# Patient Record
Sex: FEMALE | Race: WHITE | NOT HISPANIC OR LATINO | Employment: UNEMPLOYED | URBAN - METROPOLITAN AREA
[De-identification: names, ages, dates, MRNs, and addresses within clinical notes are randomized per-mention and may not be internally consistent; named-entity substitution may affect disease eponyms.]

---

## 2017-01-11 ENCOUNTER — HOSPITAL ENCOUNTER (OUTPATIENT)
Dept: RADIOLOGY | Facility: HOSPITAL | Age: 62
Discharge: HOME | End: 2017-01-11
Attending: INTERNAL MEDICINE
Payer: COMMERCIAL

## 2017-01-11 DIAGNOSIS — K76.0 FATTY LIVER: ICD-10-CM

## 2017-01-11 PROCEDURE — 76700 US EXAM ABDOM COMPLETE: CPT | Mod: 26,,, | Performed by: RADIOLOGY

## 2017-01-11 PROCEDURE — 76700 US EXAM ABDOM COMPLETE: CPT | Mod: TC

## 2017-01-17 ENCOUNTER — OFFICE VISIT (OUTPATIENT)
Dept: ORTHOPEDICS | Facility: CLINIC | Age: 62
End: 2017-01-17
Payer: COMMERCIAL

## 2017-01-17 VITALS
RESPIRATION RATE: 19 BRPM | BODY MASS INDEX: 41.78 KG/M2 | HEIGHT: 66 IN | SYSTOLIC BLOOD PRESSURE: 165 MMHG | WEIGHT: 260 LBS | DIASTOLIC BLOOD PRESSURE: 95 MMHG | HEART RATE: 87 BPM

## 2017-01-17 DIAGNOSIS — M17.0 PRIMARY OSTEOARTHRITIS OF BOTH KNEES: Primary | ICD-10-CM

## 2017-01-17 PROCEDURE — 99499 UNLISTED E&M SERVICE: CPT | Mod: S$GLB,,, | Performed by: NURSE PRACTITIONER

## 2017-01-17 PROCEDURE — 20610 DRAIN/INJ JOINT/BURSA W/O US: CPT | Mod: 50,S$GLB,, | Performed by: NURSE PRACTITIONER

## 2017-01-17 PROCEDURE — 99999 PR PBB SHADOW E&M-EST. PATIENT-LVL IV: CPT | Mod: PBBFAC,,, | Performed by: NURSE PRACTITIONER

## 2017-01-17 RX ORDER — TRIAMCINOLONE ACETONIDE 40 MG/ML
80 INJECTION, SUSPENSION INTRA-ARTICULAR; INTRAMUSCULAR
Status: COMPLETED | OUTPATIENT
Start: 2017-01-17 | End: 2017-01-17

## 2017-01-17 RX ADMIN — TRIAMCINOLONE ACETONIDE 80 MG: 40 INJECTION, SUSPENSION INTRA-ARTICULAR; INTRAMUSCULAR at 03:01

## 2017-01-17 NOTE — PROGRESS NOTES
Pt with known bilateral knee djd. She returns today for repeat cortisone injections.     Knee Injection Procedure Note    Pre-operative Diagnosis: bilateral knee degenerative arthritis    Post-operative Diagnosis: same    Indications: bilateral knee pain    Anesthesia: none    Procedure Details     Verbal consent was obtained for the procedure. The injection site was identified and the skin was prepared with alcohol. The bilateral knee was injected from an anterolateral approach with 1 ml of Kenalog and 5 ml Lidocaine under sterile technique using a 22 gauge needle. The needle was removed and the area cleansed and dressed.    Complications:  None; patient tolerated the procedure well.    she was advised to rest the knee today, using ice and elevation as needed for comfort and swelling. she did receive immediate relief of the knee pain. she was told this would be short lived and is secondary to the lidocaine. she may have an increase in discomfort tonight followed by steady improvement over the next several days. It may take 1-3 weeks following the injection to get the full benefit of the medication.

## 2017-03-15 RX ORDER — NAPROXEN 500 MG/1
TABLET ORAL
Qty: 60 TABLET | Refills: 0 | Status: SHIPPED | OUTPATIENT
Start: 2017-03-15 | End: 2018-09-26 | Stop reason: ALTCHOICE

## 2017-03-16 RX ORDER — TRIAMTERENE AND HYDROCHLOROTHIAZIDE 37.5; 25 MG/1; MG/1
CAPSULE ORAL
Qty: 90 CAPSULE | Refills: 0 | Status: SHIPPED | OUTPATIENT
Start: 2017-03-16 | End: 2017-04-21 | Stop reason: SDUPTHER

## 2017-04-21 ENCOUNTER — OFFICE VISIT (OUTPATIENT)
Dept: INTERNAL MEDICINE | Facility: CLINIC | Age: 62
End: 2017-04-21
Payer: COMMERCIAL

## 2017-04-21 VITALS
RESPIRATION RATE: 17 BRPM | SYSTOLIC BLOOD PRESSURE: 130 MMHG | BODY MASS INDEX: 41.76 KG/M2 | DIASTOLIC BLOOD PRESSURE: 90 MMHG | WEIGHT: 259.81 LBS | TEMPERATURE: 98 F | HEIGHT: 66 IN | HEART RATE: 91 BPM

## 2017-04-21 DIAGNOSIS — F43.21 ADJUSTMENT DISORDER WITH DEPRESSED MOOD: ICD-10-CM

## 2017-04-21 DIAGNOSIS — E66.01 MORBID OBESITY WITH BMI OF 40.0-44.9, ADULT: ICD-10-CM

## 2017-04-21 DIAGNOSIS — E89.0 POSTOPERATIVE HYPOTHYROIDISM: Primary | ICD-10-CM

## 2017-04-21 DIAGNOSIS — R73.03 PRE-DIABETES: ICD-10-CM

## 2017-04-21 DIAGNOSIS — I10 ESSENTIAL HYPERTENSION: ICD-10-CM
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
## 2017-04-21 DIAGNOSIS — Z12.31 ENCOUNTER FOR SCREENING MAMMOGRAM FOR BREAST CANCER: ICD-10-CM

## 2017-04-21 DIAGNOSIS — E78.5 HYPERLIPIDEMIA, UNSPECIFIED HYPERLIPIDEMIA TYPE: ICD-10-CM

## 2017-04-21 PROCEDURE — 1160F RVW MEDS BY RX/DR IN RCRD: CPT | Mod: S$GLB,,, | Performed by: INTERNAL MEDICINE

## 2017-04-21 PROCEDURE — 99999 PR PBB SHADOW E&M-EST. PATIENT-LVL III: CPT | Mod: PBBFAC,,, | Performed by: INTERNAL MEDICINE

## 2017-04-21 PROCEDURE — 3075F SYST BP GE 130 - 139MM HG: CPT | Mod: S$GLB,,, | Performed by: INTERNAL MEDICINE

## 2017-04-21 PROCEDURE — 99214 OFFICE O/P EST MOD 30 MIN: CPT | Mod: S$GLB,,, | Performed by: INTERNAL MEDICINE

## 2017-04-21 PROCEDURE — 3080F DIAST BP >= 90 MM HG: CPT | Mod: S$GLB,,, | Performed by: INTERNAL MEDICINE

## 2017-04-21 RX ORDER — ATORVASTATIN CALCIUM 10 MG/1
10 TABLET, FILM COATED ORAL DAILY
Qty: 90 TABLET | Refills: 3 | Status: SHIPPED | OUTPATIENT
Start: 2017-04-21 | End: 2017-10-23

## 2017-04-21 RX ORDER — LEVOTHYROXINE SODIUM 137 UG/1
TABLET ORAL
Qty: 30 TABLET | Refills: 11 | Status: SHIPPED | OUTPATIENT
Start: 2017-04-21 | End: 2017-04-24

## 2017-04-21 RX ORDER — BUPROPION HYDROCHLORIDE 100 MG/1
100 TABLET, EXTENDED RELEASE ORAL 2 TIMES DAILY
Qty: 60 TABLET | Refills: 2 | Status: SHIPPED | OUTPATIENT
Start: 2017-04-21 | End: 2017-10-09 | Stop reason: ALTCHOICE

## 2017-04-21 RX ORDER — MUPIROCIN 20 MG/G
OINTMENT TOPICAL 2 TIMES DAILY PRN
Qty: 30 G | Refills: 0 | Status: SHIPPED | OUTPATIENT
Start: 2017-04-21 | End: 2018-09-26 | Stop reason: ALTCHOICE

## 2017-04-21 RX ORDER — METFORMIN HYDROCHLORIDE 750 MG/1
750 TABLET, EXTENDED RELEASE ORAL DAILY
Qty: 90 TABLET | Refills: 0 | Status: SHIPPED | OUTPATIENT
Start: 2017-04-21 | End: 2017-07-18 | Stop reason: SDUPTHER

## 2017-04-21 RX ORDER — LOSARTAN POTASSIUM 25 MG/1
25 TABLET ORAL DAILY
Qty: 30 TABLET | Refills: 11 | Status: SHIPPED | OUTPATIENT
Start: 2017-04-21 | End: 2017-06-05

## 2017-04-21 NOTE — MR AVS SNAPSHOT
[facility]  [address]  [place] [address]  Phone: [phone]  Fax: [phone]                  Carolina Marcum   2017 9:00 AM   Office Visit    Description:  Female : 1955   Provider:  Kierra Cornejo MD   Department:  [facility]           Reason for Visit     Medication Refill     Follow-up           Diagnoses this Visit        Comments    Postoperative hypothyroidism    -  Primary     Adjustment disorder with depressed mood         Morbid obesity with BMI of 40.0-44.9, adult         Essential hypertension         Insect bite, initial encounter         Hyperlipidemia, unspecified hyperlipidemia type         Pre-diabetes         Encounter for screening mammogram for breast cancer                To Do List           Future Appointments        Provider Department Dept Phone    2017 9:40 AM LAB, APPOINTMENT [facility] [phone]      Goals (5 Years of Data)     None      Follow-Up and Disposition     Return in about 6 weeks (around 2017).       These Medications        Disp Refills Start End    SYNTHROID 137 mcg Tab tablet 30 tablet 11 2017     1 tab daily except 1/2 tab on Sundays.    Pharmacy: [facility] [address] [place] [address] [address] AT [facility] Ph #: [phone]       buPROPion (WELLBUTRIN SR) 100 MG TBSR 12 hr tablet 60 tablet 2 2017    Take 1 tablet (100 mg total) by mouth 2 (two) times daily. - Oral    Pharmacy: [facility] [address] [place], [place] [address] [address] [address] AT [facility] Ph #: [phone]       losartan (COZAAR) 25 MG tablet 30 tablet 11 2017    Take 1 tablet (25 mg total) by mouth once daily. - Oral    Pharmacy: [facility] [address] [place], [place] [address] [address] AT [facility] Ph #: [phone]       mupirocin (BACTROBAN) 2 % ointment 30 g 0  4/21/2017     Apply topically 2 (two) times daily as needed. - Topical (Top)    Pharmacy: [facility] [address] AT [facility] #: [phone]       metformin (GLUCOPHAGE-XR) 750 MG 24 hr tablet 90 tablet 0 4/21/2017     Take 1 tablet (750 mg total) by mouth once daily. - Oral    Pharmacy: [facility] [place] [address] [address] AT [facility] #: [phone]       atorvastatin (LIPITOR) 10 MG tablet 90 tablet 3 4/21/2017     Take 1 tablet (10 mg total) by mouth once daily. - Oral    Pharmacy: [facility] [address] [facility] #: [phone]       Notes to Pharmacy: **Patient requests 90 day supply**      [facility]     [facility] - 24/7 Assistance  Unless otherwise directed by your provider, please contact [facility], our nurse care line that is available for 24/7 assistance.     Registered nurses in the [facility] provide: appointment scheduling, clinical advisement, health education, and other advisory services.  Call: [phone] (toll free)               Medications           Message regarding Medications     Verify the changes and/or additions to your medication regime listed below are the same as discussed with your clinician today.  If any of these changes or additions are incorrect, please notify your healthcare provider.        START taking these NEW medications        Refills    buPROPion (WELLBUTRIN SR) 100 MG TBSR 12 hr tablet 2    Sig: Take 1 tablet (100 mg total) by mouth 2 (two) times daily.    Class: Normal    Route: Oral    losartan (COZAAR) 25 MG tablet 11    Sig: Take 1 tablet (25 mg total) by mouth once daily.    Class: Normal    Route: Oral    mupirocin (BACTROBAN) 2 % ointment 0    Sig: Apply topically 2 (two) times daily as needed.    Class: Normal    Route: Topical (Top)       CHANGE how you are taking these medications     Start Taking Instead of    metformin (GLUCOPHAGE-XR) 750 MG 24 hr tablet metformin (GLUCOPHAGE-XR) 750 MG 24 hr tablet    Dosage:  Take 1 tablet (750 mg total) by mouth once daily. Dosage:  TAKE 1 TABLET BY MOUTH DAILY WITH BREAKFAST    Reason for Change:  Reorder     atorvastatin (LIPITOR) 10 MG tablet atorvastatin (LIPITOR) 10 MG tablet    Dosage:  Take 1 tablet (10 mg total) by mouth once daily. Dosage:  TAKE 1 TABLET BY MOUTH ONCE DAILY    Reason for Change:  Reorder            Verify that the below list of medications is an accurate representation of the medications you are currently taking.  If none reported, the list may be blank. If incorrect, please contact your healthcare provider. Carry this list with you in case of emergency.           Current Medications     atorvastatin (LIPITOR) 10 MG tablet Take 1 tablet (10 mg total) by mouth once daily.    cetirizine (ZYRTEC) 10 MG tablet Take 10 mg by mouth. Half Tablet Oral Every day    esomeprazole (NEXIUM) 20 MG capsule Take 1 capsule (20 mg total) by mouth 2 (two) times daily. 1 Capsule, Delayed Release(E.C.) Oral Every evening    estradiol (VAGIFEM) 10 mcg Tab Insert vaginally twice weekly H.S.    fluticasone (FLONASE) 50 mcg/actuation nasal spray 1 Spray, Suspension Nasal Twice a day     hydrocortisone-pramox-E-pram#1 (ANALPRAM E) 2.5-1-1 % KtCT Place 1 application rectally 3 (three) times daily. 1 Cream Rectal 3-4 times per day.    hydrocortisone-pramoxine (PROCTOFOAM HC) rectal foam Place rectally 3 (three) times daily. 1 Foam Rectal Three times a day    metformin (GLUCOPHAGE-XR) 750 MG 24 hr tablet Take 1 tablet (750 mg total) by mouth once daily.    montelukast (SINGULAIR) 10 mg tablet TAKE 1 TABLET BY MOUTH EVERY EVENING    multivitamin (THERAGRAN) per tablet Take by mouth. 1 Tablet Oral Every day    naproxen (NAPROSYN) 500 MG tablet TAKE 1 TABLET BY MOUTH TWICE DAILY    nystatin (MYCOSTATIN) cream Apply  "topically 2 (two) times daily.    SYNTHROID 137 mcg Tab tablet 1 tab daily except 1/2 tab on Sundays.    terconazole (TERAZOL 3) 0.8 % vaginal cream     tramadol (ULTRAM) 50 mg tablet Take 1 tablet (50 mg total) by mouth every 6 (six) hours as needed for Pain.    tretinoin, emollient, 0.05 % Crea 1 Cream Topical Every day    TRIAMCINOLONE ACETONIDE TOP 1   Every day (cream)    triamterene-hydrochlorothiazide 37.5-25 mg (DYAZIDE) 37.5-25 mg per capsule TAKE 1 CAPSULE BY MOUTH EVERY DAY WITH A BANANA    buPROPion (WELLBUTRIN SR) 100 MG TBSR 12 hr tablet Take 1 tablet (100 mg total) by mouth 2 (two) times daily.    ciclesonide (OMNARIS) 50 mcg nasal spray 50 mcg. 2 Spray, Non-Aerosol Nasal Every day    losartan (COZAAR) 25 MG tablet Take 1 tablet (25 mg total) by mouth once daily.    mupirocin (BACTROBAN) 2 % ointment Apply topically 2 (two) times daily as needed.    pilocarpine (SALAGEN) 5 MG Tab Take 1 tablet (5 mg total) by mouth 2 (two) times daily.    topiramate (TOPAMAX) 50 MG tablet Take 1 tablet (50 mg total) by mouth once daily.           Clinical Reference Information           Your Vitals Were     BP Pulse Temp Resp Height Weight    130/90 91 98.1 °F (36.7 °C) (Oral) 17 5' 6" (1.676 m) 117.8 kg (259 lb 13 oz)    BMI                41.93 kg/m2          Blood Pressure          Most Recent Value    BP  (!)  130/90      Allergies as of 4/21/2017     No Known Allergies      Immunizations Administered on Date of Encounter - 4/21/2017     None      Orders Placed During Today's Visit     Future Labs/Procedures Expected by Expires    Mammo Digital Screening Bilateral With CAD  4/21/2017 6/21/2018    T4, free  4/21/2017 6/20/2018    TSH  4/21/2017 6/20/2018      Language Assistance Services     ATTENTION: Language assistance services are available, free of charge. Please call [phone].      ATENCIÓN: Si habla español, tiene a hoff disposición servicios gratuitos de asistencia lingüística. Llame al " [phone].     TERESSA Ý: N?u b?n nói Ti?ng Vi?t, có các d?ch v? h? tr? ngôn ng? mi?n phí dành cho b?n. G?i s? [phone].         [facility] complies with applicable Federal civil rights laws and does not discriminate on the basis of race, color, national origin, age, disability, or sex.

## 2017-04-21 NOTE — PROGRESS NOTES
"Subjective:       Patient ID: Carolina Marcum is a 62 y.o. female.    Chief Complaint: Medication Refill and Follow-up    HPI   Thyroid nodule s/p total thyroidectomy-->Hypothyroidism - synthroid 137mcg daily. T4 mildly elevated w/ low normal TSH. Decreased synthroid to 137mcg daily except 1/2 tab at Sunday. Sometimes forgetful to take the half and would just take 1/2 some day in the week day. More hectic life and so weight loss of a pound. No palpitations unless too much caffeine or alcohol. No SOB/LE edema/cp.    Hasn't started Ideal Protein.     HTN - triamterene-hydrochlorothiazide. Takes it intermittently as it causes achy joints. Tries to drink a lot of water.     Previously on lexapro 10mg daily and wellbutrin XL 150mg daily previously. Stress and feels down x 6 mo. Caregiver. Cries intermittently.     Review of Systems  as above in HPI.     Objective:      Physical Exam    BP (!) 130/90  Pulse 91  Temp 98.1 °F (36.7 °C) (Oral)   Resp 17  Ht 5' 6" (1.676 m)  Wt 117.8 kg (259 lb 13 oz)  BMI 41.93 kg/m2    gEN - a+ox4, nad  HEENT - PERRL, op clear. MMM.   Neck - No thyromegaly or cervical LAD. No thyroid masses felt.  CV - RRR, no m/r   Chest - CTAB, no wheezing or rhonchi  Abd - S/NT/ND/+BS.   Ext - 2+B radial pulses. No LE edema.   Skin - right lower/mid back w/ area of redness that's tender to palpation. Also has small nodules that feel like LN on the upper thighs (about 3 each upper leg), nontender and soft.     Assessment/Plan     Carolina was seen today for medication refill and follow-up.    Diagnoses and all orders for this visit:    Postoperative hypothyroidism  -     TSH; Future  -     T4, free; Future  -     SYNTHROID 137 mcg Tab tablet; 1 tab daily except 1/2 tab on Sundays.    Adjustment disorder with depressed mood - restart wellbutrin but at 100mg bid.   -     buPROPion (WELLBUTRIN SR) 100 MG TBSR 12 hr tablet; Take 1 tablet (100 mg total) by mouth 2 (two) times daily.    Morbid obesity " with BMI of 41.9, adult - start ideal protein.   -     metformin (GLUCOPHAGE-XR) 750 MG 24 hr tablet; Take 1 tablet (750 mg total) by mouth once daily.    Essential hypertension  -     losartan (COZAAR) 25 MG tablet; Take 1 tablet (25 mg total) by mouth once daily.    Insect bite, initial encounter  -     mupirocin (BACTROBAN) 2 % ointment; Apply topically 2 (two) times daily as needed.    Hyperlipidemia, unspecified hyperlipidemia type  -     atorvastatin (LIPITOR) 10 MG tablet; Take 1 tablet (10 mg total) by mouth once daily.    Pre-diabetes  -     metformin (GLUCOPHAGE-XR) 750 MG 24 hr tablet; Take 1 tablet (750 mg total) by mouth once daily.    Encounter for screening mammogram for breast cancer  -     Mammo Digital Screening Bilateral With CAD; Future    Pt to monitor size of nodules on the thighs to ensure no growth. Let me know if there is growth.     Return in about 6 weeks (around 6/2/2017).      Kierra Cornejo MD  Department of Internal Medicine - [facility]  9:10 AM

## 2017-04-24 ENCOUNTER — TELEPHONE (OUTPATIENT)
Dept: INTERNAL MEDICINE | Facility: CLINIC | Age: 62
End: 2017-04-24

## 2017-04-24 RX ORDER — LEVOTHYROXINE SODIUM 125 UG/1
125 TABLET ORAL DAILY
Qty: 90 TABLET | Refills: 3 | Status: SHIPPED | OUTPATIENT
Start: 2017-04-24 | End: 2017-06-06 | Stop reason: SDUPTHER

## 2017-05-14 ENCOUNTER — HOSPITAL ENCOUNTER (EMERGENCY)
Facility: HOSPITAL | Age: 62
Discharge: HOME | End: 2017-05-14
Attending: EMERGENCY MEDICINE
Payer: COMMERCIAL

## 2017-05-14 VITALS
TEMPERATURE: 99 F | DIASTOLIC BLOOD PRESSURE: 73 MMHG | OXYGEN SATURATION: 97 % | RESPIRATION RATE: 16 BRPM | HEIGHT: 66 IN | BODY MASS INDEX: 40.18 KG/M2 | WEIGHT: 250 LBS | SYSTOLIC BLOOD PRESSURE: 188 MMHG | HEART RATE: 85 BPM

## 2017-05-14 DIAGNOSIS — T78.40XA ALLERGIC REACTION, INITIAL ENCOUNTER: Primary | ICD-10-CM

## 2017-05-14 LAB — POCT GLUCOSE: 109 MG/DL (ref 70–110)

## 2017-05-14 PROCEDURE — 99284 EMERGENCY DEPT VISIT MOD MDM: CPT | Mod: 25

## 2017-05-14 PROCEDURE — 96374 THER/PROPH/DIAG INJ IV PUSH: CPT

## 2017-05-14 PROCEDURE — 63600175 PHARM REV CODE 636 W HCPCS: Performed by: EMERGENCY MEDICINE

## 2017-05-14 PROCEDURE — 96375 TX/PRO/DX INJ NEW DRUG ADDON: CPT

## 2017-05-14 PROCEDURE — 25000003 PHARM REV CODE 250: Performed by: EMERGENCY MEDICINE

## 2017-05-14 PROCEDURE — 82962 GLUCOSE BLOOD TEST: CPT

## 2017-05-14 RX ORDER — FAMOTIDINE 10 MG/ML
40 INJECTION INTRAVENOUS
Status: COMPLETED | OUTPATIENT
Start: 2017-05-14 | End: 2017-05-14

## 2017-05-14 RX ORDER — DIPHENHYDRAMINE HYDROCHLORIDE 50 MG/ML
12.5 INJECTION INTRAMUSCULAR; INTRAVENOUS
Status: COMPLETED | OUTPATIENT
Start: 2017-05-14 | End: 2017-05-14

## 2017-05-14 RX ORDER — PREDNISONE 20 MG/1
40 TABLET ORAL DAILY
Qty: 10 TABLET | Refills: 0 | Status: SHIPPED | OUTPATIENT
Start: 2017-05-14 | End: 2017-05-24

## 2017-05-14 RX ORDER — EPINEPHRINE 0.3 MG/.3ML
1 INJECTION SUBCUTANEOUS
Qty: 1 DEVICE | Refills: 0 | Status: SHIPPED | OUTPATIENT
Start: 2017-05-14 | End: 2019-08-12

## 2017-05-14 RX ORDER — METHYLPREDNISOLONE SOD SUCC 125 MG
125 VIAL (EA) INJECTION
Status: COMPLETED | OUTPATIENT
Start: 2017-05-14 | End: 2017-05-14

## 2017-05-14 RX ORDER — DIPHENHYDRAMINE HCL 25 MG
25 CAPSULE ORAL EVERY 6 HOURS PRN
Qty: 30 CAPSULE | Refills: 1 | Status: SHIPPED | OUTPATIENT
Start: 2017-05-14 | End: 2017-06-05

## 2017-05-14 RX ORDER — FAMOTIDINE 20 MG/1
20 TABLET, FILM COATED ORAL 2 TIMES DAILY
Qty: 10 TABLET | Refills: 0 | Status: SHIPPED | OUTPATIENT
Start: 2017-05-14 | End: 2017-06-27

## 2017-05-14 RX ADMIN — METHYLPREDNISOLONE SODIUM SUCCINATE 125 MG: 125 INJECTION, POWDER, FOR SOLUTION INTRAMUSCULAR; INTRAVENOUS at 09:05

## 2017-05-14 RX ADMIN — FAMOTIDINE 40 MG: 10 INJECTION, SOLUTION INTRAVENOUS at 09:05

## 2017-05-14 RX ADMIN — DIPHENHYDRAMINE HYDROCHLORIDE 12.5 MG: 50 INJECTION, SOLUTION INTRAMUSCULAR; INTRAVENOUS at 09:05

## 2017-05-14 NOTE — ED AVS SNAPSHOT
[facility]  [address] [place] [address]               Carolina Marcum   2017  9:01 PM   ED    Description:  Female : 1955   Department:  [facility]           Your Care was Coordinated By:     Provider Role From To    Wilmer Ren MD Attending Provider 17 1908 --      Reason for Visit     Allergic Reaction           Diagnoses this Visit        Comments    Allergic reaction, initial encounter    -  Primary       ED Disposition     ED Disposition Condition Comment    Discharge             To Do List           Follow-up Information     Follow up with Kierra Cornejo MD In 1 week(s).    Specialty:  Internal Medicine    Contact information:    [address]  [facility] [address]  [phone]        Referral Details     Referred By    Referred To    Wilmer Ren MD   [address] [place] [place] [address]   Phone:  [phone]   Fax:  [phone]    Order:  Ambulatory Referral To Allergy   Reason:  Specialty Services Required           These Medications        Disp Refills Start End    diphenhydrAMINE (BENADRYL) 25 mg capsule 30 capsule 1 2017     Take 1 each (25 mg total) by mouth every 6 (six) hours as needed for Itching or Allergies. - Oral    Pharmacy: [facility] [address] [place] [address] [address] AT [facility] Ph #: [phone]       predniSONE (DELTASONE) 20 MG tablet 10 tablet 0 2017    Take 2 tablets (40 mg total) by mouth once daily. - Oral    Pharmacy: [facility] [address] [place] [address] [address] AT [facility] Ph #: [phone]       famotidine (PEPCID AC) 20 MG tablet 10 tablet 0 2017    Take 1 tablet (20 mg total) by mouth 2 (two) times daily. - Oral    Pharmacy: [facility] [address] [place] [address] [place] [address] [address] AT [facility] Ph #: [phone]       epinephrine  (EPIPEN) 0.3 mg/0.3 mL AtIn 1 Device 0 5/14/2017 5/14/2018    Inject 0.3 mLs (0.3 mg total) into the muscle as needed (for allergic reaction in which you feel like your throat is swelling, you cannot breathe.). - Intramuscular    Pharmacy: [facility] [id number] [address] [facility] [address] AT [facility] #: [phone]         [facility]     [facility] On Call Nurse Care Line - 24/7 Assistance  Unless otherwise directed by your provider, please contact [facility], our nurse care line that is available for 24/7 assistance.     Registered nurses in the [facility] provide: appointment scheduling, clinical advisement, health education, and other advisory services.  Call: [phone] (toll free)               Medications           Message regarding Medications     Verify the changes and/or additions to your medication regime listed below are the same as discussed with your clinician today.  If any of these changes or additions are incorrect, please notify your healthcare provider.        START taking these NEW medications        Refills    diphenhydrAMINE (BENADRYL) 25 mg capsule 1    Sig: Take 1 each (25 mg total) by mouth every 6 (six) hours as needed for Itching or Allergies.    Class: Print    Route: Oral    predniSONE (DELTASONE) 20 MG tablet 0    Sig: Take 2 tablets (40 mg total) by mouth once daily.    Class: Print    Route: Oral    famotidine (PEPCID AC) 20 MG tablet 0    Sig: Take 1 tablet (20 mg total) by mouth 2 (two) times daily.    Class: Print    Route: Oral    epinephrine (EPIPEN) 0.3 mg/0.3 mL AtIn 0    Sig: Inject 0.3 mLs (0.3 mg total) into the muscle as needed (for allergic reaction in which you feel like your throat is swelling, you cannot breathe.).    Class: Print    Route: Intramuscular      These medications were administered today        Dose Freq    methylPREDNISolone sodium succinate injection 125 mg 125 mg ED 1 Time    Sig: Inject 125 mg  into the vein ED 1 Time.    Class: Normal    Route: Intravenous    famotidine (PF) 20 mg/2 mL injection 40 mg 40 mg ED 1 Time    Sig: Inject 4 mLs (40 mg total) into the vein ED 1 Time.    Class: Normal    Route: Intravenous    diphenhydrAMINE injection 12.5 mg 12.5 mg ED 1 Time    Sig: Inject 0.25 mLs (12.5 mg total) into the vein ED 1 Time.    Class: Normal    Route: Intravenous           Verify that the below list of medications is an accurate representation of the medications you are currently taking.  If none reported, the list may be blank. If incorrect, please contact your healthcare provider. Carry this list with you in case of emergency.           Current Medications     atorvastatin (LIPITOR) 10 MG tablet Take 1 tablet (10 mg total) by mouth once daily.    buPROPion (WELLBUTRIN SR) 100 MG TBSR 12 hr tablet Take 1 tablet (100 mg total) by mouth 2 (two) times daily.    cetirizine (ZYRTEC) 10 MG tablet Take 10 mg by mouth. Half Tablet Oral Every day    ciclesonide (OMNARIS) 50 mcg nasal spray 50 mcg. 2 Spray, Non-Aerosol Nasal Every day    esomeprazole (NEXIUM) 20 MG capsule Take 1 capsule (20 mg total) by mouth 2 (two) times daily. 1 Capsule, Delayed Release(E.C.) Oral Every evening    estradiol (VAGIFEM) 10 mcg Tab Insert vaginally twice weekly H.S.    fluticasone (FLONASE) 50 mcg/actuation nasal spray 1 Spray, Suspension Nasal Twice a day     levothyroxine (SYNTHROID) 125 MCG tablet Take 1 tablet (125 mcg total) by mouth once daily. Stop synthroid 137mcg daily.    losartan (COZAAR) 25 MG tablet Take 1 tablet (25 mg total) by mouth once daily.    metformin (GLUCOPHAGE-XR) 750 MG 24 hr tablet Take 1 tablet (750 mg total) by mouth once daily.    multivitamin (THERAGRAN) per tablet Take by mouth. 1 Tablet Oral Every day    naproxen (NAPROSYN) 500 MG tablet TAKE 1 TABLET BY MOUTH TWICE DAILY    pilocarpine (SALAGEN) 5 MG Tab Take 1 tablet (5 mg total) by mouth 2 (two) times daily.    terconazole (TERAZOL 3) 0.8  "% vaginal cream     TRIAMCINOLONE ACETONIDE TOP 1   Every day (cream)    triamterene-hydrochlorothiazide 37.5-25 mg (DYAZIDE) 37.5-25 mg per capsule TAKE 1 CAPSULE BY MOUTH EVERY DAY WITH A BANANA    diphenhydrAMINE (BENADRYL) 25 mg capsule Take 1 each (25 mg total) by mouth every 6 (six) hours as needed for Itching or Allergies.    epinephrine (EPIPEN) 0.3 mg/0.3 mL AtIn Inject 0.3 mLs (0.3 mg total) into the muscle as needed (for allergic reaction in which you feel like your throat is swelling, you cannot breathe.).    famotidine (PEPCID AC) 20 MG tablet Take 1 tablet (20 mg total) by mouth 2 (two) times daily.    hydrocortisone-pramoxine (PROCTOFOAM HC) rectal foam Place rectally 3 (three) times daily. 1 Foam Rectal Three times a day    montelukast (SINGULAIR) 10 mg tablet TAKE 1 TABLET BY MOUTH EVERY EVENING    mupirocin (BACTROBAN) 2 % ointment Apply topically 2 (two) times daily as needed.    nystatin (MYCOSTATIN) cream Apply topically 2 (two) times daily.    predniSONE (DELTASONE) 20 MG tablet Take 2 tablets (40 mg total) by mouth once daily.    topiramate (TOPAMAX) 50 MG tablet Take 1 tablet (50 mg total) by mouth once daily.    tramadol (ULTRAM) 50 mg tablet Take 1 tablet (50 mg total) by mouth every 6 (six) hours as needed for Pain.    tretinoin, emollient, 0.05 % Crea 1 Cream Topical Every day           Clinical Reference Information           Your Vitals Were     BP Pulse Temp Resp Height Weight    188/73 (BP Location: Left arm, Patient Position: Lying, BP Method: Automatic) 85 98.6 °F (37 °C) (Oral) 16 5' 6" (1.676 m) 113.4 kg (250 lb)    SpO2 BMI             97% 40.35 kg/m2         Allergies as of 5/14/2017     No Known Allergies      Immunizations Administered on Date of Encounter - 5/14/2017     None      ED Micro, Lab, POCT     Start Ordered       Status Ordering Provider    05/14/17 2153 05/14/17 2153  POCT glucose  Once      Final result       ED Imaging Orders     None        Discharge " Instructions         Take your medications as prescribed.  Follow up with your primary care physician if you're not improving in 2-3 days.  Please return to the emergency department if another episode of ALLERGIC reaction, difficulty breathing, shortness of breath or any other concerns.  Please refer to the additional material providing further information including when return to the emergency department.   When to seek medical advice  Call your healthcare provider right away if any of these occur:  · Shortness of breath  · Increased swelling in the face, eyelids, or lips  · Dizziness, weakness  · Continuing or recurring symptoms  · Spreading areas of itching, redness or swelling  · Signs of infection:  ¨ Spreading redness  ¨ Increased pain or swelling  ¨ Fever (1 degree above your normal temperature) lasting for 24 to 48 hours Or, whatever your healthcare provider told you to report based on your medical condition  ¨ Colored fluid draining from the inflamed area  Date Last Reviewed: 7/30/2015  © 7098-0583 [employer]. [address] [place] [address]. All rights reserved. This information is not intended as a substitute for professional medical care. Always follow your healthcare professional's instructions.          Nasal Allergy Medicines  The table below lists the most common over-the-counter (OTC) medicines for nasal allergies. Some are pills. Some are liquids. And, some are nasal sprays. It's important to check with your healthcare provider or pharmacist before taking these medicines, even though they are available without a prescription. And, be sure to follow the instructions on the package labels.  Type of medicine Description of medicine   Antihistamines · Stops the release of histamine, a substance in the body that causes many allergy symptoms.  · Helps prevent sneezing, runny nose, and itchy and watery eyes.   Corticosteroids    · Reduces inflammation and swelling.  · Relieves  itching and sneezing.   Decongestants · Reduce swelling of nasal passages and relieve sinus pressure  · Overuse can worsen symptoms.   Mast cell inhibitors · Also help prevent cells from releasing histamine  · Prevent and relieve sneezing, itchiness, and runny nose.   Anticholinergics · Decrease mucus production in the nasal passages.  · Relieves runny nose.   Saline sprays, rinses, and gels · Provide lubrication or moisture to nasal passages. These can be used as often as needed.  · Help soothe irritated nasal passages. Loosens thick mucus.   NOTE: Talk to your healthcare provider or pharmacist about the possible side effects and drug or food interactions of any medicine you take.   How to use nasal spray  Nasal sprays must be used the right way to be effective. Be sure to do the following:  · Blow your nose to clear your nostrils.  · Gently shake the bottle. Then remove the cap.  · With your right hand, carefully insert the tip of the bottle into your left nostril. Make sure to point the tip toward your ear and not the center of the nose.  · While gently breathing in through your nose, press down once on the pump to release the spray.  · Breathe out through your mouth.  · With your left hand, repeat the steps for your right nostril.  Date Last Reviewed: 9/1/2016 © 2000-2016 [employer]. [address] [place] [address], [place] [address]. All rights reserved. This information is not intended as a substitute for professional medical care. Always follow your healthcare professional's instructions.          Discharge References/Attachments     ALLERGIC REACTION, OTHER (GENERAL) (ENGLISH)       [facility] complies with applicable Federal civil rights laws and does not discriminate on the basis of race, color, national origin, age, disability, or sex.        Language Assistance Services     ATTENTION: Language assistance services are available, free of charge. Please call [phone].       ATENCIÓN: Si habla español, tiene a hoff disposición servicios gratuitos de asistencia lingüística. Llame al [phone].     CHÚ Ý: N?u b?n nói Ti?ng Vi?t, có các d?ch v? h? tr? ngôn ng? mi?n phí dành cho b?n. G?i s? [phone].

## 2017-05-15 NOTE — DISCHARGE INSTRUCTIONS
Take your medications as prescribed.  Follow up with your primary care physician if you're not improving in 2-3 days.  Please return to the emergency department if another episode of ALLERGIC reaction, difficulty breathing, shortness of breath or any other concerns.  Please refer to the additional material providing further information including when return to the emergency department.   When to seek medical advice  Call your healthcare provider right away if any of these occur:  · Shortness of breath  · Increased swelling in the face, eyelids, or lips  · Dizziness, weakness  · Continuing or recurring symptoms  · Spreading areas of itching, redness or swelling  · Signs of infection:  ¨ Spreading redness  ¨ Increased pain or swelling  ¨ Fever (1 degree above your normal temperature) lasting for 24 to 48 hours Or, whatever your healthcare provider told you to report based on your medical condition  ¨ Colored fluid draining from the inflamed area  Date Last Reviewed: 7/30/2015  © 6948-9461 [employer]. [address] [place] [address]. All rights reserved. This information is not intended as a substitute for professional medical care. Always follow your healthcare professional's instructions.          Nasal Allergy Medicines  The table below lists the most common over-the-counter (OTC) medicines for nasal allergies. Some are pills. Some are liquids. And, some are nasal sprays. It's important to check with your healthcare provider or pharmacist before taking these medicines, even though they are available without a prescription. And, be sure to follow the instructions on the package labels.  Type of medicine Description of medicine   Antihistamines · Stops the release of histamine, a substance in the body that causes many allergy symptoms.  · Helps prevent sneezing, runny nose, and itchy and watery eyes.   Corticosteroids    · Reduces inflammation and swelling.  · Relieves itching and sneezing.    Decongestants · Reduce swelling of nasal passages and relieve sinus pressure  · Overuse can worsen symptoms.   Mast cell inhibitors · Also help prevent cells from releasing histamine  · Prevent and relieve sneezing, itchiness, and runny nose.   Anticholinergics · Decrease mucus production in the nasal passages.  · Relieves runny nose.   Saline sprays, rinses, and gels · Provide lubrication or moisture to nasal passages. These can be used as often as needed.  · Help soothe irritated nasal passages. Loosens thick mucus.   NOTE: Talk to your healthcare provider or pharmacist about the possible side effects and drug or food interactions of any medicine you take.   How to use nasal spray  Nasal sprays must be used the right way to be effective. Be sure to do the following:  · Blow your nose to clear your nostrils.  · Gently shake the bottle. Then remove the cap.  · With your right hand, carefully insert the tip of the bottle into your left nostril. Make sure to point the tip toward your ear and not the center of the nose.  · While gently breathing in through your nose, press down once on the pump to release the spray.  · Breathe out through your mouth.  · With your left hand, repeat the steps for your right nostril.  Date Last Reviewed: 9/1/2016  © 6916-5724 [employer]. [address] [place] [address], [place] [address]. All rights reserved. This information is not intended as a substitute for professional medical care. Always follow your healthcare professional's instructions.

## 2017-05-15 NOTE — ED PROVIDER NOTES
Encounter Date: 5/14/2017       History     Chief Complaint   Patient presents with    Allergic Reaction     reports possible allergic reaction that started approx 3 hrs ago, reports running nose and thick tongue, took zerytec 10mg      Review of patient's allergies indicates:  No Known Allergies  HPI Comments: CHIEF COMPLAINT: ALLERGIC reaction    HISTORY OF PRESENT ILLNESS: This is a 62-year-old female presents to the emergency Department today with complete ALLERGIC reaction.  She reports she is unaware of what she had causing the ALLERGIC reaction.  She is known to be ALLERGIC to oak and cats.  They were driving through the [place] today on the way back from eating.  She has no known food ALLERGIES.  Did not take any new medications.  She reports that she has a very thick nasal drainage that happens when this comes on.  It goes down the throat makes it feel like she cannot breathe.  She does not have any lip swelling but she reports she may have some tongue swelling.  No posterior pharynx swelling.  No stridor.  No nausea no vomiting.  No diarrhea.    REVIEW OF SYSTEMS:  Constitutional: No fever, no chills.  Eyes: No discharge. No pain.  HENT: No ear ache. No sore throat.  Cardiovascular: No chest pain, no palpitations.  Respiratory: No cough, no shortness of breath.  Gastrointestinal: No abdominal pain, no vomiting. No diarrhea.  Genitourinary: No hematuria, dysuria, urgency.  Musculoskeletal: No back pain.  Skin: No rashes, no lesions.  Neurological: No headache, no focal weakness.    ALLERGIES reviewed  Family history reviewed  Home medications reviewed  Problem list reviewed        The history is provided by the patient.     Past Medical History:   Diagnosis Date    Abnormal Pap smear of cervix 1989    [facility] - CK, then Hyst    Asthma, chronic     Bleeding hemorrhoids     Depression     Diverticular disease     Endometriosis     Fatty liver 12/15/2014    Herpes simplex without mention of complication      Hypercholesterolemia     Multiple gastric polyps 2012    Obesity     Reflux      Past Surgical History:   Procedure Laterality Date    BREAST BIOPSY      CERVICAL CONIZATION   W/ LASER       SECTION      x1    CHOLECYSTECTOMY      HYSTERECTOMY  age 42    [place], LSO (uterine fibroids, adhesions, endometriosis)    OOPHORECTOMY  age 42    LSO (endometriosis, cyst)    THYROIDECTOMY      TOE SURGERY      left    TOTAL THYROIDECTOMY       Family History   Problem Relation Age of Onset    Breast cancer Maternal Aunt     Ovarian cancer Neg Hx     Colon cancer Neg Hx      Social History   Substance Use Topics    Smoking status: Never Smoker    Smokeless tobacco: Never Used    Alcohol use No      Comment: none lately.  rarely has a drink for special occassion     Review of Systems   All other systems reviewed and are negative.      Physical Exam   Initial Vitals   BP Pulse Resp Temp SpO2   17   238/108 89 20 98.6 °F (37 °C) 99 %     Physical Exam    Nursing note and vitals reviewed.  Constitutional: She appears well-developed and well-nourished.   HENT:   Head: Normocephalic and atraumatic.   Mouth/Throat: Oropharynx is clear and moist.   No noticeable lip, tongue or uvular swelling.  She does have some nasal drainage.   Eyes: Conjunctivae and EOM are normal. Pupils are equal, round, and reactive to light. No scleral icterus.   Neck: Normal range of motion. Neck supple. No JVD present.   Cardiovascular: Normal rate, regular rhythm, normal heart sounds and intact distal pulses. Exam reveals no gallop and no friction rub.    No murmur heard.  Pulmonary/Chest: Breath sounds normal. No stridor. No respiratory distress. She has no wheezes. She has no rhonchi. She has no rales. She exhibits no tenderness.   Abdominal: Soft. Bowel sounds are normal. She exhibits no distension and no mass. There is no tenderness. There is no  rebound and no guarding.   Musculoskeletal: Normal range of motion. She exhibits no edema or tenderness.   Neurological: She is alert and oriented to person, place, and time. She has normal strength. No cranial nerve deficit.   Skin: Skin is warm and dry. No rash noted. No pallor.   Psychiatric: She has a normal mood and affect. Thought content normal.         ED Course   Procedures  Labs Reviewed   POCT GLUCOSE             Medical Decision Making:   ED Management:  This is a 62-year-old female presents to the emergency department with severe ALLERGIC reaction.  She has not had any swelling.  No stridor.  No breathing issues.  She feels much improved by medications.  We'll discharge her home on medications including short course of steroids, H1 and H2 blockers.  I will also write her a prescription for EpiPen given that she's had these reactions in the past.  The patient is comfortable with this plan and comfortable going home at this time. After taking into careful account the historical factors and physical exam findings of the patient's presentation today no acute emergent medical condition has been identified. The patient appears to be low risk for an emergent medical condition and I feel it is safe and appropriate at this time for the patient to be discharged to follow-up as detailed in their discharge instructions for reevaluation and possible continued outpatient workup and management. I have discussed the specifics of the workup with the patient and the patient has verbalized understanding of the details of the workup, the diagnosis, the treatment plan, and the need for outpatient follow-up. In addition, I have advised the patient that they can return to the ED and/or activate EMS at any time with worsening of their symptoms, change of their symptoms, or with any other medical complaint. The patient remained comfortable and stable during their visit in the ED.  Discharge and follow-up instructions discussed  with the patient who expressed understanding and willingness to comply with my recommendations.     This medical record was prepared using voice dictation software. There may be phonetic errors.                   ED Course   The pt feels improved following meds. Back to her baseline.   Clinical Impression:   The encounter diagnosis was Allergic reaction, initial encounter.    Disposition:   Disposition: Discharged  Condition: Stable       Wilmer Ren MD  05/14/17 5374

## 2017-05-15 NOTE — ED TRIAGE NOTES
Pt arrived in ED with complaints of allergic reaction that started a few hours ago. Complaints of SOB, frequent dry cough, and post nasal drip

## 2017-05-15 NOTE — ED NOTES
Patient has verified the spelling of their name and  on armband  LOC: The patient is awake, alert, and aware of environment with an appropriate affect, the patient is oriented x 4 and speaking appropriately.   APPEARANCE: Patient resting comfortably and in no acute distress, patient is clean and well groomed, patient's clothing is properly fastened.   SKIN: The skin is warm and dry, color consistent with ethnicity, patient has normal skin turgor and moist mucus membranes, skin intact, no breakdown or bruising noted.   : denies any problems urinating  MUSCULOSKELETAL: Patient moving all extremities spontaneously, no obvious swelling or deformities noted.   RESPIRATORY: Airway is open and patent, respirations are spontaneous, patient has a normal effort and rate, no accessory muscle use noted, bilateral breath sounds clear, complaints of SOB, post nasal drip allergic reaction per pt  ABDOMEN: Soft and non tender to palpation, no distention noted, normoactive bowel sounds present in all four quadrants, constipation at times.   CARDIAC: Normal rate and rhythm, no peripheral edema noted, less then 3 second capillary refill, denies chest pain

## 2017-05-25 ENCOUNTER — HOSPITAL ENCOUNTER (OUTPATIENT)
Dept: RADIOLOGY | Facility: HOSPITAL | Age: 62
Discharge: HOME | End: 2017-05-25
Attending: INTERNAL MEDICINE
Payer: COMMERCIAL

## 2017-05-25 DIAGNOSIS — Z12.31 ENCOUNTER FOR SCREENING MAMMOGRAM FOR BREAST CANCER: ICD-10-CM

## 2017-05-25 PROCEDURE — 77063 BREAST TOMOSYNTHESIS BI: CPT | Mod: 26,,, | Performed by: RADIOLOGY

## 2017-05-25 PROCEDURE — 77067 SCR MAMMO BI INCL CAD: CPT | Mod: TC

## 2017-05-25 PROCEDURE — 77067 SCR MAMMO BI INCL CAD: CPT | Mod: 26,,, | Performed by: RADIOLOGY

## 2017-05-28 ENCOUNTER — PATIENT MESSAGE (OUTPATIENT)
Dept: ADMINISTRATIVE | Facility: OTHER | Age: 62
End: 2017-05-28

## 2017-05-29 ENCOUNTER — TELEPHONE (OUTPATIENT)
Dept: RADIOLOGY | Facility: HOSPITAL | Age: 62
End: 2017-05-29

## 2017-05-29 ENCOUNTER — PATIENT MESSAGE (OUTPATIENT)
Dept: ADMINISTRATIVE | Facility: OTHER | Age: 62
End: 2017-05-29

## 2017-05-29 NOTE — TELEPHONE ENCOUNTER
Spoke with patient and explained mammogram findings.Patient expressed understanding of results. Patient is scheduled for a abnormal mammogram follow up appointment at [facility] on 5/30/17

## 2017-05-30 ENCOUNTER — HOSPITAL ENCOUNTER (OUTPATIENT)
Dept: RADIOLOGY | Facility: HOSPITAL | Age: 62
Discharge: HOME | End: 2017-05-30
Attending: INTERNAL MEDICINE
Payer: COMMERCIAL

## 2017-05-30 DIAGNOSIS — R92.8 ABNORMAL MAMMOGRAM: ICD-10-CM

## 2017-05-30 PROCEDURE — 77061 BREAST TOMOSYNTHESIS UNI: CPT | Mod: 26,LT,, | Performed by: RADIOLOGY

## 2017-05-30 PROCEDURE — 77065 DX MAMMO INCL CAD UNI: CPT | Mod: 26,LT,, | Performed by: RADIOLOGY

## 2017-05-30 PROCEDURE — 76642 ULTRASOUND BREAST LIMITED: CPT | Mod: 26,LT,, | Performed by: RADIOLOGY

## 2017-05-30 PROCEDURE — 77061 BREAST TOMOSYNTHESIS UNI: CPT | Mod: TC,LT

## 2017-05-30 PROCEDURE — 76642 ULTRASOUND BREAST LIMITED: CPT | Mod: TC,LT

## 2017-05-31 ENCOUNTER — TELEPHONE (OUTPATIENT)
Dept: RADIOLOGY | Facility: HOSPITAL | Age: 62
End: 2017-05-31

## 2017-05-31 NOTE — TELEPHONE ENCOUNTER
Spoke with patient. Reviewed breast biopsy procedure and reviewed instructions for breast biopsy. Patient expressed understanding and all questions were answered. Provided patient with my phone number to call for any further concerns or questions.   Patient scheduled breast biopsy at the [facility] on 6/5/17

## 2017-06-02 ENCOUNTER — OFFICE VISIT (OUTPATIENT)
Dept: ORTHOPEDICS | Facility: CLINIC | Age: 62
End: 2017-06-02
Payer: COMMERCIAL

## 2017-06-02 VITALS — HEIGHT: 66 IN | BODY MASS INDEX: 41.56 KG/M2 | WEIGHT: 258.63 LBS

## 2017-06-02 DIAGNOSIS — M17.0 PRIMARY OSTEOARTHRITIS OF BOTH KNEES: Primary | ICD-10-CM

## 2017-06-02 PROCEDURE — 20610 DRAIN/INJ JOINT/BURSA W/O US: CPT | Mod: 50,S$GLB,, | Performed by: NURSE PRACTITIONER

## 2017-06-02 PROCEDURE — 99213 OFFICE O/P EST LOW 20 MIN: CPT | Mod: 25,S$GLB,, | Performed by: NURSE PRACTITIONER

## 2017-06-02 PROCEDURE — 99999 PR PBB SHADOW E&M-EST. PATIENT-LVL IV: CPT | Mod: PBBFAC,,, | Performed by: NURSE PRACTITIONER

## 2017-06-02 RX ADMIN — TRIAMCINOLONE ACETONIDE 80 MG: 40 INJECTION, SUSPENSION INTRA-ARTICULAR; INTRAMUSCULAR at 04:06

## 2017-06-04 RX ORDER — TRIAMCINOLONE ACETONIDE 40 MG/ML
80 INJECTION, SUSPENSION INTRA-ARTICULAR; INTRAMUSCULAR
Status: COMPLETED | OUTPATIENT
Start: 2017-06-02 | End: 2017-06-02

## 2017-06-05 ENCOUNTER — OFFICE VISIT (OUTPATIENT)
Dept: INTERNAL MEDICINE | Facility: CLINIC | Age: 62
End: 2017-06-05
Payer: COMMERCIAL

## 2017-06-05 ENCOUNTER — PATIENT MESSAGE (OUTPATIENT)
Dept: ADMINISTRATIVE | Facility: OTHER | Age: 62
End: 2017-06-05

## 2017-06-05 ENCOUNTER — HOSPITAL ENCOUNTER (OUTPATIENT)
Dept: RADIOLOGY | Facility: HOSPITAL | Age: 62
Discharge: HOME | End: 2017-06-05
Attending: INTERNAL MEDICINE
Payer: COMMERCIAL

## 2017-06-05 VITALS
BODY MASS INDEX: 40.64 KG/M2 | SYSTOLIC BLOOD PRESSURE: 148 MMHG | DIASTOLIC BLOOD PRESSURE: 86 MMHG | HEART RATE: 84 BPM | WEIGHT: 252.88 LBS | TEMPERATURE: 98 F | RESPIRATION RATE: 17 BRPM | HEIGHT: 66 IN

## 2017-06-05 DIAGNOSIS — E03.9 HYPOTHYROIDISM, UNSPECIFIED TYPE: ICD-10-CM

## 2017-06-05 DIAGNOSIS — R92.8 ABNORMAL MAMMOGRAM: ICD-10-CM

## 2017-06-05 DIAGNOSIS — T78.40XD ALLERGIC REACTION, SUBSEQUENT ENCOUNTER: Primary | ICD-10-CM

## 2017-06-05 DIAGNOSIS — R22.43 SKIN LUMP OF LEG, BILATERAL: ICD-10-CM

## 2017-06-05 DIAGNOSIS — I10 BENIGN ESSENTIAL HYPERTENSION: ICD-10-CM

## 2017-06-05 DIAGNOSIS — J30.9 ALLERGIC RHINITIS, UNSPECIFIED ALLERGIC RHINITIS TRIGGER, UNSPECIFIED RHINITIS SEASONALITY: ICD-10-CM

## 2017-06-05 PROCEDURE — 19081 BX BREAST 1ST LESION STRTCTC: CPT | Mod: LT,,, | Performed by: RADIOLOGY

## 2017-06-05 PROCEDURE — 88305 TISSUE EXAM BY PATHOLOGIST: CPT | Mod: 26,,, | Performed by: PATHOLOGY

## 2017-06-05 PROCEDURE — 99999 PR PBB SHADOW E&M-EST. PATIENT-LVL IV: CPT | Mod: PBBFAC,,, | Performed by: INTERNAL MEDICINE

## 2017-06-05 PROCEDURE — 99214 OFFICE O/P EST MOD 30 MIN: CPT | Mod: S$GLB,,, | Performed by: INTERNAL MEDICINE

## 2017-06-05 PROCEDURE — A4648 IMPLANTABLE TISSUE MARKER: HCPCS

## 2017-06-05 PROCEDURE — 88305 TISSUE EXAM BY PATHOLOGIST: CPT | Performed by: PATHOLOGY

## 2017-06-05 RX ORDER — DIPHENHYDRAMINE HCL 25 MG
25 CAPSULE ORAL EVERY 6 HOURS PRN
Qty: 60 CAPSULE | Refills: 0 | Status: SHIPPED | OUTPATIENT
Start: 2017-06-05

## 2017-06-05 RX ORDER — MONTELUKAST SODIUM 10 MG/1
10 TABLET ORAL NIGHTLY
Qty: 90 TABLET | Refills: 3 | Status: SHIPPED | OUTPATIENT
Start: 2017-06-05 | End: 2023-05-17 | Stop reason: SDUPTHER

## 2017-06-05 RX ORDER — AZELASTINE 1 MG/ML
1 SPRAY, METERED NASAL 2 TIMES DAILY
Qty: 30 ML | Refills: 1 | Status: SHIPPED | OUTPATIENT
Start: 2017-06-05 | End: 2018-12-03 | Stop reason: SDUPTHER

## 2017-06-05 RX ORDER — AMLODIPINE BESYLATE 5 MG/1
5 TABLET ORAL DAILY
Qty: 90 TABLET | Refills: 3 | Status: SHIPPED | OUTPATIENT
Start: 2017-06-05 | End: 2018-01-02

## 2017-06-05 NOTE — PROGRESS NOTES
"Subjective:       Patient ID: Carolina Marcum is a 62 y.o. female.    Chief Complaint: Hypothyroidism and Hypertension    HPI  Reports ate some spicy shrimp and had an allergic reaction where she had swelling of her lips. Was taking banophen 25mg prn and prednisolone prn. Has an epipen now.   Reports has had allergic reaction about once a year.  Follows w/ an outside allergist. Would like to est w/ [facility] allergist.     Allergies - on zyrtec 10mg, singular 10mg (hasn't been taking) and on flonase. Reports breakthrough allergies - postnasal drip. Raspy voice. No fevers/chills. No congestion.  Allergic to dust, oak, [place] grass - she has all of this in her surrounding.     GERD - given famotidine 20mg BID and nexium 20mg BID.   Reports her BP was very high in the ED.   Multiple gastric polyps on EGD 7/16/12. Repeat EGD wnl 2013 except for hiatal hernia. Suggest for repeat EGD in 3 yrs for surveillance.    HTN - on losartan 25mg daily and triamterene-hydrochlorothiazide 37.5-25mg daily. No CP/SOB currently.     Hypothyroidism - on synthroid 125mcg daily. Hair loss.     Lumps on the legs - reports getting more of them. No pain on palpation.     Review of Systems  as above in HPI.     Objective:      Physical Exam    BP (!) 148/86   Pulse 84   Temp 98 °F (36.7 °C) (Oral)   Resp 17   Ht 5' 6" (1.676 m)   Wt 114.7 kg (252 lb 14.4 oz)   BMI 40.82 kg/m²     Gen - A+OX4, NAD  HEENT - PERRL, OP clear. Narrow airway but not edematous. No stridor. MMM.   Neck - no LAD  CV - RRR, no m/r  Chest - CTAB, no wheezing/rhonchi/crackles  Abd - S/NT/ND/+BS  eXT -2 + BDP and radial pulses. No LE edema.   MSK - lumps under the skin of B thighs - nontender.     Previous labs reviewed.    Assessment/Plan     Carolina was seen today for hypothyroidism and hypertension.    Diagnoses and all orders for this visit:    Allergic reaction, subsequent encounter - avoid spicy foods.  -     Ambulatory Referral to Allergy  -     " diphenhydrAMINE (BANOPHEN) 25 mg capsule; Take 1 each (25 mg total) by mouth every 6 (six) hours as needed for Itching or Allergies.    Allergic rhinitis, unspecified allergic rhinitis trigger, unspecified rhinitis seasonality  -     montelukast (SINGULAIR) 10 mg tablet; Take 1 tablet (10 mg total) by mouth every evening.  -     azelastine (ASTELIN) 137 mcg (0.1 %) nasal spray; 1 spray (137 mcg total) by Nasal route 2 (two) times daily.    Benign essential hypertension - stop losartan given poss angioedema hx. Start norvasc. Cont triamterene-hctz. Not controlled currently.  -     Hypertension Digital Medicine (HDMP) Enrollment Order  -     amlodipine (NORVASC) 5 MG tablet; Take 1 tablet (5 mg total) by mouth once daily.  -     Comprehensive metabolic panel; Future    Hypothyroidism, unspecified type - pt dec synthroid from 137 to 125mcg daily.   -     TSH; Future  -     T4, free; Future    Skin lump of leg, bilateral  -     US Soft Tissue Misc; Future        Return in about 2 months (around 8/5/2017).      Kierra Cornejo MD  Department of Internal Medicine - [facility]  10:01 AM

## 2017-06-06 ENCOUNTER — TELEPHONE (OUTPATIENT)
Dept: INTERNAL MEDICINE | Facility: CLINIC | Age: 62
End: 2017-06-06

## 2017-06-06 RX ORDER — LEVOTHYROXINE SODIUM 125 UG/1
TABLET ORAL
Qty: 90 TABLET | Refills: 3 | Status: SHIPPED | OUTPATIENT
Start: 2017-06-06 | End: 2017-06-20

## 2017-06-07 ENCOUNTER — TELEPHONE (OUTPATIENT)
Dept: SURGERY | Facility: CLINIC | Age: 62
End: 2017-06-07

## 2017-06-07 ENCOUNTER — HOSPITAL ENCOUNTER (OUTPATIENT)
Dept: RADIOLOGY | Facility: HOSPITAL | Age: 62
Discharge: HOME | End: 2017-06-07
Attending: INTERNAL MEDICINE
Payer: COMMERCIAL

## 2017-06-07 DIAGNOSIS — R22.43 SKIN LUMP OF LEG, BILATERAL: ICD-10-CM

## 2017-06-07 PROCEDURE — 76882 US LMTD JT/FCL EVL NVASC XTR: CPT | Mod: TC

## 2017-06-07 PROCEDURE — 76882 US LMTD JT/FCL EVL NVASC XTR: CPT | Mod: 26,,, | Performed by: RADIOLOGY

## 2017-06-07 PROCEDURE — 76882 US LMTD JT/FCL EVL NVASC XTR: CPT | Mod: 26,76,, | Performed by: RADIOLOGY

## 2017-06-07 NOTE — TELEPHONE ENCOUNTER
Called patient with the results of her left breast biopsy from 6/5/17. Explained that it showed fibrocystic changes in the breast,  no atypia/benign, all questions answered, pt advised to follow up in 6 months with repeat imaging per core biopsy protocol, advised case will be reviewed in the weekly core conference and pt will be notified if there are any changes. Patient verbalized understanding of all information

## 2017-06-08 ENCOUNTER — PATIENT MESSAGE (OUTPATIENT)
Dept: ADMINISTRATIVE | Facility: OTHER | Age: 62
End: 2017-06-08

## 2017-06-19 RX ORDER — LEVOTHYROXINE SODIUM 125 UG/1
TABLET ORAL
Qty: 34 TABLET | Refills: 12 | Status: SHIPPED | OUTPATIENT
Start: 2017-06-19 | End: 2017-08-08 | Stop reason: SDUPTHER

## 2017-06-26 ENCOUNTER — PATIENT MESSAGE (OUTPATIENT)
Dept: ADMINISTRATIVE | Facility: OTHER | Age: 62
End: 2017-06-26

## 2017-06-27 ENCOUNTER — PATIENT MESSAGE (OUTPATIENT)
Dept: INTERNAL MEDICINE | Facility: CLINIC | Age: 62
End: 2017-06-27

## 2017-06-27 ENCOUNTER — PATIENT OUTREACH (OUTPATIENT)
Dept: OTHER | Facility: OTHER | Age: 62
End: 2017-06-27

## 2017-06-27 DIAGNOSIS — T78.40XA ALLERGIC REACTION, INITIAL ENCOUNTER: ICD-10-CM

## 2017-06-27 RX ORDER — TRETINOIN 0.8 MG/G
1 GEL TOPICAL DAILY
COMMUNITY
End: 2017-10-09 | Stop reason: ALTCHOICE

## 2017-06-27 NOTE — TELEPHONE ENCOUNTER
HTN Digital Medicine Program Medication Reconciliation Outreach    Called patient to introduce her into the HDMP. Reviewed program details including blood pressure goals, technique for taking readings (timing, cuff placement, body positioning), and what to do in case of emergency. Verified patient's understanding of [facility] use for contacting clinical team and to ensure that [facility] cuff readings continue to transmit (by logging in at least every 2 weeks). Reviewed recent readings with patient, she states higher readings were taken before her BP medications.    Screening Questionnaire Review:  1. Depression - not indicated  2. Sleep apnea - yes     QUINTIN screening results for this patient suggest a high likelihood of sleep apnea, which can contribute to hypertension. Patient has not been previously diagnosed with sleep apnea and is interested in referral at this time. Patient reports the following symptoms of sleep apnea: snoring, snorting, periods of not breathing, tossing and turning, sinus problems, congested nose. Staff message sent to [facility].      Verified the following information with the patient:  1. Medication list  Current Outpatient Prescriptions on File Prior to Visit   Medication Sig Dispense Refill    amlodipine (NORVASC) 5 MG tablet Take 1 tablet (5 mg total) by mouth once daily. 90 tablet 3    atorvastatin (LIPITOR) 10 MG tablet Take 1 tablet (10 mg total) by mouth once daily. 90 tablet 3    azelastine (ASTELIN) 137 mcg (0.1 %) nasal spray 1 spray (137 mcg total) by Nasal route 2 (two) times daily. 30 mL 1    buPROPion (WELLBUTRIN SR) 100 MG TBSR 12 hr tablet Take 1 tablet (100 mg total) by mouth 2 (two) times daily. 60 tablet 2    cetirizine (ZYRTEC) 10 MG tablet Take 10 mg by mouth. Half Tablet Oral Every day      diphenhydrAMINE (BANOPHEN) 25 mg capsule Take 1 each (25 mg total) by mouth every 6 (six) hours as needed for Itching or Allergies. 60 capsule 0    epinephrine  (EPIPEN) 0.3 mg/0.3 mL AtIn Inject 0.3 mLs (0.3 mg total) into the muscle as needed (for allergic reaction in which you feel like your throat is swelling, you cannot breathe.). 1 Device 0    esomeprazole (NEXIUM) 20 MG capsule Take 1 capsule (20 mg total) by mouth 2 (two) times daily. 1 Capsule, Delayed Release(E.C.) Oral Every evening 60 capsule 7    estradiol (VAGIFEM) 10 mcg Tab Insert vaginally twice weekly H.S. 24 tablet 4    hydrocortisone-pramoxine (PROCTOFOAM HC) rectal foam Place rectally 3 (three) times daily. 1 Foam Rectal Three times a day 1 applicator 0    metformin (GLUCOPHAGE-XR) 750 MG 24 hr tablet Take 1 tablet (750 mg total) by mouth once daily. 90 tablet 0    montelukast (SINGULAIR) 10 mg tablet Take 1 tablet (10 mg total) by mouth every evening. 90 tablet 3    multivitamin (THERAGRAN) per tablet Take by mouth. 1 Tablet Oral Every day      mupirocin (BACTROBAN) 2 % ointment Apply topically 2 (two) times daily as needed. 30 g 0    naproxen (NAPROSYN) 500 MG tablet TAKE 1 TABLET BY MOUTH TWICE DAILY 60 tablet 0    nystatin (MYCOSTATIN) cream Apply topically 2 (two) times daily. 30 g 4    pilocarpine (SALAGEN) 5 MG Tab Take 1 tablet (5 mg total) by mouth 2 (two) times daily. 60 tablet 11    SYNTHROID 125 mcg tablet TAKE 1 TABLET BY MOUTH DAILY 34 tablet 12    terconazole (TERAZOL 3) 0.8 % vaginal cream   4    triamterene-hydrochlorothiazide 37.5-25 mg (DYAZIDE) 37.5-25 mg per capsule TAKE 1 CAPSULE BY MOUTH EVERY DAY WITH A BANANA 90 capsule 3    [DISCONTINUED] ciclesonide (OMNARIS) 50 mcg nasal spray 50 mcg. 2 Spray, Non-Aerosol Nasal Every day      [DISCONTINUED] famotidine (PEPCID AC) 20 MG tablet Take 1 tablet (20 mg total) by mouth 2 (two) times daily. 10 tablet 0    [DISCONTINUED] topiramate (TOPAMAX) 50 MG tablet Take 1 tablet (50 mg total) by mouth once daily. 30 tablet 11    [DISCONTINUED] tramadol (ULTRAM) 50 mg tablet Take 1 tablet (50 mg total) by mouth every 6 (six)  hours as needed for Pain. 90 tablet 4    [DISCONTINUED] tretinoin, emollient, 0.05 % Crea 1 Cream Topical Every day 15 g 2    [DISCONTINUED] TRIAMCINOLONE ACETONIDE TOP 1   Every day (cream)       No current facility-administered medications on file prior to visit.        Previous ADRs  Losartan: swelling, sensation of throat closing    2. Medication compliance: has not been compliant with the medication regiment due to the following:  medication regimen too difficult. Overwhelmed at times by amount of medications and different med schedules.    3. Medication Allergies:   Review of patient's allergies indicates:   Allergen Reactions    Losartan Swelling     Possible throat closing sensation.    Shrimp Rash     Possibly spicy foods       Explained that our goal is to get her BP consistently below 140/90mmHg.  Patient denies having further questions, concerns. BP is not at goal.       Last 5 Patient Entered Readings                                                               Current 30 Day Average: 139/90     Recent Readings 6/27/2017 6/27/2017 6/27/2017 6/27/2017 6/26/2017    Systolic BP (mmHg) 146 143 136 140 142    Diastolic BP (mmHg) 93 89 82 96 87    Pulse 97 91 78 80 95

## 2017-06-27 NOTE — LETTER
"   Danielle Collins PharmD  [address] [place] [address]     Dear Carolina Marcum,    Welcome to the [facility]!         My name is Danielle Collins PharmD and I am your dedicated Digital Medicine clinician.  As an expert in medication management, I will help ensure that the medications you are taking continue to provide you with the intended benefits.       This is Samantha Albright and she will be your health  for the duration of the program.  Her job is to help you identify lifestyle changes to improve your blood pressure control.  You will talk about nutrition, exercise, and other ways that you may be able to adjust your current habits to better your health. Together, we will work to improve your overall health and encourage you to meet your goals for a healthier lifestyle.    What we expect from YOU:    You will need to take blood pressure readings multiple times a week and no less than one reading per week.   It is important that you take your measurements at different times during the day, when possible.     What you should expect from your Digital Medicine Care Team:   We will provide you with education about high blood pressure, including lifestyle changes that could help you to control your blood pressure.   We will review your weekly readings and provide you with monthly blood pressure progress reports after you have been in the program for more than 30 days.   We will send monthly progress reports on your blood pressure control to your physician so they can follow along with your progress as well.    You will be able to reach me by phone at [phone] or through your [facility] account by clicking "Send a message to your doctor's office" on the home screen then selecting my name in the "To the office of:" field.     I look forward to working with you to achieve your blood pressure goals!    Sincerely,    Danielle Collins PharmD  Your personal " clinician    Please visit [email or web address] to learn more about high blood pressure and what you can do lower your blood pressure.                                                                                         Carolina Marcum  [address] [id number]

## 2017-07-03 ENCOUNTER — PATIENT OUTREACH (OUTPATIENT)
Dept: OTHER | Facility: OTHER | Age: 62
End: 2017-07-03

## 2017-07-03 NOTE — PROGRESS NOTES
Last 5 Patient Entered Readings                                                               Current 30 Day Average: 141/89     Recent Readings 7/2/2017 7/1/2017 6/30/2017 6/29/2017 6/28/2017    Systolic BP (mmHg) 153 145 152 143 126    Diastolic BP (mmHg) 91 83 93 90 90    Pulse 91 90 99 90 90        Big problem is eating out. Very curious about diet and making dietary changes to lose weight. Staying away from spicy food.  Bad knee - restricts exercising. [place] some resistance band exercises in PT. Has a gym membership, would like to start swimming again.   Excited for the program. Would like to get  enrolled, as well.  No other questions or concerns.    Initial introduction completed with the Mrs. Tucker and the role of the health  was explained via [employer].   Expectations and the process of the program was explained as well. I encouraged her to call or message me back with any questions she may have. I will follow up in a few weeks to see how she are doing.     Resources on diet and exercise were sent.     she is aware that I am not available for emergencies and to call 911 or [facility] on call if one arises.  she is aware of the importance of medication adherence.  she is aware of the importance of diet and exercise.  she is aware that his sodium intake should be no more than 2000mg per day.  she is aware that the recommended physical activity each week should be about 30 minutes per day at least 5 times per week.   she is aware of the importance of taking BP readings at least weekly if not more and during different times each day.  she is aware that the health  can be used as a resource for lifestyle modifications to help reduce or maintain a healthy BP

## 2017-07-18 ENCOUNTER — OFFICE VISIT (OUTPATIENT)
Dept: OBSTETRICS AND GYNECOLOGY | Facility: CLINIC | Age: 62
End: 2017-07-18
Payer: COMMERCIAL

## 2017-07-18 VITALS
WEIGHT: 248 LBS | HEIGHT: 66 IN | DIASTOLIC BLOOD PRESSURE: 72 MMHG | BODY MASS INDEX: 39.86 KG/M2 | SYSTOLIC BLOOD PRESSURE: 124 MMHG

## 2017-07-18 DIAGNOSIS — N89.8 VAGINAL DISCHARGE: ICD-10-CM

## 2017-07-18 DIAGNOSIS — Z01.419 VISIT FOR GYNECOLOGIC EXAMINATION: Primary | ICD-10-CM

## 2017-07-18 DIAGNOSIS — N95.2 ATROPHIC VAGINITIS: ICD-10-CM

## 2017-07-18 DIAGNOSIS — Z90.710 S/P HYSTERECTOMY: ICD-10-CM

## 2017-07-18 DIAGNOSIS — E66.01 MORBID OBESITY WITH BMI OF 40.0-44.9, ADULT: ICD-10-CM

## 2017-07-18 DIAGNOSIS — R73.03 PRE-DIABETES: ICD-10-CM

## 2017-07-18 DIAGNOSIS — D06.9 CARCINOMA IN SITU OF CERVIX, UNSPECIFIED LOCATION: ICD-10-CM

## 2017-07-18 LAB
CANDIDA RRNA VAG QL PROBE: NEGATIVE
G VAGINALIS RRNA GENITAL QL PROBE: NEGATIVE
T VAGINALIS RRNA GENITAL QL PROBE: NEGATIVE

## 2017-07-18 PROCEDURE — 88175 CYTOPATH C/V AUTO FLUID REDO: CPT

## 2017-07-18 PROCEDURE — 87480 CANDIDA DNA DIR PROBE: CPT

## 2017-07-18 PROCEDURE — 87660 TRICHOMONAS VAGIN DIR PROBE: CPT

## 2017-07-18 PROCEDURE — 99396 PREV VISIT EST AGE 40-64: CPT | Mod: S$GLB,,, | Performed by: NURSE PRACTITIONER

## 2017-07-18 PROCEDURE — 99999 PR PBB SHADOW E&M-EST. PATIENT-LVL IV: CPT | Mod: PBBFAC,,, | Performed by: NURSE PRACTITIONER

## 2017-07-18 RX ORDER — METFORMIN HYDROCHLORIDE 750 MG/1
TABLET, EXTENDED RELEASE ORAL
Qty: 90 TABLET | Refills: 0 | Status: SHIPPED | OUTPATIENT
Start: 2017-07-18 | End: 2017-08-14

## 2017-07-18 RX ORDER — ESTRADIOL 10 UG/1
INSERT VAGINAL
Qty: 24 TABLET | Refills: 4 | Status: SHIPPED | OUTPATIENT
Start: 2017-07-18 | End: 2017-10-23

## 2017-07-18 RX ORDER — FLUCONAZOLE 150 MG/1
150 TABLET ORAL ONCE
Qty: 2 TABLET | Refills: 4 | Status: SHIPPED | OUTPATIENT
Start: 2017-07-18 | End: 2017-07-18

## 2017-07-18 RX ORDER — METRONIDAZOLE 500 MG/1
500 TABLET ORAL 2 TIMES DAILY
Qty: 14 TABLET | Refills: 1 | Status: SHIPPED | OUTPATIENT
Start: 2017-07-18 | End: 2017-07-25

## 2017-07-18 RX ORDER — CLOTRIMAZOLE AND BETAMETHASONE DIPROPIONATE 10; .64 MG/G; MG/G
CREAM TOPICAL 2 TIMES DAILY
Qty: 45 G | Refills: 3 | Status: SHIPPED | OUTPATIENT
Start: 2017-07-18 | End: 2017-07-25

## 2017-07-18 RX ORDER — METRONIDAZOLE 7.5 MG/G
1 GEL VAGINAL DAILY
Qty: 70 G | Refills: 4 | Status: SHIPPED | OUTPATIENT
Start: 2017-07-18 | End: 2017-07-23

## 2017-07-18 NOTE — PROGRESS NOTES
"HISTORY OF PRESENT ILLNESS:    Carolina Marcum is a 62 y.o. female,   presents today for her routine visit and Vagifem refills for dryness.  -Lately has been itching and notes a vaginal odor - self treated with Monistat, but wants to be sure all the infection is gone.  -Wears Depends in public due to occasional JOVAN "from increased HCTZ Rx".  -Requests Rx refills to take with her to [place] to visit her son.     Past Medical History:   Diagnosis Date    Abnormal Pap smear of cervix     CIS - CKC, then Hyst    Asthma, chronic     Bleeding hemorrhoids     Depression     Diverticular disease     Endometriosis     Fatty liver 12/15/2014    Herpes simplex without mention of complication     Hypercholesterolemia     Multiple gastric polyps 2012    Obesity     Reflux        Past Surgical History:   Procedure Laterality Date    BREAST BIOPSY      CERVICAL CONIZATION   W/ LASER       SECTION      x1    CHOLECYSTECTOMY      HYSTERECTOMY  age 42    [place], LSO (uterine fibroids, adhesions, endometriosis)    OOPHORECTOMY  age 42    LSO (endometriosis, cyst)    THYROIDECTOMY      TOE SURGERY      left    TOTAL THYROIDECTOMY         MEDICATIONS AND ALLERGIES:      Current Outpatient Prescriptions:     amlodipine (NORVASC) 5 MG tablet, Take 1 tablet (5 mg total) by mouth once daily., Disp: 90 tablet, Rfl: 3    atorvastatin (LIPITOR) 10 MG tablet, Take 1 tablet (10 mg total) by mouth once daily., Disp: 90 tablet, Rfl: 3    azelastine (ASTELIN) 137 mcg (0.1 %) nasal spray, 1 spray (137 mcg total) by Nasal route 2 (two) times daily., Disp: 30 mL, Rfl: 1    buPROPion (WELLBUTRIN SR) 100 MG TBSR 12 hr tablet, Take 1 tablet (100 mg total) by mouth 2 (two) times daily., Disp: 60 tablet, Rfl: 2    cetirizine (ZYRTEC) 10 MG tablet, Take 10 mg by mouth. Half Tablet Oral Every day, Disp: , Rfl:     diphenhydrAMINE (BANOPHEN) 25 mg capsule, Take 1 each (25 mg total) by mouth every 6 (six) " hours as needed for Itching or Allergies., Disp: 60 capsule, Rfl: 0    epinephrine (EPIPEN) 0.3 mg/0.3 mL AtIn, Inject 0.3 mLs (0.3 mg total) into the muscle as needed (for allergic reaction in which you feel like your throat is swelling, you cannot breathe.)., Disp: 1 Device, Rfl: 0    esomeprazole (NEXIUM) 20 MG capsule, Take 1 capsule (20 mg total) by mouth 2 (two) times daily. 1 Capsule, Delayed Release(E.C.) Oral Every evening, Disp: 60 capsule, Rfl: 7    estradiol (VAGIFEM) 10 mcg Tab, Insert vaginally twice weekly H.S., Disp: 24 tablet, Rfl: 4    hydrocortisone-pramoxine (PROCTOFOAM HC) rectal foam, Place rectally 3 (three) times daily. 1 Foam Rectal Three times a day, Disp: 1 applicator, Rfl: 0    metformin (GLUCOPHAGE-XR) 750 MG 24 hr tablet, Take 1 tablet (750 mg total) by mouth once daily., Disp: 90 tablet, Rfl: 0    montelukast (SINGULAIR) 10 mg tablet, Take 1 tablet (10 mg total) by mouth every evening., Disp: 90 tablet, Rfl: 3    multivitamin (THERAGRAN) per tablet, Take by mouth. 1 Tablet Oral Every day, Disp: , Rfl:     mupirocin (BACTROBAN) 2 % ointment, Apply topically 2 (two) times daily as needed., Disp: 30 g, Rfl: 0    naproxen (NAPROSYN) 500 MG tablet, TAKE 1 TABLET BY MOUTH TWICE DAILY, Disp: 60 tablet, Rfl: 0    nystatin (MYCOSTATIN) cream, Apply topically 2 (two) times daily., Disp: 30 g, Rfl: 4    pilocarpine (SALAGEN) 5 MG Tab, Take 1 tablet (5 mg total) by mouth 2 (two) times daily., Disp: 60 tablet, Rfl: 11    SYNTHROID 125 mcg tablet, TAKE 1 TABLET BY MOUTH DAILY, Disp: 34 tablet, Rfl: 12    terconazole (TERAZOL 3) 0.8 % vaginal cream, , Disp: , Rfl: 4    tretinoin microspheres 0.08 % GlwP, Apply 1 application topically once daily., Disp: , Rfl:     triamterene-hydrochlorothiazide 37.5-25 mg (DYAZIDE) 37.5-25 mg per capsule, TAKE 1 CAPSULE BY MOUTH EVERY DAY WITH A BANANA, Disp: 90 capsule, Rfl: 3    clotrimazole-betamethasone 1-0.05% (LOTRISONE) cream, Apply topically 2  (two) times daily., Disp: 45 g, Rfl: 3    fluconazole (DIFLUCAN) 150 MG Tab, Take 1 tablet (150 mg total) by mouth once. May retreat in 3 days if still symptomatic, Disp: 2 tablet, Rfl: 4    metronidazole (FLAGYL) 500 MG tablet, Take 1 tablet (500 mg total) by mouth 2 (two) times daily., Disp: 14 tablet, Rfl: 1    metronidazole (METROGEL VAGINAL) 0.75 % vaginal gel, Place 1 applicator vaginally once daily., Disp: 70 g, Rfl: 4    Review of patient's allergies indicates:   Allergen Reactions    Losartan Swelling     Possible throat closing sensation.    Shrimp Rash     Possibly spicy foods       Family History   Problem Relation Age of Onset    Breast cancer Maternal Aunt     Ovarian cancer Neg Hx     Colon cancer Neg Hx        Social History     Social History    Marital status:      Spouse name: N/A    Number of children: N/A    Years of education: N/A     Occupational History    Not on file.     Social History Main Topics    Smoking status: Never Smoker    Smokeless tobacco: Never Used    Alcohol use No      Comment: none lately.  rarely has a drink for special occassion    Drug use: No    Sexual activity: Yes     Partners: Male     Birth control/ protection: Surgical, Post-menopausal, None     Other Topics Concern    Not on file     Social History Narrative    No narrative on file       OBSTETRIC HISTORY: Number of cesareans: 1     COMPREHENSIVE GYN HISTORY:  PAP History: Reports abnormal Pap: CIS. Date:1989. Treatment:CK, Hyst. LAST PAP 5-3-16 NORMAL  Infection History: Reports STDs. HPV. Denies PID.  Benign History: Reports uterine fibroids. Reports ovarian cysts. Reports endometriosis. Denies other conditions.  Cancer History: Reports cervical cancer: 1989. Treatment: CKC. Denies uterine cancer or hyperplasia. Denies ovarian cancer. Denies vulvar cancer or pre-cancer. Denies vaginal cancer or precancer.  Denies breast cancer. Denies colon cancer.  Sexual Activity History: Reports  "currently being sexually active.  Menstrual History: Denies menses. Patient is postmenopausal: At age 42 (hyst). Not on ERT.     ROS:  GENERAL: No weight changes. No swelling. No fatigue. No fever. No vasomotor symptoms.   CARDIOVASCULAR: No chest pain. No shortness of breath. No leg cramps.   NEUROLOGICAL: No headaches. No vision changes.  BREASTS: No pain. No lumps. No discharge.  ABDOMEN: No pain. No nausea. No vomiting. No diarrhea. + CONSTIPATION.  REPRODUCTIVE: No abnormal bleeding.  VULVA: No pain. No lesions. + ITCHING.  VAGINA: No relaxation. No itching. + ODOR. No discharge. No lesions. + DRYNESS  URINARY: No incontinence. No nocturia. No frequency. No dysuria.    /72   Ht 5' 6" (1.676 m)   Wt 112.5 kg (248 lb)   BMI 40.03 kg/m²     PE:  APPEARANCE: OBESE. Well nourished, well developed, in no acute distress.  AFFECT: WNL, alert and oriented x 3.  SKIN: No acne or hirsutism.  NECK: Neck symmetric without masses or thyromegaly.  NODES: No inguinal, cervical, axillary or femoral lymph node enlargement.  CHEST: Good respiratory effort.   ABDOMEN: OBESE. DIFFICULT TO PALPATE ANYTHING. Soft.   BREASTS: Symmetrical, no skin changes or visible lesions. No palpable masses, nipple discharge bilaterally.  PELVIC: ATROPHIC EXTERNAL FEMALE GENITALIA without lesions. Normal hair distribution. Adequate perineal body, normal urethral meatus. VAGINA ATROPHIC, with YELLOW ODORLESS D/C without lesions. No significant cystocele or rectocele. Bimanual exam shows CERVIX and UTERUS to be SURGICALLY ABSENT. Adnexa without masses or tenderness. EXAM DIFFICULT DUE TO BODY HABITUS.  RECTAL: There are TWO SMALL NON PAINFUL HEMORRHOID TAGS, no stool or masses.  EXTREMITIES: No edema.    DIAGNOSIS:  1. Visit for gynecologic examination    2. S/P hysterectomy    3. History of CIS    4. Vaginal discharge    5. Atrophic vaginitis        Orders Placed This Encounter    Vaginosis Screen by DNA Probe    Liquid-based pap smear, " screening    fluconazole (DIFLUCAN) 150 MG Tab    clotrimazole-betamethasone 1-0.05% (LOTRISONE) cream    metronidazole (FLAGYL) 500 MG tablet    metronidazole (METROGEL VAGINAL) 0.75 % vaginal gel    estradiol (VAGIFEM) 10 mcg Tab       COUNSELING:  The patient was counseled today on:  -osteoporosis prevention, calcium supplementation, regular weight bearing exercise;  -ACS PAP guidelines (no paps), with recommendations for yearly pelvic exams as her uterus and cervix were removed for benign reasons and ovaries remain;  -recommendation for yearly mammogram;  -to see her primary care physician for all other health maintenance.    FOLLOW-UP with me for next routine visit.

## 2017-07-19 RX ORDER — TRIAMTERENE AND HYDROCHLOROTHIAZIDE 37.5; 25 MG/1; MG/1
CAPSULE ORAL
Qty: 90 CAPSULE | Refills: 0 | Status: SHIPPED | OUTPATIENT
Start: 2017-07-19 | End: 2017-10-10 | Stop reason: SDUPTHER

## 2017-07-25 ENCOUNTER — PATIENT OUTREACH (OUTPATIENT)
Dept: OTHER | Facility: OTHER | Age: 62
End: 2017-07-25

## 2017-07-25 NOTE — PROGRESS NOTES
Last 5 Patient Entered [employer] Current 30 Day Average: 133/83     Recent Readings 7/25/2017 7/22/2017 7/21/2017 7/20/2017 7/17/2017    Systolic BP (mmHg) 133 133 111 130 142    Diastolic BP (mmHg) 84 82 68 90 77    Pulse 95 90 87 107 91        Called Ms. Tucker to introduce her into the Hypertension Digital Medicine Program.     She complains of joint cramps/aches with Dyazide. Explained that when K drops, it usually results in muscle cramps. She does not always eat a banana when taking Dyazide, but will start to eat 1/2-1 banana/day. She will also increase other foods rich in potassium like citrus, melons, green vegetables. Potassium on 6/7/17 was 3.6.     Reviewed patient's medications and verified allergies on file.   Hypertension Medications             amlodipine (NORVASC) 5 MG tablet Take 1 tablet (5 mg total) by mouth once daily.    triamterene-hydrochlorothiazide 37.5-25 mg (DYAZIDE) 37.5-25 mg per capsule TAKE 1 CAPSULE BY MOUTH ONCE DAILY WITH A BANANA          Explained that we expect her to obtain several blood pressures/week at random times of day. Also asked that the BP be taken at least 1 hour after taking BP medications.     Explained that our goal is to get her BP to consistently below 140/90mmHg.     Patient and I agreed that the patient will take her BP daily to every other day at varying times of the day.     Emailed patient link to [facility] HTN webpage as well as my direct phone number in case she has in questions.

## 2017-07-31 ENCOUNTER — PATIENT OUTREACH (OUTPATIENT)
Dept: OTHER | Facility: OTHER | Age: 62
End: 2017-07-31

## 2017-07-31 NOTE — PROGRESS NOTES
Last 5 Patient Entered [employer] Current 30 Day Average: 130/81     Recent Readings 7/30/2017 7/29/2017 7/29/2017 7/25/2017 7/22/2017    Systolic BP (mmHg) 113 128 109 133 133    Diastolic BP (mmHg) 67 77 49 84 82    Pulse 90 92 95 95 90        Bad allergies. Going to see an allergist in a week to get help. OTC meds haven't given much relief.  Watching sodium - unless goes out to eat.  Working on getting more exercise - got in 30mins today but allergies make her more fatigued.    Follow up with Mrs. Carolina Chapalarry completed. Patient is maintaining a low sodium diet and continuing her exercise regime. Patient did not have any further questions or concerns. I will follow up in a few weeks to see how she is doing and progressing.

## 2017-08-03 ENCOUNTER — TELEPHONE (OUTPATIENT)
Dept: PULMONOLOGY | Facility: CLINIC | Age: 62
End: 2017-08-03

## 2017-08-03 DIAGNOSIS — J40 BRONCHITIS: Primary | ICD-10-CM

## 2017-08-03 RX ORDER — METHYLPREDNISOLONE 4 MG/1
TABLET ORAL
Qty: 10 TABLET | Refills: 0 | Status: SHIPPED | OUTPATIENT
Start: 2017-08-03 | End: 2017-09-05

## 2017-08-07 ENCOUNTER — OFFICE VISIT (OUTPATIENT)
Dept: ALLERGY | Facility: CLINIC | Age: 62
End: 2017-08-07
Payer: COMMERCIAL

## 2017-08-07 ENCOUNTER — APPOINTMENT (OUTPATIENT)
Dept: LAB | Facility: HOSPITAL | Age: 62
End: 2017-08-07
Attending: ALLERGY & IMMUNOLOGY
Payer: COMMERCIAL

## 2017-08-07 ENCOUNTER — OFFICE VISIT (OUTPATIENT)
Dept: INTERNAL MEDICINE | Facility: CLINIC | Age: 62
End: 2017-08-07
Payer: COMMERCIAL

## 2017-08-07 VITALS
SYSTOLIC BLOOD PRESSURE: 142 MMHG | HEART RATE: 84 BPM | HEIGHT: 66 IN | WEIGHT: 255.94 LBS | BODY MASS INDEX: 41.13 KG/M2 | DIASTOLIC BLOOD PRESSURE: 90 MMHG

## 2017-08-07 VITALS
HEIGHT: 66 IN | HEART RATE: 72 BPM | WEIGHT: 254.69 LBS | BODY MASS INDEX: 40.93 KG/M2 | DIASTOLIC BLOOD PRESSURE: 86 MMHG | SYSTOLIC BLOOD PRESSURE: 132 MMHG

## 2017-08-07 DIAGNOSIS — R09.89 CHRONIC THROAT CLEARING: ICD-10-CM

## 2017-08-07 DIAGNOSIS — E78.5 HYPERLIPIDEMIA, UNSPECIFIED HYPERLIPIDEMIA TYPE: ICD-10-CM

## 2017-08-07 DIAGNOSIS — K21.9 GASTROESOPHAGEAL REFLUX DISEASE, ESOPHAGITIS PRESENCE NOT SPECIFIED: ICD-10-CM

## 2017-08-07 DIAGNOSIS — E03.4 HYPOTHYROIDISM DUE TO ACQUIRED ATROPHY OF THYROID: ICD-10-CM

## 2017-08-07 DIAGNOSIS — M17.0 PRIMARY OSTEOARTHRITIS OF BOTH KNEES: ICD-10-CM

## 2017-08-07 DIAGNOSIS — I10 ESSENTIAL HYPERTENSION: ICD-10-CM

## 2017-08-07 DIAGNOSIS — J31.0 OTHER CHRONIC RHINITIS: ICD-10-CM

## 2017-08-07 DIAGNOSIS — I10 BENIGN ESSENTIAL HYPERTENSION: Primary | ICD-10-CM

## 2017-08-07 DIAGNOSIS — M35.00 SJOGREN'S SYNDROME, WITH UNSPECIFIED ORGAN INVOLVEMENT: ICD-10-CM

## 2017-08-07 DIAGNOSIS — R05.9 COUGH: Primary | ICD-10-CM

## 2017-08-07 PROCEDURE — 99214 OFFICE O/P EST MOD 30 MIN: CPT | Mod: S$GLB,,, | Performed by: INTERNAL MEDICINE

## 2017-08-07 PROCEDURE — 99999 PR PBB SHADOW E&M-EST. PATIENT-LVL III: CPT | Mod: PBBFAC,,, | Performed by: INTERNAL MEDICINE

## 2017-08-07 PROCEDURE — 99999 PR PBB SHADOW E&M-EST. PATIENT-LVL III: CPT | Mod: PBBFAC,,, | Performed by: ALLERGY & IMMUNOLOGY

## 2017-08-07 PROCEDURE — 3008F BODY MASS INDEX DOCD: CPT | Mod: S$GLB,,, | Performed by: INTERNAL MEDICINE

## 2017-08-07 PROCEDURE — 99244 OFF/OP CNSLTJ NEW/EST MOD 40: CPT | Mod: S$GLB,,, | Performed by: ALLERGY & IMMUNOLOGY
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
## 2017-08-07 NOTE — LETTER
[facility] - Internal Medicine  [address]  [facility] [address]  Phone: [phone]  Fax: [phone]     2017    To Whom It May Concern:    Ms. Carolina Marcum ( 1955) is currently under my care. She has had chronic rhinitis along with some wheezing. She is currently too ill to fly at this time. Please allow her to reschedule her trip or refund the ticket.    If you have any questions or concerns, don't hesitate to contact me at [phone].    Sincerely,          Kierra Cornejo MD  Department of Internal Medicine - [facility]
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
## 2017-08-08 ENCOUNTER — TELEPHONE (OUTPATIENT)
Dept: INTERNAL MEDICINE | Facility: CLINIC | Age: 62
End: 2017-08-08

## 2017-08-08 RX ORDER — LEVOTHYROXINE SODIUM 125 UG/1
125 TABLET ORAL DAILY
Qty: 34 TABLET | Refills: 12 | Status: SHIPPED | OUTPATIENT
Start: 2017-08-08 | End: 2018-05-02 | Stop reason: SDUPTHER

## 2017-08-13 ENCOUNTER — PATIENT MESSAGE (OUTPATIENT)
Dept: INTERNAL MEDICINE | Facility: CLINIC | Age: 62
End: 2017-08-13

## 2017-08-13 DIAGNOSIS — E88.810 METABOLIC SYNDROME: Primary | ICD-10-CM

## 2017-08-14 ENCOUNTER — PATIENT MESSAGE (OUTPATIENT)
Dept: INTERNAL MEDICINE | Facility: CLINIC | Age: 62
End: 2017-08-14

## 2017-08-14 RX ORDER — METFORMIN HYDROCHLORIDE 500 MG/1
500 TABLET, EXTENDED RELEASE ORAL
Qty: 90 TABLET | Refills: 3 | Status: SHIPPED | OUTPATIENT
Start: 2017-08-14 | End: 2017-10-23

## 2017-08-16 ENCOUNTER — OFFICE VISIT (OUTPATIENT)
Dept: ALLERGY | Facility: CLINIC | Age: 62
End: 2017-08-16
Payer: COMMERCIAL

## 2017-08-16 VITALS
HEIGHT: 66 IN | WEIGHT: 253 LBS | DIASTOLIC BLOOD PRESSURE: 80 MMHG | SYSTOLIC BLOOD PRESSURE: 122 MMHG | BODY MASS INDEX: 40.66 KG/M2

## 2017-08-16 DIAGNOSIS — M35.00 SJOGREN'S SYNDROME, WITH UNSPECIFIED ORGAN INVOLVEMENT: ICD-10-CM

## 2017-08-16 DIAGNOSIS — R05.9 COUGH: Primary | ICD-10-CM

## 2017-08-16 DIAGNOSIS — J31.0 OTHER CHRONIC RHINITIS: ICD-10-CM

## 2017-08-16 DIAGNOSIS — T78.3XXD ANGIOEDEMA, SUBSEQUENT ENCOUNTER: ICD-10-CM

## 2017-08-16 DIAGNOSIS — K21.9 GASTROESOPHAGEAL REFLUX DISEASE, ESOPHAGITIS PRESENCE NOT SPECIFIED: ICD-10-CM

## 2017-08-16 DIAGNOSIS — E03.4 HYPOTHYROIDISM DUE TO ACQUIRED ATROPHY OF THYROID: ICD-10-CM

## 2017-08-16 DIAGNOSIS — I10 ESSENTIAL HYPERTENSION: ICD-10-CM

## 2017-08-16 PROCEDURE — 99214 OFFICE O/P EST MOD 30 MIN: CPT | Mod: S$GLB,,, | Performed by: ALLERGY & IMMUNOLOGY

## 2017-08-16 PROCEDURE — 3008F BODY MASS INDEX DOCD: CPT | Mod: S$GLB,,, | Performed by: ALLERGY & IMMUNOLOGY

## 2017-08-16 PROCEDURE — 3074F SYST BP LT 130 MM HG: CPT | Mod: S$GLB,,, | Performed by: ALLERGY & IMMUNOLOGY

## 2017-08-16 PROCEDURE — 3079F DIAST BP 80-89 MM HG: CPT | Mod: S$GLB,,, | Performed by: ALLERGY & IMMUNOLOGY

## 2017-08-16 PROCEDURE — 99999 PR PBB SHADOW E&M-EST. PATIENT-LVL II: CPT | Mod: PBBFAC,,, | Performed by: ALLERGY & IMMUNOLOGY

## 2017-08-16 NOTE — PROGRESS NOTES
Carolina Knight returns to clinic today for continued evaluation of chronic rhinitis and angioedema. She is here alone. She was last seen August 17, 2017.    Since her last visit, she has not had any further angioedema. She has not had any urticaria.    She does continue to have chronic rhinitis with postnasal drip, throat clearing, postnasal drip, a sensation that something is in the back throat, sore throats, hoarseness, difficulty swallowing, and a nonproductive cough.    She has been taking Zyrtec twice a day. She also takes Singulair. She is taking Flonase daily.    She does not feel that these helped with her symptoms.    She does have gastroesophageal reflux disease and is currently taking 40 mg a day. She was taking 40 mg twice a day which controlled her symptoms better. She reduced this several months ago.    She does think that her rhinitis did increase after decreasing her PPI.    She is going to [place] next week for a week.    She does have an EpiPen.    [facility] PEQ ALLERGY QUESTIONNAIRE SHORT 8/13/2017   Are you taking any new medications since your last visit? No   Constitution: No symptoms   Head or facial pain: No symptoms   Eyes: No symptoms   Ears: Fullness   Nose: Post nasal drip, Sneezing   Throat: Frequent clearing   Sinuses: No symptoms   Lungs: Shortness of breath   Skin: Itching, No symptoms   Cardiovascular: No symptoms   Gastrointestinal: Heartburn/ indigestion/ reflux   Genital/ urinary No symptoms   Musculoskeletal: No symptoms   Neurologic: Light-headedness   Endocrine: No symptoms   Hematologic: No symptoms     Physical Examination:  General: Well-developed, well-nourished, no acute distress. Clearing throat throughout interview.  Head: No sinus tenderness.  Eyes: Conjunctivae:  No bulbar or palpebral conjunctival injection.  Ears: EAC's clear.  TM's clear.  No pre-auricular nodes.  Nose: Nasal Mucosa:  Pink.  Septum: No apparent deviation.  Turbinates:  No significant edema.   Polyps/Mass:  None visible.  Teeth/Gums:  No bleeding noted.  Oropharynx: No exudates.  Neck: Supple without thyromegaly. No cervical lymphadenopathy.    Respiratory/Chest: Effort: Good.  Auscultation:  Clear bilaterally.  Skin: Good turgor.  No urticaria or angioedema.  Neuro/Psych: Oriented x 3.    Spirometry 5/7/2015: Normal.    Laboratory 8/7/2017:  IgE level: Less than 35.  ImmunoCAP: Negative.  Shellfish: Negative.  TSH: 2.145.  T4: 1.50.  Anti-thyroglobulin antibody level: 4.3.  Antimicrosomal antibody level: Less than 6.0.  Anti-IgE receptor antibody level: Pending.  Vitamin D level: 26.  Serum tryptase: 4.3.  SPEP: Normal.  IgA: 282.  IgG: A 45.  IgM: 193.  Pneumococcal titers: Not protective.    Assessment:  1. Chronic rhinitis, consider allergic.  2. Chronic conjunctivitis, consider allergic.  3. Chronic cough, consider allergic component.  4. GERD.  5. Probable LPR.  6. Recurrent angioedema of uncertain etiology, controlled.  7. Hypothyroidism on replacement.  8. Recurrent rash, now resolved, of uncertain etiology.  9. Hypertension on triamterene-hydrochlorothiazide and amlodipine.  10. Sjogren's disease.  11. Prediabetes on Glucophage.  12. Fatty liver.  13. DJD.    Recommendations:  1. Continue present medications for now.  2. ENT evaluation for LPR.  3. Journal and take pictures of any further episodes of angioedema.  4. Consider repeat spirometry.  5. Consider skin testing off antihistamines. She would rather wait until the fall to do this.  6. Return to clinic after above.    LPR and website reviewed.

## 2017-08-21 ENCOUNTER — PATIENT MESSAGE (OUTPATIENT)
Dept: INTERNAL MEDICINE | Facility: CLINIC | Age: 62
End: 2017-08-21

## 2017-08-25 ENCOUNTER — TELEPHONE (OUTPATIENT)
Dept: ALLERGY | Facility: CLINIC | Age: 62
End: 2017-08-25

## 2017-08-25 DIAGNOSIS — R05.9 COUGH: Primary | ICD-10-CM

## 2017-08-29 ENCOUNTER — PATIENT OUTREACH (OUTPATIENT)
Dept: OTHER | Facility: OTHER | Age: 62
End: 2017-08-29

## 2017-08-29 NOTE — PROGRESS NOTES
Last 5 Patient Entered Redings Current 30 Day Average: 125/79     Recent Readings 8/23/2017 8/22/2017 8/22/2017 8/21/2017 8/21/2017    Systolic BP (mmHg) 147 123 123 123 152    Diastolic BP (mmHg) 81 82 76 83 103    Pulse 98 96 87 92 100        Hypertension Digital Medicine Program (HDMP): Health  Follow Up    Currently in [place] and returning home tonight. Will take a reading once she returns. Feeling well.     Lifestyle Modifications:    1. Low sodium diet: yes She is interested in the best approaches to follow to lose weight. We discussed portion control and making meals based around veggies instead of starchy carbohydrates. Filling majority of plate with veg first.    2.Physical activity: yes     Follow up with Ms. Carolina Marcum completed. Patient did not have any further questions or concerns. I will follow up in a few weeks to see how she is doing and progressing.

## 2017-09-05 ENCOUNTER — OFFICE VISIT (OUTPATIENT)
Dept: OTOLARYNGOLOGY | Facility: CLINIC | Age: 62
End: 2017-09-05
Payer: COMMERCIAL

## 2017-09-05 VITALS
HEIGHT: 66 IN | BODY MASS INDEX: 39.9 KG/M2 | HEART RATE: 77 BPM | DIASTOLIC BLOOD PRESSURE: 89 MMHG | WEIGHT: 248.25 LBS | SYSTOLIC BLOOD PRESSURE: 140 MMHG

## 2017-09-05 DIAGNOSIS — R09.89 CHRONIC THROAT CLEARING: ICD-10-CM

## 2017-09-05 DIAGNOSIS — K21.9 LPRD (LARYNGOPHARYNGEAL REFLUX DISEASE): Primary | ICD-10-CM

## 2017-09-05 DIAGNOSIS — R49.0 DYSPHONIA: ICD-10-CM

## 2017-09-05 DIAGNOSIS — R09.A2 GLOBUS PHARYNGEUS: ICD-10-CM

## 2017-09-05 PROCEDURE — 31575 DIAGNOSTIC LARYNGOSCOPY: CPT | Mod: S$GLB,,, | Performed by: NURSE PRACTITIONER

## 2017-09-05 PROCEDURE — 99243 OFF/OP CNSLTJ NEW/EST LOW 30: CPT | Mod: 25,S$GLB,, | Performed by: NURSE PRACTITIONER

## 2017-09-05 PROCEDURE — 99999 PR PBB SHADOW E&M-EST. PATIENT-LVL V: CPT | Mod: PBBFAC,,, | Performed by: NURSE PRACTITIONER

## 2017-09-05 NOTE — PATIENT INSTRUCTIONS
"  How Acid Reflux Affects Your Throat    Do you have to clear your throat or cough often? Are you hoarse? Do you have trouble swallowing? If you have these or other throat symptoms, you may have acid reflux. This occurs when stomach acid flows back up and irritates your throat.  Why you have throat symptoms  There are muscles (esophageal sphincters) at both ends of the tube that carries food to your stomach (the esophagus). These muscles relax to let food pass. Then they tighten to keep stomach acid down. When the lower esophageal sphincter (LES) doesnt tighten enough, acid can flow back (reflux) from your stomach into your esophagus. This may cause heartburn. In some cases the upper esophageal sphincter (UES) also doesnt work well. Then acid can travel higher and enter your throat (pharynx). In many cases, this causes throat symptoms.  Common throat symptoms  · Need to clear your throat often  · Feeling like youre choking  · Long-term (chronic) cough  · Hoarseness  · Trouble swallowing  · Feel like you have a lump in your throat  · Sour or acid taste  · Sore throat that keeps coming back     LARYNGOPHARYNGEAL REFLUX  (SILENT OR ATYPICAL REFLUX)    If you have any of the following symptoms you may have laryngopharyngeal reflux (LPR):  hoarseness, thick or too much mucus, chronic throat pain/irritation, chronic throat clearing, chronic cough, especially cough that wake you up from sleep, chronic "postnasal drip" without the need to blow your nose.     Many people with LPR do not have symptoms of heartburn. Compared to the esophagus, the voice box and the back of the throat are significantly more sensitive to the effects of acid on surrounding tissue. Acid passing quickly through the esophagus does not have a chance to irritate the area for too long.  However acid that pools in the throat or voice box can cause prolonged irritation resulting in the symptoms of LPR.    The symptoms of LPR can consist of a dry cough " "and the sensation of something being stuck in the throat.  Some people will complain of heartburn while others may have intermittent hoarseness or loss of voice.  Another major symptom of LPR is "postnasal drip."  Patients are often told symptoms are due to abnormal nasal drainage or sinus infection; however this is rarely the cause of chronic throat irritation. For post nasal drip to cause the complaints described, signs and symptoms of an active nasal infection should be present.     Treatments for LPR include:  postural changes, weight reduction, diet modification, medication to reduce stomach acid and promote normal motility, and surgery to prevent reflux. Most patients will begin to notice some relief in her symptoms about 2 weeks after starting the medication; however it is generally recommended the medication should be continued for 2 months. If the symptoms completely resolve, the medication can then be tapered.  Some people will remain symptom free while others may have relapses which required treatment again.    Things you can do to prevent reflux include:  Do not smoke.  Smoking will cause reflux.  Avoid tight fitting clothes or belts around the waist.  Avoid eating at least 2 hours prior to bedtime.  In fact avoid eating your largest meal at night.  Weight loss.  For patient's with recent weight gain, shedding a few pounds is all that is required to improve reflux.  Avoid caffeine, cola beverages, citrus beverages, mints, alcoholic beverages, particularly at night, cheese, fried foods, spicy foods, eggs, and chocolate.  Sleep with the head of bed elevated at least 6 inches.    Recommendations:  Take Nexium / Prilosec (PPI) every morning on an empty stomach (30-60 minutes before eating) 20-40 MG. At bedtime take Zantac (H2-blocker) 150-300 mg.  All are available over-the-counter without a prescription. See a Gastro doctor (GI) for refractory symptoms and continued management.    Acid reflux pillow: " [email or web address]

## 2017-09-05 NOTE — LETTER
September 5, 2017      SULAIMAN Ferraro III, MD  [address] [facility]  [facility] [address]           [facility]  [address] [facility] [address] [place] [address]  Phone: [phone]  Fax: [phone]          Patient: Carolina Marcum   MR Number: [id number]   YOB: 1955   Date of Visit: 9/5/2017       Dear Dr. SULAIMAN Ferraro III:    Thank you for referring Carolina Marcum to me for evaluation. Attached you will find relevant portions of my assessment and plan of care.    If you have questions, please do not hesitate to call me. I look forward to following Carolina Marcum along with you.    Sincerely,    Vanessa Herrera, GENNA    Enclosure  CC:  No Recipients    If you would like to receive this communication electronically, please contact [email or web address] or [phone] to request more information on [employer] Link access.    For providers and/or their staff who would like to refer a patient to [facility], please contact us through our one-stop-shop provider referral line, [facility], at [phone].    If you feel you have received this communication in error or would no longer like to receive these types of communications, please e-mail [email or web address]

## 2017-09-05 NOTE — PROGRESS NOTES
"Subjective:       Patient ID: Carolina Marcum is a 62 y.o. female.    Chief Complaint: LPR (Ref. Dr. Ferraro); Cough (w/ post nasal drip); and Hoarse (sensation somthing is stuck in throat)    HPI   Patient is referred by Dr. Ferraro for consultation for suspected LPRD evaluation. Patient reports sensation of something stuck in the back of her throat, chronic post-nasal drip and cough. She was evaluated by Dr. Ferraro one month ago to rule out allergic etiology. His testing revealed:  Spirometry 5/7/2015: Normal.  Laboratory 8/7/2017:  IgE level: Less than 35.  ImmunoCAP: Negative.  Shellfish: Negative.  Pneumococcal titers: Not protective.  She is return off antihistamines for skin testing by Dr. Ferraro.   Patient states she was sick all summer due to allergic rhinitis and sinusitis. She was given multiple courses of prednisone. She states when she went to [place], all symptoms improved significantly, but resumed within 24 hours of returning to [place]. Her chief complaint is post-nasal drip, hoarseness and something stuck in the back of her throat X few months. Patient states spicy foods cause sensation of throat closing up. She has occasional dyspepsia that keeps her up at night.     Review of Systems   Constitutional: Negative.    HENT: Positive for postnasal drip. Negative for congestion, dental problem, facial swelling, rhinorrhea, sinus pressure, sore throat, trouble swallowing and voice change.         Sensation of thick or too much mucus in the back of her throat  Lots of throat clearing  Sensation of "lump" in the back of her throat   Eyes: Negative.    Respiratory: Positive for cough.    Cardiovascular: Negative.    Gastrointestinal: Negative.    Musculoskeletal: Negative.    Skin: Negative.    Neurological: Negative.    Hematological: Negative.    Psychiatric/Behavioral: Negative.        Objective:      Physical Exam   Constitutional: She is oriented to person, place, and time. Vital signs are normal. She " appears well-developed and well-nourished. She is cooperative. She does not appear ill. No distress.   HENT:   Head: Normocephalic and atraumatic.   Right Ear: Hearing, tympanic membrane, external ear and ear canal normal. Tympanic membrane is not erythematous. No middle ear effusion.   Left Ear: Hearing, tympanic membrane, external ear and ear canal normal. Tympanic membrane is not erythematous.  No middle ear effusion.   Nose: Nose normal. No mucosal edema or rhinorrhea. Right sinus exhibits no maxillary sinus tenderness and no frontal sinus tenderness. Left sinus exhibits no maxillary sinus tenderness and no frontal sinus tenderness.   Mouth/Throat: Uvula is midline, oropharynx is clear and moist and mucous membranes are normal. Mucous membranes are not pale, not dry and not cyanotic. No oral lesions. No oropharyngeal exudate, posterior oropharyngeal edema or posterior oropharyngeal erythema.   Eyes: Conjunctivae, EOM and lids are normal. Pupils are equal, round, and reactive to light. Right eye exhibits no discharge. Left eye exhibits no discharge. No scleral icterus.   Neck: Trachea normal and normal range of motion. Neck supple. No tracheal deviation present. No thyroid mass and no thyromegaly present.   Cardiovascular: Normal rate.    Pulmonary/Chest: Effort normal. No stridor. No respiratory distress. She has no wheezes.   Musculoskeletal: Normal range of motion.   Lymphadenopathy:        Head (right side): No submental, no submandibular, no tonsillar, no preauricular and no posterior auricular adenopathy present.        Head (left side): No submental, no submandibular, no tonsillar, no preauricular and no posterior auricular adenopathy present.     She has no cervical adenopathy.        Right cervical: No superficial cervical and no posterior cervical adenopathy present.       Left cervical: No superficial cervical and no posterior cervical adenopathy present.   Neurological: She is alert and oriented to  person, place, and time. She has normal strength. Coordination and gait normal.   Skin: Skin is warm, dry and intact. No lesion and no rash noted. She is not diaphoretic. No cyanosis. No pallor.   Psychiatric: She has a normal mood and affect. Her speech is normal and behavior is normal. Judgment and thought content normal. Cognition and memory are normal.   Nursing note and vitals reviewed.      Procedure: Flexible laryngoscopy    In order to fully examine the upper aerodigestive tract, including the larynx, in a patient with a hyperactive gag reflex, and suboptimal visualization with indirect mirror exam,  flexible endoscopy is required.   After explaining the procedure and obtaining verbal consent, a timeout was performed with the patient's participation according to the universal protocol. Both nasal cavities were anesthetized with 4% Xylocaine spray mixed with Griffin-Synephrine. The flexible laryngoscope  was inserted into the nasal cavity and advanced to visualize the nasal cavity, nasopharynx, the posterior oropharynx, hypopharynx, and the endolarynx with the  findings noted. The scope was removed and the procedure terminated. The patient tolerated this procedure well without apparent complication.     OVERALL FINDINGS  Nasopharynx - the torus is clear. There are no lesions of the posterior wall.   Oropharynx - no lesions of the tongue base. There is no obvious fullness or asymmetry.  Hypopharynx - there are no lesions of the pyriform sinuses or postcricoid region   Larynx - there are no lesions of the supraglottic or glottic larynx.  Vocal fold mobility is normal.     SPECIFIC FINDINGS  Adenoid tissue - normal   Nasopharynx & eustachian tube orifices - normal   Posterior pharyngeal wall - normal   Base of tongue - normal   Epiglottis - normal   Valleculae - normal   Pyriform sinuses - normal   False vocal cords - normal   True vocal cords - normal  Arytenoids - edema   Interarytenoid space - erythema, edema    Larynx    Larynx    Assessment:     LPRD/GERD manifested as dysphonia, globus sensation and throat clearing      Plan:     Laryngoscope photos given and reviewed in detail with patient. Recommend omeprazole QAM on an empty stomach, ranitidine QHS, and follow up with GI for refractory symptoms and continued management. Handouts given on LPRD and GERD, and discussed at length with patient.

## 2017-09-07 ENCOUNTER — OFFICE VISIT (OUTPATIENT)
Dept: ALLERGY | Facility: CLINIC | Age: 62
End: 2017-09-07
Payer: COMMERCIAL

## 2017-09-07 VITALS
DIASTOLIC BLOOD PRESSURE: 80 MMHG | BODY MASS INDEX: 40.53 KG/M2 | WEIGHT: 252.19 LBS | HEART RATE: 88 BPM | SYSTOLIC BLOOD PRESSURE: 120 MMHG | HEIGHT: 66 IN

## 2017-09-07 DIAGNOSIS — J31.0 OTHER CHRONIC RHINITIS: ICD-10-CM

## 2017-09-07 DIAGNOSIS — K21.9 LARYNGOPHARYNGEAL REFLUX: ICD-10-CM

## 2017-09-07 DIAGNOSIS — R05.9 COUGH: Primary | ICD-10-CM

## 2017-09-07 DIAGNOSIS — K21.9 GASTROESOPHAGEAL REFLUX DISEASE, ESOPHAGITIS PRESENCE NOT SPECIFIED: ICD-10-CM

## 2017-09-07 DIAGNOSIS — I10 ESSENTIAL HYPERTENSION: ICD-10-CM

## 2017-09-07 PROCEDURE — 3008F BODY MASS INDEX DOCD: CPT | Mod: S$GLB,,, | Performed by: ALLERGY & IMMUNOLOGY

## 2017-09-07 PROCEDURE — 3074F SYST BP LT 130 MM HG: CPT | Mod: S$GLB,,, | Performed by: ALLERGY & IMMUNOLOGY

## 2017-09-07 PROCEDURE — 99999 PR PBB SHADOW E&M-EST. PATIENT-LVL III: CPT | Mod: PBBFAC,,, | Performed by: ALLERGY & IMMUNOLOGY

## 2017-09-07 PROCEDURE — 3079F DIAST BP 80-89 MM HG: CPT | Mod: S$GLB,,, | Performed by: ALLERGY & IMMUNOLOGY

## 2017-09-07 PROCEDURE — 99214 OFFICE O/P EST MOD 30 MIN: CPT | Mod: S$GLB,,, | Performed by: ALLERGY & IMMUNOLOGY

## 2017-09-07 RX ORDER — OMEPRAZOLE 40 MG/1
40 CAPSULE, DELAYED RELEASE ORAL
Qty: 60 CAPSULE | Refills: 3 | Status: SHIPPED | OUTPATIENT
Start: 2017-09-07 | End: 2019-12-27

## 2017-09-07 RX ORDER — BENZONATATE 100 MG/1
100 CAPSULE ORAL 3 TIMES DAILY PRN
Qty: 30 CAPSULE | Refills: 1 | Status: SHIPPED | OUTPATIENT
Start: 2017-09-07 | End: 2017-10-07

## 2017-09-07 NOTE — PROGRESS NOTES
Carolina Knight returns to clinic today for continued evaluation of chronic rhinitis and angioedema. She is here alone. She was last seen August 16, 2017.    After her last visit, she saw NP Vanessa Herrera who did diagnose LPR. He suggested omeprazole and ranitidine.    She has not yet started these medications.    She continues to have chronic rhinitis with postnasal drip, throat clearing, a sensation of something in the back throat, difficulty swallowing, hoarseness, and a nonproductive cough.    She does continue to take Zyrtec once or twice a day. She also takes Singulair and Flonase.    She does think that Zyrtec helps her sleep. She is reluctant to stop this medication.    She also occasionally takes Benadryl.    She has not had any urticaria or angioedema. She attributes several months past to shrimp and crawfish.    [facility] PEQ ALLERGY QUESTIONNAIRE SHORT 9/5/2017   Are you taking any new medications since your last visit? No   Constitution: No symptoms   Head or facial pain: No symptoms   Eyes: No symptoms   Ears: No symptoms   Nose: Post nasal drip   Throat: Reflux/ heartburn   Sinuses: No symptoms   Lungs: No symptoms   Skin: No symptoms   Cardiovascular: No symptoms   Gastrointestinal: No symptoms   Genital/ urinary No symptoms   Musculoskeletal: Joint pain   Neurologic: No symptoms   Endocrine: No symptoms   Hematologic: No symptoms     Physical Examination:  General: Well-developed, well-nourished, no acute distress. Clearing throat throughout interview.  Head: No sinus tenderness.  Eyes: Conjunctivae:  No bulbar or palpebral conjunctival injection.  Ears: EAC's clear.  TM's clear.  No pre-auricular nodes.  Nose: Nasal Mucosa:  Pink.  Septum: No apparent deviation.  Turbinates:  No significant edema.  Polyps/Mass:  None visible.  Teeth/Gums:  No bleeding noted.  Oropharynx: No exudates.  Neck: Supple without thyromegaly. No cervical lymphadenopathy.    sdRespiratory/Chest: Effort: Good.  Auscultation:   Clear bilaterally.  Skin: Good turgor.  No urticaria or angioedema.  Neuro/Psych: Oriented x 3.    Spirometry 5/7/2015: Normal.    Laboratory 8/7/2017:  IgE level: Less than 35.  ImmunoCAP: Negative.  Shellfish: Negative.  TSH: 2.145.  T4: 1.50.  Anti-thyroglobulin antibody level: 4.3.  Antimicrosomal antibody level: Less than 6.0.  Anti-IgE receptor antibody level: 9.  Vitamin D level: 26.  Serum tryptase: 4.3.  SPEP: Normal.  IgA: 282.  IgG: A 45.  IgM: 193.  Pneumococcal titers: Not protective.    Assessment:  1. Chronic rhinitis, consider allergic.  2. Chronic conjunctivitis, consider allergic.  3. Chronic cough, consider allergic component.  4. GERD.  5. Probable LPR.  6. Recurrent angioedema of uncertain etiology, controlled.  7. Hypothyroidism on replacement.  8. Recurrent rash, now resolved, of uncertain etiology.  9. Hypertension on triamterene-hydrochlorothiazide and amlodipine.  10. Sjogren's disease.  11. Prediabetes on Glucophage.  12. Fatty liver.  13. DJD.    Recommendations:  1. Start omeprazole 40 mg twice a day.  2. Hold Zyrtec.  3. Journal and take pictures of any further episodes of angioedema.  4. Consider repeat spirometry.  5. Schedule skin testing off antihistamines in 2 weeks.

## 2017-09-14 ENCOUNTER — TELEPHONE (OUTPATIENT)
Dept: ORTHOPEDICS | Facility: CLINIC | Age: 62
End: 2017-09-14

## 2017-09-14 NOTE — TELEPHONE ENCOUNTER
----- Message from Karlie Avendaño sent at 9/14/2017  2:57 PM CDT -----  Contact: self@home, [phone]  Pt called in stating that she needs to come in for injections in both knees, and is asking if there is anyway that she can be seen tomorrow, or at least sooner than 9/26. Please call and advise    Thank you

## 2017-09-14 NOTE — TELEPHONE ENCOUNTER
----- Message from Karlie Avendaño sent at 9/14/2017  2:57 PM CDT -----  Contact: self@home, [phone]  Pt called in stating that she needs to come in for injections in both knees, and is asking if there is anyway that she can be seen tomorrow, or at least sooner than 9/26. Please call and advise    Thank you

## 2017-09-14 NOTE — TELEPHONE ENCOUNTER
Spoke to pt regardign a sooner appt with Deedee. Pt was informed that Deedee does not have any appts tomorrow or sooner than the appt she had scheduled on 9/26. Pt was offered an appt tomorrow with Mono Dennison PA-C at 315p. Pt could not make that appt time. She was scheduled with Mono on 9/20 at 915a. Pt verbalized understanding.

## 2017-09-14 NOTE — TELEPHONE ENCOUNTER
----- Message from Karlie Avendaño sent at 9/14/2017  2:57 PM CDT -----  Contact: self@home, [phone]  Pt called in stating that she needs to come in for injections in both knees, and is asking if there is anyway that she can be seen tomorrow, or at least sooner than 9/26. Please call and advise    Thank you

## 2017-09-14 NOTE — TELEPHONE ENCOUNTER
----- Message from Karlie Avendaño sent at 9/14/2017  2:57 PM CDT -----  Contact: self@home, [phone]  Pt called in stating that she needs to come in for injections in both knees, and is asking if there is anyway that she can be seen tomorrow, or at least sooner than 9/26. Please call and advise    Thank you

## 2017-09-20 ENCOUNTER — OFFICE VISIT (OUTPATIENT)
Dept: ORTHOPEDICS | Facility: CLINIC | Age: 62
End: 2017-09-20
Payer: COMMERCIAL

## 2017-09-20 VITALS
HEART RATE: 91 BPM | DIASTOLIC BLOOD PRESSURE: 90 MMHG | HEIGHT: 66 IN | WEIGHT: 248 LBS | BODY MASS INDEX: 39.86 KG/M2 | SYSTOLIC BLOOD PRESSURE: 139 MMHG

## 2017-09-20 DIAGNOSIS — M17.0 PRIMARY OSTEOARTHRITIS OF BOTH KNEES: Primary | ICD-10-CM

## 2017-09-20 PROCEDURE — 20610 DRAIN/INJ JOINT/BURSA W/O US: CPT | Mod: 50,S$GLB,, | Performed by: PHYSICIAN ASSISTANT

## 2017-09-20 PROCEDURE — 3080F DIAST BP >= 90 MM HG: CPT | Mod: S$GLB,,, | Performed by: PHYSICIAN ASSISTANT

## 2017-09-20 PROCEDURE — 3075F SYST BP GE 130 - 139MM HG: CPT | Mod: S$GLB,,, | Performed by: PHYSICIAN ASSISTANT

## 2017-09-20 PROCEDURE — 3008F BODY MASS INDEX DOCD: CPT | Mod: S$GLB,,, | Performed by: PHYSICIAN ASSISTANT

## 2017-09-20 PROCEDURE — 99213 OFFICE O/P EST LOW 20 MIN: CPT | Mod: 25,S$GLB,, | Performed by: PHYSICIAN ASSISTANT

## 2017-09-20 PROCEDURE — 99999 PR PBB SHADOW E&M-EST. PATIENT-LVL IV: CPT | Mod: PBBFAC,,, | Performed by: PHYSICIAN ASSISTANT

## 2017-09-20 RX ORDER — BETAMETHASONE SODIUM PHOSPHATE AND BETAMETHASONE ACETATE 3; 3 MG/ML; MG/ML
12 INJECTION, SUSPENSION INTRA-ARTICULAR; INTRALESIONAL; INTRAMUSCULAR; SOFT TISSUE
Status: COMPLETED | OUTPATIENT
Start: 2017-09-20 | End: 2017-09-20

## 2017-09-20 RX ADMIN — BETAMETHASONE SODIUM PHOSPHATE AND BETAMETHASONE ACETATE 12 MG: 3; 3 INJECTION, SUSPENSION INTRA-ARTICULAR; INTRALESIONAL; INTRAMUSCULAR; SOFT TISSUE at 10:09

## 2017-09-20 NOTE — PROGRESS NOTES
SUBJECTIVE:     Chief Complaint & History of Present Illness:  Carolina Marcum is a Established patient 62 y.o. female who is seen here today with a complaint of    Chief Complaint   Patient presents with    Left Knee - Pain    Right Knee - Pain    .  Is here today for continuing care for her bilateral knee pain last seen treated clinic for this condition on 6/02/2017 at which time she undergone bilateral cortisone injections.  Injections prior to that were approximately 4 months.  We had a lengthy discussion review her x-rays she does have a moderate arthritis in both knees right more so than left with him as complete loss of medial joint space.  Explained that repeated cortisone injections at increasingly shorter intervals can cause secondary curved concerns not the least of which would be to increase the rate of deterioration in the knee.  We discussed possibility for Visco supplementation injections at some point in the future patient is very open to this concept  On a scale of 1-10, with 10 being worst pain imaginable, he rates this pain as 5 on good days and 7 on bad days.  she describes the pain as sore and achy.    Review of patient's allergies indicates:   Allergen Reactions    Losartan Swelling     Possible throat closing sensation.    Cefuroxime Itching and Rash    Shrimp Rash     Possibly spicy foods         Current Outpatient Prescriptions   Medication Sig Dispense Refill    amlodipine (NORVASC) 5 MG tablet Take 1 tablet (5 mg total) by mouth once daily. 90 tablet 3    atorvastatin (LIPITOR) 10 MG tablet Take 1 tablet (10 mg total) by mouth once daily. 90 tablet 3    azelastine (ASTELIN) 137 mcg (0.1 %) nasal spray 1 spray (137 mcg total) by Nasal route 2 (two) times daily. 30 mL 1    benzonatate (TESSALON) 100 MG capsule Take 1 capsule (100 mg total) by mouth 3 (three) times daily as needed for Cough. 30 capsule 1    buPROPion (WELLBUTRIN SR) 100 MG TBSR 12 hr tablet Take 1 tablet (100  mg total) by mouth 2 (two) times daily. 60 tablet 2    cetirizine (ZYRTEC) 10 MG tablet Take 10 mg by mouth. Half Tablet Oral Every day      diphenhydrAMINE (BANOPHEN) 25 mg capsule Take 1 each (25 mg total) by mouth every 6 (six) hours as needed for Itching or Allergies. 60 capsule 0    epinephrine (EPIPEN) 0.3 mg/0.3 mL AtIn Inject 0.3 mLs (0.3 mg total) into the muscle as needed (for allergic reaction in which you feel like your throat is swelling, you cannot breathe.). 1 Device 0    esomeprazole (NEXIUM) 20 MG capsule Take 1 capsule (20 mg total) by mouth 2 (two) times daily. 1 Capsule, Delayed Release(E.C.) Oral Every evening 60 capsule 7    estradiol (VAGIFEM) 10 mcg Tab Insert vaginally twice weekly H.S. 24 tablet 4    hydrocortisone-pramoxine (PROCTOFOAM HC) rectal foam Place rectally 3 (three) times daily. 1 Foam Rectal Three times a day 1 applicator 0    metformin (GLUCOPHAGE-XR) 500 MG 24 hr tablet Take 1 tablet (500 mg total) by mouth daily with breakfast. 90 tablet 3    montelukast (SINGULAIR) 10 mg tablet Take 1 tablet (10 mg total) by mouth every evening. 90 tablet 3    multivitamin (THERAGRAN) per tablet Take by mouth. 1 Tablet Oral Every day      mupirocin (BACTROBAN) 2 % ointment Apply topically 2 (two) times daily as needed. 30 g 0    naproxen (NAPROSYN) 500 MG tablet TAKE 1 TABLET BY MOUTH TWICE DAILY 60 tablet 0    nystatin (MYCOSTATIN) cream Apply topically 2 (two) times daily. 30 g 4    omeprazole (PRILOSEC) 40 MG capsule Take 1 capsule (40 mg total) by mouth 2 (two) times daily before meals. 60 capsule 3    pilocarpine (SALAGEN) 5 MG Tab Take 1 tablet (5 mg total) by mouth 2 (two) times daily. 60 tablet 11    SYNTHROID 125 mcg tablet Take 1 tablet (125 mcg total) by mouth once daily. 34 tablet 12    terconazole (TERAZOL 3) 0.8 % vaginal cream   4    tretinoin microspheres 0.08 % GlwP Apply 1 application topically once daily.      triamterene-hydrochlorothiazide 37.5-25 mg  "(DYAZIDE) 37.5-25 mg per capsule TAKE 1 CAPSULE BY MOUTH ONCE DAILY WITH A BANANA 90 capsule 0     No current facility-administered medications for this visit.        Past Medical History:   Diagnosis Date    Abnormal Pap smear of cervix     [facility] - [facility], then [facility]    Asthma, chronic     Bleeding hemorrhoids     Depression     Diverticular disease     Endometriosis     Fatty liver 12/15/2014    Herpes simplex without mention of complication     Hypercholesterolemia     Multiple gastric polyps 2012    Obesity     Reflux        Past Surgical History:   Procedure Laterality Date    BREAST BIOPSY      CERVICAL CONIZATION   W/ LASER       SECTION      x1    CHOLECYSTECTOMY      HYSTERECTOMY  age 42    [place], [facility] (uterine fibroids, adhesions, endometriosis)    OOPHORECTOMY  age 42    [facility] (endometriosis, cyst)    THYROIDECTOMY      TOE SURGERY      left    TOTAL THYROIDECTOMY         Vital Signs (Most Recent)  Vitals:    17 0921   BP: (!) 139/90   Pulse: 91           Review of Systems:  ROS:  Constitutional: no fever or chills  Eyes: no visual changes  ENT: no nasal congestion or sore throat  Respiratory: no cough or shortness of breath, Positive for asthma  Cardiovascular: no chest pain or palpitations  Gastrointestinal: no nausea or vomiting, tolerating diet, Positive for GERD  Genitourinary: no hematuria or dysuria  Integument/Breast: no rash or pruritis  Hematologic/Lymphatic: no easy bruising or lymphadenopathy  Musculoskeletal: positive for arthralgias, back pain, myalgias and stiff joints, negative for bone pain and muscle weakness  Neurological: no seizures or tremors  Behavioral/Psych: no auditory or visual hallucinations, Positive for depression  Endocrine: no heat or cold intolerance                OBJECTIVE:     PHYSICAL EXAM:  Height: 5' 6" (167.6 cm) Weight: 112.5 kg (248 lb 0.3 oz), General Appearance: Well nourished, well developed, in no acute " distress.  Neurological: Mood & affect are normal.  left Knee Exam:  Knee Range of Motion:0-115 degrees flexion   Effusion:none  Condition of skin:intact  Location of tenderness:Medial joint line   Strength:limited by pain and 5 of 5  Stability:  stable to testing  Varus /Valgus stress:  normal  Mary:   negative/negative    right Knee Exam:  Knee Range of Motion:0-115 degrees flexion   Effusion:none  Condition of skin:intact  Location of tenderness:Medial joint line   Strength:limited by pain and 5 of 5  Stability:  stable to testing  Varus /Valgus stress:  normal  Mary:   negative/negative      Hip Examination:  normal    RADIOGRAPHS:  X-rays from previous visit reviewed by me today demonstrate moderate arthritic changes to both knees with considerable loss of medial joint space early sclerotic changes noted without marked osteophytic spurring no other evidence of fracture dislocation or other bony abnormalities    ASSESSMENT/PLAN:     Plan: We discussed with the patient at length all the different treatment options available for  the knee including anti-inflammatories, acetaminophen, rest, ice, knee strengthening exercise, occasional cortisone injections for temporary relief, Viscosupplimentation injections, arthroscopic debridement osteotomy, and finally knee arthroplasty.   We'll proceed with bilateral steroid injections today patient was provided literature for all this was supplementation injections which she would like to try her next visit     The injection site was identified and the skin was prepared with a betadine solution. The   bilateral  knee was injected with 1 ml of Celestone and 5 ml Lidocaine under sterile technique. Carolina Marcum tolerated the procedure well, she was advised to rest the knee today, ice and elevation. she did receive immediate relief of the pain in and about his knee she was told this would be short lived and is secondary to the lidocaine. she may have an increase in  his discomfort tonight followed by steady improvement over the next several days. I may take 1-3 weeks following the injection to get the full benefit of the medication.  I will see her back in 4-6 months. Sooner if he has any problems or concerns.

## 2017-09-26 ENCOUNTER — PATIENT OUTREACH (OUTPATIENT)
Dept: OTHER | Facility: OTHER | Age: 62
End: 2017-09-26

## 2017-09-26 NOTE — PROGRESS NOTES
Last 5 Patient Entered Red[place] Current 30 Day Average: 140/88     Recent Readings 9/23/2017 9/23/2017 9/20/2017 9/20/2017 9/20/2017    Systolic BP (mmHg) 152 157 161 167 108    Diastolic BP (mmHg) 96 101 99 117 61    Pulse 89 90 89 101 84          Hypertension Digital Medicine (HDMP) Health  Follow Up    LVM to follow up with Ms. Carolinatobi Marcum.    Per 30 day average, blood pressure is 140/88 mmHg. Encouraged adherence to low sodium diet and physical activity guidelines. Advised patient to call or message with questions or concerns. WCB in 2 weeks.

## 2017-10-09 ENCOUNTER — PATIENT MESSAGE (OUTPATIENT)
Dept: INTERNAL MEDICINE | Facility: CLINIC | Age: 62
End: 2017-10-09

## 2017-10-09 ENCOUNTER — OFFICE VISIT (OUTPATIENT)
Dept: DERMATOLOGY | Facility: CLINIC | Age: 62
End: 2017-10-09
Payer: COMMERCIAL

## 2017-10-09 ENCOUNTER — NURSE TRIAGE (OUTPATIENT)
Dept: ADMINISTRATIVE | Facility: CLINIC | Age: 62
End: 2017-10-09

## 2017-10-09 ENCOUNTER — OFFICE VISIT (OUTPATIENT)
Dept: INTERNAL MEDICINE | Facility: CLINIC | Age: 62
End: 2017-10-09
Payer: COMMERCIAL

## 2017-10-09 VITALS
SYSTOLIC BLOOD PRESSURE: 140 MMHG | DIASTOLIC BLOOD PRESSURE: 80 MMHG | BODY MASS INDEX: 40.15 KG/M2 | HEART RATE: 84 BPM | WEIGHT: 249.81 LBS | OXYGEN SATURATION: 97 % | HEIGHT: 66 IN

## 2017-10-09 DIAGNOSIS — L82.1 SK (SEBORRHEIC KERATOSIS): ICD-10-CM

## 2017-10-09 DIAGNOSIS — D18.00 ANGIOMA: ICD-10-CM

## 2017-10-09 DIAGNOSIS — L81.4 LENTIGO: ICD-10-CM

## 2017-10-09 DIAGNOSIS — R42 DIZZY SPELLS: Primary | ICD-10-CM

## 2017-10-09 DIAGNOSIS — D22.9 NEVUS: ICD-10-CM

## 2017-10-09 DIAGNOSIS — L72.0 EPIDERMAL CYST: Primary | ICD-10-CM

## 2017-10-09 PROCEDURE — 99999 PR PBB SHADOW E&M-EST. PATIENT-LVL II: CPT | Mod: PBBFAC,,, | Performed by: DERMATOLOGY

## 2017-10-09 PROCEDURE — 99214 OFFICE O/P EST MOD 30 MIN: CPT | Mod: S$GLB,,, | Performed by: DERMATOLOGY

## 2017-10-09 PROCEDURE — 99999 PR PBB SHADOW E&M-EST. PATIENT-LVL III: CPT | Mod: PBBFAC,,, | Performed by: INTERNAL MEDICINE

## 2017-10-09 PROCEDURE — 99213 OFFICE O/P EST LOW 20 MIN: CPT | Mod: S$GLB,,, | Performed by: INTERNAL MEDICINE

## 2017-10-09 NOTE — TELEPHONE ENCOUNTER
"  Reason for Disposition   Symptoms only or mainly in 1 ear (unilateral tinnitus)   Patient wants to be seen    Answer Assessment - Initial Assessment Questions  1. DESCRIPTION: "Describe the sound you are hearing." (e.g., hissing, humming, pounding, ringing)      Dizziness with lying down  2. LOCATION: "One or both ears?" If one, ask: "Which ear?"      Left ear  3. SEVERITY: "How bad is it?"     - MILD - doesn't interfere with normal activities, only can hear in a quiet room     - MODERATE-SEVERE: interferes with work, school, sleep, or other activities       mild  4. ONSET: "When did this begin?" "Did it start suddenly or come on gradually?"      Few days ago  5. PATTERN: "Does this come and go, or has it been constant since it started?"      intermittent  6. HEARING LOSS: "Is your hearing decreased?" (e.g., normal, decreased)        no  7. OTHER SYMPTOMS: "Do you have any other symptoms?" (e.g., dizziness, earache)      dizziness  8. PREGNANCY: "Is there any chance you are pregnant?" "When was your last menstrual period?"      no    Protocols used: ST TINNITUS-A-AH, ST DIZZINESS-A-OH    Ms. Marcum states she is experiencing dizziness when lying down and ringing in her ear. She states dizziness resolves when she closes her eyes. Patient states she began taking metformin about 3 days ago.     "

## 2017-10-09 NOTE — PROGRESS NOTES
"  Subjective:       Patient ID:  Carolina Marcum is a 62 y.o. female who presents for   Chief Complaint   Patient presents with    Skin Check    Cyst     Patient is here today for a "mole" check.   Pt has a history of  moderate sun exposure in the past.   Pt recalls several blistering sunburns in the past- noen  Pt has history of tanning bed use- non  Pt has  had moles removed in the past- non  Pt has history of melanoma in first degree relatives-  Maybe?  Says sister and brother have one of each type of skin cancer  C/o lesion back midline x several months.  Denies pain or bleeding. Tender w pressure.  Also has soemthing in scalp      Cyst         Review of Systems   Constitutional: Negative for fever, chills, fatigue and malaise.   Skin: Positive for daily sunscreen use.   Hematologic/Lymphatic: Does not bruise/bleed easily.        Objective:    Physical Exam   Constitutional: She appears well-developed and well-nourished. No distress.   Neurological: She is alert and oriented to person, place, and time. She is not disoriented.   Psychiatric: She has a normal mood and affect.   Skin:   Areas Examined (abnormalities noted in diagram):   Scalp / Hair Palpated and Inspected  Head / Face Inspection Performed  Neck Inspection Performed  Chest / Axilla Inspection Performed  Abdomen Inspection Performed  Genitals / Buttocks / Groin Inspection Performed  Back Inspection Performed  RUE Inspected  LUE Inspection Performed  RLE Inspected  LLE Inspection Performed  Nails and Digits Inspection Performed                  Diagram Legend     Erythematous scaling macule/papule c/w actinic keratosis       Vascular papule c/w angioma      Pigmented verrucoid papule/plaque c/w seborrheic keratosis      Yellow umbilicated papule c/w sebaceous hyperplasia      Irregularly shaped tan macule c/w lentigo     1-2 mm smooth white papules consistent with Milia      Movable subcutaneous cyst with punctum c/w epidermal inclusion cyst      " Subcutaneous movable cyst c/w pilar cyst      Firm pink to brown papule c/w dermatofibroma      Pedunculated fleshy papule(s) c/w skin tag(s)      Evenly pigmented macule c/w junctional nevus     Mildly variegated pigmented, slightly irregular-bordered macule c/w mildly atypical nevus      Flesh colored to evenly pigmented papule c/w intradermal nevus       Pink pearly papule/plaque c/w basal cell carcinoma      Erythematous hyperkeratotic cursted plaque c/w SCC      Surgical scar with no sign of skin cancer recurrence      Open and closed comedones      Inflammatory papules and pustules      Verrucoid papule consistent consistent with wart     Erythematous eczematous patches and plaques     Dystrophic onycholytic nail with subungual debris c/w onychomycosis     Umbilicated papule    Erythematous-base heme-crusted tan verrucoid plaque consistent with inflamed seborrheic keratosis     Erythematous Silvery Scaling Plaque c/w Psoriasis     See annotation      Assessment / Plan:        Epidermal cyst  Reassurance given to patient. No treatment is necessary.   Treatment of benign, asymptomatic lesions may be considered cosmetic.    Angioma  These are benign vascular lesions that are inherited.  Treatment is not necessary.    Lentigo  This is a benign hyperpigmented sun induced lesion. Daily sun protection will reduce the number of new lesions. Treatment of these benign lesions are considered cosmetic.  The nature of sun-induced photo-aging and skin cancers is discussed.  Sun avoidance, protective clothing, and the use of 30-SPF sunscreens is advised. Observe closely for skin damage/changes, and call if such occurs.    Nevus  Discussed ABCDE's of nevi.  Monitor for new mole or moles that are becoming bigger, darker, irritated, or developing irregular borders. Brochure provided.    SK (seborrheic keratosis)  These are benign inherited growths without a malignant potential. Reassurance given to patient. No treatment is  necessary.     Total body skin examination performed today including at least 12 points as noted in physical examination. No lesions suspicious for malignancy noted.             Return in about 2 years (around 10/9/2019).

## 2017-10-09 NOTE — TELEPHONE ENCOUNTER
Spoke with pt , she will call back to schedule  An urgent care appt if she decides not to go to a walk in clinic

## 2017-10-09 NOTE — PATIENT INSTRUCTIONS
Recommendations for today    Dizzy spells are likely happening because of multiple causes.  Low blood sugar is 1 possibility.  Especially if dizziness occurs again check blood sugar at that time.  If blood sugar is 85 or less eat something and then contact prescribing doctor to discuss the reaction.    You may also consider suspending metformin if unable to check sugar at the time of the next episode and if episodes stop this supports the conclusion that episodes occur because of low sugar.    Sinusitis can also result in vertigo or dizziness.  Therefore we recommend that you optimize sinusitis treatment.  Start Flonase daily for at least 2 weeks in addition to Zyrtec.  This should help with postnasal drip and sinus congestion.          Dizziness (Uncertain Cause)  Dizziness is a common symptom. It may be described as lightheadedness, spinning, or feeling like you are going to faint. Dizziness can have many causes.  Be sure to tell the healthcare provider about:  · All medicines you take, including prescription, over-the-counter, herbs, and supplements  · Any other symptoms you have  · Any health problems you are being treated for  · Anything that causes the dizziness to get worse or better  Today's exam did not show an exact cause for your dizziness. Other tests may be needed. Follow up with your healthcare provider.  Home care  · Dizziness that occurs with sudden standing may be a sign of mild dehydration. Drink extra fluids for the next few days.  · If you recently started a new medicine, stopped a medicine, or had the dose of a current medicine changed, talk with the prescribing healthcare provider. Your medicine plan may need adjustment.  · If dizziness lasts more than a few seconds, sit or lie down until it passes. This may help prevent injury in case you pass out.  · Do not drive or use power tools or dangerous equipment until you have had no dizziness for at least 48 hours.  Follow-up care  Follow up with  your healthcare provider for further evaluation within the next 7 days or as advised.  When to seek medical advice  Call your healthcare provider for any of the following:  · Worsening of symptoms or new symptoms  · Passing out or seizure  · Repeated vomiting  · Headache  · Palpitations (the sense that your heart is fluttering or beating fast or hard)  · Shortness of breath  · Blood in vomit or stool (black or red color)  · Weakness of an arm or leg or one side of the face  · Vision or hearing changes  · Trouble walking or speaking  · Chest, arm, neck, back, or jaw pain  Date Last Reviewed: 8/23/2015  © 7745-6960 [employer]. [address] [place] [address], [place] [address]. All rights reserved. This information is not intended as a substitute for professional medical care. Always follow your healthcare professional's instructions.

## 2017-10-10 NOTE — PROGRESS NOTES
Portions of this note are generated with voice recognition software. Typographical errors may exist.     SUBJECTIVE:    This is a/an 62 y.o. female here for primary care visit for  Chief Complaint   Patient presents with    Dizziness    Tinnitus     States that the dizziness began within the last 7 days.  Precipitating factors are variable.  States the change in orthostatic posture caused at least 1 episode.  Another episode occurred after standing up but another episode happened when laying supine.  Episodes have not been severe to the point of losing orthostatic tone.  No associated headaches.  No TIA syndrome.  Associated with acute on chronic sinusitis symptoms.  Left maxillary fullness and left ear fullness with postnasal drip.  Associated acute on chronic tinnitus.    Pertinent history includes recently starting metformin therapy.  One episode occurred after taking metformin in the evening for the first time.  Patient felt dizzy but did not check blood sugar.  She is taking metformin to help with weight loss.      Medications Reviewed and Updated    Past medical, family, and social histories were reviewed and updated.    Review of Systems negative unless otherwise noted in history of present illness-  ROS    General ROS: negative  Psychological ROS: negative  ENT ROS: negative  Allergy and Immunology ROS: negative  Cardiovascular ROS: negative  Neurological ROS: negative  Dermatological ROS: negative        Allergic:    Review of patient's allergies indicates:   Allergen Reactions    Losartan Swelling     Possible throat closing sensation.    Cefuroxime Itching and Rash    Shrimp Rash     Possibly spicy foods       OBJECTIVE:  BP: (!) 140/80 Pulse: 84    Wt Readings from Last 3 Encounters:   10/09/17 113.3 kg (249 lb 12.5 oz)   09/20/17 112.5 kg (248 lb 0.3 oz)   09/07/17 114.4 kg (252 lb 3.3 oz)    Body mass index is 40.32 kg/m².  Previous Blood Pressure Readings :   BP Readings from Last 3 Encounters:    10/09/17 (!) 140/80   09/20/17 (!) 139/90   09/07/17 120/80       Physical Exam    GEN: No apparent distress  HEENT: sclera non-icteric, conjunctiva clear.  EOM intact.  No significant effusions noted.  CV: no peripheral edema, RRR   PULM: breathing non-labored  ABD: Obese, protuberant abdomen.  PSYCH: appropriate affect  MSK: able to rise from chair without assistance  Neurologic: Vertigo not demonstrated with provocative maneuvers in clinic.  Cranial nerves II through XII grossly intact.   SKIN: normal skin turgor    Pertinent Labs Reviewed       ASSESSMENT/PLAN:    Dizzy spells.etiology likely multifactorial.  Possible hypoglycemia due to metformin.  Possible labyrinthitis due to sinusitis.  Detailed counseling on self care measures. Plan to monitor clinically in addition to plan below.       Future Appointments  Date Time Provider Department Center   10/23/2017 9:00 AM Kierra Cornejo MD [facility] Laurent  10/10/2017  8:47 AM

## 2017-10-10 NOTE — PROGRESS NOTES
Last 5 Patient Entered Red[place] Current 30 Day Average: 142/92     Recent Readings 10/9/2017 10/6/2017 10/3/2017 10/3/2017 9/26/2017    Systolic BP (mmHg) 127 145 142 170 137    Diastolic BP (mmHg) 88 91 95 83 91    Pulse 85 86 89 90 90        Hypertension Digital Medicine Program (HDMP): Health  Follow Up    Went to Dr Laurent yesterday due to dizziness. Stated she was recently started on metformin but thinks it caused some side effects she wasn't comfortable with. (after reviewing chart notes, has been on metformin since 2015).   Has an appt with Dr Cornejo on 10/23.     Lifestyle Modifications:    1.Low sodium diet: trying to be more mindful of sodium - lunch today: burger, no bread, cauliflower, spinach. Eating more salads. Ideal protein shakes in between meals.  One of her big problems is eating out. She eats out weekly at a buffet on Wednesdays. Encouraged her to try different options to see how they affect her BP the next day. Also encouraged her to balance out her meals the rest of the day on days she knows she'll be eating out.    2.Physical activity: having some knee pain and it's hindering her from too much activity    Follow up with MsTi Marcum completed. No further questions or concerns. I will follow up in a few weeks to assess progress.

## 2017-10-11 RX ORDER — TRIAMTERENE AND HYDROCHLOROTHIAZIDE 37.5; 25 MG/1; MG/1
CAPSULE ORAL
Qty: 90 CAPSULE | Refills: 0 | Status: SHIPPED | OUTPATIENT
Start: 2017-10-11 | End: 2018-03-21 | Stop reason: ALTCHOICE

## 2017-10-23 ENCOUNTER — OFFICE VISIT (OUTPATIENT)
Dept: INTERNAL MEDICINE | Facility: CLINIC | Age: 62
End: 2017-10-23
Payer: COMMERCIAL

## 2017-10-23 ENCOUNTER — LAB VISIT (OUTPATIENT)
Dept: LAB | Facility: HOSPITAL | Age: 62
End: 2017-10-23
Attending: INTERNAL MEDICINE
Payer: COMMERCIAL

## 2017-10-23 ENCOUNTER — PATIENT MESSAGE (OUTPATIENT)
Dept: ADMINISTRATIVE | Facility: OTHER | Age: 62
End: 2017-10-23

## 2017-10-23 VITALS
RESPIRATION RATE: 15 BRPM | DIASTOLIC BLOOD PRESSURE: 62 MMHG | BODY MASS INDEX: 39.64 KG/M2 | WEIGHT: 246.63 LBS | HEART RATE: 88 BPM | HEIGHT: 66 IN | TEMPERATURE: 98 F | SYSTOLIC BLOOD PRESSURE: 114 MMHG

## 2017-10-23 DIAGNOSIS — M25.50 POLYARTHRALGIA: ICD-10-CM

## 2017-10-23 DIAGNOSIS — E03.4 HYPOTHYROIDISM DUE TO ACQUIRED ATROPHY OF THYROID: ICD-10-CM

## 2017-10-23 DIAGNOSIS — R73.03 PREDIABETES: ICD-10-CM

## 2017-10-23 DIAGNOSIS — I10 ESSENTIAL HYPERTENSION: Primary | ICD-10-CM

## 2017-10-23 DIAGNOSIS — M54.42 ACUTE BILATERAL LOW BACK PAIN WITH BILATERAL SCIATICA: ICD-10-CM

## 2017-10-23 DIAGNOSIS — K76.0 FATTY LIVER: ICD-10-CM

## 2017-10-23 DIAGNOSIS — M54.41 ACUTE BILATERAL LOW BACK PAIN WITH BILATERAL SCIATICA: ICD-10-CM

## 2017-10-23 DIAGNOSIS — I10 ESSENTIAL HYPERTENSION: ICD-10-CM

## 2017-10-23 LAB
ALBUMIN SERPL BCP-MCNC: 3.6 G/DL
ALP SERPL-CCNC: 63 U/L
ALT SERPL W/O P-5'-P-CCNC: 22 U/L
ANION GAP SERPL CALC-SCNC: 11 MMOL/L
AST SERPL-CCNC: 11 U/L
BASOPHILS # BLD AUTO: 0.04 K/UL
BASOPHILS NFR BLD: 0.6 %
BILIRUB SERPL-MCNC: 0.3 MG/DL
BUN SERPL-MCNC: 15 MG/DL
CALCIUM SERPL-MCNC: 9.3 MG/DL
CHLORIDE SERPL-SCNC: 104 MMOL/L
CK SERPL-CCNC: 135 U/L
CO2 SERPL-SCNC: 29 MMOL/L
CREAT SERPL-MCNC: 0.8 MG/DL
CRP SERPL-MCNC: 12.3 MG/L
DIFFERENTIAL METHOD: ABNORMAL
EOSINOPHIL # BLD AUTO: 0.2 K/UL
EOSINOPHIL NFR BLD: 2.1 %
ERYTHROCYTE [DISTWIDTH] IN BLOOD BY AUTOMATED COUNT: 15 %
ERYTHROCYTE [SEDIMENTATION RATE] IN BLOOD BY WESTERGREN METHOD: 29 MM/HR
EST. GFR  (AFRICAN AMERICAN): >60 ML/MIN/1.73 M^2
EST. GFR  (NON AFRICAN AMERICAN): >60 ML/MIN/1.73 M^2
ESTIMATED AVG GLUCOSE: 114 MG/DL
GLUCOSE SERPL-MCNC: 93 MG/DL
HBA1C MFR BLD HPLC: 5.6 %
HCT VFR BLD AUTO: 41.9 %
HGB BLD-MCNC: 13.8 G/DL
LYMPHOCYTES # BLD AUTO: 2.3 K/UL
LYMPHOCYTES NFR BLD: 32.6 %
MCH RBC QN AUTO: 29.2 PG
MCHC RBC AUTO-ENTMCNC: 32.9 G/DL
MCV RBC AUTO: 89 FL
MONOCYTES # BLD AUTO: 0.7 K/UL
MONOCYTES NFR BLD: 9.5 %
NEUTROPHILS # BLD AUTO: 3.8 K/UL
NEUTROPHILS NFR BLD: 54.6 %
PLATELET # BLD AUTO: 289 K/UL
PMV BLD AUTO: 10.2 FL
POTASSIUM SERPL-SCNC: 3.2 MMOL/L
PROT SERPL-MCNC: 7.3 G/DL
RBC # BLD AUTO: 4.73 M/UL
SODIUM SERPL-SCNC: 144 MMOL/L
TSH SERPL DL<=0.005 MIU/L-ACNC: 2.72 UIU/ML
WBC # BLD AUTO: 7.02 K/UL

## 2017-10-23 PROCEDURE — 85025 COMPLETE CBC W/AUTO DIFF WBC: CPT

## 2017-10-23 PROCEDURE — 84443 ASSAY THYROID STIM HORMONE: CPT

## 2017-10-23 PROCEDURE — 99214 OFFICE O/P EST MOD 30 MIN: CPT | Mod: S$GLB,,, | Performed by: INTERNAL MEDICINE

## 2017-10-23 PROCEDURE — 99999 PR PBB SHADOW E&M-EST. PATIENT-LVL IV: CPT | Mod: PBBFAC,,, | Performed by: INTERNAL MEDICINE

## 2017-10-23 PROCEDURE — 82550 ASSAY OF CK (CPK): CPT

## 2017-10-23 PROCEDURE — 36415 COLL VENOUS BLD VENIPUNCTURE: CPT

## 2017-10-23 PROCEDURE — 83036 HEMOGLOBIN GLYCOSYLATED A1C: CPT

## 2017-10-23 PROCEDURE — 80053 COMPREHEN METABOLIC PANEL: CPT

## 2017-10-23 PROCEDURE — 85651 RBC SED RATE NONAUTOMATED: CPT

## 2017-10-23 PROCEDURE — 86140 C-REACTIVE PROTEIN: CPT

## 2017-10-23 RX ORDER — GABAPENTIN 100 MG/1
100 CAPSULE ORAL NIGHTLY
Qty: 30 CAPSULE | Refills: 2 | Status: SHIPPED | OUTPATIENT
Start: 2017-10-23 | End: 2018-01-23

## 2017-10-23 RX ORDER — TIZANIDINE 2 MG/1
2 TABLET ORAL EVERY 8 HOURS PRN
Qty: 15 TABLET | Refills: 0 | Status: SHIPPED | OUTPATIENT
Start: 2017-10-23 | End: 2017-11-02
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
## 2017-10-24 ENCOUNTER — TELEPHONE (OUTPATIENT)
Dept: INTERNAL MEDICINE | Facility: CLINIC | Age: 62
End: 2017-10-24

## 2017-10-24 DIAGNOSIS — M25.50 POLYARTHRALGIA: ICD-10-CM

## 2017-10-24 DIAGNOSIS — R79.82 ELEVATED C-REACTIVE PROTEIN (CRP): Primary | ICD-10-CM

## 2017-10-24 DIAGNOSIS — E87.6 HYPOKALEMIA: ICD-10-CM

## 2017-10-24 RX ORDER — POTASSIUM CHLORIDE 750 MG/1
20 CAPSULE, EXTENDED RELEASE ORAL DAILY
Qty: 60 CAPSULE | Refills: 11 | Status: SHIPPED | OUTPATIENT
Start: 2017-10-24 | End: 2018-03-21 | Stop reason: SDUPTHER

## 2017-10-24 NOTE — TELEPHONE ENCOUNTER
Please call and let patient know that her muscle enzymes are normal.  Inflammatory markers are very mildly elevated.  I would like to do additional blood work to check for things like rheumatoid arthritis.  Rheumatoid factor, CCP,  ZULMA antibodies are ordered.  Please schedule (in 2 weeks).  Thyroid level is normal.  Continue current dose of Synthroid.  Hemoglobin A1c is in the normal range.  Her potassium is low which may be causing her body aches.  I'm going to start her on potassium supplement 10 mEq 2 pills daily.  Repeat BMP in 2 weeks to recheck potassium (can schedule w/ the CCP, RF, ZULMA).

## 2017-10-27 ENCOUNTER — PATIENT OUTREACH (OUTPATIENT)
Dept: OTHER | Facility: OTHER | Age: 62
End: 2017-10-27

## 2017-10-27 NOTE — PROGRESS NOTES
Last 5 Patient Entered Readings Current 30 Day Average: 140/90     Recent Readings 10/27/2017 10/23/2017 10/19/2017 10/19/2017 10/18/2017    Systolic BP (mmHg) 140 144 152 159 141    Diastolic BP (mmHg) 92 91 93 95 90    Pulse 93 80 89 88 96        Ms. Marcum's BP average has increased since our last encounter. She says she is losing weight and would like to continue to lose weight and be able to stop her medications. She has been having some aches, so Dr. Cornejo is investigating the cause. Her potassium was low (3.2) on labs Monday so she is now taking a potassium supplement and will be repeating labs in 2 weeks. Asked that she call with any other concerns or questions.     Current HTN regimen:  Hypertension Medications             amlodipine (NORVASC) 5 MG tablet Take 1 tablet (5 mg total) by mouth once daily.    triamterene-hydrochlorothiazide 37.5-25 mg (DYAZIDE) 37.5-25 mg per capsule TAKE ONE CAPSULE BY MOUTH ONCE DAILY WITH BANANA        Will continue to monitor regularly. Will follow up in 4-6 weeks, sooner if BP begins to trend upward or downward.    Patient has my contact information and knows to call with any concerns or clinical changes.

## 2017-10-31 ENCOUNTER — DOCUMENTATION ONLY (OUTPATIENT)
Dept: REHABILITATION | Facility: HOSPITAL | Age: 62
End: 2017-10-31

## 2017-10-31 NOTE — PROGRESS NOTES
Pt was evaluated on 11/09/16 and was seen 5 times for PT. Pt has not attended PT since 11/30/16. Pt was given HEP. Current status is unknown. Pt to be d/c'd at this time.    Peri MUÑOZT

## 2017-11-07 ENCOUNTER — PATIENT OUTREACH (OUTPATIENT)
Dept: OTHER | Facility: OTHER | Age: 62
End: 2017-11-07

## 2017-11-07 ENCOUNTER — LAB VISIT (OUTPATIENT)
Dept: LAB | Facility: HOSPITAL | Age: 62
End: 2017-11-07
Attending: INTERNAL MEDICINE
Payer: COMMERCIAL

## 2017-11-07 DIAGNOSIS — R79.82 ELEVATED C-REACTIVE PROTEIN (CRP): ICD-10-CM

## 2017-11-07 DIAGNOSIS — E87.6 HYPOKALEMIA: ICD-10-CM

## 2017-11-07 DIAGNOSIS — M25.50 POLYARTHRALGIA: ICD-10-CM

## 2017-11-07 LAB
ANION GAP SERPL CALC-SCNC: 11 MMOL/L
BUN SERPL-MCNC: 18 MG/DL
CALCIUM SERPL-MCNC: 9.9 MG/DL
CCP AB SER IA-ACNC: <0.5 U/ML
CHLORIDE SERPL-SCNC: 103 MMOL/L
CO2 SERPL-SCNC: 26 MMOL/L
CREAT SERPL-MCNC: 0.8 MG/DL
CRP SERPL-MCNC: 11.9 MG/L
ERYTHROCYTE [SEDIMENTATION RATE] IN BLOOD BY WESTERGREN METHOD: 36 MM/HR
EST. GFR  (AFRICAN AMERICAN): >60 ML/MIN/1.73 M^2
EST. GFR  (NON AFRICAN AMERICAN): >60 ML/MIN/1.73 M^2
GLUCOSE SERPL-MCNC: 86 MG/DL
MAGNESIUM SERPL-MCNC: 1.9 MG/DL
POTASSIUM SERPL-SCNC: 3.7 MMOL/L
RHEUMATOID FACT SERPL-ACNC: <10 IU/ML
SODIUM SERPL-SCNC: 140 MMOL/L

## 2017-11-07 PROCEDURE — 83735 ASSAY OF MAGNESIUM: CPT

## 2017-11-07 PROCEDURE — 86039 ANTINUCLEAR ANTIBODIES (ANA): CPT

## 2017-11-07 PROCEDURE — 36415 COLL VENOUS BLD VENIPUNCTURE: CPT | Mod: PO

## 2017-11-07 PROCEDURE — 85651 RBC SED RATE NONAUTOMATED: CPT

## 2017-11-07 PROCEDURE — 86431 RHEUMATOID FACTOR QUANT: CPT

## 2017-11-07 PROCEDURE — 86140 C-REACTIVE PROTEIN: CPT

## 2017-11-07 PROCEDURE — 86038 ANTINUCLEAR ANTIBODIES: CPT

## 2017-11-07 PROCEDURE — 80048 BASIC METABOLIC PNL TOTAL CA: CPT

## 2017-11-07 PROCEDURE — 86235 NUCLEAR ANTIGEN ANTIBODY: CPT | Mod: 59

## 2017-11-07 PROCEDURE — 86200 CCP ANTIBODY: CPT

## 2017-11-07 NOTE — PROGRESS NOTES
"Last 5 Patient Entered Readings Current 30 Day Average: 140/89     Recent Readings 11/6/2017 10/30/2017 10/28/2017 10/27/2017 10/23/2017    Systolic BP (mmHg) 147 138 141 140 144    Diastolic BP (mmHg) 89 95 83 92 91    Pulse 81 73 87 93 80        Hypertension Digital Medicine Program (HDMP): Health  Follow Up    Feeling well. Having some aches and pains. Taking tylenol in the morning. Saw Dr Cornejo recently. Metformin dose was reduced.    Lifestyle Modifications:    1.Low sodium diet: yes - "choosing healthier things", watching portion sizes, monitoring sodium intake. Working on weight loss. Aims to drink 64oz water/day. Builds meals around vegetables now more often than starchy carbs.    2.Physical activity: yes goes for walks but when she feels achy will not walk as far.    3.Hypotension/Hypertension symptoms: no   Frequency/Alleviating factors/Precipitating factors, etc.     4.Patient has been compliant with the medication regimen.     Follow up with Ms. Carolina Marcum completed. No further questions or concerns. I will follow up in a few weeks to assess progress.         "

## 2017-11-08 ENCOUNTER — HOSPITAL ENCOUNTER (OUTPATIENT)
Dept: RADIOLOGY | Facility: HOSPITAL | Age: 62
Discharge: HOME | End: 2017-11-08
Attending: INTERNAL MEDICINE
Payer: COMMERCIAL

## 2017-11-08 VITALS — BODY MASS INDEX: 39.53 KG/M2 | WEIGHT: 246 LBS | HEIGHT: 66 IN

## 2017-11-08 DIAGNOSIS — R92.8 ABNORMAL MAMMOGRAM: ICD-10-CM

## 2017-11-08 LAB
ANA SER QL IF: POSITIVE
ANA TITR SER IF: NORMAL {TITER}

## 2017-11-08 PROCEDURE — 77065 DX MAMMO INCL CAD UNI: CPT | Mod: TC,LT

## 2017-11-08 PROCEDURE — 77061 BREAST TOMOSYNTHESIS UNI: CPT | Mod: TC,LT

## 2017-11-08 PROCEDURE — 77065 DX MAMMO INCL CAD UNI: CPT | Mod: 26,LT,, | Performed by: RADIOLOGY

## 2017-11-08 PROCEDURE — 77061 BREAST TOMOSYNTHESIS UNI: CPT | Mod: 26,LT,, | Performed by: RADIOLOGY

## 2017-11-09 ENCOUNTER — PATIENT MESSAGE (OUTPATIENT)
Dept: INTERNAL MEDICINE | Facility: CLINIC | Age: 62
End: 2017-11-09

## 2017-11-09 ENCOUNTER — TELEPHONE (OUTPATIENT)
Dept: INTERNAL MEDICINE | Facility: CLINIC | Age: 62
End: 2017-11-09

## 2017-11-09 DIAGNOSIS — R79.82 ELEVATED C-REACTIVE PROTEIN (CRP): ICD-10-CM

## 2017-11-09 DIAGNOSIS — M25.50 POLYARTHRALGIA: Primary | ICD-10-CM

## 2017-11-09 NOTE — TELEPHONE ENCOUNTER
Please call and let patient know that the ZULMA screen was positive as previously.  Specific markers for rheumatoid arthritis and autoimmune diseases are negative so far.  Inflammatory markers are elevated though but may be nonsponcific. I recommend following up with rheumatology, Dr. Abbasi, whom she saw 10/2016.    Electrolytes and magnesium are normal.

## 2017-11-10 ENCOUNTER — CLINICAL SUPPORT (OUTPATIENT)
Dept: REHABILITATION | Facility: HOSPITAL | Age: 62
End: 2017-11-10
Payer: COMMERCIAL

## 2017-11-10 DIAGNOSIS — M54.42 ACUTE BILATERAL LOW BACK PAIN WITH BILATERAL SCIATICA: ICD-10-CM

## 2017-11-10 DIAGNOSIS — M54.41 ACUTE BILATERAL LOW BACK PAIN WITH BILATERAL SCIATICA: ICD-10-CM

## 2017-11-10 DIAGNOSIS — R53.1 DECREASED STRENGTH: ICD-10-CM

## 2017-11-10 DIAGNOSIS — M53.86 DECREASED ROM OF LUMBAR SPINE: ICD-10-CM

## 2017-11-10 DIAGNOSIS — Z74.09 DECREASED MOBILITY AND ENDURANCE: ICD-10-CM

## 2017-11-10 LAB
ANTI SM ANTIBODY: 9.95 EU
ANTI SM/RNP ANTIBODY: 5.94 EU
ANTI-SM INTERPRETATION: NEGATIVE
ANTI-SM/RNP INTERPRETATION: NEGATIVE
ANTI-SSA ANTIBODY: 17.63 EU
ANTI-SSA INTERPRETATION: NEGATIVE
ANTI-SSB ANTIBODY: 0.49 EU
ANTI-SSB INTERPRETATION: NEGATIVE
DSDNA AB SER-ACNC: NORMAL [IU]/ML

## 2017-11-10 PROCEDURE — 97110 THERAPEUTIC EXERCISES: CPT | Mod: PN

## 2017-11-10 PROCEDURE — 97161 PT EVAL LOW COMPLEX 20 MIN: CPT | Mod: PN
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
## 2017-11-16 PROBLEM — M54.40 ACUTE BILATERAL LOW BACK PAIN WITH SCIATICA: Status: ACTIVE | Noted: 2017-11-16

## 2017-11-16 PROBLEM — M53.86 DECREASED ROM OF LUMBAR SPINE: Status: ACTIVE | Noted: 2017-11-16

## 2017-11-16 PROBLEM — R53.1 DECREASED STRENGTH: Status: ACTIVE | Noted: 2017-11-16

## 2017-11-16 PROBLEM — Z74.09 DECREASED MOBILITY AND ENDURANCE: Status: ACTIVE | Noted: 2017-11-16

## 2017-11-22 ENCOUNTER — CLINICAL SUPPORT (OUTPATIENT)
Dept: REHABILITATION | Facility: HOSPITAL | Age: 62
End: 2017-11-22
Payer: COMMERCIAL

## 2017-11-22 DIAGNOSIS — M54.41 ACUTE BILATERAL LOW BACK PAIN WITH BILATERAL SCIATICA: ICD-10-CM

## 2017-11-22 DIAGNOSIS — M53.86 DECREASED ROM OF LUMBAR SPINE: ICD-10-CM

## 2017-11-22 DIAGNOSIS — M54.42 ACUTE BILATERAL LOW BACK PAIN WITH BILATERAL SCIATICA: ICD-10-CM

## 2017-11-22 DIAGNOSIS — R53.1 DECREASED STRENGTH: ICD-10-CM

## 2017-11-22 DIAGNOSIS — Z74.09 DECREASED MOBILITY AND ENDURANCE: ICD-10-CM

## 2017-11-22 PROCEDURE — 97140 MANUAL THERAPY 1/> REGIONS: CPT | Mod: PN

## 2017-11-22 PROCEDURE — 97110 THERAPEUTIC EXERCISES: CPT | Mod: PN

## 2017-11-22 NOTE — PROGRESS NOTES
"TIME RECORD    Date:  11/22/2017    Start Time:  2:05  Stop Time:  3:00    PROCEDURES:    TIMED  Procedure Min.   TE 20   MT 25                 UNTIMED  Procedure Min.   MHP 10         Total Timed Minutes:  45  Total Timed Units:  3  Total Untimed Units:  0  Charges Billed/# of units:  2MT, 1 TE      Progress/Current Status    Subjective:     Patient ID: Carolina Marcum is a 62 y.o. female.  Diagnosis:   1. Decreased ROM of lumbar spine     2. Acute bilateral low back pain with bilateral sciatica     3. Decreased strength     4. Decreased mobility and endurance       Pain: Pt states she has some B Mid lower back soreness and "ache" pain today. She is agreeable to PT session.     Objective:   Pt completed all therex as per log for B Lumbar stability and core strenghening 1:1 /w PTA. Pt the positioned in alternate sidelying for manual therapy consisting of: STM to B QL, STM to B IT band up to Piriformis mm, STM to lumbosacral paraspinals x 25 min. Pt then ended session on moist heat to full back in 90/90 positioning x 10 min.   DATE 11/22/17 11/10/17   VISIT 2 1   FOTO 2/5 1/5   MHP 10 min  Next   MT  --   Supine:      B HS stretch 30"x3 B 1x30'' B   B Piriformis stretch  --   LTRs 30X3 B 30x B   TAs 10x5'' 10x5''   B QS  --   Bridges 2x10 10x   Clams  --   SL hip abd  --   Seated:      LAQ  --   Standing:      Lat pull downs  --   Rows  --   Leg Press  --   INITIALS JA 1/6 SREE         Assessment:     Pt responded with decreased pain in B mid lower back post manual treatment. Pt completed session with no reports of increased pain, no adverse reactions.     Patient Education/Response:     Cont HEP /self stretches    Plans and Goals:   Cont to advance PT as per POC. Monitor response to session.   GOALS: Short Term Goals:  4 weeks  1.Report decreased low back pain  < / =  4 /10 with min radicular symptoms to increase tolerance for ambulation and ADLs.  2. Pt will demo good TA muscle contraction for increased lumbar " stabilization and decreased pain.  3. Pt will demo improved sitting and standing posture for decreased LBP and decrease risk of injury.  4. Pt to tolerate HEP to improve ROM and independence with ADL's     Long Term Goals: 8 weeks  1.Report decreased low back pain  <   / =  2  /10  to increase tolerance for ADLs and mobility.  2.Increase strength to >/= 4/5 MMT grade for BLE to increase tolerance for ADL and work activities.  3. Patient's goal: To loosen up and strengthen my back.  4.Pt will report at </= 41% on FOTO lumbar score for low back pain disability to demonstrate decrease in disability and improvement in back pain.

## 2017-11-24 ENCOUNTER — CLINICAL SUPPORT (OUTPATIENT)
Dept: REHABILITATION | Facility: HOSPITAL | Age: 62
End: 2017-11-24
Payer: COMMERCIAL

## 2017-11-24 DIAGNOSIS — R53.1 DECREASED STRENGTH: ICD-10-CM

## 2017-11-24 DIAGNOSIS — M53.86 DECREASED ROM OF LUMBAR SPINE: ICD-10-CM

## 2017-11-24 DIAGNOSIS — Z74.09 DECREASED MOBILITY AND ENDURANCE: ICD-10-CM

## 2017-11-24 DIAGNOSIS — M54.42 ACUTE BILATERAL LOW BACK PAIN WITH BILATERAL SCIATICA: ICD-10-CM

## 2017-11-24 DIAGNOSIS — M54.41 ACUTE BILATERAL LOW BACK PAIN WITH BILATERAL SCIATICA: ICD-10-CM

## 2017-11-24 PROCEDURE — 97110 THERAPEUTIC EXERCISES: CPT | Mod: PN

## 2017-11-24 PROCEDURE — 97140 MANUAL THERAPY 1/> REGIONS: CPT | Mod: PN

## 2017-11-24 NOTE — PROGRESS NOTES
"TIME RECORD    Date:  11/24/2017    Start Time:  0905  Stop Time:  1010    PROCEDURES:    TIMED  Procedure Min.   TE 15   MT 15                 UNTIMED  Procedure Min.   MHP 10         Total Timed Minutes: 30  Total Timed Units:  2  Total Untimed Units:  0  Charges Billed/# of units:  1 MT, 1 TE      Progress/Current Status    Subjective:     Patient ID: Carolina Marcum is a 62 y.o. female.  Diagnosis:   1. Decreased ROM of lumbar spine     2. Acute bilateral low back pain with bilateral sciatica     3. Decreased strength     4. Decreased mobility and endurance       Pain: 6-7/10 low back pain  Pt reports her back feels better today and she thinks the therapy is helping.    Objective:   Pt participated in therex as per log for B Lumbar stability and core strenghening 1:1 /w PT x 15 mins  f/b MT x15 mins consisting of: STM to B QL, STM to B IT band up to Piriformis mm, STM to lumbosacral paraspinals. Pt then performed 25 min of supervised TE per log below. Pt then ended session on moist heat to full back in 90/90 positioning x 10 min.   DATE 11/24/17 11/22/17 11/10/17   VISIT 3 2 1   FOTO 3/5 2/5 1/5   MHP NT 10 min  Next   MT   --   Supine:       B HS stretch 3x30'' B 30"x3 B 1x30'' B   B Piriformis stretch 3x30'' B  --   LTRs 2' 30X3 B 30x B   TAs 10x5'' 10x5'' 10x5''   B QS NEXT  --   Bridges 3x10 2x10 10x   Clams 3x10 B  --   SL hip abd 2x10 R  1x7 L (L knee pain)  --   Seated:       LAQ 2x10 B  --   Standing:       Lat pull downs 2x10 RTC  --   Rows 2x10 RTC  --   Leg Press NEXT  --   INITIALS SREE JA 1/6 SREE         Assessment:     Pt reported decreased low back pain after therapy and MHP at 4-5/10 LBP. Pt progressing well so far and add noted exercises above for next visit; pt tolerating TE well except of SL hip abd due to increased knee pain from OA.    Patient Education/Response:     Cont HEP /self stretches    Plans and Goals:   Cont to advance PT as per POC. Monitor response to session.   GOALS: Short " Term Goals:  4 weeks  1.Report decreased low back pain  < / =  4 /10 with min radicular symptoms to increase tolerance for ambulation and ADLs.  2. Pt will demo good TA muscle contraction for increased lumbar stabilization and decreased pain.  3. Pt will demo improved sitting and standing posture for decreased LBP and decrease risk of injury.  4. Pt to tolerate HEP to improve ROM and independence with ADL's     Long Term Goals: 8 weeks  1.Report decreased low back pain  <   / =  2  /10  to increase tolerance for ADLs and mobility.  2.Increase strength to >/= 4/5 MMT grade for BLE to increase tolerance for ADL and work activities.  3. Patient's goal: To loosen up and strengthen my back.  4.Pt will report at </= 41% on FOTO lumbar score for low back pain disability to demonstrate decrease in disability and improvement in back pain.

## 2017-11-28 ENCOUNTER — PATIENT MESSAGE (OUTPATIENT)
Dept: ADMINISTRATIVE | Facility: OTHER | Age: 62
End: 2017-11-28

## 2017-11-28 ENCOUNTER — CLINICAL SUPPORT (OUTPATIENT)
Dept: REHABILITATION | Facility: HOSPITAL | Age: 62
End: 2017-11-28
Payer: COMMERCIAL

## 2017-11-28 ENCOUNTER — PATIENT OUTREACH (OUTPATIENT)
Dept: OTHER | Facility: OTHER | Age: 62
End: 2017-11-28

## 2017-11-28 DIAGNOSIS — Z74.09 DECREASED MOBILITY AND ENDURANCE: ICD-10-CM

## 2017-11-28 DIAGNOSIS — M53.86 DECREASED ROM OF LUMBAR SPINE: ICD-10-CM

## 2017-11-28 DIAGNOSIS — M54.42 ACUTE BILATERAL LOW BACK PAIN WITH BILATERAL SCIATICA: ICD-10-CM

## 2017-11-28 DIAGNOSIS — R53.1 DECREASED STRENGTH: ICD-10-CM

## 2017-11-28 DIAGNOSIS — M54.41 ACUTE BILATERAL LOW BACK PAIN WITH BILATERAL SCIATICA: ICD-10-CM

## 2017-11-28 PROCEDURE — 97140 MANUAL THERAPY 1/> REGIONS: CPT | Mod: PN

## 2017-11-28 PROCEDURE — 97110 THERAPEUTIC EXERCISES: CPT | Mod: PN

## 2017-11-28 NOTE — PROGRESS NOTES
"TIME RECORD    Date:  11/28/2017    Start Time:  10:00  Stop Time:  11:00    PROCEDURES:    TIMED  Procedure Min.   TE 50   MT 10                 UNTIMED  Procedure Min.   MHP 10         Total Timed Minutes: 60  Total Timed Units:  4  Total Untimed Units:  NC  Charges Billed/# of units:  1 MT, 3 TE      Progress/Current Status    Subjective:     Patient ID: Carolina Marcum is a 62 y.o. female.  Diagnosis:   1. Decreased ROM of lumbar spine     2. Acute bilateral low back pain with bilateral sciatica     3. Decreased strength     4. Decreased mobility and endurance       Pain: 5/10 low back pain  Pt reports her back and knees are feeling better today.    Objective:   Pt participated in therex as per log for B Lumbar stability and core strenghening 1:1 /w PT x 50 mins  f/b MT x10 mins consisting of: STM to B QL, STM to B IT band up to Piriformis mm, STM to lumbosacral paraspinals.  Pt then ended session on moist heat to full back in 90/90 positioning x 10 min.  PT added on DKTC and brace marching today.    DATE 11/28/17 11/24/17 11/22/17 11/10/17   VISIT 4 3 2 1   FOTO 4/5 3/5 2/5 1/5   MHP NT NT 10 min  Next   MT 10 15  --   Supine:        B HS stretch 3x30'' B 3x30'' B 30"x3 B 1x30'' B   B Piriformis stretch 3x30'' B 3x30'' B  --   DKTC on SB 20x5''      LTRs  2' 30X3 B 30x B   TAs 10x5'' 10x5'' 10x5'' 10x5''   Brace Marching 2x10 B      B QS  NEXT  --   Bridges 2x15 3x10 2x10 10x   Clams 3x10 RTB 3x10 B  --   SL hip abd 3x10 B 2x10 R  1x7 L (L knee pain)  --   Seated:        LAQ 2x15 B 1# 2x10 B  --   Standing:        Lat pull downs 3x10 RTC 2x10 RTC  --   Rows 3x10 RTC 2x10 RTC  --   Leg Press NEXT NEXT  --   INITIALS SREE RUIZ 1/6 SREE         Assessment:     Pt reports 4/10 low back pain after therapy; pt progressing steadily and tolerated increased wts and reps today without pain.    Patient Education/Response:     Cont HEP /self stretches    Plans and Goals:   Cont to advance PT as per POC. Monitor response " to session.   GOALS: Short Term Goals:  4 weeks  1.Report decreased low back pain  < / =  4 /10 with min radicular symptoms to increase tolerance for ambulation and ADLs.  2. Pt will demo good TA muscle contraction for increased lumbar stabilization and decreased pain.  3. Pt will demo improved sitting and standing posture for decreased LBP and decrease risk of injury.  4. Pt to tolerate HEP to improve ROM and independence with ADL's     Long Term Goals: 8 weeks  1.Report decreased low back pain  <   / =  2  /10  to increase tolerance for ADLs and mobility.  2.Increase strength to >/= 4/5 MMT grade for BLE to increase tolerance for ADL and work activities.  3. Patient's goal: To loosen up and strengthen my back.  4.Pt will report at </= 41% on FOTO lumbar score for low back pain disability to demonstrate decrease in disability and improvement in back pain.

## 2017-11-28 NOTE — PROGRESS NOTES
"Last 5 Patient Entered Readings Current 30 Day Average: 144/91     Recent Readings 11/28/2017 11/26/2017 11/26/2017 11/24/2017 11/23/2017    Systolic BP (mmHg) 140 136 159 144 148    Diastolic BP (mmHg) 93 90 100 90 85    Pulse 101 101 83 73 82        Patient's BP average is not controlled. Reviewed 2017 ACC/AHA HTN guidelines with patient and explained new BP goal is <130/80.     Ms. Tucker's BP readings have been higher recently. She attributes this to Thanksgiving and a lot of parties the past few weeks. She is concerned that amlodipine could be causing her to have symptoms of an autoimmune disease. Her ZULMA was positive on 11/7, but has been positive in the past even while not taking amlodipine. She does not want to increase amlodipine dose. She also complains of dizziness but is unsure which medication may be causing it. She plans to resume her "weight loss plan" soon an would like to see if this helps her BP before adding another medication or increasing amlodipine. She asked that I follow up in 2 months.    Current HTN regimen:  Hypertension Medications             amlodipine (NORVASC) 5 MG tablet Take 1 tablet (5 mg total) by mouth once daily.    triamterene-hydrochlorothiazide 37.5-25 mg (DYAZIDE) 37.5-25 mg per capsule TAKE ONE CAPSULE BY MOUTH ONCE DAILY WITH BANANA          Will continue to monitor regularly. Will follow up in 2-3 months, sooner if BP begins to trend upward or downward.    Patient has my contact information and knows to call with any concerns or clinical changes.       "

## 2017-12-01 ENCOUNTER — CLINICAL SUPPORT (OUTPATIENT)
Dept: REHABILITATION | Facility: HOSPITAL | Age: 62
End: 2017-12-01
Payer: COMMERCIAL

## 2017-12-01 DIAGNOSIS — Z74.09 DECREASED MOBILITY AND ENDURANCE: ICD-10-CM

## 2017-12-01 DIAGNOSIS — M53.86 DECREASED ROM OF LUMBAR SPINE: ICD-10-CM

## 2017-12-01 DIAGNOSIS — M54.41 ACUTE BILATERAL LOW BACK PAIN WITH BILATERAL SCIATICA: ICD-10-CM

## 2017-12-01 DIAGNOSIS — R53.1 DECREASED STRENGTH: ICD-10-CM

## 2017-12-01 DIAGNOSIS — M54.42 ACUTE BILATERAL LOW BACK PAIN WITH BILATERAL SCIATICA: ICD-10-CM

## 2017-12-01 PROCEDURE — 97110 THERAPEUTIC EXERCISES: CPT | Mod: PN

## 2017-12-01 PROCEDURE — 97140 MANUAL THERAPY 1/> REGIONS: CPT | Mod: PN

## 2017-12-01 NOTE — PROGRESS NOTES
"TIME RECORD    Date:  12/01/2017    Start Time:  10:00  Stop Time:  11:00    PROCEDURES:    TIMED  Procedure Min.   TE 50   MT 10                 UNTIMED  Procedure Min.   MHP 10         Total Timed Minutes: 60  Total Timed Units:  4  Total Untimed Units:  NC  Charges Billed/# of units:  1 MT, 3 TE      Progress/Current Status    Subjective:     Patient ID: Carolina Marcum is a 62 y.o. female.  Diagnosis:   1. Decreased ROM of lumbar spine     2. Acute bilateral low back pain with bilateral sciatica     3. Decreased strength     4. Decreased mobility and endurance       Pain: 5/10 low back pain  Pt reports her back and knees are feeling better today.    Objective:   Pt participated in therex as per log for B Lumbar stability and core strenghening 1:1 /w PT x 50 mins  f/b MT x10 mins consisting of: STM to B QL, STM to B IT band up to Piriformis mm, STM to lumbosacral paraspinals.     DATE 12/1/17 11/28/17 11/24/17 11/22/17 11/10/17   VISIT 5 4 3 2 1   FOTO NEXT 4/5 3/5 2/5 1/5   MHP NT NT NT 10 min  Next   MT 10 10 15  --   Supine:         B HS stretch 3x30'' B 3x30'' B 3x30'' B 30"x3 B 1x30'' B   B Piriformis stretch 3x30'' B 3x30'' B 3x30'' B  --   DKTC on SB 20x5'' 20x5''      LTRs 1'  2' 30X3 B 30x B   TAs 10x5'' 10x5'' 10x5'' 10x5'' 10x5''   Brace Marching 2x10 B 2x10 B      B QS -  NEXT  --   Bridges 2x15 2x15 3x10 2x10 10x   Clams 2x15 RTB 3x10 RTB 3x10 B  --   SL hip abd 3x10 B 3x10 B 2x10 R  1x7 L (L knee pain)  --   Seated:         LAQ NT 2x15 B 1# 2x10 B  --   Standing:         Lat pull downs 2x15 RTB 3x10 RTC 2x10 RTC  --   Rows 2x15 GTB 3x10 RTC 2x10 RTC  --   Leg Press  NEXT NEXT  --   INITIALS SREE SREE SREE JA 1/6 SREE         Assessment:     Pt reported decreased LBP at 4/10 at end of session. Pt requires cont core strengthening and Mt. Assess pt leg length and hip alignment for next visit.    Patient Education/Response:     Cont HEP /self stretches    Plans and Goals:   Cont to advance PT as per POC. " Monitor response to session.   GOALS: Short Term Goals:  4 weeks  1.Report decreased low back pain  < / =  4 /10 with min radicular symptoms to increase tolerance for ambulation and ADLs.  2. Pt will demo good TA muscle contraction for increased lumbar stabilization and decreased pain.  3. Pt will demo improved sitting and standing posture for decreased LBP and decrease risk of injury.  4. Pt to tolerate HEP to improve ROM and independence with ADL's     Long Term Goals: 8 weeks  1.Report decreased low back pain  <   / =  2  /10  to increase tolerance for ADLs and mobility.  2.Increase strength to >/= 4/5 MMT grade for BLE to increase tolerance for ADL and work activities.  3. Patient's goal: To loosen up and strengthen my back.  4.Pt will report at </= 41% on FOTO lumbar score for low back pain disability to demonstrate decrease in disability and improvement in back pain.

## 2017-12-05 ENCOUNTER — PATIENT OUTREACH (OUTPATIENT)
Dept: OTHER | Facility: OTHER | Age: 62
End: 2017-12-05

## 2017-12-05 NOTE — PROGRESS NOTES
Last 5 Patient Entered Readings Current 30 Day Average: 144/90     Recent Readings 12/5/2017 12/5/2017 12/4/2017 11/28/2017 11/28/2017    Systolic BP (mmHg) 144 146 139 138 147    Diastolic BP (mmHg) 92 100 95 89 97    Pulse 77 80 85 91 82        Hypertension Digital Medicine Program (HDMP): Health  Follow Up    Feeling well. Finding it difficult to follow healthful diet / stay active now that the holiday season has begun.    Lifestyle Modifications:    1.Low sodium diet: no - need for better sodium monitoring. Trying to eat at home before going out to social events but is having difficulty staying on track.    2.Physical activity: no - staying active but need for increased exercise.    3.Hypotension/Hypertension symptoms: no   Frequency/Alleviating factors/Precipitating factors, etc.     4.Patient has been compliant with the medication regimen.     Follow up with Mrs. Garcia Amiemelina completed. No further questions or concerns. I will follow up in a few weeks to assess progress.

## 2017-12-08 ENCOUNTER — DOCUMENTATION ONLY (OUTPATIENT)
Dept: REHABILITATION | Facility: HOSPITAL | Age: 62
End: 2017-12-08

## 2017-12-08 ENCOUNTER — CLINICAL SUPPORT (OUTPATIENT)
Dept: REHABILITATION | Facility: HOSPITAL | Age: 62
End: 2017-12-08
Payer: COMMERCIAL

## 2017-12-08 DIAGNOSIS — M54.42 ACUTE BILATERAL LOW BACK PAIN WITH BILATERAL SCIATICA: ICD-10-CM

## 2017-12-08 DIAGNOSIS — M54.41 ACUTE BILATERAL LOW BACK PAIN WITH BILATERAL SCIATICA: ICD-10-CM

## 2017-12-08 DIAGNOSIS — M53.86 DECREASED ROM OF LUMBAR SPINE: ICD-10-CM

## 2017-12-08 DIAGNOSIS — Z74.09 DECREASED MOBILITY AND ENDURANCE: ICD-10-CM

## 2017-12-08 DIAGNOSIS — R53.1 DECREASED STRENGTH: ICD-10-CM

## 2017-12-08 PROCEDURE — 97110 THERAPEUTIC EXERCISES: CPT | Mod: PN

## 2017-12-08 PROCEDURE — 97140 MANUAL THERAPY 1/> REGIONS: CPT | Mod: PN

## 2017-12-08 NOTE — PROGRESS NOTES
"TIME RECORD    Date:  12/08/2017    Start Time:  9:00  Stop Time:  10:00    PROCEDURES:    TIMED  Procedure Min.   TE 35   MT 20                 UNTIMED  Procedure Min.             Total Timed Minutes: 55  Total Timed Units: 4  Total Untimed Units:    Charges Billed/# of units:  2TE, 2 MT      Progress/Current Status    Subjective:     Patient ID: Carolina Marcum is a 62 y.o. female.  Diagnosis:   1. Decreased ROM of lumbar spine     2. Acute bilateral low back pain with bilateral sciatica     3. Decreased strength     4. Decreased mobility and endurance       Pain: 5/10 low back pain  Pt states she has R mid lower back pain today. Pt describes her pain as "ache"    Objective:   Pt participated in therex as per log for B Lumbar stability and core strenghening 1:1 /w PTA x 30 mins. Pt then received manual therapy x20 mins consisting of: STM to B QL, STM to B IT band up to Piriformis mm, STM to lumbosacral paraspinals.     DATE 12/8/17 12/1/17 11/28/17 11/24/17 11/22/17 11/10/17   VISIT 6 5 4 3 2 1   FOTO 6/5 NEXT 4/5 3/5 2/5 1/5   MHP NT NT NT NT 10 min  Next   MT 20 10 10 15  --   Supine:          B HS stretch 30"X3 B 3x30'' B 3x30'' B 3x30'' B 30"x3 B 1x30'' B   B Piriformis stretch 30"X3 B 3x30'' B 3x30'' B 3x30'' B  --   DKTC on SB 20x5" 20x5'' 20x5''      LTRs 1' 1'  2' 30X3 B 30x B   TAs 10x5" 10x5'' 10x5'' 10x5'' 10x5'' 10x5''   Brace Marching 2x10 B 2x10 B 2x10 B      B QS -  -  NEXT  --   Bridges 2x15 2x15 2x15 3x10 2x10 10x   Clams 2x15 RTB 2x15 RTB 3x10 RTB 3x10 B  --   SL hip abd 3x10 B 3x10 B 3x10 B 2x10 R  1x7 L (L knee pain)  --   Seated:          LAQ  NT 2x15 B 1# 2x10 B  --   Standing:          Lat pull downs 2x15 RTB 2x15 RTB 3x10 RTC 2x10 RTC  --   Rows 2x15 RTB 2x15 GTB 3x10 RTC 2x10 RTC  --   Leg Press   NEXT NEXT  --   INITIALS JOSEPH 1/6 SREE RUIZ 1/6 SREE         Assessment:     Pt responded well to manual therapy stating she feels "Better overall". Completed all therx with no reports of " increased pain.     Patient Education/Response:     Cont HEP /self stretches    Plans and Goals:   Cont to advance PT as per POC.    GOALS: Short Term Goals:  4 weeks  1.Report decreased low back pain  < / =  4 /10 with min radicular symptoms to increase tolerance for ambulation and ADLs.  2. Pt will demo good TA muscle contraction for increased lumbar stabilization and decreased pain.  3. Pt will demo improved sitting and standing posture for decreased LBP and decrease risk of injury.  4. Pt to tolerate HEP to improve ROM and independence with ADL's     Long Term Goals: 8 weeks  1.Report decreased low back pain  <   / =  2  /10  to increase tolerance for ADLs and mobility.  2.Increase strength to >/= 4/5 MMT grade for BLE to increase tolerance for ADL and work activities.  3. Patient's goal: To loosen up and strengthen my back.  4.Pt will report at </= 41% on FOTO lumbar score for low back pain disability to demonstrate decrease in disability and improvement in back pain.

## 2017-12-08 NOTE — PROGRESS NOTES
"TIME RECORD    Date: 12/8/2017      Start Time:  ***  Stop Time:  ***    PROCEDURES:    TIMED  Procedure Min.                         UNTIMED  Procedure Min.             Total Timed Minutes:  ***  Total Timed Units:  ***  Total Untimed Units:  ***  Charges Billed/# of units:  ***      Progress/Current Status    Subjective:     Patient ID: Carolina Marcum is a 62 y.o. female.  Diagnosis:   1. Decreased ROM of lumbar spine     2. Acute bilateral low back pain with bilateral sciatica     3. Decreased strength     4. Decreased mobility and endurance       Pain: {PAIN 0-10:88456} /10  ***    Objective:     Pt participated in therex as per log for B Lumbar stability and core strenghening 1:1 /w PT x 50 mins  f/b MT x10 mins consisting of: STM to B QL, STM to B IT band up to Piriformis mm, STM to lumbosacral paraspinals.     DATE 12/8/17 12/1/17 11/28/17 11/24/17 11/22/17 11/10/17   VISIT  5 4 3 2 1   FOTO  NEXT 4/5 3/5 2/5 1/5   MHP  NT NT NT 10 min  Next   MT  10 10 15  --   Supine:          B HS stretch  3x30'' B 3x30'' B 3x30'' B 30"x3 B 1x30'' B   B Piriformis stretch  3x30'' B 3x30'' B 3x30'' B  --   DKTC on SB  20x5'' 20x5''      LTRs  1'  2' 30X3 B 30x B   TAs  10x5'' 10x5'' 10x5'' 10x5'' 10x5''   Brace Marching  2x10 B 2x10 B      B QS  -  NEXT  --   Bridges  2x15 2x15 3x10 2x10 10x   Clams  2x15 RTB 3x10 RTB 3x10 B  --   SL hip abd  3x10 B 3x10 B 2x10 R  1x7 L (L knee pain)  --   Seated:          LAQ  NT 2x15 B 1# 2x10 B  --   Standing:          Lat pull downs  2x15 RTB 3x10 RTC 2x10 RTC  --   Rows  2x15 GTB 3x10 RTC 2x10 RTC  --   Leg Press   NEXT NEXT  --   INITIALS  SREE RUIZ 1/6 SREE       Assessment:     ***    Patient Education/Response:     ***    Plans and Goals:     ***    "

## 2017-12-11 ENCOUNTER — CLINICAL SUPPORT (OUTPATIENT)
Dept: REHABILITATION | Facility: HOSPITAL | Age: 62
End: 2017-12-11
Payer: COMMERCIAL

## 2017-12-11 DIAGNOSIS — M53.86 DECREASED ROM OF LUMBAR SPINE: ICD-10-CM

## 2017-12-11 DIAGNOSIS — M54.42 ACUTE BILATERAL LOW BACK PAIN WITH BILATERAL SCIATICA: ICD-10-CM

## 2017-12-11 DIAGNOSIS — Z74.09 DECREASED MOBILITY AND ENDURANCE: ICD-10-CM

## 2017-12-11 DIAGNOSIS — R53.1 DECREASED STRENGTH: ICD-10-CM

## 2017-12-11 DIAGNOSIS — M54.41 ACUTE BILATERAL LOW BACK PAIN WITH BILATERAL SCIATICA: ICD-10-CM

## 2017-12-11 PROCEDURE — 97110 THERAPEUTIC EXERCISES: CPT | Mod: PN

## 2017-12-11 PROCEDURE — 97140 MANUAL THERAPY 1/> REGIONS: CPT | Mod: PN

## 2017-12-11 NOTE — PROGRESS NOTES
"TIME RECORD    Date:  12/11/2017    Start Time:  9:00  Stop Time:  10:00    PROCEDURES:    TIMED  Procedure Min.   TE 35   MT 20                 UNTIMED  Procedure Min.             Total Timed Minutes: 55  Total Timed Units: 4  Total Untimed Units:    Charges Billed/# of units:  2TE, 2 MT      Progress/Current Status    Subjective:     Patient ID: Carolina Marcum is a 62 y.o. female.  Diagnosis:   1. Decreased ROM of lumbar spine     2. Acute bilateral low back pain with bilateral sciatica     3. Decreased strength     4. Decreased mobility and endurance       Pain: 5/10 low back pain  Pt states she has R mid lower back pain today. Pt describes her pain as "ache"    Objective:   Pt participated in therex as per log for B Lumbar stability and core strenghening 1:1 /w PTA x 30 mins. Pt then received manual therapy x20 mins consisting of: STM to B QL, STM to B IT band up to Piriformis mm, STM to lumbosacral paraspinals.     DATE  12/8/17 12/1/17 11/28/17 11/24/17 11/22/17 11/10/17   VISIT  6 5 4 3 2 1   FOTO  6/5 NEXT 4/5 3/5 2/5 1/5   MHP  NT NT NT NT 10 min  Next   MT  20 10 10 15  --   Supine:           B HS stretch  30"X3 B 3x30'' B 3x30'' B 3x30'' B 30"x3 B 1x30'' B   B Piriformis stretch  30"X3 B 3x30'' B 3x30'' B 3x30'' B  --   DKTC on SB  20x5" 20x5'' 20x5''      LTRs  1' 1'  2' 30X3 B 30x B   TAs  10x5" 10x5'' 10x5'' 10x5'' 10x5'' 10x5''   Brace Marching  2x10 B 2x10 B 2x10 B      B QS  -  -  NEXT  --   Bridges  2x15 2x15 2x15 3x10 2x10 10x   Clams  2x15 RTB 2x15 RTB 3x10 RTB 3x10 B  --   SL hip abd  3x10 B 3x10 B 3x10 B 2x10 R  1x7 L (L knee pain)  --   Seated:           LAQ   NT 2x15 B 1# 2x10 B  --   Standing:           Lat pull downs  2x15 RTB 2x15 RTB 3x10 RTC 2x10 RTC  --   Rows  2x15 RTB 2x15 GTB 3x10 RTC 2x10 RTC  --   Leg Press    NEXT NEXT  --   INITIALS SREE RUIZ 1/6 SREE RUIZ 1/6 SREE         Assessment:     Pt responded well to manual therapy stating she feels "Better overall". Completed all " therx with no reports of increased pain.     Patient Education/Response:     Cont HEP /self stretches    Plans and Goals:   Cont to advance PT as per POC.    GOALS: Short Term Goals:  4 weeks  1.Report decreased low back pain  < / =  4 /10 with min radicular symptoms to increase tolerance for ambulation and ADLs.  2. Pt will demo good TA muscle contraction for increased lumbar stabilization and decreased pain.  3. Pt will demo improved sitting and standing posture for decreased LBP and decrease risk of injury.  4. Pt to tolerate HEP to improve ROM and independence with ADL's     Long Term Goals: 8 weeks  1.Report decreased low back pain  <   / =  2  /10  to increase tolerance for ADLs and mobility.  2.Increase strength to >/= 4/5 MMT grade for BLE to increase tolerance for ADL and work activities.  3. Patient's goal: To loosen up and strengthen my back.  4.Pt will report at </= 41% on FOTO lumbar score for low back pain disability to demonstrate decrease in disability and improvement in back pain.

## 2017-12-11 NOTE — PROGRESS NOTES
"TIME RECORD    Date: 12/11/2017      Start Time:  10:00 am   Stop Time:  11:00 am     PROCEDURES:    TIMED  Procedure Min.   TE supervised 30' NC   TE 10'    MT 20'              UNTIMED  Procedure Min.             Total Timed Minutes:  30'  Total Timed Units:  2  Total Untimed Units:  0  Charges Billed/# of units:  2  ( 1 TE, 1 MT)       Progress/Current Status    Subjective:     Patient ID: Carolina Marcum is a 62 y.o. female.  Diagnosis:   1. Decreased ROM of lumbar spine     2. Acute bilateral low back pain with bilateral sciatica     3. Decreased strength     4. Decreased mobility and endurance       Pain: 5 /10 prior to therapy session  Pt stated that she was lifting and twisting heavy clothes this weekend and "twinged" her back .     Objective:     Pt participated in therex as per log for B Lumbar stability and core strenghening supervised x 30' and 1:1 with PT x10'. Pt received manual therapy x20 mins consisting of: STM to B QL and STM to B lumbosacral paraspinals.     DATE 12/11/17 12/8/17 12/1/17 11/28/17 11/24/17 11/22/17 11/10/17   VISIT 7 6 5 4 3 2 1   FOTO 7/10 6/5 NEXT 4/5 3/5 2/5 1/5   MHP  NT NT NT NT 10 min  Next   MT 20' 20 10 10 15  --   Supine:           B HS stretch 30"x3 B 30"X3 B 3x30'' B 3x30'' B 3x30'' B 30"x3 B 1x30'' B   B Piriformis stretch 30"x3 B 30"X3 B 3x30'' B 3x30'' B 3x30'' B  --   DKTC on SB 20x5" 20x5" 20x5'' 20x5''      LTRs 1' 1' 1'  2' 30X3 B 30x B   TAs 10x5" 10x5" 10x5'' 10x5'' 10x5'' 10x5'' 10x5''   Brace Marching 2x10 2x10 B 2x10 B 2x10 B      B QS  -  -  NEXT  --   Bridges 2x15 2x15 2x15 2x15 3x10 2x10 10x   Clams 2x15 RTB 2x15 RTB 2x15 RTB 3x10 RTB 3x10 B  --   SL hip abd 3x10 B 3x10 B 3x10 B 3x10 B 2x10 R  1x7 L (L knee pain)  --   Seated:           LAQ   NT 2x15 B 1# 2x10 B  --   Standing:           Lat pull downs 2x15 RTB 2x15 RTB 2x15 RTB 3x10 RTC 2x10 RTC  --   Rows 2x15 RTB 2x15 RTB 2x15 GTB 3x10 RTC 2x10 RTC  --   Leg Press    NEXT NEXT  --   INITIALS RAHDA RUIZ 1/6 " SREE SREE SREE JOSEPH 1/6 SREE       Assessment:     Pt was able to tolerate all therex without any c/o increased lumbar pain. Pt tolerated therapy session without any adverse reactions.     Patient Education/Response:     Continue with HEP    Plans and Goals:     Cont to advance PT as per POC.    GOALS: Short Term Goals:  4 weeks  1.Report decreased low back pain  < / =  4 /10 with min radicular symptoms to increase tolerance for ambulation and ADLs.  2. Pt will demo good TA muscle contraction for increased lumbar stabilization and decreased pain.  3. Pt will demo improved sitting and standing posture for decreased LBP and decrease risk of injury.  4. Pt to tolerate HEP to improve ROM and independence with ADL's     Long Term Goals: 8 weeks  1.Report decreased low back pain  <   / =  2  /10  to increase tolerance for ADLs and mobility.  2.Increase strength to >/= 4/5 MMT grade for BLE to increase tolerance for ADL and work activities.  3. Patient's goal: To loosen up and strengthen my back.  4.Pt will report at </= 41% on FOTO lumbar score for low back pain disability to demonstrate decrease in disability and improvement in back pain.

## 2017-12-18 ENCOUNTER — CLINICAL SUPPORT (OUTPATIENT)
Dept: REHABILITATION | Facility: HOSPITAL | Age: 62
End: 2017-12-18
Payer: COMMERCIAL

## 2017-12-18 DIAGNOSIS — M53.86 DECREASED ROM OF LUMBAR SPINE: ICD-10-CM

## 2017-12-18 DIAGNOSIS — M54.41 ACUTE BILATERAL LOW BACK PAIN WITH BILATERAL SCIATICA: ICD-10-CM

## 2017-12-18 DIAGNOSIS — R53.1 DECREASED STRENGTH: ICD-10-CM

## 2017-12-18 DIAGNOSIS — M54.42 ACUTE BILATERAL LOW BACK PAIN WITH BILATERAL SCIATICA: ICD-10-CM

## 2017-12-18 DIAGNOSIS — Z74.09 DECREASED MOBILITY AND ENDURANCE: ICD-10-CM

## 2017-12-18 PROCEDURE — 97110 THERAPEUTIC EXERCISES: CPT | Mod: PN

## 2017-12-18 PROCEDURE — 97140 MANUAL THERAPY 1/> REGIONS: CPT | Mod: PN

## 2017-12-18 NOTE — PROGRESS NOTES
"TIME RECORD    Date: 12/18/2017      Start Time:  5:00 pm  Stop Time:  6:00 pm     PROCEDURES:    TIMED  Procedure Min.   TE supervised    TE 20'    MT 40'              UNTIMED  Procedure Min.             Total Timed Minutes:  60'  Total Timed Units:  4  Total Untimed Units:  0  Charges Billed/# of units:  4  ( 3 TE, 1 MT)       Progress/Current Status    Subjective:     Patient ID: Carolina Marcum is a 62 y.o. female.  Diagnosis:   1. Decreased ROM of lumbar spine     2. Acute bilateral low back pain with bilateral sciatica     3. Decreased strength     4. Decreased mobility and endurance       Pain: 4/10 prior to therapy session  Pt reports she went for a 7 hour drive to [place] and back was feeling pretty good. Pt does also state that she hasn't been keeping up with her exercises lately.    Objective:     Pt participated in therex as per log for B Lumbar stability and core strenghening x 40' 1:1 with PT f/b 20 mins of MT consisting of: STM to B QL and STM to B lumbosacral paraspinals, glut med, R IT band, piriformis, and lumbar distraction.    DATE 12/18/17 12/11/17 12/8/17 12/1/17 11/28/17 11/24/17 11/22/17 11/10/17   VISIT 8 7 6 5 4 3 2 1   POC 1/10/18           FOTO 8/10 7/10 6/5 NEXT 4/5 3/5 2/5 1/5   MHP   NT NT NT NT 10 min  Next   MT 20' 20' 20 10 10 15  --   Supine:            B HS stretch 3x30'' B 30"x3 B 30"X3 B 3x30'' B 3x30'' B 3x30'' B 30"x3 B 1x30'' B   B Piriformis stretch 2x30'' B 30"x3 B 30"X3 B 3x30'' B 3x30'' B 3x30'' B  --   DKTC on SB 20x5'' 20x5" 20x5" 20x5'' 20x5''      LTRs NT 1' 1' 1'  2' 30X3 B 30x B   TAs NT 10x5" 10x5" 10x5'' 10x5'' 10x5'' 10x5'' 10x5''   Brace Marching 2x10 2x10 2x10 B 2x10 B 2x10 B      B QS --  -  -  NEXT  --   Bridges -- 2x15 2x15 2x15 2x15 3x10 2x10 10x   Clams 2x15 GTB 2x15 RTB 2x15 RTB 2x15 RTB 3x10 RTB 3x10 B  --   SL hip abd 2x15 1# 3x10 B 3x10 B 3x10 B 3x10 B 2x10 R  1x7 L (L knee pain)  --   Seated:            LAQ --   NT 2x15 B 1# 2x10 B  --   Standing:  "           Lat pull downs 2x15 GTC 2x15 RTB 2x15 RTB 2x15 RTB 3x10 RTC 2x10 RTC  --   Rows 2x15 GTC 2x15 RTB 2x15 RTB 2x15 GTB 3x10 RTC 2x10 RTC  --   Leg Press DL 3x10  4.0    NEXT NEXT  --   INITIALS SREE RADHA RUIZ 1/6 SREE SREE RUIZ 1/6 SREE       Assessment:     Pt progressing steadily and pt decreased pain in general today (not specific to scale). Cont to progress pt in strength and advanced core dynamic activities.    Patient Education/Response:     Continue with HEP    Plans and Goals:     Cont to advance PT as per POC.    GOALS: Short Term Goals:  4 weeks  1.Report decreased low back pain  < / =  4 /10 with min radicular symptoms to increase tolerance for ambulation and ADLs.  2. Pt will demo good TA muscle contraction for increased lumbar stabilization and decreased pain.  3. Pt will demo improved sitting and standing posture for decreased LBP and decrease risk of injury.  4. Pt to tolerate HEP to improve ROM and independence with ADL's     Long Term Goals: 8 weeks  1.Report decreased low back pain  <   / =  2  /10  to increase tolerance for ADLs and mobility.  2.Increase strength to >/= 4/5 MMT grade for BLE to increase tolerance for ADL and work activities.  3. Patient's goal: To loosen up and strengthen my back.  4.Pt will report at </= 41% on FOTO lumbar score for low back pain disability to demonstrate decrease in disability and improvement in back pain.

## 2017-12-22 ENCOUNTER — CLINICAL SUPPORT (OUTPATIENT)
Dept: REHABILITATION | Facility: HOSPITAL | Age: 62
End: 2017-12-22
Payer: COMMERCIAL

## 2017-12-22 DIAGNOSIS — M54.41 ACUTE BILATERAL LOW BACK PAIN WITH BILATERAL SCIATICA: ICD-10-CM

## 2017-12-22 DIAGNOSIS — R53.1 DECREASED STRENGTH: ICD-10-CM

## 2017-12-22 DIAGNOSIS — M54.42 ACUTE BILATERAL LOW BACK PAIN WITH BILATERAL SCIATICA: ICD-10-CM

## 2017-12-22 DIAGNOSIS — Z74.09 DECREASED MOBILITY AND ENDURANCE: ICD-10-CM

## 2017-12-22 DIAGNOSIS — M53.86 DECREASED ROM OF LUMBAR SPINE: ICD-10-CM

## 2017-12-22 PROCEDURE — 97110 THERAPEUTIC EXERCISES: CPT | Mod: PN

## 2017-12-22 PROCEDURE — 97140 MANUAL THERAPY 1/> REGIONS: CPT | Mod: PN

## 2017-12-22 NOTE — PROGRESS NOTES
"TIME RECORD    Date: 12/22/2017      Start Time:  10:00  Stop Time:  11:00     PROCEDURES:    TIMED  Procedure Min.   TE supervised    TE 10'    MT 50'              UNTIMED  Procedure Min.             Total Timed Minutes:  60'  Total Timed Units:  4  Total Untimed Units:  0  Charges Billed/# of units:  4  ( 3 TE, 1 MT)       Progress/Current Status    Subjective:     Patient ID: Carolina Marcum is a 62 y.o. female.  Diagnosis:   1. Decreased ROM of lumbar spine     2. Acute bilateral low back pain with bilateral sciatica     3. Decreased strength     4. Decreased mobility and endurance       Pain: 4-5/10 prior to therapy session  Pt reports that her back is feeling ok today.    Objective:     Pt participated in therex as per log for B Lumbar stability and core strenghening x 50' 1:1 with PT f/b 10 mins of MT consisting of: STM to B QL and STM to B lumbosacral paraspinals, glut med, R IT band, piriformis, and lumbar distraction, and MET to correct for anteriorly rotated L innominate and R posteriorly rotated R innominate.    DATE 12/22/17 12/18/17 12/11/17 12/8/17 12/1/17 11/28/17 11/24/17 11/22/17 11/10/17   VISIT 9 8 7 6 5 4 3 2 1   POC 1/10/18            FOTO 9/19  Next 8/10 7/10 6/5 NEXT 4/5 3/5 2/5 1/5   MHP    NT NT NT NT 10 min  Next   MT 10' 20' 20' 20 10 10 15  --   Supine:             B HS stretch 3x30'' B 3x30'' B 30"x3 B 30"X3 B 3x30'' B 3x30'' B 3x30'' B 30"x3 B 1x30'' B   B Piriformis stretch 3x30'' B 2x30'' B 30"x3 B 30"X3 B 3x30'' B 3x30'' B 3x30'' B  --   DKTC on SB 20x5'' 20x5'' 20x5" 20x5" 20x5'' 20x5''      LTRs NT NT 1' 1' 1'  2' 30X3 B 30x B   TAs NT NT 10x5" 10x5" 10x5'' 10x5'' 10x5'' 10x5'' 10x5''   Brace Marching 2x10 B 2x10 2x10 2x10 B 2x10 B 2x10 B      QS 15x5'' B  Ankle propped --  -  -  NEXT  --   SLR 3x10 B           Bridges 2x15 w/ hip add -- 2x15 2x15 2x15 2x15 3x10 2x10 10x   Clams 2x15 GTB 2x15 GTB 2x15 RTB 2x15 RTB 2x15 RTB 3x10 RTB 3x10 B  --   SL hip abd 2x15 1# B 2x15 1# 3x10 " B 3x10 B 3x10 B 3x10 B 2x10 R  1x7 L (L knee pain)  --   Seated:             LAQ -- --   NT 2x15 B 1# 2x10 B  --   Standing:             Lat pull downs oot 2x15 GTC 2x15 RTB 2x15 RTB 2x15 RTB 3x10 RTC 2x10 RTC  --   Rows oot 2x15 GTC 2x15 RTB 2x15 RTB 2x15 GTB 3x10 RTC 2x10 RTC  --   Leg Press oot DL 3x10  4.0    NEXT NEXT  --   INITIALS SREE RUIZ 1/6 SREE RUIZ 1/6 SREE       Assessment:     Pt demo good MET hip correction with L LE slightly longer than R now. Pt given updated HEP today after pt said she was never given one (HEP was made and scanned into media). Pt reported decreased pain after MET and therapy.    Patient Education/Response:     Continue with HEP    Plans and Goals:     Cont to advance PT as per POC.    GOALS: Short Term Goals:  4 weeks  1.Report decreased low back pain  < / =  4 /10 with min radicular symptoms to increase tolerance for ambulation and ADLs.  2. Pt will demo good TA muscle contraction for increased lumbar stabilization and decreased pain.  3. Pt will demo improved sitting and standing posture for decreased LBP and decrease risk of injury.  4. Pt to tolerate HEP to improve ROM and independence with ADL's     Long Term Goals: 8 weeks  1.Report decreased low back pain  <   / =  2  /10  to increase tolerance for ADLs and mobility.  2.Increase strength to >/= 4/5 MMT grade for BLE to increase tolerance for ADL and work activities.  3. Patient's goal: To loosen up and strengthen my back.  4.Pt will report at </= 41% on FOTO lumbar score for low back pain disability to demonstrate decrease in disability and improvement in back pain.

## 2017-12-27 ENCOUNTER — CLINICAL SUPPORT (OUTPATIENT)
Dept: REHABILITATION | Facility: HOSPITAL | Age: 62
End: 2017-12-27
Payer: COMMERCIAL

## 2017-12-27 DIAGNOSIS — M53.86 DECREASED ROM OF LUMBAR SPINE: ICD-10-CM

## 2017-12-27 DIAGNOSIS — M54.41 ACUTE BILATERAL LOW BACK PAIN WITH BILATERAL SCIATICA: ICD-10-CM

## 2017-12-27 DIAGNOSIS — R53.1 DECREASED STRENGTH: ICD-10-CM

## 2017-12-27 DIAGNOSIS — Z74.09 DECREASED MOBILITY AND ENDURANCE: ICD-10-CM

## 2017-12-27 DIAGNOSIS — M54.42 ACUTE BILATERAL LOW BACK PAIN WITH BILATERAL SCIATICA: ICD-10-CM

## 2017-12-27 PROCEDURE — 97140 MANUAL THERAPY 1/> REGIONS: CPT | Mod: PN

## 2017-12-27 PROCEDURE — 97110 THERAPEUTIC EXERCISES: CPT | Mod: PN

## 2017-12-27 NOTE — PROGRESS NOTES
"TIME RECORD    Date: 12/27/2017      Start Time:  205  Stop Time:  305    PROCEDURES:    TIMED  Procedure Min.   Therex 50   Manual therapy 10         Total Timed Minutes:  60  Total Timed Units:  4  Total Untimed Units:  0  Charges Billed/# of units:  4  MTx1, TEx3      Progress/Current Status    Subjective:     Patient ID: Carolina Marcum is a 62 y.o. female.  Diagnosis:   1. Decreased ROM of lumbar spine     2. Acute bilateral low back pain with bilateral sciatica     3. Decreased strength     4. Decreased mobility and endurance       "I've been feeling good until the weather changed - now I'm aching"  Reports "soreness from where the band was around my legs last time."    Objective:     Pt participated in therex as per log for B Lumbar stability and core strenghening x 50' 1:1 with PTA.  Increased 10 mins of MT consisting of: STM to B QL and STM to B lumbosacral paraspinals, glut med, R IT band, piriformis, and     COMPLETE FOTO NEXT VISIT!!    DATE 12/27/17 12/22/17 12/18/17 12/11/17 12/8/17 12/1/17 11/28/17 11/24/17 11/22/17 11/10/17   VISIT 10 9 8 7 6 5 4 3 2 1   POC 1/10/18             FOTO 10/10   NEXT 9/19  Next 8/10 7/10 6/5 NEXT 4/5 3/5 2/5 1/5   MHP     NT NT NT NT 10 min  Next   MT 10' 10' 20' 20' 20 10 10 15  --   Supine:              B HS stretch 30"x3 B 3x30'' B 3x30'' B 30"x3 B 30"X3 B 3x30'' B 3x30'' B 3x30'' B 30"x3 B 1x30'' B   B Piriformis stretch 30"x3 B 3x30'' B 2x30'' B 30"x3 B 30"X3 B 3x30'' B 3x30'' B 3x30'' B  --   DKTC on SB 2' 20x5'' 20x5'' 20x5" 20x5" 20x5'' 20x5''      LTRs 5" hold x 2' NT NT 1' 1' 1'  2' 30X3 B 30x B   TAs 5"x10 NT NT 10x5" 10x5" 10x5'' 10x5'' 10x5'' 10x5'' 10x5''   Brace Marching 2x30" 2x10 B 2x10 2x10 2x10 B 2x10 B 2x10 B      QS 15x5'' B  Ankle propped 15x5'' B  Ankle propped --  -  -  NEXT  --   SLR 1# 2x10 B 3x10 B           Bridges 2x15 w/hip  add 2x15 w/ hip add -- 2x15 2x15 2x15 2x15 3x10 2x10 10x   Clams Held 2x15 GTB 2x15 GTB 2x15 RTB 2x15 RTB 2x15 RTB " "3x10 RTB 3x10 B  --   SL hip abd 1# 2x15 B 2x15 1# B 2x15 1# 3x10 B 3x10 B 3x10 B 3x10 B 2x10 R  1x7 L (L knee pain)  --   Seated:              LAQ -- -- --   NT 2x15 B 1# 2x10 B  --   Standing: --             Lat pull downs GTC 2x15 oot 2x15 GTC 2x15 RTB 2x15 RTB 2x15 RTB 3x10 RTC 2x10 RTC  --   Rows GTC 2x15 oot 2x15 GTC 2x15 RTB 2x15 RTB 2x15 GTB 3x10 RTC 2x10 RTC  --   Leg Press 4.0 DL  3x10 oot DL 3x10  4.0    NEXT NEXT  --   INITIALS DH 1/6 SREE BALBUENA CK JA 1/6 SREE RUIZ 1/6 SREE       Assessment:     Patient able to increase activity with added reps and weight as noted without complaint of pain or difficulty.  States "I feel pretty good now" after treatment.    Patient Education/Response:     Patient educated to continue HEP.  Verbalized understanding.    Plans and Goals:     Cont to advance PT as per POC.    GOALS: Short Term Goals:  4 weeks  1.Report decreased low back pain  < / =  4 /10 with min radicular symptoms to increase tolerance for ambulation and ADLs.  2. Pt will demo good TA muscle contraction for increased lumbar stabilization and decreased pain.  3. Pt will demo improved sitting and standing posture for decreased LBP and decrease risk of injury.  4. Pt to tolerate HEP to improve ROM and independence with ADL's     Long Term Goals: 8 weeks  1.Report decreased low back pain  <   / =  2  /10  to increase tolerance for ADLs and mobility.  2.Increase strength to >/= 4/5 MMT grade for BLE to increase tolerance for ADL and work activities.  3. Patient's goal: To loosen up and strengthen my back.  4.Pt will report at </= 41% on FOTO lumbar score for low back pain disability to demonstrate decrease in disability and improvement in back pain.       "

## 2017-12-29 ENCOUNTER — CLINICAL SUPPORT (OUTPATIENT)
Dept: REHABILITATION | Facility: HOSPITAL | Age: 62
End: 2017-12-29
Payer: COMMERCIAL

## 2017-12-29 ENCOUNTER — PATIENT MESSAGE (OUTPATIENT)
Dept: INTERNAL MEDICINE | Facility: CLINIC | Age: 62
End: 2017-12-29

## 2017-12-29 ENCOUNTER — DOCUMENTATION ONLY (OUTPATIENT)
Dept: REHABILITATION | Facility: HOSPITAL | Age: 62
End: 2017-12-29

## 2017-12-29 DIAGNOSIS — R53.1 DECREASED STRENGTH: ICD-10-CM

## 2017-12-29 DIAGNOSIS — Z74.09 DECREASED MOBILITY AND ENDURANCE: ICD-10-CM

## 2017-12-29 DIAGNOSIS — M53.86 DECREASED ROM OF LUMBAR SPINE: ICD-10-CM

## 2017-12-29 DIAGNOSIS — M54.42 ACUTE BILATERAL LOW BACK PAIN WITH LEFT-SIDED SCIATICA: ICD-10-CM

## 2017-12-29 PROCEDURE — G8979 MOBILITY GOAL STATUS: HCPCS | Mod: CK,PN

## 2017-12-29 PROCEDURE — 97110 THERAPEUTIC EXERCISES: CPT | Mod: PN

## 2017-12-29 PROCEDURE — G8980 MOBILITY D/C STATUS: HCPCS | Mod: CJ,PN

## 2017-12-29 NOTE — PROGRESS NOTES
"TIME RECORD    Date: 12/29/2017      Start Time:  200  Stop Time:  305    PROCEDURES:    TIMED  Procedure Min.   Therex 55   Manual therapy          Total Timed Minutes:  60  Total Timed Units:  4  Total Untimed Units:  0  Charges Billed/# of units:  4 TE      Progress/Current Status    Subjective:     Patient ID: Carolina Marcum is a 62 y.o. female.  Diagnosis:   1. Decreased ROM of lumbar spine     2. Acute bilateral low back pain with left-sided sciatica     3. Decreased strength     4. Decreased mobility and endurance       "I've been feeling good until the weather changed - now I'm aching"  Reports "soreness from where the band was around my legs last time."    Objective:     Pt participated in therex as per log for B Lumbar stability and core strenghening x 55' 1:1 with PT.        DATE 12/29/17 12/27/17 12/22/17 12/18/17 12/11/17 12/8/17 12/1/17 11/28/17 11/24/17 11/22/17 11/10/17   VISIT 11 10 9 8 7 6 5 4 3 2 1   POC 1/10/18              FOTO 11  DONE 10/10   NEXT 9/19  Next 8/10 7/10 6/5 NEXT 4/5 3/5 2/5 1/5   MHP      NT NT NT NT 10 min  Next   MT NT 10' 10' 20' 20' 20 10 10 15  --   Supine:               B HS stretch 3x30'' B 30"x3 B 3x30'' B 3x30'' B 30"x3 B 30"X3 B 3x30'' B 3x30'' B 3x30'' B 30"x3 B 1x30'' B   B Piriformis stretch 3x30'' B 30"x3 B 3x30'' B 2x30'' B 30"x3 B 30"X3 B 3x30'' B 3x30'' B 3x30'' B  --   DKTC on SB 2' 2' 20x5'' 20x5'' 20x5" 20x5" 20x5'' 20x5''      LTRs 5'' holds 2' 5" hold x 2' NT NT 1' 1' 1'  2' 30X3 B 30x B   TAs 5''x10 5"x10 NT NT 10x5" 10x5" 10x5'' 10x5'' 10x5'' 10x5'' 10x5''   Brace Marching 2x30'' 2x30" 2x10 B 2x10 2x10 2x10 B 2x10 B 2x10 B      QS 15x5'' B  Ankle propped 15x5'' B  Ankle propped 15x5'' B  Ankle propped --  -  -  NEXT  --   SLR 1# 2x10 B 1# 2x10 B 3x10 B           Bridges 2x15 w/ hip add 2x15 w/hip  add 2x15 w/ hip add -- 2x15 2x15 2x15 2x15 3x10 2x10 10x   Clams NT Held 2x15 GTB 2x15 GTB 2x15 RTB 2x15 RTB 2x15 RTB 3x10 RTB 3x10 B  --   SL hip abd NT 1# " 2x15 B 2x15 1# B 2x15 1# 3x10 B 3x10 B 3x10 B 3x10 B 2x10 R  1x7 L (L knee pain)  --   Seated:               LAQ -- -- -- --   NT 2x15 B 1# 2x10 B  --   Standing:  --             Lat pull downs NT GTC 2x15 oot 2x15 GTC 2x15 RTB 2x15 RTB 2x15 RTB 3x10 RTC 2x10 RTC  --   Rows NT GTC 2x15 oot 2x15 GTC 2x15 RTB 2x15 RTB 2x15 GTB 3x10 RTC 2x10 RTC  --   Leg Press NT 4.0 DL  3x10 oot DL 3x10  4.0    NEXT NEXT  --   INITIALS SREE DH 1/6 SREE BALBUENA CK JA 1/6 SREE RUIZ 1/6 SREE     Functional Limitation Reports: G codes  Tool: FOTO Lumbar Spine SURVEY  Category: Mobility ()  Limitation: 28%  Current: CJ 20%<40%  Goal: CK 40%<60%    Assessment:     Pt reported 0/10 LBP at end of session today. See d/c assessment below.    Patient Education/Response:     See d/c assessment    Plans and Goals:     REHAB SERVICES OUTPATIENT DISCHARGE SUMMARY  Physical Therapy      Name:  Carolina Marcum  Date:  12/29/17  Date of Evaluation:  11/10/17  Physician:  Kierra Cornejo MD  Total # Of Visits:  11   Diagnosis:    1. Decreased ROM of lumbar spine     2. Acute bilateral low back pain with left-sided sciatica     3. Decreased strength     4. Decreased mobility and endurance         Physical/Functional Status:  At time of discharge, patient was able to perform increased home ADLs and community walking with minimal low back pain.  EVAL  Range of Motion/Strength:  AROM: LUMBAR   Flexion WNL   Extension 80%, low back tightness   Right side bending 80%, opposing stretch   Left side bending 75%, opposing stretch   Right rotation 75%, opposing stretch   Left rotation 80%, opposing stretch      Hip   Right     Left   Pain/Dysfunction with Movement     AROM PROM MMT AROM PROM MMT     Flexion WFL WFL 4-/5 WFL WFL 4/5     Extension WFL WFL 3+/5 WFL WFL 3+/5 Thigh tightness   Abduction WFL WFL 4/5 WFL WFL 4/5 Hip pain B   Adduction WFL WFL 4+/5 WFL WFL 4/5     Internal rotation WFL WFL 4+/5 WFL WFL 4+/5     External rotation WFL WFL 4+/5 WFL WFL  4+/5           Knee   Right     Left   Pain/Dysfunction with Movement     AROM PROM MMT AROM PROM MMT     Flexion 105 106 4/5 110 112 4+/5 B knee pain with testing   Extension 0 0 4/5 0 0 4/5        Ankle   Right     Left   Pain/Dysfunction with Movement     AROM PROM MMT AROM PROM MMT     Plantarflexion WFL WFL 5/5 WFL WFL 5/5     Dorsiflexion WFL WFL 5/5 WFL WFL 4-/5     Inversion WFL WFL 4+/5 WFL WFL 5/5     Eversion WFL WFL 4+/5 WFL WFL 4/5        D/C  Range of Motion/Strength:  AROM: LUMBAR   Flexion WNL   Extension WNL   Right side bending 80%   Left side bending 80%   Right rotation 90%   Left rotation 90%      Hip   Right     Left   Pain/Dysfunction with Movement     AROM PROM MMT AROM PROM MMT     Flexion WFL WFL 4+/5 WFL WFL 5/5     Extension WFL WFL 4/5 WFL WFL 4/5    Abduction WFL WFL 5/5 WFL WFL 4+/5    Adduction WFL WFL 5/5 WFL WFL 5/5     Internal rotation WFL WFL 5/5 WFL WFL 5/5     External rotation WFL WFL 5/5 WFL WFL 5/5           Knee   Right     Left   Pain/Dysfunction with Movement     AROM PROM MMT AROM PROM MMT     Flexion 123 125 4+/5 125 126 4+/5    Extension 0 0 5/5 0 0 4/5        Ankle   Right     Left   Pain/Dysfunction with Movement     AROM PROM MMT AROM PROM MMT     Plantarflexion WFL WFL 5/5 WFL WFL 5/5     Dorsiflexion WFL WFL 5/5 WFL WFL 5/5     Inversion WFL WFL 5/5 WFL WFL 5/5     Eversion WFL WFL 5/5 WFL WFL 5/5          The patient is to be discharged from our Therapy service for the following reason(s):  Patient has met all of his/her goals    Degree of Goal Achievement:  Patient has met all goals  GOALS: Short Term Goals:  4 weeks  1.Report decreased low back pain  < / =  4 /10 with min radicular symptoms to increase tolerance for ambulation and ADLs. - Met  2. Pt will demo good TA muscle contraction for increased lumbar stabilization and decreased pain. - Met  3. Pt will demo improved sitting and standing posture for decreased LBP and decrease risk of injury. - Met  4. Pt to  tolerate HEP to improve ROM and independence with ADL's - Met     Long Term Goals: 8 weeks  1.Report decreased low back pain  <   / =  2  /10  to increase tolerance for ADLs and mobility. - Met  2.Increase strength to >/= 4/5 MMT grade for BLE to increase tolerance for ADL and work activities. - Met  3. Patient's goal: To loosen up and strengthen my back. - Met  4.Pt will report at </= 41% on FOTO lumbar score for low back pain disability to demonstrate decrease in disability and improvement in back pain. - Met       Patient Education:  Cont HEP and perform activities with good body mechanics.    Discharge Plan:  Home Program

## 2017-12-29 NOTE — PROGRESS NOTES
Face to Face PTA Conference performed with Nataliya Andino PTA, Lara Bach PTA, Jose Alvares PTA Jacobo Crain PTA regarding patient's current status, overall progress, and plan of care    Stephan Lugo, PT     Face to face meeting completed with  Stephan Lugo PT regarding current status and progress of   Carolina Barrettbrite .  Jacobo Crain, PTA    Face to face meeting completed with Stephan Lugo PT regarding current status and progress of   Carolina Barrettbrite .  Jose Alvares, GARRICK    Face to face meeting completed with Stephan Lugo PT regarding current status and progress of   Carolina Barrettbrite .  Kaci Andino, PTA\    Face to face meeting completed with Stephan Lugo PT regarding current status and progress of   Carolina Barrettbrite .  Lara Bach, PTA

## 2018-01-02 ENCOUNTER — PATIENT MESSAGE (OUTPATIENT)
Dept: INTERNAL MEDICINE | Facility: CLINIC | Age: 63
End: 2018-01-02

## 2018-01-02 RX ORDER — AMLODIPINE BESYLATE 10 MG/1
10 TABLET ORAL DAILY
Qty: 90 TABLET | Refills: 3 | Status: SHIPPED | OUTPATIENT
Start: 2018-01-02 | End: 2018-09-23 | Stop reason: SDUPTHER

## 2018-01-03 ENCOUNTER — PATIENT OUTREACH (OUTPATIENT)
Dept: OTHER | Facility: OTHER | Age: 63
End: 2018-01-03

## 2018-01-03 NOTE — PROGRESS NOTES
Last 5 Patient Entered Readings Current 30 Day Average: 139/91     Recent Readings 1/1/2018 12/31/2017 12/31/2017 12/31/2017 12/31/2017    SBP (mmHg) 115 155 170 165 174    DBP (mmHg) 84 99 102 105 101    Pulse 86 83 85 83 83        Patient's BP average is above goal of <130/80.     Ms. Tucker's BP readings have been elevated around the holidays (Christman, New Year's Sonali). She also forgot to take Dyazide on New Year's Sonali and BP was elevated as a result. She took Dyazide that evening and her BP the next day was 115/85. Dr. Cornejo increased amlodipine from 5 mg to 10 mg QD and Ms. Tucker will start this today or tomorrow. Asked that she notify me if she has any side effects or s/s of hypotension once starting higher dose.     Will continue to monitor regularly. Will follow up in 2-3 weeks, sooner if BP begins to trend upward or downward.    Patient has my contact information and knows to call with any concerns or clinical changes.     Current HTN regimen:  Hypertension Medications             amLODIPine (NORVASC) 10 MG tablet Take 1 tablet (10 mg total) by mouth once daily.    triamterene-hydrochlorothiazide 37.5-25 mg (DYAZIDE) 37.5-25 mg per capsule TAKE ONE CAPSULE BY MOUTH ONCE DAILY WITH BANANA

## 2018-01-09 ENCOUNTER — PATIENT OUTREACH (OUTPATIENT)
Dept: OTHER | Facility: OTHER | Age: 63
End: 2018-01-09

## 2018-01-09 NOTE — PROGRESS NOTES
Last 5 Patient Entered Readings Current 30 Day Average: 142/90     Recent Readings 1/8/2018 1/6/2018 1/6/2018 1/5/2018 1/5/2018    SBP (mmHg) 138 147 140 152 149    DBP (mmHg) 85 87 118 91 95    Pulse 86 90 85 88 82        Hypertension Digital Medicine Program (HDMP): Health  Follow Up    Feeling well.   Started new med regimen recently and feels it is helping bring readings down.    Lifestyle Modifications:    1.Low sodium diet: coffee, small cookie, ideal protein, salads, baked chicken, veggies / requests food to be prepared without salt/pepper.    2.Physical activity: trying to stay active but increased work load. Plans to do shorter bouts of activity until she can get back to the gym.    3.Hypotension/Hypertension symptoms: no   Frequency/Alleviating factors/Precipitating factors, etc.     4.Patient has been compliant with the medication regimen.     Follow up with Mrs. Carolina Garciadiana completed. No further questions or concerns. I will follow up in a few weeks to assess progress.

## 2018-01-23 ENCOUNTER — OFFICE VISIT (OUTPATIENT)
Dept: INTERNAL MEDICINE | Facility: CLINIC | Age: 63
End: 2018-01-23
Payer: COMMERCIAL

## 2018-01-23 ENCOUNTER — LAB VISIT (OUTPATIENT)
Dept: LAB | Facility: HOSPITAL | Age: 63
End: 2018-01-23
Attending: INTERNAL MEDICINE
Payer: COMMERCIAL

## 2018-01-23 VITALS
WEIGHT: 240.88 LBS | TEMPERATURE: 98 F | SYSTOLIC BLOOD PRESSURE: 116 MMHG | BODY MASS INDEX: 38.71 KG/M2 | DIASTOLIC BLOOD PRESSURE: 86 MMHG | HEIGHT: 66 IN | RESPIRATION RATE: 16 BRPM | HEART RATE: 76 BPM

## 2018-01-23 DIAGNOSIS — I10 BENIGN ESSENTIAL HYPERTENSION: ICD-10-CM

## 2018-01-23 DIAGNOSIS — M17.0 PRIMARY OSTEOARTHRITIS OF BOTH KNEES: ICD-10-CM

## 2018-01-23 DIAGNOSIS — E03.4 HYPOTHYROIDISM DUE TO ACQUIRED ATROPHY OF THYROID: ICD-10-CM

## 2018-01-23 DIAGNOSIS — E66.01 SEVERE OBESITY (BMI 35.0-39.9) WITH COMORBIDITY: ICD-10-CM

## 2018-01-23 DIAGNOSIS — I10 BENIGN ESSENTIAL HYPERTENSION: Primary | ICD-10-CM

## 2018-01-23 DIAGNOSIS — K76.0 FATTY LIVER: ICD-10-CM

## 2018-01-23 PROBLEM — M54.40 ACUTE BILATERAL LOW BACK PAIN WITH SCIATICA: Status: RESOLVED | Noted: 2017-11-16 | Resolved: 2018-01-23

## 2018-01-23 LAB
ALBUMIN SERPL BCP-MCNC: 3.9 G/DL
ALP SERPL-CCNC: 68 U/L
ALT SERPL W/O P-5'-P-CCNC: 21 U/L
ANION GAP SERPL CALC-SCNC: 8 MMOL/L
AST SERPL-CCNC: 11 U/L
BILIRUB SERPL-MCNC: 0.3 MG/DL
BUN SERPL-MCNC: 15 MG/DL
CALCIUM SERPL-MCNC: 9.8 MG/DL
CHLORIDE SERPL-SCNC: 104 MMOL/L
CO2 SERPL-SCNC: 31 MMOL/L
CREAT SERPL-MCNC: 0.8 MG/DL
EST. GFR  (AFRICAN AMERICAN): >60 ML/MIN/1.73 M^2
EST. GFR  (NON AFRICAN AMERICAN): >60 ML/MIN/1.73 M^2
GLUCOSE SERPL-MCNC: 97 MG/DL
POTASSIUM SERPL-SCNC: 3.6 MMOL/L
PROT SERPL-MCNC: 7.6 G/DL
SODIUM SERPL-SCNC: 143 MMOL/L
TSH SERPL DL<=0.005 MIU/L-ACNC: 1.83 UIU/ML

## 2018-01-23 PROCEDURE — 84443 ASSAY THYROID STIM HORMONE: CPT

## 2018-01-23 PROCEDURE — 80053 COMPREHEN METABOLIC PANEL: CPT

## 2018-01-23 PROCEDURE — 36415 COLL VENOUS BLD VENIPUNCTURE: CPT

## 2018-01-23 PROCEDURE — 99214 OFFICE O/P EST MOD 30 MIN: CPT | Mod: S$GLB,,, | Performed by: INTERNAL MEDICINE

## 2018-01-23 PROCEDURE — 99999 PR PBB SHADOW E&M-EST. PATIENT-LVL III: CPT | Mod: PBBFAC,,, | Performed by: INTERNAL MEDICINE

## 2018-01-23 RX ORDER — TRETINOIN 0.5 MG/G
CREAM TOPICAL
Qty: 15 G | Refills: 2 | Status: SHIPPED | OUTPATIENT
Start: 2018-01-23 | End: 2018-09-26 | Stop reason: ALTCHOICE

## 2018-01-23 RX ORDER — DICLOFENAC SODIUM 10 MG/G
2 GEL TOPICAL 2 TIMES DAILY
Qty: 100 G | Refills: 3 | Status: SHIPPED | OUTPATIENT
Start: 2018-01-23 | End: 2018-02-15 | Stop reason: SDUPTHER

## 2018-01-23 NOTE — PROGRESS NOTES
"Subjective:       Patient ID: Carolina Marcum is a 62 y.o. female.    Chief Complaint: Pre-diabetes; Hypothyroidism; Fatty Liver; Hypertension; Gastroesophageal Reflux; and Back Pain    HPI Pt known to me. Last seen by me 10/23/17    HTN - amlodipine 10 (recently increased from 5 to 10 since her BP was elevated), triamterene-hctz 37.5-25mg daily. Thinks that there were a lot more stressors and so was eating a lot of junk food - just finished audit.   Pt reports that if she doesn't drink water, it doesn't help to resolve her BP.   Part of digital HTN.   Losartan-->swelling    Thyroid nodule s/p total thyroidectomy-->Hypothyroidism - synthroid (brand name only) 125mcg daily.   TSH 10/23/17 - WNL.    Pre- DM - metformin xr 500mg daily but caused issues so stopped.   esolved now. a1c normal 10/2017.    At last visit 10/2017, pt c/o diffuse joint pains. ZULMA titers pos 1:640 but profile neg. F and CCP neg. Mildly increased CRP and ESR.    Fatty liver on US w/ mild hepatomegaly 1/11/17.  Hepatic function panel WNL.  Has been doing well w/ weight loss on Ideal Protein. Last another 6lbs since last visit in 10/2017 with a total of about 15lbs overall.    OA of knees - uses naproxen prn (discussed that this can elevate BP). S/P PT. Reports yesterday, twisted her lower back.   Saw following YESENIA Montes De Oca and was getting intermittent steroid injections.     Review of Systems   Constitutional: Negative for chills, fever and unexpected weight change.   HENT: Negative.    Respiratory: Negative for cough and shortness of breath.    Cardiovascular: Negative for chest pain and palpitations.   Musculoskeletal: Negative for arthralgias and neck pain.   Neurological: Negative for headaches.         Objective:      Physical Exam    /86   Pulse 76   Temp 98 °F (36.7 °C) (Oral)   Resp 16   Ht 5' 6" (1.676 m)   Wt 109.2 kg (240 lb 13.6 oz)   BMI 38.87 kg/m²     Gen - a+ox4, nad  heent - perrl, op clear but crowded. TM " normal.   Neck - no LAD or thyromegaly.  CV - RRR, no m/r  Chest - CTAB, no wheezing/rhonchi/crackles  Abd - S/NT/ND/+BS  eXT -2 + BDP and radial pulses. No BLE edema.   MSK - no spinal tenderness to palpation. Tenderness to palpation of B medial knees, no crepitus. 5/5 BUE and BLE strength. Normal gait.    Labs reviewed.    Assessment/Plan     Carolina was seen today for pre-diabetes, hypothyroidism, fatty liver, hypertension, gastroesophageal reflux and back pain.    Diagnoses and all orders for this visit:    Benign essential hypertension - part of digital HTN. BP better on amlodipine 10mg daily. Discussed avoiding NSAIDs including naproxen if possible. Cont low salt diet.  -     TSH; Future  -     Comprehensive metabolic panel; Future    Hypothyroidism due to acquired atrophy of thyroid - on synthroid.  -     TSH; Future    Fatty liver - continue to work on weight loss.     Primary osteoarthritis of both knees - trial of tylenol arthritis OTC prn (max 2000mg in 24 hrs) and voltaren gel. Avoid NSAIDs 2/2 HTN.   -     diclofenac sodium 1 % Gel; Apply 2 g topically 2 (two) times daily.    Severe obesity (BMI 38.87) with comorbidity - has continued to lose weight despite not watching diet as closely. Keep working w/ Ideal protein and increase exercise.     Other orders  -     tretinoin, emollient, 0.05 % Crea; 1 Cream Topical Every day      Follow-up in about 4 months (around 5/23/2018).      Kierra Cornejo MD  Department of Internal Medicine - [facility]  10:07 AM

## 2018-02-02 ENCOUNTER — PATIENT OUTREACH (OUTPATIENT)
Dept: OTHER | Facility: OTHER | Age: 63
End: 2018-02-02

## 2018-02-02 NOTE — PROGRESS NOTES
"Last 5 Patient Entered Readings                                      Current 30 Day Average: 145/91     Recent Readings 1/28/2018 1/23/2018 1/23/2018 1/18/2018 1/17/2018    SBP (mmHg) 139 129 157 135 149    DBP (mmHg) 92 88 95 90 87    Pulse 84 78 84 83 80        Ms. Bals BP has not improved since increasing amlodipine. She has had spikes in BP that she assumes are related to dining out. She said she eats out at restaurants about 3 times/week with different social clubs. She does try to get food without salt, but says sometimes it isn't an option. She also mention having "more room to eat salt since she is on 10 mg of amlodipine." Advised against this as her BP remains above goal despite the increase in amlodipine. She is very involved in [employer] and expects the next 2 weeks to be "crazy". Encouraged her to do her best with following a low salt diet and adhering to her medications.     Will continue to monitor regularly. Will follow up in 3-4 weeks, sooner if BP begins to trend upward or downward.    Patient has my contact information and knows to call with any concerns or clinical changes.     Current HTN regimen:  Hypertension Medications             amLODIPine (NORVASC) 10 MG tablet Take 1 tablet (10 mg total) by mouth once daily.    triamterene-hydrochlorothiazide 37.5-25 mg (DYAZIDE) 37.5-25 mg per capsule TAKE ONE CAPSULE BY MOUTH ONCE DAILY WITH BANANA              "

## 2018-02-06 ENCOUNTER — PATIENT OUTREACH (OUTPATIENT)
Dept: OTHER | Facility: OTHER | Age: 63
End: 2018-02-06

## 2018-02-06 NOTE — PROGRESS NOTES
Last 5 Patient Entered Readings                                      Current 30 Day Average: 143/90     Recent Readings 2/2/2018 2/2/2018 1/28/2018 1/23/2018 1/23/2018    SBP (mmHg) 135 140 139 129 157    DBP (mmHg) 82 88 92 88 95    Pulse 87 84 84 78 84        Hypertension Digital Medicine Program (HDMP): Health  Follow Up    Feeling well.    Lifestyle Modifications:    1.Low sodium diet: yes - grilled chicken breast, quinoa, ideal protein, neena game hen, working on increasing veggies - steamed, some jose cake / we reviewed sodium intake and staying below 2,000mg/day. She was curious about different bread choices and good toppings for baked potatoes. Plans to focus more on diet once Mateo Berger is over.    2.Physical activity: doing some exercises at home, would benefit from increased activity    3.Hypotension/Hypertension symptoms: no   Frequency/Alleviating factors/Precipitating factors, etc.     4.Patient has been compliant with the medication regimen.     Follow up with Ti Carolina Jossue completed. No further questions or concerns. I will follow up in a few weeks to assess progress.

## 2018-02-15 ENCOUNTER — LAB VISIT (OUTPATIENT)
Dept: LAB | Facility: HOSPITAL | Age: 63
End: 2018-02-15
Attending: INTERNAL MEDICINE
Payer: COMMERCIAL

## 2018-02-15 ENCOUNTER — OFFICE VISIT (OUTPATIENT)
Dept: RHEUMATOLOGY | Facility: CLINIC | Age: 63
End: 2018-02-15
Payer: COMMERCIAL

## 2018-02-15 VITALS
SYSTOLIC BLOOD PRESSURE: 137 MMHG | RESPIRATION RATE: 18 BRPM | HEART RATE: 96 BPM | DIASTOLIC BLOOD PRESSURE: 88 MMHG | HEIGHT: 66 IN | BODY MASS INDEX: 38.51 KG/M2 | WEIGHT: 239.63 LBS

## 2018-02-15 DIAGNOSIS — M35.01 SJOGREN'S SYNDROME WITH KERATOCONJUNCTIVITIS SICCA: Primary | ICD-10-CM

## 2018-02-15 DIAGNOSIS — M15.9 PRIMARY OSTEOARTHRITIS INVOLVING MULTIPLE JOINTS: ICD-10-CM

## 2018-02-15 DIAGNOSIS — M17.0 PRIMARY OSTEOARTHRITIS OF BOTH KNEES: ICD-10-CM

## 2018-02-15 PROCEDURE — 3008F BODY MASS INDEX DOCD: CPT | Mod: S$GLB,,, | Performed by: INTERNAL MEDICINE

## 2018-02-15 PROCEDURE — 86235 NUCLEAR ANTIGEN ANTIBODY: CPT | Mod: 59

## 2018-02-15 PROCEDURE — 86039 ANTINUCLEAR ANTIBODIES (ANA): CPT

## 2018-02-15 PROCEDURE — 99214 OFFICE O/P EST MOD 30 MIN: CPT | Mod: S$GLB,,, | Performed by: INTERNAL MEDICINE

## 2018-02-15 PROCEDURE — 36415 COLL VENOUS BLD VENIPUNCTURE: CPT

## 2018-02-15 PROCEDURE — 86235 NUCLEAR ANTIGEN ANTIBODY: CPT

## 2018-02-15 PROCEDURE — 86038 ANTINUCLEAR ANTIBODIES: CPT

## 2018-02-15 PROCEDURE — 99999 PR PBB SHADOW E&M-EST. PATIENT-LVL IV: CPT | Mod: PBBFAC,,, | Performed by: INTERNAL MEDICINE

## 2018-02-15 RX ORDER — TRAMADOL HYDROCHLORIDE 50 MG/1
50 TABLET ORAL EVERY 12 HOURS PRN
Qty: 90 TABLET | Refills: 5 | Status: SHIPPED | OUTPATIENT
Start: 2018-02-15 | End: 2018-05-16

## 2018-02-15 RX ORDER — DICLOFENAC SODIUM 10 MG/G
2 GEL TOPICAL 2 TIMES DAILY
Qty: 100 G | Refills: 3 | Status: SHIPPED | OUTPATIENT
Start: 2018-02-15 | End: 2018-09-26 | Stop reason: SDUPTHER

## 2018-02-15 NOTE — PROGRESS NOTES
Subjective:       Patient ID: Carolina Marcum is a 60 y.o. female.    Chief Complaint: Joint Pain    HPI   61 yo female with asthma, diverticulitis, depression,  Cervix carcinoma in situ in 1989, GERD, potential retinal detachments s/p laser treatment here for evaluation of pain.   Reports pain in knees. Reports knee pain in last year. She reports in September of 2014, she  Had right ankle and right knee pain from, dancing.  She had steroid shot INTO RIGHT KNEE with not much improvement. Pain is nonradiating  She reports doing stress eating and gaining weight in last year. She reports swelling off and on of right knee for months.  She was on naproxen 500mg as needed and she reports it might have helped.   Denies any rashes, oral ulcers, or hair loss. Patient denies dry eyes or dry mouth.      Interval history:  Patient is here for follow up. She has dry eyes and dry mouth for a year and half. She uses systane for dry eyes.  She denies cavities.   She is pain in both knees, hands, elbows. Pain in right knee is the worse 6/10.  Gripping makes pain worse.   She gets nodules in fingers in hands.   Reports that taking naproxen as needed for knee pain with improvement.      Past Medical History   Diagnosis Date    Asthma, chronic     Hypercholesterolemia     Diverticular disease     Reflux     Bleeding hemorrhoids     Obesity     Depression     CIS (carcinoma in situ of cervix)     Herpes simplex without mention of complication     Endometriosis     Abnormal Pap smear 1989     Colposcopy, Laser    Fatty liver 12/15/2014    Multiple gastric polyps 7/21/2012       Review of Systems   Constitutional: Negative for chills, diaphoresis, activity change, appetite change and fatigue.   HENT: Negative for congestion, ear discharge, ear pain, facial swelling, mouth sores, sinus pressure, sneezing, sore throat, tinnitus and trouble swallowing.    Eyes: Negative for photophobia, pain, discharge, redness, itching and  visual disturbance.   Respiratory: Negative for apnea, chest tightness, shortness of breath, wheezing and stridor.    Cardiovascular: Negative for leg swelling.   Gastrointestinal: Negative for nausea, abdominal pain, diarrhea, constipation, blood in stool, abdominal distention and anal bleeding.   Endocrine: Negative for cold intolerance and heat intolerance.   Genitourinary: Negative for dysuria and difficulty urinating.   Musculoskeletal: Positive for arthralgias. Negative for myalgias, back pain, joint swelling, gait problem, neck pain and neck stiffness.   Skin: Negative for color change, pallor, rash and wound.   Neurological: Negative for dizziness, seizures, light-headedness and numbness.   Hematological: Negative for adenopathy. Does not bruise/bleed easily.   Psychiatric/Behavioral: Negative for sleep disturbance. The patient is not nervous/anxious.              Objective:        Physical Exam   Constitutional: She is oriented to person, place, and time.   HENT:   Head: Normocephalic and atraumatic.   Right Ear: External ear normal.   Left Ear: External ear normal.   Nose: Nose normal.   Mouth/Throat: Oropharynx is clear and moist. No oropharyngeal exudate.   Eyes: Conjunctivae and EOM are normal. Pupils are equal, round, and reactive to light. Right eye exhibits no discharge. Left eye exhibits no discharge. No scleral icterus.   Neck: Neck supple. No JVD present. No thyromegaly present.   Cardiovascular: Normal rate, regular rhythm, normal heart sounds and intact distal pulses.  Exam reveals no gallop and no friction rub.    No murmur heard.  Pulmonary/Chest: Effort normal and breath sounds normal. No respiratory distress. She has no wheezes. She has no rales. She exhibits no tenderness.   Abdominal: Soft. Bowel sounds are normal. She exhibits no distension and no mass. There is no tenderness. There is no rebound and no guarding.   Lymphadenopathy:     She has no cervical adenopathy.   Neurological: She  is alert and oriented to person, place, and time. No cranial nerve deficit. Gait normal. Coordination normal.   Skin: candidiasis in inguinal region on right  Psychiatric: Affect and judgment normal.   Musculoskeletal: She exhibits no edema or tenderness.         FROM of all joints including neck, shoulders, spine, ankles, wrists, knees, and ankles; no joint deformities noted or effusions, erythema or warmth; no tophi or nodules noted; no crepitus; no synovitis noted in hands or feet; no nail pitting or onycholysis     ZULMA-+  Ro+    Imaging:  Knees (3/2015)- I personally reviewed knee xrays; DJD; ?early chondrocalcinosis    Assessment:      60 yo female with asthma, diverticulitis, depression,  YOUNG , GERD, Cervix carcinoma in situ in 1989,  here for follow up of Sjogrens.      1) Sjogrens:She has +ZULMA , +Ro, and sicca symptoms.  -continue systane eye drops  Declines pilocarpine  Labs today    2) Bilateral knee OA- followed by ortho and getting steroid injections  - can consider plaquenil for chondrocalcinosis. Patient declines.  Continue voltaren gel      3) Obesity: encourage weight loss  Provided for patient diet for weight loss and inflammation   rtc in 3 months

## 2018-02-15 NOTE — LETTER
February 16, 2018      Kierra Cornejo MD  [address]  [facility] [address]           [facility]  [address]  [facility] [address]  Phone: [phone]  Fax: [phone]          Patient: Carolina Marcum   MR Number: [id number]   YOB: 1955   Date of Visit: 2/15/2018       Dear Dr. Kierra Cornejo:    Thank you for referring Carolina Marcum to me for evaluation. Attached you will find relevant portions of my assessment and plan of care.    If you have questions, please do not hesitate to call me. I look forward to following Carolina Marcum along with you.    Sincerely,    Linda Abbasi MD    Enclosure  CC:  No Recipients    If you would like to receive this communication electronically, please contact [email or web address] or [phone] to request more information on [employer] access.    For providers and/or their staff who would like to refer a patient to [facility], please contact us through our one-stop-shop provider referral line, [facility] [place], at [phone].    If you feel you have received this communication in error or would no longer like to receive these types of communications, please e-mail [email or web address]

## 2018-02-16 LAB
ANA SER QL IF: POSITIVE
ANA TITR SER IF: NORMAL {TITER}

## 2018-02-17 LAB
ANTI-SSA ANTIBODY: 12.69 EU
ANTI-SSA INTERPRETATION: NEGATIVE
ANTI-SSB ANTIBODY: 0.88 EU
ANTI-SSB INTERPRETATION: NEGATIVE

## 2018-02-19 LAB
ANTI SM ANTIBODY: 6.51 EU
ANTI SM/RNP ANTIBODY: 6.23 EU
ANTI-SM INTERPRETATION: NEGATIVE
ANTI-SM/RNP INTERPRETATION: NEGATIVE
ANTI-SSA ANTIBODY: 12.69 EU
ANTI-SSA INTERPRETATION: NEGATIVE
ANTI-SSB ANTIBODY: 0.88 EU
ANTI-SSB INTERPRETATION: NEGATIVE
DSDNA AB SER-ACNC: NORMAL [IU]/ML

## 2018-02-21 ENCOUNTER — PATIENT OUTREACH (OUTPATIENT)
Dept: OTHER | Facility: OTHER | Age: 63
End: 2018-02-21

## 2018-02-21 NOTE — PROGRESS NOTES
Last 5 Patient Entered Readings                                      Current 30 Day Average: 135/85     Recent Readings 2/17/2018 2/9/2018 2/2/2018 2/2/2018 1/28/2018    SBP (mmHg) 130 143 135 140 139    DBP (mmHg) 80 82 82 88 92    Pulse 92 88 87 84 84        Ms. Tucekr has only submitted 2 BP readings since out last encounter. She will try to remember to check BP more often. Her average is improving, but still above goal.     Will continue to monitor regularly. Will follow up in 2-3 weeks, sooner if BP begins to trend upward or downward.    Patient has my contact information and knows to call with any concerns or clinical changes.     Current HTN regimen:  Hypertension Medications             amLODIPine (NORVASC) 10 MG tablet Take 1 tablet (10 mg total) by mouth once daily.    triamterene-hydrochlorothiazide 37.5-25 mg (DYAZIDE) 37.5-25 mg per capsule TAKE ONE CAPSULE BY MOUTH ONCE DAILY WITH BANANA          .

## 2018-02-28 ENCOUNTER — OFFICE VISIT (OUTPATIENT)
Dept: ORTHOPEDICS | Facility: CLINIC | Age: 63
End: 2018-02-28
Payer: COMMERCIAL

## 2018-02-28 VITALS
HEIGHT: 66 IN | SYSTOLIC BLOOD PRESSURE: 129 MMHG | WEIGHT: 243.63 LBS | HEART RATE: 91 BPM | DIASTOLIC BLOOD PRESSURE: 86 MMHG | BODY MASS INDEX: 39.15 KG/M2

## 2018-02-28 DIAGNOSIS — M17.0 PRIMARY OSTEOARTHRITIS OF BOTH KNEES: Primary | ICD-10-CM

## 2018-02-28 PROCEDURE — 20610 DRAIN/INJ JOINT/BURSA W/O US: CPT | Mod: 50,S$GLB,, | Performed by: NURSE PRACTITIONER

## 2018-02-28 PROCEDURE — 99499 UNLISTED E&M SERVICE: CPT | Mod: S$GLB,,, | Performed by: NURSE PRACTITIONER

## 2018-02-28 PROCEDURE — 99999 PR PBB SHADOW E&M-EST. PATIENT-LVL IV: CPT | Mod: PBBFAC,,, | Performed by: NURSE PRACTITIONER

## 2018-02-28 RX ADMIN — TRIAMCINOLONE ACETONIDE 80 MG: 40 INJECTION, SUSPENSION INTRA-ARTICULAR; INTRAMUSCULAR at 03:02

## 2018-03-02 RX ORDER — TRIAMCINOLONE ACETONIDE 40 MG/ML
80 INJECTION, SUSPENSION INTRA-ARTICULAR; INTRAMUSCULAR
Status: COMPLETED | OUTPATIENT
Start: 2018-02-28 | End: 2018-02-28

## 2018-03-02 NOTE — PROGRESS NOTES
Pt with known bilateral knee djd. She has been taking cortisone injections for her pain. Her last injections were 9/20/18. She returns today for repeat injections. She's not interested in surgery at this time. She is ambulating without assistive device.     Knee Injection Procedure Note    Pre-operative Diagnosis: bilateral knee degenerative arthritis    Post-operative Diagnosis: same    Indications: bilateral knee pain    Anesthesia: none    Procedure Details     Verbal consent was obtained for the procedure. The injection site was identified and the skin was prepared with alcohol. The bilateral knee was injected from an anterolateral approach with 1 ml of Kenalog and 5 ml Lidocaine under sterile technique using a 22 gauge needle. The needle was removed and the area cleansed and dressed.    Complications:  None; patient tolerated the procedure well.    she was advised to rest the knee today, using ice and elevation as needed for comfort and swelling. she did receive immediate relief of the knee pain. she was told this would be short lived and is secondary to the lidocaine. she may have an increase in discomfort tonight followed by steady improvement over the next several days. It may take 1-3 weeks following the injection to get the full benefit of the medication.

## 2018-03-06 ENCOUNTER — PATIENT OUTREACH (OUTPATIENT)
Dept: OTHER | Facility: OTHER | Age: 63
End: 2018-03-06

## 2018-03-06 NOTE — PROGRESS NOTES
"Last 5 Patient Entered Readings                                      Current 30 Day Average: 138/85     Recent Readings 2/28/2018 2/26/2018 2/24/2018 2/24/2018 2/24/2018    SBP (mmHg) 138 137 141 154 154    DBP (mmHg) 84 86 91 93 91    Pulse 86 86 81 88 84        Hypertension Digital Medicine Program (HDMP): Health  Follow Up    Feeling well.    Lifestyle Modifications:    1.Low sodium diet: yes - trying to eat more vegetables and get  "on board". Interested in learning more about the Whole 30 Diet compared to other diets and requested info be sent via email.    2.Physical activity: yes - Started walking more and now volunteering at an Assisted Living Facility. Doing exercises with them - arnol chi, balance exercises, slow/controlled movements - really enjoys it.     3.Hypotension/Hypertension symptoms: no   Frequency/Alleviating factors/Precipitating factors, etc.     4.Patient has been compliant with the medication regimen.     Follow up with Ms. Carolina Marcum completed. No further questions or concerns. I will follow up in a few weeks to assess progress.       "

## 2018-03-19 ENCOUNTER — PATIENT MESSAGE (OUTPATIENT)
Dept: INTERNAL MEDICINE | Facility: CLINIC | Age: 63
End: 2018-03-19

## 2018-03-21 ENCOUNTER — PATIENT OUTREACH (OUTPATIENT)
Dept: OTHER | Facility: OTHER | Age: 63
End: 2018-03-21

## 2018-03-21 DIAGNOSIS — E87.6 HYPOKALEMIA: ICD-10-CM

## 2018-03-21 DIAGNOSIS — I10 ESSENTIAL HYPERTENSION: Primary | ICD-10-CM

## 2018-03-21 RX ORDER — POTASSIUM CHLORIDE 750 MG/1
20 CAPSULE, EXTENDED RELEASE ORAL 2 TIMES DAILY
Qty: 120 CAPSULE | Refills: 3 | Status: SHIPPED | OUTPATIENT
Start: 2018-03-21 | End: 2018-04-05 | Stop reason: SDUPTHER

## 2018-03-21 RX ORDER — CHLORTHALIDONE 25 MG/1
25 TABLET ORAL DAILY
Qty: 30 TABLET | Refills: 3 | Status: SHIPPED | OUTPATIENT
Start: 2018-03-21 | End: 2018-07-10 | Stop reason: SDUPTHER

## 2018-03-21 NOTE — PROGRESS NOTES
Last 5 Patient Entered Readings                                      Current 30 Day Average: 139/87     Recent Readings 3/17/2018 3/17/2018 3/9/2018 3/9/2018 3/9/2018    SBP (mmHg) 133 144 148 140 153    DBP (mmHg) 85 88 91 87 92    Pulse 77 84 81 82 81        Ms. Tucker has a difficult time monitoring sodium intake because she dines out at restaurants frequently. Will change Dyazide to chlorthalidone 25 mg QD and increase KCL to 20 meq BID as potassium runs low normal. Will check BMP next week. Asked Ms. Tucker to check her BP more often (3-4 times/week).     Patient's BP average is above goal of <130/80.     Will continue to monitor regularly. Will follow up in 2-3 weeks, sooner if BP begins to trend upward or downward.    Patient has my contact information and knows to call with any concerns or clinical changes.     Current HTN regimen:  Hypertension Medications             amLODIPine (NORVASC) 10 MG tablet Take 1 tablet (10 mg total) by mouth once daily.    chlorthalidone (HYGROTEN) 25 MG Tab Take 1 tablet (25 mg total) by mouth once daily.

## 2018-03-28 ENCOUNTER — TELEPHONE (OUTPATIENT)
Dept: INTERNAL MEDICINE | Facility: CLINIC | Age: 63
End: 2018-03-28

## 2018-03-28 ENCOUNTER — LAB VISIT (OUTPATIENT)
Dept: LAB | Facility: HOSPITAL | Age: 63
End: 2018-03-28
Attending: INTERNAL MEDICINE
Payer: COMMERCIAL

## 2018-03-28 DIAGNOSIS — I10 ESSENTIAL HYPERTENSION: ICD-10-CM

## 2018-03-28 LAB
ANION GAP SERPL CALC-SCNC: 9 MMOL/L
BUN SERPL-MCNC: 20 MG/DL
CALCIUM SERPL-MCNC: 9.5 MG/DL
CHLORIDE SERPL-SCNC: 102 MMOL/L
CO2 SERPL-SCNC: 32 MMOL/L
CREAT SERPL-MCNC: 0.9 MG/DL
EST. GFR  (AFRICAN AMERICAN): >60 ML/MIN/1.73 M^2
EST. GFR  (NON AFRICAN AMERICAN): >60 ML/MIN/1.73 M^2
GLUCOSE SERPL-MCNC: 90 MG/DL
POTASSIUM SERPL-SCNC: 3.3 MMOL/L
SODIUM SERPL-SCNC: 143 MMOL/L

## 2018-03-28 PROCEDURE — 80048 BASIC METABOLIC PNL TOTAL CA: CPT

## 2018-03-28 PROCEDURE — 36415 COLL VENOUS BLD VENIPUNCTURE: CPT | Mod: PO

## 2018-03-28 NOTE — TELEPHONE ENCOUNTER
Please call pt: Potassium is still mildly low. Can you call pt to confirm that she's taking potassium 10mEq 2 pills twice a day? If so, I'll repeat labs - BMP and magnesium - on Monday. We may have to go up on the potassium pills if it remains low.

## 2018-03-29 ENCOUNTER — PATIENT MESSAGE (OUTPATIENT)
Dept: INTERNAL MEDICINE | Facility: CLINIC | Age: 63
End: 2018-03-29

## 2018-04-03 ENCOUNTER — PATIENT OUTREACH (OUTPATIENT)
Dept: OTHER | Facility: OTHER | Age: 63
End: 2018-04-03

## 2018-04-03 NOTE — PROGRESS NOTES
Last 5 Patient Entered Readings                                      Current 30 Day Average: 135/87     Recent Readings 3/31/2018 3/31/2018 3/31/2018 3/23/2018 3/23/2018    SBP (mmHg) 132 141 151 125 136    DBP (mmHg) 88 94 95 84 92    Pulse 84 87 86 87 84        Hypertension Digital Medicine Program (HDMP): Health  Follow Up    Feeling well.    Lifestyle Modifications:    1.Low sodium diet: Continuing to work on dietary changes. Not seasoning meat. Eating out maybe 1x/wk.    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    3.Hypotension/Hypertension symptoms: no   Frequency/Alleviating factors/Precipitating factors, etc.     4.Patient has been compliant with the medication regimen.     Follow up with Mrs. Machucaita Jossue completed. No further questions or concerns. I will follow up in a few weeks to assess progress.

## 2018-04-04 ENCOUNTER — PATIENT OUTREACH (OUTPATIENT)
Dept: OTHER | Facility: OTHER | Age: 63
End: 2018-04-04

## 2018-04-04 DIAGNOSIS — I10 ESSENTIAL HYPERTENSION: Primary | ICD-10-CM

## 2018-04-04 NOTE — PROGRESS NOTES
Last 5 Patient Entered Readings                                      Current 30 Day Average: 137/86     Recent Readings 4/3/2018 3/31/2018 3/31/2018 3/31/2018 3/23/2018    SBP (mmHg) 146 132 141 151 125    DBP (mmHg) 81 88 94 95 84    Pulse 90 84 87 86 87        Ms. Tucker's BP average has slightly improved since starting chlorthalidone. She has been eating out frequently, but is trying to cut back on this and be more focused on reducing salt intake. Her potassium was 3.3 on BMP. She confirms she is taking KCL 20 meq BID. Will repeat BMP tomorrow. Encouraged her to eat more potassium rich foods and sent a list to her via My Chart. Will not make any additional medication changes at this time. If BP remains above goal, will add a beta blocker.     Patient's BP average is above goal of <130/80.     Will continue to monitor regularly. Will follow up in 2-3 weeks, sooner if BP begins to trend upward or downward.    Patient has my contact information and knows to call with any concerns or clinical changes.     Current HTN regimen:  Hypertension Medications             amLODIPine (NORVASC) 10 MG tablet Take 1 tablet (10 mg total) by mouth once daily.    chlorthalidone (HYGROTEN) 25 MG Tab Take 1 tablet (25 mg total) by mouth once daily.

## 2018-04-05 ENCOUNTER — TELEPHONE (OUTPATIENT)
Dept: INTERNAL MEDICINE | Facility: CLINIC | Age: 63
End: 2018-04-05

## 2018-04-05 ENCOUNTER — LAB VISIT (OUTPATIENT)
Dept: LAB | Facility: HOSPITAL | Age: 63
End: 2018-04-05
Attending: INTERNAL MEDICINE
Payer: COMMERCIAL

## 2018-04-05 DIAGNOSIS — E87.6 HYPOKALEMIA: ICD-10-CM

## 2018-04-05 DIAGNOSIS — I10 ESSENTIAL HYPERTENSION: ICD-10-CM

## 2018-04-05 LAB
ANION GAP SERPL CALC-SCNC: 8 MMOL/L
BUN SERPL-MCNC: 19 MG/DL
CALCIUM SERPL-MCNC: 9.5 MG/DL
CHLORIDE SERPL-SCNC: 100 MMOL/L
CO2 SERPL-SCNC: 33 MMOL/L
CREAT SERPL-MCNC: 0.9 MG/DL
EST. GFR  (AFRICAN AMERICAN): >60 ML/MIN/1.73 M^2
EST. GFR  (NON AFRICAN AMERICAN): >60 ML/MIN/1.73 M^2
GLUCOSE SERPL-MCNC: 90 MG/DL
POTASSIUM SERPL-SCNC: 3.4 MMOL/L
SODIUM SERPL-SCNC: 141 MMOL/L

## 2018-04-05 PROCEDURE — 36415 COLL VENOUS BLD VENIPUNCTURE: CPT | Mod: PO

## 2018-04-05 PROCEDURE — 80048 BASIC METABOLIC PNL TOTAL CA: CPT

## 2018-04-05 RX ORDER — POTASSIUM CHLORIDE 750 MG/1
20 CAPSULE, EXTENDED RELEASE ORAL 3 TIMES DAILY
Qty: 180 CAPSULE | Refills: 11 | Status: SHIPPED | OUTPATIENT
Start: 2018-04-05 | End: 2019-05-21 | Stop reason: SDUPTHER

## 2018-04-05 NOTE — TELEPHONE ENCOUNTER
Potassium is still mildly on the low side. I'm going to increase her potassium from 10mEq 2 caps twice daily to three times daily.

## 2018-04-24 ENCOUNTER — PATIENT OUTREACH (OUTPATIENT)
Dept: OTHER | Facility: OTHER | Age: 63
End: 2018-04-24

## 2018-04-24 NOTE — PROGRESS NOTES
Last 5 Patient Entered Readings                                      Current 30 Day Average: 139/83     Recent Readings 5/8/2018 4/28/2018 4/21/2018 4/21/2018 4/14/2018    SBP (mmHg) 128 142 139 153 148    DBP (mmHg) 77 83 94 93 77    Pulse 86 86 95 93 89        Ms. Tucker's BP average remains above goal. Her most recent BP reading was improved. Explained that ideally, all of her BP readings would be like the last reading and be in the 120s/70s. She will try to send readings 3-4 times/week to determine if her average is improving. Explained the next step would be to add carvedilol or metoprolol.     Patient's BP average is above goal of <130/80.     Will continue to monitor regularly. Will follow up in 2-3 weeks, sooner if BP begins to trend upward or downward.    Patient has my contact information and knows to call with any concerns or clinical changes.     Current HTN regimen:  Hypertension Medications             amLODIPine (NORVASC) 10 MG tablet Take 1 tablet (10 mg total) by mouth once daily.    chlorthalidone (HYGROTEN) 25 MG Tab Take 1 tablet (25 mg total) by mouth once daily.

## 2018-05-01 ENCOUNTER — PATIENT OUTREACH (OUTPATIENT)
Dept: OTHER | Facility: OTHER | Age: 63
End: 2018-05-01

## 2018-05-01 NOTE — PROGRESS NOTES
Last 5 Patient Entered Readings                                      Current 30 Day Average: 138/83     Recent Readings 4/28/2018 4/21/2018 4/21/2018 4/14/2018 4/14/2018    SBP (mmHg) 142 139 153 148 135    DBP (mmHg) 83 94 93 77 90    Pulse 86 95 93 89 82          Digital Medicine: Health  Follow Up    Left voicemail to follow up with Ms. Carolina Marcum.  Current BP average 138/83 mmHg is not at goal.

## 2018-05-02 RX ORDER — LEVOTHYROXINE SODIUM 125 UG/1
TABLET ORAL
Qty: 90 TABLET | Refills: 3 | Status: SHIPPED | OUTPATIENT
Start: 2018-05-02 | End: 2018-05-02

## 2018-05-02 RX ORDER — LEVOTHYROXINE SODIUM 125 UG/1
125 TABLET ORAL DAILY
Qty: 90 TABLET | Refills: 3 | Status: SHIPPED | OUTPATIENT
Start: 2018-05-02 | End: 2018-10-17 | Stop reason: SDUPTHER

## 2018-05-10 ENCOUNTER — OFFICE VISIT (OUTPATIENT)
Dept: SLEEP MEDICINE | Facility: CLINIC | Age: 63
End: 2018-05-10
Payer: COMMERCIAL

## 2018-05-10 VITALS
WEIGHT: 244.06 LBS | DIASTOLIC BLOOD PRESSURE: 81 MMHG | HEIGHT: 66 IN | BODY MASS INDEX: 39.22 KG/M2 | HEART RATE: 85 BPM | SYSTOLIC BLOOD PRESSURE: 135 MMHG

## 2018-05-10 DIAGNOSIS — G47.30 SLEEP APNEA, UNSPECIFIED TYPE: Primary | ICD-10-CM

## 2018-05-10 DIAGNOSIS — J45.909 ASTHMA, UNSPECIFIED ASTHMA SEVERITY, UNSPECIFIED WHETHER COMPLICATED, UNSPECIFIED WHETHER PERSISTENT: ICD-10-CM

## 2018-05-10 PROCEDURE — 3008F BODY MASS INDEX DOCD: CPT | Mod: CPTII,S$GLB,, | Performed by: NURSE PRACTITIONER

## 2018-05-10 PROCEDURE — 99999 PR PBB SHADOW E&M-EST. PATIENT-LVL V: CPT | Mod: PBBFAC,,, | Performed by: NURSE PRACTITIONER

## 2018-05-10 PROCEDURE — 3079F DIAST BP 80-89 MM HG: CPT | Mod: CPTII,S$GLB,, | Performed by: NURSE PRACTITIONER

## 2018-05-10 PROCEDURE — 99204 OFFICE O/P NEW MOD 45 MIN: CPT | Mod: S$GLB,,, | Performed by: NURSE PRACTITIONER

## 2018-05-10 PROCEDURE — 3075F SYST BP GE 130 - 139MM HG: CPT | Mod: CPTII,S$GLB,, | Performed by: NURSE PRACTITIONER

## 2018-05-10 NOTE — PATIENT INSTRUCTIONS
Obstructive Sleep Apnea  Obstructive sleep apnea is a condition that causes your air passages to become narrowed or blocked during sleep. As a result, breathing stops for short periods. Your body wakes up enough for breathing to begin again, though you don't remember it. The cycle of stopped breathing and brief awakenings can repeat dozens of times a night. This prevents the body from getting to the deeper stages of sleep that are needed for good rest and may cause your body's oxygen level to fall.  Signs of sleep apnea include loud snoring, noisy breathing, and gasping sounds during sleep. Daytime symptoms include waking up tired after a full night's sleep, waking up with headaches, feeling very sleepy or falling asleep during the day, and having problems with memory or concentration.  Risk factors for sleep apnea include:  · Being overweight  · Being a man, or a woman in menopause  · Smoking  · Using alcohol or sedating medicines  · Having enlarged structures in the nose or throat  Home care  Lifestyle changes that can help treat snoring and sleep apnea include the following:  · If you are overweight, lose weight. Talk to your healthcare provider about a weight-loss plan for you.  · Avoid alcohol for 3 to 4 hours before bedtime. Avoid sedating medications. Ask your healthcare provider about the medicines you take.  · If you smoke, talk to your healthcare provider about ways to quit.  · Sleep on your side. This can help prevent gravity from pulling relaxed throat tissues into your breathing passages.  · If you have allergies or sinus problems that block your nose, ask your healthcare provider for help.  Follow-up care  Follow up with your healthcare provider, or as advised. A diagnosis of sleep apnea is made with a sleep study. Your healthcare provider can tell you more about this test.  When to seek medical advice  Sleep apnea can make you more likely to have certain health problems. These include high blood  pressure, heart attack, stroke, and sexual dysfunction. If you have sleep apnea, talk to your healthcare provider about the best treatments for you.  Date Last Reviewed: 4/1/2017  © 3509-8102 [employer]. [address] [place] [address], [place] [address]. All rights reserved. This information is not intended as a substitute for professional medical care. Always follow your healthcare professional's instructions.    Cheryle or Yuriy will contact you to schedule your sleep study. Their number is [phone] (ext 2). The [facility] is located on 7th floor of the [facility].    We will call you when the sleep study results are ready - if you have not heard from us by 2 weeks from the date of the study, please call [phone] (ext 1).    You are advised to abstain from driving should you feel sleepy or drowsy.

## 2018-05-10 NOTE — PROGRESS NOTES
Carolina Marcum  was seen as a new patient, referred by Dr. MUSTAFA , for the evaluation of obstructive sleep apnea.     CHIEF COMPLAINT: Snoring    HISTORY OF PRESENT ILLNESS:Carolina Marcum a 63 y.o. female presents for the evaluation of obstructive sleep apnea. She has never had a sleep study. +snores.  and osorio. Nocturia 3x, easily returns to sleep more often than not.  Occasional witnessed apneic pauses. Wgt gain+. Was waking up with headaches just recently. Got ent syrup which she takes prn which helps abort head pain/helps drain sinus. Side sleeper. Mouth breather during sleep.      At times symptoms of restless legs  Despite taking Zyrtec qhs still tosses and turns  R knee inflammation    EATS out a lot, hard to lose wgt.       EPWORTH SLEEPINESS SCALE 5/10/2018   Sitting and reading 3   Watching TV 2   Sitting, inactive in a public place (e.g. a theatre or a meeting) 2   As a passenger in a car for an hour without a break 1   Lying down to rest in the afternoon when circumstances permit 3   Sitting and talking to someone 0   Sitting quietly after a lunch without alcohol 3   In a car, while stopped for a few minutes in traffic 0   Total score 14         Sleep Clinic New Patient 5/10/2018   What time do you go to bed on a week day? (Give a range) Anywhere from 9:00-11:30 pm   What time do you go to bed on a day off? (Give a range) 10:30-1:00 pm   How long does it take you to fall asleep? (Give a range) Sometimes within one hour watching tv or other times fifteen minutes to twenty   How many times per night do you wake up?  2   How long does it take you to fall back into sleep? (Give a range) Sometimes quickly like within five minutes others hour or two depending on how i woke up n how much sleep and time night   What time do you wake up to start your day on a week day? (Give a range) From five to 7:30 am   What time do you wake up to start your day on a day off? (Give a range) From from 6:30-8 am  "  What time do you get out of bed? (Give a range) Seven to eight    How many hours do you sleep?  8   How many naps do you take per day? 1   Rate your sleep quality from 0 to 5 (0-poor, 5-great). 3   Have you experienced:  Weight gain?   How much weight have you gained or lost in pounds (lbs)?  25   How many times per night do you go to the bathroom?  3   Have you ever had a sleep study/CPAP machine/surgery for sleep apnea? No   Have you ever had a CPAP machine for sleep apnea? No   Have you ever had surgery for sleep apnea? No         FAMILY HISTORY: No known sleep disorders. ?dad    SOCIAL HISTORY: . Former , now volunteers at [employer], infrequent ETOH    REVIEW OF SYSTEMS:  Sleep related symptoms as per HPI; +overweight  Sleep Clinic ROS  5/10/2018   Difficulty breathing through the nose?  Yes   Sore throat or dry mouth in the morning? Yes   Irregular or very fast heart beat?  Sometimes   Shortness of breath?  Yes   Acid reflux? Yes   Body aches and pains?  Sometimes   Morning headaches? Yes   Dizziness? Yes   Mood changes?  No   Do you exercise?  Sometimes   Do you feel like moving your legs a lot?  Yes           PHYSICAL EXAM:   /81   Pulse 85   Ht 5' 6" (1.676 m)   Wt 110.7 kg (244 lb 0.8 oz)   BMI 39.39 kg/m²   GENERAL: Obese body habitus, well groomed   HEENT: Conjunctivae are non-erythematous; Pupils equal, round, and reactive to light; Nose is symmetrical; Nasal mucosa is normal; Septum is midline; Inferior turbinates are mildly swollen; Nasal airflow is normal; Posterior pharynx is pink; Modified Mallampati: IV; Posterior palate is low; Tonsils not visualized; +retrognathia. Uvula is short and pink;Tongue is short broad. Small oral cavitty; Dentition is fair; No TMJ tenderness; Jaw opening and protrusion without click and without discomfort.   NECK: Supple. Neck circumference is 17.5inches. No thyromegaly. No palpable nodes.   SKIN: On face and neck: No " abrasions, no rashes, no lesions. No subcutaneous nodules are palpable.   RESPIRATORY: Chest is clear to auscultation. Normal chest expansion and non-labored breathing at rest.   CARDIOVASCULAR: Normal S1, S2. No murmurs, gallops or rubs. No carotid bruits bilaterally.   EXTREMITIES: No edema. No clubbing. No cyanosis. Station normal. Gait normal.   NEURO/PSYCH: Oriented to time, place and person. Normal attention span and concentration. Affect is full. Mood is normal.       ASSESSMENT:     Unspecified Sleep Apnea, with symptoms of snoring, apneic pauses, un-refreshing disrupted sleep and excessive daytime sleepiness, with exam findings of a crowded oral airway, with medical comorbidities of ASTHMA, obesity, hypertension, hypothyroidism. Warrants further investigation for untreated sleep apnea.   Allergies  Chronic allergic rhinitis    PLAN:   1. Polysomnogram is warranted due to mouth breathing (HST ineffective), ASTHMA/allergy sensitivities/exacerbations and presence of upper airway resistance syndrome.    2. Discussed etiology of QUINTIN and potential ramifications of untreated QUINTIN, including stroke, heart disease, HTN.  We discussed potential treatment options, which could include weight loss (10-15%), body positioning, continuous positive airway pressure (CPAP-definitive), mandibular advancement splint by dentist, or referral for surgical consideration.   3. The patient was advised to abstain from driving should she feel sleepy or drowsy. Encouraged weight loss efforts for potential improvement of QUINTIN and overall health benefits  4. I will see the patient back after the study has been completed to review test results and plan of care.     Thank you for allowing me the opportunity to participate in the care of your patient

## 2018-05-18 NOTE — PROGRESS NOTES
Last 5 Patient Entered Readings                                      Current 30 Day Average: 136/87     Recent Readings 5/14/2018 5/14/2018 5/14/2018 5/10/2018 5/10/2018    SBP (mmHg) 137 141 151 132 149    DBP (mmHg) 90 101 95 90 90    Pulse 75 83 81 84 88        Digital Medicine: Health  Follow Up    Feeling well.   Noticed BP started to increase after weight gain.    Lifestyle Modifications:    1.Dietary Modifications: Started Ideal Protein yesterday again, hopes to lose 15-20lbs. Continuing to be mindful of sodium intake. Reading nutrition labels.     2.Physical Activity: staying active    Follow up with MsTi Carolina Jossue completed. No further questions or concerns. Will continue follow up to achieve health goals.

## 2018-05-21 DIAGNOSIS — Z86.018: Primary | ICD-10-CM

## 2018-05-23 DIAGNOSIS — G47.30 SLEEP APNEA, UNSPECIFIED TYPE: Primary | ICD-10-CM

## 2018-05-28 ENCOUNTER — OFFICE VISIT (OUTPATIENT)
Dept: INTERNAL MEDICINE | Facility: CLINIC | Age: 63
End: 2018-05-28
Payer: COMMERCIAL

## 2018-05-28 ENCOUNTER — PATIENT MESSAGE (OUTPATIENT)
Dept: ADMINISTRATIVE | Facility: OTHER | Age: 63
End: 2018-05-28

## 2018-05-28 ENCOUNTER — TELEPHONE (OUTPATIENT)
Dept: SLEEP MEDICINE | Facility: OTHER | Age: 63
End: 2018-05-28

## 2018-05-28 VITALS
HEIGHT: 66 IN | BODY MASS INDEX: 39.15 KG/M2 | DIASTOLIC BLOOD PRESSURE: 68 MMHG | HEART RATE: 73 BPM | SYSTOLIC BLOOD PRESSURE: 122 MMHG | WEIGHT: 243.63 LBS | TEMPERATURE: 98 F

## 2018-05-28 DIAGNOSIS — T50.2X5A DIURETIC-INDUCED HYPOKALEMIA: ICD-10-CM

## 2018-05-28 DIAGNOSIS — I10 BENIGN ESSENTIAL HYPERTENSION: Primary | ICD-10-CM

## 2018-05-28 DIAGNOSIS — E03.4 HYPOTHYROIDISM DUE TO ACQUIRED ATROPHY OF THYROID: ICD-10-CM

## 2018-05-28 DIAGNOSIS — M25.50 POLYARTHRALGIA: ICD-10-CM

## 2018-05-28 DIAGNOSIS — E87.6 DIURETIC-INDUCED HYPOKALEMIA: ICD-10-CM

## 2018-05-28 DIAGNOSIS — E66.01 SEVERE OBESITY (BMI 35.0-39.9) WITH COMORBIDITY: ICD-10-CM

## 2018-05-28 DIAGNOSIS — Z12.31 ENCOUNTER FOR SCREENING MAMMOGRAM FOR BREAST CANCER: ICD-10-CM

## 2018-05-28 PROCEDURE — 3074F SYST BP LT 130 MM HG: CPT | Mod: CPTII,S$GLB,, | Performed by: INTERNAL MEDICINE

## 2018-05-28 PROCEDURE — 99214 OFFICE O/P EST MOD 30 MIN: CPT | Mod: S$GLB,,, | Performed by: INTERNAL MEDICINE

## 2018-05-28 PROCEDURE — 99999 PR PBB SHADOW E&M-EST. PATIENT-LVL III: CPT | Mod: PBBFAC,,, | Performed by: INTERNAL MEDICINE

## 2018-05-28 PROCEDURE — 3078F DIAST BP <80 MM HG: CPT | Mod: CPTII,S$GLB,, | Performed by: INTERNAL MEDICINE

## 2018-05-28 PROCEDURE — 3008F BODY MASS INDEX DOCD: CPT | Mod: CPTII,S$GLB,, | Performed by: INTERNAL MEDICINE

## 2018-05-28 NOTE — PROGRESS NOTES
"Subjective:       Patient ID: Carolina Marcum is a 63 y.o. female.    Chief Complaint: Follow-up    HPI   HTN - amlodipine 10, chlorthalidone 25mg daily.   Previously on triamterene-HCTZ.  Losartan-->swelling.   Diuretic induced hypoK. microK 30mEq bid - sometimes forgets.   Follows w/ digital HTN. flowsheet reviewed.  No SOB/CP/palpitations/HA/blurry vision.     Hypothyroidism - synthroid (brand name) 125mcg daily.   TSH 1/23/18 WNL.  No unexpected weight changes.    Diffuse joint pains. Pos ZULMA, mildly elevated CRP and ESR.  Seen Dr. Abbasi 2/16/18 - Sjogrens. +Ro and sicca. Cont systane eye drops.  C/O dry eyes and mouth.      OA of knees. Naproxen prn. Recommended tylenol arthritis OTC prn.   KNEE XR 10/2016 - B tricompartmental OA of knees, helen medial tib-fem compartment.  S/p PT.   YESENIA [facility]es De Oca - intermittent steroid injections. Last injection to the knees were 3/2/18 w/ Mariluz Mark, NP.  Knee pains are doing ok but after stairs, had slight R knee pain.   Per Dr. Abbasi's note 2/16/18 - consider plaquenil for chondrocalcinosis, which pt declined. Cont voltaren gel.  Volunteering 2 days a week - 1/2 hour and doing exercise at the facility.     Snoring and risks of QUINTIN. Saw Soraya Desir NP 5/10/18. Plan for in lab sleep study. Daytime sleepiness.     Occasional constipation.   Restarted Ideal Protein last week.     Review of Systems  as above in HPI.     Objective:      Physical Exam    /68 (BP Location: Left arm, Patient Position: Sitting, BP Method: Large (Manual))   Pulse 73   Temp 97.8 °F (36.6 °C)   Ht 5' 6" (1.676 m)   Wt 110.5 kg (243 lb 9.7 oz)   BMI 39.32 kg/m²     Gen - a+ox4, nad  heent - perrl, op clear but crowded. TM normal.   Neck - no LAD or thyromegaly.  CV - RRR, no m/r  Chest - CTAB, no wheezing/rhonchi/crackles  Abd - S/NT/ND/+BS  eXT -2 + BDP and radial pulses. No BLE edema.   MSK - no spinal tenderness to palpation. Tenderness to palpation of B medial knees, no " crepitus. 5/5 BUE and BLE strength. Normal gait.    Labs reviewed.     Assessment/Plan     Carolina was seen today for follow-up.    Diagnoses and all orders for this visit:    Benign essential hypertension - Stable and controlled. Continue current medications.  -     Comprehensive metabolic panel; Future    Diuretic-induced hypokalemia  -     Comprehensive metabolic panel; Future    Hypothyroidism due to acquired atrophy of thyroid - cont synthroid (brand name only).   -     TSH; Future    Polyarthralgia - f/u w/ rheum.     Severe obesity (BMI 39.22) with comorbidity - recently restarted ideal protein. Cont exercise as tolerated.     Encounter for screening mammogram for breast cancer  -     Mammo Digital Screening Bilat with CAD; Future      Follow-up in about 6 months (around 11/28/2018).      Kierra Cornejo MD  Department [facility] Kristofer Farmer  7:24 AM

## 2018-05-31 ENCOUNTER — PATIENT OUTREACH (OUTPATIENT)
Dept: OTHER | Facility: OTHER | Age: 63
End: 2018-05-31

## 2018-05-31 ENCOUNTER — HOSPITAL ENCOUNTER (OUTPATIENT)
Dept: RADIOLOGY | Facility: HOSPITAL | Age: 63
Discharge: HOME | End: 2018-05-31
Attending: INTERNAL MEDICINE
Payer: COMMERCIAL

## 2018-05-31 VITALS — WEIGHT: 243 LBS | BODY MASS INDEX: 39.05 KG/M2 | HEIGHT: 66 IN

## 2018-05-31 DIAGNOSIS — Z12.31 ENCOUNTER FOR SCREENING MAMMOGRAM FOR BREAST CANCER: ICD-10-CM

## 2018-05-31 PROCEDURE — 77067 SCR MAMMO BI INCL CAD: CPT | Mod: TC

## 2018-05-31 PROCEDURE — 77067 SCR MAMMO BI INCL CAD: CPT | Mod: 26,,, | Performed by: RADIOLOGY

## 2018-05-31 NOTE — PROGRESS NOTES
Last 5 Patient Entered Readings                                      Current 30 Day Average: 126/82     Recent Readings 5/27/2018 5/22/2018 5/22/2018 5/22/2018 5/22/2018    SBP (mmHg) 122 124 120 128 134    DBP (mmHg) 78 83 85 85 90    Pulse 73 70 70 70 72        Ms. Tucker's BP average has improved. She has started doing Ideal Protein and only has a shake for breakfast. She feels this is the reason her BP has improved so much. Encouragement provided to continue to monitor salt intake.     Patient's BP average is controlled based on 2017 ACC/AHA HTN guidelines of goal BP <130/80.      Patient's health , Samantha Albright, will be following up every 3-4 weeks. I will continue to monitor regularly and will follow up in 2-3 months, sooner if BP begins to trend upward or downward.    Patient has my contact information and knows to call with any concerns or clinical changes.     Current HTN regimen:  Hypertension Medications             amLODIPine (NORVASC) 10 MG tablet Take 1 tablet (10 mg total) by mouth once daily.    chlorthalidone (HYGROTEN) 25 MG Tab Take 1 tablet (25 mg total) by mouth once daily.

## 2018-06-04 ENCOUNTER — TELEPHONE (OUTPATIENT)
Dept: SLEEP MEDICINE | Facility: OTHER | Age: 63
End: 2018-06-04

## 2018-06-05 ENCOUNTER — HOSPITAL ENCOUNTER (OUTPATIENT)
Dept: SLEEP MEDICINE | Facility: OTHER | Age: 63
Discharge: HOME | End: 2018-06-05
Attending: NURSE PRACTITIONER
Payer: COMMERCIAL

## 2018-06-05 DIAGNOSIS — G47.30 SLEEP APNEA, UNSPECIFIED TYPE: ICD-10-CM

## 2018-06-05 DIAGNOSIS — G47.33 OSA (OBSTRUCTIVE SLEEP APNEA): ICD-10-CM

## 2018-06-05 PROCEDURE — 95800 SLP STDY UNATTENDED: CPT

## 2018-06-05 PROCEDURE — 95806 SLEEP STUDY UNATT&RESP EFFT: CPT | Mod: 26,,, | Performed by: PSYCHIATRY & NEUROLOGY

## 2018-06-11 ENCOUNTER — LAB VISIT (OUTPATIENT)
Dept: LAB | Facility: HOSPITAL | Age: 63
End: 2018-06-11
Attending: INTERNAL MEDICINE
Payer: COMMERCIAL

## 2018-06-11 DIAGNOSIS — E87.6 DIURETIC-INDUCED HYPOKALEMIA: ICD-10-CM

## 2018-06-11 DIAGNOSIS — E03.4 HYPOTHYROIDISM DUE TO ACQUIRED ATROPHY OF THYROID: ICD-10-CM

## 2018-06-11 DIAGNOSIS — T50.2X5A DIURETIC-INDUCED HYPOKALEMIA: ICD-10-CM

## 2018-06-11 DIAGNOSIS — I10 BENIGN ESSENTIAL HYPERTENSION: ICD-10-CM

## 2018-06-11 LAB
ALBUMIN SERPL BCP-MCNC: 3.4 G/DL
ALP SERPL-CCNC: 69 U/L
ALT SERPL W/O P-5'-P-CCNC: 14 U/L
ANION GAP SERPL CALC-SCNC: 8 MMOL/L
AST SERPL-CCNC: 8 U/L
BILIRUB SERPL-MCNC: 0.3 MG/DL
BUN SERPL-MCNC: 24 MG/DL
CALCIUM SERPL-MCNC: 9.3 MG/DL
CHLORIDE SERPL-SCNC: 103 MMOL/L
CO2 SERPL-SCNC: 31 MMOL/L
CREAT SERPL-MCNC: 0.8 MG/DL
EST. GFR  (AFRICAN AMERICAN): >60 ML/MIN/1.73 M^2
EST. GFR  (NON AFRICAN AMERICAN): >60 ML/MIN/1.73 M^2
GLUCOSE SERPL-MCNC: 93 MG/DL
POTASSIUM SERPL-SCNC: 4.2 MMOL/L
PROT SERPL-MCNC: 7 G/DL
SODIUM SERPL-SCNC: 142 MMOL/L
TSH SERPL DL<=0.005 MIU/L-ACNC: 3.05 UIU/ML

## 2018-06-11 PROCEDURE — 80053 COMPREHEN METABOLIC PANEL: CPT

## 2018-06-11 PROCEDURE — 36415 COLL VENOUS BLD VENIPUNCTURE: CPT | Mod: PO

## 2018-06-11 PROCEDURE — 84443 ASSAY THYROID STIM HORMONE: CPT

## 2018-06-15 ENCOUNTER — PATIENT OUTREACH (OUTPATIENT)
Dept: OTHER | Facility: OTHER | Age: 63
End: 2018-06-15

## 2018-06-15 NOTE — PROGRESS NOTES
Last 5 Patient Entered Readings                                      Current 30 Day Average: 123/81     Recent Readings 6/13/2018 6/8/2018 6/8/2018 6/8/2018 6/4/2018    SBP (mmHg) 119 132 136 145 122    DBP (mmHg) 83 78 85 93 89    Pulse 84 74 74 73 90          Digital Medicine: Health  Follow Up    Unable to leave voicemail to follow up with MsTi Carolina Jossue.  Current BP average 123/81 mmHg is nearly at goal.  Sending [employer] message.

## 2018-06-18 NOTE — PROGRESS NOTES
"Last 5 Patient Entered Readings                                      Current 30 Day Average: 118/81     Recent Readings 6/18/2018 6/18/2018 6/13/2018 6/8/2018 6/8/2018    SBP (mmHg) 102 118 119 132 136    DBP (mmHg) 80 93 83 78 85    Pulse 80 76 84 74 74        Digital Medicine: Health  Follow Up    Mrs. Figueroa responded via [employer].    Lifestyle Modifications:    1.Dietary Modifications (Sodium intake <2,000mg/day, food labels, dining out): "No cheese. Ideal protein in morning. Usually lunch chicken with a vegetable or burger . No salt added or pepper. Usually dinner and a carb and veggie. Dessert a few times a week. Bad new habit is vanilla soft serve ice cream from [employer] three times this week. Still "chugging" water. Breakfast out once on weekends. Dinner out once a week.  Lunch out four times a week. No added salt or pepper."    Encouraged her to read nutrition labels and stay below 2,000mg/day. Check MyFitnessPal when eating out for sodium and reduce/cut out soft serve ice cream to help reach weight loss goal.    2.Physical Activity: deferred    Follow up with Mrs. Figueroa Jossue completed. No further questions or concerns. Will continue follow up to achieve health goals.    "

## 2018-06-21 ENCOUNTER — ANESTHESIA (OUTPATIENT)
Dept: ENDOSCOPY | Facility: HOSPITAL | Age: 63
End: 2018-06-21
Payer: COMMERCIAL

## 2018-06-21 ENCOUNTER — SURGERY (OUTPATIENT)
Age: 63
End: 2018-06-21

## 2018-06-21 ENCOUNTER — HOSPITAL ENCOUNTER (OUTPATIENT)
Facility: HOSPITAL | Age: 63
Discharge: HOME | End: 2018-06-21
Attending: INTERNAL MEDICINE | Admitting: INTERNAL MEDICINE
Payer: COMMERCIAL

## 2018-06-21 ENCOUNTER — ANESTHESIA EVENT (OUTPATIENT)
Dept: ENDOSCOPY | Facility: HOSPITAL | Age: 63
End: 2018-06-21
Payer: COMMERCIAL

## 2018-06-21 VITALS
DIASTOLIC BLOOD PRESSURE: 79 MMHG | HEART RATE: 92 BPM | OXYGEN SATURATION: 96 % | SYSTOLIC BLOOD PRESSURE: 144 MMHG | HEIGHT: 66 IN | TEMPERATURE: 98 F | RESPIRATION RATE: 17 BRPM | WEIGHT: 243 LBS | BODY MASS INDEX: 39.05 KG/M2

## 2018-06-21 DIAGNOSIS — Z86.018: ICD-10-CM

## 2018-06-21 PROCEDURE — 88305 TISSUE EXAM BY PATHOLOGIST: CPT | Performed by: PATHOLOGY

## 2018-06-21 PROCEDURE — 37000008 HC ANESTHESIA 1ST 15 MINUTES: Performed by: INTERNAL MEDICINE

## 2018-06-21 PROCEDURE — 25000003 PHARM REV CODE 250: Performed by: INTERNAL MEDICINE

## 2018-06-21 PROCEDURE — 88305 TISSUE EXAM BY PATHOLOGIST: CPT | Mod: 26,,, | Performed by: PATHOLOGY

## 2018-06-21 PROCEDURE — D9220A PRA ANESTHESIA: Mod: CRNA,,, | Performed by: NURSE ANESTHETIST, CERTIFIED REGISTERED

## 2018-06-21 PROCEDURE — D9220A PRA ANESTHESIA: Mod: ANES,,, | Performed by: ANESTHESIOLOGY

## 2018-06-21 PROCEDURE — 25000003 PHARM REV CODE 250: Performed by: NURSE ANESTHETIST, CERTIFIED REGISTERED

## 2018-06-21 PROCEDURE — 37000009 HC ANESTHESIA EA ADD 15 MINS: Performed by: INTERNAL MEDICINE

## 2018-06-21 PROCEDURE — 63600175 PHARM REV CODE 636 W HCPCS: Performed by: NURSE ANESTHETIST, CERTIFIED REGISTERED

## 2018-06-21 PROCEDURE — 43239 EGD BIOPSY SINGLE/MULTIPLE: CPT | Mod: ,,, | Performed by: INTERNAL MEDICINE

## 2018-06-21 PROCEDURE — 27201012 HC FORCEPS, HOT/COLD, DISP: Performed by: INTERNAL MEDICINE

## 2018-06-21 PROCEDURE — 43239 EGD BIOPSY SINGLE/MULTIPLE: CPT | Performed by: INTERNAL MEDICINE

## 2018-06-21 PROCEDURE — 88342 IMHCHEM/IMCYTCHM 1ST ANTB: CPT | Mod: 26,,, | Performed by: PATHOLOGY

## 2018-06-21 RX ORDER — FENTANYL CITRATE 50 UG/ML
25 INJECTION, SOLUTION INTRAMUSCULAR; INTRAVENOUS EVERY 5 MIN PRN
Status: DISCONTINUED | OUTPATIENT
Start: 2018-06-21 | End: 2018-06-21 | Stop reason: HOSPADM

## 2018-06-21 RX ORDER — SODIUM CHLORIDE, SODIUM LACTATE, POTASSIUM CHLORIDE, CALCIUM CHLORIDE 600; 310; 30; 20 MG/100ML; MG/100ML; MG/100ML; MG/100ML
INJECTION, SOLUTION INTRAVENOUS CONTINUOUS PRN
Status: DISCONTINUED | OUTPATIENT
Start: 2018-06-21 | End: 2018-06-21

## 2018-06-21 RX ORDER — SODIUM CHLORIDE 9 MG/ML
INJECTION, SOLUTION INTRAVENOUS CONTINUOUS
Status: DISCONTINUED | OUTPATIENT
Start: 2018-06-21 | End: 2018-06-21 | Stop reason: HOSPADM

## 2018-06-21 RX ORDER — LIDOCAINE HCL/PF 100 MG/5ML
SYRINGE (ML) INTRAVENOUS
Status: DISCONTINUED | OUTPATIENT
Start: 2018-06-21 | End: 2018-06-21

## 2018-06-21 RX ORDER — LIDOCAINE HYDROCHLORIDE 10 MG/ML
1 INJECTION, SOLUTION EPIDURAL; INFILTRATION; INTRACAUDAL; PERINEURAL ONCE
Status: DISCONTINUED | OUTPATIENT
Start: 2018-06-21 | End: 2018-06-21 | Stop reason: HOSPADM

## 2018-06-21 RX ORDER — PROPOFOL 10 MG/ML
INJECTION, EMULSION INTRAVENOUS
Status: DISCONTINUED | OUTPATIENT
Start: 2018-06-21 | End: 2018-06-21

## 2018-06-21 RX ORDER — SODIUM CHLORIDE 0.9 % (FLUSH) 0.9 %
3 SYRINGE (ML) INJECTION
Status: DISCONTINUED | OUTPATIENT
Start: 2018-06-21 | End: 2018-06-21 | Stop reason: HOSPADM

## 2018-06-21 RX ADMIN — PROPOFOL 50 MG: 10 INJECTION, EMULSION INTRAVENOUS at 08:06

## 2018-06-21 RX ADMIN — PROPOFOL 100 MG: 10 INJECTION, EMULSION INTRAVENOUS at 08:06

## 2018-06-21 RX ADMIN — PROPOFOL 20 MG: 10 INJECTION, EMULSION INTRAVENOUS at 08:06

## 2018-06-21 RX ADMIN — SODIUM CHLORIDE, SODIUM LACTATE, POTASSIUM CHLORIDE, AND CALCIUM CHLORIDE: 600; 310; 30; 20 INJECTION, SOLUTION INTRAVENOUS at 08:06

## 2018-06-21 RX ADMIN — LIDOCAINE HYDROCHLORIDE 100 MG: 20 INJECTION, SOLUTION INTRAVENOUS at 08:06

## 2018-06-21 RX ADMIN — SODIUM CHLORIDE: 0.9 INJECTION, SOLUTION INTRAVENOUS at 07:06

## 2018-06-21 NOTE — PLAN OF CARE
Discharge instructions reviewed with patient and , verbalization of understanding. Pt denies pain. Tolerated PO fluids without any vomiting or complaints of nausea. VSS. Pt ready for discharge home.

## 2018-06-21 NOTE — DISCHARGE INSTRUCTIONS

## 2018-06-21 NOTE — H&P
[facility]  History & Physical    Subjective:      Chief Complaint/Reason for Admission:     EGD    Carolina Marcum is a 63 y.o. female.    Past Medical History:   Diagnosis Date    Abnormal Pap smear of cervix     [facility] - [facility], then Hyst    Asthma, chronic     Bleeding hemorrhoids     Depression     Diverticular disease     Endometriosis     Fatty liver 12/15/2014    Herpes simplex without mention of complication     Hypercholesterolemia     Multiple gastric polyps 2012    Obesity     Reflux      Past Surgical History:   Procedure Laterality Date    BREAST BIOPSY Left 2017    core    BREAST BIOPSY Right     core    CERVICAL CONIZATION   W/ LASER       SECTION      x1    CHOLECYSTECTOMY      HYSTERECTOMY  age 42    [place], LSO (uterine fibroids, adhesions, endometriosis)    OOPHORECTOMY  age 42    LSO (endometriosis, cyst)    THYROIDECTOMY      TOE SURGERY      left    TOTAL THYROIDECTOMY       Family History   Problem Relation Age of Onset    Breast cancer Maternal Aunt     Melanoma Sister     Melanoma Brother     Ovarian cancer Neg Hx     Colon cancer Neg Hx      Social History   Substance Use Topics    Smoking status: Never Smoker    Smokeless tobacco: Never Used    Alcohol use No      Comment: none lately.  rarely has a drink for special occassion       PTA Medications   Medication Sig    amLODIPine (NORVASC) 10 MG tablet Take 1 tablet (10 mg total) by mouth once daily.    azelastine (ASTELIN) 137 mcg (0.1 %) nasal spray 1 spray (137 mcg total) by Nasal route 2 (two) times daily.    cetirizine (ZYRTEC) 10 MG tablet Take 10 mg by mouth. Half Tablet Oral Every day    chlorthalidone (HYGROTEN) 25 MG Tab Take 1 tablet (25 mg total) by mouth once daily.    diclofenac sodium 1 % Gel Apply 2 g topically 2 (two) times daily.    diphenhydrAMINE (BANOPHEN) 25 mg capsule Take 1 each (25 mg total) by mouth every 6 (six) hours as needed for Itching or  Allergies.    epinephrine (EPIPEN) 0.3 mg/0.3 mL AtIn Inject 0.3 mLs (0.3 mg total) into the muscle as needed (for allergic reaction in which you feel like your throat is swelling, you cannot breathe.).    hydrocortisone-pramoxine (PROCTOFOAM HC) rectal foam Place rectally 3 (three) times daily. 1 Foam Rectal Three times a day    montelukast (SINGULAIR) 10 mg tablet Take 1 tablet (10 mg total) by mouth every evening.    multivitamin (THERAGRAN) per tablet Take by mouth. 1 Tablet Oral Every day    mupirocin (BACTROBAN) 2 % ointment Apply topically 2 (two) times daily as needed.    naproxen (NAPROSYN) 500 MG tablet TAKE 1 TABLET BY MOUTH TWICE DAILY (Patient taking differently: TAKE 1 TABLET BY MOUTH TWICE DAILY prn)    nystatin (MYCOSTATIN) cream Apply topically 2 (two) times daily.    omeprazole (PRILOSEC) 40 MG capsule Take 1 capsule (40 mg total) by mouth 2 (two) times daily before meals.    potassium chloride (MICRO-K) 10 MEQ CpSR Take 2 capsules (20 mEq total) by mouth 3 (three) times daily.    SYNTHROID 125 mcg tablet Take 1 tablet (125 mcg total) by mouth once daily.    terconazole (TERAZOL 3) 0.8 % vaginal cream     tretinoin, emollient, 0.05 % Crea 1 Cream Topical Every day     Review of patient's allergies indicates:   Allergen Reactions    Losartan Swelling     Possible throat closing sensation.    Cefuroxime Itching and Rash        Review of Systems   Constitutional: Negative for chills, fever and weight loss.   Respiratory: Negative for shortness of breath.    Cardiovascular: Negative for chest pain.   Gastrointestinal: Positive for heartburn.       Objective:      Vital Signs (Most Recent)       Vital Signs Range (Last 24H):  BP: ()/()   Arterial Line BP: ()/()     Physical Exam   Constitutional: She appears well-developed and well-nourished.   Cardiovascular: Normal rate.    Pulmonary/Chest: Effort normal.   Abdominal: Soft.   Skin: Skin is warm and dry.   Psychiatric: She has a normal  mood and affect. Her behavior is normal. Judgment and thought content normal.           Assessment:      There are no hospital problems to display for this patient.      Plan:    EGD for GERD and history of gastric polyps and hiatal hernia.

## 2018-06-21 NOTE — TRANSFER OF CARE
"Anesthesia Transfer of Care Note    Patient: Carolina Barrettbrite    Procedure(s) Performed: Procedure(s) (LRB):  ESOPHAGOGASTRODUODENOSCOPY (EGD) (N/A)    Patient location: [facility]    Anesthesia Type: general    Transport from OR: Transported from OR on room air with adequate spontaneous ventilation    Post pain: adequate analgesia    Post assessment: no apparent anesthetic complications and tolerated procedure well    Post vital signs: stable    Level of consciousness: awake, alert and oriented    Nausea/Vomiting: no nausea/vomiting    Complications: none    Transfer of care protocol was followed      Last vitals:   Visit Vitals  BP (!) 161/84   Pulse 82   Temp 36.4 °C (97.5 °F) (Temporal)   Resp 17   Ht 5' 5.5" (1.664 m)   Wt 110.2 kg (243 lb)   SpO2 98%   Breastfeeding? No   BMI 39.82 kg/m²     "

## 2018-06-21 NOTE — ANESTHESIA POSTPROCEDURE EVALUATION
"Anesthesia Post Evaluation    Patient: Caroilna Barrettbrite    Procedure(s) Performed: Procedure(s) (LRB):  ESOPHAGOGASTRODUODENOSCOPY (EGD) (N/A)    Final Anesthesia Type: general  Patient location during evaluation: [facility]  Patient participation: Yes- Able to Participate  Level of consciousness: awake and alert  Post-procedure vital signs: reviewed and stable  Pain management: adequate  Airway patency: patent  PONV status at discharge: No PONV  Anesthetic complications: no      Cardiovascular status: blood pressure returned to baseline  Respiratory status: unassisted, spontaneous ventilation and room air  Hydration status: euvolemic          Visit Vitals  BP (!) 144/79 (BP Location: Left arm, Patient Position: Lying)   Pulse 92   Temp 36.4 °C (97.5 °F) (Temporal)   Resp 17   Ht 5' 5.5" (1.664 m)   Wt 110.2 kg (243 lb)   SpO2 96%   Breastfeeding? No   BMI 39.82 kg/m²       Pain/Vesna Score: Pain Assessment Performed: Yes (6/21/2018  8:18 AM)  Presence of Pain: denies (6/21/2018  8:18 AM)      "

## 2018-06-21 NOTE — [employer]ATION PATIENT INSTRUCTIONS
Discharge Summary/Instructions after an Endoscopic Procedure  Patient Name: Carolina Marcum  Patient MRN: [id number]  Patient YOB: 1955  Thursday, June 21, 2018  George Henriquez MD  RESTRICTIONS:  During your procedure today, you received medications for sedation.  These   medications may affect your judgment, balance and coordination.  Therefore,   for 24 hours, you have the following restrictions:   - DO NOT drive a car, operate machinery, make legal/financial decisions,   sign important papers or drink alcohol.    ACTIVITY:  Today: no heavy lifting, straining or running due to procedural   sedation/anesthesia.  The following day: return to full activity including work.  DIET:  Eat and drink normally unless instructed otherwise.     TREATMENT FOR COMMON SIDE EFFECTS:  - Mild abdominal pain, nausea, belching, bloating or excessive gas:  rest,   eat lightly and use a heating pad.  - Sore Throat: treat with throat lozenges and/or gargle with warm salt   water.  - Because air was used during the procedure, expelling large amounts of air   from your rectum or belching is normal.  - If a bowel prep was taken, you may not have a bowel movement for 1-3 days.    This is normal.  SYMPTOMS TO WATCH FOR AND REPORT TO YOUR PHYSICIAN:  1. Abdominal pain or bloating, other than gas cramps.  2. Chest pain.  3. Back pain.  4. Signs of infection such as: chills or fever occurring within 24 hours   after the procedure.  5. Rectal bleeding, which would show as bright red, maroon, or black stools.   (A tablespoon of blood from the rectum is not serious, especially if   hemorrhoids are present.)  6. Vomiting.  7. Weakness or dizziness.  GO DIRECTLY TO THE NEAREST EMERGENCY ROOM IF YOU HAVE ANY OF THE FOLLOWING:      Difficulty breathing              Chills and/or fever over 101 F   Persistent vomiting and/or vomiting blood   Severe abdominal pain   Severe chest pain   Black, tarry stools   Bleeding- more than one  tablespoon   Any other symptom or condition that you feel may need urgent attention  Your doctor recommends these additional instructions:  If any biopsies were taken, your doctors clinic will contact you in 1 to 2   weeks with any results.  - Discharge patient to home.   - Follow an antireflux regimen.   - Await pathology results.   - Telephone endoscopist for pathology results in 2 weeks.   - Return to GI clinic at the next available appointment.   - The findings and recommendations were discussed with the patient.   - Use a proton pump inhibitor PO daily.  For questions, problems or results please call your physician - George Henriquez MD at Work:  [phone].  [facility], EMERGENCY ROOM PHONE NUMBER: [phone]  IF A COMPLICATION OR EMERGENCY SITUATION ARISES AND YOU ARE UNABLE TO REACH   YOUR PHYSICIAN - GO DIRECTLY TO THE EMERGENCY ROOM.  George Henriquez MD  6/21/2018 8:45:38 AM  This report has been verified and signed electronically.  [employer]

## 2018-06-21 NOTE — ANESTHESIA PREPROCEDURE EVALUATION
06/21/2018  Carolina Marcum is a 63 y.o., female.  Pre-operative evaluation for Procedure(s) (LRB):  ESOPHAGOGASTRODUODENOSCOPY (EGD) (N/A)    Carolina Marcum is a 63 y.o. female     Patient Active Problem List   Diagnosis    Multiple gastric polyps    Hypothyroid    Back pain    Fatty liver    Polyarthralgia    BMI 38.0-38.9,adult    Essential hypertension    Sjogren's disease    Right ankle pain    Primary osteoarthritis of both knees    Gastroesophageal reflux disease    Decreased ROM of lumbar spine    Decreased strength    Decreased mobility and endurance    Sleep apnea       Review of patient's allergies indicates:   Allergen Reactions    Losartan Swelling     Possible throat closing sensation.    Cefuroxime Itching and Rash       No current facility-administered medications on file prior to encounter.      Current Outpatient Prescriptions on File Prior to Encounter   Medication Sig Dispense Refill    amLODIPine (NORVASC) 10 MG tablet Take 1 tablet (10 mg total) by mouth once daily. 90 tablet 3    azelastine (ASTELIN) 137 mcg (0.1 %) nasal spray 1 spray (137 mcg total) by Nasal route 2 (two) times daily. 30 mL 1    cetirizine (ZYRTEC) 10 MG tablet Take 10 mg by mouth. Half Tablet Oral Every day      chlorthalidone (HYGROTEN) 25 MG Tab Take 1 tablet (25 mg total) by mouth once daily. 30 tablet 3    diclofenac sodium 1 % Gel Apply 2 g topically 2 (two) times daily. 100 g 3    diphenhydrAMINE (BANOPHEN) 25 mg capsule Take 1 each (25 mg total) by mouth every 6 (six) hours as needed for Itching or Allergies. 60 capsule 0    epinephrine (EPIPEN) 0.3 mg/0.3 mL AtIn Inject 0.3 mLs (0.3 mg total) into the muscle as needed (for allergic reaction in which you feel like your throat is swelling, you cannot breathe.). 1 Device 0    hydrocortisone-pramoxine (PROCTOFOAM HC) rectal foam  Place rectally 3 (three) times daily. 1 Foam Rectal Three times a day 1 applicator 0    montelukast (SINGULAIR) 10 mg tablet Take 1 tablet (10 mg total) by mouth every evening. 90 tablet 3    multivitamin (THERAGRAN) per tablet Take by mouth. 1 Tablet Oral Every day      mupirocin (BACTROBAN) 2 % ointment Apply topically 2 (two) times daily as needed. 30 g 0    naproxen (NAPROSYN) 500 MG tablet TAKE 1 TABLET BY MOUTH TWICE DAILY (Patient taking differently: TAKE 1 TABLET BY MOUTH TWICE DAILY prn) 60 tablet 0    nystatin (MYCOSTATIN) cream Apply topically 2 (two) times daily. 30 g 4    omeprazole (PRILOSEC) 40 MG capsule Take 1 capsule (40 mg total) by mouth 2 (two) times daily before meals. 60 capsule 3    potassium chloride (MICRO-K) 10 MEQ CpSR Take 2 capsules (20 mEq total) by mouth 3 (three) times daily. 180 capsule 11    SYNTHROID 125 mcg tablet Take 1 tablet (125 mcg total) by mouth once daily. 90 tablet 3    terconazole (TERAZOL 3) 0.8 % vaginal cream   4    tretinoin, emollient, 0.05 % Crea 1 Cream Topical Every day 15 g 2       Past Surgical History:   Procedure Laterality Date    BREAST BIOPSY Left 2017    core    BREAST BIOPSY Right     core    CERVICAL CONIZATION   W/ LASER       SECTION      x1    CHOLECYSTECTOMY      HYSTERECTOMY  age 42    [place], LSO (uterine fibroids, adhesions, endometriosis)    OOPHORECTOMY  age 42    LSO (endometriosis, cyst)    THYROIDECTOMY      TOE SURGERY      left    TOTAL THYROIDECTOMY         Social History     Social History    Marital status:      Spouse name: N/A    Number of children: N/A    Years of education: N/A     Occupational History    Not on file.     Social History Main Topics    Smoking status: Never Smoker    Smokeless tobacco: Never Used    Alcohol use No      Comment: none lately.  rarely has a drink for special occassion    Drug use: No    Sexual activity: Yes     Partners: Male     Birth control/ protection:  "Surgical, Post-menopausal, None     Other Topics Concern    Not on file     Social History Narrative    No narrative on file         CBC: No results for input(s): WBC, RBC, HGB, HCT, PLT, MCV, MCH, MCHC in the last 72 hours.    CMP: No results for input(s): NA, K, CL, CO2, BUN, CREATININE, GLU, MG, PHOS, CALCIUM, ALBUMIN, PROT, ALKPHOS, ALT, AST, BILITOT in the last 72 hours.    INR  No results for input(s): PT, INR, PROTIME, APTT in the last 72 hours.        Diagnostic Studies:      EKD Echo:  No results found for this or any previous visit.  Last 3 sets of Vitals    Vitals - 1 value per visit 5/10/2018 2018 2018   SYSTOLIC 135 122 -   DIASTOLIC 81 68 -   PULSE 85 73 -   TEMPERATURE - 97.8 -   RESPIRATIONS - - -   SPO2 - - -   Weight (lb) 244.05 243.61 243   Weight (kg) 110.7 110.5 110.224   HEIGHT 5' 6" 5' 6" 5' 6"   BODY MASS INDEX 39.39 39.32 39.22   VISIT REPORT - - -   Pain Score  0 0 -   Some recent data might be hidden         Pre-op Assessment    I have reviewed the Patient Summary Reports.     I have reviewed the Nursing Notes.   I have reviewed the Medications.     Review of Systems  Anesthesia Hx:  No previous Anesthesia Possible difficult intubation History of prior surgery of interest to airway management or planning: Previous anesthesia: General thyroid 6 or 7  with general anesthesia.  Procedure performed at an [facility].   Social:  Non-Smoker, No Alcohol Use    Hematology/Oncology:  Hematology Normal   Oncology Normal     EENT/Dental:EENT/Dental Normal   Cardiovascular:   Exercise tolerance: good Hypertension, well controlled hyperlipidemia    Pulmonary:   Asthma Sleep Apnea    Hepatic/GI:   GERD Liver Disease, (fatty liver )    Musculoskeletal:   Arthritis     Endocrine:   Hypothyroidism    Psych:   depression          Physical Exam  General:  Well nourished    Airway/Jaw/Neck:  Airway Findings: Mouth Opening: Normal Tongue: Normal  Mallampati: II  TM Distance: " Normal, at least 6 cm      Dental:  Dental Findings: In tact        Mental Status:  Mental Status Findings:  Cooperative, Alert and Oriented         Anesthesia Plan  Type of Anesthesia, risks & benefits discussed:  Anesthesia Type:  MAC, general  Patient's Preference: same   Intra-op Monitoring Plan: standard ASA monitors  Intra-op Monitoring Plan Comments:   Post Op Pain Control Plan: per primary service following discharge from PACU  Post Op Pain Control Plan Comments:   Induction:   IV  Beta Blocker:  Patient is not currently on a Beta-Blocker (No further documentation required).       Informed Consent: Patient understands risks and agrees with Anesthesia plan.  Questions answered. Anesthesia consent signed with patient.  ASA Score: 2     Day of Surgery Review of History & Physical:    H&P update referred to the surgeon.         Ready For Surgery From Anesthesia Perspective.

## 2018-06-28 ENCOUNTER — TELEPHONE (OUTPATIENT)
Dept: ORTHOPEDICS | Facility: CLINIC | Age: 63
End: 2018-06-28

## 2018-06-28 NOTE — TELEPHONE ENCOUNTER
----- Message from Harriet Lopez sent at 6/28/2018 11:17 AM CDT -----  Contact: Self/ [phone]  The patient like a call back to make a appt for her joint injection in her knee.

## 2018-06-28 NOTE — TELEPHONE ENCOUNTER
Spoke with  after checking in chart to see if she was able to receive another cortisone injection. The pt was able to be scheduled, I offered her Monday 7/2 at 8:30 and 3:15. The pt accepted the 8:30a appt.

## 2018-07-02 ENCOUNTER — OFFICE VISIT (OUTPATIENT)
Dept: ORTHOPEDICS | Facility: CLINIC | Age: 63
End: 2018-07-02
Payer: COMMERCIAL

## 2018-07-02 VITALS — BODY MASS INDEX: 41.32 KG/M2 | WEIGHT: 248 LBS | HEIGHT: 65 IN

## 2018-07-02 DIAGNOSIS — M17.11 PRIMARY OSTEOARTHRITIS OF RIGHT KNEE: Primary | ICD-10-CM

## 2018-07-02 PROCEDURE — 99999 PR PBB SHADOW E&M-EST. PATIENT-LVL III: CPT | Mod: PBBFAC,,, | Performed by: NURSE PRACTITIONER

## 2018-07-02 PROCEDURE — 3008F BODY MASS INDEX DOCD: CPT | Mod: CPTII,S$GLB,, | Performed by: NURSE PRACTITIONER

## 2018-07-02 PROCEDURE — 20610 DRAIN/INJ JOINT/BURSA W/O US: CPT | Mod: RT,S$GLB,, | Performed by: NURSE PRACTITIONER

## 2018-07-02 PROCEDURE — 99213 OFFICE O/P EST LOW 20 MIN: CPT | Mod: 25,S$GLB,, | Performed by: NURSE PRACTITIONER

## 2018-07-02 RX ORDER — TRIAMCINOLONE ACETONIDE 40 MG/ML
40 INJECTION, SUSPENSION INTRA-ARTICULAR; INTRAMUSCULAR
Status: COMPLETED | OUTPATIENT
Start: 2018-07-02 | End: 2018-07-02

## 2018-07-02 RX ADMIN — TRIAMCINOLONE ACETONIDE 40 MG: 40 INJECTION, SUSPENSION INTRA-ARTICULAR; INTRAMUSCULAR at 09:07

## 2018-07-02 NOTE — PROGRESS NOTES
CC: Pain of the Right Knee      HPI: Pt with right knee pain for the past several years. The pain is medial and aching. She has been getting the cortisone injections for pain relief because she hasn't been ready for surgery. She is taking otc nsaids with minimal relief. She is ambulating without assistive device. There is not a limp.    ROS  General: denies fever and chills  Resp: no c/o sob  CVS: no c/o cp  MSK: c/o right knee pain which is aching and medial and worse with increased activity    PE  General: AAOx3, pleasant and cooperative  Resp: respirations even and unlabored  MSK: right knee exam  0 degrees extension  120 degrees flexion  No warmth or erythema   - effusion    Assessment:  Right knee djd    Plan:  Repeat cortisone injection right knee today  Pt would like to try the gel injections- euflexxa to start once insurance authorization obtained  RICE  Discussed need for weight loss to consider surgical intervention- BMI less than 40 is necessary (14 lbs weight loss needed)  F/u for gel injections    Knee Injection Procedure Note    Diagnosis: right knee degenerative arthritis  Indications: right knee pain  Procedure Details: Verbal consent was obtained for the procedure. The injection site was identified and the skin was prepared with alcohol. The right knee was injected from an anterolateral approach with 1 ml of Kenalog and 4 ml Lidocaine under sterile technique using a 22 gauge needle. The needle was removed and the area cleansed and dressed.  Complications:  Patient tolerated the procedure well.    she was advised to rest the knee today, using ice and elevation as needed for comfort and swelling.Immediate relief of the knee pain may be short lived and secondary to the lidocaine. she may have an increase in discomfort tonight followed by steady improvement over the next several days. It may take 1-2 weeks following the injection to get the full benefit of the medication.

## 2018-07-09 ENCOUNTER — PATIENT MESSAGE (OUTPATIENT)
Dept: ORTHOPEDICS | Facility: CLINIC | Age: 63
End: 2018-07-09

## 2018-07-10 DIAGNOSIS — I10 ESSENTIAL HYPERTENSION: ICD-10-CM

## 2018-07-10 RX ORDER — CHLORTHALIDONE 25 MG/1
TABLET ORAL
Qty: 30 TABLET | Refills: 2 | Status: SHIPPED | OUTPATIENT
Start: 2018-07-10 | End: 2018-09-30 | Stop reason: SDUPTHER

## 2018-07-11 DIAGNOSIS — G47.33 OBSTRUCTIVE SLEEP APNEA: Primary | ICD-10-CM

## 2018-07-16 ENCOUNTER — PATIENT OUTREACH (OUTPATIENT)
Dept: OTHER | Facility: OTHER | Age: 63
End: 2018-07-16

## 2018-07-16 NOTE — PROGRESS NOTES
"Last 5 Patient Entered Readings                                      Current 30 Day Average: 119/86     Recent Readings 7/11/2018 7/3/2018 7/3/2018 7/3/2018 7/3/2018    SBP (mmHg) 107 144 149 157 154    DBP (mmHg) 85 90 99 98 100    Pulse 92 80 82 83 88        Digital Medicine: Health  Follow Up    Lifestyle Modifications:    1.Dietary Modifications: Ideal protein in the morning. "Slipped up" a lot in the last two weeks for snacks and other meals. Thought she was making a smart choice by snacking on nuts but didn't realize how much sodium they had. Stopped snacking on nuts, has also stopped getting soft serve ice cream as often as she was before. Gained a few pounds and feeling discouraged but working on making better choices. Sending resources on low sodium recipes. Will encourage her to journal food because she's also having trouble losing weight.     2.Physical Activity: [facility] Stationary bike, light weight lifting.     3.Medication Therapy: Patient has been compliant with the medication regimen.    4.Patient has the following medication side effects/concerns:   (Frequency/Alleviating factors/Precipitating factors, etc.)     Follow up with Ms. Figueroa Jossue completed. No further questions or concerns. Will continue follow up to achieve health goals.  "

## 2018-07-19 ENCOUNTER — PATIENT MESSAGE (OUTPATIENT)
Dept: INTERNAL MEDICINE | Facility: CLINIC | Age: 63
End: 2018-07-19

## 2018-07-19 ENCOUNTER — OFFICE VISIT (OUTPATIENT)
Dept: OBSTETRICS AND GYNECOLOGY | Facility: CLINIC | Age: 63
End: 2018-07-19
Payer: COMMERCIAL

## 2018-07-19 VITALS
DIASTOLIC BLOOD PRESSURE: 68 MMHG | BODY MASS INDEX: 41.38 KG/M2 | SYSTOLIC BLOOD PRESSURE: 122 MMHG | WEIGHT: 248.69 LBS

## 2018-07-19 DIAGNOSIS — N95.2 VAGINAL ATROPHY: ICD-10-CM

## 2018-07-19 DIAGNOSIS — K62.5 RECTAL BLEEDING: ICD-10-CM

## 2018-07-19 DIAGNOSIS — Z01.419 ENCOUNTER FOR ANNUAL ROUTINE GYNECOLOGICAL EXAMINATION: Primary | ICD-10-CM

## 2018-07-19 DIAGNOSIS — K64.9 HEMORRHOIDS, UNSPECIFIED HEMORRHOID TYPE: ICD-10-CM

## 2018-07-19 DIAGNOSIS — Z12.4 PAP SMEAR FOR CERVICAL CANCER SCREENING: ICD-10-CM

## 2018-07-19 PROCEDURE — 88175 CYTOPATH C/V AUTO FLUID REDO: CPT

## 2018-07-19 PROCEDURE — 99396 PREV VISIT EST AGE 40-64: CPT | Mod: S$GLB,,, | Performed by: OBSTETRICS & GYNECOLOGY

## 2018-07-19 PROCEDURE — 3078F DIAST BP <80 MM HG: CPT | Mod: CPTII,S$GLB,, | Performed by: OBSTETRICS & GYNECOLOGY

## 2018-07-19 PROCEDURE — 3074F SYST BP LT 130 MM HG: CPT | Mod: CPTII,S$GLB,, | Performed by: OBSTETRICS & GYNECOLOGY

## 2018-07-19 PROCEDURE — 99999 PR PBB SHADOW E&M-EST. PATIENT-LVL IV: CPT | Mod: PBBFAC,,, | Performed by: OBSTETRICS & GYNECOLOGY

## 2018-07-19 RX ORDER — HYDROCORTISONE ACETATE 25 MG/1
25 SUPPOSITORY RECTAL 2 TIMES DAILY
Qty: 14 SUPPOSITORY | Refills: 1 | Status: SHIPPED | OUTPATIENT
Start: 2018-07-19 | End: 2019-07-19

## 2018-07-19 NOTE — PROGRESS NOTES
Chief Complaint   Patient presents with    Well Woman    Vaginal Pain       HISTORY OF PRESENT ILLNESS:   Carolina Marcum is a 63 y.o. female  who presents for well woman exam.  No LMP recorded. Patient has had a hysterectomy.. Menopause at age 42 2/2 [place]/LSO due to endometrosis   She has complains of vaginal dryness and is going to have intercourse but is worried it will hurt.   Declines STD testing. Also c/o occasional rectal bleeding.      Past Medical History:   Diagnosis Date    Abnormal Pap smear of cervix     CIS - CKC, then Hyst    Asthma, chronic     Bleeding hemorrhoids     Depression     Difficult intubation     Diverticular disease     Endometriosis     Fatty liver 12/15/2014    Herpes simplex without mention of complication     Hypercholesterolemia     Hypertension     Multiple gastric polyps 2012    Obesity     Reflux     Thyroid disease           Past Surgical History:   Procedure Laterality Date    BREAST BIOPSY Left 2017    core    BREAST BIOPSY Right     core    CERVICAL CONIZATION   W/ LASER       SECTION      x1    CHOLECYSTECTOMY      ESOPHAGOGASTRODUODENOSCOPY N/A 2018    Procedure: ESOPHAGOGASTRODUODENOSCOPY (EGD);  Surgeon: George Henriquez MD;  Location: [address]);  Service: Endoscopy;  Laterality: N/A;  possible difficult intubation - see anesthesia note dated 1/15/13 - 2nd floor/ generated from last EGD, 3 year f/u, Pt due/ Pt requesting Dr. Henriquez - ER    HYSTERECTOMY  age 42    [place], LSO (uterine fibroids, adhesions, endometriosis)    OOPHORECTOMY  age 42    LSO (endometriosis, cyst)    THYROIDECTOMY      TOE SURGERY      left    TOTAL THYROIDECTOMY           Social History     Social History    Marital status:      Spouse name: N/A    Number of children: N/A    Years of education: N/A     Occupational History    Not on file.     Social History Main Topics    Smoking status: Never Smoker    Smokeless  tobacco: Never Used    Alcohol use No      Comment: none lately.  rarely has a drink for special occassion    Drug use: No    Sexual activity: Yes     Partners: Male     Birth control/ protection: Surgical, Post-menopausal, None     Other Topics Concern    Not on file     Social History Narrative    No narrative on file       Family History   Problem Relation Age of Onset    Breast cancer Maternal Aunt     Melanoma Sister     Melanoma Brother     Cancer Maternal Uncle         esophageal cancer    Ovarian cancer Neg Hx     Colon cancer Neg Hx          OB History    Para Term  AB Living   1 1 1     1   SAB TAB Ectopic Multiple Live Births           1      # Outcome Date GA Lbr Eric/2nd Weight Sex Delivery Anes PTL Lv   1 Term 84    M CS-LTranv  N BURT          COMPREHENSIVE GYN HISTORY:  PAP History: had CKC in  but normal paps since   Infection History: Denies STDs. Denies PID.  Benign History: had uterine fibroids. Denies ovarian cysts. had endometriosis Denies other conditions.  Cancer History: Denies cervical cancer. Denies uterine cancer or hyperplasia. Denies ovarian cancer. Denies vulvar cancer or pre-cancer. Denies vaginal cancer or pre-cancer. Denies breast cancer. Denies colon cancer.  Cycle: normal until had hyst at 42 2/2 endo and an enlarging uterus due to fibroids    ROS:  GENERAL: Denies weight gain or weight loss. Feeling well overall.   SKIN: Denies rash or lesions.   HEAD: Denies headache.   NODES: Denies enlarged lymph nodes.   CHEST: Denies shortness of breath.   ABDOMEN: No abdominal pain, constipation, diarrhea, nausea, vomiting or rectal bleeding.   URINARY: No frequency, dysuria, hematuria, or burning on urination.  REPRODUCTIVE: See HPI.   BREASTS: The patient denies pain, lumps, or nipple discharge.       /68   Wt 112.8 kg (248 lb 10.9 oz)   BMI 41.38 kg/m²     APPEARANCE: Well nourished, well developed, in no acute distress.  NECK: Neck symmetric  without  thyromegaly.  NODES: No inguinal, cervical lymph node enlargement.  CHEST: Lungs clear to auscultation.  HEART: Regular rate and rhythm, no murmurs, rubs or gallops.  ABDOMEN: Soft. No tenderness or masses. No hernias. No hepatosplenomegaly.  BREASTS: Symmetrical, no skin changes or visible lesions. No palpable masses, nipple discharge or adenopathy bilaterally.  PELVIC:   VULVA: No lesions. Normal female genitalia.  URETHRAL MEATUS: Normal size and location, no lesions, no prolapse.  URETHRA: No masses, tenderness, prolapse or scarring.  VAGINA: atrophic, no discharge, no significant cystocele or rectocele. Well healed cuff   CERVIX: surgically absent  UTERUS: surgically absent   ADNEXA: No masses or tenderness.  PERINEUM: Normal, mo masses, small internal hemorrhoid     Data Reviewed:   Last Pap: 2017 NILM   Last MMG: Date: 5/2018 BIRADS 1  Lipid profile: Date:   Lab Results   Component Value Date    CHOL 190 12/21/2016    CHOL 190 05/20/2015    CHOL 166 05/30/2013     Lab Results   Component Value Date    HDL 62 12/21/2016    HDL 59 05/20/2015    HDL 60 05/30/2013     Lab Results   Component Value Date    LDLCALC 106.2 12/21/2016    LDLCALC 101.4 05/20/2015    LDLCALC 91.0 05/30/2013     Lab Results   Component Value Date    TRIG 109 12/21/2016    TRIG 148 05/20/2015    TRIG 76 05/30/2013     Lab Results   Component Value Date    CHOLHDL 32.6 12/21/2016    CHOLHDL 31.1 05/20/2015    CHOLHDL 36.1 05/30/2013     TSH:   Lab Results   Component Value Date    TSH 3.051 06/11/2018     Colonoscopy Date: 11/2010    1. Encounter for annual routine gynecological examination    2. Pap smear for cervical cancer screening    3. Vaginal atrophy    4. Hemorrhoids, unspecified hemorrhoid type        A/P 1. Routine gyn annual exam. s/p normal breast exam and MMG ordered.  Pap  ordered, discussed with her not likely indicated 2/2 hyst and no cervix and its been over 20 years since her ckc and abn pap but she would like to  continue to get them yearly. STD testing: GC/CT/trich, syphilis, HBV/HCV and HIV declined. Lipid Profile, needed every 5 years, up to date. Fasting glucose, needed every 3 years, up to date.   TSH, needed every 5 years, up to date.   Colonoscopy referral sent, will send in hemorrhoid cream per patient request but strongly recommend she see GI due to the rectal bleeding, no masses felt on exam today.  Does have vaginal atrophy we discussed in detail when to take HRT and when estrogen cream would be more appropriate and as she isn't having hot flashes will use premarin. She would like to do the lowest dose possible so we discussed doing what will help with her dryness and atrophy she can do it twice a week or prn if she likes. Discussed the low systemic absorption but the risk of DVT/stroke/breast cancer.      F/u in 1 yr or PRN  \

## 2018-07-20 ENCOUNTER — PATIENT MESSAGE (OUTPATIENT)
Dept: OBSTETRICS AND GYNECOLOGY | Facility: CLINIC | Age: 63
End: 2018-07-20

## 2018-07-23 DIAGNOSIS — J45.909 ASTHMA, UNSPECIFIED ASTHMA SEVERITY, UNSPECIFIED WHETHER COMPLICATED, UNSPECIFIED WHETHER PERSISTENT: Primary | ICD-10-CM

## 2018-07-26 ENCOUNTER — PATIENT OUTREACH (OUTPATIENT)
Dept: OTHER | Facility: OTHER | Age: 63
End: 2018-07-26

## 2018-07-26 NOTE — PROGRESS NOTES
Last 5 Patient Entered Readings                                      Current 30 Day Average: 120/84     Recent Readings 7/20/2018 7/20/2018 7/20/2018 7/16/2018 7/16/2018    SBP (mmHg) 114 123 128 112 124    DBP (mmHg) 77 82 87 81 83    Pulse 83 84 86 87 87        Patient's BP average is controlled based on 2017 ACC/AHA HTN guidelines of goal BP <130/80.      Mrs. Tucker's BP readings are much improved since starting Ideal [employer]. Her DBP average is above goal, but she is working on making changes to her diet to reduce her salt intake. Encouragement provided and praised her for progress with improving her BP average.     Patient's health , Samantha Albright, will be following up every 3-4 weeks. I will continue to monitor regularly and will follow up in 3-4 months, sooner if BP begins to trend upward or downward.    Patient has my contact information and knows to call with any concerns or clinical changes.     Current HTN regimen:  Hypertension Medications             amLODIPine (NORVASC) 10 MG tablet Take 1 tablet (10 mg total) by mouth once daily.    chlorthalidone (HYGROTEN) 25 MG Tab TAKE 1 TABLET BY MOUTH ONCE DAILY

## 2018-07-30 ENCOUNTER — OFFICE VISIT (OUTPATIENT)
Dept: ORTHOPEDICS | Facility: CLINIC | Age: 63
End: 2018-07-30
Payer: COMMERCIAL

## 2018-07-30 VITALS
BODY MASS INDEX: 41.43 KG/M2 | HEIGHT: 65 IN | DIASTOLIC BLOOD PRESSURE: 83 MMHG | SYSTOLIC BLOOD PRESSURE: 136 MMHG | HEART RATE: 79 BPM | WEIGHT: 248.69 LBS

## 2018-07-30 DIAGNOSIS — M17.11 PRIMARY OSTEOARTHRITIS OF RIGHT KNEE: ICD-10-CM

## 2018-07-30 PROCEDURE — 99999 PR PBB SHADOW E&M-EST. PATIENT-LVL IV: CPT | Mod: PBBFAC,,, | Performed by: NURSE PRACTITIONER

## 2018-07-30 PROCEDURE — 20610 DRAIN/INJ JOINT/BURSA W/O US: CPT | Mod: RT,S$GLB,, | Performed by: NURSE PRACTITIONER

## 2018-07-30 PROCEDURE — 99499 UNLISTED E&M SERVICE: CPT | Mod: S$GLB,,, | Performed by: NURSE PRACTITIONER

## 2018-07-30 NOTE — PROGRESS NOTES
Carolina Marcum presents to clinic today for the first right knee Euflexxa injection.    Exam demonstrates the no effusion in the  right knee, and the skin is intact.    Diagnosis: osteoarthritis knee    After time out was performed and patient ID, side, and site were verified, the  right  knee was sterilly prepped in the standard fashion.  A 22-gauge needle was introduced into right knee joint from an tucker-lateral site without complication and knee was then injected with 2 ml of Euflexxa.  Sterile dressing was applied.  The patient was instructed to resume activities as tolerated and to call with any problems.     We will see Carolina Chapalarry back next week for the second injection.

## 2018-08-02 ENCOUNTER — PATIENT MESSAGE (OUTPATIENT)
Dept: INTERNAL MEDICINE | Facility: CLINIC | Age: 63
End: 2018-08-02

## 2018-08-02 DIAGNOSIS — E03.4 HYPOTHYROIDISM DUE TO ACQUIRED ATROPHY OF THYROID: Primary | ICD-10-CM

## 2018-08-06 ENCOUNTER — OFFICE VISIT (OUTPATIENT)
Dept: ORTHOPEDICS | Facility: CLINIC | Age: 63
End: 2018-08-06
Payer: COMMERCIAL

## 2018-08-06 DIAGNOSIS — M17.11 PRIMARY OSTEOARTHRITIS OF RIGHT KNEE: ICD-10-CM

## 2018-08-06 PROCEDURE — 20610 DRAIN/INJ JOINT/BURSA W/O US: CPT | Mod: RT,S$GLB,, | Performed by: NURSE PRACTITIONER

## 2018-08-06 PROCEDURE — 99499 UNLISTED E&M SERVICE: CPT | Mod: S$GLB,,, | Performed by: NURSE PRACTITIONER

## 2018-08-06 PROCEDURE — 99999 PR PBB SHADOW E&M-EST. PATIENT-LVL III: CPT | Mod: PBBFAC,,, | Performed by: NURSE PRACTITIONER

## 2018-08-06 NOTE — PROGRESS NOTES
Carolina Marcum presents to clinic today for the second right knee Euflexxa injection.    Exam demonstrates the no effusion in the  right knee, and the skin is intact.    Diagnosis: osteoarthritis knee    After time out was performed and patient ID, side, and site were verified, the  right  knee was sterilly prepped in the standard fashion.  A 22-gauge needle was introduced into right knee joint from an tucker-lateral site without complication and knee was then injected with 2 ml of Euflexxa.  Sterile dressing was applied.  The patient was instructed to resume activities as tolerated and to call with any problems.     We will see Carolina Chapalarry back next week for the third injection.

## 2018-08-08 ENCOUNTER — OFFICE VISIT (OUTPATIENT)
Dept: INTERNAL MEDICINE | Facility: CLINIC | Age: 63
End: 2018-08-08
Payer: COMMERCIAL

## 2018-08-08 ENCOUNTER — DOCUMENTATION ONLY (OUTPATIENT)
Dept: INTERNAL MEDICINE | Facility: CLINIC | Age: 63
End: 2018-08-08

## 2018-08-08 ENCOUNTER — LAB VISIT (OUTPATIENT)
Dept: LAB | Facility: HOSPITAL | Age: 63
End: 2018-08-08
Attending: NURSE PRACTITIONER
Payer: COMMERCIAL

## 2018-08-08 VITALS
WEIGHT: 249.56 LBS | HEART RATE: 75 BPM | DIASTOLIC BLOOD PRESSURE: 82 MMHG | HEIGHT: 66 IN | SYSTOLIC BLOOD PRESSURE: 126 MMHG | BODY MASS INDEX: 40.11 KG/M2 | OXYGEN SATURATION: 98 %

## 2018-08-08 DIAGNOSIS — I10 ESSENTIAL HYPERTENSION: ICD-10-CM

## 2018-08-08 DIAGNOSIS — K21.9 GASTROESOPHAGEAL REFLUX DISEASE, ESOPHAGITIS PRESENCE NOT SPECIFIED: ICD-10-CM

## 2018-08-08 DIAGNOSIS — E03.4 HYPOTHYROIDISM DUE TO ACQUIRED ATROPHY OF THYROID: ICD-10-CM

## 2018-08-08 DIAGNOSIS — L65.9 HAIR LOSS: ICD-10-CM

## 2018-08-08 DIAGNOSIS — K92.1 MELENA: ICD-10-CM

## 2018-08-08 DIAGNOSIS — R25.1 TREMOR: ICD-10-CM

## 2018-08-08 DIAGNOSIS — K31.7 MULTIPLE GASTRIC POLYPS: ICD-10-CM

## 2018-08-08 DIAGNOSIS — L65.9 HAIR LOSS: Primary | ICD-10-CM

## 2018-08-08 LAB
BASOPHILS # BLD AUTO: 0.04 K/UL
BASOPHILS NFR BLD: 0.5 %
DHEA-S SERPL-MCNC: 17.9 UG/DL
DIFFERENTIAL METHOD: ABNORMAL
EOSINOPHIL # BLD AUTO: 0.2 K/UL
EOSINOPHIL NFR BLD: 2.1 %
ERYTHROCYTE [DISTWIDTH] IN BLOOD BY AUTOMATED COUNT: 15 %
HCT VFR BLD AUTO: 42 %
HGB BLD-MCNC: 14 G/DL
LYMPHOCYTES # BLD AUTO: 3 K/UL
LYMPHOCYTES NFR BLD: 34.1 %
MCH RBC QN AUTO: 29.4 PG
MCHC RBC AUTO-ENTMCNC: 33.3 G/DL
MCV RBC AUTO: 88 FL
MONOCYTES # BLD AUTO: 0.8 K/UL
MONOCYTES NFR BLD: 9.6 %
NEUTROPHILS # BLD AUTO: 4.6 K/UL
NEUTROPHILS NFR BLD: 53 %
PLATELET # BLD AUTO: 290 K/UL
PMV BLD AUTO: 9.9 FL
RBC # BLD AUTO: 4.76 M/UL
T3FREE SERPL-MCNC: 2.3 PG/ML
T4 FREE SERPL-MCNC: 1.44 NG/DL
TESTOST SERPL-MCNC: 10 NG/DL
TSH SERPL DL<=0.005 MIU/L-ACNC: 2.1 UIU/ML
WBC # BLD AUTO: 8.64 K/UL

## 2018-08-08 PROCEDURE — 99214 OFFICE O/P EST MOD 30 MIN: CPT | Mod: S$GLB,,, | Performed by: NURSE PRACTITIONER

## 2018-08-08 PROCEDURE — 84481 FREE ASSAY (FT-3): CPT

## 2018-08-08 PROCEDURE — 84443 ASSAY THYROID STIM HORMONE: CPT

## 2018-08-08 PROCEDURE — 84403 ASSAY OF TOTAL TESTOSTERONE: CPT

## 2018-08-08 PROCEDURE — 82627 DEHYDROEPIANDROSTERONE: CPT

## 2018-08-08 PROCEDURE — 3008F BODY MASS INDEX DOCD: CPT | Mod: CPTII,S$GLB,, | Performed by: NURSE PRACTITIONER

## 2018-08-08 PROCEDURE — 85025 COMPLETE CBC W/AUTO DIFF WBC: CPT

## 2018-08-08 PROCEDURE — 99999 PR PBB SHADOW E&M-EST. PATIENT-LVL IV: CPT | Mod: PBBFAC,,, | Performed by: NURSE PRACTITIONER

## 2018-08-08 PROCEDURE — 3074F SYST BP LT 130 MM HG: CPT | Mod: CPTII,S$GLB,, | Performed by: NURSE PRACTITIONER

## 2018-08-08 PROCEDURE — 84402 ASSAY OF FREE TESTOSTERONE: CPT

## 2018-08-08 PROCEDURE — 84439 ASSAY OF FREE THYROXINE: CPT

## 2018-08-08 PROCEDURE — 3079F DIAST BP 80-89 MM HG: CPT | Mod: CPTII,S$GLB,, | Performed by: NURSE PRACTITIONER

## 2018-08-08 NOTE — PROGRESS NOTES
INTERNAL MEDICINE URGENT CARE NOTE    CHIEF COMPLAINT     Chief Complaint   Patient presents with    Shaking     hand tremors    Results     f/u results from c scope    Hair Loss     thinning of hair       CLAUDIA Marcum is a 63 y.o. female with HTN, Sjogren's diease, hypothyroidism, Gerd, fatty liver, OA, and QUINTIN who presents for an urgent visit today.  She is an established pt of Dr Cornejo     Hypothyroidism- pt compliant with synthroid 125mcg daily   Last TSH 6/11/18 normal     Here with c/o hand tremors and hair loss.   Hand tremor- Made appointment with neurology.   C/o hand tremors to bilateral hands R>L intermittently at rest. Worse when neck is turned to left.   Also occurred when laying supine at night.    Denies neck pain or injury.   States it started after EGD when neck was positioned to the left.     Hair loss- occurs when brushing hair. No areas of baldness. Thinning to the front.   Started on premarin by Gyn   + hirsutism to the chin   No  irregular menses - went through menopause   No moderate to severe acne or treatment-refractory adult acne   No acanthosis nigricans  No galactorrhea   free and/or total testosterone level and dehydroepiandrosterone sulfate (DHEAS) level as initial screening tests     EGD- 6/21/2018   Needs GI follow up   Normal examined duodenum.                        - Erythematous mucosa in the gastric body, antrum                         and prepyloric region of the stomach. Biopsied.                        - A few gastric polyps. Resected and retrieved.                        - 1 cm hiatal hernia.  Recommendation:       - Discharge patient to home.                        - Follow an antireflux regimen.                        - Await pathology results.                        - Telephone endoscopist for pathology results in 2                         weeks.                        - Return to GI clinic at the next available                         appointment.                         - The findings and recommendations were discussed                         with the patient.                        - Use a proton pump inhibitor PO daily.      Past Medical History:  Past Medical History:   Diagnosis Date    Abnormal Pap smear of cervix 1989    [facility] - CKC, then Hyst    Asthma, chronic     Bleeding hemorrhoids     Depression     Difficult intubation     Diverticular disease     Endometriosis     Fatty liver 12/15/2014    Herpes simplex without mention of complication     Hypercholesterolemia     Hypertension     Multiple gastric polyps 7/21/2012    Obesity     Reflux     Thyroid disease        Home Medications:  Prior to Admission medications    Medication Sig Start Date End Date Taking? Authorizing Provider   amLODIPine (NORVASC) 10 MG tablet Take 1 tablet (10 mg total) by mouth once daily. 1/2/18 1/2/19  Kierra Cornejo MD   azelastine (ASTELIN) 137 mcg (0.1 %) nasal spray 1 spray (137 mcg total) by Nasal route 2 (two) times daily. 6/5/17 7/19/18  Kierra Cornejo MD   cetirizine (ZYRTEC) 10 MG tablet Take 10 mg by mouth. Half Tablet Oral Every day    Historical Provider, MD   chlorthalidone (HYGROTEN) 25 MG Tab TAKE 1 TABLET BY MOUTH ONCE DAILY 7/10/18   Demario Sykes MD   conjugated estrogens (PREMARIN) vaginal cream Place 0.5 g vaginally every Mon, Wed, Fri. 7/20/18 7/20/19  Isabel Hastings MD   diclofenac sodium 1 % Gel Apply 2 g topically 2 (two) times daily. 2/15/18 6/21/18  Linda Abbasi MD   diphenhydrAMINE (BANOPHEN) 25 mg capsule Take 1 each (25 mg total) by mouth every 6 (six) hours as needed for Itching or Allergies. 6/5/17   Kierra Cornejo MD   epinephrine (EPIPEN) 0.3 mg/0.3 mL AtIn Inject 0.3 mLs (0.3 mg total) into the muscle as needed (for allergic reaction in which you feel like your throat is swelling, you cannot breathe.). 5/14/17 5/28/18  Wilmer Ren MD   hydrocortisone (ANUSOL-HC) 25 mg suppository Place 1 suppository (25 mg total) rectally 2 (two) times  daily. 7/19/18 7/19/19  Isabel Hastings MD   hydrocortisone-pramoxine (PROCTOFOAM HC) rectal foam Place rectally 3 (three) times daily. 1 Foam Rectal Three times a day 10/10/12   Hermelindo Hernandez MD   montelukast (SINGULAIR) 10 mg tablet Take 1 tablet (10 mg total) by mouth every evening. 6/5/17   Kierra Cornejo MD   multivitamin (THERAGRAN) per tablet Take by mouth. 1 Tablet Oral Every day    Historical Provider, MD   mupirocin (BACTROBAN) 2 % ointment Apply topically 2 (two) times daily as needed. 4/21/17   Kierra Cornejo MD   naproxen (NAPROSYN) 500 MG tablet TAKE 1 TABLET BY MOUTH TWICE DAILY  Patient taking differently: TAKE 1 TABLET BY MOUTH TWICE DAILY prn 3/15/17   Deedee Mcgraw NP   nystatin (MYCOSTATIN) cream Apply topically 2 (two) times daily. 10/13/16   Linda Abbasi MD   omeprazole (PRILOSEC) 40 MG capsule Take 1 capsule (40 mg total) by mouth 2 (two) times daily before meals. 9/7/17   SULAIMAN Ferraro III, MD   SYNTHROID 125 mcg tablet Take 1 tablet (125 mcg total) by mouth once daily. 5/2/18   Kierra Cornejo MD   terconazole (TERAZOL 3) 0.8 % vaginal cream  4/21/14   Historical Provider, MD   tretinoin, emollient, 0.05 % Crea 1 Cream Topical Every day 1/23/18   Kierra Cornejo MD       Review of Systems:  Review of Systems   Constitutional: Negative for activity change and unexpected weight change.   HENT: Negative for hearing loss, rhinorrhea and trouble swallowing.    Eyes: Negative for discharge and visual disturbance.   Respiratory: Negative for chest tightness and wheezing.    Cardiovascular: Negative for chest pain and palpitations.   Gastrointestinal: Negative for blood in stool, constipation, diarrhea and vomiting.   Endocrine: Negative for polydipsia and polyuria.   Genitourinary: Negative for difficulty urinating, dysuria, hematuria and menstrual problem.   Musculoskeletal: Positive for arthralgias and neck pain. Negative for joint swelling.   Neurological: Positive for weakness. Negative for headaches.  "  Psychiatric/Behavioral: Negative for confusion and dysphoric mood.       Health Maintainence:   Immunizations:  Health Maintenance       Date Due Completion Date    Influenza Vaccine 08/01/2018 10/2/2017 (Done)    Override on 10/2/2017: Done    Override on 8/15/2016: Done    Mammogram 05/31/2019 5/31/2018    Colonoscopy 11/08/2020 11/8/2010    Lipid Panel 12/21/2021 12/21/2016    TETANUS VACCINE 12/21/2026 12/21/2016           PHYSICAL EXAM     /82 (BP Location: Left arm, Patient Position: Sitting, BP Method: Large (Manual))   Pulse 75   Ht 5' 6" (1.676 m)   Wt 113.2 kg (249 lb 9 oz)   SpO2 98%   BMI 40.28 kg/m²     Physical Exam   Constitutional: She is oriented to person, place, and time. She appears well-developed and well-nourished.   HENT:   Head: Normocephalic.   Right Ear: External ear normal.   Left Ear: External ear normal.   Nose: Nose normal.   Mouth/Throat: Oropharynx is clear and moist. No oropharyngeal exudate.   Hair thinning    Eyes: Pupils are equal, round, and reactive to light.   Neck: Neck supple. No JVD present. No tracheal deviation present. No thyroid mass and no thyromegaly present.   Cardiovascular: Normal rate, regular rhythm, normal heart sounds and intact distal pulses.  Exam reveals no gallop and no friction rub.    No murmur heard.  Pulmonary/Chest: Effort normal and breath sounds normal. No respiratory distress. She has no wheezes. She has no rales.   Abdominal: Soft. Bowel sounds are normal. She exhibits no distension. There is no tenderness.   Musculoskeletal: Normal range of motion. She exhibits no edema or tenderness.   Lymphadenopathy:     She has no cervical adenopathy.   Neurological: She is alert and oriented to person, place, and time. She displays normal reflexes. No cranial nerve deficit or sensory deficit. She exhibits normal muscle tone. Coordination normal.   No resting or essential tremor    Skin: Skin is warm and dry. No rash noted.   Psychiatric: She has a " normal mood and affect. Her behavior is normal.   Vitals reviewed.      LABS     Lab Results   Component Value Date    HGBA1C 5.6 10/23/2017     CMP  Sodium   Date Value Ref Range Status   06/11/2018 142 136 - 145 mmol/L Final     Potassium   Date Value Ref Range Status   06/11/2018 4.2 3.5 - 5.1 mmol/L Final     Chloride   Date Value Ref Range Status   06/11/2018 103 95 - 110 mmol/L Final     CO2   Date Value Ref Range Status   06/11/2018 31 (H) 23 - 29 mmol/L Final     Glucose   Date Value Ref Range Status   06/11/2018 93 70 - 110 mg/dL Final     BUN, Bld   Date Value Ref Range Status   06/11/2018 24 (H) 8 - 23 mg/dL Final     Creatinine   Date Value Ref Range Status   06/11/2018 0.8 0.5 - 1.4 mg/dL Final     Calcium   Date Value Ref Range Status   06/11/2018 9.3 8.7 - 10.5 mg/dL Final     Total Protein   Date Value Ref Range Status   06/11/2018 7.0 6.0 - 8.4 g/dL Final     Albumin   Date Value Ref Range Status   06/11/2018 3.4 (L) 3.5 - 5.2 g/dL Final     Total Bilirubin   Date Value Ref Range Status   06/11/2018 0.3 0.1 - 1.0 mg/dL Final     Comment:     For infants and newborns, interpretation of results should be based  on gestational age, weight and in agreement with clinical  observations.  Premature Infant recommended reference ranges:  Up to 24 hours.............<8.0 mg/dL  Up to 48 hours............<12.0 mg/dL  3-5 days..................<15.0 mg/dL  6-29 days.................<15.0 mg/dL       Alkaline Phosphatase   Date Value Ref Range Status   06/11/2018 69 55 - 135 U/L Final     AST   Date Value Ref Range Status   06/11/2018 8 (L) 10 - 40 U/L Final     ALT   Date Value Ref Range Status   06/11/2018 14 10 - 44 U/L Final     Anion Gap   Date Value Ref Range Status   06/11/2018 8 8 - 16 mmol/L Final     eGFR if    Date Value Ref Range Status   06/11/2018 >60.0 >60 mL/min/1.73 m^2 Final     eGFR if non    Date Value Ref Range Status   06/11/2018 >60.0 >60 mL/min/1.73 m^2  Final     Comment:     Calculation used to obtain the estimated glomerular filtration  rate (eGFR) is the CKD-EPI equation.        Lab Results   Component Value Date    WBC 7.02 10/23/2017    HGB 13.8 10/23/2017    HCT 41.9 10/23/2017    MCV 89 10/23/2017     10/23/2017     Lab Results   Component Value Date    CHOL 190 12/21/2016    CHOL 190 05/20/2015    CHOL 166 05/30/2013     Lab Results   Component Value Date    HDL 62 12/21/2016    HDL 59 05/20/2015    HDL 60 05/30/2013     Lab Results   Component Value Date    LDLCALC 106.2 12/21/2016    LDLCALC 101.4 05/20/2015    LDLCALC 91.0 05/30/2013     Lab Results   Component Value Date    TRIG 109 12/21/2016    TRIG 148 05/20/2015    TRIG 76 05/30/2013     Lab Results   Component Value Date    CHOLHDL 32.6 12/21/2016    CHOLHDL 31.1 05/20/2015    CHOLHDL 36.1 05/30/2013     Lab Results   Component Value Date    TSH 3.051 06/11/2018    T8GBQCI 8.2 09/02/2010       ASSESSMENT/PLAN     Carolina Marcum is a 63 y.o. female with  Past Medical History:   Diagnosis Date    Abnormal Pap smear of cervix 1989    [facility] - CKC, then Hyst    Asthma, chronic     Bleeding hemorrhoids     Depression     Difficult intubation     Diverticular disease     Endometriosis     Fatty liver 12/15/2014    Herpes simplex without mention of complication     Hypercholesterolemia     Hypertension     Multiple gastric polyps 7/21/2012    Obesity     Reflux     Thyroid disease      Hair loss- will check for hyperandrogenism. If normal will refer to derm for follow up.   -     DHEA-SULFATE; Future; Expected date: 08/08/2018  -     TESTOSTERONE; Future; Expected date: 08/08/2018  -     Testosterone, free; Future; Expected date: 08/08/2018    Tremor- will check cbc and TFTs. Physical exam normal. Will follow up with neurology.   -     CBC auto differential; Future; Expected date: 08/08/2018    Gastroesophageal reflux disease, esophagitis presence not specified- discussed EGD  findings. Discussed recommendations of GI to cont PPI. Discussed risks and benefits. Needs GI follow up   -     CBC auto differential; Future; Expected date: 08/08/2018    Multiple gastric polyps- see above     Essential hypertension- at goal. Cont current meds.     Melena- Check CBC today and send FIT kit   -     Fecal Immunochemical Test (iFOBT); Future; Expected date: 08/08/2018    Follow up with PCP     Patient education provided from Kai. Patient was counseled on when and how to seek emergent care.       Ana MATTHEWS, BON, FNP-c   Department of Internal Medicine - [facility]  9:18 AM

## 2018-08-10 ENCOUNTER — LAB VISIT (OUTPATIENT)
Dept: LAB | Facility: HOSPITAL | Age: 63
End: 2018-08-10
Attending: NURSE PRACTITIONER
Payer: COMMERCIAL

## 2018-08-10 DIAGNOSIS — K92.1 MELENA: ICD-10-CM

## 2018-08-10 LAB
HEMOCCULT STL QL IA: NEGATIVE
TESTOST FREE SERPL-MCNC: 0.4 PG/ML

## 2018-08-10 PROCEDURE — 82274 ASSAY TEST FOR BLOOD FECAL: CPT

## 2018-08-13 ENCOUNTER — PATIENT OUTREACH (OUTPATIENT)
Dept: OTHER | Facility: OTHER | Age: 63
End: 2018-08-13

## 2018-08-13 ENCOUNTER — OFFICE VISIT (OUTPATIENT)
Dept: ORTHOPEDICS | Facility: CLINIC | Age: 63
End: 2018-08-13
Payer: COMMERCIAL

## 2018-08-13 VITALS
SYSTOLIC BLOOD PRESSURE: 130 MMHG | BODY MASS INDEX: 40.28 KG/M2 | WEIGHT: 249.56 LBS | HEART RATE: 88 BPM | DIASTOLIC BLOOD PRESSURE: 85 MMHG

## 2018-08-13 DIAGNOSIS — M17.11 PRIMARY OSTEOARTHRITIS OF RIGHT KNEE: ICD-10-CM

## 2018-08-13 DIAGNOSIS — M54.31 SCIATICA OF RIGHT SIDE: Primary | ICD-10-CM

## 2018-08-13 PROCEDURE — 99499 UNLISTED E&M SERVICE: CPT | Mod: S$GLB,,, | Performed by: NURSE PRACTITIONER

## 2018-08-13 PROCEDURE — 20610 DRAIN/INJ JOINT/BURSA W/O US: CPT | Mod: RT,S$GLB,, | Performed by: NURSE PRACTITIONER

## 2018-08-13 PROCEDURE — 99999 PR PBB SHADOW E&M-EST. PATIENT-LVL IV: CPT | Mod: PBBFAC,,, | Performed by: NURSE PRACTITIONER

## 2018-08-13 RX ORDER — METHYLPREDNISOLONE 4 MG/1
TABLET ORAL
Qty: 1 PACKAGE | Refills: 0 | Status: SHIPPED | OUTPATIENT
Start: 2018-08-13 | End: 2018-08-14

## 2018-08-13 NOTE — PROGRESS NOTES
Carolina Marcum presents to clinic today for the third right knee Euflexxa injection.    Exam demonstrates the no effusion in the  right knee, and the skin is intact.    Diagnosis: osteoarthritis knee    After time out was performed and patient ID, side, and site were verified, the  right  knee was sterilly prepped in the standard fashion.  A 22-gauge needle was introduced into right knee joint from an tucker-lateral site without complication and knee was then injected with 2 ml of Euflexxa.  Sterile dressing was applied.  The patient was instructed to resume activities as tolerated and to call with any problems.     She states she is having good relief, but after dancing Saturday night she is having some aching pain.

## 2018-08-13 NOTE — PROGRESS NOTES
Last 5 Patient Entered Readings                                      Current 30 Day Average: 120/77     Recent Readings 8/8/2018 8/8/2018 8/8/2018 8/5/2018 8/1/2018    SBP (mmHg) 116 123 143 127 133    DBP (mmHg) 75 90 92 84 65    Pulse 75 74 76 70 78          Digital Medicine: Health  Follow Up    Left voicemail to follow up with Mrs. Figueroa Jossue.  Current BP average 120/77 mmHg is at goal.

## 2018-08-14 ENCOUNTER — PATIENT MESSAGE (OUTPATIENT)
Dept: INTERNAL MEDICINE | Facility: CLINIC | Age: 63
End: 2018-08-14

## 2018-08-14 ENCOUNTER — PATIENT MESSAGE (OUTPATIENT)
Dept: ORTHOPEDICS | Facility: CLINIC | Age: 63
End: 2018-08-14

## 2018-08-14 ENCOUNTER — HOSPITAL ENCOUNTER (OUTPATIENT)
Dept: RADIOLOGY | Facility: HOSPITAL | Age: 63
Discharge: HOME | End: 2018-08-14
Attending: INTERNAL MEDICINE
Payer: COMMERCIAL

## 2018-08-14 DIAGNOSIS — M54.30 SCIATICA, UNSPECIFIED LATERALITY: Primary | ICD-10-CM

## 2018-08-14 DIAGNOSIS — J45.909 ASTHMA, UNSPECIFIED ASTHMA SEVERITY, UNSPECIFIED WHETHER COMPLICATED, UNSPECIFIED WHETHER PERSISTENT: ICD-10-CM

## 2018-08-14 PROCEDURE — 71046 X-RAY EXAM CHEST 2 VIEWS: CPT | Mod: 26,,, | Performed by: RADIOLOGY

## 2018-08-14 PROCEDURE — 71046 X-RAY EXAM CHEST 2 VIEWS: CPT | Mod: TC,FY,PO

## 2018-08-14 RX ORDER — LEVOTHYROXINE SODIUM 125 UG/1
TABLET ORAL
Qty: 30 TABLET | Refills: 2 | Status: SHIPPED | OUTPATIENT
Start: 2018-08-14 | End: 2018-09-26 | Stop reason: SDUPTHER

## 2018-08-14 RX ORDER — METHYLPREDNISOLONE 4 MG/1
TABLET ORAL
Qty: 1 PACKAGE | Refills: 0 | Status: SHIPPED | OUTPATIENT
Start: 2018-08-14 | End: 2018-09-04

## 2018-08-21 ENCOUNTER — PATIENT MESSAGE (OUTPATIENT)
Dept: ADMINISTRATIVE | Facility: OTHER | Age: 63
End: 2018-08-21

## 2018-08-21 ENCOUNTER — PATIENT MESSAGE (OUTPATIENT)
Dept: INTERNAL MEDICINE | Facility: CLINIC | Age: 63
End: 2018-08-21

## 2018-08-21 ENCOUNTER — PATIENT OUTREACH (OUTPATIENT)
Dept: OTHER | Facility: OTHER | Age: 63
End: 2018-08-21

## 2018-08-21 NOTE — PROGRESS NOTES
"Last 5 Patient Entered Readings                                      Current 30 Day Average: 125/77     Recent Readings 8/21/2018 8/21/2018 8/21/2018 8/17/2018 8/17/2018    SBP (mmHg) 122 130 121 133 136    DBP (mmHg) 76 78 86 80 80    Pulse 80 77 77 78 79        Ms. Tucker has been having ALEXA, mostly after dining out and having a meal high in sodium. She reports that after drinking "lots of water and going to the restroom, it gets better." ALEXA is unlikely due to amlodipine as it does resolve after urination and starts after a salty meal. Advised Ms. Tucker discuss this with Dr. Cornejo. Her BP is well controlled on current regimen.     Will continue to monitor regularly. Will follow up in 6-8 weeks, sooner if BP begins to trend upward or downward.    Patient has my contact information and knows to call with any concerns or clinical changes.     Current HTN regimen:  Hypertension Medications             amLODIPine (NORVASC) 10 MG tablet Take 1 tablet (10 mg total) by mouth once daily.    chlorthalidone (HYGROTEN) 25 MG Tab TAKE 1 TABLET BY MOUTH ONCE DAILY              "

## 2018-08-23 ENCOUNTER — OFFICE VISIT (OUTPATIENT)
Dept: NEUROLOGY | Facility: CLINIC | Age: 63
End: 2018-08-23
Payer: COMMERCIAL

## 2018-08-23 VITALS
WEIGHT: 254 LBS | HEART RATE: 93 BPM | BODY MASS INDEX: 40.82 KG/M2 | DIASTOLIC BLOOD PRESSURE: 75 MMHG | SYSTOLIC BLOOD PRESSURE: 133 MMHG | HEIGHT: 66 IN

## 2018-08-23 DIAGNOSIS — R25.1 TREMOR, UNSPECIFIED: ICD-10-CM

## 2018-08-23 PROCEDURE — 3078F DIAST BP <80 MM HG: CPT | Mod: CPTII,S$GLB,, | Performed by: PSYCHIATRY & NEUROLOGY

## 2018-08-23 PROCEDURE — 99999 PR PBB SHADOW E&M-EST. PATIENT-LVL III: CPT | Mod: PBBFAC,,, | Performed by: PSYCHIATRY & NEUROLOGY

## 2018-08-23 PROCEDURE — 3075F SYST BP GE 130 - 139MM HG: CPT | Mod: CPTII,S$GLB,, | Performed by: PSYCHIATRY & NEUROLOGY

## 2018-08-23 PROCEDURE — 99213 OFFICE O/P EST LOW 20 MIN: CPT | Mod: S$GLB,,, | Performed by: PSYCHIATRY & NEUROLOGY

## 2018-08-23 PROCEDURE — 3008F BODY MASS INDEX DOCD: CPT | Mod: CPTII,S$GLB,, | Performed by: PSYCHIATRY & NEUROLOGY

## 2018-08-23 NOTE — PROGRESS NOTES
[facility] NEUROLOGY  [facility] [place]    Date: 8/23/18  Patient Name: Carolina Marcum   MRN: [id number]   PCP: Kierra Cornejo  Referring Provider: Self, Aaareferral    Assessment:   Carolina Marcum is a 63 y.o. female presenting with reported history of positional tremor.  May be representative of a benign essential or dystonic tremor however am unable to reproduce this on exam today.  Patient states it is minimally disruptive.  No indication for further workup at this time.  Neurologic exam is otherwise normal. May follow up clinically as needed.=    Plan:     Problem List Items Addressed This Visit        Neuro    Tremor, unspecified    Overview     Not seen/reproduced on exam today         Current Assessment & Plan     -- follow up as needed             Demario Ferraro MD  [facility]   Department of Neurology    Patient note was created using Dragon Dictation.  Any errors in syntax or even information may not have been identified and edited on initial review prior to signing this note.  Subjective:   HPI:   Ms. Carolina Marcum is a 63 y.o. female presenting for evaluation of right hand tremor states that over the past 2 months she has noted that intermittent tremor of her right hand and occasionally both hands, typically after bending her neck and a funny ankle and picking up heavy objects.  She states that her hand will shake for several seconds in this position and then resolves when she changes positions.  She denies resting tremor, gait change, memory change, speech change, stiffness, freezing, or other parkinsonian features.  She denies a family history of tremor.  She is unsure of alcohol effects the tremor.  I am unable to reproduce the tremor in clinic today.     PAST MEDICAL HISTORY:  Past Medical History:   Diagnosis Date    Abnormal Pap smear of cervix 1989    [facility] - [facility], then Hyst    Asthma, chronic     Bleeding hemorrhoids     Depression     Difficult  intubation     Diverticular disease     Endometriosis     Fatty liver 12/15/2014    Herpes simplex without mention of complication     Hypercholesterolemia     Hypertension     Multiple gastric polyps 2012    Obesity     Reflux     Thyroid disease        PAST SURGICAL HISTORY:  Past Surgical History:   Procedure Laterality Date    BREAST BIOPSY Left 2017    core    BREAST BIOPSY Right     core    CERVICAL CONIZATION   W/ LASER       SECTION      x1    CHOLECYSTECTOMY      HYSTERECTOMY  age 42    [place], [facility] (uterine fibroids, adhesions, endometriosis)    OOPHORECTOMY  age 42    LSO (endometriosis, cyst)    THYROIDECTOMY      TOE SURGERY      left    TOTAL THYROIDECTOMY         CURRENT MEDS:  Current Outpatient Medications   Medication Sig Dispense Refill    amLODIPine (NORVASC) 10 MG tablet Take 1 tablet (10 mg total) by mouth once daily. 90 tablet 3    cetirizine (ZYRTEC) 10 MG tablet Take 10 mg by mouth. Half Tablet Oral Every day      chlorthalidone (HYGROTEN) 25 MG Tab TAKE 1 TABLET BY MOUTH ONCE DAILY 30 tablet 2    conjugated estrogens (PREMARIN) vaginal cream Place 0.5 g vaginally every Mon, Wed, Fri. 45 g 1    diphenhydrAMINE (BANOPHEN) 25 mg capsule Take 1 each (25 mg total) by mouth every 6 (six) hours as needed for Itching or Allergies. 60 capsule 0    hydrocortisone (ANUSOL-HC) 25 mg suppository Place 1 suppository (25 mg total) rectally 2 (two) times daily. 14 suppository 1    hydrocortisone-pramoxine (PROCTOFOAM HC) rectal foam Place rectally 3 (three) times daily. 1 Foam Rectal Three times a day 1 applicator 0    methylPREDNISolone (MEDROL DOSEPACK) 4 mg tablet use as directed 1 Package 0    montelukast (SINGULAIR) 10 mg tablet Take 1 tablet (10 mg total) by mouth every evening. 90 tablet 3    multivitamin (THERAGRAN) per tablet Take by mouth. 1 Tablet Oral Every day      mupirocin (BACTROBAN) 2 % ointment Apply topically 2 (two) times daily as needed. 30 g  0    naproxen (NAPROSYN) 500 MG tablet TAKE 1 TABLET BY MOUTH TWICE DAILY (Patient taking differently: TAKE 1 TABLET BY MOUTH TWICE DAILY prn) 60 tablet 0    nystatin (MYCOSTATIN) cream Apply topically 2 (two) times daily. 30 g 4    omeprazole (PRILOSEC) 40 MG capsule Take 1 capsule (40 mg total) by mouth 2 (two) times daily before meals. 60 capsule 3    SYNTHROID 125 mcg tablet Take 1 tablet (125 mcg total) by mouth once daily. 90 tablet 3    SYNTHROID 125 mcg tablet TAKE 1 TABLET BY MOUTH DAILY 30 tablet 2    terconazole (TERAZOL 3) 0.8 % vaginal cream   4    tretinoin, emollient, 0.05 % Crea 1 Cream Topical Every day 15 g 2    azelastine (ASTELIN) 137 mcg (0.1 %) nasal spray 1 spray (137 mcg total) by Nasal route 2 (two) times daily. 30 mL 1    diclofenac sodium 1 % Gel Apply 2 g topically 2 (two) times daily. 100 g 3    epinephrine (EPIPEN) 0.3 mg/0.3 mL AtIn Inject 0.3 mLs (0.3 mg total) into the muscle as needed (for allergic reaction in which you feel like your throat is swelling, you cannot breathe.). 1 Device 0     No current facility-administered medications for this visit.        ALLERGIES:  Review of patient's allergies indicates:   Allergen Reactions    Losartan Swelling     Possible throat closing sensation.    Cefuroxime Itching and Rash       FAMILY HISTORY:  Family History   Problem Relation Age of Onset    Breast cancer Maternal Aunt     Melanoma Sister     Melanoma Brother     Cancer Maternal Uncle         esophageal cancer    Ovarian cancer Neg Hx     Colon cancer Neg Hx        SOCIAL HISTORY:  Social History     Tobacco Use    Smoking status: Never Smoker    Smokeless tobacco: Never Used   Substance Use Topics    Alcohol use: No     Comment: none lately.  rarely has a drink for special occassion    Drug use: No       Review of Systems:  12 review of systems is negative except for the symptoms mentioned in HPI.      Objective:     Vitals:    08/23/18 1407   BP: 133/75  "  Pulse: 93   Weight: 115.2 kg (253 lb 15.5 oz)   Height: 5' 6" (1.676 m)     General: NAD, well nourished   Eyes: no tearing, discharge, no erythema   ENT: moist mucous membranes of the oral cavity, nares patent    Neck: Supple, full range of motion  Cardiovascular: Warm and well perfused, pulses equal and symmetrical  Lungs: Normal work of breathing, normal chest wall excursions  Skin: No rash, lesions, or breakdown on exposed skin  Psychiatry: Mood and affect are appropriate   Abdomen: soft, non tender, non distended  Extremeties: No cyanosis, clubbing or edema.    Neurological   MENTAL STATUS: Alert and oriented to person, place, and time. Attention and concentration within normal limits. Speech without dysarthria, able to name and repeat without difficulty. Recent and remote memory within normal limits   CRANIAL NERVES: Visual fields intact. PERRL. EOMI. Facial sensation intact. Face symmetrical. Hearing grossly intact. Full shoulder shrug bilaterally. Tongue protrudes midline   SENSORY: Sensation is intact to light touch throughout.    MOTOR: Normal bulk and tone. No tremor on exam including with provoking maneuvers. 5/5 deltoid, biceps, triceps, interosseous, hand  bilaterally. 5/5 iliopsoas, knee extension/flexion, foot dorsi/plantarflexion bilaterally.    REFLEXES: Symmetric and 2+ throughout.   CEREBELLAR/COORDINATION/GAIT: Gait steady with normal arm swing and stride length. Finger to nose intact. Normal rapid alternating movements.       "

## 2018-08-23 NOTE — PATIENT INSTRUCTIONS
Taking Medicine Safely    Medicine is given to help treat or prevent illness. But if you dont take it correctly, it might not help. It might even harm you. Your healthcare provider or pharmacist can help you learn the right way to take your medicine. Listed below are some tips to help you take medicine safely.  Safety tips  Do's and don'ts include the following:   · Have a routine for taking each medicine. Make it part of something you do each day, such as brushing your teeth or eating a meal.  · When you go to the hospital or your healthcare providers office, bring all your current medicines in their original boxes or bottles. If you cant do that, bring an up-to-date list of your medicines.  · Don't stop taking a prescription medicine unless your healthcare provider tells you to. Doing so could make your condition worse.  · Don't share medicines.  · Let your healthcare provider and pharmacist know of any allergies you have and if there have been any changes in your health since you were last prescribed these medicines.   · Taking prescription medicines with alcohol, street drugs, herbs, supplements, or even some over-the-counter medicines can be harmful. Talk to your healthcare provider or pharmacist before using any of these things while taking a prescription medicine.  · When filling your prescriptions, try using the same pharmacy for all your medicines. If not, let the pharmacist know what medicines you are already on.  · Consider putting a week's worth of medicines in a container marked for each day of the week.   · Keep medicines out of the reach of children and pets. Be sure all medicine bottles have safety caps.  · Keep narcotics and sedatives out of sight or in a locked box or safe.  · Keep your medicines in a dry and cool location (not in the bathroom.)   · Don't use medicine that has  or that doesnt look or smell right. Get rid of it properly. To find out the right way to get rid of  medicine:  ¨ Call your [facility] or [facility] household trash and recycling service and ask if a medicine take-back program is available in your community.  ¨ Call your local pharmacy and ask the right way to get rid of the medicine.  ¨ Go to www.fda.gov/ForConsumers/ConsumerUpdates/oni784936 to learn how to get rid of medicines safely.  · Medicine that comes in a container for a single dose should be used only 1 time. If you use the container a second time, it may have germs in it that can cause illness. These illnesses include hepatitis B and C. They also include infections of the brain or spinal cord (meningitis and epidural abscess).   Using generic medicines  Medicines have brand names and generic (chemical) names. When a medicine is first made, it is sold only under its brand name. Later, it can be made and sold as a generic. Generic medicines cost less than brand-name medicines and most work just as well. Most people can use the generic medicine instead of the brand-name medicine, unless their healthcare provider says otherwise.   Date Last Reviewed: 8/1/2016 © 2000-2017 [employer]. [address] [place] [address], [place] [address]. All rights reserved. This information is not intended as a substitute for professional medical care. Always follow your healthcare professional's instructions.

## 2018-08-28 NOTE — PROGRESS NOTES
Last 5 Patient Entered Readings                                      Current 30 Day Average: 127/78     Recent Readings 8/25/2018 8/25/2018 8/21/2018 8/21/2018 8/21/2018    SBP (mmHg) 128 136 122 130 121    DBP (mmHg) 85 86 76 78 86    Pulse 80 79 80 77 77          Digital [facility]: Health  Follow Up    Left voicemail to follow up with Ms. Carolina Jossue.  Current BP average 127/78 mmHg is at goal.  Sending [employer] message.

## 2018-08-30 RX ORDER — METFORMIN HYDROCHLORIDE 750 MG/1
TABLET, EXTENDED RELEASE ORAL
Qty: 60 TABLET | Refills: 0 | OUTPATIENT
Start: 2018-08-30

## 2018-09-04 NOTE — PROGRESS NOTES
Last 5 Patient Entered Readings                                      Current 30 Day Average: 127/81     Recent Readings 9/3/2018 9/3/2018 9/3/2018 9/3/2018 8/25/2018    SBP (mmHg) 134 148 157 150 128    DBP (mmHg) 83 95 95 92 85    Pulse 86 89 101 100 80        Digital Medicine: Health  Follow Up    Lifestyle Modifications:    1.Dietary Modifications (Sodium intake <2,000mg/day, food labels, dining out): attributes elevated readings to salt intake. Had BBQ over labor day. Encouraged low sodium diet.    2.Physical Activity: Going to PT 2x/wk and [facility] 1x/wk    3.Medication Therapy: Patient has been compliant with the medication regimen.    4.Patient has the following medication side effects/concerns:   (Frequency/Alleviating factors/Precipitating factors, etc.)     Follow up with Mrs. Figueroa Jossue completed. No further questions or concerns. Will continue follow up to achieve health goals.

## 2018-09-05 RX ORDER — CONJUGATED ESTROGENS 0.62 MG/G
CREAM VAGINAL
Qty: 30 G | Refills: 2 | Status: SHIPPED | OUTPATIENT
Start: 2018-09-05 | End: 2018-09-26 | Stop reason: ALTCHOICE

## 2018-09-06 ENCOUNTER — HOSPITAL ENCOUNTER (OUTPATIENT)
Dept: PULMONOLOGY | Facility: CLINIC | Age: 63
Discharge: HOME | End: 2018-09-06
Payer: COMMERCIAL

## 2018-09-06 ENCOUNTER — OFFICE VISIT (OUTPATIENT)
Dept: SLEEP MEDICINE | Facility: CLINIC | Age: 63
End: 2018-09-06
Payer: COMMERCIAL

## 2018-09-06 ENCOUNTER — OFFICE VISIT (OUTPATIENT)
Dept: PULMONOLOGY | Facility: CLINIC | Age: 63
End: 2018-09-06
Payer: COMMERCIAL

## 2018-09-06 VITALS
SYSTOLIC BLOOD PRESSURE: 138 MMHG | HEIGHT: 66 IN | WEIGHT: 253.13 LBS | DIASTOLIC BLOOD PRESSURE: 80 MMHG | BODY MASS INDEX: 40.68 KG/M2 | HEART RATE: 91 BPM

## 2018-09-06 VITALS
SYSTOLIC BLOOD PRESSURE: 122 MMHG | OXYGEN SATURATION: 96 % | HEIGHT: 65 IN | BODY MASS INDEX: 42.15 KG/M2 | WEIGHT: 253 LBS | HEART RATE: 85 BPM | DIASTOLIC BLOOD PRESSURE: 66 MMHG

## 2018-09-06 DIAGNOSIS — J45.20 ASTHMA, MILD INTERMITTENT, WELL-CONTROLLED: Primary | ICD-10-CM

## 2018-09-06 DIAGNOSIS — J45.909 ASTHMA, UNSPECIFIED ASTHMA SEVERITY, UNSPECIFIED WHETHER COMPLICATED, UNSPECIFIED WHETHER PERSISTENT: ICD-10-CM

## 2018-09-06 DIAGNOSIS — J45.909 ASTHMA, UNSPECIFIED ASTHMA SEVERITY, UNSPECIFIED WHETHER COMPLICATED, UNSPECIFIED WHETHER PERSISTENT: Primary | ICD-10-CM

## 2018-09-06 DIAGNOSIS — G47.33 OSA (OBSTRUCTIVE SLEEP APNEA): Primary | ICD-10-CM

## 2018-09-06 DIAGNOSIS — I10 ESSENTIAL HYPERTENSION: ICD-10-CM

## 2018-09-06 LAB
PRE FEV1 FVC: 81
PRE FEV1: 2.31
PRE FVC: 2.86
PREDICTED FEV1 FVC: 79
PREDICTED FEV1: 2.44
PREDICTED FVC: 3.08

## 2018-09-06 PROCEDURE — 99204 OFFICE O/P NEW MOD 45 MIN: CPT | Mod: 25,S$GLB,, | Performed by: INTERNAL MEDICINE

## 2018-09-06 PROCEDURE — 3074F SYST BP LT 130 MM HG: CPT | Mod: CPTII,S$GLB,, | Performed by: INTERNAL MEDICINE

## 2018-09-06 PROCEDURE — 3008F BODY MASS INDEX DOCD: CPT | Mod: CPTII,S$GLB,, | Performed by: NURSE PRACTITIONER

## 2018-09-06 PROCEDURE — 94010 BREATHING CAPACITY TEST: CPT | Mod: S$GLB,,, | Performed by: INTERNAL MEDICINE

## 2018-09-06 PROCEDURE — 99999 PR PBB SHADOW E&M-EST. PATIENT-LVL III: CPT | Mod: PBBFAC,,, | Performed by: INTERNAL MEDICINE

## 2018-09-06 PROCEDURE — 99214 OFFICE O/P EST MOD 30 MIN: CPT | Mod: S$GLB,,, | Performed by: NURSE PRACTITIONER

## 2018-09-06 PROCEDURE — 3078F DIAST BP <80 MM HG: CPT | Mod: CPTII,S$GLB,, | Performed by: INTERNAL MEDICINE

## 2018-09-06 PROCEDURE — 3075F SYST BP GE 130 - 139MM HG: CPT | Mod: CPTII,S$GLB,, | Performed by: NURSE PRACTITIONER

## 2018-09-06 PROCEDURE — 3008F BODY MASS INDEX DOCD: CPT | Mod: CPTII,S$GLB,, | Performed by: INTERNAL MEDICINE

## 2018-09-06 PROCEDURE — 99999 PR PBB SHADOW E&M-EST. PATIENT-LVL IV: CPT | Mod: PBBFAC,,, | Performed by: NURSE PRACTITIONER

## 2018-09-06 PROCEDURE — 3079F DIAST BP 80-89 MM HG: CPT | Mod: CPTII,S$GLB,, | Performed by: NURSE PRACTITIONER

## 2018-09-06 RX ORDER — METFORMIN HYDROCHLORIDE 750 MG/1
750 TABLET, EXTENDED RELEASE ORAL
COMMUNITY
End: 2019-02-18

## 2018-09-06 RX ORDER — BUDESONIDE AND FORMOTEROL FUMARATE DIHYDRATE 160; 4.5 UG/1; UG/1
2 AEROSOL RESPIRATORY (INHALATION) 2 TIMES DAILY
Qty: 1 INHALER | Refills: 12 | Status: SHIPPED | OUTPATIENT
Start: 2018-09-06 | End: 2019-08-12

## 2018-09-06 RX ORDER — BUDESONIDE AND FORMOTEROL FUMARATE DIHYDRATE 160; 4.5 UG/1; UG/1
2 AEROSOL RESPIRATORY (INHALATION) EVERY 12 HOURS
Qty: 1 INHALER | Refills: 11 | Status: CANCELLED | OUTPATIENT
Start: 2018-09-06 | End: 2019-09-06

## 2018-09-06 NOTE — PROGRESS NOTES
Carolina Marcum  was seen as f/u today for the mgt of obstructive sleep apnea. Last seen  May 2018    Since seen she has undergone a sleep study which was reviewed with her today. Been setup with PAP therapy 6-20cm.Using nightly. Snoring resolved. Switched to FFM due to sinus congestion. Foggy today due to sinus impact. Likes using therapy. Denies pressure intolerance. +oral drying. Sleep more consolidated when not drinking late in evening. Knee pain can disrupt sleep/turn in bed.   Hard to lose wgt, eats out. 64oz water daily (drinks  Mostly evening time so has nocturia 2-4).     Encore: Date Range: 8/7/2018 - 9/5/2018     Hide 90% tile 11.7cm  2% leak     Compliance Summary  Apnea Indices    Days with Device Usage:  30 days  Average AHI:  4.1    Percentage of Days >=4 Hours:  96.7%     Average Usage (Days Used):  6 hrs. 22 mins. 48 secs.     Average Usage (All Days):  6 hrs. 22 mins. 48 secs.           HST 6-5-18 AHI 23/low SpO2: 82.7%    HISTORY  5/10/18  CHIEF COMPLAINT: Snoring    HISTORY OF PRESENT ILLNESS:Carolina Marcum a 63 y.o. female presents for the evaluation of obstructive sleep apnea. She has never had a sleep study. +snores.  and osorio. Nocturia 3x, easily returns to sleep more often than not.  Occasional witnessed apneic pauses. Wgt gain+. Was waking up with headaches just recently. Got ent syrup which she takes prn which helps abort head pain/helps drain sinus. Side sleeper. Mouth breather during sleep.      At times symptoms of restless legs  Despite taking Zyrtec qhs still tosses and turns  R knee inflammation    EATS out a lot, hard to lose wgt.     EPWORTH SLEEPINESS SCALE 5/10/2018   Sitting and reading 3   Watching TV 2   Sitting, inactive in a public place (e.g. a theatre or a meeting) 2   As a passenger in a car for an hour without a break 1   Lying down to rest in the afternoon when circumstances permit 3   Sitting and talking to someone 0   Sitting quietly after a lunch  without alcohol 3   In a car, while stopped for a few minutes in traffic 0   Total score 14         Sleep Clinic New Patient 5/10/2018   What time do you go to bed on a week day? (Give a range) Anywhere from 9:00-11:30 pm   What time do you go to bed on a day off? (Give a range) 10:30-1:00 pm   How long does it take you to fall asleep? (Give a range) Sometimes within one hour watching tv or other times fifteen minutes to twenty   How many times per night do you wake up?  2   How long does it take you to fall back into sleep? (Give a range) Sometimes quickly like within five minutes others hour or two depending on how i woke up n how much sleep and time night   What time do you wake up to start your day on a week day? (Give a range) From five to 7:30 am   What time do you wake up to start your day on a day off? (Give a range) From from 6:30-8 am   What time do you get out of bed? (Give a range) Seven to eight    How many hours do you sleep?  8   How many naps do you take per day? 1   Rate your sleep quality from 0 to 5 (0-poor, 5-great). 3   Have you experienced:  Weight gain?   How much weight have you gained or lost in pounds (lbs)?  25   How many times per night do you go to the bathroom?  3   Have you ever had a sleep study/CPAP machine/surgery for sleep apnea? No   Have you ever had a CPAP machine for sleep apnea? No   Have you ever had surgery for sleep apnea? No       FAMILY HISTORY: No known sleep disorders. ?dad  SOCIAL HISTORY: . Former , now volunteers at [employer], infrequent ETOH    REVIEW OF SYSTEMS:  Sleep related symptoms as per HPI; 9# gain  Difficulty breathing through the nose?  Yes   Sore throat or dry mouth in the morning? Yes   Irregular or very fast heart beat?  Sometimes   Shortness of breath?  Yes   Acid reflux? Yes   Body aches and pains?  Sometimes   Morning headaches? Yes   Dizziness? Yes   Mood changes?  No   Do you exercise?  Sometimes   Do you feel like  "moving your legs a lot?  Yes       PHYSICAL EXAM:   /80   Pulse 91   Ht 5' 6" (1.676 m)   Wt 114.8 kg (253 lb 1.6 oz)   BMI 40.85 kg/m²   GENERAL: Obese body habitus, well groomed     ASSESSMENT:     QUINTIN, moderate. She is adherent with PAP, AHI<5,symptoms improved  She has  medical comorbidities of asthma, obesity, hypertension, hypothyroidism      PLAN:   1. Continue APAP 6-20cm. [facility] DME prn supplies. Increase humidity to reduce oral drying   2. Discussed effectiveness of therapy and potential ramifications of untreated QUINTIN, including stroke, heart disease, HTN  3.REFER to bariatric dept. Encouraged continued weight loss efforts for potential improvement of QUINTIN and overall health benefits and see PCP re: HTN mgt. Stop fluid intake by 8p and use pillow b/t knees in bed  4. RTC 12mos, sooner if needed    "

## 2018-09-08 NOTE — PROGRESS NOTES
Subjective:       Patient ID: Carolina Marcum is a 63 y.o. female.    Chief Complaint: Asthma    HPI  62 yo daughter of Miko Tucker comes to follow up on her asthma.She really done well, Is using an inhaler sparingly. She has some rhinitis which seems to trigger her asthma.. She uses symbicort on rare occasions  Her PFT's today are normal in all respects and unchanged from May, 2015.  She is obese and her weight continue to climb. We had a bj discussion about bariatric surgery and I referred her to that clinic because I think that she would be a good candidate.       No flowsheet data found.]  Review of Systems   Constitutional: Negative.    HENT: Positive for postnasal drip and rhinorrhea.    Eyes: Negative.    Respiratory: Negative.         Mild intermittent ashtma    Hx of QUINTIN   Cardiovascular: Negative.    Genitourinary: Negative.    Musculoskeletal: Negative.    Skin: Negative.    Gastrointestinal: Negative.         GERD/s     Hx of gastric polyps   Neurological: Negative.    Psychiatric/Behavioral: Negative.        Objective:      Physical Exam   Constitutional: She is oriented to person, place, and time. She appears well-developed and well-nourished. No distress.   Obese, BMI>40   HENT:   Head: Normocephalic and atraumatic.   Right Ear: External ear normal.   Left Ear: External ear normal.   Nose: Nose normal.   Mouth/Throat: Oropharynx is clear and moist.   Eyes: Conjunctivae and EOM are normal. Pupils are equal, round, and reactive to light.   Neck: Normal range of motion. Neck supple. No JVD present. No thyromegaly present.   Cardiovascular: Normal rate, regular rhythm, normal heart sounds and intact distal pulses. Exam reveals no gallop.   No murmur heard.  Pulmonary/Chest: Breath sounds normal. No stridor. No respiratory distress. She has no wheezes. She has no rales. She exhibits no tenderness.   Abdominal: Soft. Bowel sounds are normal. She exhibits no distension and no mass. There is no  tenderness. There is no rebound and no guarding.   Musculoskeletal: Normal range of motion. She exhibits no edema.   Lymphadenopathy:     She has no cervical adenopathy.   Neurological: She is alert and oriented to person, place, and time. She has normal reflexes. She displays normal reflexes. No cranial nerve deficit.   Skin: Skin is warm and dry. No rash noted.   Psychiatric: She has a normal mood and affect. Her behavior is normal. Judgment and thought content normal.   Nursing note and vitals reviewed.          Assessment:       1. Asthma, mild intermittent, well-controlled        Outpatient Encounter Medications as of 9/6/2018   Medication Sig Dispense Refill    amLODIPine (NORVASC) 10 MG tablet Take 1 tablet (10 mg total) by mouth once daily. 90 tablet 3    azelastine (ASTELIN) 137 mcg (0.1 %) nasal spray 1 spray (137 mcg total) by Nasal route 2 (two) times daily. 30 mL 1    budesonide-formoterol 160-4.5 mcg (SYMBICORT) 160-4.5 mcg/actuation HFAA Inhale 2 puffs into the lungs 2 (two) times daily. 1 Inhaler 12    cetirizine (ZYRTEC) 10 MG tablet Take 10 mg by mouth. Half Tablet Oral Every day      chlorthalidone (HYGROTEN) 25 MG Tab TAKE 1 TABLET BY MOUTH ONCE DAILY 30 tablet 2    diclofenac sodium 1 % Gel Apply 2 g topically 2 (two) times daily. 100 g 3    diphenhydrAMINE (BANOPHEN) 25 mg capsule Take 1 each (25 mg total) by mouth every 6 (six) hours as needed for Itching or Allergies. 60 capsule 0    epinephrine (EPIPEN) 0.3 mg/0.3 mL AtIn Inject 0.3 mLs (0.3 mg total) into the muscle as needed (for allergic reaction in which you feel like your throat is swelling, you cannot breathe.). 1 Device 0    hydrocortisone (ANUSOL-HC) 25 mg suppository Place 1 suppository (25 mg total) rectally 2 (two) times daily. 14 suppository 1    hydrocortisone-pramoxine (PROCTOFOAM HC) rectal foam Place rectally 3 (three) times daily. 1 Foam Rectal Three times a day 1 applicator 0    metFORMIN (GLUCOPHAGE-XR) 750 MG  24 hr tablet Take 750 mg by mouth daily with breakfast.      montelukast (SINGULAIR) 10 mg tablet Take 1 tablet (10 mg total) by mouth every evening. 90 tablet 3    multivitamin (THERAGRAN) per tablet Take by mouth. 1 Tablet Oral Every day      mupirocin (BACTROBAN) 2 % ointment Apply topically 2 (two) times daily as needed. 30 g 0    naproxen (NAPROSYN) 500 MG tablet TAKE 1 TABLET BY MOUTH TWICE DAILY (Patient taking differently: TAKE 1 TABLET BY MOUTH TWICE DAILY prn) 60 tablet 0    nystatin (MYCOSTATIN) cream Apply topically 2 (two) times daily. 30 g 4    omeprazole (PRILOSEC) 40 MG capsule Take 1 capsule (40 mg total) by mouth 2 (two) times daily before meals. 60 capsule 3    PREMARIN vaginal cream PLACE 0.5 GRAMS IN THE VAGINA EVERY MONDAY, WEDNESDAY AND FRIDAY 30 g 2    SYNTHROID 125 mcg tablet Take 1 tablet (125 mcg total) by mouth once daily. 90 tablet 3    SYNTHROID 125 mcg tablet TAKE 1 TABLET BY MOUTH DAILY 30 tablet 2    terconazole (TERAZOL 3) 0.8 % vaginal cream   4    tretinoin, emollient, 0.05 % Crea 1 Cream Topical Every day 15 g 2     No facility-administered encounter medications on file as of 9/6/2018.      No orders of the defined types were placed in this encounter.    Plan:       IMP: Mild intermittent asthma by history. Exogenous Obesity  Referred to the bariatric surgery clinic

## 2018-09-24 RX ORDER — AMLODIPINE BESYLATE 10 MG/1
TABLET ORAL
Qty: 90 TABLET | Refills: 0 | Status: SHIPPED | OUTPATIENT
Start: 2018-09-24 | End: 2019-03-13 | Stop reason: SDUPTHER

## 2018-09-25 ENCOUNTER — TELEPHONE (OUTPATIENT)
Dept: BARIATRICS | Facility: CLINIC | Age: 63
End: 2018-09-25

## 2018-09-26 ENCOUNTER — LAB VISIT (OUTPATIENT)
Dept: LAB | Facility: HOSPITAL | Age: 63
End: 2018-09-26
Attending: INTERNAL MEDICINE
Payer: COMMERCIAL

## 2018-09-26 ENCOUNTER — OFFICE VISIT (OUTPATIENT)
Dept: INTERNAL MEDICINE | Facility: CLINIC | Age: 63
End: 2018-09-26
Payer: COMMERCIAL

## 2018-09-26 VITALS
OXYGEN SATURATION: 95 % | WEIGHT: 255.31 LBS | DIASTOLIC BLOOD PRESSURE: 83 MMHG | HEIGHT: 65 IN | SYSTOLIC BLOOD PRESSURE: 123 MMHG | BODY MASS INDEX: 42.54 KG/M2 | HEART RATE: 80 BPM

## 2018-09-26 DIAGNOSIS — R60.9 EDEMA, UNSPECIFIED TYPE: ICD-10-CM

## 2018-09-26 DIAGNOSIS — E87.6 HYPOKALEMIA: ICD-10-CM

## 2018-09-26 DIAGNOSIS — Z23 NEEDS FLU SHOT: ICD-10-CM

## 2018-09-26 DIAGNOSIS — E74.39 GLUCOSE INTOLERANCE: ICD-10-CM

## 2018-09-26 DIAGNOSIS — E66.01 MORBID OBESITY WITH BMI OF 45.0-49.9, ADULT: ICD-10-CM

## 2018-09-26 DIAGNOSIS — M25.50 POLYARTHRALGIA: Primary | ICD-10-CM

## 2018-09-26 DIAGNOSIS — M35.00 SJOGREN'S SYNDROME, WITH UNSPECIFIED ORGAN INVOLVEMENT: ICD-10-CM

## 2018-09-26 DIAGNOSIS — I10 HYPERTENSION, UNSPECIFIED TYPE: ICD-10-CM

## 2018-09-26 DIAGNOSIS — M17.0 PRIMARY OSTEOARTHRITIS OF BOTH KNEES: ICD-10-CM

## 2018-09-26 LAB
BILIRUB UR QL STRIP: NEGATIVE
CLARITY UR REFRACT.AUTO: CLEAR
COLOR UR AUTO: YELLOW
GLUCOSE UR QL STRIP: NEGATIVE
HGB UR QL STRIP: NEGATIVE
KETONES UR QL STRIP: NEGATIVE
LEUKOCYTE ESTERASE UR QL STRIP: NEGATIVE
MICROSCOPIC COMMENT: NORMAL
NITRITE UR QL STRIP: NEGATIVE
PH UR STRIP: 6 [PH] (ref 5–8)
PROT UR QL STRIP: NEGATIVE
SP GR UR STRIP: 1.02 (ref 1–1.03)
SQUAMOUS #/AREA URNS AUTO: 0 /HPF
URN SPEC COLLECT METH UR: NORMAL
UROBILINOGEN UR STRIP-ACNC: NEGATIVE EU/DL
WBC #/AREA URNS AUTO: 1 /HPF (ref 0–5)

## 2018-09-26 PROCEDURE — 99999 PR PBB SHADOW E&M-EST. PATIENT-LVL V: CPT | Mod: PBBFAC,,, | Performed by: INTERNAL MEDICINE

## 2018-09-26 PROCEDURE — 90471 IMMUNIZATION ADMIN: CPT | Mod: S$GLB,,, | Performed by: INTERNAL MEDICINE

## 2018-09-26 PROCEDURE — 90686 IIV4 VACC NO PRSV 0.5 ML IM: CPT | Mod: S$GLB,,, | Performed by: INTERNAL MEDICINE

## 2018-09-26 PROCEDURE — 99214 OFFICE O/P EST MOD 30 MIN: CPT | Mod: 25,S$GLB,, | Performed by: INTERNAL MEDICINE

## 2018-09-26 PROCEDURE — 81001 URINALYSIS AUTO W/SCOPE: CPT

## 2018-09-26 PROCEDURE — 3008F BODY MASS INDEX DOCD: CPT | Mod: CPTII,S$GLB,, | Performed by: INTERNAL MEDICINE

## 2018-09-26 PROCEDURE — 3079F DIAST BP 80-89 MM HG: CPT | Mod: CPTII,S$GLB,, | Performed by: INTERNAL MEDICINE

## 2018-09-26 PROCEDURE — 3074F SYST BP LT 130 MM HG: CPT | Mod: CPTII,S$GLB,, | Performed by: INTERNAL MEDICINE

## 2018-09-26 RX ORDER — DICLOFENAC SODIUM 10 MG/G
2 GEL TOPICAL 2 TIMES DAILY PRN
Qty: 100 G | Refills: 1 | Status: SHIPPED | OUTPATIENT
Start: 2018-09-26 | End: 2019-08-16 | Stop reason: SDUPTHER

## 2018-09-26 RX ORDER — ETODOLAC 400 MG/1
400 TABLET, EXTENDED RELEASE ORAL DAILY PRN
Qty: 30 TABLET | Refills: 1 | Status: SHIPPED | OUTPATIENT
Start: 2018-09-26 | End: 2020-07-20

## 2018-09-26 RX ORDER — TERCONAZOLE 4 MG/G
1 CREAM VAGINAL NIGHTLY
COMMUNITY
End: 2019-02-18

## 2018-09-26 NOTE — PROGRESS NOTES
Pt. ID: Carolina Marcum is a 63 y.o. female      Chief complaint:   Chief Complaint   Patient presents with    ache joints       HPI: Pt. Here for polyarthralgia to multiple joints for past 2 years on and off; naprosyn prn helps; joints include B/L  Hands/fingers, shoulders, and knees; she sees rheumatology for Sjogrens; RF was negative; she was asked to try voltaren gel which she has not used; pt. Sees ortho for chronic knee pain and gets localized shots to that knees; she would like flu shot; I reviewed labs dated 4/5/18; repeat potassium was normal and she is taking potassium supplements; HGBA1C dated 10/23/17 was 5.6; BP is borderline elevated and BP from HTN digital program was 126/81; pt. Gets occasional edema based on eating out at a restaurant; she has gained weight       Review of Systems   HENT: Negative for hearing loss.    Eyes: Negative for discharge.   Respiratory: Negative for wheezing.    Cardiovascular: Positive for leg swelling. Negative for chest pain and palpitations.   Gastrointestinal: Negative for blood in stool, constipation, diarrhea and vomiting.   Genitourinary: Negative for dysuria and hematuria.   Musculoskeletal: Positive for joint pain. Negative for neck pain.        Polyarthralgia to multiple joints intermittently    Neurological: Negative for weakness and headaches.   Endo/Heme/Allergies: Negative for polydipsia.         Objective:    Physical Exam   Constitutional: She is oriented to person, place, and time.   Morbid obesity     Eyes: EOM are normal.   Neck: Normal range of motion.   Cardiovascular: Normal rate, regular rhythm and normal heart sounds.   Pulmonary/Chest: Effort normal and breath sounds normal.   Abdominal: Soft. There is no tenderness. There is no rebound and no guarding.   Musculoskeletal: She exhibits edema.   Pain noted to B/L knees with ROM; good ROM of all joints; no significant edema at this time   Neurological: She is alert and oriented to person, place,  and time.   Skin: No rash noted.   Vitals reviewed.        Health Maintenance   Topic Date Due    Mammogram  05/31/2019    Fecal Occult Blood Test (FOBT)/[employer]  08/10/2019    Lipid Panel  12/21/2021    TETANUS VACCINE  12/21/2026    Hepatitis C Screening  Completed    Influenza Vaccine  Completed    Zoster Vaccine  Addressed         Assessment:     1. Polyarthralgia Active   2. Hypertension, unspecified type Well controlled   3. Sjogren's syndrome, with unspecified organ involvement Active   4. Edema, unspecified type Active   5. Hypokalemia Well controlled   6. Glucose intolerance Well controlled   7. Primary osteoarthritis of both knees Well controlled   8. Needs flu shot Active   9. Morbid obesity with BMI of 45.0-49.9, adult Sub-optimally controlled         Plan: Polyarthralgia  Comments:  start lodine prn and asked pt. to use voltaren gel as an alternative  Orders:  -     diclofenac sodium (VOLTAREN) 1 % Gel; Apply 2 g topically 2 (two) times daily as needed.  Dispense: 100 g; Refill: 1  -     etodolac (LODINE XL) 400 MG 24 hr tablet; Take 1 tablet (400 mg total) by mouth daily as needed (avoid all other NSAIDs).  Dispense: 30 tablet; Refill: 1    Hypertension, unspecified type  Comments:  continue current regimen and encouraged low Na diet and weight loss  Orders:  -     2D Echo w/ Color Flow Doppler; Future  -     Urinalysis; Future; Expected date: 09/26/2018    Sjogren's syndrome, with unspecified organ involvement  Comments:  f/u rheumatology who is managing     Edema, unspecified type  Comments:  asked pt. to limit salt intake; elevated legs at end of day; start pressure stockings PRN; get U/A and echo  Orders:  -     2D Echo w/ Color Flow Doppler; Future  -     Urinalysis; Future; Expected date: 09/26/2018    Hypokalemia  Comments:  continue potassium supplements     Glucose intolerance  Comments:  encouraged low sugar diet     Primary osteoarthritis of both knees  Comments:  continue current  regimen and f/u ortho who is managing     Needs flu shot  -     Influenza - Quadrivalent (3 years & older) (PF)    Morbid obesity with BMI of 45.0-49.9, adult  Comments:  encouraged diet and explained risks; bariatric options discussed; pt. states insurance does not cover bariatric procedure        Problem List Items Addressed This Visit        Cardiac/Vascular    Hypertension    Relevant Orders    2D Echo w/ Color Flow Doppler    Urinalysis       Renal/    Hypokalemia       ID    Needs flu shot    Relevant Orders    Influenza - Quadrivalent (3 years & older) (PF) (Completed)       Immunology/Multi System    Sjogren's disease       Endocrine    Morbid obesity with BMI of 45.0-49.9, adult    Glucose intolerance       Orthopedic    Polyarthralgia - Primary    Relevant Medications    diclofenac sodium (VOLTAREN) 1 % Gel    etodolac (LODINE XL) 400 MG 24 hr tablet    Primary osteoarthritis of both knees       Other    Edema    Relevant Orders    2D Echo w/ Color Flow Doppler    Urinalysis        Answers for HPI/ROS submitted by the patient on 9/24/2018   activity change: No  rhinorrhea: No  trouble swallowing: No  visual disturbance: No  chest tightness: No  polyuria: No  difficulty urinating: No  menstrual problem: No  joint swelling: No  arthralgias: Yes  confusion: No  dysphoric mood: No

## 2018-09-30 DIAGNOSIS — I10 ESSENTIAL HYPERTENSION: ICD-10-CM

## 2018-10-01 ENCOUNTER — HOSPITAL ENCOUNTER (OUTPATIENT)
Dept: RADIOLOGY | Facility: HOSPITAL | Age: 63
Discharge: HOME | End: 2018-10-01
Attending: NURSE PRACTITIONER
Payer: COMMERCIAL

## 2018-10-01 ENCOUNTER — OFFICE VISIT (OUTPATIENT)
Dept: ORTHOPEDICS | Facility: CLINIC | Age: 63
End: 2018-10-01
Payer: COMMERCIAL

## 2018-10-01 VITALS
SYSTOLIC BLOOD PRESSURE: 144 MMHG | WEIGHT: 248.13 LBS | HEIGHT: 65 IN | BODY MASS INDEX: 41.34 KG/M2 | DIASTOLIC BLOOD PRESSURE: 89 MMHG | HEART RATE: 90 BPM

## 2018-10-01 DIAGNOSIS — M17.0 PRIMARY OSTEOARTHRITIS OF BOTH KNEES: Primary | ICD-10-CM

## 2018-10-01 DIAGNOSIS — M17.11 PRIMARY OSTEOARTHRITIS OF RIGHT KNEE: ICD-10-CM

## 2018-10-01 DIAGNOSIS — M17.0 PRIMARY OSTEOARTHRITIS OF BOTH KNEES: ICD-10-CM

## 2018-10-01 PROCEDURE — 3079F DIAST BP 80-89 MM HG: CPT | Mod: CPTII,S$GLB,, | Performed by: NURSE PRACTITIONER

## 2018-10-01 PROCEDURE — 99999 PR PBB SHADOW E&M-EST. PATIENT-LVL IV: CPT | Mod: PBBFAC,,, | Performed by: NURSE PRACTITIONER

## 2018-10-01 PROCEDURE — 73564 X-RAY EXAM KNEE 4 OR MORE: CPT | Mod: 26,50,, | Performed by: RADIOLOGY

## 2018-10-01 PROCEDURE — 20610 DRAIN/INJ JOINT/BURSA W/O US: CPT | Mod: RT,S$GLB,, | Performed by: NURSE PRACTITIONER

## 2018-10-01 PROCEDURE — 73564 X-RAY EXAM KNEE 4 OR MORE: CPT | Mod: TC,50

## 2018-10-01 PROCEDURE — 3008F BODY MASS INDEX DOCD: CPT | Mod: CPTII,S$GLB,, | Performed by: NURSE PRACTITIONER

## 2018-10-01 PROCEDURE — 3077F SYST BP >= 140 MM HG: CPT | Mod: CPTII,S$GLB,, | Performed by: NURSE PRACTITIONER

## 2018-10-01 PROCEDURE — 99213 OFFICE O/P EST LOW 20 MIN: CPT | Mod: 25,S$GLB,, | Performed by: NURSE PRACTITIONER

## 2018-10-01 RX ORDER — CHLORTHALIDONE 25 MG/1
TABLET ORAL
Qty: 30 TABLET | Refills: 5 | Status: SHIPPED | OUTPATIENT
Start: 2018-10-01 | End: 2019-03-30 | Stop reason: SDUPTHER

## 2018-10-01 RX ORDER — TRIAMCINOLONE ACETONIDE 40 MG/ML
40 INJECTION, SUSPENSION INTRA-ARTICULAR; INTRAMUSCULAR
Status: COMPLETED | OUTPATIENT
Start: 2018-10-01 | End: 2018-10-01

## 2018-10-01 RX ADMIN — TRIAMCINOLONE ACETONIDE 40 MG: 40 INJECTION, SUSPENSION INTRA-ARTICULAR; INTRAMUSCULAR at 10:10

## 2018-10-01 NOTE — PROGRESS NOTES
CC: Injections of the Right Knee      HPI: Pt with right knee pain for the past few weeks. She had the euflexxa injections in August which helped her pain some, but the pain has returned. The pain is medial and aching. It is worse with activity. She is not ready for surgery at this time. She is currently on the Ideal Protein Diet to lose weight in anticipation of needing knee replacement. She is ambulating without assistive device. There is not a limp.    ROS  General: denies fever and chills  Resp: no c/o sob  CVS: no c/o cp  MSK: c/o right knee pain which is medial and aching    PE  General: AAOx3, pleasant and cooperative  Resp: respirations even and unlabored  MSK: right knee exam  0 degrees extension  130 degrees flexion  No warmth or erythema   - effusion    Xray:  Reviewed by me: Severe narrowing of the medial femoral tibial joints right greater than left.  No effusion    Assessment:  Right knee djd    Plan:  Cortisone injection today for pain  Continue losing weight  RICE  advil prn  F/u as needed    Knee Injection Procedure Note    Diagnosis: right knee degenerative arthritis  Indications: right knee pain  Procedure Details: Verbal consent was obtained for the procedure. The injection site was identified and the skin was prepared with alcohol. The right knee was injected from an anterolateral approach with 1 ml of Kenalog and 4 ml Lidocaine under sterile technique using a 22 gauge needle. The needle was removed and the area cleansed and dressed.  Complications:  Patient tolerated the procedure well.    she was advised to rest the knee today, using ice and elevation as needed for comfort and swelling.Immediate relief of the knee pain may be short lived and secondary to the lidocaine. she may have an increase in discomfort tonight followed by steady improvement over the next several days. It may take 1-2 weeks following the injection to get the full benefit of the medication.

## 2018-10-03 ENCOUNTER — PATIENT OUTREACH (OUTPATIENT)
Dept: OTHER | Facility: OTHER | Age: 63
End: 2018-10-03

## 2018-10-03 NOTE — PROGRESS NOTES
"Last 5 Patient Entered Readings                                      Current 30 Day Average: 133/85     Recent Readings 9/26/2018 9/26/2018 9/26/2018 9/26/2018 9/26/2018    SBP (mmHg) 153 132 130 154 140    DBP (mmHg) 92 91 87 86 87    Pulse 81 86 85 90 89        Digital Medicine: Health  Follow Up    Feeling well.    Has lost 5# so far.     Lifestyle Modifications:    1.Dietary Modifications: Ideal Protein - 4oz of protein 2x/day, unlimited raw vegetables, 2 ideal protein shakes/day. 64oz of water.    2.Physical Activity: Going to start PT on 10/8 - knee is "bone on bone".     3.Medication Therapy: Patient has been compliant with the medication regimen.    4.Patient has the following medication side effects/concerns:   (Frequency/Alleviating factors/Precipitating factors, etc.)     Follow up with MsTi Carolina Jossue completed. No further questions or concerns. Will continue to follow up to achieve health goals.    "

## 2018-10-05 ENCOUNTER — HOSPITAL ENCOUNTER (OUTPATIENT)
Dept: CARDIOLOGY | Facility: CLINIC | Age: 63
Discharge: HOME | End: 2018-10-05
Attending: INTERNAL MEDICINE
Payer: COMMERCIAL

## 2018-10-05 DIAGNOSIS — I10 HYPERTENSION, UNSPECIFIED TYPE: ICD-10-CM

## 2018-10-05 DIAGNOSIS — R60.9 EDEMA, UNSPECIFIED TYPE: ICD-10-CM

## 2018-10-05 LAB
DIASTOLIC DYSFUNCTION: NO
ESTIMATED PA SYSTOLIC PRESSURE: 19.97
RETIRED EF AND QEF - SEE NOTES: 65 (ref 55–65)
TRICUSPID VALVE REGURGITATION: NORMAL

## 2018-10-05 PROCEDURE — 93306 TTE W/DOPPLER COMPLETE: CPT | Mod: S$GLB,,, | Performed by: INTERNAL MEDICINE

## 2018-10-10 ENCOUNTER — CLINICAL SUPPORT (OUTPATIENT)
Dept: REHABILITATION | Facility: HOSPITAL | Age: 63
End: 2018-10-10
Payer: COMMERCIAL

## 2018-10-10 DIAGNOSIS — R26.89 DECREASED FUNCTIONAL MOBILITY: ICD-10-CM

## 2018-10-10 DIAGNOSIS — M62.81 MUSCLE WEAKNESS: ICD-10-CM

## 2018-10-10 DIAGNOSIS — G89.29 CHRONIC PAIN OF BOTH KNEES: ICD-10-CM

## 2018-10-10 DIAGNOSIS — M25.562 CHRONIC PAIN OF BOTH KNEES: ICD-10-CM

## 2018-10-10 DIAGNOSIS — M25.561 CHRONIC PAIN OF BOTH KNEES: ICD-10-CM

## 2018-10-10 PROCEDURE — 97161 PT EVAL LOW COMPLEX 20 MIN: CPT | Mod: PN

## 2018-10-10 PROCEDURE — 97110 THERAPEUTIC EXERCISES: CPT | Mod: PN

## 2018-10-10 NOTE — PATIENT INSTRUCTIONS
Strengthening: Quadriceps Set    Tighten muscles on top of thighs by pushing knees down into surface. Hold 5 seconds.  Repeat 10 times each leg per set. Do 2 sets per session. Do 1 sessions per day.      Straight Leg Raise     With right leg straight, other leg bent, raise straight leg until knees are even. Slowly lower.   Repeat 10 times each leg per set. Do 3 sets per session. Do 1 sessions per day.         Clam Shells    Lie on side with knees bent. Raise top leg, keeping knee bent and ankles together. Do not let your hips roll back as your raise your leg.  Repeat 10 times each leg per set. Do 3 sets per session. Do 1 sessions per day.      Copyright © [employer]. All rights reserved.

## 2018-10-10 NOTE — PLAN OF CARE
"[facility]  Physical Therapy Initial Evaluation    Name: Carolina Marcum  Clinic Number: [id number]    Therapy Diagnosis:   Encounter Diagnoses   Name Primary?    Chronic pain of both knees     Muscle weakness     Decreased functional mobility      Physician: Deedee Mcgraw NP    Physician Orders: PT Eval and Treat   Medical Diagnosis from Referral: M17.0 (ICD-10-CM) - Primary osteoarthritis of both knees  Evaluation Date: 10/10/2018  Authorization Period Expiration: 2018  Plan of Care Expiration: 12/10/18  Visit # / Visits authorized:     Time In: 1410  Time Out: 1500  Total Billable Time: 50 minutes (LCE-1, TE-1)    Precautions: Standard    Subjective     Date of onset: Chronic    History of current condition - Carolina reports: he knee pain has worsened in the last 6 weeks. Pt states she has "bone on bone" on the medial side of her R knee. "I don't walk straight, I walk like a penguin." has swelling in B knees. If performs exercises, will ice knees. Is on a weight loss and is hoping to lose more weight. She's already lost 9 pounds. Weightbearing and stairs increases pain. Has had knee injections in the past.        Past Medical History:   Diagnosis Date    Abnormal Pap smear of cervix     [facility] - [facility], then Hyst    Asthma, chronic     Bleeding hemorrhoids     Depression     Difficult intubation     Diverticular disease     Endometriosis     Fatty liver 12/15/2014    Herpes simplex without mention of complication     Hypercholesterolemia     Hypertension     Multiple gastric polyps 2012    Obesity     Reflux     Thyroid disease      Carolina Marcum  has a past surgical history that includes Total thyroidectomy; Cholecystectomy; Thyroidectomy; Toe Surgery; Oophorectomy (age 42); Cervical conization w/ laser ();  section; Hysterectomy (age 42); Breast biopsy (Left, 2017); Breast biopsy (Right); ESOPHAGOGASTRODUODENOSCOPY (EGD) (N/A, " 6/21/2018); and EGD (ESOPHAGOGASTRODUODENOSCOPY) (N/A, 1/15/2013).    Carolina has a current medication list which includes the following prescription(s): amlodipine, azelastine, budesonide-formoterol 160-4.5 mcg, zyrtec, chlorthalidone, diclofenac sodium, diphenhydramine, epinephrine, etodolac, hydrocortisone, hydrocortisone-pramoxine, metformin, montelukast, omeprazole, synthroid, and terconazole.    Review of patient's allergies indicates:   Allergen Reactions    Losartan Swelling     Possible throat closing sensation.    Cefuroxime Itching and Rash        Imaging, xray 10/1/18: FINDINGS:  Severe narrowing of the medial femoral tibial joints right greater than left.  No effusion.    Prior Therapy: Yes  Social History:  lives alone  Prior Level of Function: Independent  Current Level of Function: Independent    Pain:  Current 6/10, worst 9/10, best 3/10   Location: bilateral knee   Description: Aching and Tight  Aggravating Factors: Standing, Walking and Getting out of bed/chair  Easing Factors: pain medication and ice    Pts goals: strengthen B LE    Objective       Posture: sitting: slumped w/ forward head and rounded shoulders  Palpation: +TTP B med/lat knee joint line, B ITB    AROM:   R: -2-0-108  L: -1-0-101    PROM:   R: -4-0-101  L: +4-0-120    L/E Strength Right Left Pain/Dysfunction with Movement   (approx 4 sec hold w/ max contraction)   Hip Flexion 4/5  4/5     Hip Abduction 4/5  4/5    Quadriceps 4+/5  4+/5     Hamstrings 4/5  4/5         Leg length:  R: 84cm  L: 87cm      CMS Impairment/Limitation/Restriction for FOTO Knee Survey    Therapist reviewed FOTO scores for Carolina Chapaite on 10/10/2018.   FOTO documents entered into [email or web address] - see Media section.    Limitation Score: 72%           TREATMENT   Treatment Time In: 1452  Treatment Time Out: 1500  Total Treatment time separate from Evaluation: 8 minutes    Carolina received therapeutic exercises to develop strength and endurance for 8 minutes  "including:    FOTO: 1/10      QS: 5x5"  SLR: x10 B  Clams: x10 B        Home Exercises and Patient Education Provided    Education provided:   - role of therapy    Written Home Exercises Provided: yes.  Exercises were reviewed and Carolina was able to demonstrate them prior to the end of the session.  Carolina demonstrated good  understanding of the education provided.     See EMR under Patient Instructions for exercises provided 10/10/2018.    Assessment   Carolina is a 63 y.o. female referred to outpatient Physical Therapy with a medical diagnosis of M17.0 (ICD-10-CM) - Primary osteoarthritis of both knees. Pt presents to PT with pain, decreased right and left knee ROM, decreased strength, poor posture, impaired balance and gait, and functional deficits with walking. Pt would benefit from skilled PT consisting of manual therapy including STM/MFR right and left  knee, patellar mobs, knee flex/ext mobs/stretching; therapeutic exercise including LE strengthening/stretching, postural education, balance and gait training, and modalities prn to address limitations and increase functional mobility.      Pt prognosis is Good.   Pt will benefit from skilled outpatient Physical Therapy to address the deficits stated above and in the chart below, provide pt/family education, and to maximize pt's level of independence.     Plan of care discussed with patient: Yes  Pt's spiritual, cultural and educational needs considered and patient is agreeable to the plan of care and goals as stated below:     Anticipated Barriers for therapy: none    Medical Necessity is demonstrated by the following  History  Co-morbidities and personal factors that may impact the plan of care Co-morbidities:   -Arthritis, Back pain, BMI over 30, Cancer, High Blood Pressure, Prior  Surgery, Sleep dysfunction    Personal Factors:   no deficits     high   Examination  Body Structures and Functions, activity limitations and participation restrictions that may impact " the plan of care Body Regions:   head  neck  back  lower extremities  upper extremities  trunk    Body Systems:    gross symmetry  ROM  strength  gross coordinated movement  balance  gait  transfers    Participation Restrictions:   None stated    Activity limitations:   Learning and applying knowledge  no deficits    General Tasks and Commands  no deficits    Communication  no deficits    Mobility  lifting and carrying objects  walking    Self care  no deficits    Domestic Life  shopping  cooking  doing house work (cleaning house, washing dishes, laundry)    Interactions/Relationships  no deficits    Life Areas  no deficits    Community and Social Life  no deficits         high   Clinical Presentation stable and uncomplicated low   Decision Making/ Complexity Score: low     Goals:  Short Term Goals:    1. Pt will be independent with HEP  2. Pt will improve B LE strength to at least 4+/5 in order to improve functional mobility  3. Pt will improve L knee AROM to at least 108 degrees flexion in order to improve gait    Long Term Goals:  1. Pt will be independent with updated HEP  2. Pt will improve B LE strength to grossly 5/5 in order to improve functional mobility  3. Pt will improve FOTO knee survey score to </= 52% limited in order to demo improved functional mobility  4. Pt will perform at least 10 sit to stands without UE support on 30 second sit to stand test in order to demo improved ability to perform transfers          Plan   Plan of care Certification: 10/10/2018 to 12/10/18.    Outpatient Physical Therapy 2 times weekly for 8 weeks to include the following interventions: Gait Training, Manual Therapy, Neuromuscular Re-ed, Orthotic Management and Training, Patient Education, Therapeutic Activites and Therapeutic Exercise.     Tanika Ellis, PT

## 2018-10-17 ENCOUNTER — CLINICAL SUPPORT (OUTPATIENT)
Dept: REHABILITATION | Facility: HOSPITAL | Age: 63
End: 2018-10-17
Payer: COMMERCIAL

## 2018-10-17 ENCOUNTER — OFFICE VISIT (OUTPATIENT)
Dept: INTERNAL MEDICINE | Facility: CLINIC | Age: 63
End: 2018-10-17
Payer: COMMERCIAL

## 2018-10-17 VITALS
BODY MASS INDEX: 40.3 KG/M2 | DIASTOLIC BLOOD PRESSURE: 74 MMHG | HEIGHT: 65 IN | SYSTOLIC BLOOD PRESSURE: 121 MMHG | WEIGHT: 241.88 LBS | HEART RATE: 68 BPM | OXYGEN SATURATION: 97 %

## 2018-10-17 DIAGNOSIS — E74.39 GLUCOSE INTOLERANCE: ICD-10-CM

## 2018-10-17 DIAGNOSIS — Z00.00 PREVENTATIVE HEALTH CARE: ICD-10-CM

## 2018-10-17 DIAGNOSIS — G47.33 OSA (OBSTRUCTIVE SLEEP APNEA): ICD-10-CM

## 2018-10-17 DIAGNOSIS — E78.5 HYPERLIPIDEMIA, UNSPECIFIED HYPERLIPIDEMIA TYPE: ICD-10-CM

## 2018-10-17 DIAGNOSIS — R60.9 EDEMA, UNSPECIFIED TYPE: ICD-10-CM

## 2018-10-17 DIAGNOSIS — E03.9 HYPOTHYROIDISM, UNSPECIFIED TYPE: ICD-10-CM

## 2018-10-17 DIAGNOSIS — E66.01 MORBID OBESITY WITH BMI OF 40.0-44.9, ADULT: ICD-10-CM

## 2018-10-17 DIAGNOSIS — R26.89 DECREASED FUNCTIONAL MOBILITY: ICD-10-CM

## 2018-10-17 DIAGNOSIS — M25.562 CHRONIC PAIN OF BOTH KNEES: ICD-10-CM

## 2018-10-17 DIAGNOSIS — M25.561 CHRONIC PAIN OF BOTH KNEES: ICD-10-CM

## 2018-10-17 DIAGNOSIS — G89.29 CHRONIC PAIN OF BOTH KNEES: ICD-10-CM

## 2018-10-17 DIAGNOSIS — M62.81 MUSCLE WEAKNESS: ICD-10-CM

## 2018-10-17 DIAGNOSIS — I10 HYPERTENSION, UNSPECIFIED TYPE: Primary | ICD-10-CM

## 2018-10-17 PROCEDURE — 99999 PR PBB SHADOW E&M-EST. PATIENT-LVL IV: CPT | Mod: PBBFAC,,, | Performed by: INTERNAL MEDICINE

## 2018-10-17 PROCEDURE — 3074F SYST BP LT 130 MM HG: CPT | Mod: CPTII,S$GLB,, | Performed by: INTERNAL MEDICINE

## 2018-10-17 PROCEDURE — 97140 MANUAL THERAPY 1/> REGIONS: CPT | Mod: PN

## 2018-10-17 PROCEDURE — 99214 OFFICE O/P EST MOD 30 MIN: CPT | Mod: S$GLB,,, | Performed by: INTERNAL MEDICINE

## 2018-10-17 PROCEDURE — 3078F DIAST BP <80 MM HG: CPT | Mod: CPTII,S$GLB,, | Performed by: INTERNAL MEDICINE

## 2018-10-17 PROCEDURE — 97110 THERAPEUTIC EXERCISES: CPT | Mod: PN

## 2018-10-17 PROCEDURE — 3008F BODY MASS INDEX DOCD: CPT | Mod: CPTII,S$GLB,, | Performed by: INTERNAL MEDICINE

## 2018-10-17 RX ORDER — LEVOTHYROXINE SODIUM 125 UG/1
125 TABLET ORAL DAILY
Qty: 90 TABLET | Refills: 1 | Status: SHIPPED | OUTPATIENT
Start: 2018-10-17 | End: 2019-03-22 | Stop reason: SDUPTHER

## 2018-10-17 NOTE — PROGRESS NOTES
"  Physical Therapy Daily Treatment Note     Name: Carolina [facility]  Clinic Number: [id number]    Therapy Diagnosis:   Encounter Diagnoses   Name Primary?    Chronic pain of both knees     Muscle weakness     Decreased functional mobility      Physician: Deedee Mcgraw NP    Visit Date: 10/17/2018    Physician Orders: PT Eval and Treat   Medical Diagnosis from Referral: M17.0 (ICD-10-CM) - Primary osteoarthritis of both knees  Evaluation Date: 10/10/2018  Authorization Period Expiration: 12/31/2018  Plan of Care Expiration: 12/10/18  Visit # / Visits authorized: 2/ 50     Time In: 0804  Time Out: 0850  Total Billable Time: 46 minutes (MT-2, TE-2)    Precautions: Standard    Subjective     Pt reports: she is feeling pretty good today.  She was not compliant with home exercise program.  Response to previous treatment: no adverse reaction  Functional change: no change      Objective     Carolina received therapeutic exercises to develop strength, endurance, flexibility and core stabilization for 23 minutes including:  below    FOTO: 2/10      HSS: 3x30' B  SAQ: 2x10 B  QS: 10x5"  SLR: 2x10 B  Bridges: 2x10 B  Clams: 2x10 B  SL Hip ABd: 2x10 B        Craolina received the following manual therapy techniques: Joint mobilizations, Myofacial release and Soft tissue Mobilization were applied to the: B knee for 23 minutes, including:  STM/MFR to B quad, HS, and gastroc, MFR B ITB; grade III-IV patellar mobs in all directions at approx 30 degrees knee flexion          Home Exercises Provided and Patient Education Provided     Education provided:   - importance of performing HEP    Written Home Exercises Provided: Patient instructed to cont prior HEP.  Exercises were reviewed and Carolina was able to demonstrate them prior to the end of the session.  Carolina demonstrated good  understanding of the education provided.     See EMR under Patient Instructions for exercises provided prior visit.    Assessment     Pt tolerated treatment " well with no c/o pain or discomfort. Pt with increased tissue tension in B quad, med HS, and ITB.    Carolina is progressing well towards her goals.   Pt prognosis is Good.     Pt will continue to benefit from skilled outpatient physical therapy to address the deficits listed in the problem list box on initial evaluation, provide pt/family education and to maximize pt's level of independence in the home and community environment.     Pt's spiritual, cultural and educational needs considered and pt agreeable to plan of care and goals.    Anticipated barriers to physical therapy: none    Goals:     Short Term Goals:     1. Pt will be independent with HEP  2. Pt will improve B LE strength to at least 4+/5 in order to improve functional mobility  3. Pt will improve L knee AROM to at least 108 degrees flexion in order to improve gait     Long Term Goals:  1. Pt will be independent with updated HEP  2. Pt will improve B LE strength to grossly 5/5 in order to improve functional mobility  3. Pt will improve FOTO knee survey score to </= 52% limited in order to demo improved functional mobility  4. Pt will perform at least 10 sit to stands without UE support on 30 second sit to stand test in order to demo improved ability to perform transfers    Plan     Cont POC.    Tanika Ellis, PT

## 2018-10-17 NOTE — PROGRESS NOTES
Pt. ID: Carolina Marcum is a 63 y.o. female      Chief complaint:   Chief Complaint   Patient presents with    Follow-up       HPI: Pt. Here for f/u for HTN and polyarthralgia; she is compliant with meds; she is seeing rheumatology for polyarthralgia and voltaren gel helps and she also received a steroid shot which helped;  echo showed mild tricuspid regurgitation with EF 60-65%; she is using pressure stockings which help; she has lost weight; she needs a refill on synthroid; TSH dated 8/8/18 was WNL; she uses CPAP nightly      Review of Systems   Constitutional: Negative for chills and fever.   Respiratory: Negative for cough.    Cardiovascular: Positive for leg swelling. Negative for chest pain.   Gastrointestinal: Negative for abdominal pain, nausea and vomiting.   Genitourinary: Negative for dysuria.         Objective:    Physical Exam   Constitutional: She is oriented to person, place, and time.   Morbid obesity   Eyes: EOM are normal.   Neck: Normal range of motion.   Cardiovascular: Normal rate, regular rhythm and normal heart sounds.   Pulmonary/Chest: Effort normal and breath sounds normal.   Abdominal: Soft. There is no tenderness. There is no rebound and no guarding.   Musculoskeletal: Normal range of motion. She exhibits edema.   Neurological: She is alert and oriented to person, place, and time.   Skin: No rash noted.   Vitals reviewed.        Health Maintenance   Topic Date Due    Mammogram  05/31/2019    Fecal Occult Blood Test (FOBT)/[employer]  08/10/2019    Lipid Panel  12/21/2021    TETANUS VACCINE  12/21/2026    Hepatitis C Screening  Completed    Influenza Vaccine  Completed    Zoster Vaccine  Addressed         Assessment:     1. Hypertension, unspecified type Well controlled   2. Hypothyroidism, unspecified type Well controlled   3. Glucose intolerance Active   4. Edema, unspecified type Well controlled   5. Hyperlipidemia, unspecified hyperlipidemia type Active   6. QUINTIN (obstructive  sleep apnea) Active   7. Morbid obesity with BMI of 40.0-44.9, adult Sub-optimally controlled   8. Preventative health care Active         Plan: Hypertension, unspecified type  Comments:  continue current regimen and encouraged low Na diet and weight loss  Orders:  -     CBC auto differential; Future; Expected date: 01/17/2019  -     Comprehensive metabolic panel; Future; Expected date: 01/17/2019  -     Urinalysis; Future; Expected date: 01/17/2019    Hypothyroidism, unspecified type  Comments:  continue current regimen   Orders:  -     SYNTHROID 125 mcg tablet; Take 1 tablet (125 mcg total) by mouth once daily.  Dispense: 90 tablet; Refill: 1  -     TSH; Future; Expected date: 01/17/2019    Glucose intolerance  Comments:  encouraged low sugar diet and repeat HGBA1C on f/u   Orders:  -     Hemoglobin A1c; Future; Expected date: 01/17/2019    Edema, unspecified type  Comments:  continue pressure stockings     Hyperlipidemia, unspecified hyperlipidemia type  Comments:  encouraged diet modification and get lipids on f/u   Orders:  -     Lipid panel; Future; Expected date: 01/17/2019    QUINTIN (obstructive sleep apnea)  Comments:  continue CPAP     Morbid obesity with BMI of 40.0-44.9, adult  Comments:  encouraged diet and explained risks     Preventative health care  -     CBC auto differential; Future; Expected date: 01/17/2019  -     Comprehensive metabolic panel; Future; Expected date: 01/17/2019  -     Lipid panel; Future; Expected date: 01/17/2019  -     Urinalysis; Future; Expected date: 01/17/2019        Problem List Items Addressed This Visit        Cardiac/Vascular    Hypertension - Primary    Relevant Orders    CBC auto differential    Comprehensive metabolic panel    Urinalysis    Hyperlipidemia    Relevant Orders    Lipid panel       Endocrine    Hypothyroid    Relevant Medications    SYNTHROID 125 mcg tablet    Other Relevant Orders    TSH    Glucose intolerance    Relevant Orders    Hemoglobin A1c    Morbid  obesity with BMI of 40.0-44.9, adult       Other    QUINTIN (obstructive sleep apnea)    Edema    Preventative health care    Relevant Orders    CBC auto differential    Comprehensive metabolic panel    Lipid panel    Urinalysis

## 2018-10-18 PROBLEM — G89.29 CHRONIC PAIN OF BOTH KNEES: Status: ACTIVE | Noted: 2018-10-18

## 2018-10-18 PROBLEM — M62.81 MUSCLE WEAKNESS: Status: ACTIVE | Noted: 2018-10-18

## 2018-10-18 PROBLEM — M25.561 CHRONIC PAIN OF BOTH KNEES: Status: ACTIVE | Noted: 2018-10-18

## 2018-10-18 PROBLEM — R26.89 DECREASED FUNCTIONAL MOBILITY: Status: ACTIVE | Noted: 2018-10-18

## 2018-10-18 PROBLEM — M25.562 CHRONIC PAIN OF BOTH KNEES: Status: ACTIVE | Noted: 2018-10-18

## 2018-10-18 RX ORDER — LEVOTHYROXINE SODIUM 125 UG/1
TABLET ORAL
Qty: 34 TABLET | Refills: 0 | Status: SHIPPED | OUTPATIENT
Start: 2018-10-18 | End: 2019-06-05 | Stop reason: SDUPTHER

## 2018-10-19 ENCOUNTER — CLINICAL SUPPORT (OUTPATIENT)
Dept: REHABILITATION | Facility: HOSPITAL | Age: 63
End: 2018-10-19
Payer: COMMERCIAL

## 2018-10-19 DIAGNOSIS — M25.561 CHRONIC PAIN OF BOTH KNEES: ICD-10-CM

## 2018-10-19 DIAGNOSIS — M25.562 CHRONIC PAIN OF BOTH KNEES: ICD-10-CM

## 2018-10-19 DIAGNOSIS — R26.89 DECREASED FUNCTIONAL MOBILITY: ICD-10-CM

## 2018-10-19 DIAGNOSIS — M62.81 MUSCLE WEAKNESS: ICD-10-CM

## 2018-10-19 DIAGNOSIS — G89.29 CHRONIC PAIN OF BOTH KNEES: ICD-10-CM

## 2018-10-19 PROCEDURE — 97140 MANUAL THERAPY 1/> REGIONS: CPT | Mod: PN

## 2018-10-19 PROCEDURE — 97110 THERAPEUTIC EXERCISES: CPT | Mod: PN

## 2018-10-19 NOTE — PROGRESS NOTES
"  Physical Therapy Daily Treatment Note     Name: Carolina [facility]  Clinic Number: [id number]    Therapy Diagnosis:   Encounter Diagnoses   Name Primary?    Chronic pain of both knees     Muscle weakness     Decreased functional mobility      Physician: Deedee Mcgraw NP    Visit Date: 10/19/2018    Physician Orders: PT Eval and Treat   Medical Diagnosis from Referral: M17.0 (ICD-10-CM) - Primary osteoarthritis of both knees  Evaluation Date: 10/10/2018  Authorization Period Expiration: 12/31/2018  Plan of Care Expiration: 12/10/18  Visit # / Visits authorized: 3/ 50     Time In: 0205  Time Out: 0310  Total Billable Time: 55 minutes (MT-1, TE-3)    Precautions: Standard    Subjective     Pt reports: she has been having R sided low back and buttock pain and B knee pain as well. Pt received injections in B knees about 2 weeks ago. Lost her R heel lift.  She was not compliant with home exercise program.  Response to previous treatment: no adverse reaction  Functional change: no change  Pain: 8/10 low back, medial R knee 8/10, L knee 5/10    Objective     Carolina received therapeutic exercises to develop strength, endurance, flexibility and core stabilization for 40 minutes including:  below    FOTO: 3/10    HSS: 3x30' B  SAQ: 2x10 B NT  QS: 20x5" towel under knees  SLR: 2x10 B  Bridges: 2x15 B  Clams: 2x10 B  SL Hip ABd: 2x10 B    Carolina received the following manual therapy techniques: Joint mobilizations, Myofacial release and Soft tissue Mobilization were applied to the: B knee for 15 minutes, including:  Hip assessment and leg length assessment, B grade III-IV patellar mobs in all directions at approx 30 degrees knee flexion, and supine MET and shotgun x 3 for R posteriorly rotated innominate     Pt then received MHP to lumbar spine x 10 mins in supine to increase blood flow and decrease low back pain. B knee CP x 10 mins in supine with MHP for low back to decrease pain.    Home Exercises Provided and Patient " Education Provided     Education provided:   - importance of performing HEP    Written Home Exercises Provided: Patient instructed to cont prior HEP.  Exercises were reviewed and Carolina was able to demonstrate them prior to the end of the session.  Carolina demonstrated good  understanding of the education provided.     See EMR under Patient Instructions for exercises provided prior visit.    Assessment     Pt reported decreased low back and B knee pain after session today. Pt reported that she lost her R heel lift and states that it helped with her R knee and low back pain. Pt innominate remained unchanged after MET and possible R upslip or true leg length discrepancy can be present. Can give heel lift on R again next.    Carolina is progressing well towards her goals.   Pt prognosis is Good.     Pt will continue to benefit from skilled outpatient physical therapy to address the deficits listed in the problem list box on initial evaluation, provide pt/family education and to maximize pt's level of independence in the home and community environment.     Pt's spiritual, cultural and educational needs considered and pt agreeable to plan of care and goals.    Anticipated barriers to physical therapy: none    Goals:     Short Term Goals:     1. Pt will be independent with HEP  2. Pt will improve B LE strength to at least 4+/5 in order to improve functional mobility  3. Pt will improve L knee AROM to at least 108 degrees flexion in order to improve gait     Long Term Goals:  1. Pt will be independent with updated HEP  2. Pt will improve B LE strength to grossly 5/5 in order to improve functional mobility  3. Pt will improve FOTO knee survey score to </= 52% limited in order to demo improved functional mobility  4. Pt will perform at least 10 sit to stands without UE support on 30 second sit to stand test in order to demo improved ability to perform transfers    Plan     Cont POC.    Stephan Lugo, PT

## 2018-10-23 ENCOUNTER — CLINICAL SUPPORT (OUTPATIENT)
Dept: REHABILITATION | Facility: HOSPITAL | Age: 63
End: 2018-10-23
Payer: COMMERCIAL

## 2018-10-23 ENCOUNTER — PATIENT OUTREACH (OUTPATIENT)
Dept: OTHER | Facility: OTHER | Age: 63
End: 2018-10-23

## 2018-10-23 DIAGNOSIS — M25.562 CHRONIC PAIN OF BOTH KNEES: ICD-10-CM

## 2018-10-23 DIAGNOSIS — G89.29 CHRONIC PAIN OF BOTH KNEES: ICD-10-CM

## 2018-10-23 DIAGNOSIS — M25.561 CHRONIC PAIN OF BOTH KNEES: ICD-10-CM

## 2018-10-23 DIAGNOSIS — M62.81 MUSCLE WEAKNESS: ICD-10-CM

## 2018-10-23 DIAGNOSIS — R26.89 DECREASED FUNCTIONAL MOBILITY: ICD-10-CM

## 2018-10-23 PROCEDURE — 97110 THERAPEUTIC EXERCISES: CPT | Mod: PN

## 2018-10-23 NOTE — PROGRESS NOTES
"  Physical Therapy Daily Treatment Note     Name: Carolina [facility]  Clinic Number: [id number]    Therapy Diagnosis:   Encounter Diagnoses   Name Primary?    Chronic pain of both knees     Muscle weakness     Decreased functional mobility      Physician: Deedee Mcgraw NP    Visit Date: 10/23/2018    Physician Orders: PT Eval and Treat   Medical Diagnosis from Referral: M17.0 (ICD-10-CM) - Primary osteoarthritis of both knees  Evaluation Date: 10/10/2018  Authorization Period Expiration: 12/31/2018  Plan of Care Expiration: 12/10/18  Visit # / Visits authorized: 4/ 50     Time In: 2:00  Time Out: 3:10  Total Billable Time: 55 (TE-4)     Precautions: Standard    Subjective     Pt reports: she is doing well today and has had a decrease in pain  She was compliant with home exercise program.  Response to previous treatment: no adverse effects  Functional change: none    Pain: 2/10  Location: B knees, R knee worse today     Objective     Carolina received therapeutic exercises to develop strength, endurance and ROM for 55 minutes including:    FOTO: 4/10     Bike: 5'  HSS: 3x30' B  SAQ: 2x10 B   QS: 20x5" towel under knees  SLR: 3x10 B  Bridges: 2x15 B  Clams: 2x10 B  SL Hip ABd: 2x10 B  Calf stretch- on fitter 3 x30"  Steamboats YTB 2x10      Pt received  B knee CP x 10 mins in supine with MHP for low back to decrease pain.    Home Exercises Provided and Patient Education Provided     Education provided:   - Continue HEP, updated HEP provided and RTB provided.     Written Home Exercises Provided: Patient instructed to cont prior HEP.  Exercises were reviewed and Carolina was able to demonstrate them prior to the end of the session.  Carolina demonstrated good  understanding of the education provided.   See EMR under Patient Instructions for exercises provided prior visit.    Assessment     Pt able to progress with there-ex today with no report of pain.   Carolina is progressing well towards her goals.   Pt prognosis is " Excellent.     Pt will continue to benefit from skilled outpatient physical therapy to address the deficits listed in the problem list box on initial evaluation, provide pt/family education and to maximize pt's level of independence in the home and community environment.   Pt's spiritual, cultural and educational needs considered and pt agreeable to plan of care and goals.  Anticipated barriers to physical therapy: none    Goals:   Short Term Goals:  1. Pt will be independent with HEP  2. Pt will improve B LE strength to at least 4+/5 in order to improve functional mobility  3. Pt will improve L knee AROM to at least 108 degrees flexion in order to improve gait     Long Term Goals:  1. Pt will be independent with updated HEP  2. Pt will improve B LE strength to grossly 5/5 in order to improve functional mobility  3. Pt will improve FOTO knee survey score to </= 52% limited in order to demo improved functional mobility  4. Pt will perform at least 10 sit to stands without UE support on 30 second sit to stand test in order to demo improved ability to perform transfers    Plan     Continue POC. Progress as tolerated.  Demetra Chaudhary, PTA

## 2018-10-23 NOTE — PROGRESS NOTES
Last 5 Patient Entered Readings                                      Current 30 Day Average: 129/83     Recent Readings 10/16/2018 10/8/2018 10/8/2018 10/3/2018 10/3/2018    SBP (mmHg) 123 127 120 114 129    DBP (mmHg) 79 83 84 78 86    Pulse 75 87 84 87 87        Patient's BP average is controlled based on 2017 ACC/AHA HTN guidelines of goal BP <130/80.      Ms. Marcum is doing well. She continues to do well with [employer] diet. She has lost about 13-14 lbs in the past month using their program. She is pleased with her BP and her progress so far. Praise and encouragement provided.     Patient's health , Samantha Albright, will be following up every 3-4 weeks. I will continue to monitor regularly and will follow up in 3-4 months, sooner if BP begins to trend upward or downward.    Patient has my contact information and knows to call with any concerns or clinical changes.     Current HTN regimen:  Hypertension Medications             amLODIPine (NORVASC) 10 MG tablet TAKE 1 TABLET BY MOUTH ONCE DAILY    chlorthalidone (HYGROTEN) 25 MG Tab TAKE 1 TABLET BY MOUTH ONCE DAILY

## 2018-10-23 NOTE — PATIENT INSTRUCTIONS
Quadriceps (Straight Leg Raises)        Lie flat with ____ pound weight around left ankle.  Keeping knee straight, lift ____ inches.  May do exercise sitting with back against wall. Keep back straight.  Hold ____ seconds. Repeat ____ times. Do ____ sessions per day.  CAUTION: Move slowly, avoid jerking.    Copyright © [employer]. All rights reserved.   Bridge        Lie back, legs bent. Inhale, pressing hips up. Keeping ribs in, lengthen lower back. Exhale, rolling down along spine from top.  Repeat ____ times. Do ____ sessions per day.     [email or web address]     Copyright © [employer]. All rights reserved.     Side Lying Hip Abduction (Strength)    1. Lie down on the floor on your side. Rest your head on your arm. Bend your legs at the knees.  2. Keep your feet together and lift your top leg up so that your knees are . Keep your hips steady.     3. Slowly lower your leg back down.  4. Repeat 10 times, or as instructed.  5. Switch sides if instructed.     Challenge yourself  Put an elastic band or tubing around your thighs. Raise and lower your top leg slowly and steadily.      Date Last Reviewed: 3/29/2016  © 0028-1571 [employer]. [address] [place] [address]. All rights reserved. This information is not intended as a substitute for professional medical care. Always follow your healthcare professional's instructions.      ABDUCTION: Side-Lying (Active)        Lie on left side, top leg straight. Raise top leg as far as possible. Use ___ lbs.  Complete ___ sets of ___ repetitions. Perform ___ sessions per day.     [email or web address]     Copyright © [employer]. All rights reserved.   Quad Sets        Slowly tighten thigh muscles of straight, left leg while counting out loud to ____. Relax.  Repeat ____ times. Do ____ sessions per day.     [email or web address]     Copyright © [employer]. All rights reserved.

## 2018-10-25 ENCOUNTER — CLINICAL SUPPORT (OUTPATIENT)
Dept: REHABILITATION | Facility: HOSPITAL | Age: 63
End: 2018-10-25
Payer: COMMERCIAL

## 2018-10-25 DIAGNOSIS — M62.81 MUSCLE WEAKNESS: ICD-10-CM

## 2018-10-25 DIAGNOSIS — G89.29 CHRONIC PAIN OF BOTH KNEES: ICD-10-CM

## 2018-10-25 DIAGNOSIS — M25.561 CHRONIC PAIN OF BOTH KNEES: ICD-10-CM

## 2018-10-25 DIAGNOSIS — R26.89 DECREASED FUNCTIONAL MOBILITY: ICD-10-CM

## 2018-10-25 DIAGNOSIS — M25.562 CHRONIC PAIN OF BOTH KNEES: ICD-10-CM

## 2018-10-25 PROCEDURE — 97140 MANUAL THERAPY 1/> REGIONS: CPT | Mod: PN

## 2018-10-25 PROCEDURE — 97110 THERAPEUTIC EXERCISES: CPT | Mod: PN

## 2018-10-30 ENCOUNTER — CLINICAL SUPPORT (OUTPATIENT)
Dept: REHABILITATION | Facility: HOSPITAL | Age: 63
End: 2018-10-30
Payer: COMMERCIAL

## 2018-10-30 DIAGNOSIS — M62.81 MUSCLE WEAKNESS: ICD-10-CM

## 2018-10-30 DIAGNOSIS — M25.562 CHRONIC PAIN OF BOTH KNEES: ICD-10-CM

## 2018-10-30 DIAGNOSIS — M25.561 CHRONIC PAIN OF BOTH KNEES: ICD-10-CM

## 2018-10-30 DIAGNOSIS — G89.29 CHRONIC PAIN OF BOTH KNEES: ICD-10-CM

## 2018-10-30 DIAGNOSIS — R26.89 DECREASED FUNCTIONAL MOBILITY: ICD-10-CM

## 2018-10-30 PROCEDURE — 97110 THERAPEUTIC EXERCISES: CPT | Mod: PN

## 2018-10-30 PROCEDURE — 97140 MANUAL THERAPY 1/> REGIONS: CPT | Mod: PN

## 2018-10-30 NOTE — PROGRESS NOTES
"DAILY TREATMENT NOTE    DATE: 10/30/2018    Start Time:  100  Stop Time:  205    PROCEDURES:    TIMED  Procedure Min.   Manual therapy 12   Therex 45                 UNTIMED  Procedure Min.   Bike 5         Total Timed Minutes:  57  Total Timed Units:  4  Total Untimed Units:  0  Charges Billed/# of units:  4 MTx1, TEx3      Progress/Current Status    Subjective:     Patient ID: Carolina Marcum is a 63 y.o. female.  Diagnosis:   1. Chronic pain of both knees     2. Muscle weakness     3. Decreased functional mobility       "tendons on the outside feel tight"  (Right knee)    Objective:     MT x 12' consisting of STM/MFR to R lateral quad and HS, MFR R ITB.  Carolina received therapeutic exercises to develop strength, endurance, flexibility and core stabilization for 45 minutes including added weight and increased reps all as below:    FOTO: 6/10 DONE     Bike: 5'    HSS: 3x30' B  SAQ: 1# 2x10 B   QS: 20x5" towel under B knees  SLR: 3x10 B  Bridges: 2x15 B  Clams: 3" 2x10 B  SL Hip ABd: 2x10 B    Calf stretch on fitter 3 x30"  Steamboats YTB 2x12 held    Leg Press: 4.5 2x15        Assessment:     Patient able to increase activity slightly as noted without complaint of pain or difficulty.  Hesitant initially to add weight due to fear of increased pain.  Able to tolerate.  Reports "ok" after treatment.    Patient Education/Response:     Patient educated to continue HEP.  Verbalized understanding.    Plans and Goals:     Continue PT per POC, progress as able.    Short Term Goals:  1. Pt will be independent with HEP  2. Pt will improve B LE strength to at least 4+/5 in order to improve functional mobility  3. Pt will improve L knee AROM to at least 108 degrees flexion in order to improve gait     Long Term Goals:  1. Pt will be independent with updated HEP  2. Pt will improve B LE strength to grossly 5/5 in order to improve functional mobility  3. Pt will improve FOTO knee survey score to </= 52% limited in order to demo " improved functional mobility  4. Pt will perform at least 10 sit to stands without UE support on 30 second sit to stand test in order to demo improved ability to perform transfers

## 2018-11-02 ENCOUNTER — OFFICE VISIT (OUTPATIENT)
Dept: RHEUMATOLOGY | Facility: CLINIC | Age: 63
End: 2018-11-02
Payer: COMMERCIAL

## 2018-11-02 VITALS
HEIGHT: 65 IN | DIASTOLIC BLOOD PRESSURE: 73 MMHG | HEART RATE: 73 BPM | BODY MASS INDEX: 39.65 KG/M2 | WEIGHT: 238 LBS | SYSTOLIC BLOOD PRESSURE: 115 MMHG

## 2018-11-02 DIAGNOSIS — M35.01 SICCA SYNDROME WITH KERATOCONJUNCTIVITIS: Primary | ICD-10-CM

## 2018-11-02 DIAGNOSIS — M15.9 PRIMARY OSTEOARTHRITIS INVOLVING MULTIPLE JOINTS: ICD-10-CM

## 2018-11-02 PROCEDURE — 99999 PR PBB SHADOW E&M-EST. PATIENT-LVL III: CPT | Mod: PBBFAC,,, | Performed by: INTERNAL MEDICINE

## 2018-11-02 PROCEDURE — 99214 OFFICE O/P EST MOD 30 MIN: CPT | Mod: S$GLB,,, | Performed by: INTERNAL MEDICINE

## 2018-11-02 PROCEDURE — 3074F SYST BP LT 130 MM HG: CPT | Mod: CPTII,S$GLB,, | Performed by: INTERNAL MEDICINE

## 2018-11-02 PROCEDURE — 3008F BODY MASS INDEX DOCD: CPT | Mod: CPTII,S$GLB,, | Performed by: INTERNAL MEDICINE

## 2018-11-02 PROCEDURE — 3078F DIAST BP <80 MM HG: CPT | Mod: CPTII,S$GLB,, | Performed by: INTERNAL MEDICINE

## 2018-11-02 NOTE — PROGRESS NOTES
Subjective:       Patient ID: Carolina Marcum is a 60 y.o. female.    Chief Complaint: Joint Pain    HPI   61 yo female with asthma, diverticulitis, depression,  Cervix carcinoma in situ in 1989, GERD, potential retinal detachments s/p laser treatment here for evaluation of pain.   Reports pain in knees. Reports knee pain in last year. She reports in September of 2014, she  Had right ankle and right knee pain from, dancing.  She had steroid shot INTO RIGHT KNEE with not much improvement. Pain is nonradiating  She reports doing stress eating and gaining weight in last year. She reports swelling off and on of right knee for months.  She was on naproxen 500mg as needed and she reports it might have helped.   Denies any rashes, oral ulcers, or hair loss. Patient denies dry eyes or dry mouth.      Interval history:  Patient is here for follow up. She has dry eyes and dry mouth for a year and half. She uses systane for dry eyes.  She denies cavities.   She is pain in both knees, hands, elbows. Pain in right knee is the worse 6/10.  Gripping makes pain worse.   She gets nodules in fingers in hands.   Reports that taking naproxen as needed for knee pain with improvement. She had episode of arthralgias in beginning of October.       Past Medical History   Diagnosis Date    Asthma, chronic     Hypercholesterolemia     Diverticular disease     Reflux     Bleeding hemorrhoids     Obesity     Depression     CIS (carcinoma in situ of cervix)     Herpes simplex without mention of complication     Endometriosis     Abnormal Pap smear 1989     Colposcopy, Laser    Fatty liver 12/15/2014    Multiple gastric polyps 7/21/2012       Review of Systems   Constitutional: Negative for chills, diaphoresis, activity change, appetite change and fatigue.   HENT: Negative for congestion, ear discharge, ear pain, facial swelling, mouth sores, sinus pressure, sneezing, sore throat, tinnitus and trouble swallowing.    Eyes:  Negative for photophobia, pain, discharge, redness, itching and visual disturbance.   Respiratory: Negative for apnea, chest tightness, shortness of breath, wheezing and stridor.    Cardiovascular: Negative for leg swelling.   Gastrointestinal: Negative for nausea, abdominal pain, diarrhea, constipation, blood in stool, abdominal distention and anal bleeding.   Endocrine: Negative for cold intolerance and heat intolerance.   Genitourinary: Negative for dysuria and difficulty urinating.   Musculoskeletal: Positive for arthralgias. Negative for myalgias, back pain, joint swelling, gait problem, neck pain and neck stiffness.   Skin: Negative for color change, pallor, rash and wound.   Neurological: Negative for dizziness, seizures, light-headedness and numbness.   Hematological: Negative for adenopathy. Does not bruise/bleed easily.   Psychiatric/Behavioral: Negative for sleep disturbance. The patient is not nervous/anxious.              Objective:        Physical Exam   Constitutional: She is oriented to person, place, and time.   HENT:   Head: Normocephalic and atraumatic.   Right Ear: External ear normal.   Left Ear: External ear normal.   Nose: Nose normal.   Mouth/Throat: Oropharynx is clear and moist. No oropharyngeal exudate.   Eyes: Conjunctivae and EOM are normal. Pupils are equal, round, and reactive to light. Right eye exhibits no discharge. Left eye exhibits no discharge. No scleral icterus.   Neck: Neck supple. No JVD present. No thyromegaly present.   Cardiovascular: Normal rate, regular rhythm, normal heart sounds and intact distal pulses.  Exam reveals no gallop and no friction rub.    No murmur heard.  Pulmonary/Chest: Effort normal and breath sounds normal. No respiratory distress. She has no wheezes. She has no rales. She exhibits no tenderness.   Abdominal: Soft. Bowel sounds are normal. She exhibits no distension and no mass. There is no tenderness. There is no rebound and no guarding.    Lymphadenopathy:     She has no cervical adenopathy.   Neurological: She is alert and oriented to person, place, and time. No cranial nerve deficit. Gait normal. Coordination normal.   Skin: candidiasis in inguinal region on right  Psychiatric: Affect and judgment normal.   Musculoskeletal: She exhibits no edema or tenderness.         FROM of all joints including neck, shoulders, spine, ankles, wrists, knees, and ankles; no joint deformities noted or effusions, erythema or warmth; no tophi or nodules noted; no crepitus; no synovitis noted in hands or feet; no nail pitting or onycholysis     ZULMA-+  Ro+    Imaging:  Knees (3/2015)- I personally reviewed knee xrays; DJD; ?early chondrocalcinosis    Assessment:      60 yo female with asthma, diverticulitis, depression,  YOUNG , GERD, Cervix carcinoma in situ in 1989,  here for follow up of Sjogrens.      1) Sjogrens:She has +ZULMA , ssa, and sicca symptoms. Repeat ssa was negative. Had long discussion with patient. Told her that the only way to confirm the Sjogrens is via lip biopsy.  Patient declines biopsy.  -continue systane eye drops  Declines pilocarpine    2) Bilateral knee OA- followed by ortho and getting steroid injections  - can consider plaquenil for chondrocalcinosis. Patient declines.  Continue voltaren gel      3) Obesity: encourage weight loss  Provided for patient diet for weight loss and inflammation   rtc in 12 months

## 2018-11-05 ENCOUNTER — CLINICAL SUPPORT (OUTPATIENT)
Dept: REHABILITATION | Facility: HOSPITAL | Age: 63
End: 2018-11-05
Payer: COMMERCIAL

## 2018-11-05 DIAGNOSIS — M25.561 CHRONIC PAIN OF BOTH KNEES: ICD-10-CM

## 2018-11-05 DIAGNOSIS — M25.562 CHRONIC PAIN OF BOTH KNEES: ICD-10-CM

## 2018-11-05 DIAGNOSIS — G89.29 CHRONIC PAIN OF BOTH KNEES: ICD-10-CM

## 2018-11-05 DIAGNOSIS — R26.89 DECREASED FUNCTIONAL MOBILITY: ICD-10-CM

## 2018-11-05 DIAGNOSIS — M62.81 MUSCLE WEAKNESS: ICD-10-CM

## 2018-11-05 PROCEDURE — 97110 THERAPEUTIC EXERCISES: CPT | Mod: PN

## 2018-11-05 NOTE — PROGRESS NOTES
"  Physical Therapy Daily Treatment Note     [facility]  [facility] Number: [id number]    Therapy Diagnosis:   Encounter Diagnoses   Name Primary?    Chronic pain of both knees     Muscle weakness     Decreased functional mobility      Physician: Deedee Mcgraw NP    Visit Date: 11/5/2018    Physician Orders: PT Eval and Treat   Medical Diagnosis from Referral: M17.0 (ICD-10-CM) - Primary osteoarthritis of both knees  Evaluation Date: 10/10/2018  Authorization Period Expiration: 12/31/2018  Plan of Care Expiration: 12/10/18  Visit # / Visits authorized: 7/ 50     Time In: 2:00  Time Out: 3:10  Total Billable Time: 55 (TE-4)     Precautions: Standard    Subjective     Pt reports: feeling about the same today  She was not compliant with home exercise program. Pt states she did a lot of house work over the weekend which is why she was unable to perform her HEP  Response to previous treatment: no adverse effects  Functional change: none    Pain: 5/10   Location: right medial knee    Objective     Carolina received therapeutic exercises to develop strength, endurance and ROM for 55 minutes including:     FOTO: 7/10      Bike: 5'     HSS: 3x30' B  SAQ: 2# 2x15 B   QS: 20x5" towel under B knees  SLR: 3x10 B  Bridges: 2x15 B w/ ball adduction  Clams: 3" 3x10 B  SL Hip ABd: 2x10 B    Calf stretch on fitter 3 x30"  Steamboats YTB 2x12 held  Heel raises 3x10  TKE 2x10 B PTC  Leg Press: 5.5 2x15    Pt received  B knee CP x 10 mins in supine with MHP for low back to decrease pain      Home Exercises Provided and Patient Education Provided     Education provided:   - Continue HEP    Written Home Exercises Provided: Patient instructed to cont prior HEP.  Exercises were reviewed and Carolina was able to demonstrate them prior to the end of the session.  Carolina demonstrated good  understanding of the education provided.   See EMR under Patient Instructions for exercises provided prior visit.    Assessment     Pt able to " tolerate increase in reps and weight, and addition of TKE's and heel raises with no report of pain.   Carolina is progressing well towards her goals.   Pt prognosis is Excellent.     Pt will continue to benefit from skilled outpatient physical therapy to address the deficits listed in the problem list box on initial evaluation, provide pt/family education and to maximize pt's level of independence in the home and community environment.   Pt's spiritual, cultural and educational needs considered and pt agreeable to plan of care and goals.    Anticipated barriers to physical therapy: none    Goals:   Short Term Goals:  1. Pt will be independent with HEP  2. Pt will improve B LE strength to at least 4+/5 in order to improve functional mobility  3. Pt will improve L knee AROM to at least 108 degrees flexion in order to improve gait     Long Term Goals:  1. Pt will be independent with updated HEP  2. Pt will improve B LE strength to grossly 5/5 in order to improve functional mobility  3. Pt will improve FOTO knee survey score to </= 52% limited in order to demo improved functional mobility  4. Pt will perform at least 10 sit to stands without UE support on 30 second sit to stand test in order to demo improved ability to perform transfers    Plan     Continue POC. Progress as tolerated.    Demetra Chaudhary, PTA

## 2018-11-06 ENCOUNTER — PATIENT OUTREACH (OUTPATIENT)
Dept: OTHER | Facility: OTHER | Age: 63
End: 2018-11-06

## 2018-11-06 NOTE — PROGRESS NOTES
"Last 5 Patient Entered Readings                                      Current 30 Day Average: 123/83     Recent Readings 11/4/2018 11/4/2018 10/23/2018 10/23/2018 10/16/2018    SBP (mmHg) 123 132 118 112 123    DBP (mmHg) 81 80 83 92 79    Pulse 81 82 77 76 75        Digital Medicine: Health  Follow Up    Feeling well.    Lifestyle Modifications:    1.Dietary Modifications: Following Ideal Protein. More fresh vegetables - raw cauliflower. Not adding salt. Has a lot of holiday events already planned. Will have a protein snack before going to events to hopefully not be tempted to eat as much "junk".    2.Physical Activity: PT 2x/wk, has not done exercises at home, and house work.    3.Medication Therapy: Patient has been compliant with the medication regimen.    4.Patient has the following medication side effects/concerns:   (Frequency/Alleviating factors/Precipitating factors, etc.)     Follow up with Ms. Figueroa Jossue completed. No further questions or concerns. Will continue to follow up to achieve health goals.    "

## 2018-11-08 ENCOUNTER — CLINICAL SUPPORT (OUTPATIENT)
Dept: REHABILITATION | Facility: HOSPITAL | Age: 63
End: 2018-11-08
Payer: COMMERCIAL

## 2018-11-08 DIAGNOSIS — M25.562 CHRONIC PAIN OF BOTH KNEES: ICD-10-CM

## 2018-11-08 DIAGNOSIS — R26.89 DECREASED FUNCTIONAL MOBILITY: ICD-10-CM

## 2018-11-08 DIAGNOSIS — G89.29 CHRONIC PAIN OF BOTH KNEES: ICD-10-CM

## 2018-11-08 DIAGNOSIS — M25.561 CHRONIC PAIN OF BOTH KNEES: ICD-10-CM

## 2018-11-08 DIAGNOSIS — M62.81 MUSCLE WEAKNESS: ICD-10-CM

## 2018-11-08 PROCEDURE — 97110 THERAPEUTIC EXERCISES: CPT | Mod: PN

## 2018-11-08 NOTE — PROGRESS NOTES
"DAILY TREATMENT NOTE    DATE: 11/8/2018    Start Time:  200  Stop Time:  300    PROCEDURES:    TIMED  Procedure Min.   Therex 45                     UNTIMED  Procedure Min.   MH/Cold pack 10         Total Timed Minutes:  45  Total Timed Units:  3  Total Untimed Units:  0  Charges Billed/# of units:  3 TE      Progress/Current Status    Subjective:     Patient ID: Carolina Marcum is a 63 y.o. female.  Diagnosis:   1. Chronic pain of both knees     2. Muscle weakness     3. Decreased functional mobility       "Sore on outside of R knee"  Not specific to scale.    Objective:     Carolina received therapeutic exercises to develop strength, endurance and ROM for 45 minutes including:     FOTO: 8/10      Bike: 5'     HSS: 3x30' B  SAQ: 2# 2x15 B   QS: 20x5" towel under B knees  SLR: 3x10 B  Bridges: 2x15 B w/ ball adduction  Clams: 3" 3x10 B  SL Hip ABd: 2x10 B    Calf stretch on fitter 3 x30"  Steamboats YTB 2x15   Heel raises 3x10  TKE 2x10 B PTC  Leg Press: 5.5 3x10 DL    Pt received  B knee CP x 10 mins in supine with MHP for low back to decrease pain        Assessment:     Patient able to increase activity with added reps with reports of minimal pain increase which was resolved after MHP/cold pack to end session.  "It doesn't bother me as much now" after treatment. Not specific to scale.    Patient Education/Response:     Patient educated to continue HEP.  Verbalized understanding.    Plans and Goals:     Continue PT per PC, progress as able.    Goals:   Short Term Goals:  1. Pt will be independent with HEP  2. Pt will improve B LE strength to at least 4+/5 in order to improve functional mobility  3. Pt will improve L knee AROM to at least 108 degrees flexion in order to improve gait     Long Term Goals:  1. Pt will be independent with updated HEP  2. Pt will improve B LE strength to grossly 5/5 in order to improve functional mobility  3. Pt will improve FOTO knee survey score to </= 52% limited in order to demo " improved functional mobility  4. Pt will perform at least 10 sit to stands without UE support on 30 second sit to stand test in order to demo improved ability to perform transfers

## 2018-12-03 ENCOUNTER — OFFICE VISIT (OUTPATIENT)
Dept: INTERNAL MEDICINE | Facility: CLINIC | Age: 63
End: 2018-12-03
Payer: COMMERCIAL

## 2018-12-03 VITALS
SYSTOLIC BLOOD PRESSURE: 134 MMHG | HEIGHT: 65 IN | HEART RATE: 73 BPM | OXYGEN SATURATION: 97 % | WEIGHT: 236.75 LBS | BODY MASS INDEX: 39.45 KG/M2 | DIASTOLIC BLOOD PRESSURE: 82 MMHG

## 2018-12-03 DIAGNOSIS — J30.9 ALLERGIC RHINITIS, UNSPECIFIED SEASONALITY, UNSPECIFIED TRIGGER: ICD-10-CM

## 2018-12-03 DIAGNOSIS — I10 ESSENTIAL HYPERTENSION: Primary | ICD-10-CM

## 2018-12-03 DIAGNOSIS — E66.01 CLASS 2 SEVERE OBESITY WITH SERIOUS COMORBIDITY AND BODY MASS INDEX (BMI) OF 39.0 TO 39.9 IN ADULT, UNSPECIFIED OBESITY TYPE: ICD-10-CM

## 2018-12-03 PROBLEM — E66.812 CLASS 2 SEVERE OBESITY WITH SERIOUS COMORBIDITY AND BODY MASS INDEX (BMI) OF 39.0 TO 39.9 IN ADULT: Status: ACTIVE | Noted: 2018-12-03

## 2018-12-03 PROCEDURE — 99214 OFFICE O/P EST MOD 30 MIN: CPT | Mod: S$GLB,,, | Performed by: INTERNAL MEDICINE

## 2018-12-03 PROCEDURE — 3075F SYST BP GE 130 - 139MM HG: CPT | Mod: CPTII,S$GLB,, | Performed by: INTERNAL MEDICINE

## 2018-12-03 PROCEDURE — 3008F BODY MASS INDEX DOCD: CPT | Mod: CPTII,S$GLB,, | Performed by: INTERNAL MEDICINE

## 2018-12-03 PROCEDURE — 3079F DIAST BP 80-89 MM HG: CPT | Mod: CPTII,S$GLB,, | Performed by: INTERNAL MEDICINE

## 2018-12-03 PROCEDURE — 99999 PR PBB SHADOW E&M-EST. PATIENT-LVL IV: CPT | Mod: PBBFAC,,, | Performed by: INTERNAL MEDICINE

## 2018-12-03 RX ORDER — AZELASTINE 1 MG/ML
1 SPRAY, METERED NASAL DAILY PRN
Qty: 30 ML | Refills: 1 | Status: SHIPPED | OUTPATIENT
Start: 2018-12-03 | End: 2020-07-20 | Stop reason: SDUPTHER

## 2018-12-03 NOTE — PROGRESS NOTES
Pt. ID: Carolina Marcum is a 63 y.o. female      Chief complaint:   Chief Complaint   Patient presents with    Follow-up    Hypertension    Medication Problem       HPI: Pt. Here for f/u for HTN; she is compliant with meds; she feels zyrtec prn does not help and has tried flonase with zyrtec with no relief; she states astelin has worked in the past; she has lost a few pounds      Review of Systems   Constitutional: Negative for chills and fever.   HENT: Positive for congestion.         Allergies: not always controlled with zyrtec and flonase prn   Respiratory: Negative for cough and shortness of breath.    Cardiovascular: Negative for chest pain.   Gastrointestinal: Negative for abdominal pain, nausea and vomiting.   Genitourinary: Negative for dysuria.         Objective:    Physical Exam   Constitutional: She is oriented to person, place, and time.   obese   Eyes: EOM are normal.   Neck: Normal range of motion.   Cardiovascular: Normal rate, regular rhythm and normal heart sounds.   Pulmonary/Chest: Effort normal and breath sounds normal.   Abdominal: Soft. There is no tenderness. There is no rebound and no guarding.   Musculoskeletal: Normal range of motion.   Neurological: She is alert and oriented to person, place, and time.   Skin: No rash noted.   Vitals reviewed.        Health Maintenance   Topic Date Due    Mammogram  05/31/2019    Fecal Occult Blood Test (FOBT)/FitKit  08/10/2019    Lipid Panel  12/21/2021    TETANUS VACCINE  12/21/2026    Hepatitis C Screening  Completed    Influenza Vaccine  Completed    Zoster Vaccine  Addressed         Assessment:     1. Essential hypertension Well controlled   2. Allergic rhinitis, unspecified seasonality, unspecified trigger Sub-optimally controlled   3. Class 2 severe obesity with serious comorbidity and body mass index (BMI) of 39.0 to 39.9 in adult, unspecified obesity type Sub-optimally controlled         Plan: Essential  hypertension  Comments:  continue current regimen and encouraged low Na diet and weight loss    Allergic rhinitis, unspecified seasonality, unspecified trigger  Comments:  asked pt. to alternate astelin with flonase daily and continue zyrtec prn   Orders:  -     azelastine (ASTELIN) 137 mcg (0.1 %) nasal spray; 1 spray (137 mcg total) by Nasal route daily as needed for Rhinitis.  Dispense: 30 mL; Refill: 1    Class 2 severe obesity with serious comorbidity and body mass index (BMI) of 39.0 to 39.9 in adult, unspecified obesity type  Comments:  encouraged diet and explained risks         Problem List Items Addressed This Visit        Cardiac/Vascular    Hypertension - Primary       Other    Allergic rhinitis    Relevant Medications    azelastine (ASTELIN) 137 mcg (0.1 %) nasal spray    Class 2 severe obesity with serious comorbidity and body mass index (BMI) of 39.0 to 39.9 in adult

## 2018-12-04 ENCOUNTER — PATIENT OUTREACH (OUTPATIENT)
Dept: OTHER | Facility: OTHER | Age: 63
End: 2018-12-04

## 2018-12-04 NOTE — PROGRESS NOTES
"Last 5 Patient Entered Readings                                      Current 30 Day Average: 120/81     Recent Readings 12/1/2018 11/22/2018 11/22/2018 11/13/2018 11/4/2018    SBP (mmHg) 134 115 145 106 123    DBP (mmHg) 80 75 96 74 81    Pulse 79 67 70 86 81        Digital Medicine: Health  Follow Up    Feeling well.    Saw Dr. Liz yesterday.    Lifestyle Modifications:    1.Dietary Modifications: Maintaining weight at 235lbs and continuing Ideal protein diet. Doesn't feel she'll be able to stick to her diet over the holidays. Requested "sugar free" recipe ideas.    2.Physical Activity: deferred    3.Medication Therapy: Patient has been compliant with the medication regimen.    4.Patient has the following medication side effects/concerns:   (Frequency/Alleviating factors/Precipitating factors, etc.)     Follow up with Mrs. Carolina Marcum completed. No further questions or concerns. Will continue to follow up to achieve health goals.    "

## 2018-12-05 NOTE — PROGRESS NOTES
CC: Pain of the Right Knee and Pain of the Left Knee      HPI: Pt with bilateral knee pain for the past year or so. The pain is aching and medial. She has been taking injections with good relief. She takes nsaids prn. She has been trying to lose weight, but her right knee is feeling unstable. She is ambulating without assistive device. There is not a limp.    ROS  General: denies fever and chills  Resp: no c/o sob  CVS: no c/o cp  MSK: c/o bilateral knee pain which is medial and aching    PE  General: AAOx3, pleasant and cooperative  Resp: respirations even and unlabored  MSK: bilateral knee exam  0 degrees extension  120 degrees flexion  No warmth or erythema   - effusion    Assessment:  Bilateral knee djd    Plan:  Bilateral cortisone injections in knees today  Brace for right knee in light of instability with long walks  Continue to work towards weight loss  RICE  nsaids  F/u as needed    Knee Injection Procedure Note    Pre-operative Diagnosis: bilateral knee degenerative arthritis    Post-operative Diagnosis: same    Indications: bilateral knee pain    Anesthesia: none    Procedure Details     Verbal consent was obtained for the procedure. The injection site was identified and the skin was prepared with alcohol. The bilateral knee was injected from an anterolateral approach with 1 ml of Kenalog and 5 ml Lidocaine under sterile technique using a 22 gauge needle. The needle was removed and the area cleansed and dressed.    Complications:  None; patient tolerated the procedure well.    she was advised to rest the knee today, using ice and elevation as needed for comfort and swelling. she did receive immediate relief of the knee pain. she was told this would be short lived and is secondary to the lidocaine. she may have an increase in discomfort tonight followed by steady improvement over the next several days. It may take 1-3 weeks following the injection to get the full benefit of the medication.       Telephone Encounter by Mayelin Goff MD at 03/15/18 01:01 PM     Author:  Mayelin Goff MD Service:  (none) Author Type:  Physician     Filed:  03/15/18 01:02 PM Encounter Date:  3/15/2018 Status:  Signed     :  Mayelin Goff MD (Physician)            My mistake = we are giving BID for 5 days.[NK1.1M]        Revision History        User Key Date/Time User Provider Type Action    > NK1.1 03/15/18 01:02 PM Mayelin Goff MD Physician Sign    M - Manual

## 2018-12-18 ENCOUNTER — PATIENT MESSAGE (OUTPATIENT)
Dept: ADMINISTRATIVE | Facility: OTHER | Age: 63
End: 2018-12-18

## 2018-12-18 ENCOUNTER — TELEPHONE (OUTPATIENT)
Dept: ORTHOPEDICS | Facility: CLINIC | Age: 63
End: 2018-12-18

## 2018-12-18 NOTE — TELEPHONE ENCOUNTER
Returned call to patient and left her a voicemail letting her know that she can come in for bilateral knee cortisone injections. She can call back to schedule.

## 2018-12-18 NOTE — TELEPHONE ENCOUNTER
----- Message from Senia Meng MA sent at 12/18/2018  2:00 PM CST -----  Contact: Self      ----- Message -----  From: Ines Salgado  Sent: 12/18/2018   1:27 PM  To: Mariluz Mark Staff    Patient wants to know if it is too early to get her RT knee injection. Prev injection was 10/1. Says LT knee is also hurting and may want that knee done as well.  [phone]

## 2018-12-19 ENCOUNTER — TELEPHONE (OUTPATIENT)
Dept: ORTHOPEDICS | Facility: CLINIC | Age: 63
End: 2018-12-19

## 2018-12-19 NOTE — TELEPHONE ENCOUNTER
Attempted to contact  to schedule a sooner appt for either Thursday or Friday.  did not answer. I did leave a message asking her to give us a call back to schedule.

## 2018-12-21 ENCOUNTER — OFFICE VISIT (OUTPATIENT)
Dept: ORTHOPEDICS | Facility: CLINIC | Age: 63
End: 2018-12-21
Payer: COMMERCIAL

## 2018-12-21 VITALS
BODY MASS INDEX: 39.3 KG/M2 | WEIGHT: 235.88 LBS | HEART RATE: 76 BPM | SYSTOLIC BLOOD PRESSURE: 120 MMHG | HEIGHT: 65 IN | DIASTOLIC BLOOD PRESSURE: 79 MMHG

## 2018-12-21 DIAGNOSIS — M17.0 PRIMARY OSTEOARTHRITIS OF BOTH KNEES: Primary | ICD-10-CM

## 2018-12-21 PROCEDURE — 99499 UNLISTED E&M SERVICE: CPT | Mod: S$GLB,,, | Performed by: NURSE PRACTITIONER

## 2018-12-21 PROCEDURE — 20610 DRAIN/INJ JOINT/BURSA W/O US: CPT | Mod: 50,S$GLB,, | Performed by: NURSE PRACTITIONER

## 2018-12-21 PROCEDURE — 99999 PR PBB SHADOW E&M-EST. PATIENT-LVL IV: CPT | Mod: PBBFAC,,, | Performed by: NURSE PRACTITIONER

## 2018-12-21 RX ORDER — TRIAMCINOLONE ACETONIDE 40 MG/ML
80 INJECTION, SUSPENSION INTRA-ARTICULAR; INTRAMUSCULAR
Status: COMPLETED | OUTPATIENT
Start: 2018-12-21 | End: 2018-12-21

## 2018-12-21 RX ADMIN — TRIAMCINOLONE ACETONIDE 80 MG: 40 INJECTION, SUSPENSION INTRA-ARTICULAR; INTRAMUSCULAR at 10:12

## 2018-12-21 NOTE — PROGRESS NOTES
Pt with known bilateral knee djd. She has been getting cortisone injections with good relief. She returns today for repeat injections.    Knee Injection Procedure Note    Diagnosis: bilateral knee degenerative arthritis  Indications: bilateral knee pain  Procedure Details: Verbal consent was obtained for the procedure. The injection site was identified and the skin was prepared with alcohol. The right knee was injected from an anterolateral approach with 1 ml of Kenalog and 4 ml Lidocaine and the left knee was injected from an anterolateral approach with 1 ml of kenalog and 4 ml lidocaine under sterile technique using a 22 gauge needle. The needle was removed and the area cleansed and dressed.  Complications:  Patient tolerated the procedure well.    she was advised to rest the knee today, using ice and elevation as needed for comfort and swelling.Immediate relief of the knee pain may be short lived and secondary to the lidocaine. she may have an increase in discomfort tonight followed by steady improvement over the next several days. It may take 1-2 weeks following the injection to get the full benefit of the medication.

## 2019-01-08 ENCOUNTER — PATIENT OUTREACH (OUTPATIENT)
Dept: OTHER | Facility: OTHER | Age: 64
End: 2019-01-08

## 2019-01-08 NOTE — PROGRESS NOTES
Last 5 Patient Entered Readings                                      Current 30 Day Average: 137/92     Recent Readings 1/2/2019 12/23/2018 12/23/2018 12/22/2018 12/22/2018    SBP (mmHg) 121 138 136 153 160    DBP (mmHg) 84 91 88 100 96    Pulse 84 76 79 80 78        Digital Medicine: Health  Follow Up    Left voicemail to follow up with Mrs. Figueroa Jossue.  Current BP average 137/92 mmHg is not at goal.

## 2019-01-24 NOTE — PROGRESS NOTES
Last 5 Patient Entered Readings                                      Current 30 Day Average: 125/83     Recent Readings 1/24/2019 1/24/2019 1/13/2019 1/13/2019 1/2/2019    SBP (mmHg) 136 132 118 130 121    DBP (mmHg) 78 86 78 85 84    Pulse 78 79 68 74 84        Digital Medicine: Health  Follow Up    Left voicemail to follow up with Mrs. Carolina Marcum.  Current BP average 125/83 mmHg DBP is not at goal.    Sending [email or web address] message.

## 2019-02-07 ENCOUNTER — LAB VISIT (OUTPATIENT)
Dept: LAB | Facility: HOSPITAL | Age: 64
End: 2019-02-07
Attending: INTERNAL MEDICINE
Payer: COMMERCIAL

## 2019-02-07 DIAGNOSIS — Z00.00 PREVENTATIVE HEALTH CARE: ICD-10-CM

## 2019-02-07 DIAGNOSIS — I10 HYPERTENSION, UNSPECIFIED TYPE: ICD-10-CM

## 2019-02-07 LAB
BILIRUB UR QL STRIP: NEGATIVE
CLARITY UR REFRACT.AUTO: CLEAR
COLOR UR AUTO: NORMAL
GLUCOSE UR QL STRIP: NEGATIVE
HGB UR QL STRIP: NEGATIVE
KETONES UR QL STRIP: NEGATIVE
LEUKOCYTE ESTERASE UR QL STRIP: NEGATIVE
NITRITE UR QL STRIP: NEGATIVE
PH UR STRIP: 8 [PH] (ref 5–8)
PROT UR QL STRIP: NEGATIVE
SP GR UR STRIP: 1.01 (ref 1–1.03)
URN SPEC COLLECT METH UR: NORMAL

## 2019-02-07 PROCEDURE — 81003 URINALYSIS AUTO W/O SCOPE: CPT

## 2019-02-14 NOTE — PROGRESS NOTES
Last 5 Patient Entered Readings                                      Current 30 Day Average: 137/86     Recent Readings 2/7/2019 2/7/2019 2/5/2019 2/5/2019 2/4/2019    SBP (mmHg) 131 144 144 141 135    DBP (mmHg) 78 89 88 85 90    Pulse 71 74 86 89 83        Digital Medicine: Health  Follow Up    Feeling well.    Lifestyle Modifications:    1.Dietary Modifications: continuing with Ideal Protein and working toward weight loss goal. Has a wedding to go to in June so is motivated to work on changes. Going out to dinner tonight with  for [place] [employer] so for lunch she's having a salad with shrimp. Trying to balance meals.    2.Physical Activity: deferred    3.Medication Therapy: Patient has been compliant with the medication regimen.    4.Patient has the following medication side effects/concerns:   (Frequency/Alleviating factors/Precipitating factors, etc.)     Follow up with Mrs. Figueroa Jossue completed. No further questions or concerns. Will continue to follow up to achieve health goals.

## 2019-02-15 ENCOUNTER — TELEPHONE (OUTPATIENT)
Dept: OBSTETRICS AND GYNECOLOGY | Facility: CLINIC | Age: 64
End: 2019-02-15

## 2019-02-15 ENCOUNTER — PATIENT MESSAGE (OUTPATIENT)
Dept: OBSTETRICS AND GYNECOLOGY | Facility: CLINIC | Age: 64
End: 2019-02-15

## 2019-02-15 RX ORDER — FLUCONAZOLE 150 MG/1
150 TABLET ORAL
Qty: 2 TABLET | Refills: 0 | Status: SHIPPED | OUTPATIENT
Start: 2019-02-15 | End: 2019-02-19

## 2019-02-15 NOTE — TELEPHONE ENCOUNTER
----- Message from Delia Peraza sent at 2/15/2019  8:46 AM CST -----  Contact: pt  Name of Who is Calling: MARIA RADER [[id number]]      What is the request in detail: pt calling in regards to having medication sent to pharmacy for a yeast infection.. Please advise      Can the clinic reply by [facility]: no      What Number to Call Back if not in [facility]: [phone]

## 2019-02-15 NOTE — TELEPHONE ENCOUNTER
Spoke with pt she stated she thinks she has a yeast infection and requesting Diflucan ,explained to her that she has not been seen here in over a year offered pt an appointment , pt refused

## 2019-02-15 NOTE — TELEPHONE ENCOUNTER
Patient states she recently finished taking antibiotics for a sinus infection.  She now thinks she has a yeast infection  Has tried otc cream with no improvement.  Patient is requesting a diflucan.

## 2019-02-18 ENCOUNTER — OFFICE VISIT (OUTPATIENT)
Dept: OBSTETRICS AND GYNECOLOGY | Facility: CLINIC | Age: 64
End: 2019-02-18
Payer: COMMERCIAL

## 2019-02-18 VITALS
BODY MASS INDEX: 39.33 KG/M2 | WEIGHT: 236.31 LBS | DIASTOLIC BLOOD PRESSURE: 66 MMHG | SYSTOLIC BLOOD PRESSURE: 122 MMHG

## 2019-02-18 DIAGNOSIS — N89.8 VAGINAL DISCHARGE: ICD-10-CM

## 2019-02-18 DIAGNOSIS — B37.2 YEAST DERMATITIS: Primary | ICD-10-CM

## 2019-02-18 PROCEDURE — 3074F PR MOST RECENT SYSTOLIC BLOOD PRESSURE < 130 MM HG: ICD-10-PCS | Mod: CPTII,S$GLB,, | Performed by: OBSTETRICS & GYNECOLOGY

## 2019-02-18 PROCEDURE — 87480 CANDIDA DNA DIR PROBE: CPT

## 2019-02-18 PROCEDURE — 99999 PR PBB SHADOW E&M-EST. PATIENT-LVL III: ICD-10-PCS | Mod: PBBFAC,,, | Performed by: OBSTETRICS & GYNECOLOGY

## 2019-02-18 PROCEDURE — 3078F DIAST BP <80 MM HG: CPT | Mod: CPTII,S$GLB,, | Performed by: OBSTETRICS & GYNECOLOGY

## 2019-02-18 PROCEDURE — 99213 PR OFFICE/OUTPT VISIT, EST, LEVL III, 20-29 MIN: ICD-10-PCS | Mod: S$GLB,,, | Performed by: OBSTETRICS & GYNECOLOGY

## 2019-02-18 PROCEDURE — 3078F PR MOST RECENT DIASTOLIC BLOOD PRESSURE < 80 MM HG: ICD-10-PCS | Mod: CPTII,S$GLB,, | Performed by: OBSTETRICS & GYNECOLOGY

## 2019-02-18 PROCEDURE — 99213 OFFICE O/P EST LOW 20 MIN: CPT | Mod: S$GLB,,, | Performed by: OBSTETRICS & GYNECOLOGY

## 2019-02-18 PROCEDURE — 3074F SYST BP LT 130 MM HG: CPT | Mod: CPTII,S$GLB,, | Performed by: OBSTETRICS & GYNECOLOGY

## 2019-02-18 PROCEDURE — 3008F BODY MASS INDEX DOCD: CPT | Mod: CPTII,S$GLB,, | Performed by: OBSTETRICS & GYNECOLOGY

## 2019-02-18 PROCEDURE — 99999 PR PBB SHADOW E&M-EST. PATIENT-LVL III: CPT | Mod: PBBFAC,,, | Performed by: OBSTETRICS & GYNECOLOGY

## 2019-02-18 PROCEDURE — 87510 GARDNER VAG DNA DIR PROBE: CPT

## 2019-02-18 PROCEDURE — 3008F PR BODY MASS INDEX (BMI) DOCUMENTED: ICD-10-PCS | Mod: CPTII,S$GLB,, | Performed by: OBSTETRICS & GYNECOLOGY

## 2019-02-18 RX ORDER — TRIAMCINOLONE ACETONIDE 1 MG/G
CREAM TOPICAL 2 TIMES DAILY
Qty: 30 G | Refills: 0 | Status: SHIPPED | OUTPATIENT
Start: 2019-02-18 | End: 2019-08-20 | Stop reason: SDUPTHER

## 2019-02-18 RX ORDER — NYSTATIN 100000 U/G
CREAM TOPICAL 2 TIMES DAILY
Qty: 30 G | Refills: 0 | Status: SHIPPED | OUTPATIENT
Start: 2019-02-18 | End: 2019-08-20 | Stop reason: SDUPTHER

## 2019-02-18 NOTE — PROGRESS NOTES
Chief Complaint   Patient presents with    Vaginitis       HISTORY OF PRESENT ILLNESS:   Carolina Marcum is a 63 y.o. female  who presents vaginal discharge and itching. Took diflucan over the weekend and it improved but she has it in her skin folds as well and that isn't going away. She has itched it raw.      Past Medical History:   Diagnosis Date    Abnormal Pap smear of cervix     [facility] - CKC, then Hyst    Asthma, chronic     Bleeding hemorrhoids     Depression     Difficult intubation     Diverticular disease     Endometriosis     Fatty liver 12/15/2014    Herpes simplex without mention of complication     Hypercholesterolemia     Hypertension     Multiple gastric polyps 2012    Obesity     Reflux     Thyroid disease           Past Surgical History:   Procedure Laterality Date    BREAST BIOPSY Left 2017    core    BREAST BIOPSY Right     core    CERVICAL CONIZATION   W/ LASER       SECTION      x1    CHOLECYSTECTOMY      EGD (ESOPHAGOGASTRODUODENOSCOPY) N/A 1/15/2013    Performed by [facility] at [facility] (2ND FLR)    ESOPHAGOGASTRODUODENOSCOPY (EGD) N/A 2018    Performed by [facility] at [facility] (2ND FLR)    HYSTERECTOMY  age 42    [place], LSO (uterine fibroids, adhesions, endometriosis)    OOPHORECTOMY  age 42    LSO (endometriosis, cyst)    THYROIDECTOMY      TOE SURGERY      left    TOTAL THYROIDECTOMY           Social History     Socioeconomic History    Marital status:      Spouse name: Not on file    Number of children: Not on file    Years of education: Not on file    Highest education level: Not on file   Social Needs    Financial resource strain: Not on file    Food insecurity - worry: Not on file    Food insecurity - inability: Not on file    Transportation needs - medical: Not on file    Transportation needs - non-medical: Not on file   Occupational History    Not on file   Tobacco Use    Smoking status:  Never Smoker    Smokeless tobacco: Never Used   Substance and Sexual Activity    Alcohol use: No     Comment: none lately.  rarely has a drink for special occassion    Drug use: No    Sexual activity: Yes     Partners: Male     Birth control/protection: Surgical, Post-menopausal, None   Other Topics Concern    Are you pregnant or think you may be? Not Asked    Breast-feeding Not Asked   Social History Narrative    Not on file       Family History   Problem Relation Age of Onset    Breast cancer Maternal Aunt     Melanoma Sister     Melanoma Brother     Cancer Maternal Uncle         esophageal cancer    Ovarian cancer Neg Hx     Colon cancer Neg Hx          OB History    Para Term  AB Living   1 1 1     1   SAB TAB Ectopic Multiple Live Births           1      # Outcome Date GA Lbr Eric/2nd Weight Sex Delivery Anes PTL Lv   1 Term 84    M CS-LTranv  N BURT          COMPREHENSIVE GYN HISTORY:  PAP History: had CKC in  but normal paps since; 2018 NILM  Infection History: Denies STDs. Denies PID.  Benign History: had uterine fibroids. Denies ovarian cysts. had endometriosis Denies other conditions.  Cancer History: Denies cervical cancer. Denies uterine cancer or hyperplasia. Denies ovarian cancer. Denies vulvar cancer or pre-cancer. Denies vaginal cancer or pre-cancer. Denies breast cancer. Denies colon cancer.  Cycle: normal until had hyst at 42 2/2 endo and an enlarging uterus due to fibroids    ROS:  GENERAL: Denies weight gain or weight loss. Feeling well overall.   SKIN: Denies rash or lesions.   HEAD: Denies headache.   NODES: Denies enlarged lymph nodes.   CHEST: Denies shortness of breath.   ABDOMEN: No abdominal pain, constipation, diarrhea, nausea, vomiting or rectal bleeding.   URINARY: No frequency, dysuria, hematuria, or burning on urination.  REPRODUCTIVE: See HPI.   BREASTS: The patient denies pain, lumps, or nipple discharge.       /66   Wt 107.2 kg (236 lb  5.3 oz)   BMI 39.33 kg/m²     APPEARANCE: Well nourished, well developed, in no acute distress.  NECK: Neck symmetric without  thyromegaly.  NODES: No inguinal, cervical lymph node enlargement.  CHEST: Lungs clear to auscultation.  HEART: Regular rate and rhythm, no murmurs, rubs or gallops.  ABDOMEN: Soft. No tenderness or masses. No hernias. No hepatosplenomegaly.  BREASTS: Symmetrical, no skin changes or visible lesions. No palpable masses, nipple discharge or adenopathy bilaterally.  PELVIC:   VULVA: erythema over bilateral gluteus, and in the crease of her abdomen and mons and left thigh crease, Normal female genitalia.  URETHRAL MEATUS: Normal size and location, no lesions, no prolapse.  URETHRA: No masses, tenderness, prolapse or scarring.  VAGINA: atrophic, no discharge, no significant cystocele or rectocele. Well healed cuff   CERVIX: surgically absent  UTERUS: surgically absent   ADNEXA: No masses or tenderness.  PERINEUM: Normal, mo masses, small internal hemorrhoid     Data Reviewed:   Last Pap: 2017 NILM   Last MMG: Date: 5/2018 BIRADS 1  Lipid profile: Date:   Lab Results   Component Value Date    CHOL 238 (H) 02/07/2019    CHOL 190 12/21/2016    CHOL 190 05/20/2015     Lab Results   Component Value Date    HDL 64 02/07/2019    HDL 62 12/21/2016    HDL 59 05/20/2015     Lab Results   Component Value Date    LDLCALC 157.2 02/07/2019    LDLCALC 106.2 12/21/2016    LDLCALC 101.4 05/20/2015     Lab Results   Component Value Date    TRIG 84 02/07/2019    TRIG 109 12/21/2016    TRIG 148 05/20/2015     Lab Results   Component Value Date    CHOLHDL 26.9 02/07/2019    CHOLHDL 32.6 12/21/2016    CHOLHDL 31.1 05/20/2015     TSH:   Lab Results   Component Value Date    TSH 5.927 (H) 02/07/2019     Colonoscopy Date: 11/2010    1. Yeast dermatitis        A/P 1. Will send in mycolog cream and keep area clean and dry, should monitor BS and get those under control.

## 2019-02-20 ENCOUNTER — PATIENT MESSAGE (OUTPATIENT)
Dept: OBSTETRICS AND GYNECOLOGY | Facility: CLINIC | Age: 64
End: 2019-02-20

## 2019-02-26 ENCOUNTER — TELEPHONE (OUTPATIENT)
Dept: ORTHOPEDICS | Facility: CLINIC | Age: 64
End: 2019-02-26

## 2019-02-26 NOTE — TELEPHONE ENCOUNTER
Spoke with  and explained to her that unfortunately it's only been 2 months since she had her last injection and she can only have them every 3 months.  verbalized understanding and stated that she will keep appt for 3/18.

## 2019-02-26 NOTE — TELEPHONE ENCOUNTER
----- Message from Breanna Camarillo sent at 2/26/2019 12:11 PM CST -----  Contact: Self  Pt is wanting to know if she can schedule an appt to schedule her injection pt stated that her right knee is giving her trouble pt can be reached [phone]

## 2019-03-13 RX ORDER — AMLODIPINE BESYLATE 10 MG/1
TABLET ORAL
Qty: 90 TABLET | Refills: 3 | Status: SHIPPED | OUTPATIENT
Start: 2019-03-13 | End: 2019-06-05 | Stop reason: SDUPTHER

## 2019-03-15 ENCOUNTER — OFFICE VISIT (OUTPATIENT)
Dept: INTERNAL MEDICINE | Facility: CLINIC | Age: 64
End: 2019-03-15
Payer: COMMERCIAL

## 2019-03-15 VITALS
BODY MASS INDEX: 39.92 KG/M2 | WEIGHT: 239.63 LBS | DIASTOLIC BLOOD PRESSURE: 76 MMHG | OXYGEN SATURATION: 96 % | HEIGHT: 65 IN | HEART RATE: 74 BPM | SYSTOLIC BLOOD PRESSURE: 122 MMHG

## 2019-03-15 DIAGNOSIS — I10 ESSENTIAL HYPERTENSION: Primary | ICD-10-CM

## 2019-03-15 DIAGNOSIS — E66.01 CLASS 2 SEVERE OBESITY WITH SERIOUS COMORBIDITY AND BODY MASS INDEX (BMI) OF 39.0 TO 39.9 IN ADULT, UNSPECIFIED OBESITY TYPE: ICD-10-CM

## 2019-03-15 DIAGNOSIS — E87.6 HYPOKALEMIA: ICD-10-CM

## 2019-03-15 DIAGNOSIS — E03.9 HYPOTHYROIDISM, UNSPECIFIED TYPE: ICD-10-CM

## 2019-03-15 DIAGNOSIS — E78.5 HYPERLIPIDEMIA, UNSPECIFIED HYPERLIPIDEMIA TYPE: ICD-10-CM

## 2019-03-15 DIAGNOSIS — Z23 NEED FOR PROPHYLACTIC VACCINATION WITH STREPTOCOCCUS PNEUMONIAE (PNEUMOCOCCUS) AND INFLUENZA VACCINES: ICD-10-CM

## 2019-03-15 DIAGNOSIS — J30.9 ALLERGIC RHINITIS, UNSPECIFIED SEASONALITY, UNSPECIFIED TRIGGER: ICD-10-CM

## 2019-03-15 PROCEDURE — 99214 PR OFFICE/OUTPT VISIT, EST, LEVL IV, 30-39 MIN: ICD-10-PCS | Mod: 25,S$GLB,, | Performed by: INTERNAL MEDICINE

## 2019-03-15 PROCEDURE — 3074F SYST BP LT 130 MM HG: CPT | Mod: CPTII,S$GLB,, | Performed by: INTERNAL MEDICINE

## 2019-03-15 PROCEDURE — 90670 PCV13 VACCINE IM: CPT | Mod: S$GLB,,, | Performed by: INTERNAL MEDICINE

## 2019-03-15 PROCEDURE — 90471 IMMUNIZATION ADMIN: CPT | Mod: 59,S$GLB,, | Performed by: INTERNAL MEDICINE

## 2019-03-15 PROCEDURE — 3078F PR MOST RECENT DIASTOLIC BLOOD PRESSURE < 80 MM HG: ICD-10-PCS | Mod: CPTII,S$GLB,, | Performed by: INTERNAL MEDICINE

## 2019-03-15 PROCEDURE — 3074F PR MOST RECENT SYSTOLIC BLOOD PRESSURE < 130 MM HG: ICD-10-PCS | Mod: CPTII,S$GLB,, | Performed by: INTERNAL MEDICINE

## 2019-03-15 PROCEDURE — 3008F BODY MASS INDEX DOCD: CPT | Mod: CPTII,S$GLB,, | Performed by: INTERNAL MEDICINE

## 2019-03-15 PROCEDURE — 99999 PR PBB SHADOW E&M-EST. PATIENT-LVL V: ICD-10-PCS | Mod: PBBFAC,,, | Performed by: INTERNAL MEDICINE

## 2019-03-15 PROCEDURE — 99214 OFFICE O/P EST MOD 30 MIN: CPT | Mod: 25,S$GLB,, | Performed by: INTERNAL MEDICINE

## 2019-03-15 PROCEDURE — 90471 PNEUMOCOCCAL CONJUGATE VACCINE 13-VALENT LESS THAN 5YO & GREATER THAN: ICD-10-PCS | Mod: 59,S$GLB,, | Performed by: INTERNAL MEDICINE

## 2019-03-15 PROCEDURE — 99999 PR PBB SHADOW E&M-EST. PATIENT-LVL V: CPT | Mod: PBBFAC,,, | Performed by: INTERNAL MEDICINE

## 2019-03-15 PROCEDURE — 3008F PR BODY MASS INDEX (BMI) DOCUMENTED: ICD-10-PCS | Mod: CPTII,S$GLB,, | Performed by: INTERNAL MEDICINE

## 2019-03-15 PROCEDURE — 3078F DIAST BP <80 MM HG: CPT | Mod: CPTII,S$GLB,, | Performed by: INTERNAL MEDICINE

## 2019-03-15 PROCEDURE — 90670 PNEUMOCOCCAL CONJUGATE VACCINE 13-VALENT LESS THAN 5YO & GREATER THAN: ICD-10-PCS | Mod: S$GLB,,, | Performed by: INTERNAL MEDICINE

## 2019-03-15 RX ORDER — LOVASTATIN 10 MG/1
10 TABLET ORAL NIGHTLY
Qty: 90 TABLET | Refills: 0 | Status: SHIPPED | OUTPATIENT
Start: 2019-03-15 | End: 2019-06-05

## 2019-03-15 NOTE — PROGRESS NOTES
Pt. ID: Carolina Marcum is a 63 y.o. female      Chief complaint:   Chief Complaint   Patient presents with    Follow-up       HPI: pt. Here for f/u for HTN; she is compliant with meds;  I reviewed labs dated 2/7/19; potasium is low and she misses potassium dose; she does not want to go down on dose of chlorthalidone and she will be more compliant with her potassium supplements; cholesterol is elevated and she had bodyaches with lipitor; I will try lovastatin; LFT's were normal; TSH is elevated and she is taking levothyroxine daily but does not take it separately from other meds; she states astelin alternating with flonase helps allergies; she has gained a few pounds; she would like prevnar       Review of Systems   HENT: Positive for congestion. Negative for hearing loss.         Allergies well controlled with current regimen    Eyes: Negative for discharge.   Respiratory: Negative for wheezing.    Cardiovascular: Negative for chest pain and palpitations.   Gastrointestinal: Negative for blood in stool, constipation, diarrhea and vomiting.   Genitourinary: Negative for dysuria and hematuria.   Musculoskeletal: Negative for neck pain.   Neurological: Negative for weakness and headaches.   Endo/Heme/Allergies: Negative for polydipsia.         Objective:    Physical Exam   Constitutional: She is oriented to person, place, and time.   obese   Eyes: EOM are normal.   Neck: Normal range of motion.   Cardiovascular: Normal rate, regular rhythm and normal heart sounds.   Pulmonary/Chest: Effort normal and breath sounds normal. No respiratory distress. She has no wheezes. She has no rales.   Abdominal: Soft. There is no tenderness. There is no rebound and no guarding.   Musculoskeletal: Normal range of motion.   Neurological: She is alert and oriented to person, place, and time.   Skin: No rash noted.   Vitals reviewed.        Health Maintenance   Topic Date Due    Pneumococcal Vaccine (Medium Risk) (1 of 1 - PPSV23)  04/10/1974    Mammogram  05/31/2019    Fecal Occult Blood Test (FOBT)/FitKit  08/10/2019    Lipid Panel  02/07/2024    TETANUS VACCINE  12/21/2026    Hepatitis C Screening  Completed    Influenza Vaccine  Completed    Zoster Vaccine  Addressed         Assessment:     1. Essential hypertension Well controlled   2. Hypokalemia Active   3. Hyperlipidemia, unspecified hyperlipidemia type Sub-optimally controlled   4. Hypothyroidism, unspecified type Sub-optimally controlled   5. Allergic rhinitis, unspecified seasonality, unspecified trigger Well controlled   6. Class 2 severe obesity with serious comorbidity and body mass index (BMI) of 39.0 to 39.9 in adult, unspecified obesity type Sub-optimally controlled   7. Need for prophylactic vaccination with Streptococcus pneumoniae (Pneumococcus) and Influenza vaccines Active         Plan: Essential hypertension  Comments:  continue current regimen and encouraged low Na diet and weight loss     Hypokalemia  Comments:  asked pt. to take potassium daily without missing doses' repeat BMP in 3 weeks   Orders:  -     Basic metabolic panel; Future; Expected date: 04/05/2019  -     Comprehensive metabolic panel; Future; Expected date: 04/15/2019    Hyperlipidemia, unspecified hyperlipidemia type  Comments:  start lovastatin and side effects discussed; repeat LFT's and lipids on f/u in 2 months   Orders:  -     lovastatin (MEVACOR) 10 MG tablet; Take 1 tablet (10 mg total) by mouth every evening.  Dispense: 90 tablet; Refill: 0  -     Lipid panel; Future; Expected date: 04/15/2019  -     Comprehensive metabolic panel; Future; Expected date: 04/15/2019    Hypothyroidism, unspecified type  Comments:  asked pt. to take thyroid med seperateley from other meds and repeat TSH in 3 weeks   Orders:  -     TSH; Future; Expected date: 04/15/2019    Allergic rhinitis, unspecified seasonality, unspecified trigger  Comments:  continue astelin alternating with flonase     Class 2 severe  obesity with serious comorbidity and body mass index (BMI) of 39.0 to 39.9 in adult, unspecified obesity type  Comments:  encouraged diet and explained risks     Need for prophylactic vaccination with Streptococcus pneumoniae (Pneumococcus) and Influenza vaccines  -     (In Office Administered) Pneumococcal Conjugate Vaccine (13 Valent) (IM)        Problem List Items Addressed This Visit        Cardiac/Vascular    Hypertension - Primary    Hyperlipidemia    Relevant Medications    lovastatin (MEVACOR) 10 MG tablet    Other Relevant Orders    Lipid panel    Comprehensive metabolic panel       Renal/    Hypokalemia    Relevant Orders    Basic metabolic panel    Comprehensive metabolic panel       ID    Need for prophylactic vaccination with Streptococcus pneumoniae (Pneumococcus) and Influenza vaccines    Relevant Orders    (In Office Administered) Pneumococcal Conjugate Vaccine (13 Valent) (IM)       Endocrine    Hypothyroid    Relevant Orders    TSH       Other    Allergic rhinitis    Class 2 severe obesity with serious comorbidity and body mass index (BMI) of 39.0 to 39.9 in adult        Answers for HPI/ROS submitted by the patient on 3/13/2019   activity change: No  unexpected weight change: No  rhinorrhea: No  trouble swallowing: No  visual disturbance: No  chest tightness: No  polyuria: No  difficulty urinating: No  menstrual problem: No  joint swelling: No  arthralgias: Yes  confusion: No  dysphoric mood: No    Answers for HPI/ROS submitted by the patient on 3/13/2019   activity change: No  unexpected weight change: No  rhinorrhea: No  trouble swallowing: No  visual disturbance: No  chest tightness: No  polyuria: No  difficulty urinating: No  menstrual problem: No  joint swelling: No  arthralgias: Yes  confusion: No  dysphoric mood: No

## 2019-03-18 ENCOUNTER — OFFICE VISIT (OUTPATIENT)
Dept: ORTHOPEDICS | Facility: CLINIC | Age: 64
End: 2019-03-18
Payer: COMMERCIAL

## 2019-03-18 DIAGNOSIS — M17.0 PRIMARY OSTEOARTHRITIS OF BOTH KNEES: Primary | ICD-10-CM

## 2019-03-18 PROCEDURE — 99999 PR PBB SHADOW E&M-EST. PATIENT-LVL III: CPT | Mod: PBBFAC,,, | Performed by: NURSE PRACTITIONER

## 2019-03-18 PROCEDURE — 99999 PR PBB SHADOW E&M-EST. PATIENT-LVL III: ICD-10-PCS | Mod: PBBFAC,,, | Performed by: NURSE PRACTITIONER

## 2019-03-18 PROCEDURE — 20610 PR DRAIN/INJECT LARGE JOINT/BURSA: ICD-10-PCS | Mod: 50,S$GLB,, | Performed by: NURSE PRACTITIONER

## 2019-03-18 PROCEDURE — 99213 PR OFFICE/OUTPT VISIT, EST, LEVL III, 20-29 MIN: ICD-10-PCS | Mod: 25,S$GLB,, | Performed by: NURSE PRACTITIONER

## 2019-03-18 PROCEDURE — 99213 OFFICE O/P EST LOW 20 MIN: CPT | Mod: 25,S$GLB,, | Performed by: NURSE PRACTITIONER

## 2019-03-18 PROCEDURE — 20610 DRAIN/INJ JOINT/BURSA W/O US: CPT | Mod: 50,S$GLB,, | Performed by: NURSE PRACTITIONER

## 2019-03-18 RX ORDER — TRIAMCINOLONE ACETONIDE 40 MG/ML
80 INJECTION, SUSPENSION INTRA-ARTICULAR; INTRAMUSCULAR
Status: COMPLETED | OUTPATIENT
Start: 2019-03-18 | End: 2019-03-18

## 2019-03-18 RX ADMIN — TRIAMCINOLONE ACETONIDE 80 MG: 40 INJECTION, SUSPENSION INTRA-ARTICULAR; INTRAMUSCULAR at 01:03

## 2019-03-18 NOTE — PROGRESS NOTES
CC: Pain of the Left Knee and Pain of the Right Knee      HPI: Pt with c/o bilateral knee pain for the past month. 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 The pain is aching and global and worse with increased activity. She is taking nsaids with minimal relief and returns today for cortisone injections which give her complete relief. Her last injections were in December. She is ambulating without assistive device. There is not a limp.    ROS  General: denies fever and chills  Resp: no c/o sob  CVS: no c/o cp  MSK: c/o bilateral knee pain which is aching and worse with increased activity    PE  General: AAOx3, pleasant and cooperative  Resp: respirations even and unlabored  MSK: bilateral  knee exam  0 degrees extension  120 degrees flexion  No warmth or erythema   - effusion    Assessment:  Bilateral knee djd    Plan:  Bilateral knee cortisone injections today  RICE  nsaids prn  F/u as needed    Knee Injection Procedure Note    Diagnosis: bilateral knee degenerative arthritis  Indications: bilateral knee pain  Procedure Details: Verbal consent was obtained for the procedure. The injection site was identified and the skin was prepared with alcohol. The bilateral knee was injected from an anterolateral approach with 1 ml of Kenalog and 4 ml Lidocaine each under sterile technique using a 22 gauge needle. The needle was removed and the area cleansed and dressed.  Complications:  Patient tolerated the procedure well.    she was advised to rest the knee today, using ice and elevation as needed for comfort and swelling.Immediate relief of the knee pain may be short lived and secondary to the lidocaine. she may have an increase in discomfort tonight followed by steady improvement over the next several days. It may take 1-2 weeks following the injection to get the full benefit of the medication.

## 2019-03-21 ENCOUNTER — PATIENT OUTREACH (OUTPATIENT)
Dept: OTHER | Facility: OTHER | Age: 64
End: 2019-03-21

## 2019-03-21 NOTE — PROGRESS NOTES
Last 5 Patient Entered Readings                                      Current 30 Day Average: 120/76     Recent Readings 3/13/2019 3/13/2019 3/8/2019 3/8/2019 2/23/2019    SBP (mmHg) 110 130 129 132 120    DBP (mmHg) 71 73 80 81 75    Pulse 85 90 79 73 84        Digital Medicine: Health  Follow Up    Left voicemail to follow up with Mrs. Figueroa Jossue.  Current BP average 120/76 mmHg is at goal.

## 2019-03-22 DIAGNOSIS — E03.9 HYPOTHYROIDISM, UNSPECIFIED TYPE: ICD-10-CM

## 2019-03-25 RX ORDER — LEVOTHYROXINE SODIUM 125 UG/1
TABLET ORAL
Qty: 90 TABLET | Refills: 0 | Status: SHIPPED | OUTPATIENT
Start: 2019-03-25 | End: 2019-04-08 | Stop reason: SDUPTHER

## 2019-03-26 ENCOUNTER — PATIENT OUTREACH (OUTPATIENT)
Dept: OTHER | Facility: OTHER | Age: 64
End: 2019-03-26

## 2019-03-26 NOTE — PROGRESS NOTES
Last 5 Patient Entered Readings                                      Current 30 Day Average: 124/76     Recent Readings 3/23/2019 3/13/2019 3/13/2019 3/8/2019 3/8/2019    SBP (mmHg) 132 110 130 129 132    DBP (mmHg) 76 71 73 80 81    Pulse 79 85 90 79 73        Per 30 day average, 124/76 mmHg, patient's BP is at goal.     Mrs. Tucker is doing well. She is pleased with BP where it is. She does not want to reduce any doses at this time, and I agree with this as BP would likely be above goal with any lower dose of medications.     Patient's health , Samantha Albright, will be following up. I will continue to monitor regularly and will follow up in 4-6 months, sooner if BP begins to trend upward or downward.    Asked patient to call or message with questions or concerns.     Current HTN regimen:  Hypertension Medications             amLODIPine (NORVASC) 10 MG tablet TAKE 1 TABLET BY MOUTH ONCE DAILY    chlorthalidone (HYGROTEN) 25 MG Tab TAKE 1 TABLET BY MOUTH ONCE DAILY

## 2019-03-30 DIAGNOSIS — I10 ESSENTIAL HYPERTENSION: ICD-10-CM

## 2019-04-01 RX ORDER — CHLORTHALIDONE 25 MG/1
TABLET ORAL
Qty: 30 TABLET | Refills: 11 | Status: SHIPPED | OUTPATIENT
Start: 2019-04-01 | End: 2019-06-05

## 2019-04-04 ENCOUNTER — LAB VISIT (OUTPATIENT)
Dept: LAB | Facility: HOSPITAL | Age: 64
End: 2019-04-04
Attending: INTERNAL MEDICINE
Payer: COMMERCIAL

## 2019-04-04 DIAGNOSIS — E87.6 HYPOKALEMIA: ICD-10-CM

## 2019-04-04 DIAGNOSIS — E03.9 HYPOTHYROIDISM, UNSPECIFIED TYPE: ICD-10-CM

## 2019-04-04 LAB
ANION GAP SERPL CALC-SCNC: 9 MMOL/L (ref 8–16)
BUN SERPL-MCNC: 26 MG/DL (ref 8–23)
CALCIUM SERPL-MCNC: 9 MG/DL (ref 8.7–10.5)
CHLORIDE SERPL-SCNC: 102 MMOL/L (ref 95–110)
CO2 SERPL-SCNC: 30 MMOL/L (ref 23–29)
CREAT SERPL-MCNC: 0.8 MG/DL (ref 0.5–1.4)
EST. GFR  (AFRICAN AMERICAN): >60 ML/MIN/1.73 M^2
EST. GFR  (NON AFRICAN AMERICAN): >60 ML/MIN/1.73 M^2
GLUCOSE SERPL-MCNC: 84 MG/DL (ref 70–110)
POTASSIUM SERPL-SCNC: 3.4 MMOL/L (ref 3.5–5.1)
SODIUM SERPL-SCNC: 141 MMOL/L (ref 136–145)
T4 FREE SERPL-MCNC: 1.2 NG/DL (ref 0.71–1.51)
TSH SERPL DL<=0.005 MIU/L-ACNC: 5.24 UIU/ML (ref 0.4–4)

## 2019-04-04 PROCEDURE — 84439 ASSAY OF FREE THYROXINE: CPT

## 2019-04-04 PROCEDURE — 80048 BASIC METABOLIC PNL TOTAL CA: CPT

## 2019-04-04 PROCEDURE — 36415 COLL VENOUS BLD VENIPUNCTURE: CPT | Mod: PO

## 2019-04-04 PROCEDURE — 84443 ASSAY THYROID STIM HORMONE: CPT

## 2019-04-08 ENCOUNTER — OFFICE VISIT (OUTPATIENT)
Dept: INTERNAL MEDICINE | Facility: CLINIC | Age: 64
End: 2019-04-08
Payer: COMMERCIAL

## 2019-04-08 VITALS
HEART RATE: 72 BPM | HEIGHT: 65 IN | OXYGEN SATURATION: 97 % | SYSTOLIC BLOOD PRESSURE: 122 MMHG | DIASTOLIC BLOOD PRESSURE: 80 MMHG | TEMPERATURE: 99 F | BODY MASS INDEX: 40.14 KG/M2 | WEIGHT: 240.94 LBS

## 2019-04-08 DIAGNOSIS — E03.4 HYPOTHYROIDISM DUE TO ACQUIRED ATROPHY OF THYROID: ICD-10-CM

## 2019-04-08 DIAGNOSIS — R51.9 SINUS HEADACHE: Primary | ICD-10-CM

## 2019-04-08 DIAGNOSIS — D17.21 LIPOMA OF RIGHT SHOULDER: ICD-10-CM

## 2019-04-08 PROCEDURE — 3008F BODY MASS INDEX DOCD: CPT | Mod: CPTII,S$GLB,, | Performed by: INTERNAL MEDICINE

## 2019-04-08 PROCEDURE — 3008F PR BODY MASS INDEX (BMI) DOCUMENTED: ICD-10-PCS | Mod: CPTII,S$GLB,, | Performed by: INTERNAL MEDICINE

## 2019-04-08 PROCEDURE — 99213 PR OFFICE/OUTPT VISIT, EST, LEVL III, 20-29 MIN: ICD-10-PCS | Mod: S$GLB,,, | Performed by: INTERNAL MEDICINE

## 2019-04-08 PROCEDURE — 99999 PR PBB SHADOW E&M-EST. PATIENT-LVL IV: CPT | Mod: PBBFAC,,, | Performed by: INTERNAL MEDICINE

## 2019-04-08 PROCEDURE — 99999 PR PBB SHADOW E&M-EST. PATIENT-LVL IV: ICD-10-PCS | Mod: PBBFAC,,, | Performed by: INTERNAL MEDICINE

## 2019-04-08 PROCEDURE — 99213 OFFICE O/P EST LOW 20 MIN: CPT | Mod: S$GLB,,, | Performed by: INTERNAL MEDICINE

## 2019-04-08 PROCEDURE — 3079F DIAST BP 80-89 MM HG: CPT | Mod: CPTII,S$GLB,, | Performed by: INTERNAL MEDICINE

## 2019-04-08 PROCEDURE — 3074F SYST BP LT 130 MM HG: CPT | Mod: CPTII,S$GLB,, | Performed by: INTERNAL MEDICINE

## 2019-04-08 PROCEDURE — 3079F PR MOST RECENT DIASTOLIC BLOOD PRESSURE 80-89 MM HG: ICD-10-PCS | Mod: CPTII,S$GLB,, | Performed by: INTERNAL MEDICINE

## 2019-04-08 PROCEDURE — 3074F PR MOST RECENT SYSTOLIC BLOOD PRESSURE < 130 MM HG: ICD-10-PCS | Mod: CPTII,S$GLB,, | Performed by: INTERNAL MEDICINE

## 2019-04-08 RX ORDER — FLUTICASONE PROPIONATE 50 MCG
2 SPRAY, SUSPENSION (ML) NASAL DAILY
Qty: 16 G | Refills: 0 | Status: SHIPPED | OUTPATIENT
Start: 2019-04-08 | End: 2019-05-08

## 2019-04-08 NOTE — PROGRESS NOTES
Portions of this note are generated with voice recognition software. Typographical errors may exist.     SUBJECTIVE:    This is a/an 64 y.o. female here for primary care visit for  Chief Complaint   Patient presents with    Edema     neck     Patient states that she comes in because she has some concern about some fullness along the right aspect of the supraclavicular region.  Patient states that this is painless.  Based on today's conversation it is unclear how quickly this developed.  Patient states that she is having no problems with shoulder pain or range of motion. The patient has a history of thyroid nodule prompting thyroidectomy.  No mention of cancer in the record.  Medical record does not show a history of cancer.    Patient is complying with Synthroid it is struggling to take the medication on an empty stomach.    Patient states that for the past 7 days she has been having increased sinus congestion with frontal headache.  No fevers chills or body aches.  Self care measures have been limited only to Zyrtec.  Not using any other antihistamine products      Medications Reviewed and Updated    Past medical, family, and social histories were reviewed and updated.    Review of Systems negative unless otherwise noted in history of present illness-  ROS    General ROS: negative  Psychological ROS: negative  ENT ROS: negative  Endocrine ROS: Negative  Allergy and Immunology ROS: negative  Cardiovascular ROS: negative  Pulmonary ROS: Negative  Gastrointestinal ROS: negative  Genito-Urinary ROS: negative  Dermatological ROS: negative        Allergic:    Review of patient's allergies indicates:   Allergen Reactions    Losartan Swelling     Possible throat closing sensation.    Cefuroxime Itching and Rash       OBJECTIVE:  BP: 122/80 Pulse: 72 Temp: 98.5 °F (36.9 °C)  Wt Readings from Last 3 Encounters:   04/08/19 109.3 kg (240 lb 15.4 oz)   03/15/19 108.7 kg (239 lb 10.2 oz)   02/18/19 107.2 kg (236 lb 5.3 oz)     Body mass index is 40.1 kg/m².  Previous Blood Pressure Readings :   BP Readings from Last 3 Encounters:   04/08/19 122/80   03/15/19 122/76   02/18/19 122/66       Physical Exam   Lymphadenopathy:        Head (right side): No submental, no submandibular and no tonsillar adenopathy present.        Head (left side): No submental, no submandibular and no tonsillar adenopathy present.     She has no cervical adenopathy.        Right: No supraclavicular adenopathy present.        Left: No supraclavicular adenopathy present.       GEN: No apparent distress  HEENT: sclera non-icteric, conjunctiva clear edematous turbinates bilaterally. No sinus pain on palpation  CV: no peripheral edema  PULM: breathing non-labored  ABD: Obese, protuberant abdomen.  PSYCH: appropriate affect  MSK: able to rise from chair without assistance  SKIN: normal skin turgor soft tissue swelling supraclavicular fossa right side.  Compatible with lipoma    Pertinent Labs Reviewed       ASSESSMENT/PLAN:    Sinus headache.Condition not optimally controlled. Detailed counseling on self care measures. Plan to monitor clinically in addition to plan below or as listed on After Visit Summary.   -     fluticasone (FLONASE) 50 mcg/actuation nasal spray; 2 sprays (100 mcg total) by Each Nare route once daily.  Dispense: 16 g; Refill: 0    Lipoma of right shoulder.Condition stable.  Counseling given today on self-care measures. Plan to monitor clinically. Continue current medical plan.     Hypothyroidism due to acquired atrophy of thyroid.Condition stable.  Counseling given today on self-care measures. Plan to monitor clinically. Continue current medical plan.         Future Appointments   Date Time Provider Department Center   5/9/2019  7:00 AM LAB, [facility]   [facility]  [facility]   6/5/2019 10:30 AM Kierra Cornejo MD [facility]  4/13/2019  12:19 PM    Answers for HPI/ROS  submitted by the patient on 4/6/2019   activity change: No  unexpected weight change: No  rhinorrhea: No  trouble swallowing: No  visual disturbance: No  chest tightness: No  polyuria: No  difficulty urinating: No  menstrual problem: No  joint swelling: No  arthralgias: No  confusion: No  dysphoric mood: No

## 2019-04-08 NOTE — PATIENT INSTRUCTIONS
"Recommendations for today    If despite using Flonase +the allergy tablet you continue to have sinus headaches or they worsen contact my clinic so that we can prescribe an antibiotic in addition to this treatment      How to use your Nasal Steroid Spray   - you should first try using an antihistamine tablet as listed below  - your doctor may ask you to use a nasal steroid spray IN ADDITION TO this antihistamine tablet.   - Nasal steroid sprays are no very effective if used "as needed"  for nasal congestion  - they work best when you use them for a period of at least 7-14 days IN A ROW.   - If symptoms are improving you should continue nasal steroid spray daily for up to 2 months.   - The most common side effect from this medicine is excessive nasal dryness which can lead to nose bleeds.   - If this happens, take a break from the nasal spray and if possible from the antihistamine tablet until nasal dryness improves.   - For sore throats soon after each use of the nasal steroid, we recommend you gargle after each use of nasal steroid and spit out the water to avoid sore throat caused by Flonase irritating the throat      Antihistamine Medicines:    - for seasonal allergies and congestion, use loratidine (generic for Claritin), fexofenadine, cetirizine.   - take this throughout the year when your symptoms are coming back   - safe to take all week or month as needed  - take breaks when you think your triggers are going away    Decongestants   - do not use daily, this can make your congestion worse over time   - for rapid relief of allergy congestion you can try something like Claritin-D with a decongestant. Afrin. Psuedophed. Phenylerphrine   - this is for rapid relief and should only be used as needed, less than 4 days per week.     NASAL SALINE   Washing the nasal cavities with saline reduces postnasal drainage, removes secretions, and rinses away allergens and irritants.     Saline washes can be used immediately " prior to administration of other intranasal medications so that the mucosa is freshly cleansed when the medications are introduced    You can use this daily, or multiple times daily, depending on the severity of symptoms      Reasons to Call Clinic   · Start to have headache, facial pain, yellow mucous, fevers.

## 2019-04-09 NOTE — PROGRESS NOTES
Last 5 Patient Entered Readings                                      Current 30 Day Average: 123/76     Recent Readings 4/6/2019 4/6/2019 4/6/2019 3/23/2019 3/13/2019    SBP (mmHg) 126 131 130 132 110    DBP (mmHg) 77 78 80 76 71    Pulse 84 86 85 79 85        Digital Medicine: Health  Follow Up    Feeling well.    Lifestyle Modifications:    1.Dietary Modifications:  does most of the cooking because he has DM2 and monitors sugar/carbs. Continuing to follow Ideal Protein diet. Has had a few recent celebrations and thinks she's gained a couple pounds. Birthday is this week and going to a few lunches with friends. Encouraged moderation for other meals to compensate. Daughter's wedding is in June and she's determined to lose weight.  Requested resources for low sugar condiment options and recipes with nutrition facts.    2.Physical Activity: knee pain causes limited activity, tries to just walk around the block. Going to the gym and riding stationary bike. Considering getting a knee replacement next year.     3.Medication Therapy: Patient has been compliant with the medication regimen.    4.Patient has the following medication side effects/concerns:   (Frequency/Alleviating factors/Precipitating factors, etc.)     Follow up with Ms. Carolinatobi Marcum completed. No further questions or concerns. Will continue to follow up to achieve health goals.

## 2019-04-22 ENCOUNTER — PATIENT MESSAGE (OUTPATIENT)
Dept: DERMATOLOGY | Facility: CLINIC | Age: 64
End: 2019-04-22

## 2019-04-22 ENCOUNTER — PATIENT MESSAGE (OUTPATIENT)
Dept: OBSTETRICS AND GYNECOLOGY | Facility: CLINIC | Age: 64
End: 2019-04-22

## 2019-04-23 RX ORDER — FLUCONAZOLE 150 MG/1
150 TABLET ORAL
Qty: 2 TABLET | Refills: 0 | Status: SHIPPED | OUTPATIENT
Start: 2019-04-23 | End: 2019-04-27

## 2019-05-09 ENCOUNTER — LAB VISIT (OUTPATIENT)
Dept: LAB | Facility: HOSPITAL | Age: 64
End: 2019-05-09
Attending: INTERNAL MEDICINE
Payer: COMMERCIAL

## 2019-05-09 DIAGNOSIS — E78.5 HYPERLIPIDEMIA, UNSPECIFIED HYPERLIPIDEMIA TYPE: ICD-10-CM

## 2019-05-09 DIAGNOSIS — E87.6 HYPOKALEMIA: ICD-10-CM

## 2019-05-09 LAB
ALBUMIN SERPL BCP-MCNC: 3.4 G/DL (ref 3.5–5.2)
ALP SERPL-CCNC: 62 U/L (ref 55–135)
ALT SERPL W/O P-5'-P-CCNC: 18 U/L (ref 10–44)
ANION GAP SERPL CALC-SCNC: 11 MMOL/L (ref 8–16)
AST SERPL-CCNC: 10 U/L (ref 10–40)
BILIRUB SERPL-MCNC: 0.3 MG/DL (ref 0.1–1)
BUN SERPL-MCNC: 19 MG/DL (ref 8–23)
CALCIUM SERPL-MCNC: 9.2 MG/DL (ref 8.7–10.5)
CHLORIDE SERPL-SCNC: 101 MMOL/L (ref 95–110)
CHOLEST SERPL-MCNC: 210 MG/DL (ref 120–199)
CHOLEST/HDLC SERPL: 3.3 {RATIO} (ref 2–5)
CO2 SERPL-SCNC: 31 MMOL/L (ref 23–29)
CREAT SERPL-MCNC: 0.8 MG/DL (ref 0.5–1.4)
EST. GFR  (AFRICAN AMERICAN): >60 ML/MIN/1.73 M^2
EST. GFR  (NON AFRICAN AMERICAN): >60 ML/MIN/1.73 M^2
GLUCOSE SERPL-MCNC: 88 MG/DL (ref 70–110)
HDLC SERPL-MCNC: 64 MG/DL (ref 40–75)
HDLC SERPL: 30.5 % (ref 20–50)
LDLC SERPL CALC-MCNC: 129.8 MG/DL (ref 63–159)
NONHDLC SERPL-MCNC: 146 MG/DL
POTASSIUM SERPL-SCNC: 3 MMOL/L (ref 3.5–5.1)
PROT SERPL-MCNC: 7 G/DL (ref 6–8.4)
SODIUM SERPL-SCNC: 143 MMOL/L (ref 136–145)
TRIGL SERPL-MCNC: 81 MG/DL (ref 30–150)

## 2019-05-09 PROCEDURE — 80061 LIPID PANEL: CPT

## 2019-05-09 PROCEDURE — 36415 COLL VENOUS BLD VENIPUNCTURE: CPT | Mod: PO

## 2019-05-09 PROCEDURE — 80053 COMPREHEN METABOLIC PANEL: CPT

## 2019-05-14 ENCOUNTER — OFFICE VISIT (OUTPATIENT)
Dept: INTERNAL MEDICINE | Facility: CLINIC | Age: 64
End: 2019-05-14
Payer: COMMERCIAL

## 2019-05-14 VITALS
BODY MASS INDEX: 40.69 KG/M2 | HEART RATE: 73 BPM | HEIGHT: 65 IN | SYSTOLIC BLOOD PRESSURE: 130 MMHG | DIASTOLIC BLOOD PRESSURE: 86 MMHG | OXYGEN SATURATION: 95 % | WEIGHT: 244.25 LBS

## 2019-05-14 DIAGNOSIS — E03.9 HYPOTHYROIDISM, UNSPECIFIED TYPE: ICD-10-CM

## 2019-05-14 DIAGNOSIS — E87.6 HYPOKALEMIA: ICD-10-CM

## 2019-05-14 DIAGNOSIS — E66.01 MORBID OBESITY WITH BMI OF 40.0-44.9, ADULT: ICD-10-CM

## 2019-05-14 DIAGNOSIS — E86.0 DEHYDRATION: ICD-10-CM

## 2019-05-14 DIAGNOSIS — I10 ESSENTIAL HYPERTENSION: Primary | ICD-10-CM

## 2019-05-14 DIAGNOSIS — E78.5 HYPERLIPIDEMIA, UNSPECIFIED HYPERLIPIDEMIA TYPE: ICD-10-CM

## 2019-05-14 PROCEDURE — 3079F DIAST BP 80-89 MM HG: CPT | Mod: CPTII,S$GLB,, | Performed by: INTERNAL MEDICINE

## 2019-05-14 PROCEDURE — 3008F BODY MASS INDEX DOCD: CPT | Mod: CPTII,S$GLB,, | Performed by: INTERNAL MEDICINE

## 2019-05-14 PROCEDURE — 3079F PR MOST RECENT DIASTOLIC BLOOD PRESSURE 80-89 MM HG: ICD-10-PCS | Mod: CPTII,S$GLB,, | Performed by: INTERNAL MEDICINE

## 2019-05-14 PROCEDURE — 3075F SYST BP GE 130 - 139MM HG: CPT | Mod: CPTII,S$GLB,, | Performed by: INTERNAL MEDICINE

## 2019-05-14 PROCEDURE — 3075F PR MOST RECENT SYSTOLIC BLOOD PRESS GE 130-139MM HG: ICD-10-PCS | Mod: CPTII,S$GLB,, | Performed by: INTERNAL MEDICINE

## 2019-05-14 PROCEDURE — 99214 OFFICE O/P EST MOD 30 MIN: CPT | Mod: S$GLB,,, | Performed by: INTERNAL MEDICINE

## 2019-05-14 PROCEDURE — 99214 PR OFFICE/OUTPT VISIT, EST, LEVL IV, 30-39 MIN: ICD-10-PCS | Mod: S$GLB,,, | Performed by: INTERNAL MEDICINE

## 2019-05-14 PROCEDURE — 3008F PR BODY MASS INDEX (BMI) DOCUMENTED: ICD-10-PCS | Mod: CPTII,S$GLB,, | Performed by: INTERNAL MEDICINE

## 2019-05-14 PROCEDURE — 99999 PR PBB SHADOW E&M-EST. PATIENT-LVL IV: ICD-10-PCS | Mod: PBBFAC,,, | Performed by: INTERNAL MEDICINE

## 2019-05-14 PROCEDURE — 99999 PR PBB SHADOW E&M-EST. PATIENT-LVL IV: CPT | Mod: PBBFAC,,, | Performed by: INTERNAL MEDICINE

## 2019-05-14 NOTE — PROGRESS NOTES
Pt. ID: Carolina Marcum is a 64 y.o. female      Chief complaint:   Chief Complaint   Patient presents with    Follow-up       HPI: Pt. Here for f/u for HTN and hyperlipedemia; I reviewed labs dated 5/9/19 and 4/4/19; cholesterol is still elevated and she states she just started lovastatin 3 days ago; potassium is low and dehydration has improved; she states she takes potasium 10 meq 3 tabs BID; she states she does miss doses; approx 1-2 per week; TSH is mildly elevated and she misses occasional doses; I discussed importance of compliance with meds and explained risks of non-compliance;  BP is WNL and she does not want me to change chlorthalidone to HCTZ 12.5 mg QD; she does not want ne to adjust thyroid med either; pt. Has gained weight        Review of Systems   HENT: Negative for hearing loss.    Eyes: Negative for discharge.   Respiratory: Negative for wheezing.    Cardiovascular: Negative for chest pain and palpitations.   Gastrointestinal: Negative for blood in stool, constipation, diarrhea and vomiting.   Genitourinary: Negative for dysuria and hematuria.   Musculoskeletal: Negative for neck pain.   Neurological: Negative for weakness and headaches.   Endo/Heme/Allergies: Negative for polydipsia.         Objective:    Physical Exam   Constitutional: She is oriented to person, place, and time.   Morbid obesity    Eyes: EOM are normal.   Neck: Normal range of motion.   Cardiovascular: Normal rate, regular rhythm and normal heart sounds.   Pulmonary/Chest: Effort normal and breath sounds normal. No respiratory distress. She has no wheezes. She has no rales.   Abdominal: Soft. There is no tenderness. There is no rebound and no guarding.   Musculoskeletal: Normal range of motion.   Neurological: She is alert and oriented to person, place, and time.   Skin: No rash noted.   Vitals reviewed.        Health Maintenance   Topic Date Due    Pneumococcal Vaccine (Medium Risk) (1 of 1 - PPSV23) 04/10/1974     Mammogram  05/31/2019    Influenza Vaccine  08/01/2019    Fecal Occult Blood Test (FOBT)/FitKit  08/10/2019    Lipid Panel  05/09/2024    TETANUS VACCINE  12/21/2026    Hepatitis C Screening  Completed         Assessment:     1. Essential hypertension Well controlled   2. Hypokalemia Active   3. Hyperlipidemia, unspecified hyperlipidemia type Sub-optimally controlled   4. Dehydration Well controlled   5. Hypothyroidism, unspecified type Sub-optimally controlled   6. Morbid obesity with BMI of 40.0-44.9, adult Sub-optimally controlled         Plan: Essential hypertension  Comments:  pt. does not want to change chlorthalidone to lower dose of HCTZ due to hypokalemia; encouraged low Na diet and weight loss; f/u digital HTN program   Orders:  -     Comprehensive metabolic panel; Future; Expected date: 05/28/2019    Hypokalemia  Comments:  asked pt. take potassium as prescribed and not to miss doses;repeat potassium in 2 weeks and eat foods high in potassium;pt. would like to defer changing BP med  Orders:  -     Comprehensive metabolic panel; Future; Expected date: 05/28/2019    Hyperlipidemia, unspecified hyperlipidemia type  Comments:  pt. started lovastatin 10 mg 3 days ago; encouraged diet modification and repeat lipids in 2-3 months     Dehydration  Comments:  normalized; monitor   Orders:  -     Comprehensive metabolic panel; Future; Expected date: 05/28/2019    Hypothyroidism, unspecified type  Comments:  pt. states she will take thyroid med without missing doses and repeat TSH in 2 weeks; she does not want to change thyroid med dosing now    Orders:  -     TSH; Future; Expected date: 05/28/2019    Morbid obesity with BMI of 40.0-44.9, adult  Comments:  encouraged diet and explained risks         Problem List Items Addressed This Visit        Cardiac/Vascular    Hyperlipidemia    Essential hypertension - Primary    Relevant Orders    Comprehensive metabolic panel       Renal/    Hypokalemia    Relevant  Orders    Comprehensive metabolic panel    Dehydration    Relevant Orders    Comprehensive metabolic panel       Endocrine    Morbid obesity with BMI of 40.0-44.9, adult    Hypothyroidism    Relevant Orders    TSH        Answers for HPI/ROS submitted by the patient on 5/13/2019   activity change: No  unexpected weight change: No  rhinorrhea: No  trouble swallowing: No  visual disturbance: No  chest tightness: No  polyuria: No  difficulty urinating: No  menstrual problem: No  joint swelling: No  arthralgias: No  confusion: No  dysphoric mood: No

## 2019-05-15 ENCOUNTER — PATIENT MESSAGE (OUTPATIENT)
Dept: OBSTETRICS AND GYNECOLOGY | Facility: CLINIC | Age: 64
End: 2019-05-15

## 2019-05-15 RX ORDER — FLUCONAZOLE 200 MG/1
200 TABLET ORAL
Qty: 3 TABLET | Refills: 0 | Status: SHIPPED | OUTPATIENT
Start: 2019-05-15 | End: 2019-05-22

## 2019-05-16 ENCOUNTER — HOSPITAL ENCOUNTER (OUTPATIENT)
Dept: RADIOLOGY | Facility: HOSPITAL | Age: 64
Discharge: HOME | End: 2019-05-16
Attending: PHYSICIAN ASSISTANT
Payer: COMMERCIAL

## 2019-05-16 ENCOUNTER — OFFICE VISIT (OUTPATIENT)
Dept: ORTHOPEDICS | Facility: CLINIC | Age: 64
End: 2019-05-16
Payer: COMMERCIAL

## 2019-05-16 VITALS
WEIGHT: 244 LBS | HEIGHT: 65 IN | DIASTOLIC BLOOD PRESSURE: 81 MMHG | BODY MASS INDEX: 40.65 KG/M2 | SYSTOLIC BLOOD PRESSURE: 129 MMHG | HEART RATE: 75 BPM

## 2019-05-16 DIAGNOSIS — M79.672 LEFT FOOT PAIN: ICD-10-CM

## 2019-05-16 DIAGNOSIS — M79.89 SWELLING OF LEFT FOOT: ICD-10-CM

## 2019-05-16 DIAGNOSIS — M79.672 LEFT FOOT PAIN: Primary | ICD-10-CM

## 2019-05-16 PROCEDURE — 3079F DIAST BP 80-89 MM HG: CPT | Mod: CPTII,S$GLB,, | Performed by: PHYSICIAN ASSISTANT

## 2019-05-16 PROCEDURE — 3008F PR BODY MASS INDEX (BMI) DOCUMENTED: ICD-10-PCS | Mod: CPTII,S$GLB,, | Performed by: PHYSICIAN ASSISTANT

## 2019-05-16 PROCEDURE — 99213 PR OFFICE/OUTPT VISIT, EST, LEVL III, 20-29 MIN: ICD-10-PCS | Mod: S$GLB,,, | Performed by: PHYSICIAN ASSISTANT

## 2019-05-16 PROCEDURE — 99213 OFFICE O/P EST LOW 20 MIN: CPT | Mod: S$GLB,,, | Performed by: PHYSICIAN ASSISTANT

## 2019-05-16 PROCEDURE — 3074F PR MOST RECENT SYSTOLIC BLOOD PRESSURE < 130 MM HG: ICD-10-PCS | Mod: CPTII,S$GLB,, | Performed by: PHYSICIAN ASSISTANT

## 2019-05-16 PROCEDURE — 3079F PR MOST RECENT DIASTOLIC BLOOD PRESSURE 80-89 MM HG: ICD-10-PCS | Mod: CPTII,S$GLB,, | Performed by: PHYSICIAN ASSISTANT

## 2019-05-16 PROCEDURE — 99999 PR PBB SHADOW E&M-EST. PATIENT-LVL III: ICD-10-PCS | Mod: PBBFAC,,, | Performed by: PHYSICIAN ASSISTANT

## 2019-05-16 PROCEDURE — 73630 X-RAY EXAM OF FOOT: CPT | Mod: 26,LT,, | Performed by: RADIOLOGY

## 2019-05-16 PROCEDURE — 73630 X-RAY EXAM OF FOOT: CPT | Mod: TC,LT

## 2019-05-16 PROCEDURE — 73630 XR FOOT COMPLETE 3 VIEW LEFT: ICD-10-PCS | Mod: 26,LT,, | Performed by: RADIOLOGY

## 2019-05-16 PROCEDURE — 3008F BODY MASS INDEX DOCD: CPT | Mod: CPTII,S$GLB,, | Performed by: PHYSICIAN ASSISTANT

## 2019-05-16 PROCEDURE — 3074F SYST BP LT 130 MM HG: CPT | Mod: CPTII,S$GLB,, | Performed by: PHYSICIAN ASSISTANT

## 2019-05-16 PROCEDURE — 99999 PR PBB SHADOW E&M-EST. PATIENT-LVL III: CPT | Mod: PBBFAC,,, | Performed by: PHYSICIAN ASSISTANT

## 2019-05-16 RX ORDER — NAPROXEN 500 MG/1
500 TABLET ORAL 2 TIMES DAILY
Qty: 60 TABLET | Refills: 0 | Status: SHIPPED | OUTPATIENT
Start: 2019-05-16 | End: 2019-06-17 | Stop reason: SDUPTHER

## 2019-05-16 NOTE — PROGRESS NOTES
SUBJECTIVE:     Chief Complaint & History of Present Illness:  Carolina Marcum is a 64 y.o. year old female, established patient, here for constant left foot pain which has been present for 1 day.  There is a history of injury.  Patient's phone fell from elevated surface and landed on her left foot. The pain is located in the dorsal aspect of the foot.  The pain is described as sharp, 5/10.  It is aggravated by direct pressure. She states if she does not have pressure on her foot she does not feel any pain. There is not radiation, numbness or tingling into the toes.  Associated symptoms include discomfort, stiffness, swelling. Previous treatments include none which have provided poor relief.  There is not a history of previous injury or surgery to the foot.   The patient does not use an assistive device.    Review of patient's allergies indicates:   Allergen Reactions    Losartan Swelling     Possible throat closing sensation.    Cefuroxime Itching and Rash         Current Outpatient Medications   Medication Sig Dispense Refill    amLODIPine (NORVASC) 10 MG tablet TAKE 1 TABLET BY MOUTH ONCE DAILY 90 tablet 3    azelastine (ASTELIN) 137 mcg (0.1 %) nasal spray 1 spray (137 mcg total) by Nasal route daily as needed for Rhinitis. 30 mL 1    cetirizine (ZYRTEC) 10 MG tablet Take 10 mg by mouth. Half Tablet Oral Every day      chlorthalidone (HYGROTEN) 25 MG Tab TAKE 1 TABLET BY MOUTH ONCE DAILY 30 tablet 11    diphenhydrAMINE (BANOPHEN) 25 mg capsule Take 1 each (25 mg total) by mouth every 6 (six) hours as needed for Itching or Allergies. 60 capsule 0    etodolac (LODINE XL) 400 MG 24 hr tablet Take 1 tablet (400 mg total) by mouth daily as needed (avoid all other NSAIDs). 30 tablet 1    fluconazole (DIFLUCAN) 200 MG Tab Take 1 tablet (200 mg total) by mouth every 72 hours. for 3 doses 3 tablet 0    hydrocortisone (ANUSOL-HC) 25 mg suppository Place 1 suppository (25 mg total) rectally 2 (two) times  daily. 14 suppository 1    hydrocortisone-pramoxine (PROCTOFOAM HC) rectal foam Place rectally 3 (three) times daily. 1 Foam Rectal Three times a day 1 applicator 0    lovastatin (MEVACOR) 10 MG tablet Take 1 tablet (10 mg total) by mouth every evening. 90 tablet 0    montelukast (SINGULAIR) 10 mg tablet Take 1 tablet (10 mg total) by mouth every evening. 90 tablet 3    nystatin (MYCOSTATIN) cream Apply topically 2 (two) times daily. 30 g 0    omeprazole (PRILOSEC) 40 MG capsule Take 1 capsule (40 mg total) by mouth 2 (two) times daily before meals. 60 capsule 3    SYNTHROID 125 mcg tablet TAKE 1 TABLET BY MOUTH DAILY 34 tablet 0    triamcinolone acetonide 0.1% (KENALOG) 0.1 % cream Apply topically 2 (two) times daily. 30 g 0    budesonide-formoterol 160-4.5 mcg (SYMBICORT) 160-4.5 mcg/actuation HFAA Inhale 2 puffs into the lungs 2 (two) times daily. 1 Inhaler 12    diclofenac sodium (VOLTAREN) 1 % Gel Apply 2 g topically 2 (two) times daily as needed. 100 g 1    epinephrine (EPIPEN) 0.3 mg/0.3 mL AtIn Inject 0.3 mLs (0.3 mg total) into the muscle as needed (for allergic reaction in which you feel like your throat is swelling, you cannot breathe.). 1 Device 0     No current facility-administered medications for this visit.        Past Medical History:   Diagnosis Date    Abnormal Pap smear of cervix     [facility]IS - CKC, then Hyst    Asthma, chronic     Bleeding hemorrhoids     Depression     Difficult intubation     Diverticular disease     Endometriosis     Fatty liver 12/15/2014    Herpes simplex without mention of complication     Hypercholesterolemia     Hypertension     Multiple gastric polyps 2012    Obesity     Reflux     Thyroid disease        Past Surgical History:   Procedure Laterality Date    BREAST BIOPSY Left 2017    core    BREAST BIOPSY Right     core    CERVICAL CONIZATION   W/ LASER       SECTION      x1    CHOLECYSTECTOMY      EGD  "(ESOPHAGOGASTRODUODENOSCOPY) N/A 1/15/2013    [facility] (2ND F[facility])    ESOPHAGOGASTRODUODENOSCOPY (EGD) N/A 6/21/2018    Per[facility] by [facility] at [facility] (2ND FLR)    HYSTERECTOMY  age 42    [place], LSO (uterine fibroids, adhesions, endometriosis)    OOPHORECTOMY  age 42    LSO (endometriosis, cyst)    THYROIDECTOMY      TOE SURGERY      left    TOTAL THYROIDECTOMY         Vital Signs (Most Recent)  Vitals:    05/16/19 0927   BP: 129/81   Pulse: 75       Review of Systems:  ROS:  Constitutional: no fever or chills  Eyes: no visual changes  ENT: no nasal congestion or sore throat  Respiratory: no cough or shortness of breath, positive for asthma  Cardiovascular: no chest pain or palpitations, positive for HTN  Gastrointestinal: no nausea or vomiting, tolerating diet, positive for GERD, fatty liver  Genitourinary: no hematuria or dysuria  Integument/Breast: no rash or pruritis  Hematologic/Lymphatic: no easy bruising or lymphadenopathy  Musculoskeletal: positive for left foot pain  Neurological: no seizures or tremors  Behavioral/Psych: no auditory or visual hallucinations  Endocrine: no heat or cold intolerance, positive for glucose intolerance      OBJECTIVE:     PHYSICAL EXAM:  Height: 5' 5" (165.1 cm) Weight: 110.7 kg (244 lb), General Appearance: Well nourished, well developed, in no acute distress.  CV: 2+ UE and LE distal pulses bilaterally  RESP: Respirations equal and unlabored  GI: Abdomen soft and non-tender  Neurological: Mood & affect are normal.  left  Foot/Ankle    Skin: bruising to dorsal aspect of left foot  Swelling: moderate to dorsal aspect of left foot  Warmth: no warmth  Tenderness: mild  ROM: 20 degrees dorsiflexion, 25 degrees plantarflexion, 15 degrees inversion and 15 degrees eversion  Strength: 5 on 5  Gait: normal  Neurological Exam: normal  Vascular Exam: normal    RADIOGRAPHS:  Xray of left foot taken in clinic today, images reviewed by " me, demonstrates mild narrowing of joint spaces without evidence of fracture or dislocation.    ASSESSMENT/PLAN:     Plan:  I will prescribe Naproxen 500mg PO BID with food to reduce gastric side effects.  She is to rest, ice and elevate foot as needed for pain and swelling.  If symptoms worsen or do not begin to resolve, patient may return to clinic and possibly receive a post-op shoe to reduce stress on foot.  Patient verbalized understanding and agrees.    Final Diagnosis: left foot pain. Left foot swelling.

## 2019-05-16 NOTE — LETTER
May 16, 2019      Deedee Mcgraw, GENNA  [address]  [facility] [address]           [facility]  [address]  [facility] [address]  Phone: [phone]          Patient: Carolina Marcum   MR Number: [id number]   YOB: 1955   Date of Visit: 5/16/2019       Dear Deedee Mcgraw:    Thank you for referring Carolina Marcum to me for evaluation. Attached you will find relevant portions of my assessment and plan of care.    If you have questions, please do not hesitate to call me. I look forward to following Carolina Marcum along with you.    Sincerely,    Brandon Zimmerman PA-C    Enclosure  CC:  No Recipients    If you would like to receive this communication electronically, please contact [email or web address] or [phone] to request more information on [facility] Link access.    For providers and/or their staff who would like to refer a patient to [facility], please contact us through our one-stop-shop provider referral line, Issa Hebert, at [phone].    If you feel you have received this communication in error or would no longer like to receive these types of communications, please e-mail [email or web address]

## 2019-05-21 DIAGNOSIS — E87.6 HYPOKALEMIA: ICD-10-CM

## 2019-05-21 RX ORDER — POTASSIUM CHLORIDE 750 MG/1
CAPSULE, EXTENDED RELEASE ORAL
Qty: 180 CAPSULE | Refills: 0 | Status: SHIPPED | OUTPATIENT
Start: 2019-05-21 | End: 2019-06-05

## 2019-05-22 ENCOUNTER — PATIENT OUTREACH (OUTPATIENT)
Dept: ADMINISTRATIVE | Facility: HOSPITAL | Age: 64
End: 2019-05-22

## 2019-05-22 NOTE — PROGRESS NOTES
Health Maintenance Due   Topic Date Due    Pneumococcal Vaccine (Medium Risk) (1 of 1 - PPSV23) 04/10/1974    Mammogram  05/31/2019     Pre-visit chart check complete.

## 2019-05-23 ENCOUNTER — TELEPHONE (OUTPATIENT)
Dept: SLEEP MEDICINE | Facility: CLINIC | Age: 64
End: 2019-05-23

## 2019-05-23 NOTE — TELEPHONE ENCOUNTER
----- Message from Kacie Luther sent at 5/23/2019  8:32 AM CDT -----  Contact: Self  Pt made contact via [facility] Portal as follows:    Appointment Request From: Carolina Marcum    With Provider: Soraya Desir NP [[facility]]    Preferred Date Range: Any date 5/30/2019 or later    Preferred Times: Any time    Reason for visit: Existing Patient    Comments:  Sleep follow up appointment     Thank you.

## 2019-05-27 ENCOUNTER — LAB VISIT (OUTPATIENT)
Dept: LAB | Facility: HOSPITAL | Age: 64
End: 2019-05-27
Attending: INTERNAL MEDICINE
Payer: COMMERCIAL

## 2019-05-27 DIAGNOSIS — E03.9 HYPOTHYROIDISM, UNSPECIFIED TYPE: ICD-10-CM

## 2019-05-27 DIAGNOSIS — I10 ESSENTIAL HYPERTENSION: ICD-10-CM

## 2019-05-27 DIAGNOSIS — E86.0 DEHYDRATION: ICD-10-CM

## 2019-05-27 DIAGNOSIS — E87.6 HYPOKALEMIA: ICD-10-CM

## 2019-05-27 LAB
ALBUMIN SERPL BCP-MCNC: 3.4 G/DL (ref 3.5–5.2)
ALP SERPL-CCNC: 67 U/L (ref 55–135)
ALT SERPL W/O P-5'-P-CCNC: 17 U/L (ref 10–44)
ANION GAP SERPL CALC-SCNC: 9 MMOL/L (ref 8–16)
AST SERPL-CCNC: 7 U/L (ref 10–40)
BILIRUB SERPL-MCNC: 0.4 MG/DL (ref 0.1–1)
BUN SERPL-MCNC: 15 MG/DL (ref 8–23)
CALCIUM SERPL-MCNC: 9.9 MG/DL (ref 8.7–10.5)
CHLORIDE SERPL-SCNC: 103 MMOL/L (ref 95–110)
CO2 SERPL-SCNC: 32 MMOL/L (ref 23–29)
CREAT SERPL-MCNC: 0.8 MG/DL (ref 0.5–1.4)
EST. GFR  (AFRICAN AMERICAN): >60 ML/MIN/1.73 M^2
EST. GFR  (NON AFRICAN AMERICAN): >60 ML/MIN/1.73 M^2
GLUCOSE SERPL-MCNC: 93 MG/DL (ref 70–110)
POTASSIUM SERPL-SCNC: 3.8 MMOL/L (ref 3.5–5.1)
PROT SERPL-MCNC: 7.4 G/DL (ref 6–8.4)
SODIUM SERPL-SCNC: 144 MMOL/L (ref 136–145)
TSH SERPL DL<=0.005 MIU/L-ACNC: 3.86 UIU/ML (ref 0.4–4)

## 2019-05-27 PROCEDURE — 84443 ASSAY THYROID STIM HORMONE: CPT

## 2019-05-27 PROCEDURE — 36415 COLL VENOUS BLD VENIPUNCTURE: CPT | Mod: PO

## 2019-05-27 PROCEDURE — 80053 COMPREHEN METABOLIC PANEL: CPT

## 2019-05-28 ENCOUNTER — PATIENT OUTREACH (OUTPATIENT)
Dept: OTHER | Facility: OTHER | Age: 64
End: 2019-05-28

## 2019-05-28 NOTE — PROGRESS NOTES
Last 5 Patient Entered Readings                                      Current 30 Day Average: 119/79     Recent Readings 5/26/2019 5/26/2019 5/16/2019 5/16/2019 5/8/2019    SBP (mmHg) 125 131 103 126 128    DBP (mmHg) 81 80 74 81 76    Pulse 80 81 84 81 74        Digital Medicine: Health  Follow Up    Feeling well.    Lifestyle Modifications:    1.Dietary Modifications: Started Ideal Protein again because she's gained 5lbs. She doesn't enjoy Ideal Protein but stated it works well for her when she's on it. Encouraged her to choose a sustainable diet. She is not interested in weight watchers because she doesn't like the meetings.    2.Physical Activity: deferred    3.Medication Therapy: Patient has been compliant with the medication regimen.    4.Patient has the following medication side effects/concerns:   (Frequency/Alleviating factors/Precipitating factors, etc.)     Follow up with Mrs. Carolina Marcum completed. No further questions or concerns. Will continue to follow up to achieve health goals.

## 2019-06-04 ENCOUNTER — PATIENT MESSAGE (OUTPATIENT)
Dept: ADMINISTRATIVE | Facility: OTHER | Age: 64
End: 2019-06-04

## 2019-06-05 ENCOUNTER — HOSPITAL ENCOUNTER (OUTPATIENT)
Dept: RADIOLOGY | Facility: HOSPITAL | Age: 64
Discharge: HOME | End: 2019-06-05
Attending: INTERNAL MEDICINE
Payer: COMMERCIAL

## 2019-06-05 ENCOUNTER — OFFICE VISIT (OUTPATIENT)
Dept: INTERNAL MEDICINE | Facility: CLINIC | Age: 64
End: 2019-06-05
Payer: COMMERCIAL

## 2019-06-05 VITALS
HEIGHT: 66 IN | WEIGHT: 246.94 LBS | BODY MASS INDEX: 39.69 KG/M2 | DIASTOLIC BLOOD PRESSURE: 72 MMHG | TEMPERATURE: 98 F | HEART RATE: 82 BPM | SYSTOLIC BLOOD PRESSURE: 114 MMHG

## 2019-06-05 DIAGNOSIS — Z12.31 ENCOUNTER FOR SCREENING MAMMOGRAM FOR BREAST CANCER: ICD-10-CM

## 2019-06-05 DIAGNOSIS — E03.4 HYPOTHYROIDISM DUE TO ACQUIRED ATROPHY OF THYROID: ICD-10-CM

## 2019-06-05 DIAGNOSIS — M25.50 ARTHRALGIA, UNSPECIFIED JOINT: ICD-10-CM

## 2019-06-05 DIAGNOSIS — G47.33 OSA (OBSTRUCTIVE SLEEP APNEA): ICD-10-CM

## 2019-06-05 DIAGNOSIS — E78.5 HYPERLIPIDEMIA, UNSPECIFIED HYPERLIPIDEMIA TYPE: ICD-10-CM

## 2019-06-05 DIAGNOSIS — M17.0 PRIMARY OSTEOARTHRITIS OF BOTH KNEES: ICD-10-CM

## 2019-06-05 DIAGNOSIS — I10 BENIGN ESSENTIAL HYPERTENSION: ICD-10-CM

## 2019-06-05 DIAGNOSIS — K76.0 FATTY LIVER: ICD-10-CM

## 2019-06-05 DIAGNOSIS — Z00.00 ANNUAL PHYSICAL EXAM: Primary | ICD-10-CM

## 2019-06-05 DIAGNOSIS — E66.01 SEVERE OBESITY (BMI 35.0-39.9) WITH COMORBIDITY: ICD-10-CM

## 2019-06-05 DIAGNOSIS — E87.6 HYPOKALEMIA: ICD-10-CM

## 2019-06-05 DIAGNOSIS — M35.00 SJOGREN'S SYNDROME, WITH UNSPECIFIED ORGAN INVOLVEMENT: ICD-10-CM

## 2019-06-05 DIAGNOSIS — I77.1 TORTUOUS AORTA: ICD-10-CM

## 2019-06-05 PROBLEM — E74.39 GLUCOSE INTOLERANCE: Status: RESOLVED | Noted: 2018-09-26 | Resolved: 2019-06-05

## 2019-06-05 PROBLEM — E86.0 DEHYDRATION: Status: RESOLVED | Noted: 2019-05-14 | Resolved: 2019-06-05

## 2019-06-05 PROBLEM — Z23 NEED FOR PROPHYLACTIC VACCINATION WITH STREPTOCOCCUS PNEUMONIAE (PNEUMOCOCCUS) AND INFLUENZA VACCINES: Status: RESOLVED | Noted: 2019-03-15 | Resolved: 2019-06-05

## 2019-06-05 PROBLEM — Z23 NEEDS FLU SHOT: Status: RESOLVED | Noted: 2018-09-26 | Resolved: 2019-06-05

## 2019-06-05 PROCEDURE — 99999 PR PBB SHADOW E&M-EST. PATIENT-LVL IV: CPT | Mod: PBBFAC,,, | Performed by: INTERNAL MEDICINE

## 2019-06-05 PROCEDURE — 3078F PR MOST RECENT DIASTOLIC BLOOD PRESSURE < 80 MM HG: ICD-10-PCS | Mod: CPTII,S$GLB,, | Performed by: INTERNAL MEDICINE

## 2019-06-05 PROCEDURE — 3074F SYST BP LT 130 MM HG: CPT | Mod: CPTII,S$GLB,, | Performed by: INTERNAL MEDICINE

## 2019-06-05 PROCEDURE — 99396 PREV VISIT EST AGE 40-64: CPT | Mod: S$GLB,,, | Performed by: INTERNAL MEDICINE

## 2019-06-05 PROCEDURE — 3074F PR MOST RECENT SYSTOLIC BLOOD PRESSURE < 130 MM HG: ICD-10-PCS | Mod: CPTII,S$GLB,, | Performed by: INTERNAL MEDICINE

## 2019-06-05 PROCEDURE — 77067 SCR MAMMO BI INCL CAD: CPT | Mod: 26,,, | Performed by: RADIOLOGY

## 2019-06-05 PROCEDURE — 77067 SCR MAMMO BI INCL CAD: CPT | Mod: TC

## 2019-06-05 PROCEDURE — 99999 PR PBB SHADOW E&M-EST. PATIENT-LVL IV: ICD-10-PCS | Mod: PBBFAC,,, | Performed by: INTERNAL MEDICINE

## 2019-06-05 PROCEDURE — 99396 PR PREVENTIVE VISIT,EST,40-64: ICD-10-PCS | Mod: S$GLB,,, | Performed by: INTERNAL MEDICINE

## 2019-06-05 PROCEDURE — 3078F DIAST BP <80 MM HG: CPT | Mod: CPTII,S$GLB,, | Performed by: INTERNAL MEDICINE

## 2019-06-05 PROCEDURE — 77067 MAMMO DIGITAL SCREENING BILAT WITH CAD: ICD-10-PCS | Mod: 26,,, | Performed by: RADIOLOGY

## 2019-06-05 RX ORDER — AMLODIPINE BESYLATE 10 MG/1
10 TABLET ORAL DAILY
Qty: 90 TABLET | Refills: 3 | Status: SHIPPED | OUTPATIENT
Start: 2019-06-05 | End: 2020-03-09

## 2019-06-05 RX ORDER — ATORVASTATIN CALCIUM 10 MG/1
5 TABLET, FILM COATED ORAL DAILY
Qty: 45 TABLET | Refills: 3 | Status: SHIPPED | OUTPATIENT
Start: 2019-06-05 | End: 2019-08-12

## 2019-06-05 RX ORDER — SPIRONOLACTONE 50 MG/1
50 TABLET, FILM COATED ORAL DAILY
Qty: 30 TABLET | Refills: 11 | Status: SHIPPED | OUTPATIENT
Start: 2019-06-05 | End: 2020-05-12

## 2019-06-05 RX ORDER — LEVOTHYROXINE SODIUM 125 UG/1
125 TABLET ORAL DAILY
Qty: 90 TABLET | Refills: 3 | Status: SHIPPED | OUTPATIENT
Start: 2019-06-05 | End: 2020-05-20 | Stop reason: SDUPTHER

## 2019-06-05 NOTE — PROGRESS NOTES
"INTERNAL MEDICINE ANNUAL VISIT NOTE      CHIEF COMPLAINT     Chief Complaint   Patient presents with    Annual Exam       HPI     Carolina Marcum is a 64 y.o. C female who presents for annual.    HTN - amlodipine 10, chlorthalidone 25mg daily.   Previously on triamterene-HCTZ but did not control BP.   Losartan-->swelling.   Diuretic induced hypoK. MicroK 20mEq TID. If forgets a dose, potassium gets low. Wants to know if can switch to another diuretic that is not so potassium wasting.   Follows w/ digital HTN. flowsheet reviewed.  No SOB/CP/palpitations/HA/blurry vision.     Lovastatin caused scalp itching and intolerable myalgia.   Previously on atorvastatin 10mg daily 1/2 pill and she was doing ok on it but stopped at one point to see if her cholesterol was better. Ok w/ restarting.     Hypothyroidism - synthroid (brand name) 125mcg daily.   TSH 5/27/19 WNL  No unexpected weight changes.     Diffuse joint pains. Pos ZULMA, mildly elevated CRP and ESR.  Seen Dr. Abbasi 12/2018 - Sjogrens. +Ro and sicca. Cont systane eye drops.  C/O dry eyes and mouth.       OA of knees. Naproxen prn. Recommended tylenol arthritis OTC prn.   KNEE XR 10/2016 - B tricompartmental OA of knees, helen medial tib-fem compartment.  S/p PT.   YESENIA Montes De Oca - intermittent steroid injections. Last injection to the knees were 3/2/18 w/ Mariluz Mark, NP.  Knee pains are doing ok but after stairs, had slight R knee pain.   Per Dr. Abbasi's note 2/16/18 - consider plaquenil for chondrocalcinosis, which pt declined. Cont voltaren gel.  Volunteering 2 days a week - 1/2 hour and doing exercise at the facility.      QUINTIN - on APAP. Follows w/ sleep d/o clinic.     Asthma - using flonase in the morning and if bad, uses astelin also. May take zyrtec prn. If the drip gets bad, then only uses benadryl prn "emergency".  Follows w/ outside allergist.   Takes symbicort prn. Hasn't had to use albuterol.     cxr - tortuous aorta    Past Medical " History:  Past Medical History:   Diagnosis Date    Abnormal Pap smear of cervix     CIS - CKC, then Hyst    Asthma, chronic     Bleeding hemorrhoids     Depression     Difficult intubation     Diverticular disease     Endometriosis     Fatty liver 12/15/2014    Herpes simplex without mention of complication     Hypercholesterolemia     Hypertension     Multiple gastric polyps 2012    Obesity     Reflux     Thyroid disease        Past Surgical History:  Past Surgical History:   Procedure Laterality Date    BREAST BIOPSY Left 2017    core    BREAST BIOPSY Right     core    CERVICAL CONIZATION   W/ LASER       SECTION      x1    CHOLECYSTECTOMY      EGD (ESOPHAGOGASTRODUODENOSCOPY) N/A 1/15/2013    [facility] (2ND FLR)    ESOPHAGOGASTRODUODENOSCOPY (EGD) N/A 2018    [facility] (2ND FLR)    HYSTERECTOMY  age 42    [place], LSO (uterine fibroids, adhesions, endometriosis)    OOPHORECTOMY  age 42    LSO (endometriosis, cyst)    THYROIDECTOMY      TOE SURGERY      left    TOTAL THYROIDECTOMY         Allergies:  Review of patient's allergies indicates:   Allergen Reactions    Losartan Swelling     Possible throat closing sensation.    Lovastatin Other (See Comments)     myalgia    Cefuroxime Itching and Rash       Home Medications:  Prior to Admission medications    Medication Sig Start Date End Date Taking? Authorizing Provider   amLODIPine (NORVASC) 10 MG tablet TAKE 1 TABLET BY MOUTH ONCE DAILY 3/13/19  Yes Kierra Cornejo MD   azelastine (ASTELIN) 137 mcg (0.1 %) nasal spray 1 spray (137 mcg total) by Nasal route daily as needed for Rhinitis. 12/3/18 12/3/19 Yes Frandy Liz MD   cetirizine (ZYRTEC) 10 MG tablet Take 10 mg by mouth. Half Tablet Oral Every day   Yes Historical Provider, MD   chlorthalidone (HYGROTEN) 25 MG Tab TAKE 1 TABLET BY MOUTH ONCE DAILY 19  Yes Kierra Cornejo MD   diphenhydrAMINE  (BANOPHEN) 25 mg capsule Take 1 each (25 mg total) by mouth every 6 (six) hours as needed for Itching or Allergies. 6/5/17  Yes Kierra Cornejo MD   etodolac (LODINE XL) 400 MG 24 hr tablet Take 1 tablet (400 mg total) by mouth daily as needed (avoid all other NSAIDs). 9/26/18  Yes Frandy Liz MD   hydrocortisone (ANUSOL-HC) 25 mg suppository Place 1 suppository (25 mg total) rectally 2 (two) times daily. 7/19/18 7/19/19 Yes Isabel Hastings MD   hydrocortisone-pramoxine (PROCTOFOAM HC) rectal foam Place rectally 3 (three) times daily. 1 Foam Rectal Three times a day 10/10/12  Yes Hermelindo Hernandez MD   lovastatin (MEVACOR) 10 MG tablet Take 1 tablet (10 mg total) by mouth every evening. 3/15/19 3/14/20 Yes Frandy Liz MD   montelukast (SINGULAIR) 10 mg tablet Take 1 tablet (10 mg total) by mouth every evening. 6/5/17  Yes Kierra Cornejo MD   naproxen (NAPROSYN) 500 MG tablet Take 1 tablet (500 mg total) by mouth 2 (two) times daily. 5/16/19 6/15/19 Yes Brandon Zimmerman PA-C   nystatin (MYCOSTATIN) cream Apply topically 2 (two) times daily. 2/18/19  Yes Isabel Hastings MD   omeprazole (PRILOSEC) 40 MG capsule Take 1 capsule (40 mg total) by mouth 2 (two) times daily before meals. 9/7/17  Yes SULAIMAN Ferraro III, MD   potassium chloride (MICRO-K) 10 MEQ CpSR TAKE 2 CAPSULES BY MOUTH THREE TIMES DAILY 5/21/19  Yes Kierra Cornejo MD   SYNTHROID 125 mcg tablet TAKE 1 TABLET BY MOUTH DAILY 10/18/18  Yes Kvng Nye II, MD   triamcinolone acetonide 0.1% (KENALOG) 0.1 % cream Apply topically 2 (two) times daily. 2/18/19  Yes Isabel Hastings MD   budesonide-formoterol 160-4.5 mcg (SYMBICORT) 160-4.5 mcg/actuation HFAA Inhale 2 puffs into the lungs 2 (two) times daily. 9/6/18 3/15/19  Mina Beebe MD   diclofenac sodium (VOLTAREN) 1 % Gel Apply 2 g topically 2 (two) times daily as needed. 9/26/18 4/8/19  Frandy Liz MD   epinephrine (EPIPEN) 0.3 mg/0.3 mL AtIn Inject 0.3 mLs (0.3 mg total) into the muscle as needed (for allergic  "reaction in which you feel like your throat is swelling, you cannot breathe.). 5/14/17 4/8/19  Wilmer Ren MD       Family History:  Family History   Problem Relation Age of Onset    Breast cancer Maternal Aunt     Melanoma Sister     Melanoma Brother     Cancer Maternal Uncle         esophageal cancer    Ovarian cancer Neg Hx     Colon cancer Neg Hx        Social History:  Social History     Tobacco Use    Smoking status: Never Smoker    Smokeless tobacco: Never Used   Substance Use Topics    Alcohol use: No     Frequency: Never     Comment: none lately.  rarely has a drink for special occassion    Drug use: No       Review of Systems:  Review of Systems   Constitutional: Negative for activity change and unexpected weight change.   HENT: Negative for hearing loss, rhinorrhea and trouble swallowing.    Eyes: Negative for discharge and visual disturbance.   Respiratory: Negative for chest tightness and wheezing.    Cardiovascular: Negative for chest pain and palpitations.   Gastrointestinal: Negative for blood in stool, constipation, diarrhea and vomiting.   Endocrine: Negative for polydipsia and polyuria.   Genitourinary: Negative for difficulty urinating, dysuria, hematuria and menstrual problem.   Musculoskeletal: Negative for arthralgias, joint swelling and neck pain.   Neurological: Negative for weakness and headaches.   Psychiatric/Behavioral: Negative for confusion and dysphoric mood.       Health Maintainence:    reviewed.     PHYSICAL EXAM     /72 (BP Location: Left arm, Patient Position: Sitting, BP Method: Large (Manual))   Pulse 82   Temp 98.2 °F (36.8 °C)   Ht 5' 6" (1.676 m)   Wt 112 kg (246 lb 14.6 oz)   BMI 39.85 kg/m²     GEN - A+OX4, NAD   HEENT - PERRL, EOMI, OP clear. MMM.   Neck - No cervical LAD. Thyroid scar well healed.  CV - RRR, no m/r   Chest - CTAB, no wheezing or rhonchi  Abd - S/NT/ND/+BS.   Ext - 2+BDP and radial pulses. No LE edema.   Neuro - PERRL, EOMI, " no nystagmus, eyebrow raise, facial sensation, hearing, m of mastication, smile, palatal raise, shoulder shrug, tongue protrusion symmetric and intact. 5/5 BUE and BLE strength. Sensation to light touch intact throughout. 2+ DTRs. Normal gait.   MSK - No spinal tenderness to palpation. Normal gait. B knee tenderness and crepitus.  Skin - No rash.    LABS     Previous labs reviewed.    ASSESSMENT/PLAN     Carolina Marcum is a 64 y.o. female with  Carolina was seen today for annual exam.    Diagnoses and all orders for this visit:    Annual physical exam - labs reviewed.    Benign essential hypertension - was taking microK 2 of the 10mEq 3x/day while on chlorthalidone. Trial of stopping chlorthalidone and starting aldactone 50mg daily. Can stop K supplement also. Repeat BMP in 2 weeks. Pt is followed w/ digital HTN. Cont norvasc.   -     spironolactone (ALDACTONE) 50 MG tablet; Take 1 tablet (50 mg total) by mouth once daily.  -     Basic metabolic panel; Future; Expected date: 06/19/2019  -     amLODIPine (NORVASC) 10 MG tablet; Take 1 tablet (10 mg total) by mouth once daily.    Hyperlipidemia, unspecified hyperlipidemia type  -     atorvastatin (LIPITOR) 10 MG tablet; Take 0.5 tablets (5 mg total) by mouth once daily.    Hypokalemia - as above.    Hypothyroidism due to acquired atrophy of thyroid - brand name only. Fluctuation w/ generic.   -     SYNTHROID 125 mcg tablet; Take 1 tablet (125 mcg total) by mouth once daily.    Sjogren's syndrome, with unspecified organ involvement - symptomatic management.     Fatty liver - liver enzymes are ok.     Primary osteoarthritis of both knees - cont w/ injections.     QUINTIN (obstructive sleep apnea) - cont APAP.     Encounter for screening mammogram for breast cancer  -     Mammo Digital Screening Bilat; Future; Expected date: 06/05/2019    Arthralgia, unspecified joint - f/u w/ rheum.     Tortuous aorta - atorva 5mg daily. Couldn't tolerate lovastatin.     Severe obesity  (BMI 39.85) with comorbidity - diet and exercise as able.     Check w/ pharmacy about Shingrix (shingles vaccine).      RTC in 12 months, sooner if needed and depending on labs.    Kierra Cornejo MD  Department of Internal Medicine - [facility]  10:50 AM

## 2019-06-05 NOTE — PATIENT INSTRUCTIONS
Stop chlorthalidone and potassium pills once you come back from trip. Start spironolactone 50mg daily 6/16/19. Labs on 6/24/19 in [place] to check potassium. Keep checking blood pressure once back from trip.     Schedule mammogram.     Check w/ pharmacy about Shingrix (shingles vaccine).

## 2019-06-05 NOTE — Clinical Note
Dr. Collins,I saw Ms. barrett today and stopped her on chlorthalidone and potassium since she was needing so much potassium. I placed her on aldactone as she does have issues in past w/ let swelling, which chlorthalidone was controlling. MY

## 2019-06-14 ENCOUNTER — PATIENT MESSAGE (OUTPATIENT)
Dept: FAMILY MEDICINE | Facility: CLINIC | Age: 64
End: 2019-06-14

## 2019-06-17 DIAGNOSIS — E87.6 HYPOKALEMIA: ICD-10-CM

## 2019-06-17 RX ORDER — POTASSIUM CHLORIDE 750 MG/1
CAPSULE, EXTENDED RELEASE ORAL
Qty: 180 CAPSULE | Refills: 0 | OUTPATIENT
Start: 2019-06-17

## 2019-06-17 RX ORDER — NAPROXEN 500 MG/1
TABLET ORAL
Qty: 60 TABLET | Refills: 0 | Status: SHIPPED | OUTPATIENT
Start: 2019-06-17 | End: 2019-08-16 | Stop reason: SDUPTHER

## 2019-06-19 ENCOUNTER — PATIENT MESSAGE (OUTPATIENT)
Dept: INTERNAL MEDICINE | Facility: CLINIC | Age: 64
End: 2019-06-19

## 2019-06-24 ENCOUNTER — LAB VISIT (OUTPATIENT)
Dept: LAB | Facility: HOSPITAL | Age: 64
End: 2019-06-24
Attending: INTERNAL MEDICINE
Payer: COMMERCIAL

## 2019-06-24 DIAGNOSIS — I10 BENIGN ESSENTIAL HYPERTENSION: ICD-10-CM

## 2019-06-24 LAB
ANION GAP SERPL CALC-SCNC: 6 MMOL/L (ref 8–16)
BUN SERPL-MCNC: 18 MG/DL (ref 8–23)
CALCIUM SERPL-MCNC: 9.6 MG/DL (ref 8.7–10.5)
CHLORIDE SERPL-SCNC: 103 MMOL/L (ref 95–110)
CO2 SERPL-SCNC: 30 MMOL/L (ref 23–29)
CREAT SERPL-MCNC: 0.8 MG/DL (ref 0.5–1.4)
EST. GFR  (AFRICAN AMERICAN): >60 ML/MIN/1.73 M^2
EST. GFR  (NON AFRICAN AMERICAN): >60 ML/MIN/1.73 M^2
GLUCOSE SERPL-MCNC: 102 MG/DL (ref 70–110)
POTASSIUM SERPL-SCNC: 4.5 MMOL/L (ref 3.5–5.1)
SODIUM SERPL-SCNC: 139 MMOL/L (ref 136–145)

## 2019-06-24 PROCEDURE — 36415 COLL VENOUS BLD VENIPUNCTURE: CPT | Mod: PO

## 2019-06-24 PROCEDURE — 80048 BASIC METABOLIC PNL TOTAL CA: CPT

## 2019-06-25 ENCOUNTER — PATIENT OUTREACH (OUTPATIENT)
Dept: OTHER | Facility: OTHER | Age: 64
End: 2019-06-25

## 2019-06-25 NOTE — PROGRESS NOTES
Last 5 Patient Entered Readings                                      Current 30 Day Average: 127/85     Recent Readings 6/24/2019 6/21/2019 6/21/2019 6/20/2019 6/20/2019    SBP (mmHg) 130 127 132 126 137    DBP (mmHg) 79 84 93 83 85    Pulse 72 92 93 85 82          Digital Medicine: Health  Follow Up    Left voicemail to follow up with Mrs. Figueroa Jossue.  Current BP average 127/85 mmHg is not at goal.

## 2019-06-27 ENCOUNTER — PATIENT MESSAGE (OUTPATIENT)
Dept: INTERNAL MEDICINE | Facility: CLINIC | Age: 64
End: 2019-06-27

## 2019-06-27 ENCOUNTER — PATIENT OUTREACH (OUTPATIENT)
Dept: OTHER | Facility: OTHER | Age: 64
End: 2019-06-27

## 2019-06-27 NOTE — PROGRESS NOTES
Last 5 Patient Entered Readings                                      Current 30 Day Average: 127/85     Recent Readings 6/24/2019 6/21/2019 6/21/2019 6/20/2019 6/20/2019    SBP (mmHg) 130 127 132 126 137    DBP (mmHg) 79 84 93 83 85    Pulse 72 92 93 85 82        Ms. Tucker's BP has been higher since stopping chlorthalidone and starting spironolactone. Monday will be 2 weeks with spironolactone. She will check her BP once a day through the weekend to determine if current regimen is effective. Her K was 4.5 after taking current dose for 1 week. Discussed changing spironolactone to either dyazide or aldactazide for improved BP control and to prevent hyperkalemia by increasing sprionolactone. She was unable to tolerate chlorthalidone due to hypokalemia even with KCL supplementation.      Per 30 day average, 127/85 mmHg, patient's BP is not at goal.     Asked patient to call or message with questions or concerns.     Current HTN regimen:  Hypertension Medications             amLODIPine (NORVASC) 10 MG tablet Take 1 tablet (10 mg total) by mouth once daily.    spironolactone (ALDACTONE) 50 MG tablet Take 1 tablet (50 mg total) by mouth once daily.

## 2019-07-02 ENCOUNTER — PATIENT OUTREACH (OUTPATIENT)
Dept: OTHER | Facility: OTHER | Age: 64
End: 2019-07-02

## 2019-07-02 DIAGNOSIS — I10 ESSENTIAL HYPERTENSION: Primary | ICD-10-CM

## 2019-07-02 RX ORDER — HYDROCHLOROTHIAZIDE 25 MG/1
25 TABLET ORAL DAILY
Qty: 30 TABLET | Refills: 3 | Status: SHIPPED | OUTPATIENT
Start: 2019-07-02 | End: 2019-10-20 | Stop reason: SDUPTHER

## 2019-07-02 NOTE — PROGRESS NOTES
Last 5 Patient Entered Readings                                      Current 30 Day Average: 127/85     Recent Readings 6/29/2019 6/29/2019 6/29/2019 6/29/2019 6/28/2019    SBP (mmHg) 126 128 130 148 123    DBP (mmHg) 84 83 84 85 76    Pulse 82 83 86 95 74        Ms. Tucker's BP remains higher while on spironolactone. Will add HCTZ 25 mg QD to current regimen. Will avoid chlorthalidone to prevent hypokalemia. Will repeat BMP in 2-3 weeks.     Per 30 day average, 127/85 mmHg, patient's BP is not at goal.     Will continue to monitor regularly. Will follow up in 2-3 weeks, sooner if BP begins to trend upward or downward.    Asked patient to call or message with questions or concerns.     Current HTN regimen:  Hypertension Medications             amLODIPine (NORVASC) 10 MG tablet Take 1 tablet (10 mg total) by mouth once daily.    hydroCHLOROthiazide (HYDRODIURIL) 25 MG tablet Take 1 tablet (25 mg total) by mouth once daily.    spironolactone (ALDACTONE) 50 MG tablet Take 1 tablet (50 mg total) by mouth once daily.

## 2019-07-08 DIAGNOSIS — E03.9 HYPOTHYROIDISM, UNSPECIFIED TYPE: ICD-10-CM

## 2019-07-08 RX ORDER — LEVOTHYROXINE SODIUM 125 UG/1
TABLET ORAL
Qty: 90 TABLET | Refills: 0 | OUTPATIENT
Start: 2019-07-08

## 2019-07-09 NOTE — PROGRESS NOTES
"Last 5 Patient Entered Readings                                      Current 30 Day Average: 123/85     Recent Readings 7/8/2019 7/8/2019 7/8/2019 7/6/2019 7/6/2019    SBP (mmHg) 107 130 123 122 120    DBP (mmHg) 71 83 91 83 76    Pulse 70 78 79 83 81        Digital Medicine: Health  Follow Up    Feeling well.    Taking 1/2 of HCTZ (12.5mg) because she stated it was causing arm pain. Will inform PharmLAURENT Santos.    Lifestyle Modifications:    1.Dietary Modifications:  had a meeting with a dietitian and stated he's more "on board" with eating healthfully now. Went grocery shopping yesterday and picked up a lot chicken and vegetables.    2.Physical Activity: Hurt her knee while cleaning out her closet and not able to be as active. Using ice to help with pain/swelling.    3.Medication Therapy: Patient has been compliant with the medication regimen.    4.Patient has the following medication side effects/concerns:   (Frequency/Alleviating factors/Precipitating factors, etc.)     Follow up with Mrs. Carolina Garciadiana completed. No further questions or concerns. Will continue to follow up to achieve health goals.    "

## 2019-07-17 ENCOUNTER — PATIENT OUTREACH (OUTPATIENT)
Dept: OTHER | Facility: OTHER | Age: 64
End: 2019-07-17

## 2019-07-17 NOTE — PROGRESS NOTES
Last 5 Patient Entered Readings                                      Current 30 Day Average: 123/85     Recent Readings 7/16/2019 7/16/2019 7/8/2019 7/8/2019 7/8/2019    SBP (mmHg) 125 127 107 130 123    DBP (mmHg) 73 83 71 83 91    Pulse 83 81 70 78 79        Ms. Tucker's DBP is starting to trend down since starting HCTZ. Will check BMP tomorrow to ensure K remains stable. She is tolerating the medication at this time with no issues.     Per 30 day average, 123/85 mmHg, patient's BP is not at goal.     Will continue to monitor regularly. Will follow up in 3-4 weeks, sooner if BP begins to trend upward or downward.    Asked patient to call or message with questions or concerns.     Current HTN regimen:  Hypertension Medications             amLODIPine (NORVASC) 10 MG tablet Take 1 tablet (10 mg total) by mouth once daily.    hydroCHLOROthiazide (HYDRODIURIL) 25 MG tablet Take 1 tablet (25 mg total) by mouth once daily.    spironolactone (ALDACTONE) 50 MG tablet Take 1 tablet (50 mg total) by mouth once daily.

## 2019-07-18 ENCOUNTER — LAB VISIT (OUTPATIENT)
Dept: LAB | Facility: HOSPITAL | Age: 64
End: 2019-07-18
Attending: INTERNAL MEDICINE
Payer: COMMERCIAL

## 2019-07-18 DIAGNOSIS — I10 ESSENTIAL HYPERTENSION: ICD-10-CM

## 2019-07-18 LAB
ANION GAP SERPL CALC-SCNC: 7 MMOL/L (ref 8–16)
BUN SERPL-MCNC: 22 MG/DL (ref 8–23)
CALCIUM SERPL-MCNC: 9.4 MG/DL (ref 8.7–10.5)
CHLORIDE SERPL-SCNC: 103 MMOL/L (ref 95–110)
CO2 SERPL-SCNC: 30 MMOL/L (ref 23–29)
CREAT SERPL-MCNC: 0.8 MG/DL (ref 0.5–1.4)
EST. GFR  (AFRICAN AMERICAN): >60 ML/MIN/1.73 M^2
EST. GFR  (NON AFRICAN AMERICAN): >60 ML/MIN/1.73 M^2
GLUCOSE SERPL-MCNC: 92 MG/DL (ref 70–110)
POTASSIUM SERPL-SCNC: 4 MMOL/L (ref 3.5–5.1)
SODIUM SERPL-SCNC: 140 MMOL/L (ref 136–145)

## 2019-07-18 PROCEDURE — 36415 COLL VENOUS BLD VENIPUNCTURE: CPT | Mod: PO

## 2019-07-18 PROCEDURE — 80048 BASIC METABOLIC PNL TOTAL CA: CPT

## 2019-07-18 NOTE — PROGRESS NOTES
Last 5 Patient Entered Readings                                      Current 30 Day Average: 123/85     Recent Readings 7/16/2019 7/16/2019 7/8/2019 7/8/2019 7/8/2019    SBP (mmHg) 125 127 107 130 123    DBP (mmHg) 73 83 71 83 91    Pulse 83 81 70 78 79        Potassium is WNL. No changes required to medications at this time.

## 2019-07-29 ENCOUNTER — PATIENT MESSAGE (OUTPATIENT)
Dept: OBSTETRICS AND GYNECOLOGY | Facility: CLINIC | Age: 64
End: 2019-07-29

## 2019-07-30 RX ORDER — FLUCONAZOLE 200 MG/1
200 TABLET ORAL
Qty: 1 TABLET | Refills: 0 | Status: SHIPPED | OUTPATIENT
Start: 2019-07-30 | End: 2019-08-02

## 2019-07-30 RX ORDER — NYSTATIN 100000 U/G
CREAM TOPICAL 2 TIMES DAILY
Qty: 30 G | Refills: 0 | Status: SHIPPED | OUTPATIENT
Start: 2019-07-30 | End: 2019-08-20 | Stop reason: SDUPTHER

## 2019-08-08 ENCOUNTER — PATIENT MESSAGE (OUTPATIENT)
Dept: OBSTETRICS AND GYNECOLOGY | Facility: CLINIC | Age: 64
End: 2019-08-08

## 2019-08-09 ENCOUNTER — PATIENT OUTREACH (OUTPATIENT)
Dept: ADMINISTRATIVE | Facility: OTHER | Age: 64
End: 2019-08-09

## 2019-08-09 DIAGNOSIS — Z12.11 ENCOUNTER FOR FIT (FECAL IMMUNOCHEMICAL TEST) SCREENING: Primary | ICD-10-CM

## 2019-08-12 ENCOUNTER — OFFICE VISIT (OUTPATIENT)
Dept: OBSTETRICS AND GYNECOLOGY | Facility: CLINIC | Age: 64
End: 2019-08-12
Payer: COMMERCIAL

## 2019-08-12 ENCOUNTER — HOSPITAL ENCOUNTER (OUTPATIENT)
Dept: RADIOLOGY | Facility: HOSPITAL | Age: 64
Discharge: HOME | End: 2019-08-12
Attending: OBSTETRICS & GYNECOLOGY
Payer: COMMERCIAL

## 2019-08-12 ENCOUNTER — PATIENT MESSAGE (OUTPATIENT)
Dept: OBSTETRICS AND GYNECOLOGY | Facility: CLINIC | Age: 64
End: 2019-08-12

## 2019-08-12 VITALS
SYSTOLIC BLOOD PRESSURE: 130 MMHG | WEIGHT: 250.88 LBS | DIASTOLIC BLOOD PRESSURE: 88 MMHG | HEIGHT: 66 IN | BODY MASS INDEX: 40.32 KG/M2

## 2019-08-12 DIAGNOSIS — G89.29 CHRONIC FEMALE PELVIC PAIN: ICD-10-CM

## 2019-08-12 DIAGNOSIS — R10.2 CHRONIC FEMALE PELVIC PAIN: ICD-10-CM

## 2019-08-12 DIAGNOSIS — N89.8 VAGINAL IRRITATION: Primary | ICD-10-CM

## 2019-08-12 DIAGNOSIS — R30.0 DYSURIA: ICD-10-CM

## 2019-08-12 PROCEDURE — 99213 OFFICE O/P EST LOW 20 MIN: CPT | Mod: S$GLB,,, | Performed by: OBSTETRICS & GYNECOLOGY

## 2019-08-12 PROCEDURE — 87661 TRICHOMONAS VAGINALIS AMPLIF: CPT

## 2019-08-12 PROCEDURE — 76830 TRANSVAGINAL US NON-OB: CPT | Mod: TC

## 2019-08-12 PROCEDURE — 76830 TRANSVAGINAL US NON-OB: CPT | Mod: 26,,, | Performed by: RADIOLOGY

## 2019-08-12 PROCEDURE — 76830 US PELVIS COMP WITH TRANSVAG NON-OB (XPD): ICD-10-PCS | Mod: 26,,, | Performed by: RADIOLOGY

## 2019-08-12 PROCEDURE — 87481 CANDIDA DNA AMP PROBE: CPT | Mod: 59

## 2019-08-12 PROCEDURE — 3008F PR BODY MASS INDEX (BMI) DOCUMENTED: ICD-10-PCS | Mod: CPTII,S$GLB,, | Performed by: OBSTETRICS & GYNECOLOGY

## 2019-08-12 PROCEDURE — 87801 DETECT AGNT MULT DNA AMPLI: CPT

## 2019-08-12 PROCEDURE — 76856 US EXAM PELVIC COMPLETE: CPT | Mod: 26,,, | Performed by: RADIOLOGY

## 2019-08-12 PROCEDURE — 76856 US PELVIS COMP WITH TRANSVAG NON-OB (XPD): ICD-10-PCS | Mod: 26,,, | Performed by: RADIOLOGY

## 2019-08-12 PROCEDURE — 99999 PR PBB SHADOW E&M-EST. PATIENT-LVL IV: ICD-10-PCS | Mod: PBBFAC,,, | Performed by: OBSTETRICS & GYNECOLOGY

## 2019-08-12 PROCEDURE — 99213 PR OFFICE/OUTPT VISIT, EST, LEVL III, 20-29 MIN: ICD-10-PCS | Mod: S$GLB,,, | Performed by: OBSTETRICS & GYNECOLOGY

## 2019-08-12 PROCEDURE — 3075F PR MOST RECENT SYSTOLIC BLOOD PRESS GE 130-139MM HG: ICD-10-PCS | Mod: CPTII,S$GLB,, | Performed by: OBSTETRICS & GYNECOLOGY

## 2019-08-12 PROCEDURE — 3079F PR MOST RECENT DIASTOLIC BLOOD PRESSURE 80-89 MM HG: ICD-10-PCS | Mod: CPTII,S$GLB,, | Performed by: OBSTETRICS & GYNECOLOGY

## 2019-08-12 PROCEDURE — 3075F SYST BP GE 130 - 139MM HG: CPT | Mod: CPTII,S$GLB,, | Performed by: OBSTETRICS & GYNECOLOGY

## 2019-08-12 PROCEDURE — 99999 PR PBB SHADOW E&M-EST. PATIENT-LVL IV: CPT | Mod: PBBFAC,,, | Performed by: OBSTETRICS & GYNECOLOGY

## 2019-08-12 PROCEDURE — 3079F DIAST BP 80-89 MM HG: CPT | Mod: CPTII,S$GLB,, | Performed by: OBSTETRICS & GYNECOLOGY

## 2019-08-12 PROCEDURE — 3008F BODY MASS INDEX DOCD: CPT | Mod: CPTII,S$GLB,, | Performed by: OBSTETRICS & GYNECOLOGY

## 2019-08-12 RX ORDER — NYSTATIN 100000 U/G
CREAM TOPICAL 2 TIMES DAILY
Qty: 30 G | Refills: 0 | Status: SHIPPED | OUTPATIENT
Start: 2019-08-12 | End: 2020-01-28 | Stop reason: SDUPTHER

## 2019-08-12 RX ORDER — TRIAMCINOLONE ACETONIDE 1 MG/G
CREAM TOPICAL 2 TIMES DAILY
Qty: 30 G | Refills: 0 | Status: SHIPPED | OUTPATIENT
Start: 2019-08-12 | End: 2020-01-28 | Stop reason: SDUPTHER

## 2019-08-12 RX ORDER — FLUCONAZOLE 150 MG/1
150 TABLET ORAL
Qty: 2 TABLET | Refills: 0 | Status: SHIPPED | OUTPATIENT
Start: 2019-08-12 | End: 2019-08-16

## 2019-08-12 NOTE — PROGRESS NOTES
Chief Complaint   Patient presents with    Consult    Vaginal Pain    Vaginal Bleeding    Vaginal Itching       HISTORY OF PRESENT ILLNESS:   Carolina Marcum is a 64 y.o. female  who presents external vaginal irritation and thigh irritation, vaginal discharge and itching. Took diflucan over the weekend and it improved but she has it in her skin folds as well and that isn't going away. She has itched it raw. Also has pain that comes and goes in pelvis and wants to get US to evaluate ovaries.      Past Medical History:   Diagnosis Date    Abnormal Pap smear of cervix     [facility] - CKC, then Hyst    Asthma, chronic     Bleeding hemorrhoids     Depression     Difficult intubation     Diverticular disease     Endometriosis     Fatty liver 12/15/2014    Herpes simplex without mention of complication     Hypercholesterolemia     Hypertension     Multiple gastric polyps 2012    Obesity     Reflux     Thyroid disease           Past Surgical History:   Procedure Laterality Date    BREAST BIOPSY Left 2017    core    BREAST BIOPSY Right     core    CERVICAL CONIZATION   W/ LASER       SECTION      x1    CHOLECYSTECTOMY      EGD (ESOPHAGOGASTRODUODENOSCOPY) N/A 1/15/2013    [facility] (2ND FLR)    ESOPHAGOGASTRODUODENOSCOPY (EGD) N/A 2018    [facility] (2ND FLR)    HYSTERECTOMY  age 42    [place], [facility] (uterine fibroids, adhesions, endometriosis)    OOPHORECTOMY  age 42    LSO (endometriosis, cyst)    THYROIDECTOMY      TOE SURGERY      left    TOTAL THYROIDECTOMY           Social History     Socioeconomic History    Marital status:      Spouse name: Not on file    Number of children: Not on file    Years of education: Not on file    Highest education level: Not on file   Occupational History    Not on file   Social Needs    Financial resource strain: Not on file    Food insecurity:     Worry:  Not on file     Inability: Not on file    Transportation needs:     Medical: Not on file     Non-medical: Not on file   Tobacco Use    Smoking status: Never Smoker    Smokeless tobacco: Never Used   Substance and Sexual Activity    Alcohol use: No     Frequency: Never     Comment: none lately.  rarely has a drink for special occassion    Drug use: No    Sexual activity: Yes     Partners: Male     Birth control/protection: Surgical, Post-menopausal, None   Lifestyle    Physical activity:     Days per week: 0 days     Minutes per session: Not on file    Stress: Not at all   Relationships    Social connections:     Talks on phone: More than three times a week     Gets together: Twice a week     Attends Islam service: Not on file     Active member of club or organization: Yes     Attends meetings of clubs or organizations: More than 4 times per year     Relationship status:    Other Topics Concern    Are you pregnant or think you may be? Not Asked    Breast-feeding Not Asked   Social History Narrative    Not on file       Family History   Problem Relation Age of Onset    Breast cancer Maternal Aunt     Melanoma Sister     Melanoma Brother     Cancer Maternal Uncle         esophageal cancer    Ovarian cancer Neg Hx     Colon cancer Neg Hx          OB History    Para Term  AB Living   1 1 1     1   SAB TAB Ectopic Multiple Live Births           1      # Outcome Date GA Lbr Eric/2nd Weight Sex Delivery Anes PTL Lv   1 Term 84    M CS-LTranv  N BURT       COMPREHENSIVE GYN HISTORY:  PAP History: had CKC in  but normal paps since; 2018 NILM  Infection History: Denies STDs. Denies PID.  Benign History: had uterine fibroids. Denies ovarian cysts. had endometriosis Denies other conditions.  Cancer History: Denies cervical cancer. Denies uterine cancer or hyperplasia. Denies ovarian cancer. Denies vulvar cancer or pre-cancer. Denies vaginal cancer or pre-cancer. Denies breast  "cancer. Denies colon cancer.  Cycle: normal until had hyst at 42 2/2 endo and an enlarging uterus due to fibroids    ROS:  GENERAL: Denies weight gain or weight loss. Feeling well overall.   SKIN: Denies rash or lesions.   HEAD: Denies headache.   NODES: Denies enlarged lymph nodes.   CHEST: Denies shortness of breath.   ABDOMEN: No abdominal pain, constipation, diarrhea, nausea, vomiting or rectal bleeding.   URINARY: No frequency, dysuria, hematuria, or burning on urination.  REPRODUCTIVE: See HPI.   BREASTS: The patient denies pain, lumps, or nipple discharge.       /88   Ht 5' 6" (1.676 m)   Wt 113.8 kg (250 lb 14.1 oz)   BMI 40.49 kg/m²     APPEARANCE: Well nourished, well developed, in no acute distress.    PELVIC:   VULVA: erythema over bilateral gluteus, and in the crease of her abdomen and mons and left thigh crease, worsening erythema on left labia majora near introitus. Normal female genitalia.  URETHRAL MEATUS: Normal size and location, no lesions, no prolapse.  URETHRA: No masses, tenderness, prolapse or scarring.  VAGINA: atrophic, no discharge, no significant cystocele or rectocele. Well healed cuff   CERVIX: surgically absent  UTERUS: surgically absent   ADNEXA: No masses or tenderness.  PERINEUM: Normal, mo masses, small internal hemorrhoid     Data Reviewed:   Last Pap: 2017 NILM   Last MMG: Date: 5/2018 BIRADS 1  Lipid profile: Date:   Lab Results   Component Value Date    CHOL 210 (H) 05/09/2019    CHOL 238 (H) 02/07/2019    CHOL 190 12/21/2016     Lab Results   Component Value Date    HDL 64 05/09/2019    HDL 64 02/07/2019    HDL 62 12/21/2016     Lab Results   Component Value Date    LDLCALC 129.8 05/09/2019    LDLCALC 157.2 02/07/2019    LDLCALC 106.2 12/21/2016     Lab Results   Component Value Date    TRIG 81 05/09/2019    TRIG 84 02/07/2019    TRIG 109 12/21/2016     Lab Results   Component Value Date    CHOLHDL 30.5 05/09/2019    CHOLHDL 26.9 02/07/2019    CHOLHDL 32.6 12/21/2016 "     TSH:   Lab Results   Component Value Date    TSH 3.859 05/27/2019     Colonoscopy Date: 11/2010    1. Vaginal irritation    2. Chronic female pelvic pain    3. Dysuria        A/P 1. No abnormalities on exam but will get US of pelvis  2. Appears like yeast vs contact irritation, will do mycolog and see back in 2 weeks to make sure resolved.

## 2019-08-13 LAB
BACTERIAL VAGINOSIS DNA: NEGATIVE
CANDIDA GLABRATA DNA: NEGATIVE
CANDIDA KRUSEI DNA: NEGATIVE
CANDIDA RRNA VAG QL PROBE: NEGATIVE
T VAGINALIS RRNA GENITAL QL PROBE: NEGATIVE

## 2019-08-16 ENCOUNTER — OFFICE VISIT (OUTPATIENT)
Dept: ORTHOPEDICS | Facility: CLINIC | Age: 64
End: 2019-08-16
Payer: COMMERCIAL

## 2019-08-16 VITALS
SYSTOLIC BLOOD PRESSURE: 133 MMHG | HEART RATE: 84 BPM | WEIGHT: 249.44 LBS | BODY MASS INDEX: 40.09 KG/M2 | DIASTOLIC BLOOD PRESSURE: 83 MMHG | HEIGHT: 66 IN

## 2019-08-16 DIAGNOSIS — M25.50 POLYARTHRALGIA: ICD-10-CM

## 2019-08-16 DIAGNOSIS — M17.0 PRIMARY OSTEOARTHRITIS OF BOTH KNEES: Primary | ICD-10-CM

## 2019-08-16 PROCEDURE — 3008F PR BODY MASS INDEX (BMI) DOCUMENTED: ICD-10-PCS | Mod: CPTII,S$GLB,, | Performed by: NURSE PRACTITIONER

## 2019-08-16 PROCEDURE — 3079F PR MOST RECENT DIASTOLIC BLOOD PRESSURE 80-89 MM HG: ICD-10-PCS | Mod: CPTII,S$GLB,, | Performed by: NURSE PRACTITIONER

## 2019-08-16 PROCEDURE — 99213 PR OFFICE/OUTPT VISIT, EST, LEVL III, 20-29 MIN: ICD-10-PCS | Mod: 25,S$GLB,, | Performed by: NURSE PRACTITIONER

## 2019-08-16 PROCEDURE — 3079F DIAST BP 80-89 MM HG: CPT | Mod: CPTII,S$GLB,, | Performed by: NURSE PRACTITIONER

## 2019-08-16 PROCEDURE — 99999 PR PBB SHADOW E&M-EST. PATIENT-LVL III: CPT | Mod: PBBFAC,,, | Performed by: NURSE PRACTITIONER

## 2019-08-16 PROCEDURE — 3075F SYST BP GE 130 - 139MM HG: CPT | Mod: CPTII,S$GLB,, | Performed by: NURSE PRACTITIONER

## 2019-08-16 PROCEDURE — 20610 PR DRAIN/INJECT LARGE JOINT/BURSA: ICD-10-PCS | Mod: 50,S$GLB,, | Performed by: NURSE PRACTITIONER

## 2019-08-16 PROCEDURE — 3008F BODY MASS INDEX DOCD: CPT | Mod: CPTII,S$GLB,, | Performed by: NURSE PRACTITIONER

## 2019-08-16 PROCEDURE — 20610 DRAIN/INJ JOINT/BURSA W/O US: CPT | Mod: 50,S$GLB,, | Performed by: NURSE PRACTITIONER

## 2019-08-16 PROCEDURE — 3075F PR MOST RECENT SYSTOLIC BLOOD PRESS GE 130-139MM HG: ICD-10-PCS | Mod: CPTII,S$GLB,, | Performed by: NURSE PRACTITIONER

## 2019-08-16 PROCEDURE — 99213 OFFICE O/P EST LOW 20 MIN: CPT | Mod: 25,S$GLB,, | Performed by: NURSE PRACTITIONER

## 2019-08-16 PROCEDURE — 99999 PR PBB SHADOW E&M-EST. PATIENT-LVL III: ICD-10-PCS | Mod: PBBFAC,,, | Performed by: NURSE PRACTITIONER

## 2019-08-16 RX ORDER — NAPROXEN 500 MG/1
500 TABLET ORAL 2 TIMES DAILY
Qty: 60 TABLET | Refills: 0 | Status: SHIPPED | OUTPATIENT
Start: 2019-08-16 | End: 2019-11-15 | Stop reason: SDUPTHER

## 2019-08-16 RX ORDER — DICLOFENAC SODIUM 10 MG/G
2 GEL TOPICAL 4 TIMES DAILY
Qty: 100 G | Refills: 2 | Status: SHIPPED | OUTPATIENT
Start: 2019-08-16 | End: 2019-11-15 | Stop reason: SDUPTHER

## 2019-08-16 RX ADMIN — TRIAMCINOLONE ACETONIDE 80 MG: 40 INJECTION, SUSPENSION INTRA-ARTICULAR; INTRAMUSCULAR at 02:08

## 2019-08-18 RX ORDER — TRIAMCINOLONE ACETONIDE 40 MG/ML
80 INJECTION, SUSPENSION INTRA-ARTICULAR; INTRAMUSCULAR
Status: COMPLETED | OUTPATIENT
Start: 2019-08-16 | End: 2019-08-16

## 2019-08-18 NOTE — PROGRESS NOTES
CC: Pain of the Left Knee and Pain of the Right Knee      HPI: Pt with c/o bilateral knee pain for the past few weeks. The pain is aching and medial and worse with increased activity. She has known knee djd and has had cortisone and gel injections in the past with good relief. She'd like to repeat cortisone injections again today for pain relief. She had lost 20 lbs, but she has gained the weight back. She reports that she has been taking care of her sick  and that has gotten in the way of her doing her exercise and eating properly. She wants to get back on track with her diet and exercise and she feels that if she can get relief from the injections that will help. She is ambulating without assistive device. There is not a limp.    ROS  General: denies fever and chills  Resp: no c/o sob  CVS: no c/o cp  MSK: c/o bilateral knee pain which is aching and medial and worse with increased activity    PE  General: AAOx3, pleasant and cooperative  Resp: respirations even and unlabored  MSK: bilateral knee exam  0 degrees extension  120 degrees flexion  No warmth or erythema   - effusion  + mild crepitus  5/5 quad strength    Xray:  10/2018: showed severe narrowing of the medial femoral tibial joints right greater than left    Assessment:  Bilateral knee djd    Plan:  Cortisone injections today bilateral knees for pain relief  RICE  Continue nsaids prn  Work on weight loss with diet and exercises for strengthening  F/u as needed    Knee Injection Procedure Note    Diagnosis: bilateral knee degenerative arthritis  Indications: bilateral knee pain  Procedure Details: Verbal consent was obtained for the procedure. The injection site was identified and the skin was prepared with alcohol. The bilateral knee was injected from an anterolateral approach with 1 ml of Kenalog and 4 ml Lidocaine each under sterile technique using a 22 gauge needle. The needle was removed and the area cleansed and dressed.  Complications:   Patient tolerated the procedure well.    she was advised to rest the knee today, using ice and elevation as needed for comfort and swelling.Immediate relief of the knee pain may be short lived and secondary to the lidocaine. she may have an increase in discomfort tonight followed by steady improvement over the next several days. It may take 1-2 weeks following the injection to get the full benefit of the medication.

## 2019-08-19 ENCOUNTER — PATIENT OUTREACH (OUTPATIENT)
Dept: OTHER | Facility: OTHER | Age: 64
End: 2019-08-19

## 2019-08-19 NOTE — PROGRESS NOTES
Last 5 Patient Entered Readings                                      Current 30 Day Average: 130/83     Recent Readings 8/11/2019 8/4/2019 8/4/2019 7/26/2019 7/16/2019    SBP (mmHg) 115 136 142 139 125    DBP (mmHg) 71 88 92 81 73    Pulse 77 88 89 95 83        Per 30 day average, 130/83 mmHg, patient's BP is approaching goal.     Ms. Tucker states that she has been very busy and has not been checking BP as often as she should be. She will try to send in readings 3 times/week going forward. Last reading was controlled.    I will continue to monitor regularly and will follow up in 2-3 months, sooner if BP begins to trend upward or downward.    Asked patient to call or message with questions or concerns.     Current HTN regimen:  Hypertension Medications             amLODIPine (NORVASC) 10 MG tablet Take 1 tablet (10 mg total) by mouth once daily.    hydroCHLOROthiazide (HYDRODIURIL) 25 MG tablet Take 1 tablet (25 mg total) by mouth once daily.    spironolactone (ALDACTONE) 50 MG tablet Take 1 tablet (50 mg total) by mouth once daily.

## 2019-08-20 ENCOUNTER — HOSPITAL ENCOUNTER (OUTPATIENT)
Dept: RADIOLOGY | Facility: HOSPITAL | Age: 64
Discharge: HOME | End: 2019-08-20
Attending: INTERNAL MEDICINE
Payer: COMMERCIAL

## 2019-08-20 ENCOUNTER — OFFICE VISIT (OUTPATIENT)
Dept: CARDIOLOGY | Facility: CLINIC | Age: 64
End: 2019-08-20
Payer: COMMERCIAL

## 2019-08-20 VITALS
WEIGHT: 249.56 LBS | DIASTOLIC BLOOD PRESSURE: 84 MMHG | HEIGHT: 66 IN | SYSTOLIC BLOOD PRESSURE: 136 MMHG | BODY MASS INDEX: 40.11 KG/M2 | HEART RATE: 80 BPM

## 2019-08-20 DIAGNOSIS — I10 ESSENTIAL HYPERTENSION: Primary | ICD-10-CM

## 2019-08-20 DIAGNOSIS — Z91.89 AT RISK FOR CORONARY ARTERY DISEASE: ICD-10-CM

## 2019-08-20 DIAGNOSIS — E78.00 PURE HYPERCHOLESTEROLEMIA: ICD-10-CM

## 2019-08-20 PROCEDURE — 93010 EKG 12-LEAD: ICD-10-PCS | Mod: S$GLB,,, | Performed by: INTERNAL MEDICINE

## 2019-08-20 PROCEDURE — 93005 ELECTROCARDIOGRAM TRACING: CPT | Mod: S$GLB,,, | Performed by: INTERNAL MEDICINE

## 2019-08-20 PROCEDURE — 3008F PR BODY MASS INDEX (BMI) DOCUMENTED: ICD-10-PCS | Mod: CPTII,S$GLB,, | Performed by: INTERNAL MEDICINE

## 2019-08-20 PROCEDURE — 93010 ELECTROCARDIOGRAM REPORT: CPT | Mod: S$GLB,,, | Performed by: INTERNAL MEDICINE

## 2019-08-20 PROCEDURE — 75571 CT HRT W/O DYE W/CA TEST: CPT | Mod: TC

## 2019-08-20 PROCEDURE — 99999 PR PBB SHADOW E&M-EST. PATIENT-LVL III: CPT | Mod: PBBFAC,,, | Performed by: INTERNAL MEDICINE

## 2019-08-20 PROCEDURE — 3079F DIAST BP 80-89 MM HG: CPT | Mod: CPTII,S$GLB,, | Performed by: INTERNAL MEDICINE

## 2019-08-20 PROCEDURE — 3079F PR MOST RECENT DIASTOLIC BLOOD PRESSURE 80-89 MM HG: ICD-10-PCS | Mod: CPTII,S$GLB,, | Performed by: INTERNAL MEDICINE

## 2019-08-20 PROCEDURE — 99999 PR PBB SHADOW E&M-EST. PATIENT-LVL III: ICD-10-PCS | Mod: PBBFAC,,, | Performed by: INTERNAL MEDICINE

## 2019-08-20 PROCEDURE — 75571 CT HRT W/O DYE W/CA TEST: CPT | Mod: 26,,, | Performed by: RADIOLOGY

## 2019-08-20 PROCEDURE — 99204 OFFICE O/P NEW MOD 45 MIN: CPT | Mod: S$GLB,,, | Performed by: INTERNAL MEDICINE

## 2019-08-20 PROCEDURE — 75571 CT CALCIUM SCORING CARDIAC: ICD-10-PCS | Mod: 26,,, | Performed by: RADIOLOGY

## 2019-08-20 PROCEDURE — 3075F PR MOST RECENT SYSTOLIC BLOOD PRESS GE 130-139MM HG: ICD-10-PCS | Mod: CPTII,S$GLB,, | Performed by: INTERNAL MEDICINE

## 2019-08-20 PROCEDURE — 3008F BODY MASS INDEX DOCD: CPT | Mod: CPTII,S$GLB,, | Performed by: INTERNAL MEDICINE

## 2019-08-20 PROCEDURE — 99204 PR OFFICE/OUTPT VISIT, NEW, LEVL IV, 45-59 MIN: ICD-10-PCS | Mod: S$GLB,,, | Performed by: INTERNAL MEDICINE

## 2019-08-20 PROCEDURE — 93005 EKG 12-LEAD: ICD-10-PCS | Mod: S$GLB,,, | Performed by: INTERNAL MEDICINE

## 2019-08-20 PROCEDURE — 3075F SYST BP GE 130 - 139MM HG: CPT | Mod: CPTII,S$GLB,, | Performed by: INTERNAL MEDICINE

## 2019-08-20 NOTE — PATIENT INSTRUCTIONS
Do the calcium score then will decide if you should take a statin. The following diet will help keep your blood cholesterol down but keep in mind that most of the cholesterol edema blood comes from your liver.    Use compression stockings when you travel. Stay away from salt        LDL - bad type - improves with diet and medications: typically statins; most other medications that lower LDL have not been proven to prevent heart attacks.  May not improve significantly with exercise alone.  Ideally less than 100 mg/dl.   But the lower the better. Statins (cholesterol lowering meds) lower LDL. If you have coronary artery disease or blockages anywhere else in the body, it should be well below 70 mg/dl.    HDL - good type - improves with exercise.  Ideally greater than 50 mg/dl. The proportion of HDL to the total cholesterol is more important than the absolute HDL.  This means a HDL of 45 out of a total cholesterol of 130 mg'dl is pretty good, but the same HDL out of a total of  200 mg/dl is not quite as good. A level of 30% or higher is ideal.    TGs (triglycerides) - also bad - can change very quickly and considerably with certain foods. Improve with diet, exercise and high dose fish oil.  In some cases a low carbohydrate diet will lower TGs better than a low fat diet.  Ideal range  mg/dl.    Sugar, fat and cholesterol in food:     A sensible diet that limits the intake of sugars, saturated (bad) fats and trans fats while increasing the intake of unsaturated (good) fats and plant proteins is the basis of the current dietary recommendations.      We now recommend drastically reducing the intake of sugar. There is less emphasis on excluding all fat and more emphasis on the types of different fats.    Cholesterol in our food is generally present in relatively small amounts. New dietary guidelines are less obsessed with the amount of cholesterol. However please do not confuse this with the role of cholesterol in our  blood and arteries. The liver converts certain foods into cholesterol.  It is this cholesterol and other fats that clogs up our arteries.       Most foods that are high in cholesterol are also high in saturated fat. But there is much more saturated fat than cholesterol in these foods. Researchers believe that the saturated fat content matters more than cholesterol. On the other hand, there are a handful of foods that are high in cholesterol but do not contain much saturated fat: eggs, shrimp, crab legs and crawfish are OK to eat in modest quantities as long as you do not deep mclean them. So a few eggs a week are fine the white and the yolk, but you can eat as many egg whites as you want.      Saturated fat is the bad fat - you should limit your intake of this. Deep fried foods, meats and other animal fats are high in saturated fat. Cookies, donuts and most dessert and cakes are usually high in both saturated fat and sugar.       Unsaturated fat is the good fat. It contains the same number of calories as saturated fat but these fats do not get deposited in our arteries. The Mediterranean style diet encourages the intake of unsaturated fat - olive oil, avocado and unsalted nuts. So instead of baking a piece of fish, consider pan-frying it using olive oil.     You should eat a few servings of vegetables (and fruit as long as you are not diabetic) everyday. Substitute some plant proteins in place of meat: beans, lentils, quinoa and oatmeal. They are lean proteins.     Do not use stick butter or stick margarine. Butter that comes in a tub is soft butter. It consists of 1/2 butter and 1/2 vegetable oil., either canola or olive oil. It is fine to use that.       Trans fats should definitely be avoided. Most foods that are labelled as containing 0 gms of trans fat may still contain several hundred milligrams of trans fat: creamer, margarine, dough, deep fried foods, ready made frosting, potato, corn and torilla chips, cakes,  cookies, pie crusts and crackers containing shortening made with hydrogenated vegetable oil.    =============    LOW-SALT DIET (2 grams/day)   This diet eliminates foods that are high in salt and restricts the amount of salt that you cook with. It is most often used for patients with high blood pressure, edema (fluid retention), kidney, liver, and heart disease.   Table salt contains the mineral sodium. The body needs a little bit of sodium to work normally. But too much sodium can make your health problems worse. Your total daily allowance of salt (sodium) is 2 grams. This equals 2000 milligrams (mg). This is about a teaspoon of salt. This means you can have only about 500 mg of sodium at each meal. Most individuals get excessive sodium from snacks. Look carefully for sodium levels at or around 100 mg per serving of snacks and do not eat more than 1-2 servings at a time.     When you cook, limit the salt you use. And if you can avoid using salt, even better. Do not add salt at the table. So, throw away the saltshaker!   When shopping, read the package labels. Salt is often called sodium on the label. Choose foods that are Salt-Free, Low Salt, or Very Low Salt. Note that foods with Reduced Salt may not lower your salt intake enough.     BEVERAGES OK: Tea, coffee, carbonated beverages, juices   AVOID: Flavored international coffees, electrolyte replacement drinks, sports beverages     BREAD & CEREALS OK: All regular bread, rolls, cereals, cakes; low-salt crackers, matzoh crackers   AVOID: Salted crackers, pretzels, popcorn; Italian toast, pancakes, muffins     FRUITS & DESSERTS OK: Ice cream, frozen yogurt, juice bars, gelatin (Jell-O), cookies and pies, sugar, honey, jelly, hard candy   AVOID: Most pies, cakes and cookies prepared or processed with salt, instant pudding     MEATS OK: All fresh meat, fish, poultry, low-salt tuna   AVOID: Smoked, pickled, brine-cured, or salted meats or fish. This includes jc,  chipped beef, corned beef, hot dogs, luncheon meats, ham, kosher meats, salt pork, sausage, canned tuna, salted codfish, smoked salmon, herring, sardines, or anchovies.     DAIRY OK: Milk, chocolate milk, hot chocolate mix; eggs, Low Salt cheeses, yogurt, egg substitute   AVOID: Processed cheese, cheese spreads, Roquefort, Camembert, and cottage cheese, buttermilk, instant breakfast drink     BEANS, POTATOES & PASTA OK: Dry beans, split peas, lentils, potatoes, rice, macaroni, noodles, spaghetti without added salt   AVOID: Potato chips, tortilla chips, and similar products     SOUPS OK: Low-salt soups and broths made with allowed foods   AVOID: Bouillon cubes, soups with smoked or salted meats, regular soup and broth     VEGETABLES OK: Most are okay; low-salt tomato and vegetable juices   AVOID: Sauerkraut and other brine-soaked vegetables, pickles and other pickled vegetables, tomato juice, olives       SEASONING & SPICES: OK: Most seasonings are okay. Good substitutes for salt include: fresh herb blends, Tabasco, lemon, garlic, pritchard, vinegar, dry mustard, parsley, cilantro, horseradish, tomato paste, regular margarine, mayonnaise, butter, cream cheese, vegetable oil, cream, low-salt salad dressing and gravy   AVOID: Regular ketchup, relishes, pickles, soy sauce, teriyaki sauce, Worcestershire sauce, BBQ sauce, tartar sauce, meat tenderizer, chili sauce, regular gravy, regular salad dressing   © 8158-7893 [facility], [address] [place] [address], [place] [address]. All rights reserved. This information is not intended as a substitute for professional medical care. Always follow your healthcare professional's instructions.

## 2019-08-20 NOTE — PROGRESS NOTES
Subjective:     Patient ID:  Carolina Marcum is a 64 y.o. female who presents for evaluation of Hyperlipidemia      Problem List:  Hypertension  Hypercholesterolemia  Hypothyroidism  Overweight BMI ~40    HPI:     Carolina Tucker is self referred because she wants to establish with a cardiologist.  She reports swelling in the left leg by the ankle. She has discomfort on the outer side of the left leg. She takes amlodipine, HCTZ and spironolactone for hypertension.  She has knee pain and suspects that she will need knee surgery within the next 1-2 years.   She has GI reflux.   She had muscle aches with both low dose rosuvastatin and atorvastatin.      Review of Systems   Constitution: Negative for malaise/fatigue, weight gain and weight loss.   Cardiovascular: Positive for leg swelling. Negative for chest pain, dyspnea on exertion and palpitations.   Respiratory: Negative for cough and hemoptysis.    Hematologic/Lymphatic: Does not bruise/bleed easily.   Musculoskeletal: Negative for arthritis and muscle cramps.   Gastrointestinal: Positive for heartburn. Negative for abdominal pain and melena.   Genitourinary: Negative for hematuria and nocturia.   Neurological: Negative for headaches, light-headedness and seizures.   Psychiatric/Behavioral: Negative for depression.         Current Outpatient Medications   Medication Sig    amLODIPine (NORVASC) 10 MG tablet Take 1 tablet (10 mg total) by mouth once daily.    azelastine (ASTELIN) 137 mcg (0.1 %) nasal spray 1 spray (137 mcg total) by Nasal route daily as needed for Rhinitis.    cetirizine (ZYRTEC) 10 MG tablet Take 10 mg by mouth every evening.     diclofenac sodium (VOLTAREN) 1 % Gel Apply 2 g topically 4 (four) times daily. for 10 days    diphenhydrAMINE (BANOPHEN) 25 mg capsule Take 1 each (25 mg total) by mouth every 6 (six) hours as needed for Itching or Allergies.    etodolac (LODINE XL) 400 MG 24 hr tablet Take 1 tablet (400 mg total) by mouth daily as  needed (avoid all other NSAIDs).    hydroCHLOROthiazide (HYDRODIURIL) 25 MG tablet Take 1 tablet (25 mg total) by mouth once daily.    montelukast (SINGULAIR) 10 mg tablet Take 1 tablet (10 mg total) by mouth every evening. (Patient taking differently: Take 10 mg by mouth every evening. Pt taking prn.)    naproxen (NAPROSYN) 500 MG tablet Take 1 tablet (500 mg total) by mouth 2 (two) times daily.    nystatin (MYCOSTATIN) cream Apply topically 2 (two) times daily.    omeprazole (PRILOSEC) 40 MG capsule Take 1 capsule (40 mg total) by mouth 2 (two) times daily before meals.    spironolactone (ALDACTONE) 50 MG tablet Take 1 tablet (50 mg total) by mouth once daily.    SYNTHROID 125 mcg tablet Take 1 tablet (125 mcg total) by mouth once daily.    triamcinolone acetonide 0.1% (KENALOG) 0.1 % cream Apply topically 2 (two) times daily.           Past Medical and Surgical history:  Carolina Marcum  has a past medical history of Abnormal Pap smear of cervix (), Asthma, chronic, Bleeding hemorrhoids, Depression, Difficult intubation, Diverticular disease, Endometriosis, Fatty liver (12/15/2014), Herpes simplex without mention of complication, Hypercholesterolemia, Hypertension, Multiple gastric polyps (2012), Obesity, Reflux, and Thyroid disease.   Past Surgical History:   Procedure Laterality Date    BREAST BIOPSY Left 2017    core    BREAST BIOPSY Right     core    CERVICAL CONIZATION   W/ LASER       SECTION      x1    CHOLECYSTECTOMY      EGD (ESOPHAGOGASTRODUODENOSCOPY) N/A 1/15/2013    Performed [facility] (2ND FLR)    ESOPHAGOGASTRODUODENOSCOPY (EGD) N/A 2018    Performed by [facility] at [facility] ENDO (2ND FLR)    HYSTERECTOMY  age 42    [place], LSO (uterine fibroids, adhesions, endometriosis)    OOPHORECTOMY  age 42    LSO (endometriosis, cyst)    THYROIDECTOMY      TOE SURGERY      left    TOTAL THYROIDECTOMY           Social  "history:  Carolina Marcum  reports that she has never smoked. She has never used smokeless tobacco. She reports that she does not drink alcohol or use drugs.    Family history:  Carolina Marcum's family history includes Breast cancer in her maternal aunt; Cancer in her maternal uncle; Melanoma in her brother and sister.         Objective:     Physical Exam   Constitutional: She is oriented to person, place, and time. She appears well-developed and well-nourished.   /84   Pulse 80   Ht 5' 6" (1.676 m)   Wt 113.2 kg (249 lb 9 oz)   BMI 40.28 kg/m²      Neck: No JVD present.   Cardiovascular: Normal rate and regular rhythm.   No murmur heard.  Pulses:       Radial pulses are 2+ on the right side, and 2+ on the left side.        Posterior tibial pulses are 2+ on the right side, and 2+ on the left side.   Pulmonary/Chest: She has no decreased breath sounds. She has no wheezes. She has no rales. Chest wall is not dull to percussion.   Abdominal: Soft. Normal appearance. There is no splenomegaly or hepatomegaly. There is no tenderness.   Musculoskeletal:        Right lower leg: She exhibits no edema.        Left lower leg: She exhibits no edema.   Neurological: She is alert and oriented to person, place, and time.   Skin: Skin is warm. No bruising noted. Nails show no clubbing.   Psychiatric: Her speech is normal and behavior is normal. Cognition and memory are normal.         Lab Results   Component Value Date    CHOL 210 (H) 05/09/2019    CHOL 238 (H) 02/07/2019    CHOL 190 12/21/2016     Lab Results   Component Value Date    HDL 64 05/09/2019    HDL 64 02/07/2019    HDL 62 12/21/2016     Lab Results   Component Value Date    LDLCALC 129.8 05/09/2019    LDLCALC 157.2 02/07/2019    LDLCALC 106.2 12/21/2016     Lab Results   Component Value Date    TRIG 81 05/09/2019    TRIG 84 02/07/2019    TRIG 109 12/21/2016     Lab Results   Component Value Date    CHOLHDL 30.5 05/09/2019    CHOLHDL 26.9 02/07/2019    " CHOLHDL 32.6 12/21/2016     Lab Results   Component Value Date    ALT 17 05/27/2019     Lab Results   Component Value Date    ALT 17 05/27/2019    AST 7 (L) 05/27/2019    ALKPHOS 67 05/27/2019    BILITOT 0.4 05/27/2019      Lab Results   Component Value Date    CREATININE 1.2 08/20/2019    BUN 26 (H) 08/20/2019     08/20/2019    K 3.9 08/20/2019    CL 99 08/20/2019    CO2 26 08/20/2019         Assessment and Plan:     Essential hypertension  -     Basic metabolic panel; Future; Expected date: 02/18/2020  -     Basic metabolic panel; Future; Expected date: 08/26/2020    At risk for coronary artery disease  -     IN OFFICE EKG 12-LEAD (to Muse); Future  -     CT Calcium Scoring Cardiac; Future; Expected date: 08/20/2019  -     Basic metabolic panel; Future; Expected date: 02/18/2020    Pure hypercholesterolemia  Cholesterol education.  Nutritional counseling.  -     Lipid panel; Future; Expected date: 08/26/2020  -     ALT (SGPT); Future; Expected date: 08/26/2020  -     AST (SGOT); Future; Expected date: 08/26/2020    BMI 40.0-44.9, adult         Follow up in about 1 year (around 8/20/2020), or if symptoms worsen or fail to improve.

## 2019-08-21 ENCOUNTER — PATIENT OUTREACH (OUTPATIENT)
Dept: OTHER | Facility: OTHER | Age: 64
End: 2019-08-21

## 2019-08-21 NOTE — PROGRESS NOTES
Last 5 Patient Entered Readings                                      Current 30 Day Average: 130/83     Recent Readings 8/11/2019 8/4/2019 8/4/2019 7/26/2019 7/16/2019    SBP (mmHg) 115 136 142 139 125    DBP (mmHg) 71 88 92 81 73    Pulse 77 88 89 95 83          Digital Medicine: Health  Follow Up    Left voicemail to follow up with Mrs. Figueroa Jossue.  Current BP average 130/83 mmHg is not at goal.

## 2019-08-22 ENCOUNTER — PATIENT OUTREACH (OUTPATIENT)
Dept: ADMINISTRATIVE | Facility: OTHER | Age: 64
End: 2019-08-22

## 2019-08-26 ENCOUNTER — OFFICE VISIT (OUTPATIENT)
Dept: OBSTETRICS AND GYNECOLOGY | Facility: CLINIC | Age: 64
End: 2019-08-26
Payer: COMMERCIAL

## 2019-08-26 VITALS
DIASTOLIC BLOOD PRESSURE: 70 MMHG | SYSTOLIC BLOOD PRESSURE: 112 MMHG | BODY MASS INDEX: 40.32 KG/M2 | HEIGHT: 66 IN | WEIGHT: 250.88 LBS

## 2019-08-26 DIAGNOSIS — L81.9 HYPERPIGMENTED SKIN LESION: Primary | ICD-10-CM

## 2019-08-26 PROCEDURE — 99212 OFFICE O/P EST SF 10 MIN: CPT | Mod: 25,S$GLB,, | Performed by: OBSTETRICS & GYNECOLOGY

## 2019-08-26 PROCEDURE — 99999 PR PBB SHADOW E&M-EST. PATIENT-LVL III: CPT | Mod: PBBFAC,,, | Performed by: OBSTETRICS & GYNECOLOGY

## 2019-08-26 PROCEDURE — 88305 TISSUE EXAM BY PATHOLOGIST: CPT | Mod: 26,,, | Performed by: PATHOLOGY

## 2019-08-26 PROCEDURE — 88342 TISSUE SPECIMEN TO PATHOLOGY, OBSTETRICS/GYNECOLOGY: ICD-10-PCS | Mod: 26,,, | Performed by: PATHOLOGY

## 2019-08-26 PROCEDURE — 88342 IMHCHEM/IMCYTCHM 1ST ANTB: CPT | Mod: 26,,, | Performed by: PATHOLOGY

## 2019-08-26 PROCEDURE — 3078F DIAST BP <80 MM HG: CPT | Mod: CPTII,S$GLB,, | Performed by: OBSTETRICS & GYNECOLOGY

## 2019-08-26 PROCEDURE — 56605 PR BIOPSY VULVA/PERINEUM,ONE LESN: ICD-10-PCS | Mod: S$GLB,,, | Performed by: OBSTETRICS & GYNECOLOGY

## 2019-08-26 PROCEDURE — 88312 SPECIAL STAINS GROUP 1: CPT | Performed by: PATHOLOGY

## 2019-08-26 PROCEDURE — 3008F BODY MASS INDEX DOCD: CPT | Mod: CPTII,S$GLB,, | Performed by: OBSTETRICS & GYNECOLOGY

## 2019-08-26 PROCEDURE — 88305 TISSUE EXAM BY PATHOLOGIST: CPT | Performed by: PATHOLOGY

## 2019-08-26 PROCEDURE — 3008F PR BODY MASS INDEX (BMI) DOCUMENTED: ICD-10-PCS | Mod: CPTII,S$GLB,, | Performed by: OBSTETRICS & GYNECOLOGY

## 2019-08-26 PROCEDURE — 88305 TISSUE SPECIMEN TO PATHOLOGY, OBSTETRICS/GYNECOLOGY: ICD-10-PCS | Mod: 26,,, | Performed by: PATHOLOGY

## 2019-08-26 PROCEDURE — 88312 TISSUE SPECIMEN TO PATHOLOGY, OBSTETRICS/GYNECOLOGY: ICD-10-PCS | Mod: 26,,, | Performed by: PATHOLOGY

## 2019-08-26 PROCEDURE — 3074F PR MOST RECENT SYSTOLIC BLOOD PRESSURE < 130 MM HG: ICD-10-PCS | Mod: CPTII,S$GLB,, | Performed by: OBSTETRICS & GYNECOLOGY

## 2019-08-26 PROCEDURE — 56605 BIOPSY OF VULVA/PERINEUM: CPT | Mod: S$GLB,,, | Performed by: OBSTETRICS & GYNECOLOGY

## 2019-08-26 PROCEDURE — 88312 SPECIAL STAINS GROUP 1: CPT | Mod: 26,,, | Performed by: PATHOLOGY

## 2019-08-26 PROCEDURE — 99212 PR OFFICE/OUTPT VISIT, EST, LEVL II, 10-19 MIN: ICD-10-PCS | Mod: 25,S$GLB,, | Performed by: OBSTETRICS & GYNECOLOGY

## 2019-08-26 PROCEDURE — 3074F SYST BP LT 130 MM HG: CPT | Mod: CPTII,S$GLB,, | Performed by: OBSTETRICS & GYNECOLOGY

## 2019-08-26 PROCEDURE — 99999 PR PBB SHADOW E&M-EST. PATIENT-LVL III: ICD-10-PCS | Mod: PBBFAC,,, | Performed by: OBSTETRICS & GYNECOLOGY

## 2019-08-26 PROCEDURE — 3078F PR MOST RECENT DIASTOLIC BLOOD PRESSURE < 80 MM HG: ICD-10-PCS | Mod: CPTII,S$GLB,, | Performed by: OBSTETRICS & GYNECOLOGY

## 2019-08-26 RX ORDER — MUPIROCIN 20 MG/G
OINTMENT TOPICAL 3 TIMES DAILY
Qty: 22 G | Refills: 1 | Status: SHIPPED | OUTPATIENT
Start: 2019-08-26 | End: 2019-09-02

## 2019-08-26 NOTE — PROGRESS NOTES
Chief Complaint   Patient presents with    vaginitis follow up       HISTORY OF PRESENT ILLNESS:   Carolina Marcum is a 64 y.o. female  who presents for follow up external vaginal irritation and thigh irritation, vaginal discharge and itching. The itching and irritation has resolved but still has the discolored area over gluteal region.      Past Medical History:   Diagnosis Date    Abnormal Pap smear of cervix     CIS - CKC, then Hyst    Asthma, chronic     Bleeding hemorrhoids     Depression     Difficult intubation     Diverticular disease     Endometriosis     Fatty liver 12/15/2014    Herpes simplex without mention of complication     Hypercholesterolemia     Hypertension     Multiple gastric polyps 2012    Obesity     Reflux     Thyroid disease           Past Surgical History:   Procedure Laterality Date    BREAST BIOPSY Left 2017    core    BREAST BIOPSY Right     core    CERVICAL CONIZATION   W/ LASER       SECTION      x1    CHOLECYSTECTOMY      EGD (ESOPHAGOGASTRODUODENOSCOPY) N/A 1/15/2013    [facility]formed [facility] ENDO (2ND FLR)    ESOPHAGOGASTRODUODENOSCOPY (EGD) N/A 2018    Performed by [facility] at [facility] ENDO (2ND FLR)    HYSTERECTOMY  age 42    [place], LSO (uterine fibroids, adhesions, endometriosis)    OOPHORECTOMY  age 42    LSO (endometriosis, cyst)    THYROIDECTOMY      TOE SURGERY      left    TOTAL THYROIDECTOMY           Social History     Socioeconomic History    Marital status:      Spouse name: Not on file    Number of children: Not on file    Years of education: Not on file    Highest education level: Not on file   Occupational History    Not on file   Social Needs    Financial resource strain: Not on file    Food insecurity:     Worry: Not on file     Inability: Not on file    Transportation needs:     Medical: Not on file     Non-medical: Not on file   Tobacco Use    Smoking status: Never  Smoker    Smokeless tobacco: Never Used   Substance and Sexual Activity    Alcohol use: No     Frequency: Never     Comment: none lately.  rarely has a drink for special occassion    Drug use: No    Sexual activity: Yes     Partners: Male     Birth control/protection: Surgical, Post-menopausal, None   Lifestyle    Physical activity:     Days per week: 0 days     Minutes per session: Not on file    Stress: Not at all   Relationships    Social connections:     Talks on phone: More than three times a week     Gets together: Twice a week     Attends Anabaptism service: Not on file     Active member of club or organization: Yes     Attends meetings of clubs or organizations: More than 4 times per year     Relationship status:    Other Topics Concern    Are you pregnant or think you may be? Not Asked    Breast-feeding Not Asked   Social History Narrative    Not on file       Family History   Problem Relation Age of Onset    Breast cancer Maternal Aunt     Melanoma Sister     Melanoma Brother     Cancer Maternal Uncle         esophageal cancer    Ovarian cancer Neg Hx     Colon cancer Neg Hx          OB History    Para Term  AB Living   1 1 1     1   SAB TAB Ectopic Multiple Live Births           1      # Outcome Date GA Lbr Eric/2nd Weight Sex Delivery Anes PTL Lv   1 Term 84    M CS-LTranv  N BURT       COMPREHENSIVE GYN HISTORY:  PAP History: had CKC in  but normal paps since; 2018 NILM  Infection History: Denies STDs. Denies PID.  Benign History: had uterine fibroids. Denies ovarian cysts. had endometriosis Denies other conditions.  Cancer History: Denies cervical cancer. Denies uterine cancer or hyperplasia. Denies ovarian cancer. Denies vulvar cancer or pre-cancer. Denies vaginal cancer or pre-cancer. Denies breast cancer. Denies colon cancer.  Cycle: normal until had hyst at 42 2/2 endo and an enlarging uterus due to fibroids    ROS:  GENERAL: Denies weight gain or  "weight loss. Feeling well overall.   SKIN: Denies rash or lesions.   HEAD: Denies headache.   NODES: Denies enlarged lymph nodes.   CHEST: Denies shortness of breath.   ABDOMEN: No abdominal pain, constipation, diarrhea, nausea, vomiting or rectal bleeding.   URINARY: No frequency, dysuria, hematuria, or burning on urination.  REPRODUCTIVE: See HPI.   BREASTS: The patient denies pain, lumps, or nipple discharge.       /70   Ht 5' 6" (1.676 m)   Wt 113.8 kg (250 lb 14.1 oz)   BMI 40.49 kg/m²     APPEARANCE: Well nourished, well developed, in no acute distress.    PELVIC:   VULVA: normal skin, discolored area about 3 cm over left labia majora near introitus unchaged. Normal female genitalia.  URETHRAL MEATUS: Normal size and location, no lesions, no prolapse.  URETHRA: No masses, tenderness, prolapse or scarring.  VAGINA: atrophic, no discharge, no significant cystocele or rectocele. Well healed cuff   CERVIX: surgically absent  UTERUS: surgically absent   ADNEXA: No masses or tenderness.  PERINEUM: Normal, mo masses, small internal hemorrhoid     Procedure: punch biopsy  08/26/2019  Written consents were done  Patient layed supine, area was injected with 2 cc of lidocaine with epinephrine and a 3mm punch biopsy was done and sent to the pathology   Specimen: left gluteal biopsy     1. Hyperpigmented skin lesion        A/P 1. Yeast appears to have cleared. Discussed with her that appears more like hyperpigmentation of skin but to be cautious biopsy was done today.     "

## 2019-08-27 ENCOUNTER — TELEPHONE (OUTPATIENT)
Dept: CARDIOLOGY | Facility: CLINIC | Age: 64
End: 2019-08-27

## 2019-08-27 DIAGNOSIS — E78.00 PURE HYPERCHOLESTEROLEMIA: Primary | ICD-10-CM

## 2019-08-27 RX ORDER — ATORVASTATIN CALCIUM 20 MG/1
20 TABLET, FILM COATED ORAL DAILY
COMMUNITY
End: 2019-08-27 | Stop reason: SDUPTHER

## 2019-08-27 NOTE — TELEPHONE ENCOUNTER
----- Message from Padmini Mackey MD sent at 8/27/2019 12:51 PM CDT -----  Creatinine slightly higher. Recommend that since she is taking a diuretic that removes sodium, she should  start drinking an additional 8 oz of water a day

## 2019-08-27 NOTE — TELEPHONE ENCOUNTER
----- Message from Padmini Mackey MD sent at 8/27/2019 12:49 PM CDT -----  CACS showed a bit of calcified plaque within the blood vessels of the heart. Quite mild, but more than average for a 64 yr old woman. Recommend 20 mg of atorvastatin or 10 mg of rosuvastatin

## 2019-08-28 ENCOUNTER — PATIENT MESSAGE (OUTPATIENT)
Dept: CARDIOLOGY | Facility: CLINIC | Age: 64
End: 2019-08-28

## 2019-08-28 ENCOUNTER — CLINICAL SUPPORT (OUTPATIENT)
Dept: REHABILITATION | Facility: HOSPITAL | Age: 64
End: 2019-08-28
Payer: COMMERCIAL

## 2019-08-28 DIAGNOSIS — M25.562 CHRONIC PAIN OF BOTH KNEES: ICD-10-CM

## 2019-08-28 DIAGNOSIS — M62.81 MUSCLE WEAKNESS: ICD-10-CM

## 2019-08-28 DIAGNOSIS — G89.29 CHRONIC PAIN OF BOTH KNEES: ICD-10-CM

## 2019-08-28 DIAGNOSIS — M25.561 CHRONIC PAIN OF BOTH KNEES: ICD-10-CM

## 2019-08-28 DIAGNOSIS — R29.3 POOR POSTURE: ICD-10-CM

## 2019-08-28 PROBLEM — R26.89 DECREASED FUNCTIONAL MOBILITY: Status: RESOLVED | Noted: 2018-10-18 | Resolved: 2019-08-28

## 2019-08-28 PROCEDURE — 97161 PT EVAL LOW COMPLEX 20 MIN: CPT | Mod: PN

## 2019-08-28 PROCEDURE — 97110 THERAPEUTIC EXERCISES: CPT | Mod: PN

## 2019-08-28 RX ORDER — ATORVASTATIN CALCIUM 20 MG/1
20 TABLET, FILM COATED ORAL DAILY
Qty: 30 TABLET | Refills: 11 | Status: SHIPPED | OUTPATIENT
Start: 2019-08-28 | End: 2021-01-21
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
## 2019-09-03 ENCOUNTER — CLINICAL SUPPORT (OUTPATIENT)
Dept: REHABILITATION | Facility: HOSPITAL | Age: 64
End: 2019-09-03
Payer: COMMERCIAL

## 2019-09-03 DIAGNOSIS — R29.3 POOR POSTURE: ICD-10-CM

## 2019-09-03 DIAGNOSIS — M25.561 CHRONIC PAIN OF BOTH KNEES: ICD-10-CM

## 2019-09-03 DIAGNOSIS — M25.562 CHRONIC PAIN OF BOTH KNEES: ICD-10-CM

## 2019-09-03 DIAGNOSIS — M62.81 MUSCLE WEAKNESS: ICD-10-CM

## 2019-09-03 DIAGNOSIS — G89.29 CHRONIC PAIN OF BOTH KNEES: ICD-10-CM

## 2019-09-03 PROCEDURE — 97110 THERAPEUTIC EXERCISES: CPT | Mod: PN

## 2019-09-03 NOTE — PROGRESS NOTES
Physical Therapy Daily Treatment Note     Name: Carolina Marcum  Clinic Number: [id number]    Therapy Diagnosis:   Encounter Diagnoses   Name Primary?    Poor posture     Muscle weakness     Chronic pain of both knees      Physician: Deedee Mcgraw NP    Visit Date: 9/3/2019    Physician Orders: PT Eval and Treat   Medical Diagnosis from Referral: M17.0 (ICD-10-CM) - Primary osteoarthritis of both knees  Evaluation Date: 8/28/2019  Authorization Period Expiration: 12/31/2019  Plan of Care Expiration: 10/23/19  Visit # / Visits authorized: 2/50  FOTO: 2/5  PTA Visit: 0/6     Time In: 10:05  Time Out: 11:00  Total Billable Time: 55 minutes (4 TE)     Precautions: Standard and asthma, depression, endometriosis, Herpes complex, HTN, reflux, thyroid disease    Subjective     Pt reports: her knees are about the same and has not had a chance to do her HEP yet.  She was not compliant with home exercise program.  Response to previous treatment: no change  Functional change: no change    Pain: 5/10  Location: bilateral knees    Objective     Carolina Tucker received therapeutic exercises to develop strength, endurance, ROM, flexibility, posture and core stabilization for 55 minutes including:  Quad sets: 10x10'' c/ towel under knees  Bridges: 3x10 DL  SLR: 2x10 B  HS stretch: 3x30'' B c/ strap  SL hip abd: 2x10 B  Dying bug: 15x B  LAQ: 20x B 1.5#  Heel raises: 3x10 DL  Mini squats: 2x10  Standing hip abd: 2x10 B    Home Exercises Provided and Patient Education Provided   Education provided:   - correct body mechanics for knee health  - course of therapy, prognosis    Written Home Exercises Provided: yes.  Exercises were reviewed and Carolina Tucker was able to demonstrate them prior to the end of the session.  Carolina Tucker demonstrated good  understanding of the education provided.     See EMR under Media for exercises provided 8/28/19.    Assessment     Pt did well today and no increase in pain. Working on quad strength and  slow progression towards standing activites.  Carolina Tucker is progressing well towards her goals.   Pt prognosis is Excellent.     Pt will continue to benefit from skilled outpatient physical therapy to address the deficits listed in the problem list box on initial evaluation, provide pt/family education and to maximize pt's level of independence in the home and community environment.   Pt's spiritual, cultural and educational needs considered and pt agreeable to plan of care and goals.     Anticipated barriers to physical therapy: severe B knee R>L OA, chronic low back pain    GOALS: Short Term Goals:  4 weeks  1.Report decreased B knee pain </=6/10 in standing/walking for more than 15 minutes to increase tolerance for ADLs and increased QoL. - progressing  2. Increase R knee AROM to 0-115 degrees in order to be able to perform ADLs without difficulty. - progressing  3. Increase strength by 1/3 MMT grade in BLE  to increase tolerance for ADL and work activities. - progressing  4. Pt to tolerate HEP to improve ROM and independence with ADL's. - progressing     Long Term Goals: 8 weeks  1.Report decreased B knee pain </= 2/10 in standing/walking for more than 15 minutes to increase tolerance for ADLs and increased QoL. - progressing  2.Patient goal: increase muscle strength to help my knees. - progressing  3.Increase strength to >/= 4+/5 in BLE to increase tolerance for ADL and work activities. - progressing  4. Pt will report at </= 47% impaired on KNEE FOTO to demonstrate increase in LE function with every day tasks.  - progressing  5. Pt will negotiate 8 steps and ambulate community distances at >/= Mod I for increased functional mobility. - progressing  6. Increase B knee AROM to 0-120 degrees in order to be able to perform ADLs without difficulty. - progressing    Plan     Cont per POC for B knee OA.    Stephan Lugo, PT

## 2019-09-05 ENCOUNTER — OFFICE VISIT (OUTPATIENT)
Dept: DERMATOLOGY | Facility: CLINIC | Age: 64
End: 2019-09-05
Payer: COMMERCIAL

## 2019-09-05 DIAGNOSIS — L73.8 SEBACEOUS GLAND HYPERPLASIA: ICD-10-CM

## 2019-09-05 DIAGNOSIS — D22.9 NEVUS: ICD-10-CM

## 2019-09-05 DIAGNOSIS — L82.1 SK (SEBORRHEIC KERATOSIS): ICD-10-CM

## 2019-09-05 DIAGNOSIS — D18.00 ANGIOMA: ICD-10-CM

## 2019-09-05 DIAGNOSIS — L81.0 POST-INFLAMMATORY HYPERPIGMENTATION: ICD-10-CM

## 2019-09-05 DIAGNOSIS — L81.4 LENTIGO: ICD-10-CM

## 2019-09-05 DIAGNOSIS — L81.9 DYSCHROMIA: Primary | ICD-10-CM

## 2019-09-05 PROCEDURE — 99214 OFFICE O/P EST MOD 30 MIN: CPT | Mod: S$GLB,,, | Performed by: DERMATOLOGY

## 2019-09-05 PROCEDURE — 99214 PR OFFICE/OUTPT VISIT, EST, LEVL IV, 30-39 MIN: ICD-10-PCS | Mod: S$GLB,,, | Performed by: DERMATOLOGY

## 2019-09-05 PROCEDURE — 99999 PR PBB SHADOW E&M-EST. PATIENT-LVL II: ICD-10-PCS | Mod: PBBFAC,,, | Performed by: DERMATOLOGY

## 2019-09-05 PROCEDURE — 99999 PR PBB SHADOW E&M-EST. PATIENT-LVL II: CPT | Mod: PBBFAC,,, | Performed by: DERMATOLOGY

## 2019-09-05 RX ORDER — AZELAIC ACID 0.15 G/G
GEL TOPICAL
Qty: 50 G | Refills: 3 | Status: SHIPPED | OUTPATIENT
Start: 2019-09-05

## 2019-09-05 NOTE — PROGRESS NOTES
"  Subjective:       Patient ID:  Carolina Marcum is a 64 y.o. female who presents for   Chief Complaint   Patient presents with    Skin Check    Skin Discoloration     Patient is here today for a "mole" check.   Pt has a history of  moderate sun exposure in the past.   Pt recalls several blistering sunburns in the past- no  Pt has history of tanning bed use- no  Pt has  had moles removed in the past- yes, benign per pt  Pt has history of melanoma in first degree relatives-  Sister and brother    Pt c/o skin discoloration above upper lip, x 1wk, redness, tx antibiotic oinment  And valtrex.  Has resolved but still has redness.    C/o lesion on left groin. Was bx by gyn.  Is a dark area.  Does recall having a rash here and using yeast cream and abx cream .  Not itching any more just a large dark area      Review of Systems   Constitutional: Negative for fever, chills, fatigue and malaise.   Skin: Positive for rash, daily sunscreen use and recent sunburn. Negative for activity-related sunscreen use.        Sunburn face, 2mo ago   Hematologic/Lymphatic: Bruises/bleeds easily.        Objective:    Physical Exam   Constitutional: She appears well-developed and well-nourished. No distress.   Genitourinary:         Neurological: She is alert and oriented to person, place, and time. She is not disoriented.   Psychiatric: She has a normal mood and affect.   Skin:   Areas Examined (abnormalities noted in diagram):   Scalp / Hair Palpated and Inspected  Head / Face Inspection Performed  Neck Inspection Performed  Chest / Axilla Inspection Performed  Abdomen Inspection Performed  Genitals / Buttocks / Groin Inspection Performed  Back Inspection Performed  RUE Inspected  LUE Inspection Performed  RLE Inspected  LLE Inspection Performed  Nails and Digits Inspection Performed                       Diagram Legend     Erythematous scaling macule/papule c/w actinic keratosis       Vascular papule c/w angioma      Pigmented " verrucoid papule/plaque c/w seborrheic keratosis      Yellow umbilicated papule c/w sebaceous hyperplasia      Irregularly shaped tan macule c/w lentigo     1-2 mm smooth white papules consistent with Milia      Movable subcutaneous cyst with punctum c/w epidermal inclusion cyst      Subcutaneous movable cyst c/w pilar cyst      Firm pink to brown papule c/w dermatofibroma      Pedunculated fleshy papule(s) c/w skin tag(s)      Evenly pigmented macule c/w junctional nevus     Mildly variegated pigmented, slightly irregular-bordered macule c/w mildly atypical nevus      Flesh colored to evenly pigmented papule c/w intradermal nevus       Pink pearly papule/plaque c/w basal cell carcinoma      Erythematous hyperkeratotic cursted plaque c/w SCC      Surgical scar with no sign of skin cancer recurrence      Open and closed comedones      Inflammatory papules and pustules      Verrucoid papule consistent consistent with wart     Erythematous eczematous patches and plaques     Dystrophic onycholytic nail with subungual debris c/w onychomycosis     Umbilicated papule    Erythematous-base heme-crusted tan verrucoid plaque consistent with inflamed seborrheic keratosis     Erythematous Silvery Scaling Plaque c/w Psoriasis     See annotation      Assessment / Plan:        Dyschromia- face  -     FINACEA 15 % gel; AAA face qhs then moisturize  Dispense: 50 g; Refill: 3  Vit c lotion qam   elta daily qam   cerave pm     Lentigo  This is a benign hyperpigmented sun induced lesion. Daily sun protection will reduce the number of new lesions. Treatment of these benign lesions are considered cosmetic.  The nature of sun-induced photo-aging and skin cancers is discussed.  Sun avoidance, protective clothing, and the use of 30-SPF sunscreens is advised. Observe closely for skin damage/changes, and call if such occurs.    Angioma  These are benign vascular lesions that are inherited.  Treatment is not necessary.    Nevus  Discussed  ABCDE's of nevi.  Monitor for new mole or moles that are becoming bigger, darker, irritated, or developing irregular borders. Brochure provided.    SK (seborrheic keratosis)  These are benign inherited growths without a malignant potential. Reassurance given to patient. No treatment is necessary.     Sebaceous gland hyperplasia  Reassurance given to patient. No treatment is necessary.   Treatment of benign, asymptomatic lesions may be considered cosmetic.    Post-inflammatory hyperpigmentation- suspect in groin  Pt to await path report from gyn      Total body skin examination performed today including at least 12 points as noted in physical examination. No lesions suspicious for malignancy noted.               Follow up in about 2 years (around 9/5/2021).

## 2019-09-06 ENCOUNTER — PATIENT MESSAGE (OUTPATIENT)
Dept: DERMATOLOGY | Facility: CLINIC | Age: 64
End: 2019-09-06

## 2019-09-06 ENCOUNTER — PATIENT MESSAGE (OUTPATIENT)
Dept: OBSTETRICS AND GYNECOLOGY | Facility: HOSPITAL | Age: 64
End: 2019-09-06

## 2019-09-06 ENCOUNTER — OFFICE VISIT (OUTPATIENT)
Dept: SLEEP MEDICINE | Facility: CLINIC | Age: 64
End: 2019-09-06
Payer: COMMERCIAL

## 2019-09-06 VITALS
WEIGHT: 249.81 LBS | DIASTOLIC BLOOD PRESSURE: 85 MMHG | HEART RATE: 73 BPM | HEIGHT: 66 IN | SYSTOLIC BLOOD PRESSURE: 135 MMHG | BODY MASS INDEX: 40.15 KG/M2

## 2019-09-06 DIAGNOSIS — I10 ESSENTIAL HYPERTENSION: ICD-10-CM

## 2019-09-06 DIAGNOSIS — G47.33 OBSTRUCTIVE SLEEP APNEA: ICD-10-CM

## 2019-09-06 PROCEDURE — 3008F PR BODY MASS INDEX (BMI) DOCUMENTED: ICD-10-PCS | Mod: CPTII,S$GLB,, | Performed by: NURSE PRACTITIONER

## 2019-09-06 PROCEDURE — 3075F SYST BP GE 130 - 139MM HG: CPT | Mod: CPTII,S$GLB,, | Performed by: NURSE PRACTITIONER

## 2019-09-06 PROCEDURE — 3075F PR MOST RECENT SYSTOLIC BLOOD PRESS GE 130-139MM HG: ICD-10-PCS | Mod: CPTII,S$GLB,, | Performed by: NURSE PRACTITIONER

## 2019-09-06 PROCEDURE — 3008F BODY MASS INDEX DOCD: CPT | Mod: CPTII,S$GLB,, | Performed by: NURSE PRACTITIONER

## 2019-09-06 PROCEDURE — 99214 PR OFFICE/OUTPT VISIT, EST, LEVL IV, 30-39 MIN: ICD-10-PCS | Mod: S$GLB,,, | Performed by: NURSE PRACTITIONER

## 2019-09-06 PROCEDURE — 99214 OFFICE O/P EST MOD 30 MIN: CPT | Mod: S$GLB,,, | Performed by: NURSE PRACTITIONER

## 2019-09-06 PROCEDURE — 3079F DIAST BP 80-89 MM HG: CPT | Mod: CPTII,S$GLB,, | Performed by: NURSE PRACTITIONER

## 2019-09-06 PROCEDURE — 99999 PR PBB SHADOW E&M-EST. PATIENT-LVL IV: CPT | Mod: PBBFAC,,, | Performed by: NURSE PRACTITIONER

## 2019-09-06 PROCEDURE — 99999 PR PBB SHADOW E&M-EST. PATIENT-LVL IV: ICD-10-PCS | Mod: PBBFAC,,, | Performed by: NURSE PRACTITIONER

## 2019-09-06 PROCEDURE — 3079F PR MOST RECENT DIASTOLIC BLOOD PRESSURE 80-89 MM HG: ICD-10-PCS | Mod: CPTII,S$GLB,, | Performed by: NURSE PRACTITIONER

## 2019-09-06 NOTE — PROGRESS NOTES
Carolina Marcum  was seen as f/u today for the mgt of obstructive sleep apnea. Last seen 9/6/18    Continues to use apap qhs unless very sick. Sometimes oral drying when sick and mouth falls open. Having mask leaks. Tosses and turns during sleephuband having inc'd mask leak. Sleep more consolidated ovearll and snoring/air gasping remains resolved.   Interrogation- avg use 7.8h/night. Dreamwear full mask. 90% tile 9.6-10.5cm. AHI 5.0. 82% mask fit, heat at 3  BP stable, in digital program  4# loss, never called bariatric dept      HST 6-5-18 AHI 23/low SpO2: 82.7%    HISTORY  5/10/18  CHIEF COMPLAINT: Snoring    HISTORY OF PRESENT ILLNESS:Carolina Marcum a 64 y.o. female presents for the evaluation of obstructive sleep apnea. She has never had a sleep study. +snores.  and osorio. Nocturia 3x, easily returns to sleep more often than not.  Occasional witnessed apneic pauses. Wgt gain+. Was waking up with headaches just recently. Got ent syrup which she takes prn which helps abort head pain/helps drain sinus. Side sleeper. Mouth breather during sleep.      At times symptoms of restless legs  Despite taking Zyrtec qhs still tosses and turns  R knee inflammation    EATS out a lot, hard to lose wgt.     EPWORTH SLEEPINESS SCALE 5/10/2018   Sitting and reading 3   Watching TV 2   Sitting, inactive in a public place (e.g. a theatre or a meeting) 2   As a passenger in a car for an hour without a break 1   Lying down to rest in the afternoon when circumstances permit 3   Sitting and talking to someone 0   Sitting quietly after a lunch without alcohol 3   In a car, while stopped for a few minutes in traffic 0   Total score 14         Sleep Clinic New Patient 5/10/2018   What time do you go to bed on a week day? (Give a range) Anywhere from 9:00-11:30 pm   What time do you go to bed on a day off? (Give a range) 10:30-1:00 pm   How long does it take you to fall asleep? (Give a range) Sometimes within one hour watching  tv or other times fifteen minutes to twenty   How many times per night do you wake up?  2   How long does it take you to fall back into sleep? (Give a range) Sometimes quickly like within five minutes others hour or two depending on how i woke up n how much sleep and time night   What time do you wake up to start your day on a week day? (Give a range) From five to 7:30 am   What time do you wake up to start your day on a day off? (Give a range) From from 6:30-8 am   What time do you get out of bed? (Give a range) Seven to eight    How many hours do you sleep?  8   How many naps do you take per day? 1   Rate your sleep quality from 0 to 5 (0-poor, 5-great). 3   Have you experienced:  Weight gain?   How much weight have you gained or lost in pounds (lbs)?  25   How many times per night do you go to the bathroom?  3   Have you ever had a sleep study/CPAP machine/surgery for sleep apnea? No   Have you ever had a CPAP machine for sleep apnea? No   Have you ever had surgery for sleep apnea? No     9/6/18:  Since seen she has undergone a sleep study which was reviewed with her today. Been setup with PAP therapy 6-20cm.Using nightly. Snoring resolved. Switched to FFM due to sinus congestion. Foggy today due to sinus impact. Likes using therapy. Denies pressure intolerance. +oral drying. Sleep more consolidated when not drinking late in evening. Knee pain can disrupt sleep/turn in bed.   Hard to lose wgt, eats out. 64oz water daily (drinks  Mostly evening time so has nocturia 2-4).     Encore: Date Range: 8/7/2018 - 9/5/2018     Hide 90% tile 11.7cm  2% leak     Compliance Summary  Apnea Indices    Days with Device Usage:  30 days  Average AHI:  4.1    Percentage of Days >=4 Hours:  96.7%     Average Usage (Days Used):  6 hrs. 22 mins. 48 secs.     Average Usage (All Days):  6 hrs. 22 mins. 48 secs.         SOCIAL HISTORY: . Former ,  olunte[facility]    REVIEW  "OF SYSTEMS:  9/6/19 Sleep related symptoms as per HPI  Difficulty breathing through the nose?  Yes   Sore throat or dry mouth in the morning? Yes   Irregular or very fast heart beat?  Sometimes   Shortness of breath?  Yes   Acid reflux? Yes   Body aches and pains?  Sometimes   Morning headaches? Yes   Dizziness? Yes   Mood changes?  No   Do you exercise?  Sometimes   Do you feel like moving your legs a lot?  Yes       PHYSICAL EXAM:   /85 (BP Location: Left arm, Patient Position: Sitting, BP Method: Medium (Automatic))   Pulse 73   Ht 5' 6" (1.676 m)   Wt 113.3 kg (249 lb 12.8 oz)   BMI 40.32 kg/m²   GENERAL: morbid obese body habitus, well groomed     ASSESSMENT:     QUINTIN, moderate. She remains adherent with PAP, AHI<5,symptoms improved. MAsk leaks  She has  medical comorbidities of asthma, morbid obesity, hypertension, hypothyroidism      PLAN:   1. Continue APAP 6-20cm. [facility] DME prn supplies   2. Discussed effectiveness of therapy and potential ramifications of untreated QUINTIN, including stroke, heart disease, HTN  3. Encouraged continued weight loss efforts for potential improvement of QUINTIN and overall health benefits and see PCP re: HTN mgt  4. Refer again bariatric med dept, provided dept # to call  5. Otherwise rtc 12-mos, sooner if needed    "

## 2019-09-10 ENCOUNTER — CLINICAL SUPPORT (OUTPATIENT)
Dept: REHABILITATION | Facility: HOSPITAL | Age: 64
End: 2019-09-10
Payer: COMMERCIAL

## 2019-09-10 DIAGNOSIS — M25.562 CHRONIC PAIN OF BOTH KNEES: ICD-10-CM

## 2019-09-10 DIAGNOSIS — G89.29 CHRONIC PAIN OF BOTH KNEES: ICD-10-CM

## 2019-09-10 DIAGNOSIS — M62.81 MUSCLE WEAKNESS: ICD-10-CM

## 2019-09-10 DIAGNOSIS — M25.561 CHRONIC PAIN OF BOTH KNEES: ICD-10-CM

## 2019-09-10 DIAGNOSIS — R29.3 POOR POSTURE: ICD-10-CM

## 2019-09-10 PROCEDURE — 97110 THERAPEUTIC EXERCISES: CPT | Mod: PN

## 2019-09-10 NOTE — PROGRESS NOTES
Physical Therapy Daily Treatment Note     Name: Carolina Knight  Clinic Number: [id number]    Therapy Diagnosis:   Encounter Diagnoses   Name Primary?    Poor posture     Muscle weakness     Chronic pain of both knees      Physician: Deedee Mcgraw NP    Visit Date: 9/10/2019    Physician Orders: PT Eval and Treat   Medical Diagnosis from Referral: M17.0 (ICD-10-CM) - Primary osteoarthritis of both knees  Evaluation Date: 8/28/2019  Authorization Period Expiration: 12/31/2019  Plan of Care Expiration: 10/23/19  Visit # / Visits authorized: 3/50  FOTO: 3/5  PTA Visit: 1/6     Time In: 1005  Time Out: 1100  Total Billable Time: 30 minutes (2 TE)     Precautions: Standard and asthma, depression, endometriosis, Herpes complex, HTN, reflux, thyroid disease      Subjective     Pt reports: she is having no pain in knees today.  Hasnt had to take anti-inflammatory medication since the weekend.  She was not compliant with home exercise program.  Relays she was busy over the weekend and forgot about it  Response to previous treatment: no adverse reaction  Functional change: none    Pain: 0/10  Location: bilateral knee      Objective       Carolina Tucker received therapeutic exercises to develop strength, ROM and flexibility for 55 minutes including:    Quad sets: 10x10'' c/ towel under knees  Bridges: 3x10 DL  SLR: 3x10 B  HS stretch: 3x30'' B c/ strap  SL hip abd: 2x10 B  Dying bug: 15x B  LAQ: 20x B 1.5#  Heel raises: 3x10 DL  Mini squats: 2x10  Standing hip abd: 2x10   Leg press 4.0 plates 2x10 DL    Home Exercises Provided and Patient Education Provided     Education provided:   - cont HEP regularly to maximize therapy benefits    Written Home Exercises Provided: Patient instructed to cont prior HEP.  Exercises were reviewed and Carolina Tucker was able to demonstrate them prior to the end of the session.  Carolina Tucker demonstrated good  understanding of the education provided.     See EMR under Media for exercises  provided 9/10/2019.      Assessment     Pt tolerates therapy activities and addition of leg press without difficulties or c/o increased pain  Carolina Tucker is progressing well towards her goals.   Pt prognosis is Excellent.     Pt will continue to benefit from skilled outpatient physical therapy to address the deficits listed in the problem list box on initial evaluation, provide pt/family education and to maximize pt's level of independence in the home and community environment.     Pt's spiritual, cultural and educational needs considered and pt agreeable to plan of care and goals.    Anticipated barriers to physical therapy: severe B knee R>L OA, chronic low back pain    Goals:   Short Term Goals:  4 weeks  1.Report decreased B knee pain </=6/10 in standing/walking for more than 15 minutes to increase tolerance for ADLs and increased QoL. - progressing  2. Increase R knee AROM to 0-115 degrees in order to be able to perform ADLs without difficulty. - progressing  3. Increase strength by 1/3 MMT grade in BLE  to increase tolerance for ADL and work activities. - progressing  4. Pt to tolerate HEP to improve ROM and independence with ADL's. - progressing     Long Term Goals: 8 weeks  1.Report decreased B knee pain </= 2/10 in standing/walking for more than 15 minutes to increase tolerance for ADLs and increased QoL. - progressing  2.Patient goal: increase muscle strength to help my knees. - progressing  3.Increase strength to >/= 4+/5 in BLE to increase tolerance for ADL and work activities. - progressing  4. Pt will report at </= 47% impaired on KNEE FOTO to demonstrate increase in LE function with every day tasks.  - progressing  5. Pt will negotiate 8 steps and ambulate community distances at >/= Mod I for increased functional mobility. - progressing  6. Increase B knee AROM to 0-120 degrees in order to be able to perform ADLs without difficulty. - progressing    Plan     Cont POC to progress towards established  goals    Thais De La Torre, PTA

## 2019-09-11 ENCOUNTER — PATIENT OUTREACH (OUTPATIENT)
Dept: OTHER | Facility: OTHER | Age: 64
End: 2019-09-11

## 2019-09-11 NOTE — PROGRESS NOTES
Digital Medicine: Clinician Follow-Up    Mrs. Tucker had questions about taking medications for weight loss and the affect on her BP.         Follow Up  Follow-up reason(s): reading review          Assessment/Plan        Discussed possible medications used for weight loss (metformin, Trulicity, Victoza, phentermine, Contrave). Explained which ones will affect her BP. She will discuss retrying metformin with Dr. Cornejo.     Asked for more frequent BP monitoring.     Per 30 day average, 130/83 mmHg, patient's BP is approaching goal.     Asked patient to call or message with questions or concerns.     Will follow up in 2-3 months.           There are no preventive care reminders to display for this patient.    Last 5 Patient Entered Readings                                      Current 30 Day Average: 130/83     Recent Readings 9/10/2019 9/10/2019 9/10/2019 9/2/2019 9/2/2019    SBP (mmHg) 130 134 137 129 129    DBP (mmHg) 83 86 83 80 80    Pulse 76 74 75 67 67             Hypertension Medications             amLODIPine (NORVASC) 10 MG tablet Take 1 tablet (10 mg total) by mouth once daily.    hydroCHLOROthiazide (HYDRODIURIL) 25 MG tablet Take 1 tablet (25 mg total) by mouth once daily.    spironolactone (ALDACTONE) 50 MG tablet Take 1 tablet (50 mg total) by mouth once daily.

## 2019-09-11 NOTE — PROGRESS NOTES
"Digital Medicine: Health  Follow-Up    The history is provided by the patient.   Hypertension           Diet:       Eating style: emotional eater, relies on convenience items and time constraints    Barriers to a Healthy Diet: family constraints and time/convenience    Thinks she has gained weight since our last encounter. Stated she has had "a lot going on" and has not been focusing on her dietary goals. She spoke with Dr. Cornejo and bariatrics was suggested. She is concerned with the process at bariatrics and wants more information on the medications before going to an appt. Placed a task for PharmD to discuss any potential BP side effects. She will try to follow a low-carb diet to help with weight loss. Inquired about keto but has decided against trying it.    Intervention(s): portion control and meal planning    Assigning the following patient goals: reduce portions, meal plan, maintain low sodium diet and reduce carbs      SDOH    INTERVENTION(S)  recommended diet modifications, recommend physical activity, encouragement/support and goal setting      There are no preventive care reminders to display for this patient.    Last 5 Patient Entered Readings                                      Current 30 Day Average: 130/83     Recent Readings 9/10/2019 9/10/2019 9/10/2019 9/2/2019 9/2/2019    SBP (mmHg) 130 134 137 129 129    DBP (mmHg) 83 86 83 80 80    Pulse 76 74 75 67 67                "

## 2019-09-13 ENCOUNTER — CLINICAL SUPPORT (OUTPATIENT)
Dept: REHABILITATION | Facility: HOSPITAL | Age: 64
End: 2019-09-13
Payer: COMMERCIAL

## 2019-09-13 DIAGNOSIS — M62.81 MUSCLE WEAKNESS: ICD-10-CM

## 2019-09-13 DIAGNOSIS — M25.562 CHRONIC PAIN OF BOTH KNEES: ICD-10-CM

## 2019-09-13 DIAGNOSIS — M25.561 CHRONIC PAIN OF BOTH KNEES: ICD-10-CM

## 2019-09-13 DIAGNOSIS — R29.3 POOR POSTURE: ICD-10-CM

## 2019-09-13 DIAGNOSIS — G89.29 CHRONIC PAIN OF BOTH KNEES: ICD-10-CM

## 2019-09-13 PROCEDURE — 97110 THERAPEUTIC EXERCISES: CPT | Mod: PN

## 2019-09-13 NOTE — PROGRESS NOTES
"  Physical Therapy Daily Treatment Note     Name: Carolina Knight  Clinic Number: [id number]    Therapy Diagnosis:   No diagnosis found.  Physician: Deedee Mcgraw NP    Visit Date: 2019    Physician Orders: PT Eval and Treat   Medical Diagnosis from Referral: M17.0 (ICD-10-CM) - Primary osteoarthritis of both knees  Evaluation Date: 2019  Authorization Period Expiration: 2019  Plan of Care Expiration: 10/23/19  Visit # / Visits authorized:   FOTO:   NEXT  PTA Visit:      Time In: 0905 am  Time Out: 1000 am  Total Billable Time: 55 minutes (4 TE)     Precautions: Standard and asthma, depression, endometriosis, Herpes complex, HTN, reflux, thyroid disease    Subjective     Pt reports: she is having no pain in knees today.  "I know I should do the home exercises.  I promise to from now on."  She was not compliant with home exercise program.    Response to previous treatment: no adverse reaction  Functional change: none    Pain: 0/10  Location: bilateral knee      Objective         Carolina Tucker received therapeutic exercises to develop strength, ROM and flexibility for 55 minutes including:    Quad sets: 10x10'' c/ towel under ankles  Bridges: 3x10 DL  SLR: 1# 2x10 B  HS stretch: 3x30'' B c/ strap  SL hip abd: 1# 2x10 B  Dying bu"x2 B  LAQ: 2# 3x10  Heel raises: 3x10 DL  Mini squats: 2x10  Standing hip abd: 2x10   Leg press 4.0 plates 3x10 DL    Home Exercises Provided and Patient Education Provided     Education provided:   - cont HEP regularly to maximize therapy benefits    Written Home Exercises Provided: Patient instructed to cont prior HEP.  Exercises were reviewed and Carolina Tucker was able to demonstrate them prior to the end of the session.  Carolina Tucker demonstrated good  understanding of the education provided.     See EMR under Media for exercises provided 9/10/2019.      Assessment     Pt tolerates therapy activities with added weight and reps without difficulties or c/o " increased pain  Carolina Tucker is progressing well towards her goals.   Pt prognosis is Excellent.     Pt will continue to benefit from skilled outpatient physical therapy to address the deficits listed in the problem list box on initial evaluation, provide pt/family education and to maximize pt's level of independence in the home and community environment.     Pt's spiritual, cultural and educational needs considered and pt agreeable to plan of care and goals.    Anticipated barriers to physical therapy: severe B knee R>L OA, chronic low back pain    Goals:   Short Term Goals:  4 weeks  1.Report decreased B knee pain </=6/10 in standing/walking for more than 15 minutes to increase tolerance for ADLs and increased QoL. - progressing  2. Increase R knee AROM to 0-115 degrees in order to be able to perform ADLs without difficulty. - progressing  3. Increase strength by 1/3 MMT grade in BLE  to increase tolerance for ADL and work activities. - progressing  4. Pt to tolerate HEP to improve ROM and independence with ADL's. - progressing     Long Term Goals: 8 weeks  1.Report decreased B knee pain </= 2/10 in standing/walking for more than 15 minutes to increase tolerance for ADLs and increased QoL. - progressing  2.Patient goal: increase muscle strength to help my knees. - progressing  3.Increase strength to >/= 4+/5 in BLE to increase tolerance for ADL and work activities. - progressing  4. Pt will report at </= 47% impaired on KNEE FOTO to demonstrate increase in LE function with every day tasks.  - progressing  5. Pt will negotiate 8 steps and ambulate community distances at >/= Mod I for increased functional mobility. - progressing  6. Increase B knee AROM to 0-120 degrees in order to be able to perform ADLs without difficulty. - progressing    Plan     Cont POC to progress towards established goals    Lara Bach, PTA

## 2019-09-24 ENCOUNTER — CLINICAL SUPPORT (OUTPATIENT)
Dept: REHABILITATION | Facility: HOSPITAL | Age: 64
End: 2019-09-24
Payer: COMMERCIAL

## 2019-09-24 DIAGNOSIS — R29.3 POOR POSTURE: ICD-10-CM

## 2019-09-24 DIAGNOSIS — M25.562 CHRONIC PAIN OF BOTH KNEES: ICD-10-CM

## 2019-09-24 DIAGNOSIS — M25.561 CHRONIC PAIN OF BOTH KNEES: ICD-10-CM

## 2019-09-24 DIAGNOSIS — G89.29 CHRONIC PAIN OF BOTH KNEES: ICD-10-CM

## 2019-09-24 DIAGNOSIS — M62.81 MUSCLE WEAKNESS: ICD-10-CM

## 2019-09-24 PROCEDURE — 97110 THERAPEUTIC EXERCISES: CPT | Mod: PN

## 2019-09-24 NOTE — PROGRESS NOTES
"  Physical Therapy Daily Treatment Note     Name: Carolina Knight  Clinic Number: [id number]    Therapy Diagnosis:   Encounter Diagnoses   Name Primary?    Poor posture     Muscle weakness     Chronic pain of both knees      Physician: Deedee Mcgraw NP    Visit Date: 2019    Physician Orders: PT Eval and Treat   Medical Diagnosis from Referral: M17.0 (ICD-10-CM) - Primary osteoarthritis of both knees  Evaluation Date: 2019  Authorization Period Expiration: 2019  Plan of Care Expiration: 10/23/19  Visit # / Visits authorized:   FOTO:   NEXT  PTA Visit: 3/6     Time In: 0905 am  Time Out: 1000 am  Total Billable Time: 30 minutes (2 TE)     Precautions: Standard and asthma, depression, endometriosis, Herpes complex, HTN, reflux, thyroid disease    Subjective     Pt reports: "I did the exercises this weekend"  She was compliant with home exercise program.    Response to previous treatment: no adverse reaction  Functional change: none    Pain: 0/10  Location: bilateral knee      Objective        Carolina Tucker received therapeutic exercises to develop strength, ROM and flexibility for 55 minutes including:    Quad sets: 10x10'' c/ towel under ankles  Bridges: 3x10 DL  SLR: 1# 3x10 B  HS stretch: 3x30'' B c/ strap  SL hip abd: 1# 3x10 B  Dying bu"x2 B  LAQ: 2# 3x10  Heel raises: 3x10 DL  Mini squats: 2x10  Standing hip abd: 2x15   Leg press 4.0 plates 3x10 DL    Home Exercises Provided and Patient Education Provided     Education provided:   - cont HEP regularly to maximize therapy benefits    Written Home Exercises Provided: Patient instructed to cont prior HEP.  Exercises were reviewed and Carolina Tucker was able to demonstrate them prior to the end of the session.  Carolina Tucker demonstrated good  understanding of the education provided.     See EMR under Media for exercises provided 9/10/2019.      Assessment     Pt tolerates therapy activities with added weight and reps without " difficulties or c/o increased pain  Carolina Tucker is progressing well towards her goals.   Pt prognosis is Excellent.     Pt will continue to benefit from skilled outpatient physical therapy to address the deficits listed in the problem list box on initial evaluation, provide pt/family education and to maximize pt's level of independence in the home and community environment.     Pt's spiritual, cultural and educational needs considered and pt agreeable to plan of care and goals.    Anticipated barriers to physical therapy: severe B knee R>L OA, chronic low back pain    Goals:   Short Term Goals:  4 weeks  1.Report decreased B knee pain </=6/10 in standing/walking for more than 15 minutes to increase tolerance for ADLs and increased QoL. - progressing  2. Increase R knee AROM to 0-115 degrees in order to be able to perform ADLs without difficulty. - progressing  3. Increase strength by 1/3 MMT grade in BLE  to increase tolerance for ADL and work activities. - progressing  4. Pt to tolerate HEP to improve ROM and independence with ADL's. - progressing     Long Term Goals: 8 weeks  1.Report decreased B knee pain </= 2/10 in standing/walking for more than 15 minutes to increase tolerance for ADLs and increased QoL. - progressing  2.Patient goal: increase muscle strength to help my knees. - progressing  3.Increase strength to >/= 4+/5 in BLE to increase tolerance for ADL and work activities. - progressing  4. Pt will report at </= 47% impaired on KNEE FOTO to demonstrate increase in LE function with every day tasks.  - progressing  5. Pt will negotiate 8 steps and ambulate community distances at >/= Mod I for increased functional mobility. - progressing  6. Increase B knee AROM to 0-120 degrees in order to be able to perform ADLs without difficulty. - progressing    Plan     Cont POC to progress towards established goals    Lara Bach, PTA

## 2019-09-30 ENCOUNTER — PATIENT MESSAGE (OUTPATIENT)
Dept: INTERNAL MEDICINE | Facility: CLINIC | Age: 64
End: 2019-09-30

## 2019-10-01 ENCOUNTER — CLINICAL SUPPORT (OUTPATIENT)
Dept: REHABILITATION | Facility: HOSPITAL | Age: 64
End: 2019-10-01
Payer: COMMERCIAL

## 2019-10-01 DIAGNOSIS — R29.3 POOR POSTURE: ICD-10-CM

## 2019-10-01 DIAGNOSIS — G89.29 CHRONIC PAIN OF BOTH KNEES: ICD-10-CM

## 2019-10-01 DIAGNOSIS — M25.561 CHRONIC PAIN OF BOTH KNEES: ICD-10-CM

## 2019-10-01 DIAGNOSIS — M25.562 CHRONIC PAIN OF BOTH KNEES: ICD-10-CM

## 2019-10-01 DIAGNOSIS — M62.81 MUSCLE WEAKNESS: ICD-10-CM

## 2019-10-01 PROCEDURE — 97110 THERAPEUTIC EXERCISES: CPT | Mod: PN

## 2019-10-01 NOTE — PROGRESS NOTES
"  Physical Therapy Daily Treatment Note     Name: Carolina Knight  Clinic Number: [id number]    Therapy Diagnosis:   Encounter Diagnoses   Name Primary?    Poor posture     Muscle weakness     Chronic pain of both knees      Physician: Deedee Mcgraw NP    Visit Date: 10/1/2019    Physician Orders: PT Eval and Treat   Medical Diagnosis from Referral: M17.0 (ICD-10-CM) - Primary osteoarthritis of both knees  Evaluation Date: 2019  Authorization Period Expiration: 2019  Plan of Care Expiration: 10/23/19  Visit # / Visits authorized:   FOTO: 6/10    PTA Visit:      Time In: 1000  Time Out: 1100  Total Billable Time: 30 minutes (2 TE)     Precautions: Standard and asthma, depression, endometriosis, Herpes complex, HTN, reflux, thyroid disease      Subjective     Pt reports: she feels as though she is getting stronger with therapy.  Able to step up onto front porch without holding onto anything for support  She was compliant with home exercise program.  Response to previous treatment: some increased pain in right knee which pt believes is from heel raises last time  Functional change: none    Pain: 2/10  Location: right knee      Objective       Carolina Tucker received therapeutic exercises to develop strength, endurance and ROM for 60 minutes including:    Quad sets: 10x10'' c/ towel under ankles  Bridges: 3x10 DL  SLR: 1# 3x10 B  HS stretch: 3x30'' B c/ strap  SL hip abd: 1# 3x10 B  Dying bu"x2 B  LAQ: 2# 3x10  Heel raises: 3x10 DL  Mini squats: 2x10  Standing hip abd: 2x15   Leg press 4.0 plates 3x10 DL  Step ups L1 2x10 bilateral    Home Exercises Provided and Patient Education Provided     Education provided:   - cont HEP regularly to maximize therapy benefits    Written Home Exercises Provided: Patient instructed to cont prior HEP.  Exercises were reviewed and Carolina Tucker was able to demonstrate them prior to the end of the session.  Carolina Tucker demonstrated good  understanding of the " education provided.     See EMR under Media for exercises provided 9/10/19.      Assessment     Pt tolerates therapy activities to include addition of step ups without difficulties or c/o increased pain   Carolina Tucker is progressing well towards her goals.   Pt prognosis is Excellent.     Pt will continue to benefit from skilled outpatient physical therapy to address the deficits listed in the problem list box on initial evaluation, provide pt/family education and to maximize pt's level of independence in the home and community environment.     Pt's spiritual, cultural and educational needs considered and pt agreeable to plan of care and goals.    Anticipated barriers to physical therapy: severe B knee R>L OA, chronic low back pain    Goals:   Short Term Goals:  4 weeks  1.Report decreased B knee pain </=6/10 in standing/walking for more than 15 minutes to increase tolerance for ADLs and increased QoL. - progressing  2. Increase R knee AROM to 0-115 degrees in order to be able to perform ADLs without difficulty. - progressing  3. Increase strength by 1/3 MMT grade in BLE  to increase tolerance for ADL and work activities. - progressing  4. Pt to tolerate HEP to improve ROM and independence with ADL's. - progressing     Long Term Goals: 8 weeks  1.Report decreased B knee pain </= 2/10 in standing/walking for more than 15 minutes to increase tolerance for ADLs and increased QoL. - progressing  2.Patient goal: increase muscle strength to help my knees. - progressing  3.Increase strength to >/= 4+/5 in BLE to increase tolerance for ADL and work activities. - progressing  4. Pt will report at </= 47% impaired on KNEE FOTO to demonstrate increase in LE function with every day tasks.  - progressing  5. Pt will negotiate 8 steps and ambulate community distances at >/= Mod I for increased functional mobility. - progressing  6. Increase B knee AROM to 0-120 degrees in order to be able to perform ADLs without difficulty. -  progressing       Plan     Cont POC to progress towards established goals    Thais De La Torre, GARRICK

## 2019-10-04 ENCOUNTER — DOCUMENTATION ONLY (OUTPATIENT)
Dept: REHABILITATION | Facility: HOSPITAL | Age: 64
End: 2019-10-04

## 2019-10-04 DIAGNOSIS — M62.81 MUSCLE WEAKNESS: ICD-10-CM

## 2019-10-04 DIAGNOSIS — G89.29 CHRONIC PAIN OF BOTH KNEES: ICD-10-CM

## 2019-10-04 DIAGNOSIS — R29.3 POOR POSTURE: ICD-10-CM

## 2019-10-04 DIAGNOSIS — M25.561 CHRONIC PAIN OF BOTH KNEES: ICD-10-CM

## 2019-10-04 DIAGNOSIS — M25.562 CHRONIC PAIN OF BOTH KNEES: ICD-10-CM

## 2019-10-04 NOTE — PROGRESS NOTES
Face to Face PTA Conference performed with Thais De La Torre PTA, Lara Bach PTA regarding patient's current status, overall progress, and plan of care    GARRICK Haddad PTA

## 2019-10-20 DIAGNOSIS — I10 ESSENTIAL HYPERTENSION: ICD-10-CM

## 2019-10-21 RX ORDER — HYDROCHLOROTHIAZIDE 25 MG/1
TABLET ORAL
Qty: 30 TABLET | Refills: 0 | Status: SHIPPED | OUTPATIENT
Start: 2019-10-21 | End: 2019-11-19 | Stop reason: SDUPTHER

## 2019-10-30 ENCOUNTER — PATIENT OUTREACH (OUTPATIENT)
Dept: OTHER | Facility: OTHER | Age: 64
End: 2019-10-30

## 2019-10-31 ENCOUNTER — DOCUMENTATION ONLY (OUTPATIENT)
Dept: REHABILITATION | Facility: HOSPITAL | Age: 64
End: 2019-10-31

## 2019-10-31 DIAGNOSIS — R29.3 POOR POSTURE: ICD-10-CM

## 2019-10-31 DIAGNOSIS — M25.562 CHRONIC PAIN OF BOTH KNEES: ICD-10-CM

## 2019-10-31 DIAGNOSIS — M25.561 CHRONIC PAIN OF BOTH KNEES: ICD-10-CM

## 2019-10-31 DIAGNOSIS — G89.29 CHRONIC PAIN OF BOTH KNEES: ICD-10-CM

## 2019-10-31 DIAGNOSIS — M62.81 MUSCLE WEAKNESS: ICD-10-CM

## 2019-10-31 NOTE — PROGRESS NOTES
Face to Face PTA Conference performed with Lara Bach PTA, Thais De La Torre PTA and Jose Alvares PTA regarding patient's current status, overall progress, and plan of care.    Jose Alvares, PTA     Thais De La Torre, PTA     Lara Bach, PTA

## 2019-11-14 ENCOUNTER — PATIENT OUTREACH (OUTPATIENT)
Dept: ADMINISTRATIVE | Facility: OTHER | Age: 64
End: 2019-11-14

## 2019-11-14 ENCOUNTER — PATIENT MESSAGE (OUTPATIENT)
Dept: ORTHOPEDICS | Facility: CLINIC | Age: 64
End: 2019-11-14

## 2019-11-15 ENCOUNTER — HOSPITAL ENCOUNTER (OUTPATIENT)
Dept: RADIOLOGY | Facility: HOSPITAL | Age: 64
Discharge: HOME | End: 2019-11-15
Attending: NURSE PRACTITIONER
Payer: COMMERCIAL

## 2019-11-15 ENCOUNTER — OFFICE VISIT (OUTPATIENT)
Dept: ORTHOPEDICS | Facility: CLINIC | Age: 64
End: 2019-11-15
Payer: COMMERCIAL

## 2019-11-15 VITALS — WEIGHT: 251.31 LBS | HEIGHT: 66 IN | BODY MASS INDEX: 40.39 KG/M2

## 2019-11-15 DIAGNOSIS — M25.562 PAIN IN BOTH KNEES, UNSPECIFIED CHRONICITY: Primary | ICD-10-CM

## 2019-11-15 DIAGNOSIS — M25.561 PAIN IN BOTH KNEES, UNSPECIFIED CHRONICITY: Primary | ICD-10-CM

## 2019-11-15 DIAGNOSIS — M25.50 POLYARTHRALGIA: ICD-10-CM

## 2019-11-15 DIAGNOSIS — M25.562 PAIN IN BOTH KNEES, UNSPECIFIED CHRONICITY: ICD-10-CM

## 2019-11-15 DIAGNOSIS — M25.561 PAIN IN BOTH KNEES, UNSPECIFIED CHRONICITY: ICD-10-CM

## 2019-11-15 DIAGNOSIS — M17.0 PRIMARY OSTEOARTHRITIS OF BOTH KNEES: ICD-10-CM

## 2019-11-15 PROCEDURE — 73564 XR KNEE ORTHO BILAT WITH FLEXION: ICD-10-PCS | Mod: 26,,, | Performed by: RADIOLOGY

## 2019-11-15 PROCEDURE — 3008F BODY MASS INDEX DOCD: CPT | Mod: CPTII,S$GLB,, | Performed by: NURSE PRACTITIONER

## 2019-11-15 PROCEDURE — 99999 PR PBB SHADOW E&M-EST. PATIENT-LVL III: CPT | Mod: PBBFAC,,, | Performed by: NURSE PRACTITIONER

## 2019-11-15 PROCEDURE — 99999 PR PBB SHADOW E&M-EST. PATIENT-LVL III: ICD-10-PCS | Mod: PBBFAC,,, | Performed by: NURSE PRACTITIONER

## 2019-11-15 PROCEDURE — 20610 PR DRAIN/INJECT LARGE JOINT/BURSA: ICD-10-PCS | Mod: 50,S$GLB,, | Performed by: NURSE PRACTITIONER

## 2019-11-15 PROCEDURE — 99213 PR OFFICE/OUTPT VISIT, EST, LEVL III, 20-29 MIN: ICD-10-PCS | Mod: 25,S$GLB,, | Performed by: NURSE PRACTITIONER

## 2019-11-15 PROCEDURE — 73564 X-RAY EXAM KNEE 4 OR MORE: CPT | Mod: 26,,, | Performed by: RADIOLOGY

## 2019-11-15 PROCEDURE — 3008F PR BODY MASS INDEX (BMI) DOCUMENTED: ICD-10-PCS | Mod: CPTII,S$GLB,, | Performed by: NURSE PRACTITIONER

## 2019-11-15 PROCEDURE — 73564 X-RAY EXAM KNEE 4 OR MORE: CPT | Mod: TC,50

## 2019-11-15 PROCEDURE — 99213 OFFICE O/P EST LOW 20 MIN: CPT | Mod: 25,S$GLB,, | Performed by: NURSE PRACTITIONER

## 2019-11-15 PROCEDURE — 20610 DRAIN/INJ JOINT/BURSA W/O US: CPT | Mod: 50,S$GLB,, | Performed by: NURSE PRACTITIONER

## 2019-11-15 RX ORDER — DICLOFENAC SODIUM 10 MG/G
2 GEL TOPICAL 4 TIMES DAILY
Qty: 100 G | Refills: 2 | Status: SHIPPED | OUTPATIENT
Start: 2019-11-15 | End: 2021-09-28

## 2019-11-15 RX ORDER — NAPROXEN 500 MG/1
500 TABLET ORAL 2 TIMES DAILY
Qty: 60 TABLET | Refills: 0 | Status: SHIPPED | OUTPATIENT
Start: 2019-11-15 | End: 2020-12-22

## 2019-11-15 RX ADMIN — TRIAMCINOLONE ACETONIDE 80 MG: 40 INJECTION, SUSPENSION INTRA-ARTICULAR; INTRAMUSCULAR at 02:11

## 2019-11-17 RX ORDER — TRIAMCINOLONE ACETONIDE 40 MG/ML
80 INJECTION, SUSPENSION INTRA-ARTICULAR; INTRAMUSCULAR
Status: COMPLETED | OUTPATIENT
Start: 2019-11-15 | End: 2019-11-15

## 2019-11-18 ENCOUNTER — LAB VISIT (OUTPATIENT)
Dept: LAB | Facility: HOSPITAL | Age: 64
End: 2019-11-18
Attending: INTERNAL MEDICINE
Payer: COMMERCIAL

## 2019-11-18 DIAGNOSIS — E78.00 PURE HYPERCHOLESTEROLEMIA: ICD-10-CM

## 2019-11-18 LAB
ALT SERPL W/O P-5'-P-CCNC: 21 U/L (ref 10–44)
ANION GAP SERPL CALC-SCNC: 11 MMOL/L (ref 8–16)
AST SERPL-CCNC: 8 U/L (ref 10–40)
BUN SERPL-MCNC: 22 MG/DL (ref 8–23)
CALCIUM SERPL-MCNC: 9.4 MG/DL (ref 8.7–10.5)
CHLORIDE SERPL-SCNC: 104 MMOL/L (ref 95–110)
CO2 SERPL-SCNC: 27 MMOL/L (ref 23–29)
CREAT SERPL-MCNC: 0.8 MG/DL (ref 0.5–1.4)
EST. GFR  (AFRICAN AMERICAN): >60 ML/MIN/1.73 M^2
EST. GFR  (NON AFRICAN AMERICAN): >60 ML/MIN/1.73 M^2
GLUCOSE SERPL-MCNC: 99 MG/DL (ref 70–110)
POTASSIUM SERPL-SCNC: 4.2 MMOL/L (ref 3.5–5.1)
SODIUM SERPL-SCNC: 142 MMOL/L (ref 136–145)

## 2019-11-18 PROCEDURE — 84460 ALANINE AMINO (ALT) (SGPT): CPT

## 2019-11-18 PROCEDURE — 36415 COLL VENOUS BLD VENIPUNCTURE: CPT | Mod: PO

## 2019-11-18 PROCEDURE — 84450 TRANSFERASE (AST) (SGOT): CPT

## 2019-11-18 PROCEDURE — 80048 BASIC METABOLIC PNL TOTAL CA: CPT

## 2019-11-18 NOTE — PROGRESS NOTES
CC: Pain of the Left Knee and Pain of the Right Knee      HPI: Pt with c/o bilateral knee pain for the past week. The pain is medial and aching. She denies catching, locking, or instability. She has known bilateral knee djd and fell in her closet last week. Since she fell the pain has gotten a lot worse. She has had cortisone injections in the past with good relief and would like to repeat those today. She has taken nsaids with minimal relief. She reports that the pain is getting worse. She knows that she needs to lose weight, but she has not been successful with that and has actually gained weight. She wants to meet with a surgeon soon, but she's hoping to wait until April when she qualifies for medicare. She also would like to get through [employer] first as well. She is still working. She ambulates without assistive device. There is not a limp.    ROS  General: denies fever and chills  Resp: no c/o sob  CVS: no c/o cp  MSK: c/o bilateral knee pain which is medial and aching and worse with increased activity    PE  General: AAOx3, pleasant and cooperative  Resp: respirations even and unlabored  MSK: bilateral knee exam  0 degrees extension  120 degrees flexion  No warmth or erythema   - effusion  + crepitus  + tenderness over the medial joint space  5/5 quad strength    Xray: There is severe narrowing of the right and moderate narrowing of the left medial femoral tibial compartments.  No significant effusions.  Narrowing of the right greater than left patellofemoral joints    Assessment:  Bilateral knee djd    Plan:  Cortisone injections bilateral knees today   RICE  Advised to work on weight loss to be a surgical candidate  F/u as needed    Knee Injection Procedure Note    Diagnosis: bilateral knee degenerative arthritis  Indications: bilateral knee pain  Procedure Details: Verbal consent was obtained for the procedure. The injection site was identified and the skin was prepared with alcohol. The bilateral knee  was injected from an anterolateral approach with 1 ml of Kenalog and 4 ml Lidocaine each under sterile technique using a 22 gauge needle. The needle was removed and the area cleansed and dressed.  Complications:  Patient tolerated the procedure well.    she was advised to rest the knee today, using ice and elevation as needed for comfort and swelling.Immediate relief of the knee pain may be short lived and secondary to the lidocaine. she may have an increase in discomfort tonight followed by steady improvement over the next several days. It may take 1-2 weeks following the injection to get the full benefit of the medication.

## 2019-11-19 DIAGNOSIS — I10 ESSENTIAL HYPERTENSION: ICD-10-CM

## 2019-11-19 RX ORDER — HYDROCHLOROTHIAZIDE 25 MG/1
25 TABLET ORAL DAILY
Qty: 30 TABLET | Refills: 3 | Status: SHIPPED | OUTPATIENT
Start: 2019-11-19 | End: 2020-03-10

## 2019-11-25 ENCOUNTER — PATIENT MESSAGE (OUTPATIENT)
Dept: INTERNAL MEDICINE | Facility: CLINIC | Age: 64
End: 2019-11-25

## 2019-11-25 ENCOUNTER — PATIENT MESSAGE (OUTPATIENT)
Dept: CARDIOLOGY | Facility: CLINIC | Age: 64
End: 2019-11-25

## 2019-11-25 ENCOUNTER — TELEPHONE (OUTPATIENT)
Dept: CARDIOLOGY | Facility: CLINIC | Age: 64
End: 2019-11-25

## 2019-11-25 DIAGNOSIS — R73.02 IGT (IMPAIRED GLUCOSE TOLERANCE): ICD-10-CM

## 2019-11-25 DIAGNOSIS — K76.0 FATTY LIVER: Primary | ICD-10-CM

## 2019-11-25 RX ORDER — METFORMIN HYDROCHLORIDE 500 MG/1
500 TABLET, EXTENDED RELEASE ORAL
Qty: 90 TABLET | Refills: 3 | Status: SHIPPED | OUTPATIENT
Start: 2019-11-25 | End: 2021-05-24

## 2019-11-25 NOTE — TELEPHONE ENCOUNTER
----- Message from Padmini Mackey MD sent at 11/24/2019  4:31 PM CST -----  K and other labs within normal limits. Liver enzymes were normal. continue spironolactone and other meds.

## 2019-11-26 ENCOUNTER — PATIENT MESSAGE (OUTPATIENT)
Dept: OTHER | Facility: OTHER | Age: 64
End: 2019-11-26

## 2019-12-09 ENCOUNTER — PATIENT MESSAGE (OUTPATIENT)
Dept: OBSTETRICS AND GYNECOLOGY | Facility: CLINIC | Age: 64
End: 2019-12-09

## 2019-12-09 RX ORDER — FLUCONAZOLE 150 MG/1
150 TABLET ORAL
Qty: 2 TABLET | Refills: 0 | Status: SHIPPED | OUTPATIENT
Start: 2019-12-09 | End: 2019-12-13

## 2019-12-12 ENCOUNTER — PATIENT OUTREACH (OUTPATIENT)
Dept: ADMINISTRATIVE | Facility: HOSPITAL | Age: 64
End: 2019-12-12

## 2019-12-12 ENCOUNTER — PATIENT MESSAGE (OUTPATIENT)
Dept: ADMINISTRATIVE | Facility: HOSPITAL | Age: 64
End: 2019-12-12

## 2019-12-23 ENCOUNTER — PATIENT MESSAGE (OUTPATIENT)
Dept: ADMINISTRATIVE | Facility: OTHER | Age: 64
End: 2019-12-23

## 2019-12-23 ENCOUNTER — PATIENT OUTREACH (OUTPATIENT)
Dept: OTHER | Facility: OTHER | Age: 64
End: 2019-12-23

## 2019-12-23 NOTE — PROGRESS NOTES
Digital Medicine: Health  Follow-Up    Patient was introduced to yogesh CARIAS, Brina Duque. Patient reported that she has not been eating more junk due to the holidays. Patient requested additional low sodium and low carb resources to help improve her diet. Patient shared she is out of routine while taking her blood pressures. Patient was reminded to take her readings at least once a week.     The history is provided by the patient.     Follow Up  Follow-up reason(s): reading review      Readings are trending up due to lifestyle change.    Routine Education Topics: eating patterns        INTERVENTION(S)  recommended diet modifications and encouragement/support    PLAN  patient verbalizes understanding      There are no preventive care reminders to display for this patient.    Last 5 Patient Entered Readings                                      Current 30 Day Average: 120/84     Recent Readings 12/16/2019 12/16/2019 12/16/2019 11/25/2019 11/25/2019    SBP (mmHg) 119 120 125 121 129    DBP (mmHg) 95 82 92 69 84    Pulse 98 84 81 80 80                  Screenings    SDOH

## 2019-12-26 ENCOUNTER — PATIENT MESSAGE (OUTPATIENT)
Dept: INTERNAL MEDICINE | Facility: CLINIC | Age: 64
End: 2019-12-26

## 2019-12-26 DIAGNOSIS — K21.9 GASTROESOPHAGEAL REFLUX DISEASE, ESOPHAGITIS PRESENCE NOT SPECIFIED: Primary | ICD-10-CM

## 2019-12-27 ENCOUNTER — PATIENT MESSAGE (OUTPATIENT)
Dept: INTERNAL MEDICINE | Facility: CLINIC | Age: 64
End: 2019-12-27

## 2019-12-27 RX ORDER — PANTOPRAZOLE SODIUM 40 MG/1
40 TABLET, DELAYED RELEASE ORAL DAILY
Qty: 30 TABLET | Refills: 3 | Status: SHIPPED | OUTPATIENT
Start: 2019-12-27 | End: 2020-03-19 | Stop reason: SDUPTHER

## 2019-12-31 ENCOUNTER — PATIENT MESSAGE (OUTPATIENT)
Dept: OBSTETRICS AND GYNECOLOGY | Facility: CLINIC | Age: 64
End: 2019-12-31

## 2020-01-02 RX ORDER — TRIAMCINOLONE ACETONIDE 1 MG/G
CREAM TOPICAL 2 TIMES DAILY
Qty: 30 G | Refills: 0 | Status: SHIPPED | OUTPATIENT
Start: 2020-01-02 | End: 2021-01-21

## 2020-01-02 RX ORDER — FLUCONAZOLE 150 MG/1
150 TABLET ORAL
Qty: 2 TABLET | Refills: 0 | Status: SHIPPED | OUTPATIENT
Start: 2020-01-02 | End: 2020-01-06

## 2020-01-02 RX ORDER — NYSTATIN 100000 U/G
CREAM TOPICAL 2 TIMES DAILY
Qty: 30 G | Refills: 0 | Status: SHIPPED | OUTPATIENT
Start: 2020-01-02 | End: 2021-01-21

## 2020-01-04 DIAGNOSIS — M25.50 POLYARTHRALGIA: ICD-10-CM

## 2020-01-06 RX ORDER — NAPROXEN 500 MG/1
TABLET ORAL
Qty: 60 TABLET | Refills: 0 | OUTPATIENT
Start: 2020-01-06

## 2020-01-22 ENCOUNTER — DOCUMENTATION ONLY (OUTPATIENT)
Dept: ADMINISTRATIVE | Facility: HOSPITAL | Age: 65
End: 2020-01-22

## 2020-01-22 ENCOUNTER — PATIENT OUTREACH (OUTPATIENT)
Dept: ADMINISTRATIVE | Facility: HOSPITAL | Age: 65
End: 2020-01-22

## 2020-01-28 ENCOUNTER — PATIENT MESSAGE (OUTPATIENT)
Dept: OBSTETRICS AND GYNECOLOGY | Facility: CLINIC | Age: 65
End: 2020-01-28

## 2020-01-28 RX ORDER — TRIAMCINOLONE ACETONIDE 1 MG/G
CREAM TOPICAL 2 TIMES DAILY
Qty: 30 G | Refills: 0 | Status: SHIPPED | OUTPATIENT
Start: 2020-01-28 | End: 2020-07-10 | Stop reason: SDUPTHER

## 2020-01-28 RX ORDER — FLUCONAZOLE 150 MG/1
150 TABLET ORAL
Qty: 2 TABLET | Refills: 0 | Status: SHIPPED | OUTPATIENT
Start: 2020-01-28 | End: 2020-02-01

## 2020-01-28 RX ORDER — NYSTATIN 100000 U/G
CREAM TOPICAL 2 TIMES DAILY
Qty: 30 G | Refills: 0 | Status: SHIPPED | OUTPATIENT
Start: 2020-01-28 | End: 2020-07-10 | Stop reason: SDUPTHER

## 2020-02-04 ENCOUNTER — LAB VISIT (OUTPATIENT)
Dept: LAB | Facility: HOSPITAL | Age: 65
End: 2020-02-04
Attending: INTERNAL MEDICINE
Payer: COMMERCIAL

## 2020-02-04 DIAGNOSIS — Z12.11 ENCOUNTER FOR FIT (FECAL IMMUNOCHEMICAL TEST) SCREENING: ICD-10-CM

## 2020-02-04 PROCEDURE — 82274 ASSAY TEST FOR BLOOD FECAL: CPT

## 2020-02-06 ENCOUNTER — PATIENT OUTREACH (OUTPATIENT)
Dept: ADMINISTRATIVE | Facility: OTHER | Age: 65
End: 2020-02-06

## 2020-02-07 ENCOUNTER — OFFICE VISIT (OUTPATIENT)
Dept: ORTHOPEDICS | Facility: CLINIC | Age: 65
End: 2020-02-07
Payer: COMMERCIAL

## 2020-02-07 VITALS — HEIGHT: 66 IN | BODY MASS INDEX: 40.39 KG/M2 | WEIGHT: 251.31 LBS

## 2020-02-07 DIAGNOSIS — M17.0 PRIMARY OSTEOARTHRITIS OF BOTH KNEES: ICD-10-CM

## 2020-02-07 PROCEDURE — 3008F PR BODY MASS INDEX (BMI) DOCUMENTED: ICD-10-PCS | Mod: CPTII,S$GLB,, | Performed by: NURSE PRACTITIONER

## 2020-02-07 PROCEDURE — 99999 PR PBB SHADOW E&M-EST. PATIENT-LVL III: ICD-10-PCS | Mod: PBBFAC,,, | Performed by: NURSE PRACTITIONER

## 2020-02-07 PROCEDURE — 20610 PR DRAIN/INJECT LARGE JOINT/BURSA: ICD-10-PCS | Mod: 50,S$GLB,, | Performed by: NURSE PRACTITIONER

## 2020-02-07 PROCEDURE — 99213 PR OFFICE/OUTPT VISIT, EST, LEVL III, 20-29 MIN: ICD-10-PCS | Mod: 25,S$GLB,, | Performed by: NURSE PRACTITIONER

## 2020-02-07 PROCEDURE — 99213 OFFICE O/P EST LOW 20 MIN: CPT | Mod: 25,S$GLB,, | Performed by: NURSE PRACTITIONER

## 2020-02-07 PROCEDURE — 99999 PR PBB SHADOW E&M-EST. PATIENT-LVL III: CPT | Mod: PBBFAC,,, | Performed by: NURSE PRACTITIONER

## 2020-02-07 PROCEDURE — 20610 DRAIN/INJ JOINT/BURSA W/O US: CPT | Mod: 50,S$GLB,, | Performed by: NURSE PRACTITIONER

## 2020-02-07 PROCEDURE — 3008F BODY MASS INDEX DOCD: CPT | Mod: CPTII,S$GLB,, | Performed by: NURSE PRACTITIONER

## 2020-02-07 RX ADMIN — TRIAMCINOLONE ACETONIDE 80 MG: 40 INJECTION, SUSPENSION INTRA-ARTICULAR; INTRAMUSCULAR at 02:02

## 2020-02-09 RX ORDER — TRIAMCINOLONE ACETONIDE 40 MG/ML
80 INJECTION, SUSPENSION INTRA-ARTICULAR; INTRAMUSCULAR
Status: COMPLETED | OUTPATIENT
Start: 2020-02-07 | End: 2020-02-07

## 2020-02-10 NOTE — PROGRESS NOTES
CC: Injections of the Left Knee and Injections of the Right Knee      HPI: Pt with c/o bilateral knee pain  for the past few weeks. The pain is aching and global and worse with increased activity. 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 She is getting ready to consider knee replacement, but she understands that she will need to lose some weight first. She would like to have cortisone injections today to help her with the pain that will come with the parade. She has had good relief with injections in the past. She is ambulating without assistive device. There is not a limp..    ROS  General: denies fever and chills  Resp: no c/o sob  CVS: no c/o cp  MSK: c/o bilateral knee pain which is aching and global    PE  General: AAOx3, pleasant and cooperative  Resp: respirations even and unlabored  MSK: bilateral knee exam  0 degrees extension  120 degrees flexion  No warmth or erythema   - effusion  5/5 quad strength  + crepitus  + tenderness over the medial and lateral joint lines    Assessment:  Bilateral knee djd    Plan:  Cortisone injections bilateral knees today  RICE  nsaids or tylenol prn  F/u as needed    Knee Injection Procedure Note  Diagnosis: bilateral knee degenerative arthritis  Indications: bilateral knee pain  Procedure Details: Verbal consent was obtained for the procedure. The injection site was identified and the skin was prepared with alcohol. The bilateral knee was injected from an anterolateral approach with 1 ml of Kenalog and 4 ml Lidocaine each under sterile technique using a 22 gauge needle. The needle was removed and the area cleansed and dressed.  Complications:  Patient tolerated the procedure well.    she was advised to rest the knee today, using ice and elevation as needed for comfort and swelling.Immediate relief of the knee pain may be short lived and secondary to the  lidocaine. she may have an increase in discomfort tonight followed by steady improvement over the next several days. It may take 1-2 weeks following the injection to get the full benefit of the medication.

## 2020-02-11 ENCOUNTER — PATIENT OUTREACH (OUTPATIENT)
Dept: ADMINISTRATIVE | Facility: HOSPITAL | Age: 65
End: 2020-02-11

## 2020-02-12 LAB — HEMOCCULT STL QL IA: NEGATIVE

## 2020-02-17 ENCOUNTER — PATIENT OUTREACH (OUTPATIENT)
Dept: ADMINISTRATIVE | Facility: HOSPITAL | Age: 65
End: 2020-02-17

## 2020-02-27 ENCOUNTER — PATIENT OUTREACH (OUTPATIENT)
Dept: OTHER | Facility: OTHER | Age: 65
End: 2020-02-27

## 2020-03-09 DIAGNOSIS — I10 BENIGN ESSENTIAL HYPERTENSION: ICD-10-CM

## 2020-03-09 RX ORDER — AMLODIPINE BESYLATE 10 MG/1
TABLET ORAL
Qty: 90 TABLET | Refills: 3 | Status: SHIPPED | OUTPATIENT
Start: 2020-03-09 | End: 2020-07-10

## 2020-03-10 DIAGNOSIS — I10 ESSENTIAL HYPERTENSION: ICD-10-CM

## 2020-03-10 RX ORDER — HYDROCHLOROTHIAZIDE 25 MG/1
TABLET ORAL
Qty: 30 TABLET | Refills: 11 | Status: SHIPPED | OUTPATIENT
Start: 2020-03-10 | End: 2020-03-19 | Stop reason: SDUPTHER

## 2020-03-19 ENCOUNTER — PATIENT MESSAGE (OUTPATIENT)
Dept: INTERNAL MEDICINE | Facility: CLINIC | Age: 65
End: 2020-03-19

## 2020-03-19 DIAGNOSIS — I10 ESSENTIAL HYPERTENSION: ICD-10-CM

## 2020-03-19 DIAGNOSIS — K21.9 GASTROESOPHAGEAL REFLUX DISEASE, ESOPHAGITIS PRESENCE NOT SPECIFIED: ICD-10-CM

## 2020-03-19 RX ORDER — HYDROCHLOROTHIAZIDE 25 MG/1
25 TABLET ORAL DAILY
Qty: 30 TABLET | Refills: 11 | Status: SHIPPED | OUTPATIENT
Start: 2020-03-19 | End: 2020-07-20

## 2020-03-19 RX ORDER — PANTOPRAZOLE SODIUM 40 MG/1
40 TABLET, DELAYED RELEASE ORAL DAILY
Qty: 30 TABLET | Refills: 3 | Status: SHIPPED | OUTPATIENT
Start: 2020-03-19 | End: 2020-09-02

## 2020-03-27 ENCOUNTER — DOCUMENTATION ONLY (OUTPATIENT)
Dept: REHABILITATION | Facility: HOSPITAL | Age: 65
End: 2020-03-27

## 2020-03-27 DIAGNOSIS — M62.81 MUSCLE WEAKNESS: ICD-10-CM

## 2020-03-27 NOTE — PROGRESS NOTES
Pt was evaluated on 10/10/2018 and was seen 8 times for PT. Pt has not attended PT since 11/8/2018. Pt was given HEP. Current status is unknown. Pt to be d/c'd at this time.

## 2020-03-30 NOTE — PROGRESS NOTES
Digital Medicine: Health  Follow-Up    Patient reported she is walking 15 minutes daily and hoping to walking several times a day. She is being cautious of not walking for long periods due to knee pains.     Patient stated she is eating at home more. Patient shared she is eating more salads and soups while at home.     Patient shared she felt well and no concerns or symptoms to report at this time.      The history is provided by the patient.     Follow Up  Follow-up reason(s): reading review and goal follow-up      Routine Education Topics: eating patterns and physical activity        INTERVENTION(S)  encouragement/support    PLAN  patient verbalizes understanding    Plan to follow up in 6-8 weeks to review readings and progress towards goals.       There are no preventive care reminders to display for this patient.    Last 5 Patient Entered Readings                                      Current 30 Day Average: 126/85     Recent Readings 3/27/2020 3/13/2020 3/13/2020 3/13/2020 3/7/2020    SBP (mmHg) 123 125 137 149 131    DBP (mmHg) 81 82 89 93 86    Pulse 92 97 89 90 89                      Diet Screening   No change to diet.  Breakfast is typically between. Eggs and jc.  Lunch is typically between. Salads.    Dinner is typically between. Homecooked .    Patient reports eating or drinking the following: fresh vegetables and pasta She has the following dietary restrictions: low sodium diet    Patient reported she is having more home cooked meals.     Assigning the following patient goals: maintain low sodium diet    Physical Activity Screening   When asked if exercising, patient responded: yes  Patient has limited mobility: old injury  Patient has the following chronic pain: joint pain    She exercises for 20 minutes per day 5 day(s) a week.  Her level of intensity when exercising is low.    Patient participates in the following activities: walking and yard/housework    She identified the following barriers  to physical activity: weather    Patient is walking for 15 minutes at time daily.   She is hoping to walk several times a day to increase her stamina and prevent knee pain.      Assigning the following patient goal(s): 150 minutes of exercise per week      SDOH

## 2020-05-12 DIAGNOSIS — I10 BENIGN ESSENTIAL HYPERTENSION: ICD-10-CM

## 2020-05-12 RX ORDER — SPIRONOLACTONE 50 MG/1
TABLET, FILM COATED ORAL
Qty: 90 TABLET | Refills: 3 | Status: SHIPPED | OUTPATIENT
Start: 2020-05-12 | End: 2021-05-27

## 2020-05-20 ENCOUNTER — PATIENT MESSAGE (OUTPATIENT)
Dept: INTERNAL MEDICINE | Facility: CLINIC | Age: 65
End: 2020-05-20

## 2020-05-20 DIAGNOSIS — E03.4 HYPOTHYROIDISM DUE TO ACQUIRED ATROPHY OF THYROID: ICD-10-CM

## 2020-05-20 RX ORDER — LEVOTHYROXINE SODIUM 125 UG/1
125 TABLET ORAL DAILY
Qty: 90 TABLET | Refills: 3 | Status: SHIPPED | OUTPATIENT
Start: 2020-05-20 | End: 2020-09-02 | Stop reason: SDUPTHER

## 2020-05-21 ENCOUNTER — PATIENT OUTREACH (OUTPATIENT)
Dept: OTHER | Facility: OTHER | Age: 65
End: 2020-05-21

## 2020-05-21 NOTE — PROGRESS NOTES
"Digital Medicine: Health  Follow-Up    Patient reported she was doing well.     Patient stated she has been eating a more due to COVID-19. Patient shared she already started to changed her diet by not buying junk food and buying more fresh produce.     Patient continues to walk daily and garden.     The history is provided by the patient.     Follow Up  Follow-up reason(s): reading review and goal follow-up      Routine Education Topics: eating patterns and physical activity        INTERVENTION(S)  encouragement/support    PLAN  patient verbalizes understanding    Plan to follow up in 4-6 weeks to review readings and progress towards her goals.       There are no preventive care reminders to display for this patient.    Last 5 Patient Entered Readings                                      Current 30 Day Average: 127/82     Recent Readings 5/20/2020 5/20/2020 5/20/2020 5/20/2020 5/8/2020    SBP (mmHg) 135 133 126 132 118    DBP (mmHg) 81 82 82 83 80    Pulse 86 88 90 92 91                      Diet Screening   She has the following dietary restrictions: low sodium diet    Eating style: emotional eater    Patient reported she has not been eating healthy since start of COVID-19. Patient stated she has been eating more junk food and not watching her portion size. Patient retold she has already started to improve her diet by not buying junk food and buying fresh produce.     "Check in on me in a month and see how good I am doing then."     Intervention(s): portion control    Assigning the following patient goals: reduce portions and maintain low sodium diet    Physical Activity Screening   When asked if exercising, patient responded: yes  Patient has limited mobility: old injury  Patient has the following chronic pain: joint painHer level of intensity when exercising is low.    Patient participates in the following activities: walking and yard/housework    She identified the following barriers to physical activity: " pain/injury/recent surgery    Patient reported she tries to walk daily and spend time in her garden. She reported that knee problems prevent her from walking and hoping to have knee surgery once it is a safe for the procedure.       SDOH- deferred

## 2020-06-01 NOTE — PLAN OF CARE
Ct looks ok  There is a poorly characterized spot in the R humerus  I would get a bone scan to evaluate this  Please inform him Plan of care discussed with pt. Pt verbalized understanding. Pt states can discuss results with her

## 2020-06-10 ENCOUNTER — PATIENT MESSAGE (OUTPATIENT)
Dept: OBSTETRICS AND GYNECOLOGY | Facility: CLINIC | Age: 65
End: 2020-06-10

## 2020-06-11 RX ORDER — FLUCONAZOLE 100 MG/1
100 TABLET ORAL DAILY
Qty: 3 TABLET | Refills: 0 | Status: SHIPPED | OUTPATIENT
Start: 2020-06-11 | End: 2020-06-14

## 2020-06-22 ENCOUNTER — PATIENT MESSAGE (OUTPATIENT)
Dept: INTERNAL MEDICINE | Facility: CLINIC | Age: 65
End: 2020-06-22

## 2020-06-22 DIAGNOSIS — Z12.31 ENCOUNTER FOR SCREENING MAMMOGRAM FOR BREAST CANCER: Primary | ICD-10-CM

## 2020-07-07 ENCOUNTER — PATIENT OUTREACH (OUTPATIENT)
Dept: OTHER | Facility: OTHER | Age: 65
End: 2020-07-07

## 2020-07-07 NOTE — PROGRESS NOTES
Digital Medicine: Health  Follow-Up    Patient reported she was under a bit of stress due to COVID and losing a family member. Patient's current BP is 132/90. At last outreach BP was 127/82 on 5/21/20.     Patient reported she took her last reading on 7/6/20 shortly after having tea. Patient stated she is rushing through her readings and not relax when she takes readings. Encouraged patient to wait one hour after consuming caffeine and rest 3-5 minutes before her readings.             The history is provided by the patient.   Follow Up  Follow-up reason(s): reading review      Readings are trending up due to lifestyle change and inaccurate technique.    Routine Education Topics: eating patterns and physical activity  Patient retold she has not exercised and not monitoring her diet. She stated family stress and COVID are reasons she has missed readings and not motivated.     Patient retold she has pain in her knee that prevents her from exercising. Patient stated she has poor sleep quality when her stress is increased.       Intervention/Plan    There are no preventive care reminders to display for this patient.    Last 5 Patient Entered Readings                                      Current 30 Day Average: 132/90     Recent Readings 7/6/2020 7/6/2020 7/6/2020 7/6/2020 7/6/2020    SBP (mmHg) 133 151 137 134 136    DBP (mmHg) 96 98 87 92 96    Pulse 97 100 100 103 108                      Diet Screening   She has the following dietary restrictions: low sodium diet    Patient reported she has not been monitoring her sodium intake since being at home. Patient was provided with low sodium education and encouraged to read food labels to reduce her sodium intake. Patient stated she typical drinks 50 oz of water a day and noticed she did not drink any water yesterday.     Intervention(s): low sodium diet education, reading food labels and increasing water intake    Assigning the following patient goals: maintain low  sodium diet    Physical Activity Screening   When asked if exercising, patient responded: no  Patient has limited mobility: old injury  Patient has the following chronic pain: joint pain    She identified the following barriers to physical activity: limited mobility, weather and motivation    Patient shared she has was struggling with exercise due to her knee. Patient shared she stopped exercising during COVID. Discussed barrier to exercise such as lack of motivation and knee pain and weather. Patient stated she could exercise by walking in the morning and trying to get on her treadmill for 5-10 minutes at time during the day.     Intervention(s): exercise ideas     Medication Adherence Screening   She did not miss a dose this month.    Patient shared she noticed she has red blutches on her arm and wondering if they were caused by her medication. Encouraged patient to reach out to her provider.       SDOH-deferred

## 2020-07-09 ENCOUNTER — HOSPITAL ENCOUNTER (EMERGENCY)
Facility: HOSPITAL | Age: 65
Discharge: HOME | End: 2020-07-09
Attending: EMERGENCY MEDICINE
Payer: MEDICARE

## 2020-07-09 VITALS
HEIGHT: 66 IN | OXYGEN SATURATION: 95 % | SYSTOLIC BLOOD PRESSURE: 134 MMHG | DIASTOLIC BLOOD PRESSURE: 80 MMHG | BODY MASS INDEX: 40.18 KG/M2 | WEIGHT: 250 LBS | TEMPERATURE: 98 F | HEART RATE: 104 BPM | RESPIRATION RATE: 16 BRPM

## 2020-07-09 DIAGNOSIS — R07.9 CHEST PAIN: ICD-10-CM

## 2020-07-09 DIAGNOSIS — E87.6 HYPOKALEMIA: ICD-10-CM

## 2020-07-09 DIAGNOSIS — R09.1 PLEURISY: ICD-10-CM

## 2020-07-09 DIAGNOSIS — R07.89 RIGHT-SIDED CHEST WALL PAIN: Primary | ICD-10-CM

## 2020-07-09 LAB
ALBUMIN SERPL BCP-MCNC: 4.3 G/DL (ref 3.5–5.2)
ALP SERPL-CCNC: 77 U/L (ref 55–135)
ALT SERPL W/O P-5'-P-CCNC: 22 U/L (ref 10–44)
ANION GAP SERPL CALC-SCNC: 16 MMOL/L (ref 8–16)
AST SERPL-CCNC: 12 U/L (ref 10–40)
BASOPHILS # BLD AUTO: 0.05 K/UL (ref 0–0.2)
BASOPHILS NFR BLD: 0.5 % (ref 0–1.9)
BILIRUB SERPL-MCNC: 0.6 MG/DL (ref 0.1–1)
BNP SERPL-MCNC: 21 PG/ML (ref 0–99)
BUN SERPL-MCNC: 22 MG/DL (ref 8–23)
CALCIUM SERPL-MCNC: 10 MG/DL (ref 8.7–10.5)
CHLORIDE SERPL-SCNC: 97 MMOL/L (ref 95–110)
CO2 SERPL-SCNC: 22 MMOL/L (ref 23–29)
CREAT SERPL-MCNC: 1 MG/DL (ref 0.5–1.4)
D DIMER PPP IA.FEU-MCNC: 1.06 MG/L FEU
DIFFERENTIAL METHOD: ABNORMAL
EOSINOPHIL # BLD AUTO: 0.1 K/UL (ref 0–0.5)
EOSINOPHIL NFR BLD: 0.8 % (ref 0–8)
ERYTHROCYTE [DISTWIDTH] IN BLOOD BY AUTOMATED COUNT: 13.8 % (ref 11.5–14.5)
EST. GFR  (AFRICAN AMERICAN): >60 ML/MIN/1.73 M^2
EST. GFR  (NON AFRICAN AMERICAN): 59 ML/MIN/1.73 M^2
GLUCOSE SERPL-MCNC: 105 MG/DL (ref 70–110)
HCT VFR BLD AUTO: 42.9 % (ref 37–48.5)
HGB BLD-MCNC: 14.8 G/DL (ref 12–16)
IMM GRANULOCYTES # BLD AUTO: 0.07 K/UL (ref 0–0.04)
IMM GRANULOCYTES NFR BLD AUTO: 0.7 % (ref 0–0.5)
LYMPHOCYTES # BLD AUTO: 2.5 K/UL (ref 1–4.8)
LYMPHOCYTES NFR BLD: 23.7 % (ref 18–48)
MCH RBC QN AUTO: 30 PG (ref 27–31)
MCHC RBC AUTO-ENTMCNC: 34.5 G/DL (ref 32–36)
MCV RBC AUTO: 87 FL (ref 82–98)
MONOCYTES # BLD AUTO: 1 K/UL (ref 0.3–1)
MONOCYTES NFR BLD: 9.3 % (ref 4–15)
NEUTROPHILS # BLD AUTO: 6.8 K/UL (ref 1.8–7.7)
NEUTROPHILS NFR BLD: 65 % (ref 38–73)
NRBC BLD-RTO: 0 /100 WBC
PLATELET # BLD AUTO: 309 K/UL (ref 150–350)
PMV BLD AUTO: 9.6 FL (ref 9.2–12.9)
POTASSIUM SERPL-SCNC: 3.2 MMOL/L (ref 3.5–5.1)
PROT SERPL-MCNC: 8.5 G/DL (ref 6–8.4)
RBC # BLD AUTO: 4.94 M/UL (ref 4–5.4)
SODIUM SERPL-SCNC: 135 MMOL/L (ref 136–145)
TROPONIN I SERPL DL<=0.01 NG/ML-MCNC: 0.01 NG/ML (ref 0–0.03)
TROPONIN I SERPL DL<=0.01 NG/ML-MCNC: <0.006 NG/ML (ref 0–0.03)
WBC # BLD AUTO: 10.44 K/UL (ref 3.9–12.7)

## 2020-07-09 PROCEDURE — 93005 ELECTROCARDIOGRAM TRACING: CPT

## 2020-07-09 PROCEDURE — 85379 FIBRIN DEGRADATION QUANT: CPT

## 2020-07-09 PROCEDURE — 99285 EMERGENCY DEPT VISIT HI MDM: CPT | Mod: 25

## 2020-07-09 PROCEDURE — 25000003 PHARM REV CODE 250: Performed by: PHYSICIAN ASSISTANT

## 2020-07-09 PROCEDURE — 83880 ASSAY OF NATRIURETIC PEPTIDE: CPT

## 2020-07-09 PROCEDURE — 85025 COMPLETE CBC W/AUTO DIFF WBC: CPT

## 2020-07-09 PROCEDURE — 84484 ASSAY OF TROPONIN QUANT: CPT | Mod: 91

## 2020-07-09 PROCEDURE — 80053 COMPREHEN METABOLIC PANEL: CPT

## 2020-07-09 PROCEDURE — 25500020 PHARM REV CODE 255: Performed by: EMERGENCY MEDICINE

## 2020-07-09 RX ORDER — METHOCARBAMOL 500 MG/1
500 TABLET, FILM COATED ORAL 4 TIMES DAILY PRN
Qty: 40 TABLET | Refills: 0 | Status: SHIPPED | OUTPATIENT
Start: 2020-07-09 | End: 2020-07-20

## 2020-07-09 RX ORDER — ASPIRIN 325 MG
325 TABLET ORAL
Status: COMPLETED | OUTPATIENT
Start: 2020-07-09 | End: 2020-07-09

## 2020-07-09 RX ADMIN — IOHEXOL 100 ML: 350 INJECTION, SOLUTION INTRAVENOUS at 07:07

## 2020-07-09 RX ADMIN — ASPIRIN 325 MG ORAL TABLET 325 MG: 325 PILL ORAL at 03:07

## 2020-07-09 NOTE — ED PROVIDER NOTES
Encounter Date: 7/9/2020    SCRIBE #1 NOTE: I, Anum De, am scribing for, and in the presence of,  Quan Conner. I have scribed the entire note.       History     Chief Complaint   Patient presents with    Chest Pain     c/o pain to different areas of her chest for the past 2 hours. Also c/o right lower rib pain today. Also c/o intermittent SOB and possible anxiety for the past few days     This is a 65 y.o. female who  has a past medical history of Abnormal Pap smear of cervix (1989), Asthma, chronic, Bleeding hemorrhoids, Depression, Difficult intubation, Diverticular disease, Endometriosis, Fatty liver (12/15/2014), Herpes simplex without mention of complication, Hypercholesterolemia, Hypertension, Multiple gastric polyps (7/21/2012), Obesity, Reflux, and Thyroid disease who presents with chief complaint of chest pain and shortness of breath. Patient reports she was working at home when she began to feel short of breath and and began to experience R sided chest pain that she describes as specifically being located under her R breast with occasional radiation to her back; She denies . She describes it as a pressure that worsens when sitting up. She denies any ripping or tearing type of back or chest pain. She denies any SOB, abdominal pain,  type symptoms, nausea, vomiting, diaphoresis, numbness or tingling, fever, pre-syncope/syncope, dizziness, chills or cough. . The patient states she took left over Azithromycin (2 doses; today), thinking it was pleurisy, without any relief.  The patient denies any recent trauma or skin changes. She denies any sick contacts or recent travel.     The history is provided by the patient. No  was used.     Review of patient's allergies indicates:   Allergen Reactions    Losartan Swelling     Possible throat closing sensation.    Lovastatin Other (See Comments)     myalgia    Cefuroxime Itching and Rash     Past Medical History:   Diagnosis Date     Abnormal Pap smear of cervix     [facility] - [facility], then Hyst    Asthma, chronic     Bleeding hemorrhoids     Depression     Difficult intubation     Diverticular disease     Endometriosis     Fatty liver 12/15/2014    Herpes simplex without mention of complication     Hypercholesterolemia     Hypertension     Multiple gastric polyps 2012    Obesity     Reflux     Thyroid disease      Past Surgical History:   Procedure Laterality Date    BREAST BIOPSY Left 2017    core    BREAST BIOPSY Right     core    CERVICAL CONIZATION   W/ LASER       SECTION      x1    CHOLECYSTECTOMY      ESOPHAGOGASTRODUODENOSCOPY N/A 2018    Procedure: ESOPHAGOGASTRODUODENOSCOPY (EGD);  Surgeon: George Henriquez MD;  [facility]: [facility] ([facility]);  Service: Endoscopy;  Laterality: N/A;  possible difficult intubation - see anesthesia note dated 1/15/13 - 2nd floor/ generated from last EGD, 3 year f/u, Pt due/ Pt requesting Dr. Henriquez - ER    HYSTERECTOMY  age 42    [place], LSO (uterine fibroids, adhesions, endometriosis)    OOPHORECTOMY  age 42    LSO (endometriosis, cyst)    THYROIDECTOMY      TOE SURGERY      left    TOTAL THYROIDECTOMY       Family History   Problem Relation Age of Onset    Breast cancer Maternal Aunt     Melanoma Sister     Melanoma Brother     Cancer Maternal Uncle         esophageal cancer    Ovarian cancer Neg Hx     Colon cancer Neg Hx      Social History     Tobacco Use    Smoking status: Never Smoker    Smokeless tobacco: Never Used   Substance Use Topics    Alcohol use: No     Frequency: Never     Comment: none lately.  rarely has a drink for special occassion    Drug use: No     Review of Systems   Constitutional: Negative for chills, fatigue and fever.   HENT: Negative for facial swelling, trouble swallowing and voice change.    Eyes: Negative for photophobia, pain and redness.   Respiratory: Positive for shortness of breath. Negative for cough and choking.     Cardiovascular: Positive for chest pain. Negative for palpitations and leg swelling.   Gastrointestinal: Negative for abdominal pain, diarrhea, nausea and vomiting.   Genitourinary: Negative for difficulty urinating, frequency and urgency.   Musculoskeletal: Positive for back pain. Negative for neck pain and neck stiffness.   Neurological: Negative for dizziness, seizures, syncope, speech difficulty, weakness, light-headedness, numbness and headaches.   All other systems reviewed and are negative.      Physical Exam     Initial Vitals [07/09/20 1447]   BP Pulse Resp Temp SpO2   (!) 152/94 109 20 100.1 °F (37.8 °C) 97 %      MAP       --         Physical Exam    Nursing note and vitals reviewed.  Constitutional: She appears well-developed and well-nourished. No distress.   HENT:   Head: Normocephalic and atraumatic.   Mouth/Throat: Oropharynx is clear and moist.   Eyes: Conjunctivae and EOM are normal. Pupils are equal, round, and reactive to light.   Neck: Normal range of motion. Neck supple.   Cardiovascular: Normal rate, regular rhythm, normal heart sounds and intact distal pulses.   Pulmonary/Chest: Breath sounds normal. No respiratory distress. She has no wheezes. She has no rhonchi. She has no rales.   Lungs clear to auscultation bilaterally    Abdominal: Soft. Bowel sounds are normal. She exhibits no distension. There is no abdominal tenderness.   Abdomen is soft, but obese. There is no rebound or guarding. Normal bowel sounds in all four quadrants. Negative Wagoner's sign. Negative McBurney's point tenderness Negative heel tap. No masses, fluid wave or other abnormality noted. No rebound or guarding tenderness. No ecchymosis or other skin changes appreciated on physical exam.    Musculoskeletal: Normal range of motion. No tenderness or edema.      Comments: No lower extremity edema or swelling; neg Homans sign bilaterally   Neurological: She is alert and oriented to person, place, and time. She has normal  strength. No cranial nerve deficit. GCS score is 15. GCS eye subscore is 4. GCS verbal subscore is 5. GCS motor subscore is 6.   Skin: Skin is warm and dry. Capillary refill takes less than 2 seconds.   No rashes to R anterior chest or R back; no findings suggestive of shingles    Psychiatric: She has a normal mood and affect. Thought content normal.         ED Course   Procedures  Labs Reviewed   CBC W/ AUTO DIFFERENTIAL - Abnormal; Notable for the following components:       Result Value    Immature Granulocytes 0.7 (*)     Immature Grans (Abs) 0.07 (*)     All other components within normal limits   COMPREHENSIVE METABOLIC PANEL - Abnormal; Notable for the following components:    Sodium 135 (*)     Potassium 3.2 (*)     CO2 22 (*)     Total Protein 8.5 (*)     eGFR if non  59 (*)     All other components within normal limits   D DIMER, QUANTITATIVE - Abnormal; Notable for the following components:    D-Dimer 1.06 (*)     All other components within normal limits   TROPONIN I   B-TYPE NATRIURETIC PEPTIDE   TROPONIN I   D DIMER, QUANTITATIVE          X-Rays:   Independently Interpreted Readings:   Other Readings:  Reviewed by myself, read by radiology.     Imaging Results          CTA Chest Non-Coronary (PE Study) (Final result)  Result time 07/09/20 20:11:08    Final result by Rosalva Bob MD (07/09/20 20:11:08)                 Impression:      No evidence of acute pulmonary embolism.    Left hepatic cysts.      Electronically signed by: Rosalva Bob  Date:    07/09/2020  Time:    20:11             Narrative:    EXAMINATION:  CT PULMONARY ANGIOGRAM WITH CONTRAST    CLINICAL HISTORY:  Right lower rib pain.    TECHNIQUE:  CT of the chest with intravenous contrast for pulmonary artery angiogram was performed. Contiguous axial 1.25 mm images followed by 10 mm reconstructions with multiplanar and MIP reformations of the pulmonary arteries. No 3D post-angiographic imaging was performed on an  independent workstation and reviewed.  One hundred ml of Omnipaque 350 was injected.    COMPARISON:  None.    FINDINGS:  There is no evidence of pulmonary artery filling defect to suggest pulmonary embolism. There is no aortic aneurysm or aortic dissection.    Moderate bibasilar atelectatic changes are present.    There is no evidence of mediastinal, hilar, or axillary adenopathy.    There is no pleural or pericardial effusion.    The heart size is within normal limits.    In the visualized upper abdomen, left hepatic cysts are present.                               X-Ray Chest AP Portable (Final result)  Result time 07/09/20 16:05:02    Final result by Doug Antonio MD (07/09/20 16:05:02)                 Impression:      No acute abnormality.      Electronically signed by: Doug Antonio MD  Date:    07/09/2020  Time:    16:05             Narrative:    EXAMINATION:  XR CHEST AP PORTABLE    CLINICAL HISTORY:  Chest Pain;.    TECHNIQUE:  Single frontal portable view of the chest was performed.    COMPARISON:  08/14/2018    FINDINGS:  Support devices: None    The lungs are clear, with normal appearance of pulmonary vasculature and no pleural effusion or pneumothorax.    The cardiac silhouette is normal in size. The hilar and mediastinal contours are unremarkable.    Bones are intact.                              Medical Decision Making:   Clinical Tests:   Lab Tests: Ordered and Reviewed  Radiological Study: Ordered and Reviewed  Medical Tests: Ordered and Reviewed  ED Management:  - CXR without acute cardiopulmonary process per my interpretation, final radiology read  - D-dimer elevated at 1.06  - K of 3.2  - Labs otherwise unremarkable  - Troponin x 2 WNL  - CTA negative for pulmonary embolus, aortic dissection per final radiology read  - Pt reports improvement in chest pain, back pain  - Pt confided in me recent stressors such as the death of a very close family member; pt is concerned that recent stressors may be  contributing to her presentation to the emergency department today  - No further emergent intervention required at this time  - pt stable for discharge  - No further intervention is indicated at this time after having taken into account the patient's history, physical exam findings, and empirical and objective data obtained during the patient's emergency department workup.   - The patient is at low risk for an emergent medical condition at this time, and I am of the belief that that it is safe to discharge the patient from the emergency department.   - The patient is instructed to follow up as outpatient as indicated on the discharge paperwork.    - I have discussed the specifics of the workup with the patient and the patient has verbalized understanding of the details of the workup, the diagnosis, the treatment plan, and the need for outpatient follow-up.    - Although the patient has no emergent etiology today this does not preclude the development of an emergent condition so, in addition, I have advised the patient that they can return to the ED and/or activate EMS at any time with worsening of their symptoms, change of their symptoms, or with any other medical complaint.    - The patient remained comfortable and stable during their visit in the ED.    - Discharge and follow-up instructions discussed with the patient who expressed understanding and willingness to comply with my recommendations.  - Results of all emergency department tests  discussed thoroughly with patient; all patient questions answered; pt in agreement with plan  - Pt instructed to follow up with PCP in 2-3 days for recheck of today's complaints  - Pt given strict emergency department return precautions for any new or worsening of symptoms  - Pt discharged from the emergency department in stable condition, in no acute distress                                    Clinical Impression:       ICD-10-CM ICD-9-CM   1. Right-sided chest wall pain   R07.89 786.52   2. Chest pain  R07.9 786.50   3. Pleurisy  R09.1 511.0   4. Hypokalemia  E87.6 276.8             ED Disposition Condition    Discharge Stable        ED Prescriptions     Medication Sig Dispense Start Date End Date Auth. Provider    methocarbamoL (ROBAXIN) 500 MG Tab Take 1 tablet (500 mg total) by mouth 4 (four) times daily as needed. 40 tablet 7/9/2020 7/19/2020 Quan Conner MD        Follow-up Information     Follow up With Specialties Details Why Contact Info    [facility]  If symptoms worsen [address] [id number] [phone]    Kierra Cornejo MD Internal Medicine  Follow up with your primary care physician in the next 2-3 days for recheck of today's complaints. [address]  [place] [address]  [phone]                          I, Quan Conner,  personally performed the services described in this documentation. All medical record entries made by the scribe were at my direction and in my presence.  I have reviewed the chart and agree that the record reflects my personal performance and is accurate and complete. Quan Conner M.D. 12:42 AM07/10/2020         Quan Conner MD  07/10/20 0043

## 2020-07-09 NOTE — PROVIDER PROGRESS NOTES - EMERGENCY DEPT.
Emergency Department TeleTRIAGE Encounter Note      CHIEF COMPLAINT    Chief Complaint   Patient presents with    Chest Pain     c/o pain to different areas of her chest for the past 2 hours. Also c/o right lower rib pain today. Also c/o intermittent SOB and possible anxiety for the past few days       VITAL SIGNS   Initial Vitals [07/09/20 1447]   BP Pulse Resp Temp SpO2   (!) 152/94 109 20 100.1 °F (37.8 °C) 97 %      MAP       --            ALLERGIES    Review of patient's allergies indicates:   Allergen Reactions    Losartan Swelling     Possible throat closing sensation.    Lovastatin Other (See Comments)     myalgia    Cefuroxime Itching and Rash       PROVIDER TRIAGE NOTE  Patient with past medical history asthma, depression, hypercholesterolemia, hypertension, thyroid disease presents to the ED for evaluation of chest pain.  Patient reports left-sided chest pain that started a few days ago after finding out her stepfather had passed away.  She reports for the past 1-2 hr she has had constant chest pain in the middle of her chest that radiates to her back.  She has had intermittent shortness of breath as well today.  She denies nausea, vomiting, abdominal pain, neck pain or jaw pain.  She denies recent illness.  She reports pulse of 117 at home.  Patient was concerned that she may be in AFib because her mother has a history.  Her brother had an MI at the age of 66.      ORDERS  Labs Reviewed - No data to display    ED Orders (720h ago, onward)    Start Ordered     Status Ordering Provider    07/09/20 1754 07/09/20 1453  Troponin I #2  Once Timed      Ordered CHIN ACOSTA.    07/09/20 1500 07/09/20 1453  aspirin tablet 325 mg  ED 1 Time      Ordered KARLACHIN G.    07/09/20 1454 07/09/20 1453  Vital signs  Every 15 min      Ordered CHIN ACOSTA G.    07/09/20 1454 07/09/20 1453  Cardiac Monitoring - Adult  Continuous     Comments: Notify Physician If:    Ordered CHIN ACOSTA.    07/09/20 1454  07/09/20 1453  Pulse Oximetry Continuous  Continuous      Ordered CHIN ACOSTA.    07/09/20 1454 07/09/20 1453  Diet NPO  Diet effective now      Ordered CHIN ACOSTA.    07/09/20 1454 07/09/20 1453  Saline lock IV  Once      Ordered CHIN ACOSTA.    07/09/20 1454 07/09/20 1453  EKG 12-lead  Once     Comments: Do not perform if previously done during this visit/ triage    Ordered CHIN ACOSTA.    07/09/20 1454 07/09/20 1453  CBC auto differential  STAT  Collect    Ordered CHIN ACOSTA.    07/09/20 1454 07/09/20 1453  Comprehensive metabolic panel  STAT  Collect    Ordered CHIN ACOSTA.    07/09/20 1454 07/09/20 1453  Troponin I #1  STAT  Collect    Ordered CHIN ACOSTA.    07/09/20 1454 07/09/20 1453  B-Type natriuretic peptide (BNP)  STAT  Collect    Ordered CHIN ACOSTA.    07/09/20 1454 07/09/20 1453  X-Ray Chest AP Portable  1 time imaging      Ordered CHIN ACOSTA.            Virtual Visit Note: The provider triage portion of this emergency department evaluation and documentation was performed via [employer], a HIPAA-compliant telemedicine application, in concert with a tele-presenter in the room. A face to face patient evaluation with one of my colleagues will occur once the patient is placed in an emergency department room.      DISCLAIMER: This note was prepared with [employer] voice recognition transcription software. Garbled syntax, mangled pronouns, and other bizarre constructions may be attributed to that software system.

## 2020-07-10 ENCOUNTER — PATIENT OUTREACH (OUTPATIENT)
Dept: ADMINISTRATIVE | Facility: OTHER | Age: 65
End: 2020-07-10

## 2020-07-10 ENCOUNTER — OFFICE VISIT (OUTPATIENT)
Dept: CARDIOLOGY | Facility: CLINIC | Age: 65
End: 2020-07-10
Payer: MEDICARE

## 2020-07-10 VITALS
SYSTOLIC BLOOD PRESSURE: 125 MMHG | HEIGHT: 66 IN | HEART RATE: 106 BPM | DIASTOLIC BLOOD PRESSURE: 83 MMHG | BODY MASS INDEX: 40.35 KG/M2

## 2020-07-10 DIAGNOSIS — R94.31 NONSPECIFIC ABNORMAL ELECTROCARDIOGRAM (ECG) (EKG): ICD-10-CM

## 2020-07-10 DIAGNOSIS — E87.6 HYPOKALEMIA: ICD-10-CM

## 2020-07-10 DIAGNOSIS — E78.00 PURE HYPERCHOLESTEROLEMIA: ICD-10-CM

## 2020-07-10 DIAGNOSIS — R07.89 CHEST DISCOMFORT: Primary | ICD-10-CM

## 2020-07-10 DIAGNOSIS — I10 ESSENTIAL HYPERTENSION: ICD-10-CM

## 2020-07-10 PROCEDURE — 99214 PR OFFICE/OUTPT VISIT, EST, LEVL IV, 30-39 MIN: ICD-10-PCS | Mod: 95,,, | Performed by: INTERNAL MEDICINE

## 2020-07-10 PROCEDURE — 99214 OFFICE O/P EST MOD 30 MIN: CPT | Mod: 95,,, | Performed by: INTERNAL MEDICINE

## 2020-07-10 RX ORDER — AMLODIPINE BESYLATE 10 MG/1
5 TABLET ORAL DAILY
Qty: 90 TABLET | Refills: 3 | Status: SHIPPED | OUTPATIENT
Start: 2020-07-10 | End: 2020-07-20

## 2020-07-10 RX ORDER — EFINACONAZOLE 100 MG/ML
SOLUTION TOPICAL DAILY
COMMUNITY
Start: 2020-06-08 | End: 2020-07-20 | Stop reason: SDUPTHER

## 2020-07-10 RX ORDER — METOPROLOL SUCCINATE 50 MG/1
50 TABLET, EXTENDED RELEASE ORAL DAILY
Qty: 30 TABLET | Refills: 11 | Status: SHIPPED | OUTPATIENT
Start: 2020-07-10 | End: 2020-08-28

## 2020-07-10 RX ORDER — POTASSIUM CHLORIDE 750 MG/1
10 CAPSULE, EXTENDED RELEASE ORAL DAILY
Qty: 30 CAPSULE | Refills: 6 | Status: SHIPPED | OUTPATIENT
Start: 2020-07-10 | End: 2021-07-13 | Stop reason: SDUPTHER

## 2020-07-10 RX ORDER — TRETINOIN 0.5 MG/G
CREAM TOPICAL NIGHTLY PRN
COMMUNITY
Start: 2020-06-08

## 2020-07-10 NOTE — ED NOTES
Assumed care of patient from MEGHAN Bull. Pt resting comfortably at this time in NAD. Pt updated on POC.

## 2020-07-10 NOTE — PROGRESS NOTES
The patient location is: [place], [place].  The chief complaint leading to consultation is: chest discomfort    Visit type: audiovisual    Face to Face time with patient: 25 minutes of total time spent on the encounter, which includes face to face time and non-face to face time preparing to see the patient (eg, review of tests), Obtaining and/or reviewing separately obtained history, Documenting clinical information in the electronic or other health record, Independently interpreting results (not separately reported) and communicating results to the patient/family/caregiver, or Care coordination (not separately reported).     Each patient to whom he or she provides medical services by telemedicine is:  (1) informed of the relationship between the physician and patient and the respective role of any other health care provider with respect to management of the patient; and (2) notified that he or she may decline to receive medical services by telemedicine and may withdraw from such care at any time.    Notes:   Subjective:     Problem List:  Hypertension  Hypercholesterolemia  Hypothyroidism  Overweight BMI ~40    HPI:   Carolina Knight is a 65 y.o. female who presents for follow-up of chest pains  She started having discomfort in the chest 2 days ago. The discomfort was located in the lower sternal region and radiated to the back.  It lasted throughout most of the day. She attributed it to anxiety and did not go to the ER until yesterday when the discomfort increased.  Initial troponin was undetectable, the second one was .009. A D-dimer was mildly elevated and so she underwent a CTA of the chest. No pulmonary embolism was present.  Other findings of note were a K of 3.2 and ST abnormality on the ECG. Her step-father passed away a few days ago and she was feeling stressed. Monitored by digital medicine; she noted that her heart rate was higher.    Prescribed atorvastatin for hypercholesterolemia. She was unable to  tolerate 20 mg of atorvastatin and reduced the dose to 10 mg.      ROS  Negative except as in HPI    Review of patient's allergies indicates:   Allergen Reactions    Losartan Swelling     Possible throat closing sensation.    Lovastatin Other (See Comments)     myalgia    Cefuroxime Itching and Rash        Current Outpatient Medications   Medication Sig    amLODIPine (NORVASC) 10 MG tablet TAKE 1 TABLET BY MOUTH ONCE DAILY    atorvastatin (LIPITOR) 20 MG tablet Take 1 tablet (20 mg total) by mouth once daily. (Patient taking differently: Take 10 mg by mouth once daily. )    azelastine (ASTELIN) 137 mcg (0.1 %) nasal spray 1 spray (137 mcg total) by Nasal route daily as needed for Rhinitis.    cetirizine (ZYRTEC) 10 MG tablet Take 10 mg by mouth every evening.     diclofenac sodium (VOLTAREN) 1 % Gel Apply 2 g topically 4 (four) times daily. for 10 days    diphenhydrAMINE (BANOPHEN) 25 mg capsule Take 1 each (25 mg total) by mouth every 6 (six) hours as needed for Itching or Allergies.    FINACEA 15 % gel AAA face qhs then moisturize    hydroCHLOROthiazide (HYDRODIURIL) 25 MG tablet Take 1 tablet (25 mg total) by mouth once daily.    JUBLIA 10 % Ayesha Apply topically once daily.    metFORMIN (GLUCOPHAGE-XR) 500 MG 24 hr tablet Take 1 tablet (500 mg total) by mouth daily with breakfast.    montelukast (SINGULAIR) 10 mg tablet Take 1 tablet (10 mg total) by mouth every evening. (Patient taking differently: Take 10 mg by mouth every evening. Pt taking prn.)    naproxen (NAPROSYN) 500 MG tablet Take 1 tablet (500 mg total) by mouth 2 (two) times daily. (Patient taking differently: Take 500 mg by mouth 2 (two) times daily as needed. )    nystatin (MYCOSTATIN) cream Apply topically 2 (two) times daily. (Patient taking differently: Apply topically as needed. )    pantoprazole (PROTONIX) 40 MG tablet Take 1 tablet (40 mg total) by mouth once daily.    RETIN-A 0.05 % cream Apply topically nightly as needed.  "   spironolactone (ALDACTONE) 50 MG tablet TAKE 1 TABLET BY MOUTH DAILY    SYNTHROID 125 mcg tablet Take 1 tablet (125 mcg total) by mouth once daily.    triamcinolone acetonide 0.1% (KENALOG) 0.1 % cream Apply topically 2 (two) times daily. (Patient taking differently: Apply topically as needed. )    etodolac (LODINE XL) 400 MG 24 hr tablet Take 1 tablet (400 mg total) by mouth daily as needed (avoid all other NSAIDs). (Patient not taking: Reported on 7/10/2020)    methocarbamoL (ROBAXIN) 500 MG Tab Take 1 tablet (500 mg total) by mouth 4 (four) times daily as needed. (Patient not taking: Reported on 7/10/2020)     No current facility-administered medications for this visit.        Social history:  Carolina GarciaMattymelina  reports that she has never smoked. She has never used smokeless tobacco. She reports that she does not drink alcohol or use drugs.      Objective:   /83   Pulse 106   Ht 5' 6" (1.676 m)   BMI 40.35 kg/m²      Physical Exam        Lab Results   Component Value Date    CHOL 210 (H) 05/09/2019    HDL 64 05/09/2019    LDLCALC 129.8 05/09/2019    TRIG 81 05/09/2019    CHOLHDL 30.5 05/09/2019     Lab Results   Component Value Date     07/09/2020    CREATININE 1.0 07/09/2020    BUN 22 07/09/2020     (L) 07/09/2020    K 3.2 (L) 07/09/2020    CL 97 07/09/2020    CO2 22 (L) 07/09/2020     Lab Results   Component Value Date    ALT 22 07/09/2020    AST 12 07/09/2020    ALKPHOS 77 07/09/2020    BILITOT 0.6 07/09/2020           Assessment and Plan:       ICD-10-CM ICD-9-CM   1. Chest discomfort  R07.89 786.59   2. ST abnormality on ECG  R94.31 794.31   3. Essential hypertension  I10 401.9   4. Pure hypercholesterolemia  E78.00 272.0   5. Hypokalemia  E87.6 276.8   6. BMI 40.0-44.9, adult  Z68.41 V85.41        In view of chest discomfort and ST abnormality I recommended a stress test.  She is feeling stressed due to the demise of a family member.  Add metoprolol 5-0 mg to reduce sinus " tachycardia and reduce amlodipine to 5 mg  If unable to obtain a diagnostic stress echo due to knee pain or limitations posed by BMI, will proceed w a PET stress test.  Likely not adequately treated because she was unable to tolerate 20 mg of atorvastatin and reduced the dose to 10 mg.    Orders placed during this encounter:     Chest discomfort  -     metoprolol succinate (TOPROL-XL) 50 MG 24 hr tablet; Take 1 tablet (50 mg total) by mouth once daily.  Dispense: 30 tablet; Refill: 11  -     Stress Echo Which stress agent will be used? Treadmill Exercise; Color Flow Doppler? No; Future; Expected date: 07/10/2020    ST abnormality on ECG  -     metoprolol succinate (TOPROL-XL) 50 MG 24 hr tablet; Take 1 tablet (50 mg total) by mouth once daily.  Dispense: 30 tablet; Refill: 11  -     Stress Echo Which stress agent will be used? Treadmill Exercise; Color Flow Doppler? No; Future; Expected date: 07/10/2020    Essential hypertension  -     amLODIPine (NORVASC) 10 MG tablet; Take 0.5 tablets (5 mg total) by mouth once daily.  Dispense: 90 tablet; Refill: 3    Pure hypercholesterolemia    Hypokalemia  -     potassium chloride (MICRO-K) 10 MEQ CpSR; Take 1 capsule (10 mEq total) by mouth once daily.  Dispense: 30 capsule; Refill: 6    BMI 40.0-44.9, adult         No follow-ups on file.

## 2020-07-10 NOTE — PATIENT INSTRUCTIONS
Start metoprolol 50 mg once a day. Take 1/2 a tab on the first day and a full tab starting the day after.  Reduce amlodipine to 1/2 a tab a day.  Take 2 caps of potassium (KCl) today and tomorrow. After that take 1 cap a day. This may change in the future depending on your blood levels.

## 2020-07-10 NOTE — ED NOTES
"Pt presents to the ED w/ c/ of chest pain for the past few days that worsened today. Pt reports midsternal chest pain that radiates to her back. Pt reports sob with chest pain as well. Pt states "I feel shakey." no tremors noted to patient. Pt states "it could be my anxiety." pt reports right lower rib pain as well and reports intermittent nausea with no vomiting. Pt is connected to cardiac monitor, BP cuff, and pulse ox.   "

## 2020-07-14 ENCOUNTER — HOSPITAL ENCOUNTER (OUTPATIENT)
Dept: RADIOLOGY | Facility: HOSPITAL | Age: 65
Discharge: HOME | End: 2020-07-14
Attending: INTERNAL MEDICINE
Payer: MEDICARE

## 2020-07-14 DIAGNOSIS — Z12.31 ENCOUNTER FOR SCREENING MAMMOGRAM FOR BREAST CANCER: ICD-10-CM

## 2020-07-14 PROCEDURE — 77063 BREAST TOMOSYNTHESIS BI: CPT | Mod: 26,,, | Performed by: RADIOLOGY

## 2020-07-14 PROCEDURE — 77067 SCR MAMMO BI INCL CAD: CPT | Mod: TC

## 2020-07-14 PROCEDURE — 77067 MAMMO DIGITAL SCREENING BILAT WITH TOMOSYNTHESIS_CAD: ICD-10-PCS | Mod: 26,,, | Performed by: RADIOLOGY

## 2020-07-14 PROCEDURE — 77067 SCR MAMMO BI INCL CAD: CPT | Mod: 26,,, | Performed by: RADIOLOGY

## 2020-07-14 PROCEDURE — 77063 MAMMO DIGITAL SCREENING BILAT WITH TOMOSYNTHESIS_CAD: ICD-10-PCS | Mod: 26,,, | Performed by: RADIOLOGY

## 2020-07-16 ENCOUNTER — CLINICAL SUPPORT (OUTPATIENT)
Dept: CARDIOLOGY | Facility: CLINIC | Age: 65
End: 2020-07-16
Attending: INTERNAL MEDICINE
Payer: MEDICARE

## 2020-07-16 VITALS — WEIGHT: 250 LBS | HEIGHT: 66 IN | BODY MASS INDEX: 40.18 KG/M2

## 2020-07-16 DIAGNOSIS — R94.31 NONSPECIFIC ABNORMAL ELECTROCARDIOGRAM (ECG) (EKG): ICD-10-CM

## 2020-07-16 DIAGNOSIS — R07.89 CHEST DISCOMFORT: ICD-10-CM

## 2020-07-16 LAB
ASCENDING AORTA: 3.64 CM
BSA FOR ECHO PROCEDURE: 2.3 M2
CV ECHO LV RWT: 0.34 CM
CV STRESS BASE HR: 75 BPM
DIASTOLIC BLOOD PRESSURE: 60 MMHG
DOP CALC LVOT AREA: 3.5 CM2
DOP CALC LVOT DIAMETER: 2.11 CM
DOP CALC LVOT PEAK VEL: 0.91 M/S
DOP CALC LVOT STROKE VOLUME: 76.15 CM3
DOP CALCLVOT PEAK VEL VTI: 21.79 CM
E WAVE DECELERATION TIME: 230.47 MSEC
E/A RATIO: 0.86
E/E' RATIO: 8.4 M/S
ECHO LV POSTERIOR WALL: 0.8 CM (ref 0.6–1.1)
FRACTIONAL SHORTENING: 36 % (ref 28–44)
INTERVENTRICULAR SEPTUM: 0.71 CM (ref 0.6–1.1)
IVRT: 91.34 MSEC
LA MAJOR: 5.31 CM
LA MINOR: 4.94 CM
LA WIDTH: 3.87 CM
LEFT ATRIUM SIZE: 3.01 CM
LEFT ATRIUM VOLUME INDEX: 23 ML/M2
LEFT ATRIUM VOLUME: 50.68 CM3
LEFT INTERNAL DIMENSION IN SYSTOLE: 2.98 CM (ref 2.1–4)
LEFT VENTRICLE DIASTOLIC VOLUME INDEX: 46.09 ML/M2
LEFT VENTRICLE DIASTOLIC VOLUME: 101.36 ML
LEFT VENTRICLE MASS INDEX: 51 G/M2
LEFT VENTRICLE SYSTOLIC VOLUME INDEX: 15.6 ML/M2
LEFT VENTRICLE SYSTOLIC VOLUME: 34.34 ML
LEFT VENTRICULAR INTERNAL DIMENSION IN DIASTOLE: 4.68 CM (ref 3.5–6)
LEFT VENTRICULAR MASS: 112.65 G
LV LATERAL E/E' RATIO: 8.4 M/S
LV SEPTAL E/E' RATIO: 8.4 M/S
MV PEAK A VEL: 0.98 M/S
MV PEAK E VEL: 0.84 M/S
MV STENOSIS PRESSURE HALF TIME: 66.84 MS
MV VALVE AREA P 1/2 METHOD: 3.29 CM2
OHS CV CPX 1 MINUTE RECOVERY HEART RATE: 86 BPM
OHS CV CPX 85 PERCENT MAX PREDICTED HEART RATE MALE: 126
OHS CV CPX ESTIMATED METS: 5
OHS CV CPX MAX PREDICTED HEART RATE: 149
OHS CV CPX PATIENT IS FEMALE: 1
OHS CV CPX PATIENT IS MALE: 0
OHS CV CPX PEAK DIASTOLIC BLOOD PRESSURE: 52 MMHG
OHS CV CPX PEAK HEAR RATE: 116 BPM
OHS CV CPX PEAK RATE PRESSURE PRODUCT: NORMAL
OHS CV CPX PEAK SYSTOLIC BLOOD PRESSURE: 213 MMHG
OHS CV CPX PERCENT MAX PREDICTED HEART RATE ACHIEVED: 78
OHS CV CPX RATE PRESSURE PRODUCT PRESENTING: 9450
PULM VEIN S/D RATIO: 1.18
PV PEAK D VEL: 0.51 M/S
PV PEAK S VEL: 0.6 M/S
RA MAJOR: 4.69 CM
RA PRESSURE: 3 MMHG
RA WIDTH: 2.99 CM
RIGHT VENTRICULAR END-DIASTOLIC DIMENSION: 3.13 CM
SINUS: 3.26 CM
STJ: 2.68 CM
STRESS ECHO POST EXERCISE DUR MIN: 7 MINUTES
STRESS ECHO POST EXERCISE DUR SEC: 27 SECONDS
SYSTOLIC BLOOD PRESSURE: 126 MMHG
TDI LATERAL: 0.1 M/S
TDI SEPTAL: 0.1 M/S
TDI: 0.1 M/S
TRICUSPID ANNULAR PLANE SYSTOLIC EXCURSION: 2.44 CM

## 2020-07-16 PROCEDURE — 93351 STRESS ECHO (CUPID ONLY): ICD-10-PCS | Mod: 26,S$PBB,, | Performed by: INTERNAL MEDICINE

## 2020-07-16 PROCEDURE — 99999 PR PBB SHADOW E&M-EST. PATIENT-LVL I: CPT | Mod: PBBFAC,,,

## 2020-07-16 PROCEDURE — 93351 STRESS TTE COMPLETE: CPT | Mod: PBBFAC,PO | Performed by: INTERNAL MEDICINE

## 2020-07-16 PROCEDURE — 99211 OFF/OP EST MAY X REQ PHY/QHP: CPT | Mod: PBBFAC,PO

## 2020-07-16 PROCEDURE — 99999 PR PBB SHADOW E&M-EST. PATIENT-LVL I: ICD-10-PCS | Mod: PBBFAC,,,

## 2020-07-17 ENCOUNTER — TELEPHONE (OUTPATIENT)
Dept: CARDIOLOGY | Facility: CLINIC | Age: 65
End: 2020-07-17

## 2020-07-17 NOTE — TELEPHONE ENCOUNTER
----- Message from Padmini Mackey MD sent at 7/17/2020 12:09 PM CDT -----  Pl tell pt that her stress test was also normal but another physician read it and there was a typo. I'll release the report as soon as they correct it and will speak to both of them later today.  Thanks

## 2020-07-20 ENCOUNTER — OFFICE VISIT (OUTPATIENT)
Dept: INTERNAL MEDICINE | Facility: CLINIC | Age: 65
End: 2020-07-20
Payer: MEDICARE

## 2020-07-20 ENCOUNTER — TELEPHONE (OUTPATIENT)
Dept: INTERNAL MEDICINE | Facility: CLINIC | Age: 65
End: 2020-07-20

## 2020-07-20 VITALS
BODY MASS INDEX: 40.36 KG/M2 | DIASTOLIC BLOOD PRESSURE: 78 MMHG | SYSTOLIC BLOOD PRESSURE: 126 MMHG | HEART RATE: 82 BPM | HEIGHT: 66 IN | OXYGEN SATURATION: 96 % | WEIGHT: 251.13 LBS

## 2020-07-20 DIAGNOSIS — B35.1 ONYCHOMYCOSIS: ICD-10-CM

## 2020-07-20 DIAGNOSIS — I77.1 TORTUOUS AORTA: ICD-10-CM

## 2020-07-20 DIAGNOSIS — E87.6 HYPOKALEMIA: ICD-10-CM

## 2020-07-20 DIAGNOSIS — M25.50 ARTHRALGIA, UNSPECIFIED JOINT: ICD-10-CM

## 2020-07-20 DIAGNOSIS — R00.2 PALPITATIONS: ICD-10-CM

## 2020-07-20 DIAGNOSIS — G47.33 OSA ON CPAP: ICD-10-CM

## 2020-07-20 DIAGNOSIS — Z78.0 POSTMENOPAUSAL ESTROGEN DEFICIENCY: ICD-10-CM

## 2020-07-20 DIAGNOSIS — E03.9 HYPOTHYROIDISM, UNSPECIFIED TYPE: ICD-10-CM

## 2020-07-20 DIAGNOSIS — I10 ESSENTIAL HYPERTENSION: ICD-10-CM

## 2020-07-20 DIAGNOSIS — I10 BENIGN ESSENTIAL HYPERTENSION: Primary | ICD-10-CM

## 2020-07-20 DIAGNOSIS — J30.9 ALLERGIC RHINITIS, UNSPECIFIED SEASONALITY, UNSPECIFIED TRIGGER: ICD-10-CM

## 2020-07-20 DIAGNOSIS — E78.5 HYPERLIPIDEMIA, UNSPECIFIED HYPERLIPIDEMIA TYPE: ICD-10-CM

## 2020-07-20 PROCEDURE — 99215 PR OFFICE/OUTPT VISIT, EST, LEVL V, 40-54 MIN: ICD-10-PCS | Mod: S$PBB,,, | Performed by: INTERNAL MEDICINE

## 2020-07-20 PROCEDURE — 99215 OFFICE O/P EST HI 40 MIN: CPT | Mod: S$PBB,,, | Performed by: INTERNAL MEDICINE

## 2020-07-20 PROCEDURE — 99999 PR PBB SHADOW E&M-EST. PATIENT-LVL V: ICD-10-PCS | Mod: PBBFAC,,, | Performed by: INTERNAL MEDICINE

## 2020-07-20 PROCEDURE — 99215 OFFICE O/P EST HI 40 MIN: CPT | Mod: PBBFAC,25 | Performed by: INTERNAL MEDICINE

## 2020-07-20 PROCEDURE — G0009 ADMIN PNEUMOCOCCAL VACCINE: HCPCS | Mod: PBBFAC

## 2020-07-20 PROCEDURE — 99999 PR PBB SHADOW E&M-EST. PATIENT-LVL V: CPT | Mod: PBBFAC,,, | Performed by: INTERNAL MEDICINE

## 2020-07-20 RX ORDER — AZELASTINE 1 MG/ML
1 SPRAY, METERED NASAL DAILY PRN
Qty: 30 ML | Refills: 1 | Status: SHIPPED | OUTPATIENT
Start: 2020-07-20 | End: 2023-05-17 | Stop reason: SDUPTHER

## 2020-07-20 RX ORDER — AMLODIPINE BESYLATE 5 MG/1
5 TABLET ORAL DAILY
Qty: 90 TABLET | Refills: 3 | OUTPATIENT
Start: 2020-07-20 | End: 2020-08-28

## 2020-07-20 RX ORDER — HYDROCHLOROTHIAZIDE 12.5 MG/1
12.5 TABLET ORAL 2 TIMES DAILY
Qty: 60 TABLET | Refills: 11 | Status: SHIPPED | OUTPATIENT
Start: 2020-07-20 | End: 2021-09-28 | Stop reason: SDUPTHER

## 2020-07-20 RX ORDER — EFINACONAZOLE 100 MG/ML
SOLUTION TOPICAL
Qty: 8 ML | Refills: 1 | Status: SHIPPED | OUTPATIENT
Start: 2020-07-20 | End: 2022-01-31

## 2020-07-20 RX ORDER — FLUTICASONE PROPIONATE 50 MCG
1 SPRAY, SUSPENSION (ML) NASAL DAILY
Qty: 16 G | Refills: 3 | Status: SHIPPED | OUTPATIENT
Start: 2020-07-20 | End: 2021-11-02

## 2020-07-20 NOTE — PROGRESS NOTES
INTERNAL MEDICINE ANNUAL VISIT NOTE      CHIEF COMPLAINT     Chief Complaint   Patient presents with    Annual Exam     HPI     Carolina Knight is a 65 y.o. C female who presents annual.    Stepfather  and pt is dealing w/ a lot of things.   Went to ED for tachycardia a week ago.   Saw Dr. Mackey 7/10/20. Started on toprol xl 50mg daily and 1/2'd on the amlodipine.   Palpitations is better.     HTN - amlodipine 5 qd, HCTZ 25MG (helps w/ swelling), spironolactone 50mg daily, toprol xl 50mg daily.   Previously on triamterene-HCTZ but did not control BP.   Losartan-->swelling.   Chlorthalidone-->very low potassium.   Hypokalemia - on 10mEq daily - started in mid July.   Follows w/ digital HTN. flowsheet reviewed.  No SOB/CP/palpitations/HA/blurry vision.     HLD - atorvastatin 20mg 1/2 pill daily.    lipitor 20mg caused myalgia.  Lovastatin caused scalp itching and intolerable myalgia.     Onychomycosis - on Jublia x 3 weeks. Helped a little. Needs a refill.   Wondered about oral meds. Discussed lamisil but pt deferred due to potential liver side effect.     Hypothyroidism - synthroid (brand name) 125mcg daily.   No unexpected weight changes.     Diffuse joint pains. Pos ZULMA, mildly elevated CRP and ESR.  Seen Dr. Abbasi 2018 - Sjogrens. +Ro and sicca. Cont systane eye drops.  OA of knees. Naproxen prn. Recommended tylenol arthritis OTC prn.   KNEE XR 10/2016 - B tricompartmental OA of knees, helen medial tib-fem compartment.  S/p PT.   YESENIA [facility] De [facility]ca - intermittent steroid injections. Last injection to the knees were 3/2/18 w/ Mariluz Mark, NP.  Knee pains are doing ok but after stairs, had slight R knee pain.   Per Dr. Abbasi's note 18 - consider plaquenil for chondrocalcinosis, which pt declined. Cont voltaren gel.  Volunteering 2 days a week - 1/2 hour and doing exercise at the facility.      QUINTIN - on APAP. Follows w/ sleep d/o clinic.      Asthma - using flonase in the morning and if bad,  "uses astelin also. May take zyrtec prn. If the drip gets bad, then only uses benadryl prn "emergency".  Follows w/ outside allergist.   Takes symbicort prn. Hasn't had to use albuterol.      cxr - tortuous aorta    Past Medical History:  Past Medical History:   Diagnosis Date    Abnormal Pap smear of cervix     [facility] - [facility], then Hyst    Asthma, chronic     Bleeding hemorrhoids     Depression     Difficult intubation     Diverticular disease     Endometriosis     Fatty liver 12/15/2014    Herpes simplex without mention of complication     Hypercholesterolemia     Hypertension     Multiple gastric polyps 2012    Obesity     Reflux     Thyroid disease        Past Surgical History:  Past Surgical History:   Procedure Laterality Date    BREAST BIOPSY Left 2017    core    BREAST BIOPSY Right     core    CERVICAL CONIZATION   W/ LASER       SECTION      x1    CHOLECYSTECTOMY      ESOPHAGOGASTRODUODENOSCOPY N/A 2018    Procedure: ESOPHAGOGASTRODUODENOSCOPY (EGD);  Surgeon: George Henriquez MD;  Location: [facility] ([address] [place] [address]);  Service: Endoscopy;  Laterality: N/A;  possible difficult intubation - see anesthesia note dated 1/15/13 - 2nd floor/ generated from last EGD, 3 year f/u, Pt due/ Pt requesting Dr. Henriquez - ER    HYSTERECTOMY  age 42    [place], LSO (uterine fibroids, adhesions, endometriosis)    OOPHORECTOMY  age 42    LSO (endometriosis, cyst)    THYROIDECTOMY      TOE SURGERY      left    TOTAL THYROIDECTOMY         Allergies:  Review of patient's allergies indicates:   Allergen Reactions    Losartan Swelling     Possible throat closing sensation.    Lovastatin Other (See Comments)     myalgia    Cefuroxime Itching and Rash       Home Medications:  Prior to Admission medications    Medication Sig Start Date End Date Taking? Authorizing Provider   amLODIPine (NORVASC) 5 MG tablet Take 1 tablet (5 mg total) by mouth once daily. 20  Yes Homeyar EARLINE Mackey MD "   atorvastatin (LIPITOR) 20 MG tablet Take 1 tablet (20 mg total) by mouth once daily.  Patient taking differently: Take 10 mg by mouth once daily.  8/28/19  Yes Padmini Mackey MD   cetirizine (ZYRTEC) 10 MG tablet Take 10 mg by mouth every evening.    Yes Historical Provider, MD   diphenhydrAMINE (BANOPHEN) 25 mg capsule Take 1 each (25 mg total) by mouth every 6 (six) hours as needed for Itching or Allergies. 6/5/17  Yes Kierra Cornejo MD   etodolac (LODINE XL) 400 MG 24 hr tablet Take 1 tablet (400 mg total) by mouth daily as needed (avoid all other NSAIDs). 9/26/18  Yes Frandy Liz MD   FINACEA 15 % gel AAA face qhs then moisturize 9/5/19  Yes Samantha Avila MD   hydroCHLOROthiazide (HYDRODIURIL) 25 MG tablet Take 1 tablet (25 mg total) by mouth once daily. 3/19/20  Yes Benny Cole MD   JUBLIA 10 % Ayesha Apply topically once daily. 6/8/20  Yes Historical Provider, MD   metFORMIN (GLUCOPHAGE-XR) 500 MG 24 hr tablet Take 1 tablet (500 mg total) by mouth daily with breakfast. 11/25/19 11/24/20 Yes Kierra Cornejo MD   metoprolol succinate (TOPROL-XL) 50 MG 24 hr tablet Take 1 tablet (50 mg total) by mouth once daily. 7/10/20 8/9/20 Yes Padmini Mackey MD   montelukast (SINGULAIR) 10 mg tablet Take 1 tablet (10 mg total) by mouth every evening.  Patient taking differently: Take 10 mg by mouth every evening. Pt taking prn. 6/5/17  Yes Kierra Cornejo MD   naproxen (NAPROSYN) 500 MG tablet Take 1 tablet (500 mg total) by mouth 2 (two) times daily.  Patient taking differently: Take 500 mg by mouth 2 (two) times daily as needed.  11/15/19  Yes Deedee Mcgraw NP   nystatin (MYCOSTATIN) cream Apply topically 2 (two) times daily.  Patient taking differently: Apply topically as needed.  1/2/20  Yes Isabel Hastings MD   pantoprazole (PROTONIX) 40 MG tablet Take 1 tablet (40 mg total) by mouth once daily. 3/19/20 3/19/21 Yes Benny Cole MD   potassium chloride (MICRO-K) 10 MEQ CpSR Take 1 capsule (10 mEq total) by mouth  once daily. 7/10/20 8/9/20 Yes Padmini Mackey MD   RETIN-A 0.05 % cream Apply topically nightly as needed. 6/8/20  Yes Historical Provider, MD   spironolactone (ALDACTONE) 50 MG tablet TAKE 1 TABLET BY MOUTH DAILY 5/12/20  Yes Kierra Cornejo MD   SYNTHROID 125 mcg tablet Take 1 tablet (125 mcg total) by mouth once daily. 5/20/20  Yes Kierra Cornejo MD   triamcinolone acetonide 0.1% (KENALOG) 0.1 % cream Apply topically 2 (two) times daily.  Patient taking differently: Apply topically as needed.  1/2/20  Yes Isabel Hastings MD   azelastine (ASTELIN) 137 mcg (0.1 %) nasal spray 1 spray (137 mcg total) by Nasal route daily as needed for Rhinitis. 12/3/18 7/10/20  Frandy Liz MD   diclofenac sodium (VOLTAREN) 1 % Gel Apply 2 g topically 4 (four) times daily. for 10 days 11/15/19 7/10/20  Deedee Mcgraw NP   methocarbamoL (ROBAXIN) 500 MG Tab Take 1 tablet (500 mg total) by mouth 4 (four) times daily as needed.  Patient not taking: Reported on 7/10/2020 7/9/20 7/19/20  Quan Conner MD   amLODIPine (NORVASC) 10 MG tablet Take 0.5 tablets (5 mg total) by mouth once daily. 7/10/20 7/20/20  Padmini Mackey MD       Family History:  Family History   Problem Relation Age of Onset    Breast cancer Maternal Aunt     Melanoma Sister     Melanoma Brother     Cancer Maternal Uncle         esophageal cancer    Ovarian cancer Neg Hx     Colon cancer Neg Hx        Social History:  Social History     Tobacco Use    Smoking status: Never Smoker    Smokeless tobacco: Never Used   Substance Use Topics    Alcohol use: No     Frequency: Never     Comment: none lately.  rarely has a drink for special occassion    Drug use: No       Review of Systems:  Review of Systems Comprehensive review of systems otherwise negative. See history/subjective section for more details.    Health Maintainence:    reviewed.     PHYSICAL EXAM     /78 (BP Location: Left arm, Patient Position: Sitting, BP Method: Large (Manual))   Pulse 82   " Ht 5' 6" (1.676 m)   Wt 113.9 kg (251 lb 1.7 oz)   SpO2 96%   BMI 40.53 kg/m²     GEN - A+OX4, NAD   HEENT - PERRL, EOMI, OP clear. MMM. TM normal.   Neck - No thyromegaly or cervical LAD. No thyroid masses felt.  CV - RRR, no m/r   Chest - CTAB, no wheezing or rhonchi  Abd - S/NT/ND/+BS.   Ext - 2+BDP and radial pulses. No LE edema.   Neuro - 5/5 BUE and BLE strength. Sensation to light touch intact throughout. 2+ DTRs. Normal gait.   MSK - No spinal tenderness to palpation. Normal gait.   Skin - No rash.    LABS     Previous labs reviewed.    ASSESSMENT/PLAN     Carolina Knight is a 65 y.o. female with  Carolina was seen today for annual exam.    Diagnoses and all orders for this visit:    Benign essential hypertension - ok to split up HCTZ 12.5mg to BID. Did not want to stop this and inc aldactone b/c reports when stopped on her own, it caused leg swelling. Recheck BMP.   -     hydroCHLOROthiazide (HYDRODIURIL) 12.5 MG Tab; Take 1 tablet (12.5 mg total) by mouth 2 (two) times a day.  -     Basic metabolic panel; Future; Expected date: 07/20/2020  -     TSH; Future; Expected date: 07/20/2020    Hypothyroidism, unspecified type - cont current med.   -     TSH; Future; Expected date: 07/20/2020    Hyperlipidemia, unspecified hyperlipidemia type - cont atorva 1/2 of 20 atorvastatin. 20mg caused myalgia.   -     Lipid Panel; Future; Expected date: 07/20/2020    Hypokalemia - on micro k 10mEq daily. Needs repeat BMP.  -     Basic metabolic panel; Future; Expected date: 07/20/2020  -     Magnesium; Future; Expected date: 07/20/2020    Onychomycosis  -     JUBLIA 10 % Ayesha; Apply topically once daily.    Allergic rhinitis, unspecified seasonality, unspecified trigger  -     azelastine (ASTELIN) 137 mcg (0.1 %) nasal spray; 1 spray (137 mcg total) by Nasal route daily as needed for Rhinitis.  -     fluticasone propionate (FLONASE) 50 mcg/actuation nasal spray; 1 spray (50 mcg total) by Each Nostril route once " daily.    Postmenopausal estrogen deficiency  -     DXA Bone Density Spine And Hip; Future; Expected date: 07/20/2020    Palpitations - doing well on metoprolol. Cont. F/u w/ Dr. Mackey.    Arthralgia, unspecified joint - tylenol and voltaren gel prn.     QUINTIN on CPAP - cont CPAP.     Tortuous aorta - cont atorvastatin.     Other orders  -     (In Office Administered) Pneumococcal Polysaccharide Vaccine (23 Valent) (SQ/IM)    45 minutes was spent on patient with over half the time was spent in coordination of care and/or counseling.  Pt will ask about shingrix at local pharmacy.     RTC in 6 months, sooner if needed and depending on labs.    Kierra Cornejo MD  Department of Internal Medicine - [facility]  2:01 PM

## 2020-07-20 NOTE — TELEPHONE ENCOUNTER
Can you call pt to see if she can do her annual today instead at 1:30 instead of tomorrow at 11? Thanks! If not, then how about 7/30 at 11:30am?

## 2020-07-21 ENCOUNTER — IMMUNIZATION (OUTPATIENT)
Dept: PHARMACY | Facility: CLINIC | Age: 65
End: 2020-07-21
Payer: COMMERCIAL

## 2020-07-22 ENCOUNTER — LAB VISIT (OUTPATIENT)
Dept: LAB | Facility: HOSPITAL | Age: 65
End: 2020-07-22
Attending: INTERNAL MEDICINE
Payer: MEDICARE

## 2020-07-22 DIAGNOSIS — I10 BENIGN ESSENTIAL HYPERTENSION: ICD-10-CM

## 2020-07-22 DIAGNOSIS — E87.6 HYPOKALEMIA: ICD-10-CM

## 2020-07-22 DIAGNOSIS — E78.5 HYPERLIPIDEMIA, UNSPECIFIED HYPERLIPIDEMIA TYPE: ICD-10-CM

## 2020-07-22 DIAGNOSIS — E03.9 HYPOTHYROIDISM, UNSPECIFIED TYPE: ICD-10-CM

## 2020-07-22 LAB
ANION GAP SERPL CALC-SCNC: 10 MMOL/L (ref 8–16)
BUN SERPL-MCNC: 18 MG/DL (ref 8–23)
CALCIUM SERPL-MCNC: 9.3 MG/DL (ref 8.7–10.5)
CHLORIDE SERPL-SCNC: 105 MMOL/L (ref 95–110)
CHOLEST SERPL-MCNC: 151 MG/DL (ref 120–199)
CHOLEST/HDLC SERPL: 2.9 {RATIO} (ref 2–5)
CO2 SERPL-SCNC: 27 MMOL/L (ref 23–29)
CREAT SERPL-MCNC: 0.8 MG/DL (ref 0.5–1.4)
EST. GFR  (AFRICAN AMERICAN): >60 ML/MIN/1.73 M^2
EST. GFR  (NON AFRICAN AMERICAN): >60 ML/MIN/1.73 M^2
GLUCOSE SERPL-MCNC: 89 MG/DL (ref 70–110)
HDLC SERPL-MCNC: 52 MG/DL (ref 40–75)
HDLC SERPL: 34.4 % (ref 20–50)
LDLC SERPL CALC-MCNC: 81.8 MG/DL (ref 63–159)
MAGNESIUM SERPL-MCNC: 1.9 MG/DL (ref 1.6–2.6)
NONHDLC SERPL-MCNC: 99 MG/DL
POTASSIUM SERPL-SCNC: 3.7 MMOL/L (ref 3.5–5.1)
SODIUM SERPL-SCNC: 142 MMOL/L (ref 136–145)
TRIGL SERPL-MCNC: 86 MG/DL (ref 30–150)
TSH SERPL DL<=0.005 MIU/L-ACNC: 1.38 UIU/ML (ref 0.4–4)

## 2020-07-22 PROCEDURE — 36415 COLL VENOUS BLD VENIPUNCTURE: CPT | Mod: PO

## 2020-07-22 PROCEDURE — 83735 ASSAY OF MAGNESIUM: CPT

## 2020-07-22 PROCEDURE — 80048 BASIC METABOLIC PNL TOTAL CA: CPT

## 2020-07-22 PROCEDURE — 84443 ASSAY THYROID STIM HORMONE: CPT

## 2020-07-22 PROCEDURE — 80061 LIPID PANEL: CPT

## 2020-07-29 ENCOUNTER — OFFICE VISIT (OUTPATIENT)
Dept: CARDIOLOGY | Facility: CLINIC | Age: 65
End: 2020-07-29
Payer: MEDICARE

## 2020-07-29 ENCOUNTER — PATIENT MESSAGE (OUTPATIENT)
Dept: CARDIOLOGY | Facility: CLINIC | Age: 65
End: 2020-07-29

## 2020-07-29 VITALS
HEART RATE: 64 BPM | WEIGHT: 253.31 LBS | HEIGHT: 66 IN | DIASTOLIC BLOOD PRESSURE: 90 MMHG | BODY MASS INDEX: 40.71 KG/M2 | SYSTOLIC BLOOD PRESSURE: 130 MMHG

## 2020-07-29 DIAGNOSIS — E78.2 MIXED HYPERLIPIDEMIA: ICD-10-CM

## 2020-07-29 DIAGNOSIS — E66.01 MORBID OBESITY WITH BMI OF 40.0-44.9, ADULT: ICD-10-CM

## 2020-07-29 DIAGNOSIS — I10 ESSENTIAL HYPERTENSION: Primary | ICD-10-CM

## 2020-07-29 DIAGNOSIS — T46.6X5A MYALGIA DUE TO STATIN: Primary | ICD-10-CM

## 2020-07-29 DIAGNOSIS — M17.0 PRIMARY OSTEOARTHRITIS OF BOTH KNEES: ICD-10-CM

## 2020-07-29 DIAGNOSIS — I83.893 VARICOSE VEINS OF BILATERAL LOWER EXTREMITIES WITH OTHER COMPLICATIONS: ICD-10-CM

## 2020-07-29 DIAGNOSIS — M79.662 PAIN IN BOTH LOWER LEGS: ICD-10-CM

## 2020-07-29 DIAGNOSIS — R60.0 EDEMA OF BOTH LEGS: ICD-10-CM

## 2020-07-29 DIAGNOSIS — G89.29 CHRONIC PAIN OF RIGHT ANKLE: ICD-10-CM

## 2020-07-29 DIAGNOSIS — M79.10 MYALGIA DUE TO STATIN: Primary | ICD-10-CM

## 2020-07-29 DIAGNOSIS — G47.33 OSA (OBSTRUCTIVE SLEEP APNEA): ICD-10-CM

## 2020-07-29 DIAGNOSIS — M79.661 PAIN IN BOTH LOWER LEGS: ICD-10-CM

## 2020-07-29 DIAGNOSIS — I89.0 LYMPHEDEMA OF BOTH LOWER EXTREMITIES: ICD-10-CM

## 2020-07-29 DIAGNOSIS — M25.571 CHRONIC PAIN OF RIGHT ANKLE: ICD-10-CM

## 2020-07-29 PROBLEM — I83.93 VARICOSE VEINS OF BOTH LOWER EXTREMITIES: Status: ACTIVE | Noted: 2020-07-29

## 2020-07-29 PROCEDURE — 99215 OFFICE O/P EST HI 40 MIN: CPT | Mod: PBBFAC,PO | Performed by: INTERNAL MEDICINE

## 2020-07-29 PROCEDURE — 99205 PR OFFICE/OUTPT VISIT, NEW, LEVL V, 60-74 MIN: ICD-10-PCS | Mod: S$PBB,,, | Performed by: INTERNAL MEDICINE

## 2020-07-29 PROCEDURE — 99999 PR PBB SHADOW E&M-EST. PATIENT-LVL V: CPT | Mod: PBBFAC,,, | Performed by: INTERNAL MEDICINE

## 2020-07-29 PROCEDURE — 99999 PR PBB SHADOW E&M-EST. PATIENT-LVL V: ICD-10-PCS | Mod: PBBFAC,,, | Performed by: INTERNAL MEDICINE

## 2020-07-29 PROCEDURE — 99205 OFFICE O/P NEW HI 60 MIN: CPT | Mod: S$PBB,,, | Performed by: INTERNAL MEDICINE

## 2020-07-29 NOTE — TELEPHONE ENCOUNTER
Suggest that she stop atorvastatin for a few days then switch to Crestor 10 mg a day. If she agrees pl send me a prescription.  Also check vit D level, CPK and ESR now.

## 2020-07-29 NOTE — PATIENT INSTRUCTIONS
Assessment/Plan:  Carolina Knight is a 65 y.o. female with a past medical history of HLD, HTN, hypothyroidism, morbid obesity, who presents for an initial appointment.     1. BLE Edema and Pain- Likely due to lymphedema and venous insufficiency.  Check BLE venous reflux study and segmental pressure study.  If no evidence of moderate to severe PAD, will refer to lymphedema clinic.     2. Morbid Obesity- Pt would like to try to lose weight on her own at this time.  Continue to encourage diet, exercise and weight loss.     Follow up in 2 weeks

## 2020-07-29 NOTE — PROGRESS NOTES
[facility]      Chief Complaint   Patient presents with    LLE pain       Patient ID: Carolina Knight is a 65 y.o. female with a past medical history of HLD, HTN, hypothyroidism, morbid obesity, who presents for an initial appointment.  Pertinent history/events are as follows:     -Pt presents for evaluation of LLE pain.      HPI:  Mrs. Knight reports pain and edema of both legs.  She has no claudication or tissue loss.  Exam shows BLE's with prominent varicose veins with changes consistent with lymphedema.       Past Medical History:   Diagnosis Date    Abnormal Pap smear of cervix     CIS - CKC, then Hyst    Asthma, chronic     Bleeding hemorrhoids     Depression     Difficult intubation     Diverticular disease     Endometriosis     Fatty liver 12/15/2014    Herpes simplex without mention of complication     Hypercholesterolemia     Hypertension     Multiple gastric polyps 2012    Obesity     Reflux     Thyroid disease      Past Surgical History:   Procedure Laterality Date    BREAST BIOPSY Left 2017    core    BREAST BIOPSY Right     core    CERVICAL CONIZATION   W/ LASER       SECTION      x1    CHOLECYSTECTOMY      ESOPHAGOGASTRODUODENOSCOPY N/A 2018    Procedure: ESOPHAGOGASTRODUODENOSCOPY (EGD);  Surgeon: George Henriquez MD;  Location: [facility] ([address] [place] [address]);  Service: Endoscopy;  Laterality: N/A;  possible difficult intubation - see anesthesia note dated 1/15/13 - 2nd floor/ generated from last EGD, 3 year f/u, Pt due/ Pt requesting Dr. Henriquez - ER    HYSTERECTOMY  age 42    [place], LSO (uterine fibroids, adhesions, endometriosis)    OOPHORECTOMY  age 42    LSO (endometriosis, cyst)    THYROIDECTOMY      TOE SURGERY      left    TOTAL THYROIDECTOMY       Social History     Socioeconomic History    Marital status:      Spouse name: Not on file    Number of children: Not on file    Years of education: Not on file     Highest education level: Not on file   Occupational History    Not on file   Social Needs    Financial resource strain: Not on file    Food insecurity     Worry: Not on file     Inability: Not on file    Transportation needs     Medical: Not on file     Non-medical: Not on file   Tobacco Use    Smoking status: Never Smoker    Smokeless tobacco: Never Used   Substance and Sexual Activity    Alcohol use: No     Frequency: Never     Comment: none lately.  rarely has a drink for special occassion    Drug use: No    Sexual activity: Yes     Partners: Male     Birth control/protection: Surgical, Post-menopausal, None   Lifestyle    Physical activity     Days per week: 0 days     Minutes per session: Not on file    Stress: Not at all   Relationships    Social connections     Talks on phone: More than three times a week     Gets together: Twice a week     Attends Muslim service: Not on file     Active member of club or organization: Yes     Attends meetings of clubs or organizations: More than 4 times per year     Relationship status:    Other Topics Concern    Are you pregnant or think you may be? Not Asked    Breast-feeding Not Asked   Social History Narrative    Not on file     Family History   Problem Relation Age of Onset    Breast cancer Maternal Aunt     Melanoma Sister     Melanoma Brother     Cancer Maternal Uncle         esophageal cancer    Ovarian cancer Neg Hx     Colon cancer Neg Hx        Review of patient's allergies indicates:   Allergen Reactions    Losartan Swelling     Possible throat closing sensation.    Lovastatin Other (See Comments)     myalgia    Cefuroxime Itching and Rash       Medication List with Changes/Refills   Current Medications    AMLODIPINE (NORVASC) 5 MG TABLET    Take 1 tablet (5 mg total) by mouth once daily.    ATORVASTATIN (LIPITOR) 20 MG TABLET    Take 1 tablet (20 mg total) by mouth once daily.    AZELASTINE (ASTELIN) 137 MCG (0.1 %) NASAL SPRAY     1 spray (137 mcg total) by Nasal route daily as needed for Rhinitis.    CETIRIZINE (ZYRTEC) 10 MG TABLET    Take 10 mg by mouth every evening.     DICLOFENAC SODIUM (VOLTAREN) 1 % GEL    Apply 2 g topically 4 (four) times daily. for 10 days    DIPHENHYDRAMINE (BANOPHEN) 25 MG CAPSULE    Take 1 each (25 mg total) by mouth every 6 (six) hours as needed for Itching or Allergies.    FINACEA 15 % GEL    AAA face qhs then moisturize    FLUTICASONE PROPIONATE (FLONASE) 50 MCG/ACTUATION NASAL SPRAY    1 spray (50 mcg total) by Each Nostril route once daily.    HYDROCHLOROTHIAZIDE (HYDRODIURIL) 12.5 MG TAB    Take 1 tablet (12.5 mg total) by mouth 2 (two) times a day.    JUBLIA 10 % LUPE    Apply topically once daily.    METFORMIN (GLUCOPHAGE-XR) 500 MG 24 HR TABLET    Take 1 tablet (500 mg total) by mouth daily with breakfast.    METOPROLOL SUCCINATE (TOPROL-XL) 50 MG 24 HR TABLET    Take 1 tablet (50 mg total) by mouth once daily.    MONTELUKAST (SINGULAIR) 10 MG TABLET    Take 1 tablet (10 mg total) by mouth every evening.    NAPROXEN (NAPROSYN) 500 MG TABLET    Take 1 tablet (500 mg total) by mouth 2 (two) times daily.    NYSTATIN (MYCOSTATIN) CREAM    Apply topically 2 (two) times daily.    PANTOPRAZOLE (PROTONIX) 40 MG TABLET    Take 1 tablet (40 mg total) by mouth once daily.    POTASSIUM CHLORIDE (MICRO-K) 10 MEQ CPSR    Take 1 capsule (10 mEq total) by mouth once daily.    RETIN-A 0.05 % CREAM    Apply topically nightly as needed.    SPIRONOLACTONE (ALDACTONE) 50 MG TABLET    TAKE 1 TABLET BY MOUTH DAILY    SYNTHROID 125 MCG TABLET    Take 1 tablet (125 mcg total) by mouth once daily.    TRIAMCINOLONE ACETONIDE 0.1% (KENALOG) 0.1 % CREAM    Apply topically 2 (two) times daily.       Review of Systems  Constitution: Denies chills, fever, and sweats.  HENT: Denies headaches or blurry vision.  Cardiovascular: Denies chest pain or irregular heart beat.  Respiratory: Denies cough or shortness of  "breath.  Gastrointestinal: Denies abdominal pain, nausea, or vomiting.  Musculoskeletal: Positive for leg swelling and pain.    Neurological: Denies dizziness or focal weakness.  Psychiatric/Behavioral: Normal mental status.  Hematologic/Lymphatic: Denies bleeding problem or easy bruising/bleeding.  Skin: Denies rash or suspicious lesions    Physical Examination  BP (!) 130/90   Pulse 64   Ht 5' 6" (1.676 m)   Wt 114.9 kg (253 lb 4.9 oz)   BMI 40.89 kg/m²     Constitutional: No acute distress, conversant  HEENT: Sclera anicteric, Pupils equal, round and reactive to light, extraocular motions intact, Oropharynx clear  Neck: No JVD, no carotid bruits  Cardiovascular: regular rate and rhythm, no murmur, rubs or gallops, normal S1/S2  Pulmonary: Clear to auscultation bilaterally  Abdominal: Abdomen soft, nontender, nondistended, positive bowel sounds  Extremities: BLE's with prominent varicose veins with changes consistent with lymphedema   Pulses:  Carotid pulses are 2+ on the right side, and 2+ on the left side.  Radial pulses are 2+ on the right side, and 2+ on the left side.   Femoral pulses are 2+ on the right side, and 2+ on the left side.  Popliteal pulses are 2+ on the right side, and 2+ on the left side.   Dorsalis pedis pulses are 2+ on the right side, and 2+ on the left side.   Posterior tibial pulses are 2+ on the right side, and 2+ on the left side.    Skin: No ecchymosis, erythema, or ulcers  Psych: Alert and oriented x 3, appropriate affect  Neuro: CNII-XII intact, no focal deficits    Labs:  Most Recent Data  CBC:   Lab Results   Component Value Date    WBC 10.44 07/09/2020    HGB 14.8 07/09/2020    HCT 42.9 07/09/2020     07/09/2020    MCV 87 07/09/2020    RDW 13.8 07/09/2020     BMP:   Lab Results   Component Value Date     07/22/2020    K 3.7 07/22/2020     07/22/2020    CO2 27 07/22/2020    BUN 18 07/22/2020    CREATININE 0.8 07/22/2020    GLU 89 07/22/2020    CALCIUM 9.3 " 07/22/2020    MG 1.9 07/22/2020    PHOS 3.7 05/25/2011     LFTS;   Lab Results   Component Value Date    PROT 8.5 (H) 07/09/2020    ALBUMIN 4.3 07/09/2020    BILITOT 0.6 07/09/2020    AST 12 07/09/2020    ALKPHOS 77 07/09/2020    ALT 22 07/09/2020     COAGS:   Lab Results   Component Value Date    INR 1.0 08/04/2014     FLP:   Lab Results   Component Value Date    CHOL 151 07/22/2020    HDL 52 07/22/2020    LDLCALC 81.8 07/22/2020    TRIG 86 07/22/2020    CHOLHDL 34.4 07/22/2020     CARDIAC:   Lab Results   Component Value Date    TROPONINI 0.009 07/09/2020    BNP 21 07/09/2020         Assessment/Plan:  Carolina Knight is a 65 y.o. female with a past medical history of HLD, HTN, hypothyroidism, morbid obesity, who presents for an initial appointment.     1. BLE Edema and Pain- Likely due to lymphedema and venous insufficiency.  Check BLE venous reflux study and segmental pressure study.  If no evidence of moderate to severe PAD, will refer to lymphedema clinic.     2. Morbid Obesity- Pt would like to try to lose weight on her own at this time.  Continue to encourage diet, exercise and weight loss.     Follow up in 2 weeks    Total duration of face to face visit time 30 minutes.  Total time spent counseling greater than fifty percent of total visit time.  Counseling included discussion regarding imaging findings, diagnosis, possibilities, treatment options, risks and benefits.  The patient had many questions regarding the options and long-term effects.    Mina Plummer MD, PhD  Interventional Cardiology

## 2020-07-30 ENCOUNTER — LAB VISIT (OUTPATIENT)
Dept: LAB | Facility: HOSPITAL | Age: 65
End: 2020-07-30
Attending: INTERNAL MEDICINE
Payer: MEDICARE

## 2020-07-30 DIAGNOSIS — T46.6X5A MYALGIA DUE TO STATIN: ICD-10-CM

## 2020-07-30 DIAGNOSIS — M79.10 MYALGIA DUE TO STATIN: ICD-10-CM

## 2020-07-30 LAB
25(OH)D3+25(OH)D2 SERPL-MCNC: 34 NG/ML (ref 30–96)
CK SERPL-CCNC: 79 U/L (ref 20–180)
ERYTHROCYTE [SEDIMENTATION RATE] IN BLOOD BY WESTERGREN METHOD: 26 MM/HR (ref 0–36)

## 2020-07-30 PROCEDURE — 82306 VITAMIN D 25 HYDROXY: CPT

## 2020-07-30 PROCEDURE — 82550 ASSAY OF CK (CPK): CPT

## 2020-07-30 PROCEDURE — 36415 COLL VENOUS BLD VENIPUNCTURE: CPT | Mod: PO

## 2020-07-30 PROCEDURE — 85652 RBC SED RATE AUTOMATED: CPT

## 2020-08-03 ENCOUNTER — HOSPITAL ENCOUNTER (OUTPATIENT)
Dept: RADIOLOGY | Facility: HOSPITAL | Age: 65
Discharge: HOME | End: 2020-08-03
Attending: INTERNAL MEDICINE
Payer: MEDICARE

## 2020-08-03 DIAGNOSIS — Z78.0 POSTMENOPAUSAL ESTROGEN DEFICIENCY: ICD-10-CM

## 2020-08-03 PROCEDURE — 77080 DEXA BONE DENSITY SPINE HIP: ICD-10-PCS | Mod: 26,,, | Performed by: RADIOLOGY

## 2020-08-03 PROCEDURE — 77080 DXA BONE DENSITY AXIAL: CPT | Mod: 26,,, | Performed by: RADIOLOGY

## 2020-08-03 PROCEDURE — 77080 DXA BONE DENSITY AXIAL: CPT | Mod: TC

## 2020-08-07 ENCOUNTER — CLINICAL SUPPORT (OUTPATIENT)
Dept: CARDIOLOGY | Facility: CLINIC | Age: 65
End: 2020-08-07
Attending: INTERNAL MEDICINE
Payer: MEDICARE

## 2020-08-07 ENCOUNTER — PATIENT OUTREACH (OUTPATIENT)
Dept: OTHER | Facility: OTHER | Age: 65
End: 2020-08-07

## 2020-08-07 DIAGNOSIS — R60.0 EDEMA OF BOTH LEGS: ICD-10-CM

## 2020-08-07 DIAGNOSIS — M79.661 PAIN IN BOTH LOWER LEGS: ICD-10-CM

## 2020-08-07 DIAGNOSIS — M79.662 PAIN IN BOTH LOWER LEGS: ICD-10-CM

## 2020-08-07 DIAGNOSIS — I89.0 LYMPHEDEMA OF BOTH LOWER EXTREMITIES: ICD-10-CM

## 2020-08-07 LAB
LEFT GREAT SAPHENOUS DISTAL THIGH DIA: 0.35 CM
LEFT GREAT SAPHENOUS JUNCTION DIA: 0.58 CM
LEFT GREAT SAPHENOUS KNEE DIA: 0.2 CM
LEFT GREAT SAPHENOUS KNEE REFLUX: 4258 MS
LEFT GREAT SAPHENOUS MIDDLE THIGH DIA: 0.45 CM
LEFT GREAT SAPHENOUS PROXIMAL CALF DIA: 0.18 CM
LEFT GREAT SAPHENOUS PROXIMAL CALF REFLUX: 3859 MS
LEFT SMALL SAPHENOUS KNEE DIA: 0.3 CM
LEFT SMALL SAPHENOUS SPJ DIA: 0.26 CM
LEFT SMALL SAPHENOUS SPJ REFLUX: 3739 MS
RIGHT GREAT SAPHENOUS DISTAL THIGH DIA: 0.35 CM
RIGHT GREAT SAPHENOUS JUNCTION DIA: 0.63 CM
RIGHT GREAT SAPHENOUS KNEE DIA: 0.33 CM
RIGHT GREAT SAPHENOUS KNEE REFLUX: 2734 MS
RIGHT GREAT SAPHENOUS MIDDLE THIGH DIA: 0.5 CM
RIGHT GREAT SAPHENOUS PROXIMAL CALF DIA: 0.23 CM
RIGHT GREAT SAPHENOUS PROXIMAL CALF REFLUX: 2763 MS
RIGHT SMALL SAPHENOUS KNEE DIA: 0.44 CM
RIGHT SMALL SAPHENOUS SPJ DIA: 0.31 CM

## 2020-08-07 PROCEDURE — 93970 EXTREMITY STUDY: CPT | Mod: PBBFAC,PO | Performed by: INTERNAL MEDICINE

## 2020-08-07 PROCEDURE — 93970 CV US LOWER VENOUS INSUFFICIENCY BILATERAL (CUPID ONLY): ICD-10-PCS | Mod: 26,S$PBB,, | Performed by: INTERNAL MEDICINE

## 2020-08-10 ENCOUNTER — CLINICAL SUPPORT (OUTPATIENT)
Dept: CARDIOLOGY | Facility: CLINIC | Age: 65
End: 2020-08-10
Attending: INTERNAL MEDICINE
Payer: MEDICARE

## 2020-08-10 DIAGNOSIS — R60.0 EDEMA OF BOTH LEGS: ICD-10-CM

## 2020-08-10 DIAGNOSIS — M79.662 PAIN IN BOTH LOWER LEGS: ICD-10-CM

## 2020-08-10 DIAGNOSIS — I89.0 LYMPHEDEMA OF BOTH LOWER EXTREMITIES: ICD-10-CM

## 2020-08-10 DIAGNOSIS — M79.661 PAIN IN BOTH LOWER LEGS: ICD-10-CM

## 2020-08-10 LAB
IMMEDIATE ARM BP: 135 MMHG
IMMEDIATE LEFT ABI: 0.94
IMMEDIATE LEFT TIBIAL: 127 MMHG
IMMEDIATE RIGHT ABI: 1
IMMEDIATE RIGHT TIBIAL: 135 MMHG
LEFT ABI: 0.94
LEFT ARM BP: 132 MMHG
LEFT CALF BP: 144 MMHG
LEFT DORSALIS PEDIS: 124 MMHG
LEFT LOWER LEG BP: 181 MMHG
LEFT POSTERIOR TIBIAL: 120 MMHG
LEFT TBI: 0.86
LEFT TOE PRESSURE: 113 MMHG
LEFT UPPER LEG BP: 250 MMHG
RIGHT ABI: 0.95
RIGHT ARM BP: 121 MMHG
RIGHT CALF BP: 134 MMHG
RIGHT DORSALIS PEDIS: 126 MMHG
RIGHT LOWER LEG BP: 254 MMHG
RIGHT POSTERIOR TIBIAL: 120 MMHG
RIGHT TBI: 0.89
RIGHT TOE PRESSURE: 117 MMHG
RIGHT UPPER LEG BP: 254 MMHG
TREADMILL GRADE: 12 %
TREADMILL SPEED: 2 MPH
TREADMILL TIME: 5 MIN

## 2020-08-10 PROCEDURE — 93924 LWR XTR VASC STDY BILAT: CPT | Mod: PBBFAC,PO | Performed by: INTERNAL MEDICINE

## 2020-08-10 PROCEDURE — 93924 CV SEGMENTAL PRESSURES LOW EXT W STRESS (CUPID ONLY): ICD-10-PCS | Mod: 26,S$PBB,, | Performed by: INTERNAL MEDICINE

## 2020-08-11 ENCOUNTER — LAB VISIT (OUTPATIENT)
Dept: LAB | Facility: HOSPITAL | Age: 65
End: 2020-08-11
Attending: INTERNAL MEDICINE
Payer: MEDICARE

## 2020-08-11 DIAGNOSIS — E78.00 PURE HYPERCHOLESTEROLEMIA: ICD-10-CM

## 2020-08-11 DIAGNOSIS — I10 ESSENTIAL HYPERTENSION: ICD-10-CM

## 2020-08-11 LAB
ALT SERPL W/O P-5'-P-CCNC: 18 U/L (ref 10–44)
ANION GAP SERPL CALC-SCNC: 8 MMOL/L (ref 8–16)
AST SERPL-CCNC: 9 U/L (ref 10–40)
BUN SERPL-MCNC: 18 MG/DL (ref 8–23)
CALCIUM SERPL-MCNC: 9.6 MG/DL (ref 8.7–10.5)
CHLORIDE SERPL-SCNC: 104 MMOL/L (ref 95–110)
CHOLEST SERPL-MCNC: 163 MG/DL (ref 120–199)
CHOLEST/HDLC SERPL: 3.2 {RATIO} (ref 2–5)
CO2 SERPL-SCNC: 29 MMOL/L (ref 23–29)
CREAT SERPL-MCNC: 0.8 MG/DL (ref 0.5–1.4)
EST. GFR  (AFRICAN AMERICAN): >60 ML/MIN/1.73 M^2
EST. GFR  (NON AFRICAN AMERICAN): >60 ML/MIN/1.73 M^2
GLUCOSE SERPL-MCNC: 97 MG/DL (ref 70–110)
HDLC SERPL-MCNC: 51 MG/DL (ref 40–75)
HDLC SERPL: 31.3 % (ref 20–50)
LDLC SERPL CALC-MCNC: 91 MG/DL (ref 63–159)
NONHDLC SERPL-MCNC: 112 MG/DL
POTASSIUM SERPL-SCNC: 4 MMOL/L (ref 3.5–5.1)
SODIUM SERPL-SCNC: 141 MMOL/L (ref 136–145)
TRIGL SERPL-MCNC: 105 MG/DL (ref 30–150)

## 2020-08-11 PROCEDURE — 80061 LIPID PANEL: CPT

## 2020-08-11 PROCEDURE — 84450 TRANSFERASE (AST) (SGOT): CPT

## 2020-08-11 PROCEDURE — 80048 BASIC METABOLIC PNL TOTAL CA: CPT

## 2020-08-11 PROCEDURE — 36415 COLL VENOUS BLD VENIPUNCTURE: CPT | Mod: PO

## 2020-08-11 PROCEDURE — 84460 ALANINE AMINO (ALT) (SGPT): CPT

## 2020-08-12 ENCOUNTER — OFFICE VISIT (OUTPATIENT)
Dept: CARDIOLOGY | Facility: CLINIC | Age: 65
End: 2020-08-12
Payer: MEDICARE

## 2020-08-12 VITALS
BODY MASS INDEX: 41.1 KG/M2 | HEART RATE: 68 BPM | DIASTOLIC BLOOD PRESSURE: 86 MMHG | WEIGHT: 255.75 LBS | HEIGHT: 66 IN | SYSTOLIC BLOOD PRESSURE: 128 MMHG

## 2020-08-12 DIAGNOSIS — E03.8 OTHER SPECIFIED HYPOTHYROIDISM: ICD-10-CM

## 2020-08-12 DIAGNOSIS — I87.2 VENOUS INSUFFICIENCY OF BOTH LOWER EXTREMITIES: ICD-10-CM

## 2020-08-12 DIAGNOSIS — M79.661 PAIN IN BOTH LOWER LEGS: ICD-10-CM

## 2020-08-12 DIAGNOSIS — I89.0 LYMPHEDEMA OF BOTH LOWER EXTREMITIES: Primary | ICD-10-CM

## 2020-08-12 DIAGNOSIS — M79.662 PAIN IN BOTH LOWER LEGS: ICD-10-CM

## 2020-08-12 DIAGNOSIS — R60.0 EDEMA OF BOTH LEGS: ICD-10-CM

## 2020-08-12 DIAGNOSIS — I83.893 VARICOSE VEINS OF BILATERAL LOWER EXTREMITIES WITH OTHER COMPLICATIONS: ICD-10-CM

## 2020-08-12 DIAGNOSIS — E78.2 MIXED HYPERLIPIDEMIA: ICD-10-CM

## 2020-08-12 DIAGNOSIS — I10 ESSENTIAL HYPERTENSION: ICD-10-CM

## 2020-08-12 PROCEDURE — 99215 OFFICE O/P EST HI 40 MIN: CPT | Mod: PBBFAC,PO | Performed by: INTERNAL MEDICINE

## 2020-08-12 PROCEDURE — 99999 PR PBB SHADOW E&M-EST. PATIENT-LVL V: ICD-10-PCS | Mod: PBBFAC,,, | Performed by: INTERNAL MEDICINE

## 2020-08-12 PROCEDURE — 99215 OFFICE O/P EST HI 40 MIN: CPT | Mod: S$PBB,,, | Performed by: INTERNAL MEDICINE

## 2020-08-12 PROCEDURE — 99215 PR OFFICE/OUTPT VISIT, EST, LEVL V, 40-54 MIN: ICD-10-PCS | Mod: S$PBB,,, | Performed by: INTERNAL MEDICINE

## 2020-08-12 PROCEDURE — 99999 PR PBB SHADOW E&M-EST. PATIENT-LVL V: CPT | Mod: PBBFAC,,, | Performed by: INTERNAL MEDICINE

## 2020-08-12 NOTE — PATIENT INSTRUCTIONS
Assessment/Plan:  Carolina Knight is a 65 y.o. female with a past medical history of HLD, HTN, hypothyroidism, morbid obesity, who presents for a follow up appointment.     1. BLE Edema and Pain- Likely due to lymphedema and venous insufficiency.  BLE Venous Reflux Study on 8/7/2020 revealed significant BLE venous reflux and no DVT.  Segmental Pressure Study on 8/10/2020 revealed normal rest and exercise YAJAIRA's.  Refer to lymphedema clinic.  Pt will benefit from significant weight loss.    2. Morbid Obesity- Pt would like to try to lose weight on her own at this time.  Continue to encourage diet, exercise and weight loss.     Follow up in 1 month

## 2020-08-12 NOTE — PROGRESS NOTES
[facility]      Chief Complaint   Patient presents with    BLE Edema with pain    Hypertension       Patient ID: Carolina Knight is a 65 y.o. female with a past medical history of HLD, HTN, hypothyroidism, morbid obesity, who presents for a follow up appointment.  Pertinent history/events are as follows:     -Pt presents for evaluation of LLE pain.      -At our initial clinic visit on 2020, Mrs. Knight reports pain and edema of both legs.  She has no claudication or tissue loss.  Exam shows BLE's with prominent varicose veins with changes consistent with lymphedema.  Plan:  BLE Edema and Pain- Likely due to lymphedema and venous insufficiency.  Check BLE venous reflux study and segmental pressure study.  If no evidence of moderate to severe PAD, will refer to lymphedema clinic.   Morbid Obesity- Pt would like to try to lose weight on her own at this time.  Continue to encourage diet, exercise and weight loss.    HPI:  Mrs. Knight reports continued BLE edema since clinic visit on 2020.  She has no claudication or tissue loss.  BLE Venous Reflux Study on 2020 revealed significant BLE venous reflux and no DVT.  Segmental Pressure Study on 8/10/2020 revealed normal rest and exercise YAJAIRA's.    Past Medical History:   Diagnosis Date    Abnormal Pap smear of cervix     CIS - CKC, then Hyst    Asthma, chronic     Bleeding hemorrhoids     Depression     Difficult intubation     Diverticular disease     Endometriosis     Fatty liver 12/15/2014    Herpes simplex without mention of complication     Hypercholesterolemia     Hypertension     Multiple gastric polyps 2012    Obesity     Reflux     Thyroid disease      Past Surgical History:   Procedure Laterality Date    BREAST BIOPSY Left 2017    core    BREAST BIOPSY Right     core    CERVICAL CONIZATION   W/ LASER       SECTION      x1    CHOLECYSTECTOMY      ESOPHAGOGASTRODUODENOSCOPY N/A  6/21/2018    Procedure: ESOPHAGOGASTRODUODENOSCOPY (EGD);  Surgeon: George Henriquez MD;  Location: [facility] ([address] [place] [address]);  Service: Endoscopy;  Laterality: N/A;  possible difficult intubation - see anesthesia note dated 1/15/13 - 2nd floor/ generated from last EGD, 3 year f/u, Pt due/ Pt requesting Dr. Henriquez - ER    HYSTERECTOMY  age 42    [place], LSO (uterine fibroids, adhesions, endometriosis)    OOPHORECTOMY  age 42    LSO (endometriosis, cyst)    THYROIDECTOMY      TOE SURGERY      left    TOTAL THYROIDECTOMY       Social History     Socioeconomic History    Marital status:      Spouse name: Not on file    Number of children: Not on file    Years of education: Not on file    Highest education level: Not on file   Occupational History    Not on file   Social Needs    Financial resource strain: Not on file    Food insecurity     Worry: Not on file     Inability: Not on file    Transportation needs     Medical: Not on file     Non-medical: Not on file   Tobacco Use    Smoking status: Never Smoker    Smokeless tobacco: Never Used   Substance and Sexual Activity    Alcohol use: No     Frequency: Never     Comment: none lately.  rarely has a drink for special occassion    Drug use: No    Sexual activity: Yes     Partners: Male     Birth control/protection: Surgical, Post-menopausal, None   Lifestyle    Physical activity     Days per week: 0 days     Minutes per session: Not on file    Stress: Not at all   Relationships    Social connections     Talks on phone: More than three times a week     Gets together: Twice a week     Attends Yarsani service: Not on file     Active member of club or organization: Yes     Attends meetings of clubs or organizations: More than 4 times per year     Relationship status:    Other Topics Concern    Are you pregnant or think you may be? Not Asked    Breast-feeding Not Asked   Social History Narrative    Not on file     Family History   Problem  Relation Age of Onset    Breast cancer Maternal Aunt     Melanoma Sister     Melanoma Brother     Cancer Maternal Uncle         esophageal cancer    Ovarian cancer Neg Hx     Colon cancer Neg Hx        Review of patient's allergies indicates:   Allergen Reactions    Losartan Swelling     Possible throat closing sensation.    Lovastatin Other (See Comments)     myalgia    Cefuroxime Itching and Rash       Medication List with Changes/Refills   Current Medications    AMLODIPINE (NORVASC) 5 MG TABLET    Take 1 tablet (5 mg total) by mouth once daily.    ATORVASTATIN (LIPITOR) 20 MG TABLET    Take 1 tablet (20 mg total) by mouth once daily.    AZELASTINE (ASTELIN) 137 MCG (0.1 %) NASAL SPRAY    1 spray (137 mcg total) by Nasal route daily as needed for Rhinitis.    CETIRIZINE (ZYRTEC) 10 MG TABLET    Take 10 mg by mouth every evening.     DICLOFENAC SODIUM (VOLTAREN) 1 % GEL    Apply 2 g topically 4 (four) times daily. for 10 days    DIPHENHYDRAMINE (BANOPHEN) 25 MG CAPSULE    Take 1 each (25 mg total) by mouth every 6 (six) hours as needed for Itching or Allergies.    FINACEA 15 % GEL    AAA face qhs then moisturize    FLUTICASONE PROPIONATE (FLONASE) 50 MCG/ACTUATION NASAL SPRAY    1 spray (50 mcg total) by Each Nostril route once daily.    HYDROCHLOROTHIAZIDE (HYDRODIURIL) 12.5 MG TAB    Take 1 tablet (12.5 mg total) by mouth 2 (two) times a day.    JUBLIA 10 % LUPE    Apply topically once daily.    METFORMIN (GLUCOPHAGE-XR) 500 MG 24 HR TABLET    Take 1 tablet (500 mg total) by mouth daily with breakfast.    METOPROLOL SUCCINATE (TOPROL-XL) 50 MG 24 HR TABLET    Take 1 tablet (50 mg total) by mouth once daily.    MONTELUKAST (SINGULAIR) 10 MG TABLET    Take 1 tablet (10 mg total) by mouth every evening.    NAPROXEN (NAPROSYN) 500 MG TABLET    Take 1 tablet (500 mg total) by mouth 2 (two) times daily.    NYSTATIN (MYCOSTATIN) CREAM    Apply topically 2 (two) times daily.    PANTOPRAZOLE (PROTONIX) 40 MG  "TABLET    Take 1 tablet (40 mg total) by mouth once daily.    RETIN-A 0.05 % CREAM    Apply topically nightly as needed.    SPIRONOLACTONE (ALDACTONE) 50 MG TABLET    TAKE 1 TABLET BY MOUTH DAILY    SYNTHROID 125 MCG TABLET    Take 1 tablet (125 mcg total) by mouth once daily.    TRIAMCINOLONE ACETONIDE 0.1% (KENALOG) 0.1 % CREAM    Apply topically 2 (two) times daily.       Review of Systems  Constitution: Denies chills, fever, and sweats.  HENT: Denies headaches or blurry vision.  Cardiovascular: Denies chest pain or irregular heart beat.  Respiratory: Denies cough or shortness of breath.  Gastrointestinal: Denies abdominal pain, nausea, or vomiting.  Musculoskeletal: Positive for leg swelling and pain.    Neurological: Denies dizziness or focal weakness.  Psychiatric/Behavioral: Normal mental status.  Hematologic/Lymphatic: Denies bleeding problem or easy bruising/bleeding.  Skin: Denies rash or suspicious lesions    Physical Examination  Ht 5' 6" (1.676 m)   Wt 116 kg (255 lb 11.7 oz)   BMI 41.28 kg/m²     Constitutional: No acute distress, conversant  HEENT: Sclera anicteric, Pupils equal, round and reactive to light, extraocular motions intact, Oropharynx clear  Neck: No JVD, no carotid bruits  Cardiovascular: regular rate and rhythm, no murmur, rubs or gallops, normal S1/S2  Pulmonary: Clear to auscultation bilaterally  Abdominal: Abdomen soft, nontender, nondistended, positive bowel sounds  Extremities: BLE's with prominent varicose veins with changes consistent with lymphedema   Pulses:  Carotid pulses are 2+ on the right side, and 2+ on the left side.  Radial pulses are 2+ on the right side, and 2+ on the left side.   Femoral pulses are 2+ on the right side, and 2+ on the left side.  Popliteal pulses are 2+ on the right side, and 2+ on the left side.   Dorsalis pedis pulses are 2+ on the right side, and 2+ on the left side.   Posterior tibial pulses are 2+ on the right side, and 2+ on the left side.  "   Skin: No ecchymosis, erythema, or ulcers  Psych: Alert and oriented x 3, appropriate affect  Neuro: CNII-XII intact, no focal deficits    Labs:  Most Recent Data  CBC:   Lab Results   Component Value Date    WBC 10.44 07/09/2020    HGB 14.8 07/09/2020    HCT 42.9 07/09/2020     07/09/2020    MCV 87 07/09/2020    RDW 13.8 07/09/2020     BMP:   Lab Results   Component Value Date     08/11/2020    K 4.0 08/11/2020     08/11/2020    CO2 29 08/11/2020    BUN 18 08/11/2020    CREATININE 0.8 08/11/2020    GLU 97 08/11/2020    CALCIUM 9.6 08/11/2020    MG 1.9 07/22/2020    PHOS 3.7 05/25/2011     LFTS;   Lab Results   Component Value Date    PROT 8.5 (H) 07/09/2020    ALBUMIN 4.3 07/09/2020    BILITOT 0.6 07/09/2020    AST 9 (L) 08/11/2020    ALKPHOS 77 07/09/2020    ALT 18 08/11/2020     COAGS:   Lab Results   Component Value Date    INR 1.0 08/04/2014     FLP:   Lab Results   Component Value Date    CHOL 163 08/11/2020    HDL 51 08/11/2020    LDLCALC 91.0 08/11/2020    TRIG 105 08/11/2020    CHOLHDL 31.3 08/11/2020     CARDIAC:   Lab Results   Component Value Date    TROPONINI 0.009 07/09/2020    BNP 21 07/09/2020     BLE Venous Reflux Study 8/7/2020:  · No evidence of right lower extremity DVT.  · No evidence of left lower extremity DVT.  · Right GSV reflux (below knee segments)  · Left GSV reflux (below knee segment)  · Left SSV reflux.    Segmental Pressure Study 8/10/2020:  · Normal rest and exercise YAJAIRA's.  ·   Assessment/Plan:  Carolina Knight is a 65 y.o. female with a past medical history of HLD, HTN, hypothyroidism, morbid obesity, who presents for a follow up appointment.     1. BLE Edema and Pain- Likely due to lymphedema and venous insufficiency.  BLE Venous Reflux Study on 8/7/2020 revealed significant BLE venous reflux and no DVT.  Segmental Pressure Study on 8/10/2020 revealed normal rest and exercise YAJAIRA's.  Refer to lymphedema clinic.  Pt will benefit from significant weight  loss.    2. Morbid Obesity- Pt would like to try to lose weight on her own at this time.  Continue to encourage diet, exercise and weight loss.     Follow up in 1 month    Total duration of face to face visit time 30 minutes.  Total time spent counseling greater than fifty percent of total visit time.  Counseling included discussion regarding imaging findings, diagnosis, possibilities, treatment options, risks and benefits.  The patient had many questions regarding the options and long-term effects.    Mina Plummer MD, PhD  Interventional Cardiology

## 2020-08-17 RX ORDER — ATORVASTATIN CALCIUM 10 MG/1
10 TABLET, FILM COATED ORAL DAILY
COMMUNITY
End: 2020-08-17 | Stop reason: SDUPTHER

## 2020-08-18 RX ORDER — ATORVASTATIN CALCIUM 10 MG/1
10 TABLET, FILM COATED ORAL DAILY
Qty: 30 TABLET | Refills: 11 | Status: SHIPPED | OUTPATIENT
Start: 2020-08-18 | End: 2021-01-21

## 2020-08-21 DIAGNOSIS — G47.33 OBSTRUCTIVE SLEEP APNEA: Primary | ICD-10-CM

## 2020-08-25 ENCOUNTER — PATIENT MESSAGE (OUTPATIENT)
Dept: CARDIOLOGY | Facility: CLINIC | Age: 65
End: 2020-08-25

## 2020-08-26 NOTE — TELEPHONE ENCOUNTER
She can go back to her previous dose of amlodipine and stop metoprolol. She should take 1/2 a tab of metoprolol for 3-4 days then stop it and increase amlodipine on the day that she stops metoprolol. Let me know if she needs a new prescription (for the higher dose)

## 2020-08-26 NOTE — TELEPHONE ENCOUNTER
Pt notified, states she will take Amlodipine 5mg 2 tablets daily once she stops metoprolol. Pt insrcted to call our office when she needs a refill on amlodipine so a new prescription can be sent to her pharmacy. Pt verbalized understanding.

## 2020-08-27 ENCOUNTER — PATIENT MESSAGE (OUTPATIENT)
Dept: INTERNAL MEDICINE | Facility: CLINIC | Age: 65
End: 2020-08-27

## 2020-08-27 DIAGNOSIS — I10 BENIGN ESSENTIAL HYPERTENSION: ICD-10-CM

## 2020-08-28 RX ORDER — AMLODIPINE BESYLATE 10 MG/1
10 TABLET ORAL DAILY
Qty: 90 TABLET | Refills: 3 | Status: SHIPPED | OUTPATIENT
Start: 2020-08-28 | End: 2021-09-28 | Stop reason: SDUPTHER

## 2020-08-31 ENCOUNTER — TELEPHONE (OUTPATIENT)
Dept: SLEEP MEDICINE | Facility: CLINIC | Age: 65
End: 2020-08-31

## 2020-09-02 ENCOUNTER — PATIENT MESSAGE (OUTPATIENT)
Dept: INTERNAL MEDICINE | Facility: CLINIC | Age: 65
End: 2020-09-02

## 2020-09-02 ENCOUNTER — PATIENT MESSAGE (OUTPATIENT)
Dept: OBSTETRICS AND GYNECOLOGY | Facility: CLINIC | Age: 65
End: 2020-09-02

## 2020-09-02 DIAGNOSIS — E03.4 HYPOTHYROIDISM DUE TO ACQUIRED ATROPHY OF THYROID: ICD-10-CM

## 2020-09-02 RX ORDER — TRIAMCINOLONE ACETONIDE 1 MG/G
CREAM TOPICAL 2 TIMES DAILY
Qty: 30 G | Refills: 0 | Status: SHIPPED | OUTPATIENT
Start: 2020-09-02 | End: 2021-08-24 | Stop reason: SDUPTHER

## 2020-09-02 RX ORDER — NYSTATIN 100000 U/G
CREAM TOPICAL 2 TIMES DAILY
Qty: 30 G | Refills: 0 | Status: SHIPPED | OUTPATIENT
Start: 2020-09-02

## 2020-09-02 RX ORDER — FLUCONAZOLE 200 MG/1
200 TABLET ORAL
Qty: 3 TABLET | Refills: 0 | Status: SHIPPED | OUTPATIENT
Start: 2020-09-02 | End: 2020-09-09

## 2020-09-03 RX ORDER — LEVOTHYROXINE SODIUM 125 UG/1
125 TABLET ORAL DAILY
Qty: 90 TABLET | Refills: 3 | Status: SHIPPED | OUTPATIENT
Start: 2020-09-03 | End: 2021-08-15

## 2020-09-04 DIAGNOSIS — E03.4 HYPOTHYROIDISM DUE TO ACQUIRED ATROPHY OF THYROID: ICD-10-CM

## 2020-09-04 RX ORDER — LEVOTHYROXINE SODIUM 125 UG/1
125 TABLET ORAL DAILY
Qty: 90 TABLET | Refills: 3 | OUTPATIENT
Start: 2020-09-04

## 2020-09-08 NOTE — PROGRESS NOTES
[facility]: Health  Follow-Up    The history is provided by the patient.             Reason for review: Blood pressure not at goal      Additional Follow-up details: Patient reported she was doing well with no concerns or symptoms. Patient's current BP is 120/83. At last outreach BP was 121/79 on 8/7/20.     Patient shared increase in stress and coping with eating for reason for increase in her BP readings. Patient reported she does not rest long enough before taking her readings. Encouraged her to wait for 3-5 minutes before taking readings.           Diet-Change    Barriers to a Healthy Diet: Patient stated she has gained 15 lbs in the last few months due to stress. She shared she is coping by eating out and not monitoring her food intake. Patient shared she plans on improving her diet and going to the store to today to get food to make smoothies. Patient shared she has had success in the passed managing her weight by having smoothies in the morning and meal planing.     Intervention(s): portion control      Physical Activity-Change  She removed walking from Her physical activity.    She identified the following barriers to physical activity: pain and motivation         Additional physical activity details: Patient shared she not walking since weight gain. She shared it is difficult for her to walk due to knee pain.       Medication Adherence-Medication Adherence not addressed.        Substance, Sleep, Stress-change  stress-assessed  Details:Patient reported increase in family stress.   Intervention(s):    Sleep-not assessed  Details:  Intervention(s):    Alcohol -not assessed  Details:  Intervention(s):    Tobacco-Not Assessed  Details:  Intervention(s):    Additional details:Patient shared she is trying to manage her stress better by schedule times in her week for herself. Discussed exercising and self care ideas to help the patient create positive coping tools. .         Provided patient  education.  Reviewed Device Techniques.     Patient verbalizes understanding.      Explained the importance of self-monitoring and medication adherence. Encouraged the patient to communicate with their health  for lifestyle modifications to help improve or maintain a healthy lifestyle.            There are no preventive care reminders to display for this patient.    Last 5 Patient Entered Readings                                      Current 30 Day Average: 120/83     Recent Readings 9/3/2020 9/3/2020 8/29/2020 8/29/2020 8/29/2020    SBP (mmHg) 116 136 122 127 139    DBP (mmHg) 67 93 81 81 91    Pulse 86 90 75 75 85

## 2020-09-09 ENCOUNTER — PATIENT OUTREACH (OUTPATIENT)
Dept: ADMINISTRATIVE | Facility: OTHER | Age: 65
End: 2020-09-09

## 2020-09-11 ENCOUNTER — OFFICE VISIT (OUTPATIENT)
Dept: SLEEP MEDICINE | Facility: CLINIC | Age: 65
End: 2020-09-11
Payer: MEDICARE

## 2020-09-11 DIAGNOSIS — G47.33 OSA (OBSTRUCTIVE SLEEP APNEA): Primary | ICD-10-CM

## 2020-09-11 DIAGNOSIS — J30.9 ALLERGIC RHINITIS, UNSPECIFIED SEASONALITY, UNSPECIFIED TRIGGER: ICD-10-CM

## 2020-09-11 DIAGNOSIS — E66.01 MORBID OBESITY WITH BMI OF 40.0-44.9, ADULT: ICD-10-CM

## 2020-09-11 DIAGNOSIS — I10 ESSENTIAL HYPERTENSION: ICD-10-CM

## 2020-09-11 PROBLEM — E66.812 CLASS 2 SEVERE OBESITY WITH SERIOUS COMORBIDITY AND BODY MASS INDEX (BMI) OF 39.0 TO 39.9 IN ADULT: Status: RESOLVED | Noted: 2018-12-03 | Resolved: 2020-09-11

## 2020-09-11 PROCEDURE — 99214 OFFICE O/P EST MOD 30 MIN: CPT | Mod: CR,95,, | Performed by: NURSE PRACTITIONER

## 2020-09-11 PROCEDURE — 99214 PR OFFICE/OUTPT VISIT, EST, LEVL IV, 30-39 MIN: ICD-10-PCS | Mod: CR,95,, | Performed by: NURSE PRACTITIONER

## 2020-09-11 NOTE — PROGRESS NOTES
The patient location is: [place]/home  The chief complaint leading to consultation is: QUINTIN mgt    Visit type: TELE AUDIOVISUAL:85150    Face to Face time with patient: 25minutes of total time spent on the encounter, which includes face to face time and non-face to face time preparing to see the patient (eg, review of tests), Obtaining and/or reviewing separately obtained history, Documenting clinical information in the electronic or other health record, Independently interpreting results (not separately reported) and communicating results to the patient/family/caregiver, or Care coordination (not separately reported).   Each patient to whom he or she provides medical services by telemedicine is:  (1) informed of the relationship between the physician and patient and the respective role of any other health care provider with respect to management of the patient; and (2) notified that he or she may decline to receive medical services by telemedicine and may withdraw from such care at any time.    Notes: Carolina Knight  was seen as f/u today for the mgt of obstructive sleep apnea    Continues to use apap 6-20cm but inconsistently due to lingering sinus drip and cough(seeing ENT)  LOT of stress this year (sister cv-19, cv-19 patients , sons unemployment,  health). Feels calmer now thankfully. Had a good night last night, feels refreshed. Seeing friends tonight.  Having mask leaks when turns. Tosses and turns during sleep. Snoring/air gasping remains resolved when adherent.   Encore:  DATE RANGE: 3/15/2020 - 9/10/2020   90% tile 10.7  5 % leak  Compliance Summary  Days with Device Usage: 105 days  Percentage of Days >=4 Hours: 8.9%  Average Usage (Days Used): 2 hrs. 9 mins. 36 secs.  Average Usage (All Days): 1 hrs. 15 mins. 36 secs.  Apnea Indices  Average AHI: 4.0    BP stable, in digital program  Never contacted bariatric med dept last year    20 Stress Echo:  · Stress echo is negative for ischemia.  Sensitivity is impaired due to failure to reach age predicted HR.  · The test was stopped because the patient experienced fatigue.  · The ECG portion of this study is negative for myocardial ischemia.  · There were no arrhythmias during stress.  Normal left ventricular systolic function. The estimated ejection fraction is 60%.    HST 6-5-18 AHI 23/low SpO2: 82.7%      SOCIAL HISTORY: . Former ,  volunteers at [employer]    REVIEW OF SYSTEMS:  Sleep related symptoms as per HPI  Difficulty breathing through the nose?  Yes   Sore throat or dry mouth in the morning? Yes   Irregular or very fast heart beat?  Sometimes   Shortness of breath?  Yes   Acid reflux? Yes   Body aches and pains?  Sometimes   Morning headaches? Yes   Dizziness? Yes   Mood changes?  No   Do you exercise?  Sometimes   Do you feel like moving your legs a lot?  Yes       PHYSICAL EXAM:   There were no vitals taken for this visit.  GENERAL: morbid obese body habitus, well groomed BMI 41    ASSESSMENT:     QUINTIN, moderate. She remains adherent with PAP, AHI<5,symptoms improved.Cough/allergy symptoms and stressors affecting use this year  She has  medical comorbidities of asthma, morbid obesity, hypertension, hypothyroidism      PLAN:   1. Continue APAP 6-20cm. [facility] DME prn supplies   2. Discussed effectiveness of therapy and potential ramifications of untreated QUINTIN, including stroke, heart disease, HTN  3. Encouraged continued weight loss efforts for potential improvement of QUINTIN and overall health benefits and see PCP re: HTN mgt/continue med  4. Continue to see ENT, sinus medications as prescribed. Consider mask liner  5. Otherwise rtc 12-mos, sooner if needed

## 2020-09-14 ENCOUNTER — TELEPHONE (OUTPATIENT)
Dept: REHABILITATION | Facility: HOSPITAL | Age: 65
End: 2020-09-14

## 2020-09-14 NOTE — TELEPHONE ENCOUNTER
Pt cancelled PT Eval of LE edema for 9/14/20 - due to weather.   Pt was contacted by staff and was rescheduled for 10/16/20.

## 2020-10-13 ENCOUNTER — PATIENT MESSAGE (OUTPATIENT)
Dept: OBSTETRICS AND GYNECOLOGY | Facility: CLINIC | Age: 65
End: 2020-10-13

## 2020-10-15 ENCOUNTER — PATIENT OUTREACH (OUTPATIENT)
Dept: OTHER | Facility: OTHER | Age: 65
End: 2020-10-15

## 2020-10-15 NOTE — PROGRESS NOTES
Digital Medicine: Health  Follow-Up    The history is provided by the patient.                     Topics Covered on Call: physical activity, Diet, device use and sleep and stress     Additional Follow-up details: Patient reported she was doing well with no concerns or symptoms. Patient's current BP is 126/81. At last outreach BP was 121/79 on 8/7/20.     Patient shared recently medication change 8 weeks ago. Patient reported she thought her hair lose was due to BP medication. After sharing symptoms with cardiologist, patient was taken off  metroprolol and increased amlodipine dose. Patient shared anxious feelings subsided and that her hair is still falling out. Informed pharmacist     Patient shared she has concerns with low estrogen and has a follow up with her OBGYN next week.     Patient shared she takes three readings because third readings is always lower. Discussed waiting for 3-5 minutes for her first reading to prevent from having to take multiple readings. Shared with the patient that all her readings go into her chart, even if she deletes readings. Patient was encouraged to take additional readings and to charge device.             Diet-Change      Dietary Improvements:Patient she is doing smoothies in the morning. Patient shared she is trying to increase her vegetables. Patient shared she is trying to lose weight and making changes to her diet.         Barriers to a Healthy Diet: Patient shared she started drinking ice tea.     Intervention(s): portion control and carb reduction      Physical Activity-no change to routine  No change to exercise routine.       Additional physical activity details: Patient shared pain in her knee prevents her from walking. Patient shared she is waiting until the end of COVID to have knee surgery. Patient stated she is just doing yard work and house work.       Medication Adherence-Medication adherence was assessed.        Substance, Sleep,  Stress-change  stress-assessed  Details:Patient shared improvement in stress/anxiety level.   Intervention(s):    Sleep-assessed  Details:Patient reported decrease in sleep due to running nose  Intervention(s):    Alcohol -  Details:  Intervention(s):    Tobacco-  Details:  Intervention(s):    Additional details:Patient reported she notices when she goes to bed early she can not sleep through the night. Patient share     Patient shared she is still taking care of family affairs. .         Continue current diet/physical activity routine.  Instructed to charge device. Encouraged to charge before device gets below 25%   Provided patient education. Routed chart to clinician  Reviewed Device Techniques. Enouraged her to rest longer      Patient verbalizes understanding.      Explained the importance of self-monitoring and medication adherence. Encouraged the patient to communicate with their health  for lifestyle modifications to help improve or maintain a healthy lifestyle.               There are no preventive care reminders to display for this patient.      Last 5 Patient Entered Readings                                      Current 30 Day Average: 126/81     Recent Readings 10/9/2020 10/9/2020 10/9/2020 10/1/2020 10/1/2020    SBP (mmHg) 135 131 141 110 131    DBP (mmHg) 80 84 83 75 85    Pulse 82 82 78 85 82

## 2020-10-16 ENCOUNTER — CLINICAL SUPPORT (OUTPATIENT)
Dept: REHABILITATION | Facility: HOSPITAL | Age: 65
End: 2020-10-16
Payer: MEDICARE

## 2020-10-16 DIAGNOSIS — I83.893 VARICOSE VEINS OF BILATERAL LOWER EXTREMITIES WITH OTHER COMPLICATIONS: ICD-10-CM

## 2020-10-16 DIAGNOSIS — M79.661 PAIN IN BOTH LOWER LEGS: ICD-10-CM

## 2020-10-16 DIAGNOSIS — M79.662 PAIN IN BOTH LOWER LEGS: ICD-10-CM

## 2020-10-16 DIAGNOSIS — E66.01 MORBID OBESITY WITH BMI OF 40.0-44.9, ADULT: ICD-10-CM

## 2020-10-16 DIAGNOSIS — I87.2 VENOUS INSUFFICIENCY OF BOTH LOWER EXTREMITIES: ICD-10-CM

## 2020-10-16 DIAGNOSIS — I89.0 LYMPHEDEMA OF BOTH LOWER EXTREMITIES: Primary | ICD-10-CM

## 2020-10-16 DIAGNOSIS — R60.0 EDEMA OF BOTH LEGS: ICD-10-CM

## 2020-10-16 PROCEDURE — 97162 PT EVAL MOD COMPLEX 30 MIN: CPT | Mod: PO

## 2020-10-16 PROCEDURE — 97140 MANUAL THERAPY 1/> REGIONS: CPT | Mod: PO

## 2020-10-16 PROCEDURE — 97535 SELF CARE MNGMENT TRAINING: CPT | Mod: PO

## 2020-10-16 NOTE — PLAN OF CARE
[facility]  Physical Therapy Initial Evaluation    Name: Carolina Knight  [facility] Number: [id number]    Therapy Diagnosis:   Encounter Diagnoses   Name Primary?    Lymphedema of both lower extremities Yes    Varicose veins of bilateral lower extremities with other complications     Pain in both lower legs     Edema of both legs     Venous insufficiency of both lower extremities     Morbid obesity with BMI of 40.0-44.9, adult      Physician: Mina Plummer MD*    Physician Orders: PT Eval and Treat - lymphedema  Medical Diagnosis from Referral:   Diagnosis   I89.0 (ICD-10-CM) - Lymphedema of both lower extremities   Evaluation Date: 10/16/2020  Authorization Period Expiration: 8/12/21  Plan of Care Expiration: 1/8/21  Visit # / Visits authorized: 1/ 1    Time In: 900a  Time Out: 1005a  Total Billable Time: 65 minutes    Precautions: Standard, Fall and obesity, venous, lymphedema    Subjective   Date of onset: on/off for years - started with issues with knees - OA and leading to hip issues.  Trauma to L leg with dog injury.  L swells more than R.  Admits to being overweight and strain on knees- more sedentary now, limited in walking distances due to knees and now COVID.   Attempting weight management.   History of current condition - Carolina Tucker reports: loosing some muscle mass- has issues with allergies and weather- limits walking outdoors.  R knee needs TKA- working with orthopedics for injections- COVID concern.  Min attempts at compression- OTC were too tight.    Hospital recently couple of months ago- heart racing- family member illnesses-  assist.     Pt denies CHF, KF, DM and CA. - borderline DM- Metformin at times, younger age in situ of cervix - Dr. Reeder- hysterectomy at 42 with one ovary removed- cleared of CA.  Fluid pill- yes - + heart rhythm medications, chooses diuretic/HTN as needed- advised to follow MD recommendations  Blood thinner- no     Medical  History:   Past Medical History:   Diagnosis Date    Abnormal Pap smear of cervix     [facility] - [facility], then Hyst    Asthma, chronic     Bleeding hemorrhoids     Depression     Difficult intubation     Diverticular disease     Endometriosis     Fatty liver 12/15/2014    Herpes simplex without mention of complication     Hypercholesterolemia     Hypertension     Multiple gastric polyps 2012    Obesity     Reflux     Thyroid disease        Surgical History:   Carolina Knight  has a past surgical history that includes Total thyroidectomy; Cholecystectomy; Thyroidectomy; Toe Surgery; Oophorectomy (age 42); Cervical conization w/ laser ();  section; Hysterectomy (age 42); Breast biopsy (Left, 2017); Breast biopsy (Right); and Esophagogastroduodenoscopy (N/A, 2018).    Medications:   Carolina has a current medication list which includes the following prescription(s): amlodipine, atorvastatin, atorvastatin, azelastine, zyrtec, diclofenac sodium, diphenhydramine, finacea, fluticasone propionate, hydrochlorothiazide, jublia, levothyroxine, metformin, montelukast, naproxen, nystatin, nystatin, pantoprazole, retin-a, spironolactone, triamcinolone acetonide 0.1%, and triamcinolone acetonide 0.1%.    Allergies:   Review of patient's allergies indicates:   Allergen Reactions    Losartan Swelling     Possible throat closing sensation.    Lovastatin Other (See Comments)     myalgia    Cefuroxime Itching and Rash        Imaging, 20  Conclusion  · No evidence of right lower extremity DVT.  · No evidence of left lower extremity DVT.  · Right GSV reflux (below knee segments)  · Left GSV reflux (below knee segment)  · Left SSV reflux.   8/10/20  Conclusion  · Normal rest and exercise YAJAIRA's.     Radiation:  No  Chemotherapy: no   Previous Lymphedema Treatment: no  Prior Therapy: 2019 for knees  Social History: lives with , dog  + driving  Not working -retired  Environmental barriers:  "bedroom down, goes upstairs infrequently  Able to apply shoes/socks   Abuse/Neglect: no   Nutritional status: obesity BMI 41   Educational needs: met   Spiritual/Cultural: met    Fall risk: min   Occupation: retired  Prior Level of Function: limited amb due to knee pain  Current Level of Function: same  Gait: no device    Transfers:MOD I    Bed Mobility: MOD I     Pain and Swelling: mainly knee pain and around the knees  Feels some pain due to OA and musculoskeletal  Current 3/10, worst 5/10, best 2/10   Location: bilateral knees / legs   Description: Aching, Dull, Grabbing and Tight  Aggravating Factors: Sitting, Standing, Walking, Night Time and Getting out of bed/chair  Easing Factors: medication- movement, better in AM    Pts goals: flexibilty- help with swelling    Objective     Obese female amb to dept with no compression, slip on tennis shoes  Amount of Swelling/Location of Swelling: mild/mod - obesity and body habitus abdomin and thighs, mild lower legs and ankles  Soft fullness at knees - little to no in feet  Skin Integrity: multiple spider veins, varicosities, small bruises   Palpation/Texture: soft fullness, no pitting   - Stemmer Sign  - Dave's Sign  Circulation: intact     Posture: wide base- pt comments she has attempted shoe insert R due to "flattened cartilage in knees- needs R TKA after COVID"     Range of Motion - LE  Arom knee, ankle sitting or supine  (R) kf 110, DF 5   (L) kf 105 mild soreness, DF 5     Strength: functional screen of AROM against gravity and sit to stand transfers  Active motions in sitting, min a of UE to stand     Sensation: intact to lt touch B LE    Girth Measurements (in centimeters)  LANDMARK LEFT LE  10/16/20 RIGHT LE  10/16/20   SBP + 20 77.0 cm 80.0 cm   SBP + 10  65.0 cm 66.0 cm   SBP 60.0 cm 61.0 cm   10 below SBP 49.5 cm 50.0 cm   20 below SBP 43.0 cm 42.0 cm   30 below SBP 36.0 cm 33.0 cm   35 below SBP 32.0 cm 28.5 cm   Ankle 27.5 cm 27.5 cm   Forefoot 21.5 cm " 21.5 cm       CMS Impairment/Limitation/Restriction for FOTO Survey  Staff did not capture    Therapist reviewed FOTO scores for Carolina Knight on 10/16/2020.   FOTO documents entered into [employer] - see Media section.       TREATMENT   Treatment Time In: 940a  Treatment Time Out: 1005a  Total Treatment time separate from Evaluation: 25 minutes    Carolina Tucker received therapeutic exercises to develop ROM, flexibility and posture for 5 minutes including:  Aps, knee ROM, avoid dependency, avoid immobility, elevation, walking with compression, deep breathing, use of muscle pump to assist venous return    Carolina Tucker received the following manual therapy techniques: Manual Lymphatic Drainage and compression recommendations were applied to the: B LE for 15  minutes, including:  Education and training in compression needs.  Complete Decongestive Therapy components and management with goals and plan of care review.    Demo of Manual Lymphatic Drainage and short stretch compression bandaging.     Demo of shorts / tights to compliment knee high products if obesity / body habitus does not support thigh highs or pantyhose  List provided    Self Care/Home Management training for 10 minutes including:  Weight management and medication compliance as MD orders    Pt was educated in potential compression needs.  Demo of products including socks, garments, and Inelastic Velcro wraps.   Discussed cost/coverage and authorization per insurance with Durable Medical Equipment(DME) provider.  Compression require orders from referring provider and coverage or purchase of products from DME or self order.      Discussed wear schedule, don/doff, wash and management of products.  Size and compression class and AM/PM needs.    Consideration for tubigrip as form of temporary compression use until securing compression needs.   Consideration for shorts/tights or capris style compression to compliment lower leg compression to support size/shape of  LE.   Product information provider.   Vendor list provided.    Informed insurance coverage of compression is per DME provider and typically Medicare and Medicare group plans may not cover cost beyond pair of standard sized knee high garments.   Commitment to attendance as well as commitment to securing compression needs is critical to edema management.      Home Exercises and Patient Education Provided  Education provided:   - options for compression   - Pt was educated in lymphedema etiology and management plans.  Pt was provided with written risk reductions and precautions for managing lymphedema.      This patient is in agreement to participate in Lymphedema treatment.    Written Home Exercises Provided: Patient instructed to cont prior HEP.  Exercises were reviewed and Carolina Tucker was able to demonstrate them prior to the end of the session.  Carolina Tucker demonstrated good  understanding of the education provided.     See EMR under Patient Instructions for exercises provided 10/16/2020.    Assessment   Carolina is a 65 y.o. female referred to outpatient Physical Therapy with a medical diagnosis of   Diagnosis   I89.0 (ICD-10-CM) - Lymphedema of both lower extremities   This patient presents s/p HTN, morbid obesity BMI 40, varicose veins, venous reflux, with OA and reported need for TKA resulting in: multifactorial lymphedema of the B LE, increased pain, increased stiffness in the LE, as well as difficulty performing walking, mobility, size/shape , compression needs, and placing the pt at higher risk of infection.     Pt prognosis is Fair.   Pt will benefit from skilled outpatient Physical Therapy to address the deficits stated above and in the chart below, provide pt/family education, and to maximize pt's level of independence.     Plan of care discussed with patient: Yes  Pt's spiritual, cultural and educational needs considered and patient is agreeable to the plan of care and goals as stated below:     Medical  Necessity is demonstrated by the following  History  Co-morbidities and personal factors that may impact the plan of care Co-morbidities:   advanced age, coping style/mechanism, financial considerations, high BMI, HTN, level of undertstanding of current condition and venous reflux and varicosities OA    Personal Factors:   no deficits     moderate   Examination  Body Structures and Functions, activity limitations and participation restrictions that may impact the plan of care Body Regions:   lower extremities  trunk    Body Systems:    gross symmetry  gait  edema  scar formation  skin integrity  skin color  venous reflux, obesity, knee pain    Participation Restrictions:   compliance    Activity limitations:   Learning and applying knowledge  no deficits    General Tasks and Commands  no deficits    Communication  no deficits    Mobility  walking    Self care  dressing  compression    Domestic Life  doing house work (cleaning house, washing dishes, laundry)    Interactions/Relationships  no deficits    Life Areas  no deficits    Community and Social Life  assists with family- life stressors         moderate   Clinical Presentation evolving clinical presentation with changing clinical characteristics moderate   Decision Making/ Complexity Score: moderate     Anticipated Barriers for therapy: obesity, venous, diuretic, OA, compression compliance and fit    The following goals were discussed with the patient and patient is in agreement with them as to be addressed in the treatment plan.     Short Term Goals: (6 weeks)  1. Patient will show decreased girth in B LE by up to 1 cm to allow for LE symmetry, shoe and clothing choice, and ability to apply needed compression.  (progressing, not met)   2. Patient will demonstrate 100% knowledge of lymphedema precautions and signs of infection to allow for reduced lymphedema risk, infection risk, and/or exacerbation of condition.  (progressing, not met)  3. Patient or caregiver  will perform self-bandaging techniques and/or wearing of compression garments to allow for lymphatic drainage support, skin elasticity, and reduction in shape and size of limb. (progressing, not met)  4. Patient will perform self lymph drainage techniques to areas within reach to enhance lymphatic drainage and skin condition.  (progressing, not met)  5. Patient will tolerate daily activities with multilayered bandaging to allow for lymphatic and venous support.  (progressing, not met)    Long Term Goals: (12  weeks)  1. Patient will show decreased girth in B LE by up to 2 cm  to allow for LE symmetry, shoe and clothing choice, and ability to apply needed compression daily.  (progressing, not met)  2. Patient will show reduction in density to mild or less with improved contour of limb to allow for cosmesis, LE symmetry, infection risk reduction, and clothing and compression choice.   (progressing, not met)  3. Patient to rogerio/doff compression garment with daily compliance to assist in lymphedema management, skin elasticity, and tissue density.  (progressing, not met)  4. Pt to show improved postural awareness and alignment.  (progressing, not met)  5. Pt to be I and compliant with HEP to allow for increased function in affected limb.   (progressing, not met)  Plan   Plan of care Certification: 10/16/2020 to 1/8/20.    Outpatient Physical Therapy 2 times weekly for 10 weeks to include the following interventions: Patient Education, Self Care, Therapeutic Activites and Therapeutic Exercise. Complete Decongestive Therapy- compression and home equipment needs to be addressed and assisted.    Pt may be seen by a PTA as part of the Rehab treatment team.  Plan of Care was discussed with GARRICK Grullon, PT

## 2020-11-05 ENCOUNTER — PATIENT OUTREACH (OUTPATIENT)
Dept: ADMINISTRATIVE | Facility: OTHER | Age: 65
End: 2020-11-05

## 2020-11-05 NOTE — PROGRESS NOTES
Updates were requested from care everywhere.  Chart was reviewed for overdue Proactive [facility] Encounters (MICHAEL) topics (CRS, Breast Cancer Screening, Eye exam)  Health Maintenance has been updated.  LINKS not responding.

## 2020-11-09 ENCOUNTER — OFFICE VISIT (OUTPATIENT)
Dept: OBSTETRICS AND GYNECOLOGY | Facility: CLINIC | Age: 65
End: 2020-11-09
Payer: MEDICARE

## 2020-11-09 VITALS
BODY MASS INDEX: 39.99 KG/M2 | SYSTOLIC BLOOD PRESSURE: 132 MMHG | WEIGHT: 248.81 LBS | HEIGHT: 66 IN | DIASTOLIC BLOOD PRESSURE: 84 MMHG

## 2020-11-09 DIAGNOSIS — Z12.4 PAP SMEAR FOR CERVICAL CANCER SCREENING: ICD-10-CM

## 2020-11-09 DIAGNOSIS — Z12.89 ENCOUNTER FOR PELVIC SCREENING FOR CANCER: Primary | ICD-10-CM

## 2020-11-09 DIAGNOSIS — Z87.898 HISTORY OF ABNORMAL CELLS FROM CERVIX: ICD-10-CM

## 2020-11-09 DIAGNOSIS — B37.2 YEAST DERMATITIS: ICD-10-CM

## 2020-11-09 PROCEDURE — 88175 CYTOPATH C/V AUTO FLUID REDO: CPT

## 2020-11-09 PROCEDURE — G0101 CA SCREEN;PELVIC/BREAST EXAM: HCPCS | Mod: S$PBB,,, | Performed by: OBSTETRICS & GYNECOLOGY

## 2020-11-09 PROCEDURE — 99214 OFFICE O/P EST MOD 30 MIN: CPT | Mod: PBBFAC,PO,25 | Performed by: OBSTETRICS & GYNECOLOGY

## 2020-11-09 PROCEDURE — 99999 PR PBB SHADOW E&M-EST. PATIENT-LVL IV: CPT | Mod: PBBFAC,,, | Performed by: OBSTETRICS & GYNECOLOGY

## 2020-11-09 PROCEDURE — 99999 PR PBB SHADOW E&M-EST. PATIENT-LVL IV: ICD-10-PCS | Mod: PBBFAC,,, | Performed by: OBSTETRICS & GYNECOLOGY

## 2020-11-09 PROCEDURE — G0101 CA SCREEN;PELVIC/BREAST EXAM: HCPCS | Mod: PBBFAC,PO | Performed by: OBSTETRICS & GYNECOLOGY

## 2020-11-09 PROCEDURE — G0101 PR CA SCREEN;PELVIC/BREAST EXAM: ICD-10-PCS | Mod: S$PBB,,, | Performed by: OBSTETRICS & GYNECOLOGY

## 2020-11-09 RX ORDER — FLUCONAZOLE 200 MG/1
200 TABLET ORAL
Qty: 2 TABLET | Refills: 0 | Status: SHIPPED | OUTPATIENT
Start: 2020-11-09 | End: 2020-11-13

## 2020-11-09 RX ORDER — ESTRADIOL 0.1 MG/G
1 CREAM VAGINAL DAILY
Qty: 42.5 G | Refills: 1 | Status: SHIPPED | OUTPATIENT
Start: 2020-11-09 | End: 2021-09-28

## 2020-11-09 RX ORDER — KETOCONAZOLE 20 MG/G
CREAM TOPICAL DAILY
Qty: 30 G | Refills: 0 | Status: SHIPPED | OUTPATIENT
Start: 2020-11-09 | End: 2021-08-24 | Stop reason: SDUPTHER

## 2020-11-09 NOTE — PROGRESS NOTES
Chief Complaint   Patient presents with    Well Woman    Consult     on low estrogen       HISTORY OF PRESENT ILLNESS:   Carolina Knight is a 65 y.o. female  who presents for well woman exam.  No LMP recorded. Patient has had a hysterectomy.. Menopause at age 42 2/2 [place]/LSO due to endometrosis   She has complains of vaginal dryness and is going to have intercourse but is worried it will hurt, did the vaginal estrogen but reports it burned so stopped.   Declines STD testing.      Past Medical History:   Diagnosis Date    Abnormal Pap smear of cervix     [facility] - CKC, then Hyst    Asthma, chronic     Bleeding hemorrhoids     Depression     Difficult intubation     Diverticular disease     Endometriosis     Fatty liver 12/15/2014    Herpes simplex without mention of complication     Hypercholesterolemia     Hypertension     Multiple gastric polyps 2012    Obesity     Reflux     Thyroid disease           Past Surgical History:   Procedure Laterality Date    BREAST BIOPSY Left 2017    core    BREAST BIOPSY Right     core    CERVICAL CONIZATION   W/ LASER       SECTION      x1    CHOLECYSTECTOMY      ESOPHAGOGASTRODUODENOSCOPY N/A 2018    Procedure: ESOPHAGOGASTRODUODENOSCOPY (EGD);  Surgeon: George Henriquez MD;  Location: [facility] ([address] [place] [address]);  Service: Endoscopy;  Laterality: N/A;  possible difficult intubation - see anesthesia note dated 1/15/13 - 2nd floor/ generated from last EGD, 3 year f/u, Pt due/ Pt requesting Dr. Henriquez - ER    HYSTERECTOMY  age 42    [place], LSO (uterine fibroids, adhesions, endometriosis)    OOPHORECTOMY  age 42    LSO (endometriosis, cyst)    THYROIDECTOMY      TOE SURGERY      left    TOTAL THYROIDECTOMY           Social History     Socioeconomic History    Marital status:      Spouse name: Not on file    Number of children: Not on file    Years of education: Not on file    Highest education level: Not on file   Occupational  History    Not on file   Social Needs    Financial resource strain: Not on file    Food insecurity     Worry: Not on file     Inability: Not on file    Transportation needs     Medical: Not on file     Non-medical: Not on file   Tobacco Use    Smoking status: Never Smoker    Smokeless tobacco: Never Used   Substance and Sexual Activity    Alcohol use: No     Frequency: Never     Comment: none lately.  rarely has a drink for special occassion    Drug use: No    Sexual activity: Yes     Partners: Male     Birth control/protection: Surgical, Post-menopausal, None   Lifestyle    Physical activity     Days per week: 0 days     Minutes per session: Not on file    Stress: Not at all   Relationships    Social connections     Talks on phone: More than three times a week     Gets together: Twice a week     Attends Faith service: Not on file     Active member of club or organization: Yes     Attends meetings of clubs or organizations: More than 4 times per year     Relationship status:    Other Topics Concern    Are you pregnant or think you may be? Not Asked    Breast-feeding Not Asked   Social History Narrative    Not on file       Family History   Problem Relation Age of Onset    Breast cancer Maternal Aunt     Melanoma Sister     Melanoma Brother     Cancer Maternal Uncle         esophageal cancer    Ovarian cancer Neg Hx     Colon cancer Neg Hx          OB History    Para Term  AB Living   1 1 1     1   SAB TAB Ectopic Multiple Live Births           1      # Outcome Date GA Lbr Eric/2nd Weight Sex Delivery Anes PTL Lv   1 Term 84    M CS-LTranv  N BURT       COMPREHENSIVE GYN HISTORY:  PAP History: had CKC in  but normal paps since; 2018 NILM   Infection History: Denies STDs. Denies PID.  Benign History: had uterine fibroids. Denies ovarian cysts. had endometriosis Denies other conditions.  Cancer History: Denies cervical cancer. Denies uterine cancer or hyperplasia.  "Denies ovarian cancer. Denies vulvar cancer or pre-cancer. Denies vaginal cancer or pre-cancer. Denies breast cancer. Denies colon cancer.  Cycle: normal until had hyst at 42 2/2 endo and an enlarging uterus due to fibroids    ROS:  GENERAL: Denies weight gain or weight loss. Feeling well overall.   SKIN: Denies rash or lesions.   HEAD: Denies headache.   NODES: Denies enlarged lymph nodes.   CHEST: Denies shortness of breath.   ABDOMEN: No abdominal pain, constipation, diarrhea, nausea, vomiting or rectal bleeding.   URINARY: No frequency, dysuria, hematuria, or burning on urination.  REPRODUCTIVE: See HPI.   BREASTS: The patient denies pain, lumps, or nipple discharge.       Ht 5' 6" (1.676 m)   Wt 112.9 kg (248 lb 12.6 oz)   BMI 40.16 kg/m²     APPEARANCE: Well nourished, well developed, in no acute distress.  NECK: Neck symmetric without  thyromegaly.  NODES: No inguinal, cervical lymph node enlargement.  CHEST: Lungs clear to auscultation.  HEART: Regular rate and rhythm, no murmurs, rubs or gallops.  ABDOMEN: Soft. No tenderness or masses. No hernias. No hepatosplenomegaly.  BREASTS: Symmetrical, no skin changes or visible lesions. No palpable masses, nipple discharge or adenopathy bilaterally.  PELVIC:   VULVA: No lesions. Normal female genitalia.  URETHRAL MEATUS: Normal size and location, no lesions, no prolapse.  URETHRA: No masses, tenderness, prolapse or scarring.  VAGINA: atrophic, no discharge, no significant cystocele or rectocele. Well healed cuff   CERVIX: surgically absent  UTERUS: surgically absent   ADNEXA: No masses or tenderness.  PERINEUM: Normal, mo masses, small internal hemorrhoid     Data Reviewed:     Last MMG: Date: 7/2020 BIRADS 1  Lipid profile: Date:   Lab Results   Component Value Date    CHOL 163 08/11/2020    CHOL 151 07/22/2020    CHOL 210 (H) 05/09/2019     Lab Results   Component Value Date    HDL 51 08/11/2020    HDL 52 07/22/2020    HDL 64 05/09/2019     Lab Results "   Component Value Date    LDLCALC 91.0 08/11/2020    LDLCALC 81.8 07/22/2020    LDLCALC 129.8 05/09/2019     Lab Results   Component Value Date    TRIG 105 08/11/2020    TRIG 86 07/22/2020    TRIG 81 05/09/2019     Lab Results   Component Value Date    CHOLHDL 31.3 08/11/2020    CHOLHDL 34.4 07/22/2020    CHOLHDL 30.5 05/09/2019     TSH:   Lab Results   Component Value Date    TSH 1.378 07/22/2020     Colonoscopy Date: 11/2010  DXA: 2020 osteopenia     1. Encounter for pelvic screening for cancer    2. Pap smear for cervical cancer screening    3. History of abnormal cells from cervix        A/P 1. Routine gyn annual exam. s/p normal breast exam and MMG ordered.  Pap  ordered, discussed with her not likely indicated 2/2 hyst and no cervix and its been over 20 years since her ckc and abn pap but she would like to continue to get them yearly.  Lipid Profile, needed every 5 years, up to date. Fasting glucose, needed every 3 years, up to date.   TSH, needed every 5 years, up to date.   Affirm done, will do ketonazole for area in groin  Does have vaginal atrophy we discussed in detail when to take HRT and when estrogen cream would be more appropriate and as she isn't having hot flashes will use premarin. She would like to do the lowest dose possible so we discussed doing what will help with her dryness and atrophy she can do it twice a week or prn if she likes. Discussed the low systemic absorption but the risk of DVT/stroke/breast cancer.  Will try this and if too uncomfortable then will change to vagifem    F/u in 1 yr or PRN

## 2020-11-10 NOTE — PROGRESS NOTES
Physical Therapy Daily Treatment Note     Name: Carolina GarciaGarrett  [facility] Number: [id number]    Therapy Diagnosis:   Encounter Diagnoses   Name Primary?    Morbid obesity with BMI of 40.0-44.9, adult     Venous insufficiency of both lower extremities     Edema of both legs     Pain in both lower legs     Varicose veins of bilateral lower extremities with other complications     Lymphedema of both lower extremities Yes    Edema, unspecified type      Physician: Mina Plummer MD*    Visit Date: 11/11/2020    Physician Orders: PT Eval and Treat - lymphedema  Medical Diagnosis from Referral:   Diagnosis   I89.0 (ICD-10-CM) - Lymphedema of both lower extremities   Evaluation Date: 10/16/2020  Authorization Period Expiration: 12/31/2020  Plan of Care Expiration: 1/8/21  Visit # / Visits authorized: 1/ 20     Time In: 1:00 pm  Time Out: 2:00 pm  Total Billable Time: 60 minutes      Precautions: Standard, Fall and obesity, venous, lymphedema    Subjective     Pt reports: swelling in both legs and also suffers with knee pain. Pt reports she needs to get a TKA done but has put it off due to COVID . Reports she might need to consider just getting it done despite COVID still going on.      She was compliant with home exercise program.  Response to previous treatment: she lost the papers Monica gave her so she has not yet purchased any compression .   Functional change: limited activity due to knee pain   Reports she does not wear compression .     Pain: 4/10  Location: B legs and knees ; R knee > L knee    Objective     Obese female amb to dept with no compression, slip on tennis shoes  Amount of Swelling/Location of Swelling: mild/mod - obesity and body habitus abdomin and thighs, mild lower legs and ankles  Soft fullness at knees - little to no in feet  Skin Integrity: multiple spider veins, varicosities, small bruises   Palpation/Texture: soft fullness, no pitting   - Stemmer Sign  - Dave's Sign  Circulation:  intact     Treatment:   Carolina Tucker received the following manual therapy techniques:- Manual Lymphatic Drainage were applied to the: LLE for 55 minutes, including: MLD and short stretch compression bandaging     MANUAL LYMPHATIC DRAINAGE (MLD):    While supine with LEs elevated stimulation at terminus, along GI region, B inguinal regions, drainage of entire LLE helen lower leg, ankle, and foot with return proximally,  Use of Aquaphor due to dryness.   Educated in self massage to abdominal areas, B inguinal areas, thigh, and remaining LE within reach.    MULTILAYERED BANDAGING:  issued supplies and bandaged LLE with cotton stockinette, cellona section dorsum of foot, 2 cellona rolls ankle to knee, 1-8cm and 2- 10cm rosidal k rolls foot to knee, to leave intact 12-24 hrs as tolerated, discontinue with any problems, return rolled bandages next session. Wash and wear schedules confirmed.     Demo of shorts / tights to compliment knee high products if obesity / body habitus does not support thigh highs or pantyhose  List provided    Carolina Tucker received therapeutic exercises to develop ROM, flexibility and posture for 5 minutes including:  Aps, knee ROM, avoid dependency, avoid immobility, elevation, walking with compression, deep breathing, use of muscle pump to assist venous return    Home Exercises Provided and Patient Education Provided     Education provided:    Weight management and medication compliance as MD orders     Pt was educated in potential compression needs.  Demo of products including socks, garments, and Inelastic Velcro wraps.   Discussed cost/coverage and authorization per insurance with Durable Medical Equipment(DME) provider.  Compression require orders from referring provider and coverage or purchase of products from DME or self order.      Discussed wear schedule, don/doff, wash and management of products.  Size and compression class and AM/PM needs.    Consideration for tubigrip as form of  temporary compression use until securing compression needs.   Consideration for shorts/tights or capris style compression to compliment lower leg compression to support size/shape of LE.   Product information provider.   Vendor list provided.     Informed insurance coverage of compression is per DME provider and typically Medicare and Medicare group plans may not cover cost beyond pair of standard sized knee high garments.   Commitment to attendance as well as commitment to securing compression needs is critical to edema management.      PATIENT/FAMILY Education: bandaging wear schedule,  HEP,  Beginning of self massage,  Self or assisted bandaging, compression options, and Risk reduction    Written Home Exercises Provided: yes.  Exercises were reviewed and Carolina Tucker was able to demonstrate them prior to the end of the session.  Carolina Tucker demonstrated good  understanding of the education provided.       Assessment     Carolina is a 65 y.o. female referred to outpatient Physical Therapy with a medical diagnosis of   Diagnosis   I89.0 (ICD-10-CM) - Lymphedema of both lower extremities   This patient presents s/p HTN, morbid obesity BMI 40, varicose veins, venous reflux, with OA and reported need for TKA resulting in: multifactorial lymphedema of the B LE, increased pain, increased stiffness in the LE, as well as difficulty performing walking, mobility, size/shape , compression needs, and placing the pt at higher risk of infection.     Pt presents with multifactorial BLE swelling . CDT initiated on pts RLE today and discussed importance of use of daily compression to manage. Reviewed and encouraged pt to purchase leggings/capris/biker short style compression for LE and abdominal support. Pts tolerance to compression and to bandages is questionable and may be limited in options due to this. Therapy to continue to progress CDT, education on self managemnet and assist pt in purchase of compression options.     Carolina  Garrett is progressing well towards her goals.   Pt prognosis is Fair.     Pt will continue to benefit from skilled outpatient physical therapy to address the deficits listed in the problem list box on initial evaluation, provide pt/family education and to maximize pt's level of independence in the home and community environment.     Pt's spiritual, cultural and educational needs considered and pt agreeable to plan of care and goals.     Anticipated Barriers for therapy: obesity, venous, diuretic, OA, compression compliance and fit     The following goals were discussed with the patient and patient is in agreement with them as to be addressed in the treatment plan.      Short Term Goals: (6 weeks)  1. Patient will show decreased girth in B LE by up to 1 cm to allow for LE symmetry, shoe and clothing choice, and ability to apply needed compression.  (progressing, not met)   2. Patient will demonstrate 100% knowledge of lymphedema precautions and signs of infection to allow for reduced lymphedema risk, infection risk, and/or exacerbation of condition.  (progressing, not met)  3. Patient or caregiver will perform self-bandaging techniques and/or wearing of compression garments to allow for lymphatic drainage support, skin elasticity, and reduction in shape and size of limb. (progressing, not met)  4. Patient will perform self lymph drainage techniques to areas within reach to enhance lymphatic drainage and skin condition.  (progressing, not met)  5. Patient will tolerate daily activities with multilayered bandaging to allow for lymphatic and venous support.  (progressing, not met)     Long Term Goals: (12  weeks)  1. Patient will show decreased girth in B LE by up to 2 cm  to allow for LE symmetry, shoe and clothing choice, and ability to apply needed compression daily.  (progressing, not met)  2. Patient will show reduction in density to mild or less with improved contour of limb to allow for cosmesis, LE symmetry,  infection risk reduction, and clothing and compression choice.   (progressing, not met)  3. Patient to rogerio/doff compression garment with daily compliance to assist in lymphedema management, skin elasticity, and tissue density.  (progressing, not met)  4. Pt to show improved postural awareness and alignment.  (progressing, not met)  5. Pt to be I and compliant with HEP to allow for increased function in affected limb.   (progressing, not met)    Plan   Plan of care Certification: 10/16/2020 to 1/8/20.     Outpatient Physical Therapy 2 times weekly for 10 weeks to include the following interventions: Patient Education, Self Care, Therapeutic Activites and Therapeutic Exercise. Complete Decongestive Therapy- compression and home equipment needs to be addressed and assisted.    Demetra Chaudhary, PTA

## 2020-11-11 ENCOUNTER — PATIENT MESSAGE (OUTPATIENT)
Dept: OBSTETRICS AND GYNECOLOGY | Facility: CLINIC | Age: 65
End: 2020-11-11

## 2020-11-11 ENCOUNTER — CLINICAL SUPPORT (OUTPATIENT)
Dept: REHABILITATION | Facility: HOSPITAL | Age: 65
End: 2020-11-11
Payer: MEDICARE

## 2020-11-11 DIAGNOSIS — E66.01 MORBID OBESITY WITH BMI OF 40.0-44.9, ADULT: ICD-10-CM

## 2020-11-11 DIAGNOSIS — I87.2 VENOUS INSUFFICIENCY OF BOTH LOWER EXTREMITIES: ICD-10-CM

## 2020-11-11 DIAGNOSIS — I89.0 LYMPHEDEMA OF BOTH LOWER EXTREMITIES: Primary | ICD-10-CM

## 2020-11-11 DIAGNOSIS — R60.0 EDEMA OF BOTH LEGS: ICD-10-CM

## 2020-11-11 DIAGNOSIS — I83.893 VARICOSE VEINS OF BILATERAL LOWER EXTREMITIES WITH OTHER COMPLICATIONS: ICD-10-CM

## 2020-11-11 DIAGNOSIS — M79.662 PAIN IN BOTH LOWER LEGS: ICD-10-CM

## 2020-11-11 DIAGNOSIS — R60.9 EDEMA, UNSPECIFIED TYPE: ICD-10-CM

## 2020-11-11 DIAGNOSIS — M79.661 PAIN IN BOTH LOWER LEGS: ICD-10-CM

## 2020-11-11 PROCEDURE — 97140 MANUAL THERAPY 1/> REGIONS: CPT | Mod: PO,CQ

## 2020-11-13 ENCOUNTER — CLINICAL SUPPORT (OUTPATIENT)
Dept: REHABILITATION | Facility: HOSPITAL | Age: 65
End: 2020-11-13
Payer: MEDICARE

## 2020-11-13 DIAGNOSIS — R60.9 EDEMA, UNSPECIFIED TYPE: ICD-10-CM

## 2020-11-13 DIAGNOSIS — E66.01 MORBID OBESITY WITH BMI OF 40.0-44.9, ADULT: ICD-10-CM

## 2020-11-13 DIAGNOSIS — M79.662 PAIN IN BOTH LOWER LEGS: ICD-10-CM

## 2020-11-13 DIAGNOSIS — I89.0 LYMPHEDEMA OF BOTH LOWER EXTREMITIES: Primary | ICD-10-CM

## 2020-11-13 DIAGNOSIS — M79.661 PAIN IN BOTH LOWER LEGS: ICD-10-CM

## 2020-11-13 DIAGNOSIS — R60.0 EDEMA OF BOTH LEGS: ICD-10-CM

## 2020-11-13 DIAGNOSIS — I87.2 VENOUS INSUFFICIENCY OF BOTH LOWER EXTREMITIES: ICD-10-CM

## 2020-11-13 DIAGNOSIS — I83.893 VARICOSE VEINS OF BILATERAL LOWER EXTREMITIES WITH OTHER COMPLICATIONS: ICD-10-CM

## 2020-11-13 PROCEDURE — 97140 MANUAL THERAPY 1/> REGIONS: CPT | Mod: PO,CQ

## 2020-11-13 NOTE — PROGRESS NOTES
Physical Therapy Daily Treatment Note     Name: Carolina Knight  Clinic Number: [id number]    Therapy Diagnosis:   Encounter Diagnoses   Name Primary?    Morbid obesity with BMI of 40.0-44.9, adult     Venous insufficiency of both lower extremities     Edema of both legs     Pain in both lower legs     Varicose veins of bilateral lower extremities with other complications     Lymphedema of both lower extremities Yes    Edema, unspecified type      Physician: Mina Plummer MD*    Visit Date: 11/13/2020    Physician Orders: PT Eval and Treat - lymphedema  Medical Diagnosis from Referral:   Diagnosis   I89.0 (ICD-10-CM) - Lymphedema of both lower extremities   Evaluation Date: 10/16/2020  Authorization Period Expiration: 12/31/2020  Plan of Care Expiration: 1/8/21  Visit # / Visits authorized: 2/ 20     Time In: 1:00 pm  Time Out: 2:00 pm  Total Billable Time: 60 minutes      Precautions: Standard, Fall and obesity, venous, lymphedema    Subjective     Pt reports: pt purchased some different compression options and brought with her for us to review.      She was compliant with home exercise program.  Response to previous treatment: the bandages felt fine and was able to wear for 24 hours .   Pt purchased some compression options .   Functional change: limited activity due to knee pain , reports tries to walk every day depending on how she feels      Pain: 4/10  Location: B legs and knees ; R knee > L knee    Objective     Obese female amb to dept with no compression, slip on tennis shoes  Amount of Swelling/Location of Swelling: mild/mod - obesity and body habitus abdomin and thighs, mild lower legs and ankles  Soft fullness at knees - little to no in feet  Skin Integrity: multiple spider veins, varicosities, small bruises   Palpation/Texture: soft fullness, no pitting   - Stemmer Sign  - Dave's Sign  Circulation: intact     Treatment:   Carolina Tucker received the following manual therapy techniques:-  Manual Lymphatic Drainage were applied to the: LLE for 55 minutes, including: MLD and short stretch compression bandaging     MANUAL LYMPHATIC DRAINAGE (MLD):    While supine with LEs elevated stimulation at terminus, along GI region, B inguinal regions, drainage of entire R and LLE helen lower leg, ankle, and foot with return proximally,  Use of Aquaphor due to dryness.   Educated in self massage to abdominal areas, B inguinal areas, thigh, and remaining LE within reach.    MULTILAYERED BANDAGING:  issued supplies and bandaged R and LLE with cotton stockinette, cellona section dorsum of foot, 2 cellona rolls ankle to knee, 1-8cm and 2- 10cm rosidal k rolls foot to knee, to leave intact 12-24 hrs as tolerated, discontinue with any problems, return rolled bandages next session. Wash and wear schedules confirmed.     Pt wishes to self purchase compression options and worries medical grade strength will not be tolerable.   Pt brings in two pairs of leggings self purchased from [employer] - advised one pair is all cotton and would not provide enough compression . Her second pair offered some compression but still may not be enough .   Pt also purchased knee high socks - copper fit . Advised these are ideal to wear daily.    Discussed purchasing capris/biker style compression shorts to complement knee high compression. Pt admits not feeling comfortable with online purchasing and wishes to purchase in store only so is limited in options.     Carolina Tucker received therapeutic exercises to develop ROM, flexibility and posture for 5 minutes including:  Aps, knee ROM, avoid dependency, avoid immobility, elevation, walking with compression, deep breathing, use of muscle pump to assist venous return    Home Exercises Provided and Patient Education Provided     Education provided:   Weight management and medication compliance as MD orders     Pt was educated in potential compression needs.  Demo of products including  socks, garments, and Inelastic Velcro wraps.   Discussed cost/coverage and authorization per insurance with Durable Medical Equipment(DME) provider.  Compression require orders from referring provider and coverage or purchase of products from DME or self order.      Discussed wear schedule, don/doff, wash and management of products.  Size and compression class and AM/PM needs.    Consideration for tubigrip as form of temporary compression use until securing compression needs.   Consideration for shorts/tights or capris style compression to compliment lower leg compression to support size/shape of LE.   Product information provider.   Vendor list provided.      PATIENT/FAMILY Education: bandaging wear schedule,  HEP,  Beginning of self massage,  Self or assisted bandaging, compression options, and Risk reduction    Written Home Exercises Provided: yes.  Exercises were reviewed and Carolina Tucker was able to demonstrate them prior to the end of the session.  Carolina Tucker demonstrated good  understanding of the education provided.       Assessment     Carolina is a 65 y.o. female referred to outpatient Physical Therapy with a medical diagnosis of   Diagnosis   I89.0 (ICD-10-CM) - Lymphedema of both lower extremities   This patient presents s/p HTN, morbid obesity BMI 40, varicose veins, venous reflux, with OA and reported need for TKA resulting in: multifactorial lymphedema of the B LE, increased pain, increased stiffness in the LE, as well as difficulty performing walking, mobility, size/shape , compression needs, and placing the pt at higher risk of infection.     Pt presents with multifactorial BLE swelling . CDT progressed to BLE today and discussed importance of use of daily compression to manage. Reviewed and encouraged pt to purchase leggings/capris/biker short style compression for LE and abdominal support - some limitations with recommendations as pt wishes to purchase in store only. Therapy to continue to progress  CDT, education on self managemnet and assist pt in purchase of compression options.     Carolina Tucker is progressing well towards her goals.   Pt prognosis is Fair.     Pt will continue to benefit from skilled outpatient physical therapy to address the deficits listed in the problem list box on initial evaluation, provide pt/family education and to maximize pt's level of independence in the home and community environment.     Pt's spiritual, cultural and educational needs considered and pt agreeable to plan of care and goals.     Anticipated Barriers for therapy: obesity, venous, diuretic, OA, compression compliance and fit     The following goals were discussed with the patient and patient is in agreement with them as to be addressed in the treatment plan.      Short Term Goals: (6 weeks)  1. Patient will show decreased girth in B LE by up to 1 cm to allow for LE symmetry, shoe and clothing choice, and ability to apply needed compression.  (progressing, not met)   2. Patient will demonstrate 100% knowledge of lymphedema precautions and signs of infection to allow for reduced lymphedema risk, infection risk, and/or exacerbation of condition.  (progressing, not met)  3. Patient or caregiver will perform self-bandaging techniques and/or wearing of compression garments to allow for lymphatic drainage support, skin elasticity, and reduction in shape and size of limb. (progressing, not met)  4. Patient will perform self lymph drainage techniques to areas within reach to enhance lymphatic drainage and skin condition.  (progressing, not met)  5. Patient will tolerate daily activities with multilayered bandaging to allow for lymphatic and venous support.  (progressing, not met)     Long Term Goals: (12  weeks)  1. Patient will show decreased girth in B LE by up to 2 cm  to allow for LE symmetry, shoe and clothing choice, and ability to apply needed compression daily.  (progressing, not met)  2. Patient will show reduction in  density to mild or less with improved contour of limb to allow for cosmesis, LE symmetry, infection risk reduction, and clothing and compression choice.   (progressing, not met)  3. Patient to rogerio/doff compression garment with daily compliance to assist in lymphedema management, skin elasticity, and tissue density.  (progressing, not met)  4. Pt to show improved postural awareness and alignment.  (progressing, not met)  5. Pt to be I and compliant with HEP to allow for increased function in affected limb.   (progressing, not met)    Plan   Plan of care Certification: 10/16/2020 to 1/8/20.     Outpatient Physical Therapy 2 times weekly for 10 weeks to include the following interventions: Patient Education, Self Care, Therapeutic Activites and Therapeutic Exercise. Complete Decongestive Therapy- compression and home equipment needs to be addressed and assisted.    Demetra Chaudhary, PTA

## 2020-11-18 ENCOUNTER — CLINICAL SUPPORT (OUTPATIENT)
Dept: REHABILITATION | Facility: HOSPITAL | Age: 65
End: 2020-11-18
Payer: MEDICARE

## 2020-11-18 DIAGNOSIS — I87.2 VENOUS INSUFFICIENCY OF BOTH LOWER EXTREMITIES: ICD-10-CM

## 2020-11-18 DIAGNOSIS — I89.0 LYMPHEDEMA OF BOTH LOWER EXTREMITIES: Primary | ICD-10-CM

## 2020-11-18 DIAGNOSIS — R60.0 EDEMA OF BOTH LEGS: ICD-10-CM

## 2020-11-18 DIAGNOSIS — M79.662 PAIN IN BOTH LOWER LEGS: ICD-10-CM

## 2020-11-18 DIAGNOSIS — E66.01 MORBID OBESITY WITH BMI OF 40.0-44.9, ADULT: ICD-10-CM

## 2020-11-18 DIAGNOSIS — I83.893 VARICOSE VEINS OF BILATERAL LOWER EXTREMITIES WITH OTHER COMPLICATIONS: ICD-10-CM

## 2020-11-18 DIAGNOSIS — M79.661 PAIN IN BOTH LOWER LEGS: ICD-10-CM

## 2020-11-18 PROCEDURE — 97535 SELF CARE MNGMENT TRAINING: CPT | Mod: PO

## 2020-11-18 PROCEDURE — 97140 MANUAL THERAPY 1/> REGIONS: CPT | Mod: PO

## 2020-11-18 NOTE — PROGRESS NOTES
Physical Therapy Daily Treatment Note     Name: Carolina GarciaGarrett  [facility] Number: [id number]    Therapy Diagnosis:   Encounter Diagnoses   Name Primary?    Morbid obesity with BMI of 40.0-44.9, adult     Venous insufficiency of both lower extremities     Edema of both legs     Pain in both lower legs     Varicose veins of bilateral lower extremities with other complications     Lymphedema of both lower extremities Yes     Physician: Mina Plummer MD*    Visit Date: 11/18/2020    Physician Orders: PT Eval and Treat - lymphedema  Medical Diagnosis from Referral:   Diagnosis   I89.0 (ICD-10-CM) - Lymphedema of both lower extremities   Evaluation Date: 10/16/2020  Authorization Period Expiration: 12/31/2020  Plan of Care Expiration: 1/8/21  Visit # / Visits authorized: 4/ 20     Time In: 900a  Time Out: 1005a  Total Billable Time: 65 minutes      Precautions: Standard, Fall and obesity, venous, lymphedema    Subjective     Pt reports: some bruising when removing her own compression copper socks- unclear of class.  Pt will consider purchasing medical grade knee highs with DME provider- sent order request.  Pt questions when to have R TKA- will consider COVID and medical advice.   She was compliant with home exercise program.  Response to previous treatment: pt chooses to have 1 more session- agrees to compression management.   Suggests weight loss would help - feels limited in exercise by knee- advised ROM and strengthening for muscle pump as well as joint support.   Pt declines online suggestions and prefers to shop at stores.   Functional change: limited activity due to knee pain - recommend activity while in compression support.      Pain: 4/10  Location: B legs and knees ; R knee > L knee    Objective     Obese female amb to dept with no compression, slip on tennis shoes  Amount of Swelling/Location of Swelling: mild/mod - obesity and body habitus abdomen and thighs, mild lower legs and ankles  Soft  fullness at knees - little to no in feet  Skin Integrity: multiple spider veins, varicosities, small bruises   Palpation/Texture: soft fullness, no pitting   - Stemmer Sign  - Dave's Sign  Circulation: intact     Treatment:   Carolina Tucker received the following manual therapy techniques:- Manual Lymphatic Drainage were applied to the: LLE for 50 minutes, including: MLD and short stretch compression bandaging     MANUAL LYMPHATIC DRAINAGE (MLD):    While supine with LEs elevated stimulation at terminus, along GI region, B inguinal regions, drainage of entire R and LLE helen lower leg, ankle, and foot with return proximally,  Use of Aquaphor due to dryness.   Educated in self massage to abdominal areas, B inguinal areas, thigh, and remaining LE within reach.    MULTILAYERED BANDAGING:  issued supplies and bandaged R and LLE with cotton stockinette, cellona section dorsum of foot, 2 cellona rolls ankle to knee, 1-8cm and 2- 10cm rosidal k rolls foot to knee, to leave intact 12-24 hrs as tolerated, discontinue with any problems, return rolled bandages next session. Wash and wear schedules confirmed.     Carolina Tucker received therapeutic exercises to develop ROM, flexibility and posture for 5 minutes including:  Aps, knee ROM, avoid dependency, avoid immobility, elevation, walking with compression, deep breathing, use of muscle pump to assist venous return    Home Exercises Provided and Patient Education Provided   Self care / home management training x 15 min    Education provided:   Encouraged activity and compression use  Discussed options for compression choices  Don/doff of garments with suggested gloves, method and position reviewed.    Sent order request to MD  Confirmed her understanding of knee highs plus may compliment with tights/capris for thighs  20-30mmHg knee highs recommended  Size/shape and class recommendations - girth and sizing per DME provider.    Weight management and medication compliance as MD  orders- this will assist management.    Compliance with compression even if choosing lower class better than no compression.    Product information provided.   Vendor list provided.  PATIENT/FAMILY Education: bandaging wear schedule,  HEP,  Beginning of self massage,  Self or assisted bandaging, compression options, and Risk reduction    Written Home Exercises Provided: yes.  Exercises were reviewed and Carolina Tucker was able to demonstrate them prior to the end of the session.  Carolina Tucker demonstrated good  understanding of the education provided.   Assessment     Carolina is a 65 y.o. female referred to outpatient Physical Therapy with a medical diagnosis of   Diagnosis   I89.0 (ICD-10-CM) - Lymphedema of both lower extremities   This patient presents s/p HTN, morbid obesity BMI 40, varicose veins, venous reflux, with OA and reported need for TKA resulting in: multifactorial lymphedema of the B LE, increased pain, increased stiffness in the LE, as well as difficulty performing walking, mobility, size/shape , compression needs, and placing the pt at higher risk of infection.     Pt has small vessel changes and bruising to B LE with soft fullness - she does relate some familiar history.  Compression products will assist in her management as well as weight management and medical compliance.   Reviewed and encouraged pt to purchase leggings/capris/biker short style compression for LE and abdominal support + knee high garments.  Pt declines recommendation for compression leggings/capris and will visit DME once orders are received.   Therapy to continue to progress CDT, education on self managemnet and assist pt in purchase of compression options.     Carolina Tucker is progressing well towards her goals.   Pt prognosis is Fair.     Pt will continue to benefit from skilled outpatient physical therapy to address the deficits listed in the problem list box on initial evaluation, provide pt/family education and to maximize pt's  level of independence in the home and community environment.     Pt's spiritual, cultural and educational needs considered and pt agreeable to plan of care and goals.     Anticipated Barriers for therapy: obesity, venous, diuretic, OA, compression compliance and fit     The following goals were discussed with the patient and patient is in agreement with them as to be addressed in the treatment plan.      Short Term Goals: (6 weeks)  1. Patient will show decreased girth in B LE by up to 1 cm to allow for LE symmetry, shoe and clothing choice, and ability to apply needed compression.  (progressing, not met)   2. Patient will demonstrate 100% knowledge of lymphedema precautions and signs of infection to allow for reduced lymphedema risk, infection risk, and/or exacerbation of condition.  (progressing,)  3. Patient or caregiver will perform self-bandaging techniques and/or wearing of compression garments to allow for lymphatic drainage support, skin elasticity, and reduction in shape and size of limb. (- choosing to move to compression products)  4. Patient will perform self lymph drainage techniques to areas within reach to enhance lymphatic drainage and skin condition.  (progressing,)  5. Patient will tolerate daily activities with multilayered bandaging to allow for lymphatic and venous support.  (met)     Long Term Goals: (12  weeks)  1. Patient will show decreased girth in B LE by up to 2 cm  to allow for LE symmetry, shoe and clothing choice, and ability to apply needed compression daily.  (progressing, not met)  2. Patient will show reduction in density to mild or less with improved contour of limb to allow for cosmesis, LE symmetry, infection risk reduction, and clothing and compression choice.   (progressing, not met)  3. Patient to rogerio/doff compression garment with daily compliance to assist in lymphedema management, skin elasticity, and tissue density.  (progressing, not met)  4. Pt to show improved postural  awareness and alignment.  (progressing, not met)  5. Pt to be I and compliant with HEP to allow for increased function in affected limb.   (progressing, not met)    Plan   Plan of care Certification: 10/16/2020 to 1/8/20.     Outpatient Physical Therapy 2 times weekly for 10 weeks to include the following interventions: Patient Education, Self Care, Therapeutic Activites and Therapeutic Exercise. Complete Decongestive Therapy- compression and home equipment needs to be addressed and assisted.  Sent request to MD BETH information  DC planning.    Monica Martinez, PT

## 2020-11-20 ENCOUNTER — CLINICAL SUPPORT (OUTPATIENT)
Dept: REHABILITATION | Facility: HOSPITAL | Age: 65
End: 2020-11-20
Payer: MEDICARE

## 2020-11-20 ENCOUNTER — PATIENT MESSAGE (OUTPATIENT)
Dept: ORTHOPEDICS | Facility: CLINIC | Age: 65
End: 2020-11-20

## 2020-11-20 DIAGNOSIS — I89.0 LYMPHEDEMA OF BOTH LOWER EXTREMITIES: Primary | ICD-10-CM

## 2020-11-20 DIAGNOSIS — R60.0 EDEMA OF BOTH LEGS: ICD-10-CM

## 2020-11-20 DIAGNOSIS — I83.893 VARICOSE VEINS OF BILATERAL LOWER EXTREMITIES WITH OTHER COMPLICATIONS: ICD-10-CM

## 2020-11-20 DIAGNOSIS — E66.01 MORBID OBESITY WITH BMI OF 40.0-44.9, ADULT: ICD-10-CM

## 2020-11-20 DIAGNOSIS — I87.2 VENOUS INSUFFICIENCY OF BOTH LOWER EXTREMITIES: ICD-10-CM

## 2020-11-20 DIAGNOSIS — M79.661 PAIN IN BOTH LOWER LEGS: ICD-10-CM

## 2020-11-20 DIAGNOSIS — M79.662 PAIN IN BOTH LOWER LEGS: ICD-10-CM

## 2020-11-20 PROCEDURE — 97140 MANUAL THERAPY 1/> REGIONS: CPT | Mod: PO

## 2020-11-20 PROCEDURE — 97535 SELF CARE MNGMENT TRAINING: CPT | Mod: PO

## 2020-11-20 NOTE — PROGRESS NOTES
"  Physical Therapy Daily Treatment Note/DC Summary     Name: Carolina Knight  [facility] Number: [id number]    Therapy Diagnosis:   Encounter Diagnoses   Name Primary?    Morbid obesity with BMI of 40.0-44.9, adult     Venous insufficiency of both lower extremities     Edema of both legs     Pain in both lower legs     Varicose veins of bilateral lower extremities with other complications     Lymphedema of both lower extremities Yes     Physician: Mina Plummer MD*    Visit Date: 11/20/2020    Physician Orders: PT Eval and Treat - lymphedema  Medical Diagnosis from Referral:   Diagnosis   I89.0 (ICD-10-CM) - Lymphedema of both lower extremities   Evaluation Date: 10/16/2020  Authorization Period Expiration: 12/31/2020  Plan of Care Expiration: 1/8/21  Visit # / Visits authorized: 5/ 20    Date of Last visit: 11/20/20  Total Visits Received: 5  Cancelled Visits: 1  No Show Visits: 0    Time In: 1000a  Time Out: 1105a  Total Billable Time: 65 minutes      Precautions: Standard, Fall and obesity, venous, lymphedema    Subjective     Pt reports: high salt diet and on feet yesterday so her legs "blew up"- pt admits she needs to improve her choices.   some bruising when removing her own compression copper socks- did not wear today  Pt will consider purchasing medical grade knee highs with DME provider-- provided with MD orders and vendor list.  Pt will have R TKA- will consider COVID and medical advice.   She was compliant with home exercise program.  Response to previous treatment: pt chooses discharge - did not want to come for Thanksgiving.  Functional change: limited activity due to knee pain - recommend activity while in compression support.      Pain: 4/10  Location: B legs and knees ; R knee > L knee    Objective     Obese female amb to dept with no compression, slip on tennis shoes  Amount of Swelling/Location of Swelling: mild/mod - obesity and body habitus abdomen and thighs, mild lower legs and " ankles  Soft fullness at knees - little to no in feet  Skin Integrity: multiple spider veins, varicosities, small bruises   Palpation/Texture: soft fullness, no pitting   - Stemmer Sign  - Dave's Sign  Circulation: intact     Girth Measurements (in centimeters)  LANDMARK LEFT LE  10/16/20 L LE  11/20/20 L LE  diff RIGHT LE  10/16/20 R LE  11/20/20 R LE  diff   SBP + 20 77.0 cm - - 80.0 cm - -   SBP + 10  65.0 cm - - 66.0 cm - -   SBP 60.0 cm 58.0 2.0 61.0 cm 60.0 1.0   10 below SBP 49.5 cm 47.0 2.5 50.0 cm 49.0 1.0   20 below SBP 43.0 cm 44.5 1.5 42.0 cm 43.5 1.5   30 below SBP 36.0 cm 37.5 1.5 33.0 cm 34.5 1.5   35 below SBP 32.0 cm 30.5 1.5 28.5 cm 27.0 1.5   Ankle 27.5 cm 27.0 0.5 27.5 cm 26.5 1.0   Forefoot 21.5 cm 21.0 0.5 21.5 cm 21.5 0     Treatment:   Carolina Tucker received the following manual therapy techniques:- Manual Lymphatic Drainage were applied to the: B LE for 50 minutes, including: MLD and short stretch compression bandaging     MANUAL LYMPHATIC DRAINAGE (MLD):    While supine with LEs elevated stimulation at terminus, along GI region, B inguinal regions, drainage of entire R and LLE helen lower leg, ankle, and foot with return proximally,  Use of Aquaphor due to dryness.   Educated in self massage to abdominal areas, B inguinal areas, thigh, and remaining LE within reach.    MULTILAYERED BANDAGING:  issued supplies and bandaged R and LLE with cotton stockinette, cellona section dorsum of foot, 2 cellona rolls ankle to knee, 1-8cm and 2- 10cm rosidal k rolls foot to knee, to leave intact 12-24 hrs as tolerated, discontinue with any problems, return rolled bandages next session. Wash and wear schedules confirmed.     Carolina Tucker received therapeutic exercises to develop ROM, flexibility and posture for 5 minutes including:  Aps, knee ROM, avoid dependency, avoid immobility, elevation, walking with compression, deep breathing, use of muscle pump to assist venous return    Home Exercises Provided and  Patient Education Provided   Self care / home management training x 15 min    Education provided:   Encouraged activity and compression use  Discussed options for compression choices  Don/doff of garments with suggested gloves, method and position reviewed.    Provided MD orders  Encouraged pt to follow MD advise regarding weight management, dietary and lifestyle choices, use of compression as support however need to address cause and LE edema may be symptom- not solely lymphedema- pt voiced full understanding.    Confirmed her understanding of knee highs plus may compliment with tights/capris for thighs  20-30mmHg knee highs recommended  Size/shape and class recommendations - girth and sizing per DME provider.    Weight management and medication compliance as MD orders- this will assist management.  Compliance with compression even if choosing lower class better than no compression.  Product information provided.   Vendor list provided.  PATIENT/FAMILY Education: bandaging wear schedule,  HEP,  Beginning of self massage,  Self or assisted bandaging, compression options, and Risk reduction    Written Home Exercises Provided: yes.  Exercises were reviewed and Carolina Hollowaymelina was able to demonstrate them prior to the end of the session.  Carolina Tucker demonstrated good  understanding of the education provided.   Assessment     Carolina is a 65 y.o. female referred to outpatient Physical Therapy with a medical diagnosis of   Diagnosis   I89.0 (ICD-10-CM) - Lymphedema of both lower extremities   This patient presents s/p HTN, morbid obesity BMI 40, varicose veins, venous reflux, with OA and reported need for TKA resulting in: multifactorial lymphedema of the B LE, increased pain, increased stiffness in the LE, as well as difficulty performing walking, mobility, size/shape , compression needs, and placing the pt at higher risk of infection.     Some areas of reduction and some areas of increase in size/shape and density.  Pt  admits high salt diet and limited compliance to medical and therapy recommendations to assist her B LE.  Pt is choosing discharge and has orders for recommended compression products and management plan.  Compression products will only assist in her management as well as weight management and medical compliance.   Reviewed and encouraged pt to purchase leggings/capris/biker short style compression for LE and abdominal support + knee high garments.  Pt declines recommendation for compression leggings/capris and will visit DME once orders are received.      Pt prognosis is Fair.      Anticipated Barriers for therapy: obesity, venous, diuretic, OA, compression compliance and fit     The following goals were discussed with the patient and patient is in agreement with them as to be addressed in the treatment plan.      Short Term Goals: (6 weeks)  1. Patient will show decreased girth in B LE by up to 1 cm to allow for LE symmetry, shoe and clothing choice, and ability to apply needed compression.  (met some areas   2. Patient will demonstrate 100% knowledge of lymphedema precautions and signs of infection to allow for reduced lymphedema risk, infection risk, and/or exacerbation of condition.  (progressing,)  3. Patient or caregiver will perform self-bandaging techniques and/or wearing of compression garments to allow for lymphatic drainage support, skin elasticity, and reduction in shape and size of limb. (- choosing to move to compression products)  4. Patient will perform self lymph drainage techniques to areas within reach to enhance lymphatic drainage and skin condition.  (progressing,)  5. Patient will tolerate daily activities with multilayered bandaging to allow for lymphatic and venous support.  (met)     Long Term Goals: (12  weeks)  1. Patient will show decreased girth in B LE by up to 2 cm  to allow for LE symmetry, shoe and clothing choice, and ability to apply needed compression daily.  ( not met)  2. Patient  will show reduction in density to mild or less with improved contour of limb to allow for cosmesis, LE symmetry, infection risk reduction, and clothing and compression choice.   (not met)  3. Patient to rogerio/doff compression garment with daily compliance to assist in lymphedema management, skin elasticity, and tissue density.  (progressing, has orders to purchase  4. Pt to show improved postural awareness and alignment.  (progressing, not met)  5. Pt to be I and compliant with HEP to allow for increased function in affected limb.   (progressing,)    Discharge reason: Patient has reached the maximum rehab potential for the present time and Patient requested discharge    Plan   This patient is discharged from Physical Therapy      Monica Martinez, PT

## 2020-11-24 ENCOUNTER — PATIENT OUTREACH (OUTPATIENT)
Dept: OTHER | Facility: OTHER | Age: 65
End: 2020-11-24

## 2020-11-26 ENCOUNTER — PATIENT MESSAGE (OUTPATIENT)
Dept: ADMINISTRATIVE | Facility: OTHER | Age: 65
End: 2020-11-26

## 2020-11-30 ENCOUNTER — OFFICE VISIT (OUTPATIENT)
Dept: CARDIOLOGY | Facility: CLINIC | Age: 65
End: 2020-11-30
Payer: MEDICARE

## 2020-11-30 VITALS
HEIGHT: 66 IN | DIASTOLIC BLOOD PRESSURE: 90 MMHG | WEIGHT: 250.25 LBS | SYSTOLIC BLOOD PRESSURE: 128 MMHG | HEART RATE: 84 BPM | BODY MASS INDEX: 40.22 KG/M2

## 2020-11-30 DIAGNOSIS — J45.20 MILD INTERMITTENT ASTHMA WITHOUT COMPLICATION: ICD-10-CM

## 2020-11-30 DIAGNOSIS — M79.662 PAIN IN BOTH LOWER LEGS: ICD-10-CM

## 2020-11-30 DIAGNOSIS — M79.661 PAIN IN BOTH LOWER LEGS: ICD-10-CM

## 2020-11-30 DIAGNOSIS — I83.93 VARICOSE VEINS OF BOTH LOWER EXTREMITIES, UNSPECIFIED WHETHER COMPLICATED: ICD-10-CM

## 2020-11-30 DIAGNOSIS — I89.0 LYMPHEDEMA OF BOTH LOWER EXTREMITIES: Primary | ICD-10-CM

## 2020-11-30 DIAGNOSIS — Z74.09 DECREASED MOBILITY AND ENDURANCE: ICD-10-CM

## 2020-11-30 DIAGNOSIS — E66.01 MORBID OBESITY WITH BMI OF 40.0-44.9, ADULT: ICD-10-CM

## 2020-11-30 DIAGNOSIS — R29.3 POOR POSTURE: ICD-10-CM

## 2020-11-30 DIAGNOSIS — J30.2 SEASONAL ALLERGIC RHINITIS, UNSPECIFIED TRIGGER: ICD-10-CM

## 2020-11-30 DIAGNOSIS — M35.01 SJOGREN'S SYNDROME WITH KERATOCONJUNCTIVITIS SICCA: ICD-10-CM

## 2020-11-30 DIAGNOSIS — E78.2 MIXED HYPERLIPIDEMIA: ICD-10-CM

## 2020-11-30 DIAGNOSIS — I10 ESSENTIAL HYPERTENSION: ICD-10-CM

## 2020-11-30 DIAGNOSIS — G47.33 OSA (OBSTRUCTIVE SLEEP APNEA): ICD-10-CM

## 2020-11-30 DIAGNOSIS — R60.0 EDEMA OF BOTH LEGS: ICD-10-CM

## 2020-11-30 DIAGNOSIS — I87.2 VENOUS INSUFFICIENCY OF BOTH LOWER EXTREMITIES: ICD-10-CM

## 2020-11-30 DIAGNOSIS — K21.9 GASTROESOPHAGEAL REFLUX DISEASE WITHOUT ESOPHAGITIS: ICD-10-CM

## 2020-11-30 PROCEDURE — 99999 PR PBB SHADOW E&M-EST. PATIENT-LVL V: CPT | Mod: PBBFAC,,, | Performed by: INTERNAL MEDICINE

## 2020-11-30 PROCEDURE — 99215 OFFICE O/P EST HI 40 MIN: CPT | Mod: PBBFAC,PO | Performed by: INTERNAL MEDICINE

## 2020-11-30 PROCEDURE — 99999 PR PBB SHADOW E&M-EST. PATIENT-LVL V: ICD-10-PCS | Mod: PBBFAC,,, | Performed by: INTERNAL MEDICINE

## 2020-11-30 NOTE — PATIENT INSTRUCTIONS
Carolina Knight is a 65 y.o. female with a past medical history of HLD, HTN, hypothyroidism, morbid obesity, who presents for a follow up appointment.      1. BLE Edema and Pain- Likely due to lymphedema and venous insufficiency.  BLE Venous Reflux Study on 8/7/2020 revealed significant BLE venous reflux and no DVT.  Segmental Pressure Study on 8/10/2020 revealed normal rest and exercise YAJAIRA's.   Pt will benefit from significant weight loss.     2. Morbid Obesity- Pt would like to try to lose weight on her own at this time.  Continue to encourage diet, exercise and weight loss.      Follow up in 3 months

## 2020-11-30 NOTE — PROGRESS NOTES
[facility]    Chief Complaint   Patient presents with    Lymphedema of both lower extremities     Patient ID: Carolina Knight is a 65 y.o. female with a past medical history of HLD, HTN, hypothyroidism, morbid obesity, who presents for a follow up appointment.  Pertinent history/events are as follows:     -Pt presents for evaluation of LLE pain.      -At our initial clinic visit on 7/29/2020, Mrs. Knight reports pain and edema of both legs.  She has no claudication or tissue loss.  Exam shows BLE's with prominent varicose veins with changes consistent with lymphedema.  Plan:  BLE Edema and Pain- Likely due to lymphedema and venous insufficiency.  Check BLE venous reflux study and segmental pressure study.  If no evidence of moderate to severe PAD, will refer to lymphedema clinic.   Morbid Obesity- Pt would like to try to lose weight on her own at this time.  Continue to encourage diet, exercise and weight loss.    - At clinic visit on 08/12/20, Mrs. Knight reports continued BLE edema since clinic visit on 7/29/2020.  She has no claudication or tissue loss.  BLE Venous Reflux Study on 8/7/2020 revealed significant BLE venous reflux and no DVT.  Segmental Pressure Study on 8/10/2020 revealed normal rest and exercise YAJAIRA's.  Plan  BLE Edema and Pain- Likely due to lymphedema and venous insufficiency.  BLE Venous Reflux Study on 8/7/2020 revealed significant BLE venous reflux and no DVT.  Segmental Pressure Study on 8/10/2020 revealed normal rest and exercise YAJAIRA's.  Refer to lymphedema clinic.  Pt will benefit from significant weight loss.  Morbid Obesity- Pt would like to try to lose weight on her own at this time.  Continue to encourage diet, exercise and weight loss.     HPI  Mrs. Knight reports improvement in her lower extremity edema as she had completed all the sessions with lymphedema clinic and discharged from service on 11/20/20. She reports no CLI, tissue loss, rest  pain or claudication.      Past Medical History:   Diagnosis Date    Abnormal Pap smear of cervix     [facility] - [facility], then Hyst    Asthma, chronic     Bleeding hemorrhoids     Depression     Difficult intubation     Diverticular disease     Endometriosis     Fatty liver 12/15/2014    Herpes simplex without mention of complication     Hypercholesterolemia     Hypertension     Multiple gastric polyps 2012    Obesity     Reflux     Thyroid disease        Past Surgical History:   Procedure Laterality Date    BREAST BIOPSY Left 2017    core    BREAST BIOPSY Right     core    CERVICAL CONIZATION   W/ LASER       SECTION      x1    CHOLECYSTECTOMY      ESOPHAGOGASTRODUODENOSCOPY N/A 2018    Procedure: ESOPHAGOGASTRODUODENOSCOPY (EGD);  Surgeon: George Henriquez MD;  Location: [facility] ([facility]);  Service: Endoscopy;  Laterality: N/A;  possible difficult intubation - see anesthesia note dated 1/15/13 - 2nd floor/ generated from last EGD, 3 year f/u, Pt due/ Pt requesting Dr. Henriquez -     HYSTERECTOMY  age 42    [place], LSO (uterine fibroids, adhesions, endometriosis)    OOPHORECTOMY  age 42    LSO (endometriosis, cyst)    THYROIDECTOMY      TOE SURGERY      left    TOTAL THYROIDECTOMY         Social History     Socioeconomic History    Marital status:      Spouse name: Not on file    Number of children: Not on file    Years of education: Not on file    Highest education level: Not on file   Occupational History    Not on file   Social Needs    Financial resource strain: Not on file    Food insecurity     Worry: Not on file     Inability: Not on file    Transportation needs     Medical: Not on file     Non-medical: Not on file   Tobacco Use    Smoking status: Never Smoker    Smokeless tobacco: Never Used   Substance and Sexual Activity    Alcohol use: No     Frequency: Never     Comment: none lately.  rarely has a drink for special occassion    Drug use: No     Sexual activity: Yes     Partners: Male     Birth control/protection: Surgical, Post-menopausal, None   Lifestyle    Physical activity     Days per week: 0 days     Minutes per session: Not on file    Stress: Not at all   Relationships    Social connections     Talks on phone: More than three times a week     Gets together: Twice a week     Attends Jain service: Not on file     Active member of club or organization: Yes     Attends meetings of clubs or organizations: More than 4 times per year     Relationship status:    Other Topics Concern    Are you pregnant or think you may be? Not Asked    Breast-feeding Not Asked   Social History Narrative    Not on file       Family History   Problem Relation Age of Onset    Breast cancer Maternal Aunt     Melanoma Sister     Melanoma Brother     Cancer Maternal Uncle         esophageal cancer    Ovarian cancer Neg Hx     Colon cancer Neg Hx        Review of patient's allergies indicates:   Allergen Reactions    Losartan Swelling     Possible throat closing sensation.    Lovastatin Other (See Comments)     myalgia    Cefuroxime Itching and Rash       Medication List with Changes/Refills   Current Medications    AMLODIPINE (NORVASC) 10 MG TABLET    Take 1 tablet (10 mg total) by mouth once daily.    ATORVASTATIN (LIPITOR) 10 MG TABLET    Take 1 tablet (10 mg total) by mouth once daily.    ATORVASTATIN (LIPITOR) 20 MG TABLET    Take 1 tablet (20 mg total) by mouth once daily.    AZELASTINE (ASTELIN) 137 MCG (0.1 %) NASAL SPRAY    1 spray (137 mcg total) by Nasal route daily as needed for Rhinitis.    CETIRIZINE (ZYRTEC) 10 MG TABLET    Take 10 mg by mouth every evening.     DICLOFENAC SODIUM (VOLTAREN) 1 % GEL    Apply 2 g topically 4 (four) times daily. for 10 days    DIPHENHYDRAMINE (BANOPHEN) 25 MG CAPSULE    Take 1 each (25 mg total) by mouth every 6 (six) hours as needed for Itching or Allergies.    ESTRADIOL (ESTRACE) 0.01 % (0.1 MG/GRAM)  VAGINAL CREAM    Place 1 g vaginally once daily.    FINACEA 15 % GEL    AAA face qhs then moisturize    FLUTICASONE PROPIONATE (FLONASE) 50 MCG/ACTUATION NASAL SPRAY    1 spray (50 mcg total) by Each Nostril route once daily.    HYDROCHLOROTHIAZIDE (HYDRODIURIL) 12.5 MG TAB    Take 1 tablet (12.5 mg total) by mouth 2 (two) times a day.    JUBLIA 10 % LUPE    Apply topically once daily.    KETOCONAZOLE (NIZORAL) 2 % CREAM    Apply topically once daily.    LEVOTHYROXINE (SYNTHROID) 125 MCG TABLET    Take 1 tablet (125 mcg total) by mouth once daily.    METFORMIN (GLUCOPHAGE-XR) 500 MG 24 HR TABLET    Take 1 tablet (500 mg total) by mouth daily with breakfast.    MONTELUKAST (SINGULAIR) 10 MG TABLET    Take 1 tablet (10 mg total) by mouth every evening.    NAPROXEN (NAPROSYN) 500 MG TABLET    Take 1 tablet (500 mg total) by mouth 2 (two) times daily.    NYSTATIN (MYCOSTATIN) CREAM    Apply topically 2 (two) times daily.    NYSTATIN (MYCOSTATIN) CREAM    Apply topically 2 (two) times daily.    PANTOPRAZOLE (PROTONIX) 40 MG TABLET    TAKE 1 TABLET BY MOUTH ONCE DAILY    RETIN-A 0.05 % CREAM    Apply topically nightly as needed.    SPIRONOLACTONE (ALDACTONE) 50 MG TABLET    TAKE 1 TABLET BY MOUTH DAILY    TRIAMCINOLONE ACETONIDE 0.1% (KENALOG) 0.1 % CREAM    Apply topically 2 (two) times daily.    TRIAMCINOLONE ACETONIDE 0.1% (KENALOG) 0.1 % CREAM    Apply topically 2 (two) times daily.       Review of Systems  Constitution: Denies chills, fever, and sweats.  HENT: Denies headaches or blurry vision.  Cardiovascular: Denies chest pain or irregular heart beat.  Respiratory: Denies cough or shortness of breath.  Gastrointestinal: Denies abdominal pain, nausea, or vomiting.  Musculoskeletal: Positive for leg swelling and pain.    Neurological: Denies dizziness or focal weakness.  Psychiatric/Behavioral: Normal mental status.  Hematologic/Lymphatic: Denies bleeding problem or easy bruising/bleeding.  Skin: Denies rash or  "suspicious lesions    Physical Examination  BP (!) 128/90   Pulse 84   Ht 5' 6" (1.676 m)   Wt 113.5 kg (250 lb 3.6 oz)   BMI 40.39 kg/m²     Constitutional: No acute distress, conversant  HEENT: Sclera anicteric, Pupils equal, round and reactive to light, extraocular motions intact, Oropharynx clear  Neck: No JVD, no carotid bruits  Cardiovascular: regular rate and rhythm, no murmur, rubs or gallops, normal S1/S2  Pulmonary: Clear to auscultation bilaterally  Abdominal: Abdomen soft, nontender, nondistended, positive bowel sounds  Extremities: BLE's with prominent varicose veins with changes consistent with lymphedema   Pulses:  Carotid pulses are 2+ on the right side, and 2+ on the left side.  Radial pulses are 2+ on the right side, and 2+ on the left side.   Femoral pulses are 2+ on the right side, and 2+ on the left side.  Popliteal pulses are 2+ on the right side, and 2+ on the left side.   Dorsalis pedis pulses are 2+ on the right side, and 2+ on the left side.   Posterior tibial pulses are 2+ on the right side, and 2+ on the left side.    Skin: No ecchymosis, erythema, or ulcers  Psych: Alert and oriented x 3, appropriate affect  Neuro: CNII-XII intact, no focal deficits    Labs:  Most Recent Data  CBC:   Lab Results   Component Value Date    WBC 10.44 07/09/2020    HGB 14.8 07/09/2020    HCT 42.9 07/09/2020     07/09/2020    MCV 87 07/09/2020    RDW 13.8 07/09/2020     BMP:   Lab Results   Component Value Date     08/11/2020    K 4.0 08/11/2020     08/11/2020    CO2 29 08/11/2020    BUN 18 08/11/2020    CREATININE 0.8 08/11/2020    GLU 97 08/11/2020    CALCIUM 9.6 08/11/2020    MG 1.9 07/22/2020    PHOS 3.7 05/25/2011     LFTS;   Lab Results   Component Value Date    PROT 8.5 (H) 07/09/2020    ALBUMIN 4.3 07/09/2020    BILITOT 0.6 07/09/2020    AST 9 (L) 08/11/2020    ALKPHOS 77 07/09/2020    ALT 18 08/11/2020     COAGS:   Lab Results   Component Value Date    INR 1.0 08/04/2014 "     FLP:   Lab Results   Component Value Date    CHOL 163 08/11/2020    HDL 51 08/11/2020    LDLCALC 91.0 08/11/2020    TRIG 105 08/11/2020    CHOLHDL 31.3 08/11/2020     CARDIAC:   Lab Results   Component Value Date    TROPONINI 0.009 07/09/2020    BNP 21 07/09/2020     BLE Venous Reflux Study 8/7/2020:  · No evidence of right lower extremity DVT.  · No evidence of left lower extremity DVT.  · Right GSV reflux (below knee segments)  · Left GSV reflux (below knee segment)  · Left SSV reflux.    Segmental Pressure Study 8/10/2020:  · Normal rest and exercise YAJAIRA's.    Assessment/Plan:  Carolina Knight is a 65 y.o. female with a past medical history of HLD, HTN, hypothyroidism, morbid obesity, who presents for a follow up appointment.     1. BLE Edema and Pain- Likely due to lymphedema and venous insufficiency.  BLE Venous Reflux Study on 8/7/2020 revealed significant BLE venous reflux and no DVT.  Segmental Pressure Study on 8/10/2020 revealed normal rest and exercise YAJAIRA's.  Significant improvement noted with lymphedema clinic therapy discharged 11/20/20.  Pt will benefit from significant weight loss.    2. Morbid Obesity- Pt would like to try to lose weight on her own at this time.  Continue to encourage diet, exercise and weight loss.     Follow up in 3 months     Total duration of face to face visit time 30 minutes.  Total time spent counseling greater than fifty percent of total visit time.  Counseling included discussion regarding imaging findings, diagnosis, possibilities, treatment options, risks and benefits.  The patient had many questions regarding the options and long-term effects.    Patient seen and discussed with Dr. Plummer. Further recommendations per the attending addendum.        Eriberto Lemus MD  Vascular Medicine Fellow PGY IV  Department of Vascular Medicine  [facility]

## 2020-12-11 ENCOUNTER — PATIENT MESSAGE (OUTPATIENT)
Dept: OBSTETRICS AND GYNECOLOGY | Facility: CLINIC | Age: 65
End: 2020-12-11

## 2020-12-14 RX ORDER — FLUCONAZOLE 150 MG/1
150 TABLET ORAL
Qty: 2 TABLET | Refills: 0 | Status: SHIPPED | OUTPATIENT
Start: 2020-12-14 | End: 2020-12-18

## 2020-12-16 ENCOUNTER — PATIENT MESSAGE (OUTPATIENT)
Dept: OBSTETRICS AND GYNECOLOGY | Facility: CLINIC | Age: 65
End: 2020-12-16

## 2020-12-16 LAB
FINAL PATHOLOGIC DIAGNOSIS: NORMAL
Lab: NORMAL

## 2020-12-26 ENCOUNTER — PATIENT MESSAGE (OUTPATIENT)
Dept: ADMINISTRATIVE | Facility: OTHER | Age: 65
End: 2020-12-26

## 2021-01-12 ENCOUNTER — PATIENT MESSAGE (OUTPATIENT)
Dept: OBSTETRICS AND GYNECOLOGY | Facility: CLINIC | Age: 66
End: 2021-01-12

## 2021-01-12 RX ORDER — FLUCONAZOLE 150 MG/1
150 TABLET ORAL
Qty: 3 TABLET | Refills: 1 | Status: SHIPPED | OUTPATIENT
Start: 2021-01-12 | End: 2021-01-19

## 2021-01-19 DIAGNOSIS — M25.561 PAIN IN BOTH KNEES, UNSPECIFIED CHRONICITY: Primary | ICD-10-CM

## 2021-01-19 DIAGNOSIS — M25.562 PAIN IN BOTH KNEES, UNSPECIFIED CHRONICITY: Primary | ICD-10-CM

## 2021-01-21 ENCOUNTER — IMMUNIZATION (OUTPATIENT)
Dept: PHARMACY | Facility: CLINIC | Age: 66
End: 2021-01-21
Payer: COMMERCIAL

## 2021-01-21 ENCOUNTER — LAB VISIT (OUTPATIENT)
Dept: LAB | Facility: HOSPITAL | Age: 66
End: 2021-01-21
Attending: INTERNAL MEDICINE
Payer: MEDICARE

## 2021-01-21 ENCOUNTER — OFFICE VISIT (OUTPATIENT)
Dept: INTERNAL MEDICINE | Facility: CLINIC | Age: 66
End: 2021-01-21
Payer: MEDICARE

## 2021-01-21 VITALS
SYSTOLIC BLOOD PRESSURE: 116 MMHG | HEIGHT: 66 IN | OXYGEN SATURATION: 99 % | WEIGHT: 246.69 LBS | HEART RATE: 87 BPM | BODY MASS INDEX: 39.65 KG/M2 | DIASTOLIC BLOOD PRESSURE: 82 MMHG

## 2021-01-21 DIAGNOSIS — R73.03 PREDIABETES: ICD-10-CM

## 2021-01-21 DIAGNOSIS — J45.20 MILD INTERMITTENT ASTHMA WITHOUT COMPLICATION: ICD-10-CM

## 2021-01-21 DIAGNOSIS — Z01.810 PREOP CARDIOVASCULAR EXAM: ICD-10-CM

## 2021-01-21 DIAGNOSIS — I10 ESSENTIAL HYPERTENSION: Primary | ICD-10-CM

## 2021-01-21 DIAGNOSIS — E03.8 OTHER SPECIFIED HYPOTHYROIDISM: ICD-10-CM

## 2021-01-21 DIAGNOSIS — Z12.31 ENCOUNTER FOR SCREENING MAMMOGRAM FOR BREAST CANCER: ICD-10-CM

## 2021-01-21 DIAGNOSIS — E78.2 MIXED HYPERLIPIDEMIA: ICD-10-CM

## 2021-01-21 DIAGNOSIS — Z12.11 ENCOUNTER FOR FIT (FECAL IMMUNOCHEMICAL TEST) SCREENING: ICD-10-CM

## 2021-01-21 DIAGNOSIS — M35.00 SJOGREN'S SYNDROME, WITH UNSPECIFIED ORGAN INVOLVEMENT: ICD-10-CM

## 2021-01-21 DIAGNOSIS — K76.0 FATTY LIVER: ICD-10-CM

## 2021-01-21 DIAGNOSIS — G47.33 OSA (OBSTRUCTIVE SLEEP APNEA): ICD-10-CM

## 2021-01-21 DIAGNOSIS — M17.11 PRIMARY OSTEOARTHRITIS OF RIGHT KNEE: ICD-10-CM

## 2021-01-21 DIAGNOSIS — I10 ESSENTIAL HYPERTENSION: ICD-10-CM

## 2021-01-21 DIAGNOSIS — I77.1 TORTUOUS AORTA: ICD-10-CM

## 2021-01-21 PROBLEM — E66.01 MORBID OBESITY WITH BMI OF 40.0-44.9, ADULT: Status: RESOLVED | Noted: 2020-09-11 | Resolved: 2021-01-21

## 2021-01-21 LAB
ALBUMIN SERPL BCP-MCNC: 4.3 G/DL (ref 3.5–5.2)
ALP SERPL-CCNC: 74 U/L (ref 55–135)
ALT SERPL W/O P-5'-P-CCNC: 20 U/L (ref 10–44)
ANION GAP SERPL CALC-SCNC: 12 MMOL/L (ref 8–16)
AST SERPL-CCNC: 10 U/L (ref 10–40)
BASOPHILS # BLD AUTO: 0.07 K/UL (ref 0–0.2)
BASOPHILS NFR BLD: 0.9 % (ref 0–1.9)
BILIRUB SERPL-MCNC: 0.5 MG/DL (ref 0.1–1)
BUN SERPL-MCNC: 22 MG/DL (ref 8–23)
CALCIUM SERPL-MCNC: 10.1 MG/DL (ref 8.7–10.5)
CHLORIDE SERPL-SCNC: 102 MMOL/L (ref 95–110)
CHOLEST SERPL-MCNC: 213 MG/DL (ref 120–199)
CHOLEST/HDLC SERPL: 3.2 {RATIO} (ref 2–5)
CO2 SERPL-SCNC: 28 MMOL/L (ref 23–29)
CREAT SERPL-MCNC: 0.9 MG/DL (ref 0.5–1.4)
DIFFERENTIAL METHOD: ABNORMAL
EOSINOPHIL # BLD AUTO: 0.2 K/UL (ref 0–0.5)
EOSINOPHIL NFR BLD: 1.9 % (ref 0–8)
ERYTHROCYTE [DISTWIDTH] IN BLOOD BY AUTOMATED COUNT: 14.7 % (ref 11.5–14.5)
EST. GFR  (AFRICAN AMERICAN): >60 ML/MIN/1.73 M^2
EST. GFR  (NON AFRICAN AMERICAN): >60 ML/MIN/1.73 M^2
ESTIMATED AVG GLUCOSE: 105 MG/DL (ref 68–131)
GLUCOSE SERPL-MCNC: 102 MG/DL (ref 70–110)
HBA1C MFR BLD HPLC: 5.3 % (ref 4–5.6)
HCT VFR BLD AUTO: 47.2 % (ref 37–48.5)
HDLC SERPL-MCNC: 66 MG/DL (ref 40–75)
HDLC SERPL: 31 % (ref 20–50)
HGB BLD-MCNC: 15.3 G/DL (ref 12–16)
IMM GRANULOCYTES # BLD AUTO: 0.08 K/UL (ref 0–0.04)
IMM GRANULOCYTES NFR BLD AUTO: 1 % (ref 0–0.5)
LDLC SERPL CALC-MCNC: 125.2 MG/DL (ref 63–159)
LYMPHOCYTES # BLD AUTO: 2.5 K/UL (ref 1–4.8)
LYMPHOCYTES NFR BLD: 31.1 % (ref 18–48)
MCH RBC QN AUTO: 29.3 PG (ref 27–31)
MCHC RBC AUTO-ENTMCNC: 32.4 G/DL (ref 32–36)
MCV RBC AUTO: 90 FL (ref 82–98)
MONOCYTES # BLD AUTO: 0.7 K/UL (ref 0.3–1)
MONOCYTES NFR BLD: 8.7 % (ref 4–15)
NEUTROPHILS # BLD AUTO: 4.5 K/UL (ref 1.8–7.7)
NEUTROPHILS NFR BLD: 56.4 % (ref 38–73)
NONHDLC SERPL-MCNC: 147 MG/DL
NRBC BLD-RTO: 0 /100 WBC
PLATELET # BLD AUTO: 328 K/UL (ref 150–350)
PMV BLD AUTO: 10 FL (ref 9.2–12.9)
POTASSIUM SERPL-SCNC: 4.6 MMOL/L (ref 3.5–5.1)
PROT SERPL-MCNC: 8.2 G/DL (ref 6–8.4)
RBC # BLD AUTO: 5.22 M/UL (ref 4–5.4)
SODIUM SERPL-SCNC: 142 MMOL/L (ref 136–145)
TRIGL SERPL-MCNC: 109 MG/DL (ref 30–150)
TSH SERPL DL<=0.005 MIU/L-ACNC: 1.07 UIU/ML (ref 0.4–4)
WBC # BLD AUTO: 8.03 K/UL (ref 3.9–12.7)

## 2021-01-21 PROCEDURE — 99215 OFFICE O/P EST HI 40 MIN: CPT | Mod: PBBFAC | Performed by: INTERNAL MEDICINE

## 2021-01-21 PROCEDURE — 84443 ASSAY THYROID STIM HORMONE: CPT

## 2021-01-21 PROCEDURE — 80053 COMPREHEN METABOLIC PANEL: CPT

## 2021-01-21 PROCEDURE — 36415 COLL VENOUS BLD VENIPUNCTURE: CPT

## 2021-01-21 PROCEDURE — 99999 PR PBB SHADOW E&M-EST. PATIENT-LVL V: ICD-10-PCS | Mod: PBBFAC,,, | Performed by: INTERNAL MEDICINE

## 2021-01-21 PROCEDURE — 85025 COMPLETE CBC W/AUTO DIFF WBC: CPT

## 2021-01-21 PROCEDURE — 83036 HEMOGLOBIN GLYCOSYLATED A1C: CPT

## 2021-01-21 PROCEDURE — 80061 LIPID PANEL: CPT

## 2021-01-21 PROCEDURE — 99215 PR OFFICE/OUTPT VISIT, EST, LEVL V, 40-54 MIN: ICD-10-PCS | Mod: S$PBB,,, | Performed by: INTERNAL MEDICINE

## 2021-01-21 PROCEDURE — 99999 PR PBB SHADOW E&M-EST. PATIENT-LVL V: CPT | Mod: PBBFAC,,, | Performed by: INTERNAL MEDICINE

## 2021-01-21 PROCEDURE — 99215 OFFICE O/P EST HI 40 MIN: CPT | Mod: S$PBB,,, | Performed by: INTERNAL MEDICINE

## 2021-01-21 RX ORDER — ROSUVASTATIN CALCIUM 5 MG/1
5 TABLET, COATED ORAL DAILY
Qty: 90 TABLET | Refills: 3 | Status: SHIPPED | OUTPATIENT
Start: 2021-01-21 | End: 2021-07-19 | Stop reason: SDUPTHER

## 2021-01-26 ENCOUNTER — PATIENT MESSAGE (OUTPATIENT)
Dept: INTERNAL MEDICINE | Facility: CLINIC | Age: 66
End: 2021-01-26

## 2021-01-27 ENCOUNTER — PATIENT MESSAGE (OUTPATIENT)
Dept: ORTHOPEDICS | Facility: CLINIC | Age: 66
End: 2021-01-27

## 2021-01-27 ENCOUNTER — OFFICE VISIT (OUTPATIENT)
Dept: ORTHOPEDICS | Facility: CLINIC | Age: 66
End: 2021-01-27
Payer: MEDICARE

## 2021-01-27 ENCOUNTER — PATIENT MESSAGE (OUTPATIENT)
Dept: INTERNAL MEDICINE | Facility: CLINIC | Age: 66
End: 2021-01-27

## 2021-01-27 ENCOUNTER — HOSPITAL ENCOUNTER (OUTPATIENT)
Dept: RADIOLOGY | Facility: HOSPITAL | Age: 66
Discharge: HOME | End: 2021-01-27
Attending: ORTHOPAEDIC SURGERY
Payer: MEDICARE

## 2021-01-27 ENCOUNTER — HOSPITAL ENCOUNTER (OUTPATIENT)
Dept: CARDIOLOGY | Facility: CLINIC | Age: 66
Discharge: HOME | End: 2021-01-27
Payer: MEDICARE

## 2021-01-27 VITALS — BODY MASS INDEX: 39.9 KG/M2 | WEIGHT: 248.25 LBS | HEIGHT: 66 IN

## 2021-01-27 DIAGNOSIS — E66.01 MORBID OBESITY WITH BMI OF 40.0-44.9, ADULT: ICD-10-CM

## 2021-01-27 DIAGNOSIS — M25.562 PAIN IN BOTH KNEES, UNSPECIFIED CHRONICITY: ICD-10-CM

## 2021-01-27 DIAGNOSIS — Z01.810 PREOP CARDIOVASCULAR EXAM: ICD-10-CM

## 2021-01-27 DIAGNOSIS — M25.561 PAIN IN BOTH KNEES, UNSPECIFIED CHRONICITY: ICD-10-CM

## 2021-01-27 DIAGNOSIS — Z01.818 PRE-OP TESTING: ICD-10-CM

## 2021-01-27 DIAGNOSIS — M17.11 PRIMARY OSTEOARTHRITIS OF RIGHT KNEE: Primary | ICD-10-CM

## 2021-01-27 DIAGNOSIS — M17.11 PRIMARY OSTEOARTHRITIS OF RIGHT KNEE: ICD-10-CM

## 2021-01-27 PROCEDURE — 99999 PR PBB SHADOW E&M-EST. PATIENT-LVL III: CPT | Mod: PBBFAC,,, | Performed by: ORTHOPAEDIC SURGERY

## 2021-01-27 PROCEDURE — 93010 ELECTROCARDIOGRAM REPORT: CPT | Mod: S$PBB,,, | Performed by: INTERNAL MEDICINE

## 2021-01-27 PROCEDURE — 99999 PR PBB SHADOW E&M-EST. PATIENT-LVL III: ICD-10-PCS | Mod: PBBFAC,,, | Performed by: ORTHOPAEDIC SURGERY

## 2021-01-27 PROCEDURE — 73562 X-RAY EXAM OF KNEE 3: CPT | Mod: 26,RT,, | Performed by: RADIOLOGY

## 2021-01-27 PROCEDURE — 73562 X-RAY EXAM OF KNEE 3: CPT | Mod: TC,50

## 2021-01-27 PROCEDURE — 93010 EKG 12-LEAD: ICD-10-PCS | Mod: S$PBB,,, | Performed by: INTERNAL MEDICINE

## 2021-01-27 PROCEDURE — 93005 ELECTROCARDIOGRAM TRACING: CPT | Mod: PBBFAC | Performed by: INTERNAL MEDICINE

## 2021-01-27 PROCEDURE — 99213 OFFICE O/P EST LOW 20 MIN: CPT | Mod: PBBFAC,25 | Performed by: ORTHOPAEDIC SURGERY

## 2021-01-27 PROCEDURE — 73562 PR  X-RAY KNEE 3 VIEW: ICD-10-PCS | Mod: 26,59,LT, | Performed by: RADIOLOGY

## 2021-01-27 PROCEDURE — 99214 OFFICE O/P EST MOD 30 MIN: CPT | Mod: S$PBB,,, | Performed by: ORTHOPAEDIC SURGERY

## 2021-01-27 PROCEDURE — 73562 X-RAY EXAM OF KNEE 3: CPT | Mod: 26,59,LT, | Performed by: RADIOLOGY

## 2021-01-27 PROCEDURE — 99214 PR OFFICE/OUTPT VISIT, EST, LEVL IV, 30-39 MIN: ICD-10-PCS | Mod: S$PBB,,, | Performed by: ORTHOPAEDIC SURGERY

## 2021-01-28 ENCOUNTER — PATIENT MESSAGE (OUTPATIENT)
Dept: ORTHOPEDICS | Facility: CLINIC | Age: 66
End: 2021-01-28

## 2021-01-28 ENCOUNTER — PATIENT MESSAGE (OUTPATIENT)
Dept: CARDIOLOGY | Facility: CLINIC | Age: 66
End: 2021-01-28

## 2021-01-28 DIAGNOSIS — I10 ESSENTIAL HYPERTENSION: Primary | ICD-10-CM

## 2021-01-28 DIAGNOSIS — E78.2 MIXED HYPERLIPIDEMIA: ICD-10-CM

## 2021-01-29 ENCOUNTER — PATIENT MESSAGE (OUTPATIENT)
Dept: ORTHOPEDICS | Facility: CLINIC | Age: 66
End: 2021-01-29

## 2021-01-29 ENCOUNTER — TELEPHONE (OUTPATIENT)
Dept: INTERNAL MEDICINE | Facility: CLINIC | Age: 66
End: 2021-01-29

## 2021-01-29 ENCOUNTER — PATIENT MESSAGE (OUTPATIENT)
Dept: SURGERY | Facility: HOSPITAL | Age: 66
End: 2021-01-29

## 2021-01-29 DIAGNOSIS — M17.10 UNILATERAL PRIMARY OSTEOARTHRITIS, UNSPECIFIED KNEE: ICD-10-CM

## 2021-01-29 DIAGNOSIS — M17.11 PRIMARY OSTEOARTHRITIS OF RIGHT KNEE: Primary | ICD-10-CM

## 2021-01-29 DIAGNOSIS — R94.31 ABNORMAL EKG: Primary | ICD-10-CM

## 2021-02-01 ENCOUNTER — PATIENT MESSAGE (OUTPATIENT)
Dept: ORTHOPEDICS | Facility: CLINIC | Age: 66
End: 2021-02-01

## 2021-02-01 ENCOUNTER — TELEPHONE (OUTPATIENT)
Dept: ORTHOPEDICS | Facility: CLINIC | Age: 66
End: 2021-02-01

## 2021-02-02 ENCOUNTER — LAB VISIT (OUTPATIENT)
Dept: LAB | Facility: HOSPITAL | Age: 66
End: 2021-02-02
Attending: INTERNAL MEDICINE
Payer: MEDICARE

## 2021-02-02 DIAGNOSIS — E78.2 MIXED HYPERLIPIDEMIA: ICD-10-CM

## 2021-02-02 DIAGNOSIS — I10 ESSENTIAL HYPERTENSION: ICD-10-CM

## 2021-02-02 LAB
ALBUMIN SERPL BCP-MCNC: 3.9 G/DL (ref 3.5–5.2)
ALP SERPL-CCNC: 69 U/L (ref 55–135)
ALT SERPL W/O P-5'-P-CCNC: 19 U/L (ref 10–44)
ANION GAP SERPL CALC-SCNC: 12 MMOL/L (ref 8–16)
AST SERPL-CCNC: 10 U/L (ref 10–40)
BILIRUB SERPL-MCNC: 0.4 MG/DL (ref 0.1–1)
BUN SERPL-MCNC: 16 MG/DL (ref 8–23)
CALCIUM SERPL-MCNC: 9.3 MG/DL (ref 8.7–10.5)
CHLORIDE SERPL-SCNC: 104 MMOL/L (ref 95–110)
CHOLEST SERPL-MCNC: 146 MG/DL (ref 120–199)
CHOLEST/HDLC SERPL: 2.2 {RATIO} (ref 2–5)
CO2 SERPL-SCNC: 27 MMOL/L (ref 23–29)
CREAT SERPL-MCNC: 0.8 MG/DL (ref 0.5–1.4)
EST. GFR  (AFRICAN AMERICAN): >60 ML/MIN/1.73 M^2
EST. GFR  (NON AFRICAN AMERICAN): >60 ML/MIN/1.73 M^2
GLUCOSE SERPL-MCNC: 95 MG/DL (ref 70–110)
HDLC SERPL-MCNC: 66 MG/DL (ref 40–75)
HDLC SERPL: 45.2 % (ref 20–50)
LDLC SERPL CALC-MCNC: 66.4 MG/DL (ref 63–159)
NONHDLC SERPL-MCNC: 80 MG/DL
POTASSIUM SERPL-SCNC: 4.2 MMOL/L (ref 3.5–5.1)
PROT SERPL-MCNC: 7.7 G/DL (ref 6–8.4)
SODIUM SERPL-SCNC: 143 MMOL/L (ref 136–145)
TRIGL SERPL-MCNC: 68 MG/DL (ref 30–150)

## 2021-02-02 PROCEDURE — 36415 COLL VENOUS BLD VENIPUNCTURE: CPT | Mod: PO

## 2021-02-02 PROCEDURE — 80053 COMPREHEN METABOLIC PANEL: CPT

## 2021-02-02 PROCEDURE — 80061 LIPID PANEL: CPT

## 2021-02-04 ENCOUNTER — OFFICE VISIT (OUTPATIENT)
Dept: CARDIOLOGY | Facility: CLINIC | Age: 66
End: 2021-02-04
Payer: MEDICARE

## 2021-02-04 VITALS
SYSTOLIC BLOOD PRESSURE: 120 MMHG | HEIGHT: 66 IN | DIASTOLIC BLOOD PRESSURE: 70 MMHG | WEIGHT: 248.13 LBS | BODY MASS INDEX: 39.88 KG/M2 | HEART RATE: 75 BPM

## 2021-02-04 DIAGNOSIS — E11.65 TYPE 2 DIABETES MELLITUS WITH HYPERGLYCEMIA, WITHOUT LONG-TERM CURRENT USE OF INSULIN: ICD-10-CM

## 2021-02-04 DIAGNOSIS — E66.01 MORBID OBESITY WITH BMI OF 40.0-44.9, ADULT: ICD-10-CM

## 2021-02-04 DIAGNOSIS — I10 ESSENTIAL HYPERTENSION: Primary | ICD-10-CM

## 2021-02-04 DIAGNOSIS — E78.2 MIXED HYPERLIPIDEMIA: ICD-10-CM

## 2021-02-04 DIAGNOSIS — E03.9 HYPOTHYROIDISM, UNSPECIFIED TYPE: ICD-10-CM

## 2021-02-04 DIAGNOSIS — R94.31 ST SEGMENT CHANGES ON ELECTROCARDIOGRAM: ICD-10-CM

## 2021-02-04 DIAGNOSIS — Z01.810 PREOP CARDIOVASCULAR EXAM: ICD-10-CM

## 2021-02-04 DIAGNOSIS — G47.33 OSA (OBSTRUCTIVE SLEEP APNEA): ICD-10-CM

## 2021-02-04 PROCEDURE — 99999 PR PBB SHADOW E&M-EST. PATIENT-LVL IV: CPT | Mod: PBBFAC,,, | Performed by: NURSE PRACTITIONER

## 2021-02-04 PROCEDURE — 99214 PR OFFICE/OUTPT VISIT, EST, LEVL IV, 30-39 MIN: ICD-10-PCS | Mod: S$PBB,,, | Performed by: NURSE PRACTITIONER

## 2021-02-04 PROCEDURE — 99214 OFFICE O/P EST MOD 30 MIN: CPT | Mod: S$PBB,,, | Performed by: NURSE PRACTITIONER

## 2021-02-04 PROCEDURE — 99999 PR PBB SHADOW E&M-EST. PATIENT-LVL IV: ICD-10-PCS | Mod: PBBFAC,,, | Performed by: NURSE PRACTITIONER

## 2021-02-04 PROCEDURE — 99214 OFFICE O/P EST MOD 30 MIN: CPT | Mod: PBBFAC,PO | Performed by: NURSE PRACTITIONER

## 2021-02-08 ENCOUNTER — HOSPITAL ENCOUNTER (OUTPATIENT)
Dept: CARDIOLOGY | Facility: HOSPITAL | Age: 66
Discharge: HOME | End: 2021-02-08
Attending: INTERNAL MEDICINE
Payer: MEDICARE

## 2021-02-08 VITALS
SYSTOLIC BLOOD PRESSURE: 152 MMHG | DIASTOLIC BLOOD PRESSURE: 84 MMHG | HEIGHT: 66 IN | WEIGHT: 248 LBS | HEART RATE: 85 BPM | RESPIRATION RATE: 20 BRPM | BODY MASS INDEX: 39.86 KG/M2

## 2021-02-08 DIAGNOSIS — R94.31 ABNORMAL EKG: ICD-10-CM

## 2021-02-08 LAB
ASCENDING AORTA: 3.49 CM
BSA FOR ECHO PROCEDURE: 2.29 M2
CV ECHO LV RWT: 0.41 CM
CV STRESS BASE HR: 85 BPM
DIASTOLIC BLOOD PRESSURE: 84 MMHG
DOP CALC LVOT AREA: 3.9 CM2
DOP CALC LVOT DIAMETER: 2.22 CM
DOP CALC LVOT PEAK VEL: 0.9 M/S
DOP CALC LVOT STROKE VOLUME: 73.12 CM3
DOP CALCLVOT PEAK VEL VTI: 18.9 CM
E WAVE DECELERATION TIME: 207.47 MSEC
E/A RATIO: 0.66
E/E' RATIO: 6.67 M/S
ECHO LV POSTERIOR WALL: 0.93 CM (ref 0.6–1.1)
FRACTIONAL SHORTENING: 32 % (ref 28–44)
INTERVENTRICULAR SEPTUM: 0.86 CM (ref 0.6–1.1)
LA MAJOR: 4.39 CM
LA MINOR: 4.37 CM
LA WIDTH: 3.54 CM
LEFT ATRIUM SIZE: 3.17 CM
LEFT ATRIUM VOLUME INDEX MOD: 13.1 ML/M2
LEFT ATRIUM VOLUME INDEX: 19.1 ML/M2
LEFT ATRIUM VOLUME MOD: 28.68 CM3
LEFT ATRIUM VOLUME: 41.78 CM3
LEFT INTERNAL DIMENSION IN SYSTOLE: 3.1 CM (ref 2.1–4)
LEFT VENTRICLE DIASTOLIC VOLUME INDEX: 42.77 ML/M2
LEFT VENTRICLE DIASTOLIC VOLUME: 93.66 ML
LEFT VENTRICLE MASS INDEX: 61 G/M2
LEFT VENTRICLE SYSTOLIC VOLUME INDEX: 17.3 ML/M2
LEFT VENTRICLE SYSTOLIC VOLUME: 37.83 ML
LEFT VENTRICULAR INTERNAL DIMENSION IN DIASTOLE: 4.53 CM (ref 3.5–6)
LEFT VENTRICULAR MASS: 133.28 G
LV LATERAL E/E' RATIO: 5 M/S
LV SEPTAL E/E' RATIO: 10 M/S
MV A" WAVE DURATION": 10.56 MSEC
MV PEAK A VEL: 0.91 M/S
MV PEAK E VEL: 0.6 M/S
OHS CV CPX 1 MINUTE RECOVERY HEART RATE: 137 BPM
OHS CV CPX 85 PERCENT MAX PREDICTED HEART RATE MALE: 126
OHS CV CPX MAX PREDICTED HEART RATE: 149
OHS CV CPX PATIENT IS FEMALE: 1
OHS CV CPX PATIENT IS MALE: 0
OHS CV CPX PEAK DIASTOLIC BLOOD PRESSURE: 44 MMHG
OHS CV CPX PEAK HEAR RATE: 137 BPM
OHS CV CPX PEAK RATE PRESSURE PRODUCT: NORMAL
OHS CV CPX PEAK SYSTOLIC BLOOD PRESSURE: 127 MMHG
OHS CV CPX PERCENT MAX PREDICTED HEART RATE ACHIEVED: 92
OHS CV CPX RATE PRESSURE PRODUCT PRESENTING: NORMAL
PULM VEIN S/D RATIO: 1.13
PV PEAK D VEL: 0.45 M/S
PV PEAK S VEL: 0.51 M/S
RA MAJOR: 4.65 CM
RA PRESSURE: 3 MMHG
RA WIDTH: 3.3 CM
RIGHT VENTRICULAR END-DIASTOLIC DIMENSION: 3.67 CM
RV TISSUE DOPPLER FREE WALL SYSTOLIC VELOCITY 1 (APICAL 4 CHAMBER VIEW): 13.76 CM/S
SINUS: 3.26 CM
STJ: 2.7 CM
SYSTOLIC BLOOD PRESSURE: 152 MMHG
TDI LATERAL: 0.12 M/S
TDI SEPTAL: 0.06 M/S
TDI: 0.09 M/S
TRICUSPID ANNULAR PLANE SYSTOLIC EXCURSION: 1.92 CM

## 2021-02-08 PROCEDURE — 63600175 PHARM REV CODE 636 W HCPCS: Performed by: INTERNAL MEDICINE

## 2021-02-08 PROCEDURE — 93351 STRESS TTE COMPLETE: CPT

## 2021-02-08 PROCEDURE — 93351 STRESS TTE COMPLETE: CPT | Mod: 26,,, | Performed by: INTERNAL MEDICINE

## 2021-02-08 PROCEDURE — 93351 STRESS ECHO (CUPID ONLY): ICD-10-PCS | Mod: 26,,, | Performed by: INTERNAL MEDICINE

## 2021-02-08 RX ORDER — DOBUTAMINE HYDROCHLORIDE 400 MG/100ML
30 INJECTION INTRAVENOUS
Status: COMPLETED | OUTPATIENT
Start: 2021-02-08 | End: 2021-02-08

## 2021-02-08 RX ORDER — ATROPINE SULFATE 0.1 MG/ML
0.25 INJECTION INTRAVENOUS
Status: COMPLETED | OUTPATIENT
Start: 2021-02-08 | End: 2021-02-08

## 2021-02-08 RX ADMIN — DOBUTAMINE HYDROCHLORIDE 30 MCG/KG/MIN: 400 INJECTION INTRAVENOUS at 02:02

## 2021-02-08 RX ADMIN — ATROPINE SULFATE 0.25 MG: 0.1 INJECTION PARENTERAL at 02:02

## 2021-02-10 ENCOUNTER — TELEPHONE (OUTPATIENT)
Dept: PREADMISSION TESTING | Facility: HOSPITAL | Age: 66
End: 2021-02-10

## 2021-02-18 ENCOUNTER — CLINICAL SUPPORT (OUTPATIENT)
Dept: REHABILITATION | Facility: HOSPITAL | Age: 66
End: 2021-02-18
Payer: MEDICARE

## 2021-02-18 ENCOUNTER — PATIENT MESSAGE (OUTPATIENT)
Dept: SURGERY | Facility: HOSPITAL | Age: 66
End: 2021-02-18

## 2021-02-18 DIAGNOSIS — R29.898 DECREASED STRENGTH OF LOWER EXTREMITY: ICD-10-CM

## 2021-02-18 DIAGNOSIS — M17.11 PRIMARY OSTEOARTHRITIS OF RIGHT KNEE: ICD-10-CM

## 2021-02-18 DIAGNOSIS — M25.661 DECREASED RANGE OF MOTION OF BOTH KNEES: ICD-10-CM

## 2021-02-18 DIAGNOSIS — M25.662 DECREASED RANGE OF MOTION OF BOTH KNEES: ICD-10-CM

## 2021-02-18 PROBLEM — M62.81 MUSCLE WEAKNESS: Status: RESOLVED | Noted: 2018-10-18 | Resolved: 2021-02-18

## 2021-02-18 PROBLEM — R29.3 POOR POSTURE: Status: RESOLVED | Noted: 2019-08-28 | Resolved: 2021-02-18

## 2021-02-18 PROCEDURE — 97161 PT EVAL LOW COMPLEX 20 MIN: CPT | Mod: PN

## 2021-02-18 PROCEDURE — 97110 THERAPEUTIC EXERCISES: CPT | Mod: PN

## 2021-02-20 ENCOUNTER — PATIENT OUTREACH (OUTPATIENT)
Dept: ADMINISTRATIVE | Facility: OTHER | Age: 66
End: 2021-02-20

## 2021-02-22 ENCOUNTER — HOSPITAL ENCOUNTER (OUTPATIENT)
Dept: RADIOLOGY | Facility: HOSPITAL | Age: 66
Discharge: HOME | End: 2021-02-22
Attending: ORTHOPAEDIC SURGERY
Payer: MEDICARE

## 2021-02-22 ENCOUNTER — OFFICE VISIT (OUTPATIENT)
Dept: ORTHOPEDICS | Facility: CLINIC | Age: 66
End: 2021-02-22
Payer: MEDICARE

## 2021-02-22 ENCOUNTER — OFFICE VISIT (OUTPATIENT)
Dept: INTERNAL MEDICINE | Facility: CLINIC | Age: 66
End: 2021-02-22
Payer: MEDICARE

## 2021-02-22 VITALS
OXYGEN SATURATION: 98 % | HEART RATE: 76 BPM | WEIGHT: 244.19 LBS | HEIGHT: 65 IN | BODY MASS INDEX: 40.68 KG/M2 | SYSTOLIC BLOOD PRESSURE: 130 MMHG | DIASTOLIC BLOOD PRESSURE: 77 MMHG | TEMPERATURE: 97 F

## 2021-02-22 VITALS — HEIGHT: 66 IN | WEIGHT: 244.19 LBS | BODY MASS INDEX: 39.24 KG/M2

## 2021-02-22 DIAGNOSIS — M35.01 SJOGREN'S SYNDROME WITH KERATOCONJUNCTIVITIS SICCA: ICD-10-CM

## 2021-02-22 DIAGNOSIS — M17.11 PRIMARY OSTEOARTHRITIS OF RIGHT KNEE: ICD-10-CM

## 2021-02-22 DIAGNOSIS — K76.0 FATTY LIVER: ICD-10-CM

## 2021-02-22 DIAGNOSIS — K21.9 GASTROESOPHAGEAL REFLUX DISEASE WITHOUT ESOPHAGITIS: ICD-10-CM

## 2021-02-22 DIAGNOSIS — I10 ESSENTIAL HYPERTENSION: ICD-10-CM

## 2021-02-22 DIAGNOSIS — M17.11 RIGHT KNEE DJD: ICD-10-CM

## 2021-02-22 DIAGNOSIS — I87.2 VENOUS INSUFFICIENCY OF BOTH LOWER EXTREMITIES: ICD-10-CM

## 2021-02-22 DIAGNOSIS — E87.6 HYPOKALEMIA: ICD-10-CM

## 2021-02-22 DIAGNOSIS — Z91.09 HISTORY OF NICKEL ALLERGY: Primary | ICD-10-CM

## 2021-02-22 DIAGNOSIS — E66.01 MORBID OBESITY WITH BMI OF 40.0-44.9, ADULT: ICD-10-CM

## 2021-02-22 DIAGNOSIS — J45.20 MILD INTERMITTENT ASTHMA WITHOUT COMPLICATION: ICD-10-CM

## 2021-02-22 DIAGNOSIS — J30.2 SEASONAL ALLERGIC RHINITIS, UNSPECIFIED TRIGGER: ICD-10-CM

## 2021-02-22 DIAGNOSIS — E78.2 MIXED HYPERLIPIDEMIA: ICD-10-CM

## 2021-02-22 DIAGNOSIS — E03.9 HYPOTHYROIDISM, UNSPECIFIED TYPE: ICD-10-CM

## 2021-02-22 DIAGNOSIS — G47.33 OSA (OBSTRUCTIVE SLEEP APNEA): ICD-10-CM

## 2021-02-22 PROBLEM — M25.661 DECREASED RANGE OF MOTION OF BOTH KNEES: Status: ACTIVE | Noted: 2021-02-22

## 2021-02-22 PROBLEM — M25.662 DECREASED RANGE OF MOTION OF BOTH KNEES: Status: ACTIVE | Noted: 2021-02-22

## 2021-02-22 PROBLEM — R29.898 DECREASED STRENGTH OF LOWER EXTREMITY: Status: ACTIVE | Noted: 2021-02-22

## 2021-02-22 PROCEDURE — 73700 CT KNEE WITHOUT CONTRAST RIGHT: ICD-10-PCS | Mod: 26,RT,, | Performed by: INTERNAL MEDICINE

## 2021-02-22 PROCEDURE — 99214 PR OFFICE/OUTPT VISIT, EST, LEVL IV, 30-39 MIN: ICD-10-PCS | Mod: S$PBB,,, | Performed by: HOSPITALIST

## 2021-02-22 PROCEDURE — 99999 PR PBB SHADOW E&M-EST. PATIENT-LVL V: CPT | Mod: PBBFAC,,,

## 2021-02-22 PROCEDURE — 99999 PR PBB SHADOW E&M-EST. PATIENT-LVL IV: ICD-10-PCS | Mod: PBBFAC,,, | Performed by: NURSE PRACTITIONER

## 2021-02-22 PROCEDURE — 99215 OFFICE O/P EST HI 40 MIN: CPT | Mod: PBBFAC,25,27

## 2021-02-22 PROCEDURE — 99214 OFFICE O/P EST MOD 30 MIN: CPT | Mod: PBBFAC,25 | Performed by: NURSE PRACTITIONER

## 2021-02-22 PROCEDURE — 99999 PR PBB SHADOW E&M-EST. PATIENT-LVL V: ICD-10-PCS | Mod: PBBFAC,,,

## 2021-02-22 PROCEDURE — 73700 CT LOWER EXTREMITY W/O DYE: CPT | Mod: TC,RT

## 2021-02-22 PROCEDURE — 73700 CT LOWER EXTREMITY W/O DYE: CPT | Mod: 26,RT,, | Performed by: INTERNAL MEDICINE

## 2021-02-22 PROCEDURE — 99999 PR PBB SHADOW E&M-EST. PATIENT-LVL IV: CPT | Mod: PBBFAC,,, | Performed by: NURSE PRACTITIONER

## 2021-02-22 PROCEDURE — 99499 NO LOS: ICD-10-PCS | Mod: S$PBB,,, | Performed by: NURSE PRACTITIONER

## 2021-02-22 PROCEDURE — 99499 UNLISTED E&M SERVICE: CPT | Mod: S$PBB,,, | Performed by: NURSE PRACTITIONER

## 2021-02-22 PROCEDURE — 99214 OFFICE O/P EST MOD 30 MIN: CPT | Mod: S$PBB,,, | Performed by: HOSPITALIST

## 2021-02-22 RX ORDER — CELECOXIB 200 MG/1
200 CAPSULE ORAL DAILY
Status: CANCELLED | OUTPATIENT
Start: 2021-02-23

## 2021-02-22 RX ORDER — PREGABALIN 75 MG/1
75 CAPSULE ORAL
Status: CANCELLED | OUTPATIENT
Start: 2021-02-22

## 2021-02-22 RX ORDER — ONDANSETRON 2 MG/ML
4 INJECTION INTRAMUSCULAR; INTRAVENOUS EVERY 8 HOURS PRN
Status: CANCELLED | OUTPATIENT
Start: 2021-02-22

## 2021-02-22 RX ORDER — OXYCODONE HYDROCHLORIDE 5 MG/1
5 TABLET ORAL
Status: CANCELLED | OUTPATIENT
Start: 2021-02-22

## 2021-02-22 RX ORDER — ACETAMINOPHEN 500 MG
1000 TABLET ORAL EVERY 6 HOURS
Status: CANCELLED | OUTPATIENT
Start: 2021-02-22 | End: 2021-02-24

## 2021-02-22 RX ORDER — SODIUM CHLORIDE 9 MG/ML
INJECTION, SOLUTION INTRAVENOUS CONTINUOUS
Status: CANCELLED | OUTPATIENT
Start: 2021-02-22 | End: 2021-02-23

## 2021-02-22 RX ORDER — MORPHINE SULFATE 2 MG/ML
2 INJECTION, SOLUTION INTRAMUSCULAR; INTRAVENOUS
Status: CANCELLED | OUTPATIENT
Start: 2021-02-22

## 2021-02-22 RX ORDER — LIDOCAINE HYDROCHLORIDE 10 MG/ML
1 INJECTION, SOLUTION EPIDURAL; INFILTRATION; INTRACAUDAL; PERINEURAL
Status: CANCELLED | OUTPATIENT
Start: 2021-03-09

## 2021-02-22 RX ORDER — NALOXONE HCL 0.4 MG/ML
0.02 VIAL (ML) INJECTION
Status: CANCELLED | OUTPATIENT
Start: 2021-02-22

## 2021-02-22 RX ORDER — CEFAZOLIN SODIUM 2 G/50ML
2 SOLUTION INTRAVENOUS
Status: CANCELLED | OUTPATIENT
Start: 2021-02-22

## 2021-02-22 RX ORDER — ROPIVACAINE HYDROCHLORIDE 2 MG/ML
8 INJECTION, SOLUTION EPIDURAL; INFILTRATION; PERINEURAL CONTINUOUS
Status: CANCELLED | OUTPATIENT
Start: 2021-02-22

## 2021-02-22 RX ORDER — SODIUM CHLORIDE 9 MG/ML
INJECTION, SOLUTION INTRAVENOUS
Status: CANCELLED | OUTPATIENT
Start: 2021-02-22

## 2021-02-22 RX ORDER — ACETAMINOPHEN 500 MG
1000 TABLET ORAL
Status: CANCELLED | OUTPATIENT
Start: 2021-02-22

## 2021-02-22 RX ORDER — FENTANYL CITRATE 50 UG/ML
25 INJECTION, SOLUTION INTRAMUSCULAR; INTRAVENOUS EVERY 5 MIN PRN
Status: CANCELLED | OUTPATIENT
Start: 2021-02-22

## 2021-02-22 RX ORDER — OXYCODONE HYDROCHLORIDE 5 MG/1
10 TABLET ORAL
Status: CANCELLED | OUTPATIENT
Start: 2021-02-22

## 2021-02-22 RX ORDER — MUPIROCIN 20 MG/G
1 OINTMENT TOPICAL 2 TIMES DAILY
Status: CANCELLED | OUTPATIENT
Start: 2021-02-22 | End: 2021-02-27

## 2021-02-22 RX ORDER — SODIUM CHLORIDE 0.9 % (FLUSH) 0.9 %
10 SYRINGE (ML) INJECTION
Status: CANCELLED | OUTPATIENT
Start: 2021-02-22

## 2021-02-22 RX ORDER — FAMOTIDINE 20 MG/1
20 TABLET, FILM COATED ORAL 2 TIMES DAILY
Status: CANCELLED | OUTPATIENT
Start: 2021-02-22

## 2021-02-22 RX ORDER — PREGABALIN 75 MG/1
75 CAPSULE ORAL NIGHTLY
Status: CANCELLED | OUTPATIENT
Start: 2021-02-22

## 2021-02-22 RX ORDER — CELECOXIB 200 MG/1
400 CAPSULE ORAL
Status: CANCELLED | OUTPATIENT
Start: 2021-02-22

## 2021-02-22 RX ORDER — ASPIRIN 81 MG/1
81 TABLET ORAL 2 TIMES DAILY
Status: CANCELLED | OUTPATIENT
Start: 2021-02-22

## 2021-02-22 RX ORDER — POLYETHYLENE GLYCOL 3350 17 G/17G
17 POWDER, FOR SOLUTION ORAL DAILY
Status: CANCELLED | OUTPATIENT
Start: 2021-02-23

## 2021-02-22 RX ORDER — TALC
6 POWDER (GRAM) TOPICAL NIGHTLY PRN
Status: CANCELLED | OUTPATIENT
Start: 2021-02-22

## 2021-02-22 RX ORDER — MUPIROCIN 20 MG/G
1 OINTMENT TOPICAL
Status: CANCELLED | OUTPATIENT
Start: 2021-02-22

## 2021-02-22 RX ORDER — AMOXICILLIN 250 MG
1 CAPSULE ORAL 2 TIMES DAILY
Status: CANCELLED | OUTPATIENT
Start: 2021-02-22

## 2021-02-22 RX ORDER — CEFAZOLIN SODIUM 2 G/50ML
2 SOLUTION INTRAVENOUS
Status: CANCELLED | OUTPATIENT
Start: 2021-02-22 | End: 2021-02-23

## 2021-02-22 RX ORDER — MIDAZOLAM HYDROCHLORIDE 1 MG/ML
1 INJECTION INTRAMUSCULAR; INTRAVENOUS EVERY 5 MIN PRN
Status: CANCELLED | OUTPATIENT
Start: 2021-02-22

## 2021-02-22 RX ORDER — BISACODYL 10 MG
10 SUPPOSITORY, RECTAL RECTAL EVERY 12 HOURS PRN
Status: CANCELLED | OUTPATIENT
Start: 2021-02-22

## 2021-02-23 ENCOUNTER — DOCUMENTATION ONLY (OUTPATIENT)
Dept: CARDIOLOGY | Facility: CLINIC | Age: 66
End: 2021-02-23

## 2021-02-23 PROBLEM — Z74.09 DECREASED MOBILITY AND ENDURANCE: Status: RESOLVED | Noted: 2017-11-16 | Resolved: 2021-02-23

## 2021-02-23 PROBLEM — R60.9 EDEMA: Status: RESOLVED | Noted: 2018-09-26 | Resolved: 2021-02-23

## 2021-02-23 PROBLEM — R53.1 DECREASED STRENGTH: Status: RESOLVED | Noted: 2017-11-16 | Resolved: 2021-02-23

## 2021-02-23 PROBLEM — R60.0 EDEMA OF BOTH LEGS: Status: RESOLVED | Noted: 2020-07-29 | Resolved: 2021-02-23

## 2021-02-23 PROBLEM — R29.898 DECREASED STRENGTH OF LOWER EXTREMITY: Status: RESOLVED | Noted: 2021-02-22 | Resolved: 2021-02-23

## 2021-02-23 PROBLEM — I89.0 LYMPHEDEMA OF BOTH LOWER EXTREMITIES: Status: RESOLVED | Noted: 2020-07-29 | Resolved: 2021-02-23

## 2021-02-25 ENCOUNTER — CLINICAL SUPPORT (OUTPATIENT)
Dept: REHABILITATION | Facility: HOSPITAL | Age: 66
End: 2021-02-25
Payer: MEDICARE

## 2021-02-25 DIAGNOSIS — R53.1 DECREASED STRENGTH: ICD-10-CM

## 2021-02-25 DIAGNOSIS — M25.562 CHRONIC PAIN OF BOTH KNEES: ICD-10-CM

## 2021-02-25 DIAGNOSIS — M25.561 CHRONIC PAIN OF BOTH KNEES: ICD-10-CM

## 2021-02-25 DIAGNOSIS — G89.29 CHRONIC PAIN OF BOTH KNEES: ICD-10-CM

## 2021-02-25 DIAGNOSIS — M17.11 PRIMARY OSTEOARTHRITIS OF RIGHT KNEE: ICD-10-CM

## 2021-02-25 PROCEDURE — 97110 THERAPEUTIC EXERCISES: CPT | Mod: PN

## 2021-02-26 ENCOUNTER — TELEPHONE (OUTPATIENT)
Dept: PAIN MEDICINE | Facility: CLINIC | Age: 66
End: 2021-02-26

## 2021-03-01 ENCOUNTER — HOSPITAL ENCOUNTER (OUTPATIENT)
Facility: HOSPITAL | Age: 66
Discharge: HOME | End: 2021-03-01
Attending: ORTHOPAEDIC SURGERY | Admitting: NURSE PRACTITIONER
Payer: MEDICARE

## 2021-03-01 VITALS
RESPIRATION RATE: 18 BRPM | DIASTOLIC BLOOD PRESSURE: 84 MMHG | TEMPERATURE: 98 F | SYSTOLIC BLOOD PRESSURE: 143 MMHG | HEIGHT: 65 IN | HEART RATE: 75 BPM | BODY MASS INDEX: 41.27 KG/M2

## 2021-03-01 DIAGNOSIS — M17.11 LOCALIZED OSTEOARTHRITIS OF RIGHT KNEE: ICD-10-CM

## 2021-03-01 DIAGNOSIS — M17.11 PRIMARY OSTEOARTHRITIS OF RIGHT KNEE: Primary | ICD-10-CM

## 2021-03-01 PROCEDURE — 25000003 PHARM REV CODE 250: Performed by: NURSE PRACTITIONER

## 2021-03-01 PROCEDURE — 27201689 HC IOVERA SMART TIP/CARTRIDGE: Performed by: NURSE PRACTITIONER

## 2021-03-01 PROCEDURE — 64640 INJECTION TREATMENT OF NERVE: CPT | Performed by: NURSE PRACTITIONER

## 2021-03-01 PROCEDURE — 64640 INJECTION TREATMENT OF NERVE: CPT | Mod: RT,,, | Performed by: NURSE PRACTITIONER

## 2021-03-01 PROCEDURE — 64640 PR DESTRUCT BY NEURO AGENT; OTHER PERIPH NERVE: ICD-10-PCS | Mod: RT,,, | Performed by: NURSE PRACTITIONER

## 2021-03-01 RX ORDER — LIDOCAINE HYDROCHLORIDE 20 MG/ML
10 INJECTION, SOLUTION EPIDURAL; INFILTRATION; INTRACAUDAL; PERINEURAL ONCE
Status: COMPLETED | OUTPATIENT
Start: 2021-03-01 | End: 2021-03-01

## 2021-03-02 ENCOUNTER — PATIENT MESSAGE (OUTPATIENT)
Dept: ORTHOPEDICS | Facility: CLINIC | Age: 66
End: 2021-03-02

## 2021-03-03 ENCOUNTER — TELEPHONE (OUTPATIENT)
Dept: ORTHOPEDICS | Facility: CLINIC | Age: 66
End: 2021-03-03

## 2021-03-03 ENCOUNTER — PATIENT MESSAGE (OUTPATIENT)
Dept: SURGERY | Facility: HOSPITAL | Age: 66
End: 2021-03-03

## 2021-03-04 ENCOUNTER — PATIENT MESSAGE (OUTPATIENT)
Dept: ORTHOPEDICS | Facility: CLINIC | Age: 66
End: 2021-03-04

## 2021-03-06 ENCOUNTER — LAB VISIT (OUTPATIENT)
Dept: INTERNAL MEDICINE | Facility: CLINIC | Age: 66
End: 2021-03-06
Payer: MEDICARE

## 2021-03-06 DIAGNOSIS — Z01.818 PRE-OP TESTING: ICD-10-CM

## 2021-03-06 LAB — SARS-COV-2 RNA RESP QL NAA+PROBE: NOT DETECTED

## 2021-03-06 PROCEDURE — U0005 INFEC AGEN DETEC AMPLI PROBE: HCPCS | Performed by: ORTHOPAEDIC SURGERY

## 2021-03-06 PROCEDURE — U0003 INFECTIOUS AGENT DETECTION BY NUCLEIC ACID (DNA OR RNA); SEVERE ACUTE RESPIRATORY SYNDROME CORONAVIRUS 2 (SARS-COV-2) (CORONAVIRUS DISEASE [COVID-19]), AMPLIFIED PROBE TECHNIQUE, MAKING USE OF HIGH THROUGHPUT TECHNOLOGIES AS DESCRIBED BY CMS-2020-01-R: HCPCS | Performed by: ORTHOPAEDIC SURGERY

## 2021-03-08 ENCOUNTER — TELEPHONE (OUTPATIENT)
Dept: ORTHOPEDICS | Facility: CLINIC | Age: 66
End: 2021-03-08

## 2021-03-08 ENCOUNTER — ANESTHESIA EVENT (OUTPATIENT)
Dept: SURGERY | Facility: HOSPITAL | Age: 66
End: 2021-03-08
Payer: MEDICARE

## 2021-03-08 RX ORDER — OXYCODONE HYDROCHLORIDE 5 MG/1
TABLET ORAL
Qty: 50 TABLET | Refills: 0 | Status: SHIPPED | OUTPATIENT
Start: 2021-03-08 | End: 2021-03-23 | Stop reason: SDUPTHER

## 2021-03-08 RX ORDER — DEXTROMETHORPHAN HYDROBROMIDE, GUAIFENESIN 5; 100 MG/5ML; MG/5ML
650 LIQUID ORAL EVERY 8 HOURS PRN
Qty: 120 TABLET | Refills: 0 | Status: SHIPPED | OUTPATIENT
Start: 2021-03-08 | End: 2021-09-28

## 2021-03-08 RX ORDER — CELECOXIB 200 MG/1
200 CAPSULE ORAL DAILY
Qty: 30 CAPSULE | Refills: 0 | Status: SHIPPED | OUTPATIENT
Start: 2021-03-08 | End: 2021-04-09

## 2021-03-08 RX ORDER — DOCUSATE SODIUM 100 MG/1
100 CAPSULE, LIQUID FILLED ORAL 2 TIMES DAILY PRN
Qty: 60 CAPSULE | Refills: 0 | Status: SHIPPED | OUTPATIENT
Start: 2021-03-08 | End: 2022-01-27 | Stop reason: HOSPADM

## 2021-03-08 RX ORDER — ASPIRIN 81 MG/1
81 TABLET ORAL 2 TIMES DAILY
Qty: 60 TABLET | Refills: 0 | Status: SHIPPED | OUTPATIENT
Start: 2021-03-08 | End: 2021-09-28 | Stop reason: SDUPTHER

## 2021-03-09 ENCOUNTER — TELEPHONE (OUTPATIENT)
Dept: PHARMACY | Facility: CLINIC | Age: 66
End: 2021-03-09

## 2021-03-09 ENCOUNTER — HOSPITAL ENCOUNTER (OUTPATIENT)
Facility: HOSPITAL | Age: 66
Discharge: HOME | End: 2021-03-10
Attending: ORTHOPAEDIC SURGERY | Admitting: ORTHOPAEDIC SURGERY
Payer: MEDICARE

## 2021-03-09 ENCOUNTER — ANESTHESIA (OUTPATIENT)
Dept: SURGERY | Facility: HOSPITAL | Age: 66
End: 2021-03-09
Payer: MEDICARE

## 2021-03-09 DIAGNOSIS — M17.11 RIGHT KNEE DJD: ICD-10-CM

## 2021-03-09 DIAGNOSIS — M17.11 PRIMARY OSTEOARTHRITIS OF RIGHT KNEE: ICD-10-CM

## 2021-03-09 DIAGNOSIS — Z96.651 STATUS POST TOTAL RIGHT KNEE REPLACEMENT: Primary | ICD-10-CM

## 2021-03-09 LAB
POCT GLUCOSE: 107 MG/DL (ref 70–110)
POCT GLUCOSE: 163 MG/DL (ref 70–110)

## 2021-03-09 PROCEDURE — 63600175 PHARM REV CODE 636 W HCPCS: Performed by: ANESTHESIOLOGY

## 2021-03-09 PROCEDURE — 64448 NJX AA&/STRD FEM NRV NFS IMG: CPT | Performed by: STUDENT IN AN ORGANIZED HEALTH CARE EDUCATION/TRAINING PROGRAM

## 2021-03-09 PROCEDURE — 25000003 PHARM REV CODE 250: Performed by: NURSE PRACTITIONER

## 2021-03-09 PROCEDURE — 97161 PT EVAL LOW COMPLEX 20 MIN: CPT

## 2021-03-09 PROCEDURE — 63600175 PHARM REV CODE 636 W HCPCS: Performed by: NURSE PRACTITIONER

## 2021-03-09 PROCEDURE — 64448 PR NERVE BLOCK INJ, ANES/STEROID, FEMORAL, CONT INFUSION, INCL IMAG GUIDANCE: ICD-10-PCS | Mod: 59,RT,, | Performed by: ANESTHESIOLOGY

## 2021-03-09 PROCEDURE — 37000009 HC ANESTHESIA EA ADD 15 MINS: Performed by: ORTHOPAEDIC SURGERY

## 2021-03-09 PROCEDURE — 97165 OT EVAL LOW COMPLEX 30 MIN: CPT

## 2021-03-09 PROCEDURE — 97116 GAIT TRAINING THERAPY: CPT

## 2021-03-09 PROCEDURE — 94761 N-INVAS EAR/PLS OXIMETRY MLT: CPT

## 2021-03-09 PROCEDURE — 27447 PR TOTAL KNEE ARTHROPLASTY: ICD-10-PCS | Mod: RT,,, | Performed by: ORTHOPAEDIC SURGERY

## 2021-03-09 PROCEDURE — 64448 NJX AA&/STRD FEM NRV NFS IMG: CPT | Mod: 59,RT,, | Performed by: ANESTHESIOLOGY

## 2021-03-09 PROCEDURE — 63600175 PHARM REV CODE 636 W HCPCS: Performed by: STUDENT IN AN ORGANIZED HEALTH CARE EDUCATION/TRAINING PROGRAM

## 2021-03-09 PROCEDURE — C1751 CATH, INF, PER/CENT/MIDLINE: HCPCS | Performed by: STUDENT IN AN ORGANIZED HEALTH CARE EDUCATION/TRAINING PROGRAM

## 2021-03-09 PROCEDURE — 27000190 HC CPAP FULL FACE MASK W/VALVE

## 2021-03-09 PROCEDURE — 76942 PR U/S GUIDANCE FOR NEEDLE GUIDANCE: ICD-10-PCS | Mod: 26,,, | Performed by: ANESTHESIOLOGY

## 2021-03-09 PROCEDURE — 63600175 PHARM REV CODE 636 W HCPCS: Performed by: NURSE ANESTHETIST, CERTIFIED REGISTERED

## 2021-03-09 PROCEDURE — 27447 TOTAL KNEE ARTHROPLASTY: CPT | Mod: 82,AS,RT, | Performed by: PHYSICIAN ASSISTANT

## 2021-03-09 PROCEDURE — 99900035 HC TECH TIME PER 15 MIN (STAT)

## 2021-03-09 PROCEDURE — 76942 ECHO GUIDE FOR BIOPSY: CPT | Mod: 59 | Performed by: STUDENT IN AN ORGANIZED HEALTH CARE EDUCATION/TRAINING PROGRAM

## 2021-03-09 PROCEDURE — 76942 ECHO GUIDE FOR BIOPSY: CPT | Mod: 26,,, | Performed by: ANESTHESIOLOGY

## 2021-03-09 PROCEDURE — D9220A PRA ANESTHESIA: Mod: ANES,,, | Performed by: ANESTHESIOLOGY

## 2021-03-09 PROCEDURE — 36000711: Performed by: ORTHOPAEDIC SURGERY

## 2021-03-09 PROCEDURE — 71000033 HC RECOVERY, INTIAL HOUR: Performed by: ORTHOPAEDIC SURGERY

## 2021-03-09 PROCEDURE — 20985 CPTR-ASST DIR MS PX: CPT | Mod: ,,, | Performed by: ORTHOPAEDIC SURGERY

## 2021-03-09 PROCEDURE — 25000003 PHARM REV CODE 250: Performed by: PHYSICIAN ASSISTANT

## 2021-03-09 PROCEDURE — 37000008 HC ANESTHESIA 1ST 15 MINUTES: Performed by: ORTHOPAEDIC SURGERY

## 2021-03-09 PROCEDURE — 94660 CPAP INITIATION&MGMT: CPT

## 2021-03-09 PROCEDURE — 27447 PR TOTAL KNEE ARTHROPLASTY: ICD-10-PCS | Mod: 82,AS,RT, | Performed by: PHYSICIAN ASSISTANT

## 2021-03-09 PROCEDURE — C1776 JOINT DEVICE (IMPLANTABLE): HCPCS | Performed by: ORTHOPAEDIC SURGERY

## 2021-03-09 PROCEDURE — 63600175 PHARM REV CODE 636 W HCPCS: Performed by: PHYSICIAN ASSISTANT

## 2021-03-09 PROCEDURE — 97535 SELF CARE MNGMENT TRAINING: CPT

## 2021-03-09 PROCEDURE — 71000039 HC RECOVERY, EACH ADD'L HOUR: Performed by: ORTHOPAEDIC SURGERY

## 2021-03-09 PROCEDURE — 27201423 OPTIME MED/SURG SUP & DEVICES STERILE SUPPLY: Performed by: ORTHOPAEDIC SURGERY

## 2021-03-09 PROCEDURE — D9220A PRA ANESTHESIA: ICD-10-PCS | Mod: ANES,,, | Performed by: ANESTHESIOLOGY

## 2021-03-09 PROCEDURE — C1713 ANCHOR/SCREW BN/BN,TIS/BN: HCPCS | Performed by: ORTHOPAEDIC SURGERY

## 2021-03-09 PROCEDURE — 20985 PR CPTR-ASST SURGICAL NAVIGATION IMAGE-LESS: ICD-10-PCS | Mod: ,,, | Performed by: ORTHOPAEDIC SURGERY

## 2021-03-09 PROCEDURE — 36000710: Performed by: ORTHOPAEDIC SURGERY

## 2021-03-09 PROCEDURE — D9220A PRA ANESTHESIA: Mod: CRNA,,, | Performed by: NURSE ANESTHETIST, CERTIFIED REGISTERED

## 2021-03-09 PROCEDURE — D9220A PRA ANESTHESIA: ICD-10-PCS | Mod: CRNA,,, | Performed by: NURSE ANESTHETIST, CERTIFIED REGISTERED

## 2021-03-09 PROCEDURE — 27447 TOTAL KNEE ARTHROPLASTY: CPT | Mod: RT,,, | Performed by: ORTHOPAEDIC SURGERY

## 2021-03-09 PROCEDURE — 25000003 PHARM REV CODE 250: Performed by: NURSE ANESTHETIST, CERTIFIED REGISTERED

## 2021-03-09 DEVICE — PATELLA TRIATHLON 31X9 SYMTRC: Type: IMPLANTABLE DEVICE | Site: KNEE | Status: FUNCTIONAL

## 2021-03-09 DEVICE — INSERT CONVTNL POLYETH 9MM 5: Type: IMPLANTABLE DEVICE | Site: KNEE | Status: FUNCTIONAL

## 2021-03-09 DEVICE — FEMORAL CEMENTED #4 RIGHT: Type: IMPLANTABLE DEVICE | Site: KNEE | Status: FUNCTIONAL

## 2021-03-09 DEVICE — PRIMARY TIBIAL BASE 5.: Type: IMPLANTABLE DEVICE | Site: KNEE | Status: FUNCTIONAL

## 2021-03-09 DEVICE — CEMENT BONE SMPLX HV GENTMYCN: Type: IMPLANTABLE DEVICE | Site: KNEE | Status: FUNCTIONAL

## 2021-03-09 RX ORDER — PREGABALIN 75 MG/1
75 CAPSULE ORAL
Status: COMPLETED | OUTPATIENT
Start: 2021-03-09 | End: 2021-03-09

## 2021-03-09 RX ORDER — PREGABALIN 75 MG/1
75 CAPSULE ORAL NIGHTLY
Status: DISCONTINUED | OUTPATIENT
Start: 2021-03-09 | End: 2021-03-10 | Stop reason: HOSPADM

## 2021-03-09 RX ORDER — LIDOCAINE HYDROCHLORIDE 10 MG/ML
1 INJECTION, SOLUTION EPIDURAL; INFILTRATION; INTRACAUDAL; PERINEURAL
Status: DISCONTINUED | OUTPATIENT
Start: 2021-03-09 | End: 2021-03-09 | Stop reason: HOSPADM

## 2021-03-09 RX ORDER — ROPIVACAINE HYDROCHLORIDE 2 MG/ML
INJECTION, SOLUTION EPIDURAL; INFILTRATION; PERINEURAL CONTINUOUS
Status: DISPENSED | OUTPATIENT
Start: 2021-03-09

## 2021-03-09 RX ORDER — AMLODIPINE BESYLATE 10 MG/1
10 TABLET ORAL DAILY
Status: DISCONTINUED | OUTPATIENT
Start: 2021-03-10 | End: 2021-03-10 | Stop reason: HOSPADM

## 2021-03-09 RX ORDER — FAMOTIDINE 10 MG/ML
INJECTION INTRAVENOUS
Status: DISCONTINUED | OUTPATIENT
Start: 2021-03-09 | End: 2021-03-09

## 2021-03-09 RX ORDER — MIDAZOLAM HYDROCHLORIDE 1 MG/ML
INJECTION, SOLUTION INTRAMUSCULAR; INTRAVENOUS
Status: DISCONTINUED | OUTPATIENT
Start: 2021-03-09 | End: 2021-03-09

## 2021-03-09 RX ORDER — TRANEXAMIC ACID 100 MG/ML
1000 INJECTION, SOLUTION INTRAVENOUS
Status: DISCONTINUED | OUTPATIENT
Start: 2021-03-09 | End: 2021-03-09 | Stop reason: HOSPADM

## 2021-03-09 RX ORDER — OXYCODONE HYDROCHLORIDE 5 MG/1
5 TABLET ORAL
Status: DISCONTINUED | OUTPATIENT
Start: 2021-03-09 | End: 2021-03-10 | Stop reason: HOSPADM

## 2021-03-09 RX ORDER — FENTANYL CITRATE 50 UG/ML
25 INJECTION, SOLUTION INTRAMUSCULAR; INTRAVENOUS EVERY 5 MIN PRN
Status: DISCONTINUED | OUTPATIENT
Start: 2021-03-09 | End: 2021-03-09 | Stop reason: HOSPADM

## 2021-03-09 RX ORDER — ROSUVASTATIN CALCIUM 5 MG/1
5 TABLET, COATED ORAL DAILY
Status: DISCONTINUED | OUTPATIENT
Start: 2021-03-09 | End: 2021-03-10 | Stop reason: HOSPADM

## 2021-03-09 RX ORDER — MUPIROCIN 20 MG/G
1 OINTMENT TOPICAL 2 TIMES DAILY
Status: DISCONTINUED | OUTPATIENT
Start: 2021-03-09 | End: 2021-03-10 | Stop reason: HOSPADM

## 2021-03-09 RX ORDER — MORPHINE SULFATE 2 MG/ML
2 INJECTION, SOLUTION INTRAMUSCULAR; INTRAVENOUS
Status: DISCONTINUED | OUTPATIENT
Start: 2021-03-09 | End: 2021-03-10 | Stop reason: HOSPADM

## 2021-03-09 RX ORDER — ACETAMINOPHEN 500 MG
1000 TABLET ORAL
Status: COMPLETED | OUTPATIENT
Start: 2021-03-09 | End: 2021-03-09

## 2021-03-09 RX ORDER — CEFAZOLIN SODIUM 1 G/3ML
2 INJECTION, POWDER, FOR SOLUTION INTRAMUSCULAR; INTRAVENOUS
Status: COMPLETED | OUTPATIENT
Start: 2021-03-09 | End: 2021-03-10

## 2021-03-09 RX ORDER — PROPOFOL 10 MG/ML
VIAL (ML) INTRAVENOUS CONTINUOUS PRN
Status: DISCONTINUED | OUTPATIENT
Start: 2021-03-09 | End: 2021-03-09

## 2021-03-09 RX ORDER — GLUCAGON 1 MG
1 KIT INJECTION
Status: DISCONTINUED | OUTPATIENT
Start: 2021-03-09 | End: 2021-03-10 | Stop reason: HOSPADM

## 2021-03-09 RX ORDER — PANTOPRAZOLE SODIUM 40 MG/1
40 TABLET, DELAYED RELEASE ORAL DAILY
Status: DISCONTINUED | OUTPATIENT
Start: 2021-03-09 | End: 2021-03-10 | Stop reason: HOSPADM

## 2021-03-09 RX ORDER — MUPIROCIN 20 MG/G
1 OINTMENT TOPICAL
Status: COMPLETED | OUTPATIENT
Start: 2021-03-09 | End: 2021-03-09

## 2021-03-09 RX ORDER — POLYETHYLENE GLYCOL 3350 17 G/17G
17 POWDER, FOR SOLUTION ORAL DAILY
Status: DISCONTINUED | OUTPATIENT
Start: 2021-03-09 | End: 2021-03-10 | Stop reason: HOSPADM

## 2021-03-09 RX ORDER — ONDANSETRON 2 MG/ML
INJECTION INTRAMUSCULAR; INTRAVENOUS
Status: DISCONTINUED | OUTPATIENT
Start: 2021-03-09 | End: 2021-03-09

## 2021-03-09 RX ORDER — CELECOXIB 200 MG/1
400 CAPSULE ORAL
Status: COMPLETED | OUTPATIENT
Start: 2021-03-09 | End: 2021-03-09

## 2021-03-09 RX ORDER — OXYCODONE HYDROCHLORIDE 10 MG/1
10 TABLET ORAL
Status: DISCONTINUED | OUTPATIENT
Start: 2021-03-09 | End: 2021-03-10 | Stop reason: HOSPADM

## 2021-03-09 RX ORDER — TALC
6 POWDER (GRAM) TOPICAL NIGHTLY PRN
Status: DISCONTINUED | OUTPATIENT
Start: 2021-03-09 | End: 2021-03-09 | Stop reason: HOSPADM

## 2021-03-09 RX ORDER — KETAMINE HCL IN 0.9 % NACL 50 MG/5 ML
SYRINGE (ML) INTRAVENOUS
Status: DISCONTINUED | OUTPATIENT
Start: 2021-03-09 | End: 2021-03-09

## 2021-03-09 RX ORDER — SODIUM CHLORIDE 9 MG/ML
INJECTION, SOLUTION INTRAVENOUS
Status: COMPLETED | OUTPATIENT
Start: 2021-03-09 | End: 2021-03-09

## 2021-03-09 RX ORDER — HYDROCHLOROTHIAZIDE 12.5 MG/1
12.5 TABLET ORAL 2 TIMES DAILY
Status: DISCONTINUED | OUTPATIENT
Start: 2021-03-09 | End: 2021-03-10 | Stop reason: HOSPADM

## 2021-03-09 RX ORDER — ACETAMINOPHEN 500 MG
1000 TABLET ORAL EVERY 6 HOURS
Status: DISCONTINUED | OUTPATIENT
Start: 2021-03-09 | End: 2021-03-10 | Stop reason: HOSPADM

## 2021-03-09 RX ORDER — CETIRIZINE HYDROCHLORIDE 10 MG/1
10 TABLET ORAL NIGHTLY
Status: DISCONTINUED | OUTPATIENT
Start: 2021-03-09 | End: 2021-03-10 | Stop reason: HOSPADM

## 2021-03-09 RX ORDER — ROPIVACAINE HYDROCHLORIDE 2 MG/ML
8 INJECTION, SOLUTION EPIDURAL; INFILTRATION; PERINEURAL CONTINUOUS
Status: DISCONTINUED | OUTPATIENT
Start: 2021-03-09 | End: 2021-03-10 | Stop reason: HOSPADM

## 2021-03-09 RX ORDER — FAMOTIDINE 20 MG/1
20 TABLET, FILM COATED ORAL 2 TIMES DAILY
Status: DISCONTINUED | OUTPATIENT
Start: 2021-03-09 | End: 2021-03-09

## 2021-03-09 RX ORDER — SODIUM CHLORIDE 0.9 % (FLUSH) 0.9 %
10 SYRINGE (ML) INJECTION
Status: DISCONTINUED | OUTPATIENT
Start: 2021-03-09 | End: 2021-03-09 | Stop reason: HOSPADM

## 2021-03-09 RX ORDER — IBUPROFEN 200 MG
16 TABLET ORAL
Status: DISCONTINUED | OUTPATIENT
Start: 2021-03-09 | End: 2021-03-10 | Stop reason: HOSPADM

## 2021-03-09 RX ORDER — DEXAMETHASONE SODIUM PHOSPHATE 4 MG/ML
INJECTION, SOLUTION INTRA-ARTICULAR; INTRALESIONAL; INTRAMUSCULAR; INTRAVENOUS; SOFT TISSUE
Status: DISCONTINUED | OUTPATIENT
Start: 2021-03-09 | End: 2021-03-09

## 2021-03-09 RX ORDER — PHENYLEPHRINE HYDROCHLORIDE 10 MG/ML
INJECTION INTRAVENOUS
Status: DISCONTINUED | OUTPATIENT
Start: 2021-03-09 | End: 2021-03-09

## 2021-03-09 RX ORDER — LIDOCAINE HYDROCHLORIDE 20 MG/ML
INJECTION INTRAVENOUS
Status: DISCONTINUED | OUTPATIENT
Start: 2021-03-09 | End: 2021-03-09

## 2021-03-09 RX ORDER — BISACODYL 10 MG
10 SUPPOSITORY, RECTAL RECTAL EVERY 12 HOURS PRN
Status: DISCONTINUED | OUTPATIENT
Start: 2021-03-09 | End: 2021-03-10 | Stop reason: HOSPADM

## 2021-03-09 RX ORDER — ONDANSETRON 2 MG/ML
4 INJECTION INTRAMUSCULAR; INTRAVENOUS EVERY 8 HOURS PRN
Status: DISCONTINUED | OUTPATIENT
Start: 2021-03-09 | End: 2021-03-10 | Stop reason: HOSPADM

## 2021-03-09 RX ORDER — ASPIRIN 81 MG/1
81 TABLET ORAL 2 TIMES DAILY
Status: DISCONTINUED | OUTPATIENT
Start: 2021-03-09 | End: 2021-03-10 | Stop reason: HOSPADM

## 2021-03-09 RX ORDER — SODIUM CHLORIDE 9 MG/ML
INJECTION, SOLUTION INTRAVENOUS CONTINUOUS
Status: DISCONTINUED | OUTPATIENT
Start: 2021-03-09 | End: 2021-03-10 | Stop reason: HOSPADM

## 2021-03-09 RX ORDER — MIDAZOLAM HYDROCHLORIDE 1 MG/ML
1 INJECTION INTRAMUSCULAR; INTRAVENOUS EVERY 5 MIN PRN
Status: DISCONTINUED | OUTPATIENT
Start: 2021-03-09 | End: 2021-03-09 | Stop reason: HOSPADM

## 2021-03-09 RX ORDER — AMOXICILLIN 250 MG
1 CAPSULE ORAL 2 TIMES DAILY
Status: DISCONTINUED | OUTPATIENT
Start: 2021-03-09 | End: 2021-03-10 | Stop reason: HOSPADM

## 2021-03-09 RX ORDER — CEFAZOLIN SODIUM 1 G/3ML
2 INJECTION, POWDER, FOR SOLUTION INTRAMUSCULAR; INTRAVENOUS
Status: COMPLETED | OUTPATIENT
Start: 2021-03-09 | End: 2021-03-09

## 2021-03-09 RX ORDER — SPIRONOLACTONE 25 MG/1
50 TABLET ORAL DAILY
Status: DISCONTINUED | OUTPATIENT
Start: 2021-03-10 | End: 2021-03-10 | Stop reason: HOSPADM

## 2021-03-09 RX ORDER — INSULIN ASPART 100 [IU]/ML
1-10 INJECTION, SOLUTION INTRAVENOUS; SUBCUTANEOUS
Status: DISCONTINUED | OUTPATIENT
Start: 2021-03-09 | End: 2021-03-10 | Stop reason: HOSPADM

## 2021-03-09 RX ORDER — FENTANYL CITRATE 50 UG/ML
INJECTION, SOLUTION INTRAMUSCULAR; INTRAVENOUS
Status: DISCONTINUED | OUTPATIENT
Start: 2021-03-09 | End: 2021-03-09

## 2021-03-09 RX ORDER — LEVOTHYROXINE SODIUM 125 UG/1
125 TABLET ORAL DAILY
Status: DISCONTINUED | OUTPATIENT
Start: 2021-03-10 | End: 2021-03-10 | Stop reason: HOSPADM

## 2021-03-09 RX ORDER — IBUPROFEN 200 MG
24 TABLET ORAL
Status: DISCONTINUED | OUTPATIENT
Start: 2021-03-09 | End: 2021-03-10 | Stop reason: HOSPADM

## 2021-03-09 RX ORDER — NALOXONE HCL 0.4 MG/ML
0.02 VIAL (ML) INJECTION
Status: DISCONTINUED | OUTPATIENT
Start: 2021-03-09 | End: 2021-03-10 | Stop reason: HOSPADM

## 2021-03-09 RX ORDER — POTASSIUM CHLORIDE 750 MG/1
10 TABLET, EXTENDED RELEASE ORAL 2 TIMES DAILY
COMMUNITY
End: 2021-09-28 | Stop reason: SDUPTHER

## 2021-03-09 RX ADMIN — ACETAMINOPHEN 1000 MG: 500 TABLET ORAL at 03:03

## 2021-03-09 RX ADMIN — PROPOFOL 50 MCG/KG/MIN: 10 INJECTION, EMULSION INTRAVENOUS at 10:03

## 2021-03-09 RX ADMIN — PANTOPRAZOLE SODIUM 40 MG: 40 TABLET, DELAYED RELEASE ORAL at 08:03

## 2021-03-09 RX ADMIN — CEFAZOLIN 2 G: 330 INJECTION, POWDER, FOR SOLUTION INTRAMUSCULAR; INTRAVENOUS at 05:03

## 2021-03-09 RX ADMIN — SODIUM CHLORIDE: 0.9 INJECTION, SOLUTION INTRAVENOUS at 09:03

## 2021-03-09 RX ADMIN — MIDAZOLAM HYDROCHLORIDE 2 MG: 1 INJECTION, SOLUTION INTRAMUSCULAR; INTRAVENOUS at 09:03

## 2021-03-09 RX ADMIN — DEXAMETHASONE SODIUM PHOSPHATE 8 MG: 4 INJECTION, SOLUTION INTRAMUSCULAR; INTRAVENOUS at 10:03

## 2021-03-09 RX ADMIN — SODIUM CHLORIDE: 0.9 INJECTION, SOLUTION INTRAVENOUS at 07:03

## 2021-03-09 RX ADMIN — MUPIROCIN 1 G: 20 OINTMENT TOPICAL at 08:03

## 2021-03-09 RX ADMIN — FENTANYL CITRATE 50 MCG: 50 INJECTION, SOLUTION INTRAMUSCULAR; INTRAVENOUS at 10:03

## 2021-03-09 RX ADMIN — Medication 30 MG: at 10:03

## 2021-03-09 RX ADMIN — ROSUVASTATIN CALCIUM 5 MG: 5 TABLET, COATED ORAL at 09:03

## 2021-03-09 RX ADMIN — MEPIVACAINE HYDROCHLORIDE 3 ML: 15 INJECTION, SOLUTION EPIDURAL; INFILTRATION at 09:03

## 2021-03-09 RX ADMIN — SODIUM CHLORIDE: 0.9 INJECTION, SOLUTION INTRAVENOUS at 01:03

## 2021-03-09 RX ADMIN — CEFAZOLIN 2 G: 330 INJECTION, POWDER, FOR SOLUTION INTRAMUSCULAR; INTRAVENOUS at 10:03

## 2021-03-09 RX ADMIN — OXYCODONE 5 MG: 5 TABLET ORAL at 07:03

## 2021-03-09 RX ADMIN — FENTANYL CITRATE 25 MCG: 50 INJECTION INTRAMUSCULAR; INTRAVENOUS at 01:03

## 2021-03-09 RX ADMIN — ASPIRIN 81 MG: 81 TABLET, COATED ORAL at 09:03

## 2021-03-09 RX ADMIN — MIDAZOLAM HYDROCHLORIDE 1 MG: 1 INJECTION, SOLUTION INTRAMUSCULAR; INTRAVENOUS at 10:03

## 2021-03-09 RX ADMIN — PHENYLEPHRINE HYDROCHLORIDE 100 MCG: 10 INJECTION INTRAVENOUS at 10:03

## 2021-03-09 RX ADMIN — INSULIN ASPART 1 UNITS: 100 INJECTION, SOLUTION INTRAVENOUS; SUBCUTANEOUS at 09:03

## 2021-03-09 RX ADMIN — CELECOXIB 400 MG: 200 CAPSULE ORAL at 08:03

## 2021-03-09 RX ADMIN — Medication 10 MG: at 11:03

## 2021-03-09 RX ADMIN — ONDANSETRON 4 MG: 2 INJECTION, SOLUTION INTRAMUSCULAR; INTRAVENOUS at 11:03

## 2021-03-09 RX ADMIN — MIDAZOLAM HYDROCHLORIDE 1 MG: 1 INJECTION, SOLUTION INTRAMUSCULAR; INTRAVENOUS at 09:03

## 2021-03-09 RX ADMIN — DOCUSATE SODIUM 50MG AND SENNOSIDES 8.6MG 1 TABLET: 8.6; 5 TABLET, FILM COATED ORAL at 02:03

## 2021-03-09 RX ADMIN — CETIRIZINE HYDROCHLORIDE 10 MG: 10 TABLET, FILM COATED ORAL at 08:03

## 2021-03-09 RX ADMIN — OXYCODONE HYDROCHLORIDE 10 MG: 10 TABLET ORAL at 01:03

## 2021-03-09 RX ADMIN — PREGABALIN 75 MG: 75 CAPSULE ORAL at 08:03

## 2021-03-09 RX ADMIN — Medication: at 01:03

## 2021-03-09 RX ADMIN — LIDOCAINE HYDROCHLORIDE 80 MG: 20 INJECTION, SOLUTION INTRAVENOUS at 10:03

## 2021-03-09 RX ADMIN — FAMOTIDINE 20 MG: 10 INJECTION, SOLUTION INTRAVENOUS at 10:03

## 2021-03-09 RX ADMIN — INSULIN ASPART 2 UNITS: 100 INJECTION, SOLUTION INTRAVENOUS; SUBCUTANEOUS at 05:03

## 2021-03-09 RX ADMIN — OXYCODONE 5 MG: 5 TABLET ORAL at 04:03

## 2021-03-09 RX ADMIN — DOCUSATE SODIUM 50MG AND SENNOSIDES 8.6MG 1 TABLET: 8.6; 5 TABLET, FILM COATED ORAL at 09:03

## 2021-03-09 RX ADMIN — VANCOMYCIN HYDROCHLORIDE 1500 MG: 1.5 INJECTION, POWDER, LYOPHILIZED, FOR SOLUTION INTRAVENOUS at 08:03

## 2021-03-09 RX ADMIN — ACETAMINOPHEN 1000 MG: 500 TABLET ORAL at 08:03

## 2021-03-10 VITALS
WEIGHT: 244 LBS | TEMPERATURE: 98 F | BODY MASS INDEX: 41.66 KG/M2 | SYSTOLIC BLOOD PRESSURE: 119 MMHG | DIASTOLIC BLOOD PRESSURE: 56 MMHG | HEART RATE: 83 BPM | OXYGEN SATURATION: 97 % | RESPIRATION RATE: 18 BRPM | HEIGHT: 64 IN

## 2021-03-10 LAB
POCT GLUCOSE: 123 MG/DL (ref 70–110)
POCT GLUCOSE: 146 MG/DL (ref 70–110)
POCT GLUCOSE: 183 MG/DL (ref 70–110)

## 2021-03-10 PROCEDURE — 99900035 HC TECH TIME PER 15 MIN (STAT)

## 2021-03-10 PROCEDURE — 25000003 PHARM REV CODE 250: Performed by: NURSE PRACTITIONER

## 2021-03-10 PROCEDURE — 94761 N-INVAS EAR/PLS OXIMETRY MLT: CPT

## 2021-03-10 PROCEDURE — 97110 THERAPEUTIC EXERCISES: CPT

## 2021-03-10 PROCEDURE — 97530 THERAPEUTIC ACTIVITIES: CPT

## 2021-03-10 PROCEDURE — 97535 SELF CARE MNGMENT TRAINING: CPT | Mod: 59

## 2021-03-10 PROCEDURE — 97116 GAIT TRAINING THERAPY: CPT

## 2021-03-10 PROCEDURE — 94660 CPAP INITIATION&MGMT: CPT

## 2021-03-10 PROCEDURE — 25000003 PHARM REV CODE 250: Performed by: PHYSICIAN ASSISTANT

## 2021-03-10 PROCEDURE — 63600175 PHARM REV CODE 636 W HCPCS: Performed by: NURSE PRACTITIONER

## 2021-03-10 RX ORDER — FLUCONAZOLE 200 MG/1
200 TABLET ORAL DAILY
Qty: 7 TABLET | Refills: 0 | Status: SHIPPED | OUTPATIENT
Start: 2021-03-10 | End: 2021-04-09

## 2021-03-10 RX ORDER — FLUCONAZOLE 200 MG/1
200 TABLET ORAL ONCE
Status: DISCONTINUED | OUTPATIENT
Start: 2021-03-10 | End: 2021-03-10 | Stop reason: HOSPADM

## 2021-03-10 RX ORDER — CELECOXIB 200 MG/1
200 CAPSULE ORAL DAILY
Status: DISCONTINUED | OUTPATIENT
Start: 2021-03-10 | End: 2021-03-10 | Stop reason: HOSPADM

## 2021-03-10 RX ADMIN — MUPIROCIN 1 G: 20 OINTMENT TOPICAL at 08:03

## 2021-03-10 RX ADMIN — LEVOTHYROXINE SODIUM 125 MCG: 125 TABLET ORAL at 08:03

## 2021-03-10 RX ADMIN — DOCUSATE SODIUM 50MG AND SENNOSIDES 8.6MG 1 TABLET: 8.6; 5 TABLET, FILM COATED ORAL at 08:03

## 2021-03-10 RX ADMIN — OXYCODONE HYDROCHLORIDE 10 MG: 10 TABLET ORAL at 07:03

## 2021-03-10 RX ADMIN — ROSUVASTATIN CALCIUM 5 MG: 5 TABLET, COATED ORAL at 08:03

## 2021-03-10 RX ADMIN — ACETAMINOPHEN 1000 MG: 500 TABLET ORAL at 08:03

## 2021-03-10 RX ADMIN — PANTOPRAZOLE SODIUM 40 MG: 40 TABLET, DELAYED RELEASE ORAL at 08:03

## 2021-03-10 RX ADMIN — CEFAZOLIN 2 G: 330 INJECTION, POWDER, FOR SOLUTION INTRAMUSCULAR; INTRAVENOUS at 02:03

## 2021-03-10 RX ADMIN — SODIUM CHLORIDE: 0.9 INJECTION, SOLUTION INTRAVENOUS at 02:03

## 2021-03-10 RX ADMIN — AMLODIPINE BESYLATE 10 MG: 10 TABLET ORAL at 08:03

## 2021-03-10 RX ADMIN — ASPIRIN 81 MG: 81 TABLET, COATED ORAL at 08:03

## 2021-03-10 RX ADMIN — ACETAMINOPHEN 1000 MG: 500 TABLET ORAL at 03:03

## 2021-03-10 RX ADMIN — CELECOXIB 200 MG: 200 CAPSULE ORAL at 08:03

## 2021-03-10 RX ADMIN — OXYCODONE 5 MG: 5 TABLET ORAL at 03:03

## 2021-03-10 RX ADMIN — SPIRONOLACTONE 50 MG: 25 TABLET, FILM COATED ORAL at 08:03

## 2021-03-10 RX ADMIN — POLYETHYLENE GLYCOL 3350 17 G: 17 POWDER, FOR SOLUTION ORAL at 08:03

## 2021-03-11 ENCOUNTER — CLINICAL SUPPORT (OUTPATIENT)
Dept: ORTHOPEDICS | Facility: CLINIC | Age: 66
End: 2021-03-11
Payer: MEDICARE

## 2021-03-11 ENCOUNTER — CLINICAL SUPPORT (OUTPATIENT)
Dept: REHABILITATION | Facility: HOSPITAL | Age: 66
End: 2021-03-11
Attending: ORTHOPAEDIC SURGERY
Payer: MEDICARE

## 2021-03-11 DIAGNOSIS — M25.662 DECREASED RANGE OF MOTION OF BOTH KNEES: ICD-10-CM

## 2021-03-11 DIAGNOSIS — M25.561 ACUTE PAIN OF BOTH KNEES: ICD-10-CM

## 2021-03-11 DIAGNOSIS — M17.11 PRIMARY OSTEOARTHRITIS OF RIGHT KNEE: ICD-10-CM

## 2021-03-11 DIAGNOSIS — R53.1 DECREASED STRENGTH: ICD-10-CM

## 2021-03-11 DIAGNOSIS — M25.661 DECREASED RANGE OF MOTION OF BOTH KNEES: ICD-10-CM

## 2021-03-11 DIAGNOSIS — M25.562 ACUTE PAIN OF BOTH KNEES: ICD-10-CM

## 2021-03-11 PROCEDURE — 99499 UNLISTED E&M SERVICE: CPT | Mod: 95,,, | Performed by: ORTHOPAEDIC SURGERY

## 2021-03-11 PROCEDURE — 97110 THERAPEUTIC EXERCISES: CPT | Mod: PN

## 2021-03-11 PROCEDURE — 97164 PT RE-EVAL EST PLAN CARE: CPT | Mod: PN

## 2021-03-11 PROCEDURE — 99499 NO LOS: ICD-10-PCS | Mod: 95,,, | Performed by: ORTHOPAEDIC SURGERY

## 2021-03-16 ENCOUNTER — CLINICAL SUPPORT (OUTPATIENT)
Dept: REHABILITATION | Facility: HOSPITAL | Age: 66
End: 2021-03-16
Attending: ORTHOPAEDIC SURGERY
Payer: MEDICARE

## 2021-03-16 DIAGNOSIS — M17.11 PRIMARY OSTEOARTHRITIS OF RIGHT KNEE: ICD-10-CM

## 2021-03-16 DIAGNOSIS — M25.662 DECREASED RANGE OF MOTION OF BOTH KNEES: ICD-10-CM

## 2021-03-16 DIAGNOSIS — R53.1 DECREASED STRENGTH: ICD-10-CM

## 2021-03-16 DIAGNOSIS — M25.561 ACUTE PAIN OF BOTH KNEES: ICD-10-CM

## 2021-03-16 DIAGNOSIS — M25.661 DECREASED RANGE OF MOTION OF BOTH KNEES: ICD-10-CM

## 2021-03-16 DIAGNOSIS — M25.562 ACUTE PAIN OF BOTH KNEES: ICD-10-CM

## 2021-03-16 PROCEDURE — 97110 THERAPEUTIC EXERCISES: CPT | Mod: PN,CQ

## 2021-03-16 PROCEDURE — 97140 MANUAL THERAPY 1/> REGIONS: CPT | Mod: PN,CQ

## 2021-03-17 ENCOUNTER — CLINICAL SUPPORT (OUTPATIENT)
Dept: REHABILITATION | Facility: HOSPITAL | Age: 66
End: 2021-03-17
Attending: ORTHOPAEDIC SURGERY
Payer: MEDICARE

## 2021-03-17 DIAGNOSIS — M25.561 ACUTE PAIN OF BOTH KNEES: ICD-10-CM

## 2021-03-17 DIAGNOSIS — R53.1 DECREASED STRENGTH: ICD-10-CM

## 2021-03-17 DIAGNOSIS — M25.661 DECREASED RANGE OF MOTION OF BOTH KNEES: ICD-10-CM

## 2021-03-17 DIAGNOSIS — M25.662 DECREASED RANGE OF MOTION OF BOTH KNEES: ICD-10-CM

## 2021-03-17 DIAGNOSIS — M25.562 ACUTE PAIN OF BOTH KNEES: ICD-10-CM

## 2021-03-17 DIAGNOSIS — M17.11 PRIMARY OSTEOARTHRITIS OF RIGHT KNEE: ICD-10-CM

## 2021-03-17 PROCEDURE — 97110 THERAPEUTIC EXERCISES: CPT | Mod: PN

## 2021-03-19 ENCOUNTER — OFFICE VISIT (OUTPATIENT)
Dept: ORTHOPEDICS | Facility: CLINIC | Age: 66
End: 2021-03-19
Payer: MEDICARE

## 2021-03-19 ENCOUNTER — CLINICAL SUPPORT (OUTPATIENT)
Dept: REHABILITATION | Facility: HOSPITAL | Age: 66
End: 2021-03-19
Attending: ORTHOPAEDIC SURGERY
Payer: MEDICARE

## 2021-03-19 DIAGNOSIS — M25.562 ACUTE PAIN OF BOTH KNEES: ICD-10-CM

## 2021-03-19 DIAGNOSIS — Z96.651 STATUS POST RIGHT KNEE REPLACEMENT: Primary | ICD-10-CM

## 2021-03-19 DIAGNOSIS — R53.1 DECREASED STRENGTH: ICD-10-CM

## 2021-03-19 DIAGNOSIS — M25.662 DECREASED RANGE OF MOTION OF BOTH KNEES: ICD-10-CM

## 2021-03-19 DIAGNOSIS — M25.561 ACUTE PAIN OF BOTH KNEES: ICD-10-CM

## 2021-03-19 DIAGNOSIS — M17.11 PRIMARY OSTEOARTHRITIS OF RIGHT KNEE: ICD-10-CM

## 2021-03-19 DIAGNOSIS — M25.661 DECREASED RANGE OF MOTION OF BOTH KNEES: ICD-10-CM

## 2021-03-19 PROCEDURE — 97110 THERAPEUTIC EXERCISES: CPT | Mod: PN

## 2021-03-19 PROCEDURE — 99024 PR POST-OP FOLLOW-UP VISIT: ICD-10-PCS | Mod: POP,,, | Performed by: NURSE PRACTITIONER

## 2021-03-19 PROCEDURE — 99214 OFFICE O/P EST MOD 30 MIN: CPT | Mod: PBBFAC | Performed by: NURSE PRACTITIONER

## 2021-03-19 PROCEDURE — 99999 PR PBB SHADOW E&M-EST. PATIENT-LVL IV: ICD-10-PCS | Mod: PBBFAC,,, | Performed by: NURSE PRACTITIONER

## 2021-03-19 PROCEDURE — 99024 POSTOP FOLLOW-UP VISIT: CPT | Mod: POP,,, | Performed by: NURSE PRACTITIONER

## 2021-03-19 PROCEDURE — 99999 PR PBB SHADOW E&M-EST. PATIENT-LVL IV: CPT | Mod: PBBFAC,,, | Performed by: NURSE PRACTITIONER

## 2021-03-21 ENCOUNTER — PATIENT MESSAGE (OUTPATIENT)
Dept: ORTHOPEDICS | Facility: CLINIC | Age: 66
End: 2021-03-21

## 2021-03-22 ENCOUNTER — CLINICAL SUPPORT (OUTPATIENT)
Dept: REHABILITATION | Facility: HOSPITAL | Age: 66
End: 2021-03-22
Attending: ORTHOPAEDIC SURGERY
Payer: MEDICARE

## 2021-03-22 ENCOUNTER — PATIENT MESSAGE (OUTPATIENT)
Dept: ADMINISTRATIVE | Facility: OTHER | Age: 66
End: 2021-03-22

## 2021-03-22 DIAGNOSIS — M25.662 DECREASED RANGE OF MOTION OF BOTH KNEES: ICD-10-CM

## 2021-03-22 DIAGNOSIS — M25.562 ACUTE PAIN OF BOTH KNEES: ICD-10-CM

## 2021-03-22 DIAGNOSIS — R53.1 DECREASED STRENGTH: ICD-10-CM

## 2021-03-22 DIAGNOSIS — M17.11 PRIMARY OSTEOARTHRITIS OF RIGHT KNEE: ICD-10-CM

## 2021-03-22 DIAGNOSIS — M25.661 DECREASED RANGE OF MOTION OF BOTH KNEES: ICD-10-CM

## 2021-03-22 DIAGNOSIS — M25.561 ACUTE PAIN OF BOTH KNEES: ICD-10-CM

## 2021-03-22 PROCEDURE — 97140 MANUAL THERAPY 1/> REGIONS: CPT | Mod: PN,CQ

## 2021-03-22 PROCEDURE — 97110 THERAPEUTIC EXERCISES: CPT | Mod: PN,CQ

## 2021-03-23 ENCOUNTER — PATIENT MESSAGE (OUTPATIENT)
Dept: ORTHOPEDICS | Facility: CLINIC | Age: 66
End: 2021-03-23

## 2021-03-23 DIAGNOSIS — Z96.659 STATUS POST KNEE REPLACEMENT, UNSPECIFIED LATERALITY: Primary | ICD-10-CM

## 2021-03-23 RX ORDER — OXYCODONE HYDROCHLORIDE 5 MG/1
TABLET ORAL
Qty: 30 TABLET | Refills: 0 | Status: SHIPPED | OUTPATIENT
Start: 2021-03-23 | End: 2021-05-24

## 2021-03-25 ENCOUNTER — CLINICAL SUPPORT (OUTPATIENT)
Dept: REHABILITATION | Facility: HOSPITAL | Age: 66
End: 2021-03-25
Attending: ORTHOPAEDIC SURGERY
Payer: MEDICARE

## 2021-03-25 DIAGNOSIS — R53.1 DECREASED STRENGTH: ICD-10-CM

## 2021-03-25 DIAGNOSIS — M25.662 DECREASED RANGE OF MOTION OF BOTH KNEES: ICD-10-CM

## 2021-03-25 DIAGNOSIS — M25.561 ACUTE PAIN OF BOTH KNEES: ICD-10-CM

## 2021-03-25 DIAGNOSIS — M25.562 ACUTE PAIN OF BOTH KNEES: ICD-10-CM

## 2021-03-25 DIAGNOSIS — M17.11 PRIMARY OSTEOARTHRITIS OF RIGHT KNEE: ICD-10-CM

## 2021-03-25 DIAGNOSIS — M25.661 DECREASED RANGE OF MOTION OF BOTH KNEES: ICD-10-CM

## 2021-03-25 PROCEDURE — 97110 THERAPEUTIC EXERCISES: CPT | Mod: PN,CQ

## 2021-03-25 PROCEDURE — 97140 MANUAL THERAPY 1/> REGIONS: CPT | Mod: PN,CQ

## 2021-03-26 ENCOUNTER — CLINICAL SUPPORT (OUTPATIENT)
Dept: REHABILITATION | Facility: HOSPITAL | Age: 66
End: 2021-03-26
Attending: ORTHOPAEDIC SURGERY
Payer: MEDICARE

## 2021-03-26 DIAGNOSIS — M25.661 DECREASED RANGE OF MOTION OF BOTH KNEES: ICD-10-CM

## 2021-03-26 DIAGNOSIS — M17.11 PRIMARY OSTEOARTHRITIS OF RIGHT KNEE: ICD-10-CM

## 2021-03-26 DIAGNOSIS — M25.561 ACUTE PAIN OF BOTH KNEES: ICD-10-CM

## 2021-03-26 DIAGNOSIS — R53.1 DECREASED STRENGTH: ICD-10-CM

## 2021-03-26 DIAGNOSIS — M25.662 DECREASED RANGE OF MOTION OF BOTH KNEES: ICD-10-CM

## 2021-03-26 DIAGNOSIS — M25.562 ACUTE PAIN OF BOTH KNEES: ICD-10-CM

## 2021-03-26 PROCEDURE — 97110 THERAPEUTIC EXERCISES: CPT | Mod: PN

## 2021-03-26 PROCEDURE — 97140 MANUAL THERAPY 1/> REGIONS: CPT | Mod: PN

## 2021-03-29 ENCOUNTER — CLINICAL SUPPORT (OUTPATIENT)
Dept: REHABILITATION | Facility: HOSPITAL | Age: 66
End: 2021-03-29
Attending: ORTHOPAEDIC SURGERY
Payer: MEDICARE

## 2021-03-29 DIAGNOSIS — M25.661 DECREASED RANGE OF MOTION OF BOTH KNEES: ICD-10-CM

## 2021-03-29 DIAGNOSIS — M25.662 DECREASED RANGE OF MOTION OF BOTH KNEES: ICD-10-CM

## 2021-03-29 DIAGNOSIS — M25.562 ACUTE PAIN OF BOTH KNEES: ICD-10-CM

## 2021-03-29 DIAGNOSIS — M17.11 PRIMARY OSTEOARTHRITIS OF RIGHT KNEE: ICD-10-CM

## 2021-03-29 DIAGNOSIS — R53.1 DECREASED STRENGTH: ICD-10-CM

## 2021-03-29 DIAGNOSIS — M25.561 ACUTE PAIN OF BOTH KNEES: ICD-10-CM

## 2021-03-29 PROCEDURE — 97116 GAIT TRAINING THERAPY: CPT | Mod: PN

## 2021-03-29 PROCEDURE — 97110 THERAPEUTIC EXERCISES: CPT | Mod: PN

## 2021-03-31 ENCOUNTER — CLINICAL SUPPORT (OUTPATIENT)
Dept: REHABILITATION | Facility: HOSPITAL | Age: 66
End: 2021-03-31
Attending: ORTHOPAEDIC SURGERY
Payer: MEDICARE

## 2021-03-31 DIAGNOSIS — M25.662 DECREASED RANGE OF MOTION OF BOTH KNEES: ICD-10-CM

## 2021-03-31 DIAGNOSIS — M25.562 ACUTE PAIN OF BOTH KNEES: ICD-10-CM

## 2021-03-31 DIAGNOSIS — R53.1 DECREASED STRENGTH: ICD-10-CM

## 2021-03-31 DIAGNOSIS — M25.561 ACUTE PAIN OF BOTH KNEES: ICD-10-CM

## 2021-03-31 DIAGNOSIS — M25.661 DECREASED RANGE OF MOTION OF BOTH KNEES: ICD-10-CM

## 2021-03-31 DIAGNOSIS — M17.11 PRIMARY OSTEOARTHRITIS OF RIGHT KNEE: ICD-10-CM

## 2021-03-31 PROCEDURE — 97110 THERAPEUTIC EXERCISES: CPT | Mod: PN

## 2021-04-01 ENCOUNTER — CLINICAL SUPPORT (OUTPATIENT)
Dept: REHABILITATION | Facility: HOSPITAL | Age: 66
End: 2021-04-01
Attending: ORTHOPAEDIC SURGERY
Payer: MEDICARE

## 2021-04-01 DIAGNOSIS — M17.11 PRIMARY OSTEOARTHRITIS OF RIGHT KNEE: ICD-10-CM

## 2021-04-01 DIAGNOSIS — M25.561 ACUTE PAIN OF BOTH KNEES: ICD-10-CM

## 2021-04-01 DIAGNOSIS — R53.1 DECREASED STRENGTH: ICD-10-CM

## 2021-04-01 DIAGNOSIS — M25.661 DECREASED RANGE OF MOTION OF BOTH KNEES: ICD-10-CM

## 2021-04-01 DIAGNOSIS — M25.662 DECREASED RANGE OF MOTION OF BOTH KNEES: ICD-10-CM

## 2021-04-01 DIAGNOSIS — M25.562 ACUTE PAIN OF BOTH KNEES: ICD-10-CM

## 2021-04-01 PROCEDURE — 97110 THERAPEUTIC EXERCISES: CPT | Mod: PN,CQ

## 2021-04-05 ENCOUNTER — PATIENT MESSAGE (OUTPATIENT)
Dept: ADMINISTRATIVE | Facility: HOSPITAL | Age: 66
End: 2021-04-05

## 2021-04-05 ENCOUNTER — PATIENT MESSAGE (OUTPATIENT)
Dept: ORTHOPEDICS | Facility: CLINIC | Age: 66
End: 2021-04-05

## 2021-04-05 ENCOUNTER — CLINICAL SUPPORT (OUTPATIENT)
Dept: REHABILITATION | Facility: HOSPITAL | Age: 66
End: 2021-04-05
Attending: ORTHOPAEDIC SURGERY
Payer: MEDICARE

## 2021-04-05 DIAGNOSIS — M17.11 PRIMARY OSTEOARTHRITIS OF RIGHT KNEE: ICD-10-CM

## 2021-04-05 DIAGNOSIS — M25.561 ACUTE PAIN OF BOTH KNEES: ICD-10-CM

## 2021-04-05 DIAGNOSIS — M25.662 DECREASED RANGE OF MOTION OF BOTH KNEES: ICD-10-CM

## 2021-04-05 DIAGNOSIS — M25.661 DECREASED RANGE OF MOTION OF BOTH KNEES: ICD-10-CM

## 2021-04-05 DIAGNOSIS — R53.1 DECREASED STRENGTH: ICD-10-CM

## 2021-04-05 DIAGNOSIS — M25.562 ACUTE PAIN OF BOTH KNEES: ICD-10-CM

## 2021-04-05 PROCEDURE — 97110 THERAPEUTIC EXERCISES: CPT | Mod: PN

## 2021-04-07 ENCOUNTER — CLINICAL SUPPORT (OUTPATIENT)
Dept: REHABILITATION | Facility: HOSPITAL | Age: 66
End: 2021-04-07
Attending: ORTHOPAEDIC SURGERY
Payer: MEDICARE

## 2021-04-07 ENCOUNTER — PATIENT MESSAGE (OUTPATIENT)
Dept: ORTHOPEDICS | Facility: CLINIC | Age: 66
End: 2021-04-07

## 2021-04-07 DIAGNOSIS — M25.561 ACUTE PAIN OF BOTH KNEES: ICD-10-CM

## 2021-04-07 DIAGNOSIS — M25.562 ACUTE PAIN OF BOTH KNEES: ICD-10-CM

## 2021-04-07 DIAGNOSIS — M17.11 PRIMARY OSTEOARTHRITIS OF RIGHT KNEE: ICD-10-CM

## 2021-04-07 DIAGNOSIS — M25.662 DECREASED RANGE OF MOTION OF BOTH KNEES: ICD-10-CM

## 2021-04-07 DIAGNOSIS — R53.1 DECREASED STRENGTH: ICD-10-CM

## 2021-04-07 DIAGNOSIS — M25.661 DECREASED RANGE OF MOTION OF BOTH KNEES: ICD-10-CM

## 2021-04-07 PROCEDURE — 97110 THERAPEUTIC EXERCISES: CPT | Mod: PN,CQ

## 2021-04-09 ENCOUNTER — CLINICAL SUPPORT (OUTPATIENT)
Dept: REHABILITATION | Facility: HOSPITAL | Age: 66
End: 2021-04-09
Attending: ORTHOPAEDIC SURGERY
Payer: MEDICARE

## 2021-04-09 DIAGNOSIS — R53.1 DECREASED STRENGTH: ICD-10-CM

## 2021-04-09 DIAGNOSIS — M25.661 DECREASED RANGE OF MOTION OF BOTH KNEES: ICD-10-CM

## 2021-04-09 DIAGNOSIS — M25.662 DECREASED RANGE OF MOTION OF BOTH KNEES: ICD-10-CM

## 2021-04-09 DIAGNOSIS — M17.11 PRIMARY OSTEOARTHRITIS OF RIGHT KNEE: ICD-10-CM

## 2021-04-09 DIAGNOSIS — M25.562 ACUTE PAIN OF BOTH KNEES: ICD-10-CM

## 2021-04-09 DIAGNOSIS — M25.561 ACUTE PAIN OF BOTH KNEES: ICD-10-CM

## 2021-04-09 PROCEDURE — 97110 THERAPEUTIC EXERCISES: CPT | Mod: PN

## 2021-04-12 ENCOUNTER — PATIENT MESSAGE (OUTPATIENT)
Dept: ORTHOPEDICS | Facility: CLINIC | Age: 66
End: 2021-04-12

## 2021-04-12 ENCOUNTER — CLINICAL SUPPORT (OUTPATIENT)
Dept: REHABILITATION | Facility: HOSPITAL | Age: 66
End: 2021-04-12
Attending: ORTHOPAEDIC SURGERY
Payer: MEDICARE

## 2021-04-12 DIAGNOSIS — M25.661 DECREASED RANGE OF MOTION OF BOTH KNEES: ICD-10-CM

## 2021-04-12 DIAGNOSIS — M25.562 ACUTE PAIN OF BOTH KNEES: ICD-10-CM

## 2021-04-12 DIAGNOSIS — M25.662 DECREASED RANGE OF MOTION OF BOTH KNEES: ICD-10-CM

## 2021-04-12 DIAGNOSIS — M17.11 PRIMARY OSTEOARTHRITIS OF RIGHT KNEE: ICD-10-CM

## 2021-04-12 DIAGNOSIS — M25.561 ACUTE PAIN OF BOTH KNEES: ICD-10-CM

## 2021-04-12 DIAGNOSIS — R53.1 DECREASED STRENGTH: ICD-10-CM

## 2021-04-12 PROCEDURE — 97110 THERAPEUTIC EXERCISES: CPT | Mod: PN,CQ

## 2021-04-14 ENCOUNTER — CLINICAL SUPPORT (OUTPATIENT)
Dept: REHABILITATION | Facility: HOSPITAL | Age: 66
End: 2021-04-14
Attending: ORTHOPAEDIC SURGERY
Payer: MEDICARE

## 2021-04-14 DIAGNOSIS — M17.11 PRIMARY OSTEOARTHRITIS OF RIGHT KNEE: ICD-10-CM

## 2021-04-14 DIAGNOSIS — M25.661 DECREASED RANGE OF MOTION OF BOTH KNEES: ICD-10-CM

## 2021-04-14 DIAGNOSIS — R53.1 DECREASED STRENGTH: ICD-10-CM

## 2021-04-14 DIAGNOSIS — M25.561 ACUTE PAIN OF BOTH KNEES: ICD-10-CM

## 2021-04-14 DIAGNOSIS — M25.662 DECREASED RANGE OF MOTION OF BOTH KNEES: ICD-10-CM

## 2021-04-14 DIAGNOSIS — M25.562 ACUTE PAIN OF BOTH KNEES: ICD-10-CM

## 2021-04-14 PROCEDURE — 97110 THERAPEUTIC EXERCISES: CPT | Mod: PN,CQ

## 2021-04-15 ENCOUNTER — PATIENT MESSAGE (OUTPATIENT)
Dept: ADMINISTRATIVE | Facility: HOSPITAL | Age: 66
End: 2021-04-15

## 2021-04-15 ENCOUNTER — PATIENT OUTREACH (OUTPATIENT)
Dept: ADMINISTRATIVE | Facility: HOSPITAL | Age: 66
End: 2021-04-15

## 2021-04-16 ENCOUNTER — CLINICAL SUPPORT (OUTPATIENT)
Dept: REHABILITATION | Facility: HOSPITAL | Age: 66
End: 2021-04-16
Attending: ORTHOPAEDIC SURGERY
Payer: MEDICARE

## 2021-04-16 ENCOUNTER — DOCUMENTATION ONLY (OUTPATIENT)
Dept: ADMINISTRATIVE | Facility: HOSPITAL | Age: 66
End: 2021-04-16

## 2021-04-16 DIAGNOSIS — M25.561 ACUTE PAIN OF BOTH KNEES: ICD-10-CM

## 2021-04-16 DIAGNOSIS — M25.562 ACUTE PAIN OF BOTH KNEES: ICD-10-CM

## 2021-04-16 DIAGNOSIS — R53.1 DECREASED STRENGTH: ICD-10-CM

## 2021-04-16 DIAGNOSIS — M25.662 DECREASED RANGE OF MOTION OF BOTH KNEES: ICD-10-CM

## 2021-04-16 DIAGNOSIS — M25.661 DECREASED RANGE OF MOTION OF BOTH KNEES: ICD-10-CM

## 2021-04-16 DIAGNOSIS — M17.11 PRIMARY OSTEOARTHRITIS OF RIGHT KNEE: ICD-10-CM

## 2021-04-16 PROCEDURE — 97110 THERAPEUTIC EXERCISES: CPT | Mod: PN

## 2021-04-19 ENCOUNTER — CLINICAL SUPPORT (OUTPATIENT)
Dept: REHABILITATION | Facility: HOSPITAL | Age: 66
End: 2021-04-19
Attending: ORTHOPAEDIC SURGERY
Payer: MEDICARE

## 2021-04-19 ENCOUNTER — HOSPITAL ENCOUNTER (OUTPATIENT)
Dept: RADIOLOGY | Facility: HOSPITAL | Age: 66
Discharge: HOME | End: 2021-04-19
Attending: NURSE PRACTITIONER
Payer: MEDICARE

## 2021-04-19 ENCOUNTER — OFFICE VISIT (OUTPATIENT)
Dept: ORTHOPEDICS | Facility: CLINIC | Age: 66
End: 2021-04-19
Payer: MEDICARE

## 2021-04-19 DIAGNOSIS — M25.562 LEFT KNEE PAIN, UNSPECIFIED CHRONICITY: ICD-10-CM

## 2021-04-19 DIAGNOSIS — M25.562 ACUTE PAIN OF BOTH KNEES: ICD-10-CM

## 2021-04-19 DIAGNOSIS — M25.662 DECREASED RANGE OF MOTION OF BOTH KNEES: ICD-10-CM

## 2021-04-19 DIAGNOSIS — Z96.653 STATUS POST BILATERAL KNEE REPLACEMENTS: Primary | ICD-10-CM

## 2021-04-19 DIAGNOSIS — Z96.651 STATUS POST RIGHT KNEE REPLACEMENT: ICD-10-CM

## 2021-04-19 DIAGNOSIS — M25.661 DECREASED RANGE OF MOTION OF BOTH KNEES: ICD-10-CM

## 2021-04-19 DIAGNOSIS — M25.561 ACUTE PAIN OF BOTH KNEES: ICD-10-CM

## 2021-04-19 DIAGNOSIS — M17.11 PRIMARY OSTEOARTHRITIS OF RIGHT KNEE: ICD-10-CM

## 2021-04-19 DIAGNOSIS — R53.1 DECREASED STRENGTH: ICD-10-CM

## 2021-04-19 PROCEDURE — 73562 X-RAY EXAM OF KNEE 3: CPT | Mod: TC,50

## 2021-04-19 PROCEDURE — 73562 X-RAY EXAM OF KNEE 3: CPT | Mod: 26,LT,, | Performed by: RADIOLOGY

## 2021-04-19 PROCEDURE — 99212 OFFICE O/P EST SF 10 MIN: CPT | Mod: PBBFAC,25 | Performed by: NURSE PRACTITIONER

## 2021-04-19 PROCEDURE — 73562 X-RAY EXAM OF KNEE 3: CPT | Mod: 26,RT,, | Performed by: RADIOLOGY

## 2021-04-19 PROCEDURE — 99999 PR PBB SHADOW E&M-EST. PATIENT-LVL II: CPT | Mod: PBBFAC,,, | Performed by: NURSE PRACTITIONER

## 2021-04-19 PROCEDURE — 99024 POSTOP FOLLOW-UP VISIT: CPT | Mod: POP,,, | Performed by: NURSE PRACTITIONER

## 2021-04-19 PROCEDURE — 73562 PR  X-RAY KNEE 3 VIEW: ICD-10-PCS | Mod: 26,LT,, | Performed by: RADIOLOGY

## 2021-04-19 PROCEDURE — 99024 PR POST-OP FOLLOW-UP VISIT: ICD-10-PCS | Mod: POP,,, | Performed by: NURSE PRACTITIONER

## 2021-04-19 PROCEDURE — 99999 PR PBB SHADOW E&M-EST. PATIENT-LVL II: ICD-10-PCS | Mod: PBBFAC,,, | Performed by: NURSE PRACTITIONER

## 2021-04-19 PROCEDURE — 97110 THERAPEUTIC EXERCISES: CPT | Mod: PN,CQ

## 2021-04-19 RX ORDER — AMOXICILLIN 500 MG/1
2000 CAPSULE ORAL
Qty: 4 CAPSULE | Refills: 0 | Status: SHIPPED | OUTPATIENT
Start: 2021-04-19 | End: 2022-01-20

## 2021-04-21 ENCOUNTER — CLINICAL SUPPORT (OUTPATIENT)
Dept: REHABILITATION | Facility: HOSPITAL | Age: 66
End: 2021-04-21
Attending: ORTHOPAEDIC SURGERY
Payer: MEDICARE

## 2021-04-21 DIAGNOSIS — M25.661 DECREASED RANGE OF MOTION OF BOTH KNEES: ICD-10-CM

## 2021-04-21 DIAGNOSIS — M25.561 ACUTE PAIN OF BOTH KNEES: ICD-10-CM

## 2021-04-21 DIAGNOSIS — M17.11 PRIMARY OSTEOARTHRITIS OF RIGHT KNEE: ICD-10-CM

## 2021-04-21 DIAGNOSIS — M25.662 DECREASED RANGE OF MOTION OF BOTH KNEES: ICD-10-CM

## 2021-04-21 DIAGNOSIS — R53.1 DECREASED STRENGTH: ICD-10-CM

## 2021-04-21 DIAGNOSIS — M25.562 ACUTE PAIN OF BOTH KNEES: ICD-10-CM

## 2021-04-21 PROCEDURE — 97110 THERAPEUTIC EXERCISES: CPT | Mod: PN

## 2021-04-26 ENCOUNTER — CLINICAL SUPPORT (OUTPATIENT)
Dept: REHABILITATION | Facility: HOSPITAL | Age: 66
End: 2021-04-26
Attending: ORTHOPAEDIC SURGERY
Payer: MEDICARE

## 2021-04-26 DIAGNOSIS — M17.11 PRIMARY OSTEOARTHRITIS OF RIGHT KNEE: ICD-10-CM

## 2021-04-26 DIAGNOSIS — M25.662 DECREASED RANGE OF MOTION OF BOTH KNEES: ICD-10-CM

## 2021-04-26 DIAGNOSIS — R53.1 DECREASED STRENGTH: ICD-10-CM

## 2021-04-26 DIAGNOSIS — M25.661 DECREASED RANGE OF MOTION OF BOTH KNEES: ICD-10-CM

## 2021-04-26 DIAGNOSIS — M25.561 ACUTE PAIN OF BOTH KNEES: ICD-10-CM

## 2021-04-26 DIAGNOSIS — M25.562 ACUTE PAIN OF BOTH KNEES: ICD-10-CM

## 2021-04-26 PROCEDURE — 97110 THERAPEUTIC EXERCISES: CPT | Mod: PN

## 2021-04-27 ENCOUNTER — LAB VISIT (OUTPATIENT)
Dept: LAB | Facility: HOSPITAL | Age: 66
End: 2021-04-27
Attending: INTERNAL MEDICINE
Payer: MEDICARE

## 2021-04-27 DIAGNOSIS — Z12.11 ENCOUNTER FOR FIT (FECAL IMMUNOCHEMICAL TEST) SCREENING: ICD-10-CM

## 2021-04-27 PROCEDURE — 82274 ASSAY TEST FOR BLOOD FECAL: CPT | Performed by: INTERNAL MEDICINE

## 2021-04-28 LAB — HEMOCCULT STL QL IA: NEGATIVE

## 2021-05-03 ENCOUNTER — CLINICAL SUPPORT (OUTPATIENT)
Dept: REHABILITATION | Facility: HOSPITAL | Age: 66
End: 2021-05-03
Attending: ORTHOPAEDIC SURGERY
Payer: MEDICARE

## 2021-05-03 DIAGNOSIS — R53.1 DECREASED STRENGTH: ICD-10-CM

## 2021-05-03 DIAGNOSIS — M25.562 ACUTE PAIN OF BOTH KNEES: ICD-10-CM

## 2021-05-03 DIAGNOSIS — M25.561 ACUTE PAIN OF BOTH KNEES: ICD-10-CM

## 2021-05-03 DIAGNOSIS — M25.661 DECREASED RANGE OF MOTION OF BOTH KNEES: ICD-10-CM

## 2021-05-03 DIAGNOSIS — M17.11 PRIMARY OSTEOARTHRITIS OF RIGHT KNEE: ICD-10-CM

## 2021-05-03 DIAGNOSIS — M25.662 DECREASED RANGE OF MOTION OF BOTH KNEES: ICD-10-CM

## 2021-05-03 PROCEDURE — 97110 THERAPEUTIC EXERCISES: CPT | Mod: PN

## 2021-05-07 ENCOUNTER — PATIENT MESSAGE (OUTPATIENT)
Dept: ADMINISTRATIVE | Facility: OTHER | Age: 66
End: 2021-05-07

## 2021-05-10 ENCOUNTER — PATIENT MESSAGE (OUTPATIENT)
Dept: ADMINISTRATIVE | Facility: OTHER | Age: 66
End: 2021-05-10

## 2021-05-11 ENCOUNTER — TELEPHONE (OUTPATIENT)
Dept: INTERNAL MEDICINE | Facility: CLINIC | Age: 66
End: 2021-05-11

## 2021-05-11 ENCOUNTER — PATIENT MESSAGE (OUTPATIENT)
Dept: INTERNAL MEDICINE | Facility: CLINIC | Age: 66
End: 2021-05-11

## 2021-05-19 ENCOUNTER — PATIENT MESSAGE (OUTPATIENT)
Dept: INTERNAL MEDICINE | Facility: CLINIC | Age: 66
End: 2021-05-19

## 2021-05-24 ENCOUNTER — PATIENT MESSAGE (OUTPATIENT)
Dept: INTERNAL MEDICINE | Facility: CLINIC | Age: 66
End: 2021-05-24

## 2021-05-24 ENCOUNTER — PATIENT MESSAGE (OUTPATIENT)
Dept: ADMINISTRATIVE | Facility: OTHER | Age: 66
End: 2021-05-24

## 2021-05-24 ENCOUNTER — OFFICE VISIT (OUTPATIENT)
Dept: INTERNAL MEDICINE | Facility: CLINIC | Age: 66
End: 2021-05-24
Payer: MEDICARE

## 2021-05-24 ENCOUNTER — LAB VISIT (OUTPATIENT)
Dept: LAB | Facility: HOSPITAL | Age: 66
End: 2021-05-24
Attending: INTERNAL MEDICINE
Payer: MEDICARE

## 2021-05-24 VITALS
HEIGHT: 66 IN | WEIGHT: 243.38 LBS | DIASTOLIC BLOOD PRESSURE: 76 MMHG | SYSTOLIC BLOOD PRESSURE: 114 MMHG | BODY MASS INDEX: 39.12 KG/M2 | HEART RATE: 76 BPM

## 2021-05-24 DIAGNOSIS — K21.9 GASTROESOPHAGEAL REFLUX DISEASE WITHOUT ESOPHAGITIS: ICD-10-CM

## 2021-05-24 DIAGNOSIS — E03.9 HYPOTHYROIDISM, UNSPECIFIED TYPE: ICD-10-CM

## 2021-05-24 DIAGNOSIS — Z79.899 ENCOUNTER FOR MONITORING LONG-TERM PROTON PUMP INHIBITOR THERAPY: ICD-10-CM

## 2021-05-24 DIAGNOSIS — Z51.81 ENCOUNTER FOR MONITORING LONG-TERM PROTON PUMP INHIBITOR THERAPY: ICD-10-CM

## 2021-05-24 DIAGNOSIS — M17.0 PRIMARY OSTEOARTHRITIS OF BOTH KNEES: ICD-10-CM

## 2021-05-24 DIAGNOSIS — R73.03 PREDIABETES: ICD-10-CM

## 2021-05-24 DIAGNOSIS — Z78.0 POSTMENOPAUSAL: ICD-10-CM

## 2021-05-24 DIAGNOSIS — E66.01 SEVERE OBESITY (BMI 35.0-39.9) WITH COMORBIDITY: ICD-10-CM

## 2021-05-24 DIAGNOSIS — E78.2 MIXED HYPERLIPIDEMIA: ICD-10-CM

## 2021-05-24 DIAGNOSIS — I10 ESSENTIAL HYPERTENSION: Primary | ICD-10-CM

## 2021-05-24 LAB
ALBUMIN SERPL BCP-MCNC: 4.2 G/DL (ref 3.5–5.2)
ALP SERPL-CCNC: 70 U/L (ref 55–135)
ALT SERPL W/O P-5'-P-CCNC: 16 U/L (ref 10–44)
ANION GAP SERPL CALC-SCNC: 11 MMOL/L (ref 8–16)
AST SERPL-CCNC: 10 U/L (ref 10–40)
BASOPHILS # BLD AUTO: 0.05 K/UL (ref 0–0.2)
BASOPHILS NFR BLD: 0.7 % (ref 0–1.9)
BILIRUB SERPL-MCNC: 0.5 MG/DL (ref 0.1–1)
BUN SERPL-MCNC: 21 MG/DL (ref 8–23)
CALCIUM SERPL-MCNC: 10.2 MG/DL (ref 8.7–10.5)
CHLORIDE SERPL-SCNC: 101 MMOL/L (ref 95–110)
CO2 SERPL-SCNC: 27 MMOL/L (ref 23–29)
CREAT SERPL-MCNC: 0.8 MG/DL (ref 0.5–1.4)
DIFFERENTIAL METHOD: ABNORMAL
EOSINOPHIL # BLD AUTO: 0.2 K/UL (ref 0–0.5)
EOSINOPHIL NFR BLD: 3.1 % (ref 0–8)
ERYTHROCYTE [DISTWIDTH] IN BLOOD BY AUTOMATED COUNT: 14.2 % (ref 11.5–14.5)
EST. GFR  (AFRICAN AMERICAN): >60 ML/MIN/1.73 M^2
EST. GFR  (NON AFRICAN AMERICAN): >60 ML/MIN/1.73 M^2
GLUCOSE SERPL-MCNC: 91 MG/DL (ref 70–110)
HCT VFR BLD AUTO: 43.5 % (ref 37–48.5)
HGB BLD-MCNC: 14.3 G/DL (ref 12–16)
IMM GRANULOCYTES # BLD AUTO: 0.05 K/UL (ref 0–0.04)
IMM GRANULOCYTES NFR BLD AUTO: 0.7 % (ref 0–0.5)
LYMPHOCYTES # BLD AUTO: 2.6 K/UL (ref 1–4.8)
LYMPHOCYTES NFR BLD: 36.9 % (ref 18–48)
MAGNESIUM SERPL-MCNC: 2 MG/DL (ref 1.6–2.6)
MCH RBC QN AUTO: 29.1 PG (ref 27–31)
MCHC RBC AUTO-ENTMCNC: 32.9 G/DL (ref 32–36)
MCV RBC AUTO: 88 FL (ref 82–98)
MONOCYTES # BLD AUTO: 0.7 K/UL (ref 0.3–1)
MONOCYTES NFR BLD: 10.1 % (ref 4–15)
NEUTROPHILS # BLD AUTO: 3.4 K/UL (ref 1.8–7.7)
NEUTROPHILS NFR BLD: 48.5 % (ref 38–73)
NRBC BLD-RTO: 0 /100 WBC
PLATELET # BLD AUTO: 293 K/UL (ref 150–450)
PMV BLD AUTO: 10 FL (ref 9.2–12.9)
POTASSIUM SERPL-SCNC: 3.7 MMOL/L (ref 3.5–5.1)
PROT SERPL-MCNC: 7.9 G/DL (ref 6–8.4)
RBC # BLD AUTO: 4.92 M/UL (ref 4–5.4)
SODIUM SERPL-SCNC: 139 MMOL/L (ref 136–145)
WBC # BLD AUTO: 7.05 K/UL (ref 3.9–12.7)

## 2021-05-24 PROCEDURE — 80053 COMPREHEN METABOLIC PANEL: CPT | Performed by: INTERNAL MEDICINE

## 2021-05-24 PROCEDURE — 36415 COLL VENOUS BLD VENIPUNCTURE: CPT | Performed by: INTERNAL MEDICINE

## 2021-05-24 PROCEDURE — 83735 ASSAY OF MAGNESIUM: CPT | Performed by: INTERNAL MEDICINE

## 2021-05-24 PROCEDURE — 99999 PR PBB SHADOW E&M-EST. PATIENT-LVL III: ICD-10-PCS | Mod: PBBFAC,,, | Performed by: INTERNAL MEDICINE

## 2021-05-24 PROCEDURE — 99214 PR OFFICE/OUTPT VISIT, EST, LEVL IV, 30-39 MIN: ICD-10-PCS | Mod: S$PBB,,, | Performed by: INTERNAL MEDICINE

## 2021-05-24 PROCEDURE — 99213 OFFICE O/P EST LOW 20 MIN: CPT | Mod: PBBFAC | Performed by: INTERNAL MEDICINE

## 2021-05-24 PROCEDURE — 99999 PR PBB SHADOW E&M-EST. PATIENT-LVL III: CPT | Mod: PBBFAC,,, | Performed by: INTERNAL MEDICINE

## 2021-05-24 PROCEDURE — 85025 COMPLETE CBC W/AUTO DIFF WBC: CPT | Performed by: INTERNAL MEDICINE

## 2021-05-24 PROCEDURE — 99214 OFFICE O/P EST MOD 30 MIN: CPT | Mod: S$PBB,,, | Performed by: INTERNAL MEDICINE

## 2021-05-24 RX ORDER — METFORMIN HYDROCHLORIDE 750 MG/1
750 TABLET, EXTENDED RELEASE ORAL
Qty: 90 TABLET | Refills: 3 | Status: SHIPPED | OUTPATIENT
Start: 2021-05-24 | End: 2021-09-28 | Stop reason: SDUPTHER

## 2021-05-24 RX ORDER — PROMETHAZINE HYDROCHLORIDE AND DEXTROMETHORPHAN HYDROBROMIDE 6.25; 15 MG/5ML; MG/5ML
5 SYRUP ORAL EVERY 6 HOURS PRN
COMMUNITY
Start: 2021-05-07 | End: 2023-05-17

## 2021-06-02 ENCOUNTER — OFFICE VISIT (OUTPATIENT)
Dept: ORTHOPEDICS | Facility: CLINIC | Age: 66
End: 2021-06-02
Payer: MEDICARE

## 2021-06-02 VITALS — BODY MASS INDEX: 39.29 KG/M2 | WEIGHT: 244.5 LBS | HEIGHT: 66 IN

## 2021-06-02 DIAGNOSIS — Z96.651 STATUS POST RIGHT KNEE REPLACEMENT: Primary | ICD-10-CM

## 2021-06-02 PROCEDURE — 99024 PR POST-OP FOLLOW-UP VISIT: ICD-10-PCS | Mod: POP,,, | Performed by: ORTHOPAEDIC SURGERY

## 2021-06-02 PROCEDURE — 99213 OFFICE O/P EST LOW 20 MIN: CPT | Mod: PBBFAC | Performed by: ORTHOPAEDIC SURGERY

## 2021-06-02 PROCEDURE — 99999 PR PBB SHADOW E&M-EST. PATIENT-LVL III: ICD-10-PCS | Mod: PBBFAC,,, | Performed by: ORTHOPAEDIC SURGERY

## 2021-06-02 PROCEDURE — 99024 POSTOP FOLLOW-UP VISIT: CPT | Mod: POP,,, | Performed by: ORTHOPAEDIC SURGERY

## 2021-06-02 PROCEDURE — 99999 PR PBB SHADOW E&M-EST. PATIENT-LVL III: CPT | Mod: PBBFAC,,, | Performed by: ORTHOPAEDIC SURGERY

## 2021-07-02 ENCOUNTER — PATIENT MESSAGE (OUTPATIENT)
Dept: INTERNAL MEDICINE | Facility: CLINIC | Age: 66
End: 2021-07-02

## 2021-07-15 ENCOUNTER — HOSPITAL ENCOUNTER (OUTPATIENT)
Dept: RADIOLOGY | Facility: HOSPITAL | Age: 66
Discharge: HOME | End: 2021-07-15
Attending: INTERNAL MEDICINE
Payer: MEDICARE

## 2021-07-15 VITALS — WEIGHT: 244 LBS | BODY MASS INDEX: 39.21 KG/M2 | HEIGHT: 66 IN

## 2021-07-15 DIAGNOSIS — Z12.31 ENCOUNTER FOR SCREENING MAMMOGRAM FOR BREAST CANCER: ICD-10-CM

## 2021-07-15 PROCEDURE — 77067 MAMMO DIGITAL SCREENING BILAT WITH TOMO: ICD-10-PCS | Mod: 26,,, | Performed by: RADIOLOGY

## 2021-07-15 PROCEDURE — 77063 MAMMO DIGITAL SCREENING BILAT WITH TOMO: ICD-10-PCS | Mod: 26,,, | Performed by: RADIOLOGY

## 2021-07-15 PROCEDURE — 77067 SCR MAMMO BI INCL CAD: CPT | Mod: 26,,, | Performed by: RADIOLOGY

## 2021-07-15 PROCEDURE — 77067 SCR MAMMO BI INCL CAD: CPT | Mod: TC

## 2021-07-15 PROCEDURE — 77063 BREAST TOMOSYNTHESIS BI: CPT | Mod: 26,,, | Performed by: RADIOLOGY

## 2021-07-16 ENCOUNTER — PATIENT MESSAGE (OUTPATIENT)
Dept: INTERNAL MEDICINE | Facility: CLINIC | Age: 66
End: 2021-07-16

## 2021-07-17 ENCOUNTER — PATIENT MESSAGE (OUTPATIENT)
Dept: INTERNAL MEDICINE | Facility: CLINIC | Age: 66
End: 2021-07-17

## 2021-07-17 DIAGNOSIS — E78.2 MIXED HYPERLIPIDEMIA: ICD-10-CM

## 2021-07-19 RX ORDER — ROSUVASTATIN CALCIUM 5 MG/1
5 TABLET, COATED ORAL DAILY
Qty: 90 TABLET | Refills: 3 | Status: SHIPPED | OUTPATIENT
Start: 2021-07-19 | End: 2021-09-28 | Stop reason: SDUPTHER

## 2021-07-22 ENCOUNTER — PATIENT MESSAGE (OUTPATIENT)
Dept: INTERNAL MEDICINE | Facility: CLINIC | Age: 66
End: 2021-07-22

## 2021-07-22 DIAGNOSIS — K21.9 GASTROESOPHAGEAL REFLUX DISEASE: ICD-10-CM

## 2021-07-23 RX ORDER — PANTOPRAZOLE SODIUM 40 MG/1
40 TABLET, DELAYED RELEASE ORAL DAILY
Qty: 90 TABLET | Refills: 3 | Status: SHIPPED | OUTPATIENT
Start: 2021-07-23 | End: 2021-11-02

## 2021-07-30 ENCOUNTER — OFFICE VISIT (OUTPATIENT)
Dept: SLEEP MEDICINE | Facility: CLINIC | Age: 66
End: 2021-07-30
Payer: MEDICARE

## 2021-07-30 VITALS
HEIGHT: 66 IN | DIASTOLIC BLOOD PRESSURE: 83 MMHG | SYSTOLIC BLOOD PRESSURE: 132 MMHG | WEIGHT: 245 LBS | BODY MASS INDEX: 39.37 KG/M2 | HEART RATE: 77 BPM

## 2021-07-30 DIAGNOSIS — G47.33 OBSTRUCTIVE SLEEP APNEA: Primary | ICD-10-CM

## 2021-07-30 DIAGNOSIS — G47.33 OSA (OBSTRUCTIVE SLEEP APNEA): ICD-10-CM

## 2021-07-30 DIAGNOSIS — I10 ESSENTIAL HYPERTENSION: ICD-10-CM

## 2021-07-30 PROCEDURE — 99214 PR OFFICE/OUTPT VISIT, EST, LEVL IV, 30-39 MIN: ICD-10-PCS | Mod: S$PBB,,, | Performed by: NURSE PRACTITIONER

## 2021-07-30 PROCEDURE — 99999 PR PBB SHADOW E&M-EST. PATIENT-LVL IV: ICD-10-PCS | Mod: PBBFAC,,, | Performed by: NURSE PRACTITIONER

## 2021-07-30 PROCEDURE — 99999 PR PBB SHADOW E&M-EST. PATIENT-LVL IV: CPT | Mod: PBBFAC,,, | Performed by: NURSE PRACTITIONER

## 2021-07-30 PROCEDURE — 99214 OFFICE O/P EST MOD 30 MIN: CPT | Mod: PBBFAC,PO | Performed by: NURSE PRACTITIONER

## 2021-07-30 PROCEDURE — 99214 OFFICE O/P EST MOD 30 MIN: CPT | Mod: S$PBB,,, | Performed by: NURSE PRACTITIONER

## 2021-08-04 ENCOUNTER — PATIENT OUTREACH (OUTPATIENT)
Dept: ADMINISTRATIVE | Facility: OTHER | Age: 66
End: 2021-08-04

## 2021-08-05 ENCOUNTER — PATIENT MESSAGE (OUTPATIENT)
Dept: ORTHOPEDICS | Facility: CLINIC | Age: 66
End: 2021-08-05

## 2021-08-05 ENCOUNTER — PATIENT MESSAGE (OUTPATIENT)
Dept: OBSTETRICS AND GYNECOLOGY | Facility: CLINIC | Age: 66
End: 2021-08-05

## 2021-08-05 ENCOUNTER — OFFICE VISIT (OUTPATIENT)
Dept: OBSTETRICS AND GYNECOLOGY | Facility: CLINIC | Age: 66
End: 2021-08-05
Payer: MEDICARE

## 2021-08-05 VITALS
WEIGHT: 245.38 LBS | HEIGHT: 66 IN | SYSTOLIC BLOOD PRESSURE: 120 MMHG | BODY MASS INDEX: 39.43 KG/M2 | DIASTOLIC BLOOD PRESSURE: 72 MMHG

## 2021-08-05 DIAGNOSIS — Z12.89 ENCOUNTER FOR PELVIC SCREENING FOR CANCER: ICD-10-CM

## 2021-08-05 DIAGNOSIS — Z12.4 PAP SMEAR FOR CERVICAL CANCER SCREENING: Primary | ICD-10-CM

## 2021-08-05 DIAGNOSIS — N89.8 VAGINAL IRRITATION: ICD-10-CM

## 2021-08-05 DIAGNOSIS — Z01.419 ENCOUNTER FOR ANNUAL ROUTINE GYNECOLOGICAL EXAMINATION: ICD-10-CM

## 2021-08-05 PROCEDURE — G0101 PR CA SCREEN;PELVIC/BREAST EXAM: ICD-10-PCS | Mod: S$PBB,GZ,, | Performed by: OBSTETRICS & GYNECOLOGY

## 2021-08-05 PROCEDURE — 99214 OFFICE O/P EST MOD 30 MIN: CPT | Mod: PBBFAC,PO | Performed by: OBSTETRICS & GYNECOLOGY

## 2021-08-05 PROCEDURE — 87481 CANDIDA DNA AMP PROBE: CPT | Mod: 59 | Performed by: OBSTETRICS & GYNECOLOGY

## 2021-08-05 PROCEDURE — 99999 PR PBB SHADOW E&M-EST. PATIENT-LVL IV: CPT | Mod: PBBFAC,,, | Performed by: OBSTETRICS & GYNECOLOGY

## 2021-08-05 PROCEDURE — 99999 PR PBB SHADOW E&M-EST. PATIENT-LVL IV: ICD-10-PCS | Mod: PBBFAC,,, | Performed by: OBSTETRICS & GYNECOLOGY

## 2021-08-05 PROCEDURE — G0101 CA SCREEN;PELVIC/BREAST EXAM: HCPCS | Mod: S$PBB,GZ,, | Performed by: OBSTETRICS & GYNECOLOGY

## 2021-08-05 RX ORDER — ESTRADIOL 0.05 MG/D
1 PATCH TRANSDERMAL
Qty: 4 PATCH | Refills: 11 | Status: SHIPPED | OUTPATIENT
Start: 2021-08-05 | End: 2021-09-28

## 2021-08-06 RX ORDER — ESTRADIOL 0.5 MG/1
0.5 TABLET ORAL DAILY
Qty: 30 TABLET | Refills: 2 | Status: SHIPPED | OUTPATIENT
Start: 2021-08-06 | End: 2021-10-13

## 2021-08-10 ENCOUNTER — PATIENT MESSAGE (OUTPATIENT)
Dept: OBSTETRICS AND GYNECOLOGY | Facility: CLINIC | Age: 66
End: 2021-08-10

## 2021-08-18 ENCOUNTER — PATIENT MESSAGE (OUTPATIENT)
Dept: OBSTETRICS AND GYNECOLOGY | Facility: CLINIC | Age: 66
End: 2021-08-18

## 2021-08-24 ENCOUNTER — PATIENT MESSAGE (OUTPATIENT)
Dept: OBSTETRICS AND GYNECOLOGY | Facility: CLINIC | Age: 66
End: 2021-08-24

## 2021-08-24 RX ORDER — TRIAMCINOLONE ACETONIDE 1 MG/G
CREAM TOPICAL 2 TIMES DAILY
Qty: 30 G | Refills: 0 | Status: SHIPPED | OUTPATIENT
Start: 2021-08-24 | End: 2022-09-19

## 2021-08-24 RX ORDER — FLUCONAZOLE 150 MG/1
150 TABLET ORAL
Qty: 2 TABLET | Refills: 0 | Status: SHIPPED | OUTPATIENT
Start: 2021-08-24 | End: 2021-08-28

## 2021-08-24 RX ORDER — KETOCONAZOLE 20 MG/G
CREAM TOPICAL DAILY
Qty: 30 G | Refills: 0 | Status: SHIPPED | OUTPATIENT
Start: 2021-08-24 | End: 2023-03-15

## 2021-08-25 ENCOUNTER — LAB VISIT (OUTPATIENT)
Dept: PRIMARY CARE CLINIC | Facility: OTHER | Age: 66
End: 2021-08-25
Payer: MEDICARE

## 2021-08-25 DIAGNOSIS — R05.9 COUGH: ICD-10-CM

## 2021-08-25 PROCEDURE — U0003 INFECTIOUS AGENT DETECTION BY NUCLEIC ACID (DNA OR RNA); SEVERE ACUTE RESPIRATORY SYNDROME CORONAVIRUS 2 (SARS-COV-2) (CORONAVIRUS DISEASE [COVID-19]), AMPLIFIED PROBE TECHNIQUE, MAKING USE OF HIGH THROUGHPUT TECHNOLOGIES AS DESCRIBED BY CMS-2020-01-R: HCPCS | Performed by: INTERNAL MEDICINE

## 2021-08-26 LAB
SARS-COV-2 RNA RESP QL NAA+PROBE: NOT DETECTED
SARS-COV-2- CYCLE NUMBER: NORMAL

## 2021-09-16 ENCOUNTER — PATIENT MESSAGE (OUTPATIENT)
Dept: OBSTETRICS AND GYNECOLOGY | Facility: CLINIC | Age: 66
End: 2021-09-16

## 2021-09-16 DIAGNOSIS — R21 RASH: Primary | ICD-10-CM

## 2021-09-16 RX ORDER — FLUCONAZOLE 150 MG/1
TABLET ORAL
Qty: 10 TABLET | Refills: 0 | Status: SHIPPED | OUTPATIENT
Start: 2021-09-16 | End: 2021-12-01

## 2021-09-28 ENCOUNTER — OFFICE VISIT (OUTPATIENT)
Dept: INTERNAL MEDICINE | Facility: CLINIC | Age: 66
End: 2021-09-28
Payer: MEDICARE

## 2021-09-28 ENCOUNTER — PATIENT MESSAGE (OUTPATIENT)
Dept: ORTHOPEDICS | Facility: CLINIC | Age: 66
End: 2021-09-28

## 2021-09-28 VITALS
HEART RATE: 79 BPM | WEIGHT: 249.31 LBS | OXYGEN SATURATION: 97 % | BODY MASS INDEX: 40.07 KG/M2 | HEIGHT: 66 IN | DIASTOLIC BLOOD PRESSURE: 78 MMHG | TEMPERATURE: 98 F | SYSTOLIC BLOOD PRESSURE: 118 MMHG

## 2021-09-28 DIAGNOSIS — I10 ESSENTIAL HYPERTENSION: Primary | ICD-10-CM

## 2021-09-28 DIAGNOSIS — E66.01 MORBID OBESITY WITH BMI OF 40.0-44.9, ADULT: ICD-10-CM

## 2021-09-28 DIAGNOSIS — E66.01 SEVERE OBESITY (BMI 35.0-39.9) WITH COMORBIDITY: ICD-10-CM

## 2021-09-28 DIAGNOSIS — E78.2 MIXED HYPERLIPIDEMIA: ICD-10-CM

## 2021-09-28 DIAGNOSIS — E03.9 HYPOTHYROIDISM, UNSPECIFIED TYPE: ICD-10-CM

## 2021-09-28 DIAGNOSIS — R73.03 PREDIABETES: ICD-10-CM

## 2021-09-28 DIAGNOSIS — E03.4 HYPOTHYROIDISM DUE TO ACQUIRED ATROPHY OF THYROID: ICD-10-CM

## 2021-09-28 PROCEDURE — 99214 OFFICE O/P EST MOD 30 MIN: CPT | Mod: S$PBB,,, | Performed by: INTERNAL MEDICINE

## 2021-09-28 PROCEDURE — 99215 OFFICE O/P EST HI 40 MIN: CPT | Mod: PBBFAC | Performed by: INTERNAL MEDICINE

## 2021-09-28 PROCEDURE — 99214 PR OFFICE/OUTPT VISIT, EST, LEVL IV, 30-39 MIN: ICD-10-PCS | Mod: S$PBB,,, | Performed by: INTERNAL MEDICINE

## 2021-09-28 PROCEDURE — 99999 PR PBB SHADOW E&M-EST. PATIENT-LVL V: ICD-10-PCS | Mod: PBBFAC,,, | Performed by: INTERNAL MEDICINE

## 2021-09-28 PROCEDURE — 99999 PR PBB SHADOW E&M-EST. PATIENT-LVL V: CPT | Mod: PBBFAC,,, | Performed by: INTERNAL MEDICINE

## 2021-09-28 RX ORDER — AMLODIPINE BESYLATE 10 MG/1
10 TABLET ORAL DAILY
Qty: 90 TABLET | Refills: 3 | Status: SHIPPED | OUTPATIENT
Start: 2021-09-28 | End: 2022-05-05 | Stop reason: SDUPTHER

## 2021-09-28 RX ORDER — METFORMIN HYDROCHLORIDE 750 MG/1
750 TABLET, EXTENDED RELEASE ORAL
Qty: 90 TABLET | Refills: 3 | Status: SHIPPED | OUTPATIENT
Start: 2021-09-28 | End: 2022-01-20

## 2021-09-28 RX ORDER — HYDROCHLOROTHIAZIDE 12.5 MG/1
12.5 TABLET ORAL 2 TIMES DAILY
Qty: 180 TABLET | Refills: 3 | Status: SHIPPED | OUTPATIENT
Start: 2021-09-28 | End: 2021-12-20

## 2021-09-28 RX ORDER — ROSUVASTATIN CALCIUM 5 MG/1
5 TABLET, COATED ORAL DAILY
Qty: 90 TABLET | Refills: 3 | Status: SHIPPED | OUTPATIENT
Start: 2021-09-28 | End: 2022-01-20 | Stop reason: SDUPTHER

## 2021-09-28 RX ORDER — POTASSIUM CHLORIDE 750 MG/1
10 TABLET, EXTENDED RELEASE ORAL 2 TIMES DAILY
Qty: 180 TABLET | Refills: 3 | Status: SHIPPED | OUTPATIENT
Start: 2021-09-28 | End: 2022-01-31 | Stop reason: SDUPTHER

## 2021-09-28 RX ORDER — LEVOTHYROXINE SODIUM 125 UG/1
125 TABLET ORAL DAILY
Qty: 90 TABLET | Refills: 3 | Status: SHIPPED | OUTPATIENT
Start: 2021-09-28 | End: 2021-11-02

## 2021-09-28 RX ORDER — ASPIRIN 81 MG/1
81 TABLET ORAL DAILY
Qty: 90 TABLET | Refills: 3 | Status: SHIPPED | OUTPATIENT
Start: 2021-09-28 | End: 2022-01-27 | Stop reason: HOSPADM

## 2021-09-28 RX ORDER — SPIRONOLACTONE 50 MG/1
50 TABLET, FILM COATED ORAL DAILY
Qty: 90 TABLET | Refills: 3 | Status: SHIPPED | OUTPATIENT
Start: 2021-09-28 | End: 2022-05-05 | Stop reason: SDUPTHER

## 2021-09-29 ENCOUNTER — LAB VISIT (OUTPATIENT)
Dept: LAB | Facility: HOSPITAL | Age: 66
End: 2021-09-29
Attending: INTERNAL MEDICINE
Payer: MEDICARE

## 2021-09-29 DIAGNOSIS — E03.9 HYPOTHYROIDISM, UNSPECIFIED TYPE: ICD-10-CM

## 2021-09-29 DIAGNOSIS — E66.01 MORBID OBESITY WITH BMI OF 40.0-44.9, ADULT: ICD-10-CM

## 2021-09-29 DIAGNOSIS — I10 ESSENTIAL HYPERTENSION: ICD-10-CM

## 2021-09-29 DIAGNOSIS — E78.2 MIXED HYPERLIPIDEMIA: ICD-10-CM

## 2021-09-29 DIAGNOSIS — R73.03 PREDIABETES: ICD-10-CM

## 2021-09-29 LAB
ALBUMIN SERPL BCP-MCNC: 3.8 G/DL (ref 3.5–5.2)
ALP SERPL-CCNC: 70 U/L (ref 55–135)
ALT SERPL W/O P-5'-P-CCNC: 13 U/L (ref 10–44)
ANION GAP SERPL CALC-SCNC: 14 MMOL/L (ref 8–16)
AST SERPL-CCNC: 7 U/L (ref 10–40)
BASOPHILS # BLD AUTO: 0.05 K/UL (ref 0–0.2)
BASOPHILS NFR BLD: 0.6 % (ref 0–1.9)
BILIRUB SERPL-MCNC: 0.5 MG/DL (ref 0.1–1)
BUN SERPL-MCNC: 18 MG/DL (ref 8–23)
CALCIUM SERPL-MCNC: 9.5 MG/DL (ref 8.7–10.5)
CHLORIDE SERPL-SCNC: 101 MMOL/L (ref 95–110)
CHOLEST SERPL-MCNC: 153 MG/DL (ref 120–199)
CHOLEST/HDLC SERPL: 2.5 {RATIO} (ref 2–5)
CO2 SERPL-SCNC: 24 MMOL/L (ref 23–29)
CREAT SERPL-MCNC: 0.8 MG/DL (ref 0.5–1.4)
DIFFERENTIAL METHOD: ABNORMAL
EOSINOPHIL # BLD AUTO: 0.2 K/UL (ref 0–0.5)
EOSINOPHIL NFR BLD: 1.9 % (ref 0–8)
ERYTHROCYTE [DISTWIDTH] IN BLOOD BY AUTOMATED COUNT: 15.6 % (ref 11.5–14.5)
EST. GFR  (AFRICAN AMERICAN): >60 ML/MIN/1.73 M^2
EST. GFR  (NON AFRICAN AMERICAN): >60 ML/MIN/1.73 M^2
ESTIMATED AVG GLUCOSE: 108 MG/DL (ref 68–131)
GLUCOSE SERPL-MCNC: 92 MG/DL (ref 70–110)
HBA1C MFR BLD: 5.4 % (ref 4–5.6)
HCT VFR BLD AUTO: 41.7 % (ref 37–48.5)
HDLC SERPL-MCNC: 61 MG/DL (ref 40–75)
HDLC SERPL: 39.9 % (ref 20–50)
HGB BLD-MCNC: 13.9 G/DL (ref 12–16)
IMM GRANULOCYTES # BLD AUTO: 0.05 K/UL (ref 0–0.04)
IMM GRANULOCYTES NFR BLD AUTO: 0.6 % (ref 0–0.5)
LDLC SERPL CALC-MCNC: 78.8 MG/DL (ref 63–159)
LYMPHOCYTES # BLD AUTO: 3.4 K/UL (ref 1–4.8)
LYMPHOCYTES NFR BLD: 39.6 % (ref 18–48)
MCH RBC QN AUTO: 29.6 PG (ref 27–31)
MCHC RBC AUTO-ENTMCNC: 33.3 G/DL (ref 32–36)
MCV RBC AUTO: 89 FL (ref 82–98)
MONOCYTES # BLD AUTO: 0.7 K/UL (ref 0.3–1)
MONOCYTES NFR BLD: 7.9 % (ref 4–15)
NEUTROPHILS # BLD AUTO: 4.2 K/UL (ref 1.8–7.7)
NEUTROPHILS NFR BLD: 49.4 % (ref 38–73)
NONHDLC SERPL-MCNC: 92 MG/DL
NRBC BLD-RTO: 0 /100 WBC
PLATELET # BLD AUTO: 293 K/UL (ref 150–450)
PMV BLD AUTO: 10.1 FL (ref 9.2–12.9)
POTASSIUM SERPL-SCNC: 3.9 MMOL/L (ref 3.5–5.1)
PROT SERPL-MCNC: 7.4 G/DL (ref 6–8.4)
RBC # BLD AUTO: 4.7 M/UL (ref 4–5.4)
SODIUM SERPL-SCNC: 139 MMOL/L (ref 136–145)
TRIGL SERPL-MCNC: 66 MG/DL (ref 30–150)
TSH SERPL DL<=0.005 MIU/L-ACNC: 1.97 UIU/ML (ref 0.4–4)
WBC # BLD AUTO: 8.46 K/UL (ref 3.9–12.7)

## 2021-09-29 PROCEDURE — 83036 HEMOGLOBIN GLYCOSYLATED A1C: CPT | Performed by: INTERNAL MEDICINE

## 2021-09-29 PROCEDURE — 85025 COMPLETE CBC W/AUTO DIFF WBC: CPT | Performed by: INTERNAL MEDICINE

## 2021-09-29 PROCEDURE — 80061 LIPID PANEL: CPT | Performed by: INTERNAL MEDICINE

## 2021-09-29 PROCEDURE — 84443 ASSAY THYROID STIM HORMONE: CPT | Performed by: INTERNAL MEDICINE

## 2021-09-29 PROCEDURE — 80053 COMPREHEN METABOLIC PANEL: CPT | Performed by: INTERNAL MEDICINE

## 2021-09-29 PROCEDURE — 36415 COLL VENOUS BLD VENIPUNCTURE: CPT | Mod: PO | Performed by: INTERNAL MEDICINE

## 2021-10-07 DIAGNOSIS — Z96.651 STATUS POST RIGHT KNEE REPLACEMENT: Primary | ICD-10-CM

## 2021-10-13 ENCOUNTER — PATIENT MESSAGE (OUTPATIENT)
Dept: ADMINISTRATIVE | Facility: OTHER | Age: 66
End: 2021-10-13
Payer: COMMERCIAL

## 2021-10-13 ENCOUNTER — OFFICE VISIT (OUTPATIENT)
Dept: ORTHOPEDICS | Facility: CLINIC | Age: 66
End: 2021-10-13
Payer: MEDICARE

## 2021-10-13 ENCOUNTER — HOSPITAL ENCOUNTER (OUTPATIENT)
Dept: RADIOLOGY | Facility: HOSPITAL | Age: 66
Discharge: HOME | End: 2021-10-13
Attending: ORTHOPAEDIC SURGERY
Payer: MEDICARE

## 2021-10-13 VITALS — WEIGHT: 246.94 LBS | BODY MASS INDEX: 39.69 KG/M2 | HEIGHT: 66 IN

## 2021-10-13 DIAGNOSIS — Z96.651 STATUS POST RIGHT KNEE REPLACEMENT: ICD-10-CM

## 2021-10-13 DIAGNOSIS — Z96.651 STATUS POST RIGHT KNEE REPLACEMENT: Primary | ICD-10-CM

## 2021-10-13 DIAGNOSIS — M17.12 PRIMARY OSTEOARTHRITIS OF LEFT KNEE: ICD-10-CM

## 2021-10-13 PROCEDURE — 73560 XR KNEE ORTHO RIGHT: ICD-10-PCS | Mod: 26,LT,, | Performed by: RADIOLOGY

## 2021-10-13 PROCEDURE — 99212 PR OFFICE/OUTPT VISIT, EST, LEVL II, 10-19 MIN: ICD-10-PCS | Mod: S$PBB,,, | Performed by: ORTHOPAEDIC SURGERY

## 2021-10-13 PROCEDURE — 73560 X-RAY EXAM OF KNEE 1 OR 2: CPT | Mod: 26,LT,, | Performed by: RADIOLOGY

## 2021-10-13 PROCEDURE — 73562 XR KNEE ORTHO RIGHT: ICD-10-PCS | Mod: 26,RT,, | Performed by: RADIOLOGY

## 2021-10-13 PROCEDURE — 99999 PR PBB SHADOW E&M-EST. PATIENT-LVL IV: ICD-10-PCS | Mod: PBBFAC,,, | Performed by: ORTHOPAEDIC SURGERY

## 2021-10-13 PROCEDURE — 73560 X-RAY EXAM OF KNEE 1 OR 2: CPT | Mod: TC,LT,59

## 2021-10-13 PROCEDURE — 73562 X-RAY EXAM OF KNEE 3: CPT | Mod: 26,RT,, | Performed by: RADIOLOGY

## 2021-10-13 PROCEDURE — 99214 OFFICE O/P EST MOD 30 MIN: CPT | Mod: PBBFAC | Performed by: ORTHOPAEDIC SURGERY

## 2021-10-13 PROCEDURE — 99212 OFFICE O/P EST SF 10 MIN: CPT | Mod: S$PBB,,, | Performed by: ORTHOPAEDIC SURGERY

## 2021-10-13 PROCEDURE — 99999 PR PBB SHADOW E&M-EST. PATIENT-LVL IV: CPT | Mod: PBBFAC,,, | Performed by: ORTHOPAEDIC SURGERY

## 2021-10-15 ENCOUNTER — PATIENT MESSAGE (OUTPATIENT)
Dept: ADMINISTRATIVE | Facility: OTHER | Age: 66
End: 2021-10-15
Payer: COMMERCIAL

## 2021-11-01 ENCOUNTER — OFFICE VISIT (OUTPATIENT)
Dept: DERMATOLOGY | Facility: CLINIC | Age: 66
End: 2021-11-01
Payer: MEDICARE

## 2021-11-01 VITALS — WEIGHT: 246 LBS | BODY MASS INDEX: 39.71 KG/M2

## 2021-11-01 DIAGNOSIS — R21 RASH: ICD-10-CM

## 2021-11-01 DIAGNOSIS — Z12.83 SKIN EXAM, SCREENING FOR CANCER: ICD-10-CM

## 2021-11-01 DIAGNOSIS — L81.4 LENTIGINES: ICD-10-CM

## 2021-11-01 DIAGNOSIS — L82.1 SEBORRHEIC KERATOSES: ICD-10-CM

## 2021-11-01 DIAGNOSIS — L30.4 INTERTRIGO: Primary | ICD-10-CM

## 2021-11-01 DIAGNOSIS — L60.1 ONYCHOLYSIS: ICD-10-CM

## 2021-11-01 PROCEDURE — 99214 OFFICE O/P EST MOD 30 MIN: CPT | Mod: S$PBB,,, | Performed by: DERMATOLOGY

## 2021-11-01 PROCEDURE — 99213 OFFICE O/P EST LOW 20 MIN: CPT | Mod: PBBFAC,PO | Performed by: DERMATOLOGY

## 2021-11-01 PROCEDURE — 99999 PR PBB SHADOW E&M-EST. PATIENT-LVL III: CPT | Mod: PBBFAC,,, | Performed by: DERMATOLOGY

## 2021-11-01 PROCEDURE — 99999 PR PBB SHADOW E&M-EST. PATIENT-LVL III: ICD-10-PCS | Mod: PBBFAC,,, | Performed by: DERMATOLOGY

## 2021-11-01 PROCEDURE — 99214 PR OFFICE/OUTPT VISIT, EST, LEVL IV, 30-39 MIN: ICD-10-PCS | Mod: S$PBB,,, | Performed by: DERMATOLOGY

## 2021-11-01 RX ORDER — NEOMYCIN SULFATE, POLYMYXIN B SULFATE AND HYDROCORTISONE 10; 3.5; 1 MG/ML; MG/ML; [USP'U]/ML
SUSPENSION/ DROPS AURICULAR (OTIC)
Qty: 10 ML | Refills: 6 | Status: SHIPPED | OUTPATIENT
Start: 2021-11-01 | End: 2023-05-17

## 2021-11-01 RX ORDER — ECONAZOLE NITRATE 10 MG/G
CREAM TOPICAL
Qty: 30 G | Refills: 2 | Status: SHIPPED | OUTPATIENT
Start: 2021-11-01

## 2021-11-18 ENCOUNTER — PATIENT MESSAGE (OUTPATIENT)
Dept: INTERNAL MEDICINE | Facility: CLINIC | Age: 66
End: 2021-11-18
Payer: COMMERCIAL

## 2021-11-18 ENCOUNTER — LAB VISIT (OUTPATIENT)
Dept: LAB | Facility: HOSPITAL | Age: 66
End: 2021-11-18
Attending: INTERNAL MEDICINE
Payer: MEDICARE

## 2021-11-18 ENCOUNTER — TELEPHONE (OUTPATIENT)
Dept: INTERNAL MEDICINE | Facility: CLINIC | Age: 66
End: 2021-11-18
Payer: COMMERCIAL

## 2021-11-18 DIAGNOSIS — N39.0 URINARY TRACT INFECTION WITHOUT HEMATURIA, SITE UNSPECIFIED: Primary | ICD-10-CM

## 2021-11-18 DIAGNOSIS — N39.0 URINARY TRACT INFECTION WITHOUT HEMATURIA, SITE UNSPECIFIED: ICD-10-CM

## 2021-11-18 LAB
BACTERIA #/AREA URNS AUTO: NORMAL /HPF
BILIRUB UR QL STRIP: NEGATIVE
CLARITY UR REFRACT.AUTO: CLEAR
COLOR UR AUTO: YELLOW
GLUCOSE UR QL STRIP: NEGATIVE
HGB UR QL STRIP: NEGATIVE
KETONES UR QL STRIP: NEGATIVE
LEUKOCYTE ESTERASE UR QL STRIP: NEGATIVE
MICROSCOPIC COMMENT: NORMAL
NITRITE UR QL STRIP: NEGATIVE
PH UR STRIP: 5 [PH] (ref 5–8)
PROT UR QL STRIP: NEGATIVE
RBC #/AREA URNS AUTO: 1 /HPF (ref 0–4)
SP GR UR STRIP: 1.01 (ref 1–1.03)
SQUAMOUS #/AREA URNS AUTO: 0 /HPF
URN SPEC COLLECT METH UR: NORMAL
WBC #/AREA URNS AUTO: 0 /HPF (ref 0–5)

## 2021-11-18 PROCEDURE — 87086 URINE CULTURE/COLONY COUNT: CPT | Performed by: INTERNAL MEDICINE

## 2021-11-18 PROCEDURE — 81001 URINALYSIS AUTO W/SCOPE: CPT | Performed by: INTERNAL MEDICINE

## 2021-11-20 LAB — BACTERIA UR CULT: NO GROWTH

## 2021-11-29 DIAGNOSIS — M17.12 PRIMARY OSTEOARTHRITIS OF LEFT KNEE: Primary | ICD-10-CM

## 2021-12-01 ENCOUNTER — OFFICE VISIT (OUTPATIENT)
Dept: CARDIOLOGY | Facility: CLINIC | Age: 66
End: 2021-12-01
Payer: MEDICARE

## 2021-12-01 ENCOUNTER — PATIENT MESSAGE (OUTPATIENT)
Dept: ORTHOPEDICS | Facility: CLINIC | Age: 66
End: 2021-12-01
Payer: COMMERCIAL

## 2021-12-01 VITALS
BODY MASS INDEX: 40.04 KG/M2 | WEIGHT: 249.13 LBS | SYSTOLIC BLOOD PRESSURE: 130 MMHG | DIASTOLIC BLOOD PRESSURE: 85 MMHG | HEART RATE: 77 BPM | HEIGHT: 66 IN

## 2021-12-01 DIAGNOSIS — M79.661 PAIN IN BOTH LOWER LEGS: ICD-10-CM

## 2021-12-01 DIAGNOSIS — M79.662 PAIN IN BOTH LOWER LEGS: ICD-10-CM

## 2021-12-01 DIAGNOSIS — I83.813 VARICOSE VEINS OF BOTH LOWER EXTREMITIES WITH PAIN: Primary | ICD-10-CM

## 2021-12-01 PROCEDURE — 99999 PR PBB SHADOW E&M-EST. PATIENT-LVL V: CPT | Mod: PBBFAC,,, | Performed by: INTERNAL MEDICINE

## 2021-12-01 PROCEDURE — 99999 PR PBB SHADOW E&M-EST. PATIENT-LVL V: ICD-10-PCS | Mod: PBBFAC,,, | Performed by: INTERNAL MEDICINE

## 2021-12-01 PROCEDURE — 99215 OFFICE O/P EST HI 40 MIN: CPT | Mod: PBBFAC,PO | Performed by: INTERNAL MEDICINE

## 2021-12-01 PROCEDURE — 99215 PR OFFICE/OUTPT VISIT, EST, LEVL V, 40-54 MIN: ICD-10-PCS | Mod: S$PBB,,, | Performed by: INTERNAL MEDICINE

## 2021-12-01 PROCEDURE — 99215 OFFICE O/P EST HI 40 MIN: CPT | Mod: S$PBB,,, | Performed by: INTERNAL MEDICINE

## 2021-12-01 RX ORDER — ITRACONAZOLE 100 MG/1
200 CAPSULE ORAL DAILY
Qty: 14 CAPSULE | Refills: 0 | Status: SHIPPED | OUTPATIENT
Start: 2021-12-01 | End: 2021-12-08

## 2021-12-08 ENCOUNTER — PATIENT MESSAGE (OUTPATIENT)
Dept: ADMINISTRATIVE | Facility: OTHER | Age: 66
End: 2021-12-08
Payer: COMMERCIAL

## 2021-12-08 ENCOUNTER — HOSPITAL ENCOUNTER (OUTPATIENT)
Dept: RADIOLOGY | Facility: HOSPITAL | Age: 66
Discharge: HOME | End: 2021-12-08
Attending: ORTHOPAEDIC SURGERY
Payer: MEDICARE

## 2021-12-08 ENCOUNTER — OFFICE VISIT (OUTPATIENT)
Dept: ORTHOPEDICS | Facility: CLINIC | Age: 66
End: 2021-12-08
Payer: MEDICARE

## 2021-12-08 VITALS — WEIGHT: 245.06 LBS | HEIGHT: 66 IN | BODY MASS INDEX: 39.38 KG/M2

## 2021-12-08 DIAGNOSIS — M17.12 PRIMARY OSTEOARTHRITIS OF LEFT KNEE: ICD-10-CM

## 2021-12-08 DIAGNOSIS — M17.11 PRIMARY OSTEOARTHRITIS OF RIGHT KNEE: ICD-10-CM

## 2021-12-08 DIAGNOSIS — Z96.651 STATUS POST RIGHT KNEE REPLACEMENT: Primary | ICD-10-CM

## 2021-12-08 PROCEDURE — 73560 X-RAY EXAM OF KNEE 1 OR 2: CPT | Mod: TC,RT

## 2021-12-08 PROCEDURE — 99214 OFFICE O/P EST MOD 30 MIN: CPT | Mod: S$PBB,,, | Performed by: ORTHOPAEDIC SURGERY

## 2021-12-08 PROCEDURE — 99214 PR OFFICE/OUTPT VISIT, EST, LEVL IV, 30-39 MIN: ICD-10-PCS | Mod: S$PBB,,, | Performed by: ORTHOPAEDIC SURGERY

## 2021-12-08 PROCEDURE — 73560 X-RAY EXAM OF KNEE 1 OR 2: CPT | Mod: 26,RT,, | Performed by: RADIOLOGY

## 2021-12-08 PROCEDURE — 99999 PR PBB SHADOW E&M-EST. PATIENT-LVL IV: ICD-10-PCS | Mod: PBBFAC,,, | Performed by: ORTHOPAEDIC SURGERY

## 2021-12-08 PROCEDURE — 73562 X-RAY EXAM OF KNEE 3: CPT | Mod: 26,LT,, | Performed by: RADIOLOGY

## 2021-12-08 PROCEDURE — 99999 PR PBB SHADOW E&M-EST. PATIENT-LVL IV: CPT | Mod: PBBFAC,,, | Performed by: ORTHOPAEDIC SURGERY

## 2021-12-08 PROCEDURE — 73562 XR KNEE ORTHO LEFT: ICD-10-PCS | Mod: 26,LT,, | Performed by: RADIOLOGY

## 2021-12-08 PROCEDURE — 73560 XR KNEE ORTHO LEFT: ICD-10-PCS | Mod: 26,RT,, | Performed by: RADIOLOGY

## 2021-12-08 PROCEDURE — 99214 OFFICE O/P EST MOD 30 MIN: CPT | Mod: PBBFAC | Performed by: ORTHOPAEDIC SURGERY

## 2021-12-10 ENCOUNTER — PATIENT MESSAGE (OUTPATIENT)
Dept: INTERNAL MEDICINE | Facility: CLINIC | Age: 66
End: 2021-12-10
Payer: COMMERCIAL

## 2021-12-10 DIAGNOSIS — K76.89 LIVER CYST: ICD-10-CM

## 2021-12-10 DIAGNOSIS — K76.0 FATTY LIVER: Primary | ICD-10-CM

## 2021-12-13 ENCOUNTER — PATIENT MESSAGE (OUTPATIENT)
Dept: INTERNAL MEDICINE | Facility: CLINIC | Age: 66
End: 2021-12-13
Payer: COMMERCIAL

## 2021-12-15 ENCOUNTER — TELEPHONE (OUTPATIENT)
Dept: ORTHOPEDICS | Facility: CLINIC | Age: 66
End: 2021-12-15
Payer: COMMERCIAL

## 2021-12-15 ENCOUNTER — TELEPHONE (OUTPATIENT)
Dept: PREADMISSION TESTING | Facility: HOSPITAL | Age: 66
End: 2021-12-15
Payer: COMMERCIAL

## 2021-12-15 DIAGNOSIS — M17.12 PRIMARY OSTEOARTHRITIS OF LEFT KNEE: Primary | ICD-10-CM

## 2021-12-16 ENCOUNTER — TELEPHONE (OUTPATIENT)
Dept: SPORTS MEDICINE | Facility: CLINIC | Age: 66
End: 2021-12-16
Payer: COMMERCIAL

## 2021-12-22 ENCOUNTER — PATIENT MESSAGE (OUTPATIENT)
Dept: INTERNAL MEDICINE | Facility: CLINIC | Age: 66
End: 2021-12-22
Payer: COMMERCIAL

## 2021-12-23 ENCOUNTER — PATIENT MESSAGE (OUTPATIENT)
Dept: INTERNAL MEDICINE | Facility: CLINIC | Age: 66
End: 2021-12-23
Payer: COMMERCIAL

## 2021-12-23 ENCOUNTER — PATIENT MESSAGE (OUTPATIENT)
Dept: ADMINISTRATIVE | Facility: OTHER | Age: 66
End: 2021-12-23
Payer: COMMERCIAL

## 2021-12-29 ENCOUNTER — PATIENT MESSAGE (OUTPATIENT)
Dept: ORTHOPEDICS | Facility: CLINIC | Age: 66
End: 2021-12-29
Payer: COMMERCIAL

## 2021-12-31 ENCOUNTER — PATIENT MESSAGE (OUTPATIENT)
Dept: PRIMARY CARE CLINIC | Facility: CLINIC | Age: 66
End: 2021-12-31
Payer: COMMERCIAL

## 2022-01-03 NOTE — TELEPHONE ENCOUNTER
Patient is inquiring about an FDA recall - outside of scope for ORC.  Would recommend that patient contact pharmacy to confirm safety of specific lot dispensed for patient before switching strength and product

## 2022-01-12 ENCOUNTER — CLINICAL SUPPORT (OUTPATIENT)
Dept: REHABILITATION | Facility: HOSPITAL | Age: 67
End: 2022-01-12
Payer: MEDICARE

## 2022-01-12 DIAGNOSIS — R53.1 DECREASED STRENGTH: ICD-10-CM

## 2022-01-12 DIAGNOSIS — R26.89 GAIT, ANTALGIC: ICD-10-CM

## 2022-01-12 DIAGNOSIS — M17.12 PRIMARY OSTEOARTHRITIS OF LEFT KNEE: ICD-10-CM

## 2022-01-12 PROBLEM — M25.662 DECREASED RANGE OF MOTION OF BOTH KNEES: Status: RESOLVED | Noted: 2021-02-22 | Resolved: 2022-01-12

## 2022-01-12 PROBLEM — M25.661 DECREASED RANGE OF MOTION OF BOTH KNEES: Status: RESOLVED | Noted: 2021-02-22 | Resolved: 2022-01-12

## 2022-01-12 PROBLEM — M17.11 PRIMARY OSTEOARTHRITIS OF RIGHT KNEE: Status: RESOLVED | Noted: 2021-02-22 | Resolved: 2022-01-12

## 2022-01-12 PROCEDURE — 97110 THERAPEUTIC EXERCISES: CPT | Mod: PN

## 2022-01-12 PROCEDURE — 97161 PT EVAL LOW COMPLEX 20 MIN: CPT | Mod: PN

## 2022-01-12 NOTE — PLAN OF CARE
[facility]  Physical Therapy Initial Evaluation    Name: Carolina Knight  Clinic Number: [id number]    Therapy Diagnosis:   Encounter Diagnoses   Name Primary?    Primary osteoarthritis of left knee     Decreased strength     Gait, antalgic      Physician: Emmanuel Garcia MD    Physician Orders: PT Eval and Treat  Medical Diagnosis from Referral: M17.12 (ICD-10-CM) - Primary osteoarthritis of left knee  Evaluation Date: 1/12/2022  Authorization Period Expiration: 12/15/2022  Plan of Care Expiration: 3/11/2022  Visit # / Visits authorized: 1/1  FOTO: 1/5  PTA Visit: 0/6    Time In: 7:15 AM  Time Out: 8:00 AM  Total Billable Time: 45 minutes (1 TE + 1 LCE)    Precautions: Standard, Lymphodema of both legs, Sjogren's disease    Subjective   Date of onset: 2021  History of current condition - Carolina reports: has been having left knee pain for the past 4-5 years, and has a history of left knee injections. She will get a L TKA on 2/1/2022 and wanted to come here for pre-hab. She had a R TKA in 2021 and it is doing well. She does have some low back pain (more right sided) and wears a right heel lift that makes her low back feel better. She has difficulty with prolonged walking, standing and squatting/stooping. Does feel like her left knee might buckle sometimes, but never has - no reported falls. Denies any numbness or tingling.      Medical History:   Past Medical History:   Diagnosis Date    Decreased mobility and endurance 11/16/2017    Decreased strength 11/16/2017    Decreased strength of lower extremity 2/22/2021    Difficult intubation     Edema 9/26/2018    Edema of both legs 7/29/2020    Fatty liver 12/15/2014    Hypokalemia 9/26/2018    Lymphedema of both lower extremities 7/29/2020    Multiple gastric polyps 7/21/2012    Polyarthralgia 4/6/2015    Sjogren's disease     Sleep apnea        Surgical History:   Carolina Knight  has a past surgical history that  includes Total thyroidectomy; Cholecystectomy; Thyroidectomy; Toe Surgery; Oophorectomy (age 42); Cervical conization w/ laser ();  section; Hysterectomy (age 42); Esophagogastroduodenoscopy (N/A, 2018); Eye surgery (Est -15); Percutaneous cryotherapy of peripheral nerve using liquid nitrous oxide in closed needle device (Right, 3/1/2021); Breast biopsy (Left, 2017); and Breast biopsy (Right).    Medications:   Carolina has a current medication list which includes the following prescription(s): amlodipine, amoxicillin, aspirin, azelastine, zyrtec, diphenhydramine, docusate sodium, econazole nitrate, ergocalciferol (vitamin d2), estradiol, finacea, fluticasone propionate, hydrochlorothiazide, jublia, ketoconazole, lutein, metformin, montelukast, mv,ca,min/iron/fa/guarana/caff, neomycin-polymyxin-hydrocortisone, nystatin, pantoprazole, potassium chloride sa, promethazine-dextromethorphan, retin-a, rosuvastatin, spironolactone, synthroid, and triamcinolone acetonide 0.1%, and the following Facility-Administered Medications: ropivacaine (pf) 2 mg/ml (0.2%).    Allergies:   Review of patient's allergies indicates:   Allergen Reactions    Lipitor [atorvastatin]      Severe myalgia    Losartan Swelling     Possible throat closing sensation.    Lovastatin Other (See Comments)     myalgia    Cefuroxime Itching and Rash    Latex, natural rubber Itching and Rash     Pt reported         Imaging: see imaging in EMR    Prior Therapy: yes for R TKA  Social History: lives with , 2 story home, bedroom on first floor  Occupation: retired  Prior Level of Function: pain and limitations with LLE   Current Level of Function: cannot kneel, unable to step up on L for stairs, stepping over large step, squatting/stooping  Pts goals: to get ready for my L knee replacement     Pain:  Current 2/10, worst 7/10, best 0/10   Location: L knee  Description: Aching, Tight and Sharp  Aggravating Factors: see current  level of function  Easing Factors: pain medication and rest    Objective     Gait: decreased L heel off, decreased right step length, L hip drop and L trunk siebend  Posture: L trunk sidebend  Sensation: WFL  Palpation: +TTP at L MCL and pes anserinus  Skin: mild swelling at left medial lower L knee    NT = not tested  * = knee pain with testing  L knee MMT/ROM  AROM: 3-114  PROM: 1-115  L/E Strength w/ MicroFET Muscle Mariya Dynamometer Right Left Pain/Dysfunction with Movement   (approx 4 sec hold w/ max contraction)   Hip Flexion 11.7 kg  12.2 kg     Hip Abduction 16.5 kg  12.0 kg     Quadriceps 16.7 kg  16.5 kg     Hamstrings 15.2 kg  15.4 kg       TU seconds (without AD)  30 second sit-to-stand test (without U/E support): 12 without UE use    CMS Impairment/Limitation/Restriction for KNEE FOTO    Limitation for FOTO Knee Survey  Limitation Score: 59%  Predicted Score: 43%     TREATMENT   Treatment Time In: 7:50 AM  Treatment Time Out: 8:00 AM  Total Treatment time separate from Evaluation: 10 minutes    Carolina received therapeutic exercises to develop strength, endurance, ROM, flexibility and posture for 10 minutes including:    Quad sets: 3x10'' c/ towel under L knee  SL hip abd: 10x L  Heel slides: 5x3'' holds L  Bridges: 10x DL  Open/closed books: 1x5'' B  LAQ: 5x B    Home Exercises and Patient Education Provided  Education provided: Yes  - correct body mechanics for knee health  - course of therapy including knee protocol, prognosis  - importance of HEP for proper rehab  - signs and symptoms of infection and/or DVT  - avoidance of soaking knee and keeping incision dry    Written Home Exercises Provided: yes.  Exercises were reviewed and Carolina was able to demonstrate them prior to the end of the session.  Carolina demonstrated good  understanding of the education provided.     See EMR under Media for exercises provided 2022.    Assessment   Carolina is a 66 y.o. female referred to outpatient Physical  Therapy with a medical diagnosis of Primary osteoarthritis of left knee at [facility]. Pt currently presents with L kneepain, decreased L knee ROM, decreased BLE strength, poor posture, impaired balance and gait, and functional deficits with walking/standing, and squatting/stooping. Patient plans to get L TKA on 2/1/2022, and is here for pre-hab today. She also reports mild right/left sided low back pain with L knee pain that will be addressed as well. Pt would benefit from skilled PT consisting of gait training, muscular skeletal stretching/strengthening, manual therapy, neuro muscular re-education, and modalities prn to address limitations and increase functional mobility.    Pt prognosis is Excellent.   Pt will benefit from skilled outpatient Physical Therapy to address the deficits stated above and in the chart below, provide pt/family education, and to maximize pt's level of independence.     Plan of care discussed with patient: Yes  Pt's spiritual, cultural and educational needs considered and patient is agreeable to the plan of care and goals as stated below:     Anticipated Barriers for therapy: co-morbidities, low back pain    Medical Necessity is demonstrated by the following  History  Co-morbidities and personal factors that may impact the plan of care Co-morbidities:   Low back pain, Sjogren's disease    Personal Factors:   no deficits     low   Examination  Body Structures and Functions, activity limitations and participation restrictions that may impact the plan of care Body Regions:   back  lower extremities  trunk    Body Systems:    gross symmetry  ROM  strength  gross coordinated movement  balance  gait  transfers  transitions  motor control  edema  scar formation  skin integrity  skin color    Participation Restrictions:   Recreational walking    Activity limitations:   Learning and applying knowledge  no deficits    General Tasks and Commands  no  "deficits    Communication  no deficits    Mobility  lifting and carrying objects  walking  driving (bike, car, motorcycle)    Self care  no deficits    Domestic Life  shopping  cooking  doing house work (cleaning house, washing dishes, laundry)  assisting others    Interactions/Relationships  no deficits    Life Areas  no deficits    Community and Social Life  no deficits         high   Clinical Presentation stable and uncomplicated low   Decision Making/ Complexity Score: low     Goals:  Short Term Goals: 2 weeks  1. Pt will be independent with HEP supplement PT in improving functional mobility.    Long Term Goals: 8 weeks  1. Pt will be independent with updated HEP supplement PT in improving functional mobility.  2. Pt will improve L LE strength to at least 90% of L LE strength as measured via MicroFet handheld dynamometer in order to improve functional mobility.  3. Pt will improve L knee AROM to at least 0-120 degrees in order to improve gait and ability to perform ADLs.  4. Pt will improve FOTO knee survey score to </= 43% limited in order to demo improved functional mobility.  5. Pt will perform TUG in < 10 seconds without AD in order to demo improved gait speed.  6. Pt will perform at least 13 sit to stands without UE support on 30 second sit to stand test in order to demo improved ability to perform transfers.    TUG Cutoff Scores:        30" sit to stand Cutoff Scores:        Plan   Plan of care Certification: 1/12/2022 to 3/11/2022.    Outpatient Physical Therapy 2 times weekly for 8 weeks to include the following interventions: Aquatic Therapy, Debridement (nonselective), Debridement (selective), Electrical Stimulation IFC/Russian, Gait Training, Manual Therapy, Moist Heat/ Ice, Neuromuscular Re-ed, Orthotic Management and Training, Patient Education, Self Care, Therapeutic Activites and Therapeutic Exercise.     Stephan Lugo, PT     "

## 2022-01-13 ENCOUNTER — TELEPHONE (OUTPATIENT)
Dept: PREADMISSION TESTING | Facility: HOSPITAL | Age: 67
End: 2022-01-13
Payer: COMMERCIAL

## 2022-01-13 ENCOUNTER — HOSPITAL ENCOUNTER (OUTPATIENT)
Dept: RADIOLOGY | Facility: HOSPITAL | Age: 67
Discharge: HOME | End: 2022-01-13
Attending: INTERNAL MEDICINE
Payer: MEDICARE

## 2022-01-13 DIAGNOSIS — M79.609 PAIN IN EXTREMITY, UNSPECIFIED EXTREMITY: ICD-10-CM

## 2022-01-13 DIAGNOSIS — K76.0 FATTY LIVER: ICD-10-CM

## 2022-01-13 DIAGNOSIS — K76.89 LIVER CYST: ICD-10-CM

## 2022-01-13 DIAGNOSIS — E03.9 HYPOTHYROIDISM, UNSPECIFIED TYPE: ICD-10-CM

## 2022-01-13 DIAGNOSIS — Z01.818 PRE-OP TESTING: Primary | ICD-10-CM

## 2022-01-13 PROCEDURE — 76700 US EXAM ABDOM COMPLETE: CPT | Mod: 26,,, | Performed by: RADIOLOGY

## 2022-01-13 PROCEDURE — 76700 US EXAM ABDOM COMPLETE: CPT | Mod: TC

## 2022-01-13 PROCEDURE — 76700 US ABDOMEN COMPLETE: ICD-10-PCS | Mod: 26,,, | Performed by: RADIOLOGY

## 2022-01-13 NOTE — TELEPHONE ENCOUNTER
----- Message from Korina Dumont RN sent at 1/13/2022  1:53 PM CST -----  (2/1) Please schedule Labs/EKG on 1/25.    Thanks  Korina

## 2022-01-13 NOTE — PRE-PROCEDURE INSTRUCTIONS
Chart review; triage plan initiated.    Spoke to patient regarding upcoming scheduled surgery.  Name, , and allergies verified.  Medical, surgical, and anesthesia history reviewed.  Instructed to hold vitamins, supplements and NSAIDs for one week prior to surgery. Informed that the remaining medication instructions will be provided at the POC visit.  Patient aware of PCC visit on . Pt verbalized understanding.

## 2022-01-13 NOTE — ANESTHESIA PAT ROS NOTE
01/13/2022  Carolina Knight is a 66 y.o., female.      Pre-op Assessment          Review of Systems  Anesthesia Hx:  Hx of Anesthetic complications H/O DIFFICULT INTUBATION SMALL MOUTH/THROAT History of prior surgery of interest to airway management or planning: Previous anesthesia: Spinal  3/9/2021: ARTHROPLASTY, KNEE, TOTAL, CARRIE COMPUTER-ASSISTED NAVIGATION (Right Knee) with spinal.  Procedure performed at an [facility]. Denies Family Hx of Anesthesia complications.  Personal Hx of Anesthesia complications  Difficult Intubation   Social:  Non-Smoker, No Alcohol Use    Hematology/Oncology:  Hematology Normal       -- Cancer in past history:  surgery  Oncology Comments: H/O CANCEROUS CELLS TO CERVIX, HAD LASER CONOZATION-RESOLVED    PRE-CANCEROUS AREA TO THE RIGHT LOWER LEG-USING PRESCRIBED OINTMENT     EENT/Dental:   SJOGRENS-NO ISSUES CURRENTLY   Cardiovascular:   Hypertension  Denies Angina. hyperlipidemia BLE LYMPHEDEMA/VENOUS INSUFFICIENCY Functional Capacity 3 METS    Pulmonary:   Asthma mild Denies Shortness of breath. Recent URI, resolved Sleep Apnea NO CPAP   Renal/:  Renal/ Normal     Hepatic/GI:   GERD  Liver Disease, Fatty Liver    Musculoskeletal:  Joint Disease:  Arthritis, Osteoarthritis H/O R TKA 3/2021   Neurological:  Neurology Normal  Osteoarthritis    Endocrine:  Diabetes, Type 2 Diabetes , controlled by diet, oral hypoglycemics. , most recent HgA1c value was 5.4 on 9/29/21.  Thyroid Disease Hypothyroidism , s/p previous thyroid surgery, s/p total thyroidectomy.   Metabolic Disorders, Morbid Obesity / BMI > 40  Psych:   depression          Physical Exam  General:  Morbid Obesity    Airway/Jaw/Neck:  Airway Findings: Mouth Opening: Small, but > 3cm Tongue: Large  Jaw/Neck Findings:  Neck ROM: Normal ROM  Neck Findings:  Girth Increased, Short Neck      Dental:  Dental  Findings: In tact   Chest/Lungs:  Chest/Lungs Findings: Clear to auscultation     Heart/Vascular:  Heart Findings: Rate: Normal  Rhythm: Regular Rhythm  Sounds: Normal        Mental Status:  Mental Status Findings:  Cooperative, Alert and Oriented         Anesthesia Assessment: Preoperative EQUATION    Planned Procedure: Procedure(s) (LRB):  ARTHROPLASTY, KNEE, TOTAL, CARRIE COMPUTER-ASSISTED NAVIGATION (Left)  Requested Anesthesia Type:Regional  Surgeon: Emmanuel Garcia MD  Service: Orthopedics  Known or anticipated Date of Surgery:2/1/2022    Surgeon notes: reviewed    Electronic QUestionnaire Assessment completed via nurse interview with patient.        Triage considerations:     The patient has no apparent active cardiac condition (No unstable coronary Syndrome such as severe unstable angina or recent [<1 month] myocardial infarction, decompensated CHF, severe valvular   disease or significant arrhythmia)    Previous anesthesia records:Spinal Anesthesia , Difficult intubation and Patient reports small mouth/throat.       3/9/2021: ARTHROPLASTY, KNEE, TOTAL, CARRIE COMPUTER-ASSISTED NAVIGATION (Right Knee)  Airway/Jaw/Neck:  Airway Findings: Mouth Opening: Normal Tongue: Normal  General Airway Assessment: Adult  Mallampati: I  Jaw/Neck Findings:  Neck ROM: Normal ROM       Last PCP note: 3-6 months ago , within [facility]   Dr. Kierra Cornejo    Subspecialty notes: Ortho, Interventional Cardiology (Dr. Mina Plummer), Dermatology (Dr. Afua Olson), GYN (Dr. Isabel Hastings), Sleep Medicine (Soraya Desir NP)    Other important co-morbidities: DM2, GERD, HLD, HTN, Hypothyroid, Morbid Obesity, QUINTIN and no CPAP, S/P Thyroidectomy, Asthma (Stable), Depression, Hx Fatty Liver, Sjogrens, BLE lymphadema/Venous Insufficieny, R TKA (3/2021-Dr. Garcia)      Tests already available:  Available tests,  within [facility] .     9/29/2021: TSH, Lipid Panel, A1C (5.4), CBC, CMP  1/13/2022: US Abdomen (Three hepatic cysts, similar prior.   Simple renal cyst of the left kidney. Hepatic steatosis.)  8/20/2019: CT Calcium Scoring (Agatston calcium score equals 65, which translates to the 75thth percentile for coronary calcium load based on age and sex. Left hepatic lobe cysts unchanged from 2010.)  2/8/2021: Stress Echo   · A pharmacological stress test was performed using dobutamine. Atropine was used as an additional agent.  · Peak heart rate acheived is 137 bpm, which is 92% of the age predicted maximum heart rate.  · The study was stopped because the patient reached the end of the protocol.  · There were no arrhythmias during stress.  · The ECG portion of this study is negative for myocardial ischemia.  · The left ventricle is normal in size with normal systolic function. The estimated ejection fraction is 60%  · Normal left ventricular diastolic function.  · Normal right ventricular size with normal right ventricular systolic function.  · Normal central venous pressure (3 mmHg).  · The stress echo portion of this study is negative for myocardial ischemia.   2/4/2021: EKG  8/10/2020: CV Lower Ext. Pressure w/Stress   · Normal rest and exercise YAJAIRA's.  8/7/2020: CV US Lower Ext. Bilateral Veins  · No evidence of right lower extremity DVT.  · No evidence of left lower extremity DVT.  · Right GSV reflux (below knee segments)  · Left GSV reflux (below knee segment)  · Left SSV reflux.            Instructions given. (See in Nurse's note)    Optimization:  Anesthesia Preop Clinic Assessment  Indicated  Will Schedule POC      Medical Opinion Indicated         Sub-specialist consult indicated:   [facility] NP       Plan:    Testing:  A1C, CMP, EKG, Hematology Profile, PT/INR and TSH      Pre-anesthesia  visit       Visit focus: possible regional anesthesia and/or nerve block         Consultation:PCC NP for Medical and Anesthesia Optimization Pt cleared by PCP 1/20, Dr. Jose casiano with this clearance, will see Anesthesia as scheduled.       Patient   has previously scheduled Medical Appointment:  1/20/2022, 1/21/2022, 1/24/2022, 1/25/2022, 1/31/2022      Navigation: Tests Scheduled.              Consults scheduled.             Results will be tracked by Preop Clinic.      1/20/22: PCP Clearance (Dr. Kierra Cornejo)  Preoperative clearance for Primary osteoarthritis of right knee - no active cardiac symptoms. Able to walk up flight of stairs if it were not for knee pain. Neg recent stress test. Medically optimized. Please note that pt does have QUINTIN.

## 2022-01-14 ENCOUNTER — PATIENT MESSAGE (OUTPATIENT)
Dept: PRIMARY CARE CLINIC | Facility: CLINIC | Age: 67
End: 2022-01-14
Payer: COMMERCIAL

## 2022-01-14 DIAGNOSIS — E66.01 SEVERE OBESITY (BMI 35.0-39.9) WITH COMORBIDITY: ICD-10-CM

## 2022-01-14 DIAGNOSIS — R73.03 PREDIABETES: ICD-10-CM

## 2022-01-14 RX ORDER — METFORMIN HYDROCHLORIDE 750 MG/1
750 TABLET, EXTENDED RELEASE ORAL
Qty: 90 TABLET | Refills: 3 | Status: CANCELLED | OUTPATIENT
Start: 2022-01-14 | End: 2023-01-14

## 2022-01-17 ENCOUNTER — PATIENT MESSAGE (OUTPATIENT)
Dept: SLEEP MEDICINE | Facility: CLINIC | Age: 67
End: 2022-01-17
Payer: COMMERCIAL

## 2022-01-20 ENCOUNTER — OFFICE VISIT (OUTPATIENT)
Dept: PRIMARY CARE CLINIC | Facility: CLINIC | Age: 67
End: 2022-01-20
Payer: MEDICARE

## 2022-01-20 VITALS
DIASTOLIC BLOOD PRESSURE: 84 MMHG | TEMPERATURE: 99 F | SYSTOLIC BLOOD PRESSURE: 138 MMHG | HEART RATE: 83 BPM | OXYGEN SATURATION: 98 % | WEIGHT: 248 LBS | HEIGHT: 66 IN | BODY MASS INDEX: 39.86 KG/M2

## 2022-01-20 DIAGNOSIS — M17.11 PRIMARY OSTEOARTHRITIS OF RIGHT KNEE: ICD-10-CM

## 2022-01-20 DIAGNOSIS — I15.2 HYPERTENSION ASSOCIATED WITH DIABETES: ICD-10-CM

## 2022-01-20 DIAGNOSIS — E78.2 MIXED HYPERLIPIDEMIA: ICD-10-CM

## 2022-01-20 DIAGNOSIS — I77.1 TORTUOUS AORTA: ICD-10-CM

## 2022-01-20 DIAGNOSIS — M35.00 SJOGREN'S SYNDROME, WITH UNSPECIFIED ORGAN INVOLVEMENT: ICD-10-CM

## 2022-01-20 DIAGNOSIS — R73.03 PREDIABETES: ICD-10-CM

## 2022-01-20 DIAGNOSIS — G47.33 OSA (OBSTRUCTIVE SLEEP APNEA): ICD-10-CM

## 2022-01-20 DIAGNOSIS — E11.59 HYPERTENSION ASSOCIATED WITH DIABETES: ICD-10-CM

## 2022-01-20 DIAGNOSIS — Z01.818 PREOPERATIVE CLEARANCE: Primary | ICD-10-CM

## 2022-01-20 PROBLEM — R26.89 GAIT, ANTALGIC: Status: ACTIVE | Noted: 2022-01-20

## 2022-01-20 PROBLEM — M17.12 PRIMARY OSTEOARTHRITIS OF LEFT KNEE: Status: ACTIVE | Noted: 2022-01-20

## 2022-01-20 PROBLEM — R53.1 DECREASED STRENGTH: Status: ACTIVE | Noted: 2022-01-20

## 2022-01-20 PROCEDURE — 99999 PR PBB SHADOW E&M-EST. PATIENT-LVL V: ICD-10-PCS | Mod: PBBFAC,,, | Performed by: INTERNAL MEDICINE

## 2022-01-20 PROCEDURE — 99214 PR OFFICE/OUTPT VISIT, EST, LEVL IV, 30-39 MIN: ICD-10-PCS | Mod: S$PBB,,, | Performed by: INTERNAL MEDICINE

## 2022-01-20 PROCEDURE — 99999 PR PBB SHADOW E&M-EST. PATIENT-LVL V: CPT | Mod: PBBFAC,,, | Performed by: INTERNAL MEDICINE

## 2022-01-20 PROCEDURE — 99215 OFFICE O/P EST HI 40 MIN: CPT | Mod: PBBFAC,PN | Performed by: INTERNAL MEDICINE

## 2022-01-20 PROCEDURE — 99214 OFFICE O/P EST MOD 30 MIN: CPT | Mod: S$PBB,,, | Performed by: INTERNAL MEDICINE

## 2022-01-20 RX ORDER — ROSUVASTATIN CALCIUM 5 MG/1
5 TABLET, COATED ORAL EVERY OTHER DAY
Qty: 45 TABLET | Refills: 3 | Status: SHIPPED | OUTPATIENT
Start: 2022-01-20 | End: 2022-05-09 | Stop reason: SDUPTHER

## 2022-01-20 RX ORDER — METFORMIN HYDROCHLORIDE 500 MG/1
500 TABLET, EXTENDED RELEASE ORAL
Qty: 90 TABLET | Refills: 3 | Status: SHIPPED | OUTPATIENT
Start: 2022-01-20 | End: 2022-05-09 | Stop reason: SDUPTHER

## 2022-01-20 NOTE — PROGRESS NOTES
Subjective:       Patient ID: Carolina Knight is a 66 y.o. female.    Chief Complaint: pre-operative for TKA    HPI   Plan for L TKA w/ Dr. Garcia in Feb for L knee OA. S/P PT but still having pain. Had R TKA 3/2021 and did great!    When picking up something heavy, sometimes w/ some pain in the lower back. Wants to wait till after knee surgery to reassess.   Has a round area on the R lower back that's not painful. Wonders if concerning.     DM2 - wants to change back to metformin 500mg instead of the xr 750mg daily. Didn't feel like increased dosage helps.   A1C 9/29/21 - 5.4.     HLD - crestor 5mg daily. Take co Q 10 daily.   Reports having muscle cramps w/ it although less than w/ other statins. Wants to try every other day instead.   LDL 78.8 9.29.21.  CXR 8/14/18 - Thoracic aorta is tortuous.    Sjogren's disease - hasn't been to see rheum. Dry eyes. Sees allergist. Follows w/ dentist.     Follows w/ outside dermatology (Dr. Thao, Jr) for R lower shin - biopsy supposedly shows precancerous lesion. Has been using rx cream on it x 2 weeks. Not completely healing.     Got off of estrace b/c she's afraid of cancer.    Review of Systems   Constitutional: Negative for activity change and unexpected weight change.   HENT: Negative for hearing loss, rhinorrhea and trouble swallowing.    Eyes: Negative for discharge and visual disturbance.   Respiratory: Negative for chest tightness and wheezing.    Cardiovascular: Negative for chest pain and palpitations.   Gastrointestinal: Negative for blood in stool, constipation, diarrhea and vomiting.   Endocrine: Negative for polydipsia and polyuria.   Genitourinary: Negative for difficulty urinating, dysuria, hematuria and menstrual problem.   Musculoskeletal: Negative for arthralgias, joint swelling and neck pain.   Neurological: Negative for weakness and headaches.   Psychiatric/Behavioral: Negative for confusion and dysphoric mood.         Objective:      Physical  "Exam    /84 (BP Location: Left arm, Patient Position: Sitting, BP Method: Large (Manual))   Pulse 83   Temp 98.7 °F (37.1 °C) (Oral)   Ht 5' 6" (1.676 m)   Wt 112.5 kg (248 lb 0.3 oz)   LMP  (LMP Unknown)   SpO2 98%   BMI 40.03 kg/m²     GEN - A+OX4, NAD   HEENT - PERRL, EOMI, OP clear. MMM.   Neck - No thyromegaly or cervical LAD. No thyroid masses felt.  CV - RRR, no m/r   Chest - CTAB, no wheezing or rhonchi  Abd - S/NT/ND/+BS.   Ext - 2+BDP and radial pulses. No C/C/E.  Neuro - PERRL, EOMI, no nystagmus, eyebrow raise, facial sensation, hearing, m of mastication, smile, palatal raise, shoulder shrug, tongue protrusion symmetric and intact. 5/5 BUE and BLE strength.  msk - dec joint spaces. R knee scar well healed. R lower paraspinal soft, mobile, nontender, well circumscribed lesion (likely cyst). L palm ganglion cyst.  Skin - No rash.    Labs reviewed.     Assessment/Plan     Carolina was seen today for follow-up.    Diagnoses and all orders for this visit:    Preoperative clearance for Primary osteoarthritis of right knee - no active cardiac symptoms. Able to walk up flight of stairs if it were not for knee pain. Neg recent stress test. Medically optimized. Please note that pt does have QUINTIN.    Mixed hyperlipidemia - ok to try qod. Consider zetia if continues to have myalgia.   -     rosuvastatin (CRESTOR) 5 MG tablet; Take 1 tablet (5 mg total) by mouth every other day.    Diabetes mellitus type 2 in obese - dec metformin to 500mg daily.     Sjogren's syndrome, with unspecified organ involvement - will f/u w/ rheum.     QUINTIN (obstructive sleep apnea) - cont CPAP.     Hypertension associated with diabetes - Stable and controlled. Continue current medications.    BMI 40.03, adult - work on diet, exercise. Joined a gym.    Tortuous aorta - statin as above.    Other orders  -     metFORMIN (GLUCOPHAGE-XR) 500 MG ER 24hr tablet; Take 1 tablet (500 mg total) by mouth daily with breakfast.    Follow up in " April as scheduled.       Kierra Cornejo MD  [facility] [address]  2:08 PM    Addendum: please note that the above dx of DM2 in obese was put in erroneously. Pt has PDM and BMI 40, which is why she's on metformin.     Kierra Cornejo MD  [facility]  10:07 AM

## 2022-01-21 ENCOUNTER — PATIENT MESSAGE (OUTPATIENT)
Dept: SURGERY | Facility: HOSPITAL | Age: 67
End: 2022-01-21
Payer: COMMERCIAL

## 2022-01-24 ENCOUNTER — OFFICE VISIT (OUTPATIENT)
Dept: ORTHOPEDICS | Facility: CLINIC | Age: 67
End: 2022-01-24
Payer: MEDICARE

## 2022-01-24 ENCOUNTER — HOSPITAL ENCOUNTER (OUTPATIENT)
Dept: RADIOLOGY | Facility: HOSPITAL | Age: 67
Discharge: HOME | End: 2022-01-24
Attending: ORTHOPAEDIC SURGERY
Payer: MEDICARE

## 2022-01-24 DIAGNOSIS — M17.12 PRIMARY OSTEOARTHRITIS OF LEFT KNEE: ICD-10-CM

## 2022-01-24 DIAGNOSIS — M17.12 PRIMARY OSTEOARTHRITIS OF LEFT KNEE: Primary | ICD-10-CM

## 2022-01-24 DIAGNOSIS — E11.9 TYPE 2 DIABETES MELLITUS WITHOUT COMPLICATION, UNSPECIFIED WHETHER LONG TERM INSULIN USE: Primary | ICD-10-CM

## 2022-01-24 DIAGNOSIS — Z01.818 PRE-OP TESTING: ICD-10-CM

## 2022-01-24 PROCEDURE — 99999 PR PBB SHADOW E&M-EST. PATIENT-LVL II: ICD-10-PCS | Mod: PBBFAC,,, | Performed by: NURSE PRACTITIONER

## 2022-01-24 PROCEDURE — 73700 CT LOWER EXTREMITY W/O DYE: CPT | Mod: TC,LT

## 2022-01-24 PROCEDURE — 99213 OFFICE O/P EST LOW 20 MIN: CPT | Mod: S$PBB,,, | Performed by: NURSE PRACTITIONER

## 2022-01-24 PROCEDURE — 73700 CT LOWER EXTREMITY W/O DYE: CPT | Mod: 26,LT,, | Performed by: RADIOLOGY

## 2022-01-24 PROCEDURE — 99213 PR OFFICE/OUTPT VISIT, EST, LEVL III, 20-29 MIN: ICD-10-PCS | Mod: S$PBB,,, | Performed by: NURSE PRACTITIONER

## 2022-01-24 PROCEDURE — 73700 CT KNEE WITHOUT CONTRAST LEFT: ICD-10-PCS | Mod: 26,LT,, | Performed by: RADIOLOGY

## 2022-01-24 PROCEDURE — 99212 OFFICE O/P EST SF 10 MIN: CPT | Mod: PBBFAC,25 | Performed by: NURSE PRACTITIONER

## 2022-01-24 PROCEDURE — 99999 PR PBB SHADOW E&M-EST. PATIENT-LVL II: CPT | Mod: PBBFAC,,, | Performed by: NURSE PRACTITIONER

## 2022-01-24 NOTE — TELEPHONE ENCOUNTER
I'm not sure of the exact amount, but it is about $1000. They can call [employer] at [phone] to get the exact amount. Let me know if you need any other help.

## 2022-01-25 ENCOUNTER — HOSPITAL ENCOUNTER (OUTPATIENT)
Dept: CARDIOLOGY | Facility: CLINIC | Age: 67
Discharge: HOME | End: 2022-01-25
Payer: MEDICARE

## 2022-01-25 ENCOUNTER — HOSPITAL ENCOUNTER (OUTPATIENT)
Dept: PREADMISSION TESTING | Facility: HOSPITAL | Age: 67
Discharge: HOME | End: 2022-01-25
Attending: ORTHOPAEDIC SURGERY
Payer: MEDICARE

## 2022-01-25 VITALS
BODY MASS INDEX: 39.86 KG/M2 | HEIGHT: 66 IN | RESPIRATION RATE: 18 BRPM | WEIGHT: 248 LBS | OXYGEN SATURATION: 98 % | DIASTOLIC BLOOD PRESSURE: 74 MMHG | SYSTOLIC BLOOD PRESSURE: 130 MMHG | TEMPERATURE: 99 F | HEART RATE: 81 BPM

## 2022-01-25 DIAGNOSIS — Z01.818 PRE-OP TESTING: ICD-10-CM

## 2022-01-25 PROCEDURE — 93010 ELECTROCARDIOGRAM REPORT: CPT | Mod: S$PBB,,, | Performed by: INTERNAL MEDICINE

## 2022-01-25 PROCEDURE — 93005 ELECTROCARDIOGRAM TRACING: CPT | Mod: PBBFAC | Performed by: INTERNAL MEDICINE

## 2022-01-25 PROCEDURE — 93010 EKG 12-LEAD: ICD-10-PCS | Mod: S$PBB,,, | Performed by: INTERNAL MEDICINE

## 2022-01-25 NOTE — DISCHARGE INSTRUCTIONS
Your surgery has been scheduled for:__________________________________________    You should report to:  ____[facility], located on the [place] side of the first floor of the           [facility] ([phone])  ____[facility], located on the [facility] side of the            Second floor of the [facility] ([phone])  ____3rd Floor [facility] located on the [facility] side of the [facility] [phone]  Please Note   - Tell your doctor if you take Aspirin, products containing Aspirin, herbal medications  or blood thinners, such as Coumadin, Ticlid, or Plavix.  (Consult your provider regarding holding or stopping before surgery).  - Arrange for someone to drive you home following surgery.  You will not be allowed to leave the surgical facility alone or drive yourself home following sedation and anesthesia.  Before Surgery  - Stop taking all vitamins/ herbal medications 14days prior to surgery  - No Motrin/Advil (Ibuprofen) 1 day before surgery  - No Aleve (Naproxen) 7 days before surgery  - Stop Taking Asprin, products containing Asprin _____days before surgery  - Stop taking blood thinners_______days before surgery  - No Goody's/BC  Powder 7 days before surgery  - Refrain from drinking alcoholic beverages for 24hours before and after surgery  - Stop or limit smoking _________days before surgery  - You may take Tylenol for pain  Night before Surgery   Stop ALL solid food, gum, candy (including vitamins) 8 hours before arrival time.  (Please note: If your surgeon gives you different eating and drinking instructions, please follow surgeon's directions.)   Stop all CLOUDY liquids: coffee with creamer, formula, tube feeds, cloudy juices, non-human milk and breast milk with additives, 6 hours prior to arrival time.   Stop plain breast milk 4 hours prior to arrival time.   The patient should be ENCOURAGED to drink  carbohydrate-rich clear liquids (sports drinks, clear juices) until 2 hours prior to arrival time.   CLEAR liquids include only water, black coffee NO creamer, clear oral rehydration drinks, clear sports drinks or clear fruit juices (no orange juice, no pulpy juices, no apple cider). Advise patients if they can read newsprint through the liquid, it qualifies as clear liquid.    IF IN DOUBT, drink water instead.   - Take a shower or bath (shower is recommended).  Bathe with Hibiclens soap or an antibacterial soap from the neck down.  If not supplied by your surgeon, hibiclens soap will need to be purchased over the counter in pharmacy.  Rinse soap off thoroughly.  - Shampoo your hair with your regular shampoo  The Day of Surgery  · NOTHING TO  DRINK 2 hours before arrival time. If you are told to take medication on the morning of surgery, it may be taken with a sip of water.   - Take another bath or shower with hibiclens or any antibacterial soap, to reduce the chance of infection.  - Take heart and blood pressure medications with a small sip of water, as advised by the perioperative team.  - Do not take fluid pills  - You may brush your teeth and rinse your mouth, but do not swall any additional water.   - Do not apply perfumes, powder, body lotions or deodorant on the day of surgery.  - Nail polish should be removed.  - Do not wear makeup or moisturizer  - Wear comfortable clothes, such as a button front shirt and loose fitting pants.  - Leave all jewelry, including body piercings, and valuables at home.    - Bring any devices you will neeed after surgery such as crutches or canes.  - If you have sleep apnea, please bring your CPAP machine  In the event that your physical condition changes including the onset of a cold or respiratory illness, or if you have to delay or cancel your surgery, please notify your surgeon.    Anesthesia: Regional Anesthesia    Youre scheduled for surgery. During surgery, youll  receive medicine called anesthesia to keep you comfortable and pain-free. Your surgeon has decided that youll receive regional anesthesia. This sheet tells you what to expect with this type of anesthesia.  What is regional anesthesia?  Regional anesthesia numbs one region of your body. The anesthesia may be given around nerves or into veins in your arms, neck, or legs (nerve block or Lee block). Or it may be sent into the spinal fluid (spinal anesthesia) or into the space just outside the spinal fluid (epidural anesthesia). You may also be given sedatives to help you relax.  Nerve block or Lee block  A small area of the body, such as an arm or leg, can be numbed using a nerve block or Brunson block.  · Nerve block. During a nerve block, your skin is numbed. A needle is then inserted near nerves that serve the area to be numbed. Anesthetic is sent through the needle.  · IV regional or Lee block. For this type of block, an IV line is put into a vein. The blood flow to the area to be numbed is blocked for a short time. Anesthetic is sent through the IV.  Spinal anesthesia  Spinal anesthesia numbs your body from about the waist down.  · Anesthetic is injected into the spinal fluid. This is a substance that surrounds the spinal cord in your spinal column. The anesthetic blocks pain traveling from the body to the brain.  · To receive the anesthetic, your skin is numbed at the injection site on your back.  · A needle is then inserted into the spinal space. Anesthetic is sent into the spinal fluid through the needle.  Epidural anesthesia  Epidural anesthesia is most commonly used during childbirth and may also be used after surgical procedures of the chest, belly, and legs.  · Anesthetic is injected into the epidural space. This is just outside the dural sac which contains the spinal fluid.  · To receive the anesthetic, your skin is numbed at the injection site on your back.  · A needle is then inserted into the epidural  space. Anesthetic is sent into the epidural space through the needle.  · A small flexible catheter may be attached to the needle and left in place. This allows for continuous injections or infusions of anesthetic.  Anesthesia tools and medicines that might be near you during your procedure  · Local anesthetic. This medicine is given through a needle numbs one region of your body.  · Electrocardiography leads (electrodes). These are used to record your heart rate and rhythm.  · Blood pressure cuff. A cuff is placed on your arm to keep track of your blood pressure.  · Pulse oximeter. This small clip is placed on the end of the finger. It measures your blood oxygen level.  · Sedatives. These medicines may be given through an IV. They help to relax you and keep you comfortable. You may stay awake or sleep lightly.  · Oxygen. You may be given oxygen through a facemask.  Risks and possible complications  Regional anesthesia carries some risks. These include:  · Nausea and vomiting  · Headache  · Backache  · Decreased blood pressure  · Allergic reaction to the anesthetic  · Ongoing numbness (rare)  · Irregular heartbeat (rare)  · Cardiac arrest (rare)   Date Last Reviewed: 12/1/2016  © 0990-6811 [employer]. [address] [place] [address], [place] [address]. All rights reserved. This information is not intended as a substitute for professional medical care. Always follow your healthcare professional's instructions.

## 2022-01-26 RX ORDER — SODIUM CHLORIDE 9 MG/ML
INJECTION, SOLUTION INTRAVENOUS CONTINUOUS
Status: CANCELLED | OUTPATIENT
Start: 2022-01-26 | End: 2022-01-27

## 2022-01-26 RX ORDER — MORPHINE SULFATE 2 MG/ML
2 INJECTION, SOLUTION INTRAMUSCULAR; INTRAVENOUS
Status: CANCELLED | OUTPATIENT
Start: 2022-01-26

## 2022-01-26 RX ORDER — OXYCODONE HYDROCHLORIDE 5 MG/1
10 TABLET ORAL
Status: CANCELLED | OUTPATIENT
Start: 2022-01-26

## 2022-01-26 RX ORDER — OXYCODONE HYDROCHLORIDE 5 MG/1
5 TABLET ORAL
Status: CANCELLED | OUTPATIENT
Start: 2022-01-26

## 2022-01-26 RX ORDER — ROPIVACAINE HYDROCHLORIDE 2 MG/ML
8 INJECTION, SOLUTION EPIDURAL; INFILTRATION; PERINEURAL CONTINUOUS
Status: CANCELLED | OUTPATIENT
Start: 2022-01-26

## 2022-01-26 RX ORDER — CELECOXIB 200 MG/1
400 CAPSULE ORAL
Status: CANCELLED | OUTPATIENT
Start: 2022-01-26

## 2022-01-26 RX ORDER — FENTANYL CITRATE 50 UG/ML
25 INJECTION, SOLUTION INTRAMUSCULAR; INTRAVENOUS EVERY 5 MIN PRN
Status: CANCELLED | OUTPATIENT
Start: 2022-01-26

## 2022-01-26 RX ORDER — SODIUM CHLORIDE 0.9 % (FLUSH) 0.9 %
10 SYRINGE (ML) INJECTION
Status: CANCELLED | OUTPATIENT
Start: 2022-01-26

## 2022-01-26 RX ORDER — TALC
6 POWDER (GRAM) TOPICAL NIGHTLY PRN
Status: CANCELLED | OUTPATIENT
Start: 2022-01-26

## 2022-01-26 RX ORDER — PREGABALIN 75 MG/1
75 CAPSULE ORAL
Status: CANCELLED | OUTPATIENT
Start: 2022-01-26

## 2022-01-26 RX ORDER — ONDANSETRON 2 MG/ML
4 INJECTION INTRAMUSCULAR; INTRAVENOUS EVERY 8 HOURS PRN
Status: CANCELLED | OUTPATIENT
Start: 2022-01-26

## 2022-01-26 RX ORDER — BISACODYL 10 MG
10 SUPPOSITORY, RECTAL RECTAL EVERY 12 HOURS PRN
Status: CANCELLED | OUTPATIENT
Start: 2022-01-26

## 2022-01-26 RX ORDER — FLUCONAZOLE 100 MG/1
100 TABLET ORAL DAILY
Qty: 3 TABLET | Refills: 0 | Status: SHIPPED | OUTPATIENT
Start: 2022-01-26 | End: 2022-09-19 | Stop reason: SDUPTHER

## 2022-01-26 RX ORDER — ACETAMINOPHEN 500 MG
1000 TABLET ORAL EVERY 6 HOURS
Status: CANCELLED | OUTPATIENT
Start: 2022-01-26 | End: 2022-01-28

## 2022-01-26 RX ORDER — AMOXICILLIN 250 MG
1 CAPSULE ORAL 2 TIMES DAILY
Status: CANCELLED | OUTPATIENT
Start: 2022-01-26

## 2022-01-26 RX ORDER — NALOXONE HCL 0.4 MG/ML
0.02 VIAL (ML) INJECTION
Status: CANCELLED | OUTPATIENT
Start: 2022-01-26

## 2022-01-26 RX ORDER — ASPIRIN 81 MG/1
81 TABLET ORAL 2 TIMES DAILY
Status: CANCELLED | OUTPATIENT
Start: 2022-01-26

## 2022-01-26 RX ORDER — METHOCARBAMOL 750 MG/1
750 TABLET, FILM COATED ORAL 3 TIMES DAILY
Status: CANCELLED | OUTPATIENT
Start: 2022-01-26

## 2022-01-26 RX ORDER — MUPIROCIN 20 MG/G
1 OINTMENT TOPICAL
Status: CANCELLED | OUTPATIENT
Start: 2022-01-26

## 2022-01-26 RX ORDER — LIDOCAINE HYDROCHLORIDE 10 MG/ML
1 INJECTION, SOLUTION EPIDURAL; INFILTRATION; INTRACAUDAL; PERINEURAL
Status: CANCELLED | OUTPATIENT
Start: 2022-01-26

## 2022-01-26 RX ORDER — SODIUM CHLORIDE 9 MG/ML
INJECTION, SOLUTION INTRAVENOUS
Status: CANCELLED | OUTPATIENT
Start: 2022-01-26

## 2022-01-26 RX ORDER — MIDAZOLAM HYDROCHLORIDE 1 MG/ML
4 INJECTION INTRAMUSCULAR; INTRAVENOUS
Status: CANCELLED | OUTPATIENT
Start: 2022-01-26 | End: 2022-01-27

## 2022-01-26 RX ORDER — MUPIROCIN 20 MG/G
1 OINTMENT TOPICAL 2 TIMES DAILY
Status: CANCELLED | OUTPATIENT
Start: 2022-01-26 | End: 2022-01-31

## 2022-01-26 RX ORDER — POLYETHYLENE GLYCOL 3350 17 G/17G
17 POWDER, FOR SOLUTION ORAL DAILY
Status: CANCELLED | OUTPATIENT
Start: 2022-01-27

## 2022-01-26 RX ORDER — FENTANYL CITRATE 50 UG/ML
100 INJECTION, SOLUTION INTRAMUSCULAR; INTRAVENOUS
Status: CANCELLED | OUTPATIENT
Start: 2022-01-26 | End: 2022-01-27

## 2022-01-26 RX ORDER — FAMOTIDINE 20 MG/1
20 TABLET, FILM COATED ORAL 2 TIMES DAILY
Status: CANCELLED | OUTPATIENT
Start: 2022-01-26

## 2022-01-26 RX ORDER — CELECOXIB 200 MG/1
200 CAPSULE ORAL DAILY
Status: CANCELLED | OUTPATIENT
Start: 2022-01-27

## 2022-01-26 RX ORDER — PROCHLORPERAZINE EDISYLATE 5 MG/ML
5 INJECTION INTRAMUSCULAR; INTRAVENOUS EVERY 6 HOURS PRN
Status: CANCELLED | OUTPATIENT
Start: 2022-01-26

## 2022-01-26 RX ORDER — PREGABALIN 75 MG/1
75 CAPSULE ORAL NIGHTLY
Status: CANCELLED | OUTPATIENT
Start: 2022-01-26

## 2022-01-26 RX ORDER — ACETAMINOPHEN 500 MG
1000 TABLET ORAL
Status: CANCELLED | OUTPATIENT
Start: 2022-01-26

## 2022-01-26 NOTE — PROGRESS NOTES
Carolina Knight is a 66 y.o. year old here today for pre surgery optimization visit  in preparation for a Left total knee arthroplasty to be performed by Dr. Garcia on 2/1/2022.  she was last seen and treated in the clinic on 12/8/2021. she will be medically optimized by the pre op center. There has been no significant change in medical status since last visit. No fever, chills, malaise, or unexplained weight change.      Allergies, Medications, past medical and surgical history reviewed.    Focused examination performed.    Patient declined to see surgeon today. All questions answered. Patient encouraged to call with questions. Contact information given.     Pre, maryellen, and post operative procedures and expectations discussed. Goals of successful surgery reviewed and include manageable pain levels, surgical site free of infection, medication management, and ambulation with PT/OT assistance. Healthy weight management discussed with patient and caregiver who were receptive to eduction of healthy diet and activity. No other necessary lifestyle changes identified. Educated patient about signs and symptoms of infection, medication management, anticoagulation therapy, risk of tobacco and alcohol use, and self-care to promote healing. Surgical guide given for future reference. Hibiclens given to patient with instructions. All questions were answered.     Carolina Knight verbalized an understanding to the education and goals. Patient has displayed readiness to engage in care and is ready to proceed with surgery.  Patient reports her  is able and ready to provide assistance at home after discharge.    Surgical and blood consents signed.    Carolina Knight will contact us if there are any questions, concerns, or changes in medical status prior to surgery.       Joint class: completed    COVID-19 test date: vaccinated     Patient has discussed discharge planning with surgeon. Patient will be discharged to  home following surgery.   patient will be scheduled with [facility]. She will go to the [place] location.    20 minutes of time was spent on patient education, review of records, templating, H&P, , appointment scheduling and optimizing patient for surgery.

## 2022-01-26 NOTE — H&P
CC:  Left knee pain    Carolina Knight is a 66 y.o. female with history of Left knee pain. Pain is worse with activity and weight bearing.  Patient has experienced interference of activities of daily living due to decreased range of motion and an increase in joint pain and swelling.  Patient has failed non-operative treatment including NSAIDs, corticosteroid injections, viscosupplement injections, and activity modification.  Carolina Knight currently ambulates independently.     Relevant medical conditions of significance in perioperative period:  HTN- on medication managed by pcp  DM- on medication managed by pcp  Hypothyroid- on medication managed by pcp    Past Medical History:   Diagnosis Date    Decreased mobility and endurance 2017    Decreased strength 2017    Decreased strength of lower extremity 2021    Difficult intubation     Edema 2018    Edema of both legs 2020    Fatty liver 12/15/2014    Hypokalemia 2018    Lymphedema of both lower extremities 2020    Multiple gastric polyps 2012    Polyarthralgia 2015    Sjogren's disease     Sleep apnea        Past Surgical History:   Procedure Laterality Date    BREAST BIOPSY Left 2017    core,Benign breast with fibrocystic changes, including benign usual ductal hyperplasia and adenosis    BREAST BIOPSY Right     core    CERVICAL CONIZATION   W/ LASER       SECTION      x1    CHOLECYSTECTOMY      ESOPHAGOGASTRODUODENOSCOPY N/A 2018    Procedure: ESOPHAGOGASTRODUODENOSCOPY (EGD);  Surgeon: George Henriquez MD;  Location: [address]);  Service: Endoscopy;  Laterality: N/A;  possible difficult intubation - see anesthesia note dated 1/15/13 - 2nd floor/ generated from last EGD, 3 year f/u, Pt due/ Pt requesting Dr. Henriquez -     EYE SURGERY  Est 2014-15    Laser on each eye - tear in white part eye by Dr Nitish Ayala    HYSTERECTOMY  age 42    [place], LSO (uterine  fibroids, adhesions, endometriosis)    OOPHORECTOMY  age 42    LSO (endometriosis, cyst)    PERCUTANEOUS CRYOTHERAPY OF PERIPHERAL NERVE USING LIQUID NITROUS OXIDE IN CLOSED NEEDLE DEVICE Right 3/1/2021    Procedure: CRYOTHERAPY, NERVE, PERIPHERAL, PERCUTANEOUS, USING LIQUID NITROUS OXIDE IN CLOSED NEEDLE DEVICE;  Surgeon: CHACE Riley;  Location: [facility];  Service: Pain Management;  Laterality: Right;  RIGHT KNEE IOVERA    THYROIDECTOMY      TOE SURGERY      left    TOTAL THYROIDECTOMY         Family History   Problem Relation Age of Onset    Asthma Father     COPD Father     Arthritis Mother     Breast cancer Maternal Aunt     Cancer Maternal Aunt     Melanoma Sister     COPD Sister     Melanoma Brother     Cancer Maternal Uncle         esophageal cancer    Asthma Son     Diabetes Brother         Adult onset got in his 60s    Ovarian cancer Neg Hx     Colon cancer Neg Hx        Review of patient's allergies indicates:   Allergen Reactions    Lipitor [atorvastatin]      Severe myalgia    Losartan Swelling     Possible throat closing sensation.    Lovastatin Other (See Comments)     myalgia    Cefuroxime Itching and Rash    Latex, natural rubber Itching and Rash     Pt reported          Current Outpatient Medications:     amLODIPine (NORVASC) 10 MG tablet, Take 1 tablet (10 mg total) by mouth once daily., Disp: 90 tablet, Rfl: 3    aspirin (ECOTRIN) 81 MG EC tablet, Take 1 tablet (81 mg total) by mouth once daily., Disp: 90 tablet, Rfl: 3    azelastine (ASTELIN) 137 mcg (0.1 %) nasal spray, 1 spray (137 mcg total) by Nasal route daily as needed for Rhinitis., Disp: 30 mL, Rfl: 1    cetirizine (ZYRTEC) 10 MG tablet, Take 10 mg by mouth every evening. , Disp: , Rfl:     diphenhydrAMINE (BANOPHEN) 25 mg capsule, Take 1 each (25 mg total) by mouth every 6 (six) hours as needed for Itching or Allergies., Disp: 60 capsule, Rfl: 0    docusate sodium (COLACE) 100 MG capsule,  Take 1 capsule (100 mg total) by mouth 2 (two) times daily as needed for Constipation., Disp: 60 capsule, Rfl: 0    econazole nitrate 1 % cream, Use bid, Disp: 30 g, Rfl: 2    ergocalciferol, vitamin D2, (VITAMIN D ORAL), Take by mouth., Disp: , Rfl:     FINACEA 15 % gel, AAA face qhs then moisturize, Disp: 50 g, Rfl: 3    fluconazole (DIFLUCAN) 100 MG tablet, Take 1 tablet (100 mg total) by mouth once daily., Disp: 3 tablet, Rfl: 0    fluticasone propionate (FLONASE) 50 mcg/actuation nasal spray, SPRAY ONCE IN EACH NOSTRIL DAILY, Disp: 16 g, Rfl: 3    hydroCHLOROthiazide (HYDRODIURIL) 25 MG tablet, Take 1 tablet (25 mg total) by mouth once daily., Disp: 90 tablet, Rfl: 1    JUBLIA 10 % Ayesha, Apply topically once daily., Disp: 8 mL, Rfl: 1    ketoconazole (NIZORAL) 2 % cream, Apply topically once daily., Disp: 30 g, Rfl: 0    LUTEIN ORAL, Take by mouth., Disp: , Rfl:     metFORMIN (GLUCOPHAGE-XR) 500 MG ER 24hr tablet, Take 1 tablet (500 mg total) by mouth daily with breakfast., Disp: 90 tablet, Rfl: 3    montelukast (SINGULAIR) 10 mg tablet, Take 1 tablet (10 mg total) by mouth every evening., Disp: 90 tablet, Rfl: 3    mv,Ca,min/iron/FA/guarana/caff (ONE-A-DAY WOMEN'S ACTIVE ORAL), Take by mouth., Disp: , Rfl:     neomycin-polymyxin-hydrocortisone (CORTISPORIN) 3.5-10,000-1 mg/mL-unit/mL-% otic suspension, Use bid for nails, Disp: 10 mL, Rfl: 6    nystatin (MYCOSTATIN) cream, Apply topically 2 (two) times daily., Disp: 30 g, Rfl: 0    pantoprazole (PROTONIX) 40 MG tablet, TAKE 1 TABLET BY MOUTH EVERY DAY, Disp: 30 tablet, Rfl: 5    potassium chloride SA (K-DUR,KLOR-CON) 10 MEQ tablet, Take 1 tablet (10 mEq total) by mouth 2 (two) times daily., Disp: 180 tablet, Rfl: 3    promethazine-dextromethorphan (PROMETHAZINE-DM) 6.25-15 mg/5 mL Syrp, Take 5 mLs by mouth every 6 (six) hours as needed., Disp: , Rfl:     RETIN-A 0.05 % cream, Apply topically nightly as needed., Disp: , Rfl:     rosuvastatin  (CRESTOR) 5 MG tablet, Take 1 tablet (5 mg total) by mouth every other day., Disp: 45 tablet, Rfl: 3    spironolactone (ALDACTONE) 50 MG tablet, Take 1 tablet (50 mg total) by mouth once daily., Disp: 90 tablet, Rfl: 3    SYNTHROID 125 mcg tablet, TAKE 1 TABLET BY MOUTH ONCE DAILY, Disp: 90 tablet, Rfl: 1    triamcinolone acetonide 0.1% (KENALOG) 0.1 % cream, Apply topically 2 (two) times daily., Disp: 30 g, Rfl: 0  No current facility-administered medications for this visit.    Facility-Administered Medications Ordered in Other Visits:     ropivacaine 0.2% Nimbus PainPRO Pump infusion 500 ML, , Perineural, Continuous, Mariana Vergara MD, [place] at 03/09/21 1323    Review of Systems:  Constitutional: no fever or chills  Eyes: no visual changes  ENT: no nasal congestion or sore throat  Respiratory: no cough or shortness of breath  Cardiovascular: no chest pain or palpitations  Gastrointestinal: no nausea or vomiting, tolerating diet  Genitourinary: no hematuria or dysuria  Integument/Breast: no rash or pruritis  Hematologic/Lymphatic: no easy bruising or lymphadenopathy  Musculoskeletal: positive for knee pain  Neurological: no seizures or tremors  Behavioral/Psych: no auditory or visual hallucinations  Endocrine: no heat or cold intolerance    PE:  LMP  (LMP Unknown)   General: Pleasant, cooperative, NAD   Gait: antalgic  HEENT: NCAT, sclera nonicteric   Lungs: Respirations clear bilaterally; equal and unlabored.   CV: S1S2; 2+ bilateral upper and lower extremity pulses.   Skin: Intact throughout with no rashes, erythema, or lesions  Extremities: No LE edema,  no erythema or warmth of the skin in either lower extremity.    Left knee exam:  Knee Range of Motion:normal, pain with passive range of motion  Effusion:none  Condition of skin:intact  Location of tenderness:Medial joint line   Strength:normal  Stability: stable to testing    Hip Examination: painless PROM of hip     Radiographs: Radiographs reveal  advanced degenerative changes including subchondral cyst formation, subchondral sclerosis, osteophyte formation, joint space narrowing.     Knee Alignment: normal    Diagnosis: Primary osteoarthritis Left knee    Plan: Left total knee arthroplasty    Due to the serious nature of total joint infection and the high prevalence of community acquired MRSA, vancomycin will be used perioperatively.

## 2022-01-26 NOTE — H&P (VIEW-ONLY)
CC:  Left knee pain    Carolina Knight is a 66 y.o. female with history of Left knee pain. Pain is worse with activity and weight bearing.  Patient has experienced interference of activities of daily living due to decreased range of motion and an increase in joint pain and swelling.  Patient has failed non-operative treatment including NSAIDs, corticosteroid injections, viscosupplement injections, and activity modification.  Carolina Knight currently ambulates independently.     Relevant medical conditions of significance in perioperative period:  HTN- on medication managed by pcp  DM- on medication managed by pcp  Hypothyroid- on medication managed by pcp    Past Medical History:   Diagnosis Date    Decreased mobility and endurance 2017    Decreased strength 2017    Decreased strength of lower extremity 2021    Difficult intubation     Edema 2018    Edema of both legs 2020    Fatty liver 12/15/2014    Hypokalemia 2018    Lymphedema of both lower extremities 2020    Multiple gastric polyps 2012    Polyarthralgia 2015    Sjogren's disease     Sleep apnea        Past Surgical History:   Procedure Laterality Date    BREAST BIOPSY Left 2017    core,Benign breast with fibrocystic changes, including benign usual ductal hyperplasia and adenosis    BREAST BIOPSY Right     core    CERVICAL CONIZATION   W/ LASER       SECTION      x1    CHOLECYSTECTOMY      ESOPHAGOGASTRODUODENOSCOPY N/A 2018    Procedure: ESOPHAGOGASTRODUODENOSCOPY (EGD);  Surgeon: George Henriquez MD;  Location: [address]);  Service: Endoscopy;  Laterality: N/A;  possible difficult intubation - see anesthesia note dated 1/15/13 - 2nd floor/ generated from last EGD, 3 year f/u, Pt due/ Pt requesting Dr. Henriquez -     EYE SURGERY  Est 2014-15    Laser on each eye - tear in white part eye by Dr Nitish Ayala    HYSTERECTOMY  age 42    [place], LSO (uterine  fibroids, adhesions, endometriosis)    OOPHORECTOMY  age 42    LSO (endometriosis, cyst)    PERCUTANEOUS CRYOTHERAPY OF PERIPHERAL NERVE USING LIQUID NITROUS OXIDE IN CLOSED NEEDLE DEVICE Right 3/1/2021    Procedure: CRYOTHERAPY, NERVE, PERIPHERAL, PERCUTANEOUS, USING LIQUID NITROUS OXIDE IN CLOSED NEEDLE DEVICE;  Surgeon: CHACE Riley;  Location: [facility];  Service: Pain Management;  Laterality: Right;  RIGHT KNEE IOVERA    THYROIDECTOMY      TOE SURGERY      left    TOTAL THYROIDECTOMY         Family History   Problem Relation Age of Onset    Asthma Father     COPD Father     Arthritis Mother     Breast cancer Maternal Aunt     Cancer Maternal Aunt     Melanoma Sister     COPD Sister     Melanoma Brother     Cancer Maternal Uncle         esophageal cancer    Asthma Son     Diabetes Brother         Adult onset got in his 60s    Ovarian cancer Neg Hx     Colon cancer Neg Hx        Review of patient's allergies indicates:   Allergen Reactions    Lipitor [atorvastatin]      Severe myalgia    Losartan Swelling     Possible throat closing sensation.    Lovastatin Other (See Comments)     myalgia    Cefuroxime Itching and Rash    Latex, natural rubber Itching and Rash     Pt reported          Current Outpatient Medications:     amLODIPine (NORVASC) 10 MG tablet, Take 1 tablet (10 mg total) by mouth once daily., Disp: 90 tablet, Rfl: 3    aspirin (ECOTRIN) 81 MG EC tablet, Take 1 tablet (81 mg total) by mouth once daily., Disp: 90 tablet, Rfl: 3    azelastine (ASTELIN) 137 mcg (0.1 %) nasal spray, 1 spray (137 mcg total) by Nasal route daily as needed for Rhinitis., Disp: 30 mL, Rfl: 1    cetirizine (ZYRTEC) 10 MG tablet, Take 10 mg by mouth every evening. , Disp: , Rfl:     diphenhydrAMINE (BANOPHEN) 25 mg capsule, Take 1 each (25 mg total) by mouth every 6 (six) hours as needed for Itching or Allergies., Disp: 60 capsule, Rfl: 0    docusate sodium (COLACE) 100 MG capsule,  Take 1 capsule (100 mg total) by mouth 2 (two) times daily as needed for Constipation., Disp: 60 capsule, Rfl: 0    econazole nitrate 1 % cream, Use bid, Disp: 30 g, Rfl: 2    ergocalciferol, vitamin D2, (VITAMIN D ORAL), Take by mouth., Disp: , Rfl:     FINACEA 15 % gel, AAA face qhs then moisturize, Disp: 50 g, Rfl: 3    fluconazole (DIFLUCAN) 100 MG tablet, Take 1 tablet (100 mg total) by mouth once daily., Disp: 3 tablet, Rfl: 0    fluticasone propionate (FLONASE) 50 mcg/actuation nasal spray, SPRAY ONCE IN EACH NOSTRIL DAILY, Disp: 16 g, Rfl: 3    hydroCHLOROthiazide (HYDRODIURIL) 25 MG tablet, Take 1 tablet (25 mg total) by mouth once daily., Disp: 90 tablet, Rfl: 1    JUBLIA 10 % Ayesha, Apply topically once daily., Disp: 8 mL, Rfl: 1    ketoconazole (NIZORAL) 2 % cream, Apply topically once daily., Disp: 30 g, Rfl: 0    LUTEIN ORAL, Take by mouth., Disp: , Rfl:     metFORMIN (GLUCOPHAGE-XR) 500 MG ER 24hr tablet, Take 1 tablet (500 mg total) by mouth daily with breakfast., Disp: 90 tablet, Rfl: 3    montelukast (SINGULAIR) 10 mg tablet, Take 1 tablet (10 mg total) by mouth every evening., Disp: 90 tablet, Rfl: 3    mv,Ca,min/iron/FA/guarana/caff (ONE-A-DAY WOMEN'S ACTIVE ORAL), Take by mouth., Disp: , Rfl:     neomycin-polymyxin-hydrocortisone (CORTISPORIN) 3.5-10,000-1 mg/mL-unit/mL-% otic suspension, Use bid for nails, Disp: 10 mL, Rfl: 6    nystatin (MYCOSTATIN) cream, Apply topically 2 (two) times daily., Disp: 30 g, Rfl: 0    pantoprazole (PROTONIX) 40 MG tablet, TAKE 1 TABLET BY MOUTH EVERY DAY, Disp: 30 tablet, Rfl: 5    potassium chloride SA (K-DUR,KLOR-CON) 10 MEQ tablet, Take 1 tablet (10 mEq total) by mouth 2 (two) times daily., Disp: 180 tablet, Rfl: 3    promethazine-dextromethorphan (PROMETHAZINE-DM) 6.25-15 mg/5 mL Syrp, Take 5 mLs by mouth every 6 (six) hours as needed., Disp: , Rfl:     RETIN-A 0.05 % cream, Apply topically nightly as needed., Disp: , Rfl:     rosuvastatin  (CRESTOR) 5 MG tablet, Take 1 tablet (5 mg total) by mouth every other day., Disp: 45 tablet, Rfl: 3    spironolactone (ALDACTONE) 50 MG tablet, Take 1 tablet (50 mg total) by mouth once daily., Disp: 90 tablet, Rfl: 3    SYNTHROID 125 mcg tablet, TAKE 1 TABLET BY MOUTH ONCE DAILY, Disp: 90 tablet, Rfl: 1    triamcinolone acetonide 0.1% (KENALOG) 0.1 % cream, Apply topically 2 (two) times daily., Disp: 30 g, Rfl: 0  No current facility-administered medications for this visit.    Facility-Administered Medications Ordered in Other Visits:     ropivacaine 0.2% Nimbus PainPRO Pump infusion 500 ML, , Perineural, Continuous, Mariana Vergara MD, [place] at 03/09/21 1323    Review of Systems:  Constitutional: no fever or chills  Eyes: no visual changes  ENT: no nasal congestion or sore throat  Respiratory: no cough or shortness of breath  Cardiovascular: no chest pain or palpitations  Gastrointestinal: no nausea or vomiting, tolerating diet  Genitourinary: no hematuria or dysuria  Integument/Breast: no rash or pruritis  Hematologic/Lymphatic: no easy bruising or lymphadenopathy  Musculoskeletal: positive for knee pain  Neurological: no seizures or tremors  Behavioral/Psych: no auditory or visual hallucinations  Endocrine: no heat or cold intolerance    PE:  LMP  (LMP Unknown)   General: Pleasant, cooperative, NAD   Gait: antalgic  HEENT: NCAT, sclera nonicteric   Lungs: Respirations clear bilaterally; equal and unlabored.   CV: S1S2; 2+ bilateral upper and lower extremity pulses.   Skin: Intact throughout with no rashes, erythema, or lesions  Extremities: No LE edema,  no erythema or warmth of the skin in either lower extremity.    Left knee exam:  Knee Range of Motion:normal, pain with passive range of motion  Effusion:none  Condition of skin:intact  Location of tenderness:Medial joint line   Strength:normal  Stability: stable to testing    Hip Examination: painless PROM of hip     Radiographs: Radiographs reveal  advanced degenerative changes including subchondral cyst formation, subchondral sclerosis, osteophyte formation, joint space narrowing.     Knee Alignment: normal    Diagnosis: Primary osteoarthritis Left knee    Plan: Left total knee arthroplasty    Due to the serious nature of total joint infection and the high prevalence of community acquired MRSA, vancomycin will be used perioperatively.

## 2022-01-27 RX ORDER — DOCUSATE SODIUM 100 MG/1
100 CAPSULE, LIQUID FILLED ORAL 2 TIMES DAILY PRN
Qty: 60 CAPSULE | Refills: 0 | Status: SHIPPED | OUTPATIENT
Start: 2022-01-27 | End: 2022-08-30

## 2022-01-27 RX ORDER — OXYCODONE HYDROCHLORIDE 5 MG/1
TABLET ORAL
Qty: 50 TABLET | Refills: 0 | Status: SHIPPED | OUTPATIENT
Start: 2022-01-27 | End: 2022-02-13 | Stop reason: SDUPTHER

## 2022-01-27 RX ORDER — ASPIRIN 81 MG/1
81 TABLET ORAL 2 TIMES DAILY
Qty: 60 TABLET | Refills: 0 | Status: SHIPPED | OUTPATIENT
Start: 2022-02-02 | End: 2022-03-14 | Stop reason: SDUPTHER

## 2022-01-27 RX ORDER — DEXTROMETHORPHAN HYDROBROMIDE, GUAIFENESIN 5; 100 MG/5ML; MG/5ML
650 LIQUID ORAL
Qty: 120 TABLET | Refills: 0 | Status: SHIPPED | OUTPATIENT
Start: 2022-01-27 | End: 2022-03-14 | Stop reason: SDUPTHER

## 2022-01-27 RX ORDER — METHOCARBAMOL 750 MG/1
750 TABLET, FILM COATED ORAL 3 TIMES DAILY PRN
Qty: 30 TABLET | Refills: 0 | Status: SHIPPED | OUTPATIENT
Start: 2022-01-27 | End: 2022-02-13 | Stop reason: SDUPTHER

## 2022-01-27 RX ORDER — CELECOXIB 200 MG/1
200 CAPSULE ORAL DAILY
Qty: 30 CAPSULE | Refills: 0 | Status: SHIPPED | OUTPATIENT
Start: 2022-01-27 | End: 2022-03-04

## 2022-01-29 ENCOUNTER — LAB VISIT (OUTPATIENT)
Dept: PRIMARY CARE CLINIC | Facility: CLINIC | Age: 67
End: 2022-01-29
Payer: MEDICARE

## 2022-01-29 DIAGNOSIS — Z01.818 PRE-OP TESTING: ICD-10-CM

## 2022-01-29 PROCEDURE — U0005 INFEC AGEN DETEC AMPLI PROBE: HCPCS | Performed by: ORTHOPAEDIC SURGERY

## 2022-01-29 PROCEDURE — U0003 INFECTIOUS AGENT DETECTION BY NUCLEIC ACID (DNA OR RNA); SEVERE ACUTE RESPIRATORY SYNDROME CORONAVIRUS 2 (SARS-COV-2) (CORONAVIRUS DISEASE [COVID-19]), AMPLIFIED PROBE TECHNIQUE, MAKING USE OF HIGH THROUGHPUT TECHNOLOGIES AS DESCRIBED BY CMS-2020-01-R: HCPCS | Performed by: ORTHOPAEDIC SURGERY

## 2022-01-30 LAB
SARS-COV-2 RNA RESP QL NAA+PROBE: NOT DETECTED
SARS-COV-2- CYCLE NUMBER: NORMAL

## 2022-01-31 ENCOUNTER — ANESTHESIA EVENT (OUTPATIENT)
Dept: SURGERY | Facility: HOSPITAL | Age: 67
End: 2022-01-31
Payer: MEDICARE

## 2022-01-31 ENCOUNTER — PATIENT OUTREACH (OUTPATIENT)
Dept: ADMINISTRATIVE | Facility: OTHER | Age: 67
End: 2022-01-31
Payer: COMMERCIAL

## 2022-01-31 ENCOUNTER — TELEPHONE (OUTPATIENT)
Dept: ORTHOPEDICS | Facility: CLINIC | Age: 67
End: 2022-01-31
Payer: COMMERCIAL

## 2022-01-31 ENCOUNTER — OFFICE VISIT (OUTPATIENT)
Dept: CARDIOLOGY | Facility: CLINIC | Age: 67
End: 2022-01-31
Payer: MEDICARE

## 2022-01-31 VITALS
SYSTOLIC BLOOD PRESSURE: 127 MMHG | HEART RATE: 84 BPM | WEIGHT: 245.94 LBS | HEIGHT: 66 IN | BODY MASS INDEX: 39.53 KG/M2 | DIASTOLIC BLOOD PRESSURE: 88 MMHG

## 2022-01-31 DIAGNOSIS — I10 ESSENTIAL HYPERTENSION: ICD-10-CM

## 2022-01-31 DIAGNOSIS — E78.2 MIXED HYPERLIPIDEMIA: ICD-10-CM

## 2022-01-31 DIAGNOSIS — E11.65 TYPE 2 DIABETES MELLITUS WITH HYPERGLYCEMIA, WITHOUT LONG-TERM CURRENT USE OF INSULIN: ICD-10-CM

## 2022-01-31 PROCEDURE — 99213 PR OFFICE/OUTPT VISIT, EST, LEVL III, 20-29 MIN: ICD-10-PCS | Mod: S$PBB,,, | Performed by: INTERNAL MEDICINE

## 2022-01-31 PROCEDURE — 93010 ELECTROCARDIOGRAM REPORT: CPT | Mod: S$PBB,,, | Performed by: INTERNAL MEDICINE

## 2022-01-31 PROCEDURE — 99213 OFFICE O/P EST LOW 20 MIN: CPT | Mod: S$PBB,,, | Performed by: INTERNAL MEDICINE

## 2022-01-31 PROCEDURE — 93005 ELECTROCARDIOGRAM TRACING: CPT | Mod: PBBFAC,PO | Performed by: INTERNAL MEDICINE

## 2022-01-31 PROCEDURE — 93010 EKG 12-LEAD: ICD-10-PCS | Mod: S$PBB,,, | Performed by: INTERNAL MEDICINE

## 2022-01-31 PROCEDURE — 99999 PR PBB SHADOW E&M-EST. PATIENT-LVL V: CPT | Mod: PBBFAC,,, | Performed by: INTERNAL MEDICINE

## 2022-01-31 PROCEDURE — 99999 PR PBB SHADOW E&M-EST. PATIENT-LVL V: ICD-10-PCS | Mod: PBBFAC,,, | Performed by: INTERNAL MEDICINE

## 2022-01-31 PROCEDURE — 99215 OFFICE O/P EST HI 40 MIN: CPT | Mod: PBBFAC,PO | Performed by: INTERNAL MEDICINE

## 2022-01-31 RX ORDER — POTASSIUM CHLORIDE 750 MG/1
10 TABLET, EXTENDED RELEASE ORAL 2 TIMES DAILY
Qty: 180 TABLET | Refills: 3 | Status: SHIPPED | OUTPATIENT
Start: 2022-01-31 | End: 2022-05-09

## 2022-01-31 NOTE — PROGRESS NOTES
Care Everywhere: updated  Immunization:   Health Maintenance: updated  Media Review:review for outside eye exam report    Legacy Review:   DIS:  Order placed:   Upcoming appts:  EFAX: sent to Dr. Ayala office to possibly obtain updated eye exam report   Task Tickets:  Referrals

## 2022-01-31 NOTE — LETTER
AUTHORIZATION FOR RELEASE OF   CONFIDENTIAL INFORMATION    Dear Nitish Ayala MD,    We are seeing Carolina Knight, date of birth 1955, in the clinic at [facility]. Kierra Cornejo MD is the patient's PCP. Carolina Knight has an outstanding lab/procedure at the time we reviewed her chart. In order to help keep her health information updated, she has authorized us to request the following medical record(s):        (  )  MAMMOGRAM                                      (  )  COLONOSCOPY      (  )  PAP SMEAR                                          (  )  OUTSIDE LAB RESULTS     (  )  DEXA SCAN                                          ( x )  EYE EXAM            (  )  FOOT EXAM                                          (  )  ENTIRE RECORD     (  )  OUTSIDE IMMUNIZATIONS                 (  )  _______________         Please fax records to [facility], Mary Yu, MD, [phone]     If you have any questions, please contact Cathy Bolanos at [phone].           Patient Name: Carolina Knight  : 1955  Patient Phone #: [phone]

## 2022-01-31 NOTE — TELEPHONE ENCOUNTER
Spoke with pt, notified her of her 0900 arrival time for surgery tomorrow at [facility] with nothing to eat or drink after midnight. She verbalized understanding and had no further questions.

## 2022-01-31 NOTE — PROGRESS NOTES
Subjective:     Problem List:  Hypertension  Hypercholesterolemia  CACS 65 in 2019 (75th percentile)  Diabetes  Hypothyroidism  QUINTIN  Overweight BMI ~40    HPI:   Carolina Knight is a 66 y.o. female who presents for follow-up of Pre-op Exam, Hypertension, and Hyperlipidemia  She is scheduled undergo total knee arthroplasty.  She does not report chest discomfort or shortness of breath with exertion.  She has sleep apnea and but has not been using CPAP due to the [place] recall.        Review of patient's allergies indicates:   Allergen Reactions    Lipitor [atorvastatin]      Severe myalgia    Losartan Swelling     Possible throat closing sensation.    Lovastatin Other (See Comments)     myalgia    Cefuroxime Itching and Rash    Latex, natural rubber Itching and Rash     Pt reported         Current Outpatient Medications   Medication Sig    acetaminophen (TYLENOL) 650 MG TbSR Take 1 tablet (650 mg total) by mouth every 6 to 8 hours as needed (pain).    amLODIPine (NORVASC) 10 MG tablet Take 1 tablet (10 mg total) by mouth once daily.    [START ON 2/2/2022] aspirin (ECOTRIN) 81 MG EC tablet Take 1 tablet (81 mg total) by mouth 2 (two) times daily.    cetirizine (ZYRTEC) 10 MG tablet Take 10 mg by mouth every evening.     econazole nitrate 1 % cream Use bid    ergocalciferol, vitamin D2, (VITAMIN D ORAL) Take 1 tablet by mouth once daily.    estradioL (ESTRACE) 0.01 % (0.1 mg/gram) vaginal cream PLACE 1 GRAM VAGINALLY EVERY DAY    FINACEA 15 % gel AAA face qhs then moisturize    fluticasone propionate (FLONASE) 50 mcg/actuation nasal spray SPRAY ONCE IN EACH NOSTRIL DAILY    hydroCHLOROthiazide (HYDRODIURIL) 25 MG tablet Take 1 tablet (25 mg total) by mouth once daily.    ketoconazole (NIZORAL) 2 % cream Apply topically once daily.    LUTEIN ORAL Take by mouth.    metFORMIN (GLUCOPHAGE-XR) 500 MG ER 24hr tablet Take 500 mg by mouth every evening.)    montelukast (SINGULAIR) 10 mg tablet Take 1  tablet (10 mg total) by mouth every evening. (Patient taking differently: Take 10 mg by mouth as needed.)    mv,Ca,min/iron/FA/guarana/caff (ONE-A-DAY WOMEN'S ACTIVE ORAL) Take by mouth.    nystatin (MYCOSTATIN) cream Apply topically 2 (two) times daily. (Patient taking differently: Apply topically as needed.)    pantoprazole (PROTONIX) 40 MG tablet TAKE 1 TABLET BY MOUTH EVERY DAY    potassium chloride SA (K-DUR,KLOR-CON) 10 MEQ tablet Take 1 tablet (10 mEq total) by mouth 2 (two) times daily. (Patient taking differently: Take 10 mEq by mouth as needed.)    promethazine-dextromethorphan (PROMETHAZINE-DM) 6.25-15 mg/5 mL Syrp Take 5 mLs by mouth every 6 (six) hours as needed.    RETIN-A 0.05 % cream Apply topically nightly as needed.    rosuvastatin (CRESTOR) 5 MG tablet Take 1 tablet (5 mg total) by mouth every other day.    spironolactone (ALDACTONE) 50 MG tablet Take 1 tablet (50 mg total) by mouth once daily.    SYNTHROID 125 mcg tablet TAKE 1 TABLET BY MOUTH ONCE DAILY    triamcinolone acetonide 0.1% (KENALOG) 0.1 % cream Apply topically 2 (two) times daily.    azelastine (ASTELIN) 137 mcg (0.1 %) nasal spray 1 spray (137 mcg total) by Nasal route daily as needed for Rhinitis.    celecoxib (CELEBREX) 200 MG capsule Take 1 capsule (200 mg total) by mouth once daily. (Patient not taking: Reported on 1/31/2022)    diphenhydrAMINE (BANOPHEN) 25 mg capsule Take 1 each (25 mg total) by mouth every 6 (six) hours as needed for Itching or Allergies.    docusate sodium (COLACE) 100 MG capsule Take 1 capsule (100 mg total) by mouth 2 (two) times daily as needed for Constipation. (Patient not taking: Reported on 1/31/2022)    fluconazole (DIFLUCAN) 100 MG tablet Take 1 tablet (100 mg total) by mouth once daily. (Patient not taking: Reported on 1/31/2022)    methocarbamoL (ROBAXIN) 750 MG Tab Take 1 tablet (750 mg total) by mouth 3 (three) times daily as needed (muscle spasms). (Patient not taking: Reported  "on 1/31/2022)    neomycin-polymyxin-hydrocortisone (CORTISPORIN) 3.5-10,000-1 mg/mL-unit/mL-% otic suspension Use bid for nails (Patient not taking: Reported on 1/31/2022)    oxyCODONE (ROXICODONE) 5 MG immediate release tablet Take 1-2 tablets every 4-6 hours as needed pain (Patient not taking: Reported on 1/31/2022)     No current facility-administered medications for this visit.     Facility-Administered Medications Ordered in Other Visits   Medication    ropivacaine 0.2% Nimbus PainPRO Pump infusion 500 ML       Social history:  Carolina Knight  reports that she has never smoked. She has never used smokeless tobacco. She reports that she does not drink alcohol and does not use drugs.      Objective:     /88   Pulse 84   Ht 5' 6" (1.676 m)   Wt 111.5 kg (245 lb 14.8 oz)    BMI 39.69 kg/m²    Physical Exam  Constitutional:       Appearance: Normal appearance. She is well-developed and well-nourished.   Neck:      Vascular: No JVD.   Cardiovascular:      Rate and Rhythm: Normal rate and regular rhythm.      Pulses:           Radial pulses are 2+ on the right side and 2+ on the left side.        Dorsalis pedis pulses are 2+ on the right side and 2+ on the left side.      Heart sounds: No murmur heard.      Pulmonary:      Breath sounds: No decreased breath sounds, wheezing or rales.   Chest:      Chest wall: There is no dullness to percussion.   Abdominal:      Palpations: Abdomen is soft. There is hepatomegaly. There is no splenomegaly.      Tenderness: There is no abdominal tenderness.   Musculoskeletal:      Right lower leg: No edema.      Left lower leg: No edema.   Skin:     General: Skin is warm.      Findings: No bruising.      Nails: There is no clubbing.   Neurological:      Mental Status: She is alert and oriented to person, place, and time.   Psychiatric:         Speech: Speech normal.         Behavior: Behavior normal.         Cognition and Memory: Cognition and memory normal. "             Lab Results   Component Value Date    CHOL 164 01/25/2022    HDL 58 01/25/2022    LDLCALC 87.8 01/25/2022    TRIG 91 01/25/2022    CHOLHDL 35.4 01/25/2022     Lab Results   Component Value Date    GLU 97 01/25/2022    CREATININE 0.8 01/25/2022    BUN 18 01/25/2022     01/25/2022    K 3.7 01/25/2022     01/25/2022    CO2 27 01/25/2022     Lab Results   Component Value Date    ALT 15 01/25/2022    AST 8 (L) 01/25/2022    ALKPHOS 59 01/25/2022    BILITOT 0.6 01/25/2022       ECG today reviewed by me. It reveals sinus rhythm with no ST or T abnormality.       Assessment and Plan:       ICD-10-CM ICD-9-CM   1. Essential hypertension  I10 401.9   2. Mixed hyperlipidemia  E78.2 272.2   3. Type 2 diabetes mellitus with hyperglycemia, without long-term current use of insulin  E11.65 250.00     790.29        Hypertension stable.  Continue same meds.  LDL (c) should ideally be lower.  Intolerant to atorvastatin.  Consider increasing the dose or frequency of rosuvastatin.  Cleared for surgery and anesthesia.    Orders placed during this encounter:     Essential hypertension  -     EKG 12-lead    Mixed hyperlipidemia  -     EKG 12-lead    Type 2 diabetes mellitus with hyperglycemia, without long-term current use of insulin  -     EKG 12-lead    Other orders  -     potassium chloride SA (K-DUR,KLOR-CON) 10 MEQ tablet; Take 1 tablet (10 mEq total) by mouth 2 (two) times daily.  Dispense: 180 tablet; Refill: 3         Follow up if symptoms worsen or fail to improve.

## 2022-02-01 ENCOUNTER — HOSPITAL ENCOUNTER (OUTPATIENT)
Facility: HOSPITAL | Age: 67
Discharge: HOME | End: 2022-02-02
Attending: ORTHOPAEDIC SURGERY | Admitting: ORTHOPAEDIC SURGERY
Payer: MEDICARE

## 2022-02-01 ENCOUNTER — ANESTHESIA (OUTPATIENT)
Dept: SURGERY | Facility: HOSPITAL | Age: 67
End: 2022-02-01
Payer: MEDICARE

## 2022-02-01 DIAGNOSIS — M17.12 PRIMARY OSTEOARTHRITIS OF LEFT KNEE: ICD-10-CM

## 2022-02-01 LAB
POCT GLUCOSE: 159 MG/DL (ref 70–110)
POCT GLUCOSE: 179 MG/DL (ref 70–110)

## 2022-02-01 PROCEDURE — 63600175 PHARM REV CODE 636 W HCPCS: Performed by: SURGERY

## 2022-02-01 PROCEDURE — D9220A PRA ANESTHESIA: Mod: CRNA,,, | Performed by: NURSE ANESTHETIST, CERTIFIED REGISTERED

## 2022-02-01 PROCEDURE — 27447 TOTAL KNEE ARTHROPLASTY: CPT | Mod: LT,GC,, | Performed by: ORTHOPAEDIC SURGERY

## 2022-02-01 PROCEDURE — A4216 STERILE WATER/SALINE, 10 ML: HCPCS | Performed by: NURSE ANESTHETIST, CERTIFIED REGISTERED

## 2022-02-01 PROCEDURE — 99900035 HC TECH TIME PER 15 MIN (STAT)

## 2022-02-01 PROCEDURE — 76942 PR U/S GUIDANCE FOR NEEDLE GUIDANCE: ICD-10-PCS | Mod: 26,,, | Performed by: ANESTHESIOLOGY

## 2022-02-01 PROCEDURE — 63600175 PHARM REV CODE 636 W HCPCS: Performed by: NURSE PRACTITIONER

## 2022-02-01 PROCEDURE — 20985 PR CPTR-ASST SURGICAL NAVIGATION IMAGE-LESS: ICD-10-PCS | Mod: GC,,, | Performed by: ORTHOPAEDIC SURGERY

## 2022-02-01 PROCEDURE — 97161 PT EVAL LOW COMPLEX 20 MIN: CPT

## 2022-02-01 PROCEDURE — D9220A PRA ANESTHESIA: Mod: ANES,,, | Performed by: ANESTHESIOLOGY

## 2022-02-01 PROCEDURE — D9220A PRA ANESTHESIA: ICD-10-PCS | Mod: ANES,,, | Performed by: ANESTHESIOLOGY

## 2022-02-01 PROCEDURE — C1776 JOINT DEVICE (IMPLANTABLE): HCPCS | Performed by: ORTHOPAEDIC SURGERY

## 2022-02-01 PROCEDURE — 25000003 PHARM REV CODE 250: Performed by: NURSE PRACTITIONER

## 2022-02-01 PROCEDURE — 64448 NJX AA&/STRD FEM NRV NFS IMG: CPT | Mod: XP,LT | Performed by: SURGERY

## 2022-02-01 PROCEDURE — 36000710: Performed by: ORTHOPAEDIC SURGERY

## 2022-02-01 PROCEDURE — 36000711: Performed by: ORTHOPAEDIC SURGERY

## 2022-02-01 PROCEDURE — C1713 ANCHOR/SCREW BN/BN,TIS/BN: HCPCS | Performed by: ORTHOPAEDIC SURGERY

## 2022-02-01 PROCEDURE — 64448 PR NERVE BLOCK INJ, ANES/STEROID, FEMORAL, CONT INFUSION, INCL IMAG GUIDANCE: ICD-10-PCS | Mod: 59,LT,, | Performed by: ANESTHESIOLOGY

## 2022-02-01 PROCEDURE — 71000033 HC RECOVERY, INTIAL HOUR: Performed by: ORTHOPAEDIC SURGERY

## 2022-02-01 PROCEDURE — 27100025 HC TUBING, SET FLUID WARMER: Performed by: NURSE ANESTHETIST, CERTIFIED REGISTERED

## 2022-02-01 PROCEDURE — 94761 N-INVAS EAR/PLS OXIMETRY MLT: CPT

## 2022-02-01 PROCEDURE — 27000190 HC CPAP FULL FACE MASK W/VALVE

## 2022-02-01 PROCEDURE — C1751 CATH, INF, PER/CENT/MIDLINE: HCPCS | Performed by: SURGERY

## 2022-02-01 PROCEDURE — 63600175 PHARM REV CODE 636 W HCPCS: Performed by: ANESTHESIOLOGY

## 2022-02-01 PROCEDURE — 63600175 PHARM REV CODE 636 W HCPCS: Performed by: NURSE ANESTHETIST, CERTIFIED REGISTERED

## 2022-02-01 PROCEDURE — 94660 CPAP INITIATION&MGMT: CPT

## 2022-02-01 PROCEDURE — 71000039 HC RECOVERY, EACH ADD'L HOUR: Performed by: ORTHOPAEDIC SURGERY

## 2022-02-01 PROCEDURE — 37000009 HC ANESTHESIA EA ADD 15 MINS: Performed by: ORTHOPAEDIC SURGERY

## 2022-02-01 PROCEDURE — 64448 NJX AA&/STRD FEM NRV NFS IMG: CPT | Mod: 59,LT,, | Performed by: ANESTHESIOLOGY

## 2022-02-01 PROCEDURE — 37000008 HC ANESTHESIA 1ST 15 MINUTES: Performed by: ORTHOPAEDIC SURGERY

## 2022-02-01 PROCEDURE — D9220A PRA ANESTHESIA: ICD-10-PCS | Mod: CRNA,,, | Performed by: NURSE ANESTHETIST, CERTIFIED REGISTERED

## 2022-02-01 PROCEDURE — 97535 SELF CARE MNGMENT TRAINING: CPT

## 2022-02-01 PROCEDURE — 25000003 PHARM REV CODE 250: Performed by: ORTHOPAEDIC SURGERY

## 2022-02-01 PROCEDURE — 97165 OT EVAL LOW COMPLEX 30 MIN: CPT

## 2022-02-01 PROCEDURE — 27447 PR TOTAL KNEE ARTHROPLASTY: ICD-10-PCS | Mod: LT,GC,, | Performed by: ORTHOPAEDIC SURGERY

## 2022-02-01 PROCEDURE — 20985 CPTR-ASST DIR MS PX: CPT | Mod: GC,,, | Performed by: ORTHOPAEDIC SURGERY

## 2022-02-01 PROCEDURE — 25000003 PHARM REV CODE 250: Performed by: NURSE ANESTHETIST, CERTIFIED REGISTERED

## 2022-02-01 PROCEDURE — 97116 GAIT TRAINING THERAPY: CPT

## 2022-02-01 PROCEDURE — 27201423 OPTIME MED/SURG SUP & DEVICES STERILE SUPPLY: Performed by: ORTHOPAEDIC SURGERY

## 2022-02-01 PROCEDURE — 94799 UNLISTED PULMONARY SVC/PX: CPT

## 2022-02-01 PROCEDURE — 76942 ECHO GUIDE FOR BIOPSY: CPT | Mod: 26,,, | Performed by: ANESTHESIOLOGY

## 2022-02-01 DEVICE — PATELLA TRIATHLON 31X9 SYMTRC: Type: IMPLANTABLE DEVICE | Site: KNEE | Status: FUNCTIONAL

## 2022-02-01 DEVICE — CEMENT BONE SMPLX HV GENTMYCN: Type: IMPLANTABLE DEVICE | Site: KNEE | Status: FUNCTIONAL

## 2022-02-01 DEVICE — IMPLANTABLE DEVICE: Type: IMPLANTABLE DEVICE | Site: KNEE | Status: FUNCTIONAL

## 2022-02-01 DEVICE — PRIMARY TIBIAL BASE 5.: Type: IMPLANTABLE DEVICE | Site: KNEE | Status: FUNCTIONAL

## 2022-02-01 DEVICE — FEMORAL CRUC RTN CEM SZ 4 LEFT: Type: IMPLANTABLE DEVICE | Site: KNEE | Status: FUNCTIONAL

## 2022-02-01 RX ORDER — SODIUM CHLORIDE 0.9 % (FLUSH) 0.9 %
10 SYRINGE (ML) INJECTION
Status: DISCONTINUED | OUTPATIENT
Start: 2022-02-01 | End: 2022-02-01 | Stop reason: HOSPADM

## 2022-02-01 RX ORDER — AMOXICILLIN 250 MG
1 CAPSULE ORAL 2 TIMES DAILY
Status: DISCONTINUED | OUTPATIENT
Start: 2022-02-01 | End: 2022-02-02 | Stop reason: HOSPADM

## 2022-02-01 RX ORDER — DEXAMETHASONE SODIUM PHOSPHATE 4 MG/ML
INJECTION, SOLUTION INTRA-ARTICULAR; INTRALESIONAL; INTRAMUSCULAR; INTRAVENOUS; SOFT TISSUE
Status: DISCONTINUED | OUTPATIENT
Start: 2022-02-01 | End: 2022-02-02

## 2022-02-01 RX ORDER — ACETAMINOPHEN 500 MG
1000 TABLET ORAL
Status: COMPLETED | OUTPATIENT
Start: 2022-02-01 | End: 2022-02-01

## 2022-02-01 RX ORDER — CELECOXIB 200 MG/1
200 CAPSULE ORAL DAILY
Status: DISCONTINUED | OUTPATIENT
Start: 2022-02-02 | End: 2022-02-02 | Stop reason: HOSPADM

## 2022-02-01 RX ORDER — SODIUM CHLORIDE 9 MG/ML
INJECTION, SOLUTION INTRAVENOUS
Status: COMPLETED | OUTPATIENT
Start: 2022-02-01 | End: 2022-02-01

## 2022-02-01 RX ORDER — MORPHINE SULFATE 2 MG/ML
2 INJECTION, SOLUTION INTRAMUSCULAR; INTRAVENOUS
Status: DISCONTINUED | OUTPATIENT
Start: 2022-02-01 | End: 2022-02-02 | Stop reason: HOSPADM

## 2022-02-01 RX ORDER — IBUPROFEN 200 MG
16 TABLET ORAL
Status: DISCONTINUED | OUTPATIENT
Start: 2022-02-01 | End: 2022-02-02 | Stop reason: HOSPADM

## 2022-02-01 RX ORDER — HYDROCHLOROTHIAZIDE 25 MG/1
25 TABLET ORAL DAILY
Status: DISCONTINUED | OUTPATIENT
Start: 2022-02-02 | End: 2022-02-02 | Stop reason: HOSPADM

## 2022-02-01 RX ORDER — TRANEXAMIC ACID 100 MG/ML
INJECTION, SOLUTION INTRAVENOUS
Status: DISCONTINUED | OUTPATIENT
Start: 2022-02-01 | End: 2022-02-02

## 2022-02-01 RX ORDER — OXYCODONE HYDROCHLORIDE 5 MG/1
5 TABLET ORAL
Status: DISCONTINUED | OUTPATIENT
Start: 2022-02-01 | End: 2022-02-02 | Stop reason: HOSPADM

## 2022-02-01 RX ORDER — KETAMINE HCL IN 0.9 % NACL 50 MG/5 ML
SYRINGE (ML) INTRAVENOUS
Status: DISCONTINUED | OUTPATIENT
Start: 2022-02-01 | End: 2022-02-02

## 2022-02-01 RX ORDER — MUPIROCIN 20 MG/G
1 OINTMENT TOPICAL
Status: COMPLETED | OUTPATIENT
Start: 2022-02-01 | End: 2022-02-01

## 2022-02-01 RX ORDER — IBUPROFEN 200 MG
24 TABLET ORAL
Status: DISCONTINUED | OUTPATIENT
Start: 2022-02-01 | End: 2022-02-02 | Stop reason: HOSPADM

## 2022-02-01 RX ORDER — FENTANYL CITRATE 50 UG/ML
25 INJECTION, SOLUTION INTRAMUSCULAR; INTRAVENOUS EVERY 5 MIN PRN
Status: DISCONTINUED | OUTPATIENT
Start: 2022-02-01 | End: 2022-02-01 | Stop reason: HOSPADM

## 2022-02-01 RX ORDER — LEVOTHYROXINE SODIUM 125 UG/1
125 TABLET ORAL DAILY
Status: DISCONTINUED | OUTPATIENT
Start: 2022-02-02 | End: 2022-02-02 | Stop reason: HOSPADM

## 2022-02-01 RX ORDER — MIDAZOLAM HYDROCHLORIDE 1 MG/ML
4 INJECTION INTRAMUSCULAR; INTRAVENOUS
Status: DISCONTINUED | OUTPATIENT
Start: 2022-02-01 | End: 2022-02-01 | Stop reason: HOSPADM

## 2022-02-01 RX ORDER — TALC
6 POWDER (GRAM) TOPICAL NIGHTLY PRN
Status: DISCONTINUED | OUTPATIENT
Start: 2022-02-01 | End: 2022-02-01 | Stop reason: HOSPADM

## 2022-02-01 RX ORDER — MUPIROCIN 20 MG/G
1 OINTMENT TOPICAL 2 TIMES DAILY
Status: DISCONTINUED | OUTPATIENT
Start: 2022-02-01 | End: 2022-02-02 | Stop reason: HOSPADM

## 2022-02-01 RX ORDER — FAMOTIDINE 20 MG/1
20 TABLET, FILM COATED ORAL 2 TIMES DAILY
Status: DISCONTINUED | OUTPATIENT
Start: 2022-02-01 | End: 2022-02-02 | Stop reason: HOSPADM

## 2022-02-01 RX ORDER — LIDOCAINE HYDROCHLORIDE 10 MG/ML
1 INJECTION, SOLUTION EPIDURAL; INFILTRATION; INTRACAUDAL; PERINEURAL
Status: DISCONTINUED | OUTPATIENT
Start: 2022-02-01 | End: 2022-02-01 | Stop reason: HOSPADM

## 2022-02-01 RX ORDER — ROPIVACAINE HYDROCHLORIDE 2 MG/ML
8 INJECTION, SOLUTION EPIDURAL; INFILTRATION; PERINEURAL CONTINUOUS
Status: DISCONTINUED | OUTPATIENT
Start: 2022-02-01 | End: 2022-02-02 | Stop reason: HOSPADM

## 2022-02-01 RX ORDER — PROPOFOL 10 MG/ML
VIAL (ML) INTRAVENOUS CONTINUOUS PRN
Status: DISCONTINUED | OUTPATIENT
Start: 2022-02-01 | End: 2022-02-02

## 2022-02-01 RX ORDER — BISACODYL 10 MG
10 SUPPOSITORY, RECTAL RECTAL EVERY 12 HOURS PRN
Status: DISCONTINUED | OUTPATIENT
Start: 2022-02-01 | End: 2022-02-02 | Stop reason: HOSPADM

## 2022-02-01 RX ORDER — PHENYLEPHRINE HCL IN 0.9% NACL 1 MG/10 ML
SYRINGE (ML) INTRAVENOUS
Status: DISCONTINUED | OUTPATIENT
Start: 2022-02-01 | End: 2022-02-02

## 2022-02-01 RX ORDER — CEFAZOLIN SODIUM 1 G/3ML
INJECTION, POWDER, FOR SOLUTION INTRAMUSCULAR; INTRAVENOUS
Status: DISCONTINUED | OUTPATIENT
Start: 2022-02-01 | End: 2022-02-02

## 2022-02-01 RX ORDER — GLUCAGON 1 MG
1 KIT INJECTION
Status: DISCONTINUED | OUTPATIENT
Start: 2022-02-01 | End: 2022-02-02 | Stop reason: HOSPADM

## 2022-02-01 RX ORDER — INSULIN ASPART 100 [IU]/ML
0-5 INJECTION, SOLUTION INTRAVENOUS; SUBCUTANEOUS
Status: DISCONTINUED | OUTPATIENT
Start: 2022-02-01 | End: 2022-02-02 | Stop reason: HOSPADM

## 2022-02-01 RX ORDER — FENTANYL CITRATE 50 UG/ML
100 INJECTION, SOLUTION INTRAMUSCULAR; INTRAVENOUS
Status: DISCONTINUED | OUTPATIENT
Start: 2022-02-01 | End: 2022-02-01 | Stop reason: HOSPADM

## 2022-02-01 RX ORDER — ONDANSETRON 2 MG/ML
4 INJECTION INTRAMUSCULAR; INTRAVENOUS EVERY 8 HOURS PRN
Status: DISCONTINUED | OUTPATIENT
Start: 2022-02-01 | End: 2022-02-02 | Stop reason: HOSPADM

## 2022-02-01 RX ORDER — ROPIVACAINE HYDROCHLORIDE 5 MG/ML
INJECTION, SOLUTION EPIDURAL; INFILTRATION; PERINEURAL
Status: COMPLETED | OUTPATIENT
Start: 2022-02-01 | End: 2022-02-01

## 2022-02-01 RX ORDER — SPIRONOLACTONE 25 MG/1
50 TABLET ORAL DAILY
Status: DISCONTINUED | OUTPATIENT
Start: 2022-02-02 | End: 2022-02-02 | Stop reason: HOSPADM

## 2022-02-01 RX ORDER — ROPIVACAINE HYDROCHLORIDE 2 MG/ML
INJECTION, SOLUTION EPIDURAL; INFILTRATION; PERINEURAL CONTINUOUS
Status: DISCONTINUED | OUTPATIENT
Start: 2022-02-01 | End: 2022-02-02 | Stop reason: HOSPADM

## 2022-02-01 RX ORDER — ACETAMINOPHEN 500 MG
1000 TABLET ORAL EVERY 6 HOURS
Status: DISCONTINUED | OUTPATIENT
Start: 2022-02-01 | End: 2022-02-02 | Stop reason: HOSPADM

## 2022-02-01 RX ORDER — PROCHLORPERAZINE EDISYLATE 5 MG/ML
5 INJECTION INTRAMUSCULAR; INTRAVENOUS EVERY 6 HOURS PRN
Status: DISCONTINUED | OUTPATIENT
Start: 2022-02-01 | End: 2022-02-02 | Stop reason: HOSPADM

## 2022-02-01 RX ORDER — PREGABALIN 75 MG/1
75 CAPSULE ORAL NIGHTLY
Status: DISCONTINUED | OUTPATIENT
Start: 2022-02-01 | End: 2022-02-02 | Stop reason: HOSPADM

## 2022-02-01 RX ORDER — LIDOCAINE HYDROCHLORIDE 20 MG/ML
INJECTION INTRAVENOUS
Status: DISCONTINUED | OUTPATIENT
Start: 2022-02-01 | End: 2022-02-02

## 2022-02-01 RX ORDER — CELECOXIB 200 MG/1
400 CAPSULE ORAL
Status: COMPLETED | OUTPATIENT
Start: 2022-02-01 | End: 2022-02-01

## 2022-02-01 RX ORDER — NALOXONE HCL 0.4 MG/ML
0.02 VIAL (ML) INJECTION
Status: DISCONTINUED | OUTPATIENT
Start: 2022-02-01 | End: 2022-02-02 | Stop reason: HOSPADM

## 2022-02-01 RX ORDER — CEFAZOLIN SODIUM/WATER 2 G/20 ML
2 SYRINGE (ML) INTRAVENOUS
Status: DISCONTINUED | OUTPATIENT
Start: 2022-02-01 | End: 2022-02-01 | Stop reason: HOSPADM

## 2022-02-01 RX ORDER — CEFAZOLIN SODIUM/D5W 2 G/100 ML
2 PLASTIC BAG, INJECTION (ML) INTRAVENOUS
Status: COMPLETED | OUTPATIENT
Start: 2022-02-01 | End: 2022-02-02

## 2022-02-01 RX ORDER — ASPIRIN 81 MG/1
81 TABLET ORAL 2 TIMES DAILY
Status: DISCONTINUED | OUTPATIENT
Start: 2022-02-01 | End: 2022-02-02 | Stop reason: HOSPADM

## 2022-02-01 RX ORDER — AMLODIPINE BESYLATE 10 MG/1
10 TABLET ORAL DAILY
Status: DISCONTINUED | OUTPATIENT
Start: 2022-02-02 | End: 2022-02-02 | Stop reason: HOSPADM

## 2022-02-01 RX ORDER — CARBOXYMETHYLCELLULOSE SODIUM 5 MG/ML
SOLUTION/ DROPS OPHTHALMIC
Status: DISCONTINUED | OUTPATIENT
Start: 2022-02-01 | End: 2022-02-02

## 2022-02-01 RX ORDER — CEFAZOLIN SODIUM/WATER 2 G/20 ML
2 SYRINGE (ML) INTRAVENOUS
Status: DISCONTINUED | OUTPATIENT
Start: 2022-02-01 | End: 2022-02-01

## 2022-02-01 RX ORDER — PREGABALIN 75 MG/1
75 CAPSULE ORAL
Status: COMPLETED | OUTPATIENT
Start: 2022-02-01 | End: 2022-02-01

## 2022-02-01 RX ORDER — POLYETHYLENE GLYCOL 3350 17 G/17G
17 POWDER, FOR SOLUTION ORAL DAILY
Status: DISCONTINUED | OUTPATIENT
Start: 2022-02-02 | End: 2022-02-02 | Stop reason: HOSPADM

## 2022-02-01 RX ORDER — OXYCODONE HYDROCHLORIDE 10 MG/1
10 TABLET ORAL
Status: DISCONTINUED | OUTPATIENT
Start: 2022-02-01 | End: 2022-02-02 | Stop reason: HOSPADM

## 2022-02-01 RX ORDER — SODIUM CHLORIDE 9 MG/ML
INJECTION, SOLUTION INTRAVENOUS CONTINUOUS
Status: DISCONTINUED | OUTPATIENT
Start: 2022-02-01 | End: 2022-02-02 | Stop reason: HOSPADM

## 2022-02-01 RX ORDER — TRANEXAMIC ACID 100 MG/ML
1000 INJECTION, SOLUTION INTRAVENOUS
Status: DISCONTINUED | OUTPATIENT
Start: 2022-02-01 | End: 2022-02-01 | Stop reason: HOSPADM

## 2022-02-01 RX ORDER — METHOCARBAMOL 750 MG/1
750 TABLET, FILM COATED ORAL 3 TIMES DAILY
Status: DISCONTINUED | OUTPATIENT
Start: 2022-02-01 | End: 2022-02-02 | Stop reason: HOSPADM

## 2022-02-01 RX ORDER — METFORMIN HYDROCHLORIDE 500 MG/1
500 TABLET ORAL NIGHTLY
Status: DISCONTINUED | OUTPATIENT
Start: 2022-02-01 | End: 2022-02-02 | Stop reason: HOSPADM

## 2022-02-01 RX ORDER — FAMOTIDINE 10 MG/ML
INJECTION INTRAVENOUS
Status: DISCONTINUED | OUTPATIENT
Start: 2022-02-01 | End: 2022-02-02

## 2022-02-01 RX ORDER — PANTOPRAZOLE SODIUM 40 MG/1
40 TABLET, DELAYED RELEASE ORAL DAILY
Status: DISCONTINUED | OUTPATIENT
Start: 2022-02-02 | End: 2022-02-02 | Stop reason: HOSPADM

## 2022-02-01 RX ADMIN — METHOCARBAMOL 750 MG: 750 TABLET ORAL at 09:02

## 2022-02-01 RX ADMIN — Medication 50 MCG: at 01:02

## 2022-02-01 RX ADMIN — TRANEXAMIC ACID 1000 MG: 100 INJECTION, SOLUTION INTRAVENOUS at 12:02

## 2022-02-01 RX ADMIN — PREGABALIN 75 MG: 75 CAPSULE ORAL at 09:02

## 2022-02-01 RX ADMIN — CELECOXIB 400 MG: 200 CAPSULE ORAL at 09:02

## 2022-02-01 RX ADMIN — FAMOTIDINE 20 MG: 10 INJECTION, SOLUTION INTRAVENOUS at 12:02

## 2022-02-01 RX ADMIN — DEXAMETHASONE SODIUM PHOSPHATE 8 MG: 4 INJECTION INTRA-ARTICULAR; INTRALESIONAL; INTRAMUSCULAR; INTRAVENOUS; SOFT TISSUE at 12:02

## 2022-02-01 RX ADMIN — ROPIVACAINE HYDROCHLORIDE 7.5 ML: 5 INJECTION, SOLUTION EPIDURAL; INFILTRATION; PERINEURAL at 10:02

## 2022-02-01 RX ADMIN — SODIUM CHLORIDE: 9 INJECTION, SOLUTION INTRAVENOUS at 09:02

## 2022-02-01 RX ADMIN — FAMOTIDINE 20 MG: 20 TABLET, FILM COATED ORAL at 09:02

## 2022-02-01 RX ADMIN — Medication 50 MCG: at 12:02

## 2022-02-01 RX ADMIN — Medication 5 MG: at 01:02

## 2022-02-01 RX ADMIN — MIDAZOLAM 2 MG: 1 INJECTION INTRAMUSCULAR; INTRAVENOUS at 10:02

## 2022-02-01 RX ADMIN — CARBOXYMETHYLCELLULOSE SODIUM 2 DROP: 5 SOLUTION/ DROPS OPHTHALMIC at 12:02

## 2022-02-01 RX ADMIN — ASPIRIN 81 MG: 81 TABLET, COATED ORAL at 09:02

## 2022-02-01 RX ADMIN — SODIUM CHLORIDE: 0.9 INJECTION, SOLUTION INTRAVENOUS at 09:02

## 2022-02-01 RX ADMIN — Medication: at 03:02

## 2022-02-01 RX ADMIN — Medication 2 G: at 06:02

## 2022-02-01 RX ADMIN — PROPOFOL 75 MCG/KG/MIN: 10 INJECTION, EMULSION INTRAVENOUS at 12:02

## 2022-02-01 RX ADMIN — CEFAZOLIN 2 G: 330 INJECTION, POWDER, FOR SOLUTION INTRAMUSCULAR; INTRAVENOUS at 12:02

## 2022-02-01 RX ADMIN — TRANEXAMIC ACID 1000 MG: 100 INJECTION, SOLUTION INTRAVENOUS at 01:02

## 2022-02-01 RX ADMIN — Medication 25 MCG: at 12:02

## 2022-02-01 RX ADMIN — MUPIROCIN 1 G: 20 OINTMENT TOPICAL at 09:02

## 2022-02-01 RX ADMIN — LIDOCAINE HYDROCHLORIDE 60 MG: 20 INJECTION, SOLUTION INTRAVENOUS at 12:02

## 2022-02-01 RX ADMIN — SODIUM CHLORIDE: 0.9 INJECTION, SOLUTION INTRAVENOUS at 03:02

## 2022-02-01 RX ADMIN — OXYCODONE 5 MG: 5 TABLET ORAL at 06:02

## 2022-02-01 RX ADMIN — FENTANYL CITRATE 100 MCG: 50 INJECTION INTRAMUSCULAR; INTRAVENOUS at 10:02

## 2022-02-01 RX ADMIN — DOCUSATE SODIUM 50 MG AND SENNOSIDES 8.6 MG 1 TABLET: 8.6; 5 TABLET, FILM COATED ORAL at 09:02

## 2022-02-01 RX ADMIN — ACETAMINOPHEN 1000 MG: 500 TABLET ORAL at 06:02

## 2022-02-01 RX ADMIN — OXYCODONE 5 MG: 5 TABLET ORAL at 03:02

## 2022-02-01 RX ADMIN — MEPIVACAINE HYDROCHLORIDE 3.2 ML: 15 INJECTION, SOLUTION EPIDURAL; INFILTRATION at 12:02

## 2022-02-01 RX ADMIN — ACETAMINOPHEN 1000 MG: 500 TABLET ORAL at 09:02

## 2022-02-01 RX ADMIN — Medication 20 MG: at 11:02

## 2022-02-01 RX ADMIN — VANCOMYCIN HYDROCHLORIDE 1500 MG: 1.5 INJECTION, POWDER, LYOPHILIZED, FOR SOLUTION INTRAVENOUS at 09:02

## 2022-02-01 RX ADMIN — SODIUM CHLORIDE 0.03 MCG/KG/HR: 9 INJECTION INTRAMUSCULAR; INTRAVENOUS; SUBCUTANEOUS at 12:02

## 2022-02-01 NOTE — ANESTHESIA PROCEDURE NOTES
Adductor Canal Catheter    Patient location during procedure: pre-op   Block not for primary anesthetic.  Reason for block: at surgeon's request and post-op pain management   Post-op Pain Location: left knee pain  Start time: 2/1/2022 10:35 AM  Timeout: 2/1/2022 10:34 AM   End time: 2/1/2022 10:47 AM    Staffing  Authorizing Provider: Isak Graves MD  Performing Provider: Arturo Osorio MD    Preanesthetic Checklist  Completed: patient identified, IV checked, site marked, risks and benefits discussed, surgical consent, monitors and equipment checked, pre-op evaluation and timeout performed  Peripheral Block  Patient position: supine  Prep: ChloraPrep and site prepped and draped  Patient monitoring: heart rate, cardiac monitor, continuous pulse ox, continuous capnometry and frequent blood pressure checks  Block type: adductor canal  Laterality: left  Injection technique: continuous  Needle  Needle type: Tuohy   Needle gauge: 17 G  Needle length: 3.5 in  Needle localization: anatomical landmarks and ultrasound guidance  Catheter type: spring wound  Catheter size: 19 G  Test dose: lidocaine 1.5% with Epi 1-to-200,000 and negative   -ultrasound image captured on disc.  Assessment  Injection assessment: negative aspiration, negative parasthesia and local visualized surrounding nerve  Paresthesia pain: none  Heart rate change: no  Slow fractionated injection: yes  Pain Tolerance: comfortable throughout block and no complaints  Medications:  Medication Administration Time: 2/1/2022 10:42 AM  Medications: ropivacaine (NAROPIN) injection 0.5%, 7.5 mL    Medications:  Bolus administered: 20 mL of 0.25 ropivacaine  Epinephrine added: 3.75 mcg/mL (1/300,000)  Additional Notes  VSS.  DOS[facility] RN monitoring vitals throughout procedure.  Patient tolerated procedure well.  15 cc of 0.25% ropivacaine with epi 1:300k administered for the block.

## 2022-02-01 NOTE — ANESTHESIA POSTPROCEDURE EVALUATION
Anesthesia Post Evaluation    Patient: Carolina Knight    Procedure(s) Performed: Procedure(s) (LRB):  ARTHROPLASTY, KNEE, TOTAL, CARRIE COMPUTER-ASSISTED NAVIGATION (Left)    Final Anesthesia Type: spinal      Patient location during evaluation: PACU  Patient participation: Yes- Able to Participate  Level of consciousness: awake and alert and oriented  Post-procedure vital signs: reviewed and stable  Pain management: adequate  Airway patency: patent    PONV status at discharge: No PONV  Anesthetic complications: no      Cardiovascular status: hemodynamically stable  Respiratory status: nasal cannula  Hydration status: euvolemic  Follow-up not needed.          Vitals Value Taken Time   /67 02/01/22 1556   Temp 36.6 °C (97.8 °F) 02/01/22 1556   Pulse 89 02/01/22 1720   Resp 18 02/01/22 1556   SpO2 95 % 02/01/22 1556         Event Time   Out of Recovery 15:50:00         Pain/Vesna Score: Pain Rating Prior to Med Admin: 8 (2/1/2022  3:08 PM)  Vesna Score: 10 (2/1/2022  2:50 PM)

## 2022-02-01 NOTE — OP NOTE
DATE OF PROCEDURE:  2/1/22.     PREOPERATIVE DIAGNOSIS:  Arthritis, left knee.     POSTOPERATIVE DIAGNOSIS:  Arthritis, left knee.     PROCEDURES PERFORMED:  Robotically-assisted left total knee arthroplasty.     SURGEON:  Emmanuel Garcia M.D.     ASSISTANT:  CATHY Cox MD     ANESTHESIA:  Regional.     COMPLICATIONS:  None.     COUNTS:  Correct.     DISPOSITION:  Recovery Room, stable.     SPECIMENS:  Bone and cartilage.     FINDINGS:  Arthritis.     FLUIDS:  2000 mL.     ESTIMATED BLOOD LOSS:  <50cc     IMPLANTS:  Vanda Triathlon size 4 left  Triathlon cruciate retaining femoral component and a size 5 primary tibial baseplate, 31 mm patella and a size 5, 11 mm   CS tibial insert.     INDICATIONS FOR PROCEDURE:   Carolina Knight  is a 66-year-old female who is   having symptoms of left knee pain.  Physical examination and imaging studies   were consistent with arthritis.Treatment options were explained and it was decided   to proceed with robotically-assisted left total knee arthroplasty.  She was   aware of reasonable treatment options as well as risks and benefits.       PROCEDURE IN DETAIL:  After appropriate consent was obtained, the patient   brought in the Operating Room, anesthesia was administered.  She received   antibiotic prophylaxis.  Cast padding and tourniquet was applied to the proximal  left thigh.  left lower extremity was then prepped and draped in usual sterile   fashion.  The leg villasenor was applied.  Timeout was called.  Limb was elevated   and tourniquet was inflated.  The knee was flexed.  An incision was made from   the tibial tubercle just proximal to the superior pole of the patella.  It was   taken down through the skin and retinacular.  A medial parapatellar arthrotomy   was performed followed by a medial release.  Following this ACL was resected, fat pad   was excised.  The patella was everted.  It was measured and found to be 23 mm,  8 mm of bone removed and the 3 peg holes  were drilled for the 31 mm patella.    Following this, 2 stab incisions were made 4 fingerbreadths below the tibial   tubercle on the medial aspect of the tibial crest.  The 2 guide pins were placed   along with the fixation device through these.  The array was applied and   tightened to the clamp. We checked the security of the array and it was fine. Following this, we placed the femoral pins 1 cm superior and anterior to the MCL insertion.  The check point was placed in between the pins. The guide clamp and array were secured..  Once this was accomplished, the hip center was obtained, the   medial and lateral malleoli were demarcated.  The femoral check point and tibial   check point were placed and the femur and tibia were then registered.    Acceptable registration was obtained.  Osteophytes were removed. We then captured poses in extension,   And approximately 90 degrees of flexion.  Initial alignment was 10 degrees varus and 14  Degrees flexion .  The  initial gaps were   then obtained. The extension gaps were 16 medially and 22 laterally.  The flexion gaps were 15 medially and 16 laterally.  Component   position was adjusted.  We were very satisfied with the component position and   sizing.  We had a size 4 femur and a 5 tibia.  Once this was accomplished, the   robotic arm was brought in and our cuts were made.  The tibia was cut 1st.  We then cut the anterior femur anterior chamfer and posterior femur.  The saw blade was then switched and we made our distal and posterior chamfer cut.  We were very satisfied all the cuts.  Bone fragments were removed.  Lamina  was used to open up posteriorly and we removed any remaining osteophytes and meniscal remnants. The PCL was intact and   robust.  We placed the tibial trial.  We had good coverage with this.  We used   the medial one-third of the tibial tubercle to guide rotation and the trial was pinned in   place.  An 11 mm insert was placed and then the  femoral component was placed.    This was centered on the femur and the knee was brought through a range of   motion.  She was very well balanced in flexion with 19 mm gaps medially and 20  laterally.  In extension, there was an 20 mm gap medially 21 mm gap laterally.    Clinically,there was excellent balance and stability.  Final alignment was 5 degrees flexion and 5 degrees varus. Trial   patellar button was placed.  Patella tracked well.  Therefore, at this point, we   were satisfied with knee range of motion and stability, component position,   sizing and alignment as well as patella tracking.  It should be noted that she  had full extension and he had at least 130 degrees of flexion and there was no   instability at full extension, midflexion, or deep flexion.  Trial components   were removed.  Arrays and femoral and tibial pins were removed. The bone was then prepared for cementing for pulsatile lavage and   drying. Components were then cemented into   place. Tibia followed by femur.  Cement was applied to the tibial keel as   well.  Excess cement was removed.  The tibial insert was firmly seated.  The knee was inspected.  There was no loose body, foreign body, or soft tissue interposition.  It was   then reduced.  Patella button was cemented in place.  Once the cement was dry,   the knee was irrigated with Betadine solution.  Following this, it was irrigated   with pulsatile lavage.  This was periodically done throughout the closure.  The   incision was then closed.  The arthrotomy was closed with #1 Vicryl.  Once the   arthrotomy was closed, the knee was brought through range of motion and was   stable as previously described and the patella tracked well.  The remainder of   the incision was closed with 0 Vicryl, 2-0 Vicryl, Monocryl, and Dermabond.    The stab incisions were closed with Vicryl and Dermabond.  It should be noted   that all check points were removed as well as the femoral and tibial pins.     Sterile dressing was applied.  She was transferred from the Operating Room table   to a stretcher, brought to Recovery Room in stable condition.  She tolerated the   procedure well and there were no known complications. She received a gram of IV tranexamic acid prior to incision and at closure.

## 2022-02-01 NOTE — PT/OT/SLP EVAL
"Occupational Therapy   Evaluation    Name: Carolina Knight  MRN: [id number]  Admitting Diagnosis:  Primary osteoarthritis of left knee Day of Surgery    Recommendations:     Discharge Recommendations: home  Discharge Equipment Recommendations:  none  Barriers to discharge:  None    Assessment:     Carolina Knight is a 66 y.o. female with a medical diagnosis of Primary osteoarthritis of left knee.  She presents s/p left TKA. Patient completed bed mobility at contact guard assistance. She completed toilet transfer/task at contact guard assistance along with grooming task in standing. Patient would benefit from skilled OT needs s/p left TKA to increase independence with ADLs and ADL transfers.  Performance deficits affecting function: weakness,impaired functional mobilty,gait instability,impaired balance,impaired self care skills,decreased lower extremity function,decreased ROM.      Rehab Prognosis: Good; patient would benefit from acute skilled OT services to address these deficits and reach maximum level of function.       Plan:     Patient to be seen daily to address the above listed problems via self-care/home management,therapeutic activities,therapeutic exercises  · Plan of Care Expires: 02/04/22  · Plan of Care Reviewed with: patient,spouse    Subjective     Chief Complaint: "pain in knee"   Patient/Family Comments/goals: "line dancing and walk around block"     Occupational Profile:  Living Environment: Patient lives with their spouse in 2 level home with 1 steps to enter and has a master on 1st floor with walk in shower   Previous level of function: independent with ADLs, previous right TKA  Roles and Routines: enjoys watching TV  Equipment Used at Home:  bedside commode,walker, rolling,shower chair  Assistance upon Discharge:      Pain/Comfort:  · Pain Rating 1: 5/10  · Location - Side 1: Left  · Location - Orientation 1: generalized  · Location 1: knee  · Pain Addressed 1: Pre-medicate for " activity,Reposition,Distraction    Patients cultural, spiritual, Evangelical conflicts given the current situation: no    Objective:     Communicated with: Nursing prior to session.  Patient found supine with perineural catheter,cryotherapy,FCD,peripheral IV (Nimbus) upon OT entry to room.    General Precautions: Standard, fall   Orthopedic Precautions:LLE weight bearing as tolerated   Braces: N/A     Occupational Performance:    Bed Mobility:    · Patient completed Scooting/Bridging with contact guard assistance  · Patient completed Supine to Sit with contact guard assistance    Functional Mobility/Transfers:  · Patient completed Sit <> Stand Transfer with contact guard assistance  with  rolling walker   · Patient completed Toilet Transfer Step Transfer technique with contact guard assistance with  rolling walker   · Patient completed chair transfer with step transfer technique with contact guard assistance with rolling walker   · Functional Mobility: patient ambulated to/from bathroom with use of rolling walker at contact guard assistance     Activities of Daily Living:  · Grooming: contact guard assistance standing at sink to wash hands   · Upper Body Dressing: minimum assistance sitting edge of bed to don gown for back, IV in   · Toileting: contact guard assistance completed on bedside commode placed over toilet     Cognitive/Visual Perceptual:  Cognitive/Psychosocial Skills:     -       Oriented to: Person, Place and Time   -       Follows Commands/attention:Follows multistep  commands    Physical Exam:  Upper Extremity Range of Motion:     -       Right Upper Extremity: WFL  -       Left Upper Extremity: WFL  Upper Extremity Strength:    -       Right Upper Extremity: WFL  -       Left Upper Extremity: WFL    AMPAC 6 Click ADL:  AMPAC Total Score: 19    Treatment & Education:  Patient educated on hand placement for transfers    Patient educated on rolling walker placement for ADLs  Patient educated on [place]  use   Patient educated on role OT and plan of care s/p surgery at [facility], white board updated as needed       Patient left up in chair with all lines intact, call button in reach, RN notified and  present    GOALS:   Multidisciplinary Problems     Occupational Therapy Goals        Problem: Occupational Therapy Goal    Goal Priority Disciplines Outcome Interventions   Occupational Therapy Goal     OT, PT/OT Ongoing, Progressing    Description: Goals to be met by: 22    Patient will increase functional independence with ADLs by performing:    UE Dressing with Modified [place].  LE Dressing with Supervision.  Grooming while standing at sink with Modified [place].  Toileting from toilet with Modified [place] for hygiene and clothing management.   Toilet transfer to toilet with Modified [place].                     History:     Past Medical History:   Diagnosis Date    Decreased mobility and endurance 2017    Decreased strength 2017    Decreased strength of lower extremity 2021    Difficult intubation     Early dry stage nonexudative age-related macular degeneration     Edema 2018    Edema of both legs 2020    Fatty liver 12/15/2014    Hypokalemia 2018    Lymphedema of both lower extremities 2020    Multiple gastric polyps 2012    Polyarthralgia 2015    Sjogren's disease     Sleep apnea        Past Surgical History:   Procedure Laterality Date    BREAST BIOPSY Left 2017    core,Benign breast with fibrocystic changes, including benign usual ductal hyperplasia and adenosis    BREAST BIOPSY Right     core    CERVICAL CONIZATION   W/ LASER       SECTION      x1    CHOLECYSTECTOMY      ESOPHAGOGASTRODUODENOSCOPY N/A 2018    Procedure: ESOPHAGOGASTRODUODENOSCOPY (EGD);  Surgeon: George Henriquez MD;  Location: [address];  Service: Endoscopy;  Laterality: N/A;  possible difficult  intubation - see anesthesia note dated 1/15/13 - 2nd floor/ generated from last EGD, 3 year f/u, Pt due/ Pt requesting Dr. Henriquez -     EYE SURGERY  Est 2014-15    Laser on each eye - tear in white part eye by Dr Nitish Ayala    HYSTERECTOMY  age 42    [place], LSO (uterine fibroids, adhesions, endometriosis)    OOPHORECTOMY  age 42    LSO (endometriosis, cyst)    PERCUTANEOUS CRYOTHERAPY OF PERIPHERAL NERVE USING LIQUID NITROUS OXIDE IN CLOSED NEEDLE DEVICE Right 3/1/2021    Procedure: CRYOTHERAPY, NERVE, PERIPHERAL, PERCUTANEOUS, USING LIQUID NITROUS OXIDE IN CLOSED NEEDLE DEVICE;  Surgeon: CHACE Riley;  Location: [facility];  Service: Pain Management;  Laterality: Right;  RIGHT KNEE IOVERA    THYROIDECTOMY      TOE SURGERY      left    TOTAL THYROIDECTOMY         Time Tracking:     OT Date of Treatment: 02/01/22  OT Start Time: 1558  OT Stop Time: 1620  OT Total Time (min): 22 min    Billable Minutes:Evaluation 10  Self Care/Home Management 12    Deedee Palencia, OT  2/1/2022

## 2022-02-01 NOTE — PLAN OF CARE
Problem: Occupational Therapy Goal  Goal: Occupational Therapy Goal  Description: Goals to be met by: 2/4/22    Patient will increase functional independence with ADLs by performing:    UE Dressing with Modified [place].  LE Dressing with Supervision.  Grooming while standing at sink with Modified [place].  Toileting from toilet with Modified [place] for hygiene and clothing management.   Toilet transfer to toilet with Modified [place].    Outcome: Ongoing, Progressing    OT evaluation with goals established. Patient completed bed mobility at contact guard assistance. She completed toilet transfer/task at contact guard assistance along with grooming task in standing.

## 2022-02-01 NOTE — PROGRESS NOTES
Report given to MEGHAN Arellano. Patient transported via bed with chart, IV fluids/pump, Nimbus pump, chart, and belongings bag.

## 2022-02-01 NOTE — ANESTHESIA PREPROCEDURE EVALUATION
02/01/2022  Carolina Knight is a 66 y.o., female.    Active Ambulatory Problems     Diagnosis Date Noted    Hypothyroidism 12/17/2012    Back pain 05/16/2014    Fatty liver 12/15/2014    Morbid obesity with BMI of 40.0-44.9, adult 09/03/2016    Essential hypertension 09/03/2016    Sjogren's disease 10/13/2016    Right ankle pain 11/11/2016    Gastroesophageal reflux disease 08/16/2017    Decreased ROM of lumbar spine 11/16/2017    QUINTIN (obstructive sleep apnea)     H/O benign gastric tumor 06/21/2018    Asthma     Hyperlipidemia 10/17/2018    Allergic rhinitis 12/03/2018    Lipoma of right shoulder 04/08/2019    Pain in both lower legs 07/29/2020    Varicose veins of both lower extremities 07/29/2020    Venous insufficiency of both lower extremities 08/12/2020    Tortuous aorta 01/21/2021    Difficult intubation     Localized osteoarthritis of right knee 03/01/2021    Right knee DJD 03/09/2021    Primary osteoarthritis of left knee 01/20/2022    Decreased strength 01/20/2022    Gait, antalgic 01/20/2022     Resolved Ambulatory Problems     Diagnosis Date Noted    Multiple gastric polyps 07/21/2012    Lipomatosis 11/20/2012    Preventative health care 06/09/2013    Serous otitis media 10/22/2013    Urticaria 02/03/2014    Vaginosis 02/03/2014    Monilial vaginitis 02/03/2014    Sinusitis 02/03/2014    Sinusitis 05/16/2014    LBP (low back pain) 05/22/2014    Polyarthralgia 04/06/2015    BMI 38.0-38.9,adult 06/03/2015    Rash of back 06/30/2015    Swelling of upper lip 07/22/2015    Rash 07/22/2015    Prediabetes 08/05/2015    Bilateral knee pain 08/06/2015    Urinary tract infection with hematuria 09/03/2016    Chronic pain of both knees 11/11/2016    Primary osteoarthritis of both knees 12/21/2016    Acute bilateral low back pain with sciatica 11/16/2017     Decreased strength 11/16/2017    Decreased mobility and endurance 11/16/2017    Tremor, unspecified 08/23/2018    Hypokalemia 09/26/2018    Glucose intolerance 09/26/2018    Needs flu shot 09/26/2018    Edema 09/26/2018    Chronic pain of both knees 10/18/2018    Muscle weakness 10/18/2018    Decreased functional mobility 10/18/2018    Class 2 severe obesity with serious comorbidity and body mass index (BMI) of 39.0 to 39.9 in adult 12/03/2018    Need for prophylactic vaccination with Streptococcus pneumoniae (Pneumococcus) and Influenza vaccines 03/15/2019    Dehydration 05/14/2019    Poor posture 08/28/2019    Edema of both legs 07/29/2020    Lymphedema of both lower extremities 07/29/2020    Morbid obesity with BMI of 40.0-44.9, adult 09/11/2020    Primary osteoarthritis of right knee 02/22/2021    Decreased strength of lower extremity 02/22/2021    Decreased range of motion of both knees 02/22/2021     Past Medical History:   Diagnosis Date    Early dry stage nonexudative age-related macular degeneration 2020    Sleep apnea            Anesthesia Evaluation    I have reviewed the Patient Summary Reports.    I have reviewed the Nursing Notes.       Review of Systems  Anesthesia Hx:  Hx of Anesthetic complications H/O DIFFICULT INTUBATION SMALL MOUTH/THROAT History of prior surgery of interest to airway management or planning: Previous anesthesia: Spinal  3/9/2021: ARTHROPLASTY, KNEE, TOTAL, CARRIE COMPUTER-ASSISTED NAVIGATION (Right Knee) with spinal.  Procedure performed at an [facility]. Denies Family Hx of Anesthesia complications.  Personal Hx of Anesthesia complications  Difficult Intubation   Social:  Non-Smoker, No Alcohol Use    Hematology/Oncology:  Hematology Normal       -- Cancer in past history:  surgery  Oncology Comments: H/O CANCEROUS CELLS TO CERVIX, HAD LASER CONOZATION-RESOLVED    PRE-CANCEROUS AREA TO THE RIGHT LOWER LEG-USING PRESCRIBED OINTMENT     EENT/Dental:    SJOGRENS-NO ISSUES CURRENTLY   Cardiovascular:   Hypertension  Denies Angina. hyperlipidemia BLE LYMPHEDEMA/VENOUS INSUFFICIENCY Functional Capacity 3 METS    Pulmonary:   Asthma mild Denies Shortness of breath. Recent URI, resolved Sleep Apnea NO CPAP   Renal/:  Renal/ Normal     Hepatic/GI:   GERD Liver Disease,  Liver Disease, Fatty Liver    Musculoskeletal:   Arthritis   Joint Disease:  Arthritis, Osteoarthritis H/O R TKA 3/2021   Neurological:  Neurology Normal  Osteoarthritis    Endocrine:   Diabetes, well controlled, type 2 Hypothyroidism  Diabetes, Type 2 Diabetes , controlled by diet, oral hypoglycemics. , most recent HgA1c value was 5.4 on 9/29/21.  Thyroid Disease Hypothyroidism , s/p previous thyroid surgery.   Metabolic Disorders, Morbid Obesity / BMI > 40  Psych:   depression          Physical Exam  General:  Morbid Obesity    Airway/Jaw/Neck:  Airway Findings: Mouth Opening: Small, but > 3cm Tongue: Large  Mallampati: III  TM Distance: 4 - 6 cm  Jaw/Neck Findings:  Neck ROM: Normal ROM  Neck Findings:  Girth Increased, Short Neck      Dental:  Dental Findings: In tact   Chest/Lungs:  Chest/Lungs Findings: Normal Respiratory Rate     Heart/Vascular:  Heart Findings: Rate: Normal  Rhythm: Regular Rhythm  Sounds: Normal        Mental Status:  Mental Status Findings:  Cooperative, Alert and Oriented         Anesthesia Assessment: Preoperative EQUATION    Planned Procedure: Procedure(s) (LRB):  ARTHROPLASTY, KNEE, TOTAL, CARRIE COMPUTER-ASSISTED NAVIGATION (Left)  Requested Anesthesia Type:Regional  Surgeon: Emmanuel Garcia MD  Service: Orthopedics  Known or anticipated Date of Surgery:2/1/2022    Surgeon notes: reviewed    Electronic QUestionnaire Assessment completed via nurse interview with patient.        Triage considerations:     The patient has no apparent active cardiac condition (No unstable coronary Syndrome such as severe unstable angina or recent [<1 month] myocardial infarction,  decompensated CHF, severe valvular   disease or significant arrhythmia)    Previous anesthesia records:Spinal Anesthesia , Difficult intubation and Patient reports small mouth/throat.       3/9/2021: ARTHROPLASTY, KNEE, TOTAL, CARRIE COMPUTER-ASSISTED NAVIGATION (Right Knee)  Airway/Jaw/Neck:  Airway Findings: Mouth Opening: Normal Tongue: Normal  General Airway Assessment: Adult  Mallampati: I  Jaw/Neck Findings:  Neck ROM: Normal ROM       Last PCP note: 3-6 months ago , within [facility]   Dr. Kierra Cornejo    Subspecialty notes: Ortho, Interventional Cardiology (Dr. Mina Plummer), Dermatology (Dr. Afua Olson), GYN (Dr. Isabel Hastings), Sleep Medicine (Soraya Desir,GENNA)    Other important co-morbidities: DM2, GERD, HLD, HTN, Hypothyroid, Morbid Obesity, QUINTIN and no CPAP, S/P Thyroidectomy, Asthma (Stable), Depression, Hx Fatty Liver, Sjogrens, BLE lymphadema/Venous Insufficieny, R TKA (3/2021-Dr. Garcia)      Tests already available:  Available tests,  within [facility] .     9/29/2021: TSH, Lipid Panel, A1C (5.4), CBC, CMP  1/13/2022: US Abdomen (Three hepatic cysts, similar prior.  Simple renal cyst of the left kidney. Hepatic steatosis.)  8/20/2019: CT Calcium Scoring (Agatston calcium score equals 65, which translates to the 75thth percentile for coronary calcium load based on age and sex. Left hepatic lobe cysts unchanged from 2010.)  2/8/2021: Stress Echo   · A pharmacological stress test was performed using dobutamine. Atropine was used as an additional agent.  · Peak heart rate acheived is 137 bpm, which is 92% of the age predicted maximum heart rate.  · The study was stopped because the patient reached the end of the protocol.  · There were no arrhythmias during stress.  · The ECG portion of this study is negative for myocardial ischemia.  · The left ventricle is normal in size with normal systolic function. The estimated ejection fraction is 60%  · Normal left ventricular diastolic function.  · Normal right  ventricular size with normal right ventricular systolic function.  · Normal central venous pressure (3 mmHg).  · The stress echo portion of this study is negative for myocardial ischemia.   2/4/2021: EKG  8/10/2020: CV Lower Ext. Pressure w/Stress   · Normal rest and exercise YAJAIRA's.  8/7/2020: CV US Lower Ext. Bilateral Veins  · No evidence of right lower extremity DVT.  · No evidence of left lower extremity DVT.  · Right GSV reflux (below knee segments)  · Left GSV reflux (below knee segment)  · Left SSV reflux.            Instructions given. (See in Nurse's note)    Optimization:  Anesthesia Preop Clinic Assessment  Indicated  Will Schedule POC      Medical Opinion Indicated         Sub-specialist consult indicated:   [facility] NP       Plan:    Testing:  A1C, CMP, EKG, Hematology Profile, PT/INR and TSH      Pre-anesthesia  visit       Visit focus: possible regional anesthesia and/or nerve block         Consultation:P[facility] NP for Medical and Anesthesia Optimization Pt cleared by PCP 1/20, Dr. Jose casiano with this clearance, will see Anesthesia as scheduled.       Patient  has previously scheduled Medical Appointment:  1/20/2022, 1/21/2022, 1/24/2022, 1/25/2022, 1/31/2022      Navigation: Tests Scheduled.              Consults scheduled.             Results will be tracked by Preop Clinic.      1/20/22: PCP Clearance (Dr. Kierra Cornejo)  Preoperative clearance for Primary osteoarthritis of right knee - no active cardiac symptoms. Able to walk up flight of stairs if it were not for knee pain. Neg recent stress test. Medically optimized. Please note that pt does have QUINTIN.                       Anesthesia Plan  Type of Anesthesia, risks & benefits discussed:  Anesthesia Type:  CSE, spinal, general    Patient's Preference: Neuraxial vs GA  Plan Factors:          Intra-op Monitoring Plan: standard ASA monitors  Intra-op Monitoring Plan Comments:   Post Op Pain Control Plan: multimodal analgesia, IV/PO Opiods PRN and  peripheral nerve block  Post Op Pain Control Plan Comments:     Induction:   IV  Beta Blocker:  Patient is not currently on a Beta-Blocker (No further documentation required).       Informed Consent: Patient understands risks and agrees with Anesthesia plan.  Questions answered. Anesthesia consent signed with patient.  ASA Score: 3     Day of Surgery Review of History & Physical:    H&P update referred to the surgeon.     Anesthesia Plan Notes: Discussed Risks/Benefits of anesthetic plan  PMH was reviewed and PE was performed  All records reviewed. Did well with anesthetic for prior TKA  Will plan for neuraxial technique and convert to GA if needed        Ready For Surgery From Anesthesia Perspective.

## 2022-02-01 NOTE — ANESTHESIA PROCEDURE NOTES
Spinal    Diagnosis: Left Knee OA  Patient location during procedure: OR  Start time: 2/1/2022 11:59 AM  Timeout: 2/1/2022 11:56 AM  End time: 2/1/2022 12:01 PM    Staffing  Authorizing Provider: Isak Graves MD  Performing Provider: Arturo Osorio MD    Preanesthetic Checklist  Completed: patient identified, IV checked, site marked, risks and benefits discussed, surgical consent, monitors and equipment checked, pre-op evaluation and timeout performed  Spinal Block  Patient position: sitting  Prep: ChloraPrep  Patient monitoring: heart rate, cardiac monitor, continuous pulse ox and frequent blood pressure checks  Approach: midline  Location: L3-4  Injection technique: single shot  CSF Fluid: clear free-flowing CSF  Needle  Needle type: Amy   Needle gauge: 25 G  Needle length: 3.5 in  Additional Documentation: incremental injection, negative aspiration for heme and no paresthesia on injection  Needle localization: anatomical landmarks and ultrasound guidance-ultrasound image captured on disc.  Assessment  Ease of block: easy  Patient's tolerance of the procedure: comfortable throughout block and no complaints  Medications:  Medication Administration Time: 2/1/2022 12:01 PM  Medications: mepivacaine (CARBOCAINE) injection 15 mg/mL (1.5%), 3.2 mL

## 2022-02-01 NOTE — PROGRESS NOTES
Attempted to call report, MEGHAN Dorado with another pt. Floor RN will call back in approximately 10min.

## 2022-02-02 VITALS
SYSTOLIC BLOOD PRESSURE: 149 MMHG | HEART RATE: 86 BPM | BODY MASS INDEX: 39.37 KG/M2 | WEIGHT: 245 LBS | DIASTOLIC BLOOD PRESSURE: 65 MMHG | OXYGEN SATURATION: 100 % | TEMPERATURE: 99 F | RESPIRATION RATE: 17 BRPM | HEIGHT: 66 IN

## 2022-02-02 LAB — POCT GLUCOSE: 129 MG/DL (ref 70–110)

## 2022-02-02 PROCEDURE — 97116 GAIT TRAINING THERAPY: CPT | Mod: CQ

## 2022-02-02 PROCEDURE — 97535 SELF CARE MNGMENT TRAINING: CPT | Mod: 59

## 2022-02-02 PROCEDURE — 94761 N-INVAS EAR/PLS OXIMETRY MLT: CPT

## 2022-02-02 PROCEDURE — 97530 THERAPEUTIC ACTIVITIES: CPT | Mod: CQ

## 2022-02-02 PROCEDURE — 25000003 PHARM REV CODE 250: Performed by: ORTHOPAEDIC SURGERY

## 2022-02-02 PROCEDURE — 94660 CPAP INITIATION&MGMT: CPT

## 2022-02-02 PROCEDURE — 97110 THERAPEUTIC EXERCISES: CPT | Mod: CQ

## 2022-02-02 PROCEDURE — 25000003 PHARM REV CODE 250: Performed by: NURSE PRACTITIONER

## 2022-02-02 PROCEDURE — 99204 PR OFFICE/OUTPT VISIT, NEW, LEVL IV, 45-59 MIN: ICD-10-PCS | Mod: ICN,,, | Performed by: ANESTHESIOLOGY

## 2022-02-02 PROCEDURE — 99900035 HC TECH TIME PER 15 MIN (STAT)

## 2022-02-02 PROCEDURE — 25000003 PHARM REV CODE 250: Performed by: STUDENT IN AN ORGANIZED HEALTH CARE EDUCATION/TRAINING PROGRAM

## 2022-02-02 PROCEDURE — 99204 OFFICE O/P NEW MOD 45 MIN: CPT | Mod: ICN,,, | Performed by: ANESTHESIOLOGY

## 2022-02-02 RX ADMIN — METHOCARBAMOL 750 MG: 750 TABLET ORAL at 11:02

## 2022-02-02 RX ADMIN — SPIRONOLACTONE 50 MG: 25 TABLET, FILM COATED ORAL at 11:02

## 2022-02-02 RX ADMIN — AMLODIPINE BESYLATE 10 MG: 10 TABLET ORAL at 11:02

## 2022-02-02 RX ADMIN — CELECOXIB 200 MG: 200 CAPSULE ORAL at 11:02

## 2022-02-02 RX ADMIN — PANTOPRAZOLE SODIUM 40 MG: 40 TABLET, DELAYED RELEASE ORAL at 11:02

## 2022-02-02 RX ADMIN — Medication 2 G: at 04:02

## 2022-02-02 RX ADMIN — MUPIROCIN 1 G: 20 OINTMENT TOPICAL at 11:02

## 2022-02-02 RX ADMIN — HYDROCHLOROTHIAZIDE 25 MG: 25 TABLET ORAL at 11:02

## 2022-02-02 RX ADMIN — DOCUSATE SODIUM 50 MG AND SENNOSIDES 8.6 MG 1 TABLET: 8.6; 5 TABLET, FILM COATED ORAL at 11:02

## 2022-02-02 RX ADMIN — POLYETHYLENE GLYCOL 3350 17 G: 17 POWDER, FOR SOLUTION ORAL at 11:02

## 2022-02-02 RX ADMIN — ACETAMINOPHEN 1000 MG: 500 TABLET ORAL at 05:02

## 2022-02-02 RX ADMIN — ASPIRIN 81 MG: 81 TABLET, COATED ORAL at 11:02

## 2022-02-02 RX ADMIN — LEVOTHYROXINE SODIUM 125 MCG: 125 TABLET ORAL at 11:02

## 2022-02-02 RX ADMIN — ACETAMINOPHEN 1000 MG: 500 TABLET ORAL at 12:02

## 2022-02-02 NOTE — PLAN OF CARE
Problem: Adult Inpatient Plan of Care  Goal: Plan of Care Review  Outcome: Ongoing, Progressing  Goal: Patient-Specific Goal (Individualized)  Outcome: Ongoing, Progressing  Goal: Absence of Hospital-Acquired Illness or Injury  Outcome: Ongoing, Progressing  Intervention: Identify and Manage Fall Risk  Flowsheets (Taken 2/2/2022 0245)  Safety Promotion/Fall Prevention:   assistive device/personal item within reach   commode/urinal/bedpan at bedside   Fall Risk reviewed with patient/family   lighting adjusted   medications reviewed   nonskid shoes/socks when out of bed   side rails raised x 2   Supervised toileting - stay within arms reach   instructed to call staff for mobility  Intervention: Prevent Skin Injury  Flowsheets (Taken 2/2/2022 0245)  Body Position: weight shifting  Intervention: Prevent and Manage VTE (Venous Thromboembolism) Risk  Flowsheets (Taken 2/2/2022 0245)  Activity Management:   Ambulated to bathroom - L4   Standing - L3   Rolling - L1   Arm raise - L1   Ankle pumps - L1   Ambulated in room - L4  Intervention: Prevent Infection  Flowsheets (Taken 2/2/2022 0245)  Infection Prevention:   rest/sleep promoted   hand hygiene promoted   single patient room provided  Goal: Optimal Comfort and Wellbeing  Outcome: Ongoing, Progressing  Intervention: Monitor Pain and Promote Comfort  Flowsheets (Taken 2/2/2022 0245)  Pain Management Interventions:   pain management plan reviewed with patient/caregiver   care clustered   cold applied  Intervention: Provide Person-Centered Care  Flowsheets (Taken 2/2/2022 0245)  Trust Relationship/Rapport:   care explained   choices provided   emotional support provided   empathic listening provided   thoughts/feelings acknowledged   reassurance provided   questions encouraged   questions answered  Goal: Readiness for Transition of Care  Outcome: Ongoing, Progressing     Problem: Pain Acute  Goal: Acceptable Pain Control and Functional Ability  Outcome: Ongoing,  Progressing  Intervention: Develop Pain Management Plan  Flowsheets (Taken 2/2/2022 0245)  Pain Management Interventions:   pain management plan reviewed with patient/caregiver   care clustered   cold applied  Intervention: Prevent or Manage Pain  Flowsheets (Taken 2/2/2022 0245)  Sensory Stimulation Regulation:   quiet environment promoted   care clustered   lighting decreased  Medication Review/Management: medications reviewed  Intervention: Optimize Psychosocial Wellbeing  Flowsheets (Taken 2/2/2022 0245)  Supportive Measures:   active listening utilized   verbalization of feelings encouraged  Diversional Activities:   television   tablet

## 2022-02-02 NOTE — PT/OT/SLP PROGRESS
Physical Therapy Treatment    Patient Name:  Carolina Knight   MRN:  [id number]    Recommendations:     Discharge Recommendations:  outpatient PT   Discharge Equipment Recommendations:  (wheel kit for standard walker)   Barriers to discharge: None    Assessment:     Carolina Knight is a 66 y.o. female admitted with a medical diagnosis of Primary osteoarthritis of left knee.  She presents with the following impairments/functional limitations:  weakness,gait instability,decreased ROM,decreased lower extremity function,impaired balance,pain,orthopedic precautions,impaired functional mobilty,impaired self care skills,edema. Mrs. Knight tolerated treatment well as evidence of meeting 2//6 goals. She tolerated increase distance with gait training without well. Demonstrated good L LE stability and balance with OOB mobility using RW.  Noted fair strength while performing exercises but required assistance with SLR. Mrs. Knight is safe to be discharge home and will require family assistance.      Rehab Prognosis: Good; patient would benefit from acute skilled PT services to address these deficits and reach maximum level of function.    Recent Surgery: Procedure(s) (LRB):  ARTHROPLASTY, KNEE, TOTAL, CARRIE COMPUTER-ASSISTED NAVIGATION (Left) 1 Day Post-Op    Plan:     During this hospitalization, patient to be seen daily to address the identified rehab impairments via gait training,therapeutic activities,therapeutic exercises and progress toward the following goals:    · Plan of Care Expires:  02/04/22    Subjective     Chief Complaint: L knee pain  Patient/Family Comments/goals: This one is a lot harder than more other one.    Pain/Comfort:  · Pain Rating 1: 4/10  · Location - Side 1: Left  · Location 1: knee  · Pain Rating Post-Intervention 1: 6/10      Objective:     Communicated with NSG prior to session.  Patient found up in chair with perineural catheter,cryotherapyand telemetry upon PT entry to room.      General Precautions: Standard, fall   Orthopedic Precautions:LLE weight bearing as tolerated   Braces: N/A  Respiratory Status: Room air     Functional Mobility:  · Bed Mobility:     · Supine to Sit: supervision  · Sit to Supine: supervision  · Transfers:     · Sit to Stand:  supervision with rolling walker  · Gait: mb 225 feet x2 trials with RW SBA/CGA. No LOB or SOB.  Demonstrated 3 point gait pattern, decrease delaney, step length and heel strike.    · Balance: Good with RW  Stairs:  Ascend and descend 1 step with R HR B UE support CGA. VC's for technique and sequence. Mrs. Knight stated she will be sleeping downstairs and will not have to go upstairs until she is finished with therapy  · Ascend and descend 6inch curb step with RW CGA/SBA. VC's for technique and sequence  ·       AM-PAC 6 CLICK MOBILITY  Turning over in bed (including adjusting bedclothes, sheets and blankets)?: 4  Sitting down on and standing up from a chair with arms (e.g., wheelchair, bedside commode, etc.): 4  Moving from lying on back to sitting on the side of the bed?: 4  Moving to and from a bed to a chair (including a wheelchair)?: 4  Need to walk in hospital room?: 4  Climbing 3-5 steps with a railing?: 3  Basic Mobility Total Score: 23       Therapeutic Activities and Exercises:   Performed established TKR exercises AP,GS,QS,hip ABD/ADD, HS, SAQ, LAQ and SLR x10 reps LLE  Mrs. Knight was  given HEP  Mrs. Knight was educated on car transfers  Mrs. Knight was educated on bed mobility, transfers, HEP, curb step, stair climbing, and safety with OOB mobility.  Mrs. Knight stated she felt comfortable being discharged home and had no concerns.            Patient left up in chair with all lines intact and call button in reach..    GOALS:   Multidisciplinary Problems     Physical Therapy Goals        Problem: Physical Therapy Goal    Goal Priority Disciplines Outcome Goal Variances Interventions   Physical  Therapy Goal     PT, PT/OT Ongoing, Progressing     Description: Goals to be met by: 2022    Patient will increase functional independence with mobility by performin. Supine to sit with supervision  2. Sit to stand transfer with Supervision  3. Gait x300 feet with Supervision using Rolling Walker  4. Ascend/Descend 1 curb step with Stand by assistance using Rolling Walker  5. Lower extremity exercise program x30 reps per handout, with supervision  6. Ascend/Descend 4 steps with right handrail and stand by assistance                         Time Tracking:     PT Received On: 22  PT Start Time: 1010     PT Stop Time: 1055  PT Total Time (min): 45 min     Billable Minutes: Gait Training 15, Therapeutic Activity 15 and Therapeutic Exercise 15    Treatment Type: Treatment  PT/PTA: PTA     PTA Visit Number: 1     2022

## 2022-02-02 NOTE — PLAN OF CARE
Patient tolerated PT session well. Patient ambulated 80ft and 12ftwith RW and contact guard assistance. No LOB or SOB noted. Patient has an OPPT appointment on 2/3/2022.       Problem: Physical Therapy Goal  Goal: Physical Therapy Goal  Description: Goals to be met by: 2022    Patient will increase functional independence with mobility by performin. Supine to sit with supervision  2. Sit to stand transfer with Supervision  3. Gait x300 feet with Supervision using Rolling Walker  4. Ascend/Descend 1 curb step with Stand by assistance using Rolling Walker  5. Lower extremity exercise program x30 reps per handout, with supervision  6. Ascend/Descend 4 steps with right handrail and stand by assistance        Outcome: Ongoing, Progressing

## 2022-02-02 NOTE — NURSING
[facility] Pump teaching done w/patient & patients     .Instructed to remove on day 5,  2/6/22   at 12 noon or when pump alarms infusion complete. Demonstrated and explained how to remove catheter.Pt and pts    verbalized understanding.  All questions answered. [facility] Pump contract provided, home care instxn pamphlet, home care supplies provided to patient, placed in discharge folder. Encouraged to contact anesthesia using # on brochure with any questions/concerns re: pain, side effects.. Instructed to call [facility] for any pump related issues. Informed that pt/fmly will receive follow up calls.

## 2022-02-02 NOTE — DISCHARGE SUMMARY
[facility])  Orthopedics  Discharge Summary      Patient Name: Carolina Knight  MRN: [id number]  Admission Date: 2/1/2022  Hospital Length of Stay: 0 days  Discharge Date and Time:  02/02/2022 8:42 AM  Attending Physician: Emmanuel Garcia MD   Discharging Provider: Fritz Cox MD  Primary Care Provider: Kierra Cornejo MD    HPI: Carolina Knight is a 66 y.o. female with history of Left knee pain. Pain is worse with activity and weight bearing.  Patient has experienced interference of activities of daily living due to decreased range of motion and an increase in joint pain and swelling.  Patient has failed non-operative treatment including NSAIDs, corticosteroid injections, viscosupplement injections, and activity modification.  Carolina Knight currently ambulates independently.      Relevant medical conditions of significance in perioperative period:  HTN- on medication managed by pcp  DM- on medication managed by pcp  Hypothyroid- on medication managed by pcp    Procedure(s) (LRB):  ARTHROPLASTY, KNEE, TOTAL, CARRIE COMPUTER-ASSISTED NAVIGATION (Left)      Hospital Course: Patient presented for above procedure.  Tolerated it well and was discharged home POD1 after voiding, tolerating diet, ambulating, pain controlled. Discharge instructions, follow-up appointment, and med rec are below.      Consults (From admission, onward)        Status Ordering Provider     Inpatient consult to Respiratory Care  Once        Provider:  (Not yet assigned)    Acknowledged LANG NAVARRO     Inpatient consult to Respiratory Care  Once        Provider:  (Not yet assigned)    Acknowledged LANG NAVARRO     Inpatient consult to Social Work  Once        Provider:  (Not yet assigned)    Acknowledged LANG NAVARRO     Inpatient consult to Pain Management  Once        Provider:  (Not yet assigned)    Acknowledged LANG NAVARRO     Inpatient consult to Respiratory Care  Once        Provider:  (Not  yet assigned)    Acknowledged LANG NAVARRO          Significant Diagnostic Studies: No pertinent studies.    Pending Diagnostic Studies:     None        Final Active Diagnoses:    Diagnosis Date Noted POA    PRINCIPAL PROBLEM:  Primary osteoarthritis of left knee [M17.12] 01/20/2022 Yes      Problems Resolved During this Admission:      Discharged Condition: good    Disposition: Home or Self Care    Follow Up:    Patient Instructions:      Activity as tolerated     Sponge bath only until clinic visit     Keep surgical extremity elevated     Lifting restrictions   Order Comments: No strenuous exercise or lifting of > 10 lbs     Weight bearing as tolerated     No driving, operating heavy equipment or signing legal documents while taking pain medication     Leave dressing on - Keep it clean, dry, and intact until clinic visit   Order Comments: Do not remove surgical dressing for 2 weeks post-op. This will be done only by MD at initial post-op visit. If dressing is completely saturated, replace with identical dressing - silver-impregnated hydrocolloid dressing.     Do not get dressings wet. Do not shower.     If dressing continues to be saturated or there are signs of infection, please call [facility] [phone] for further instructions and to make appt to be seen.     Call MD for:  temperature >100.4     Call MD for:  persistent nausea and vomiting     Call MD for:  severe uncontrolled pain     Call MD for:  difficulty breathing, headache or visual disturbances     Call MD for:  redness, tenderness, or signs of infection (pain, swelling, redness, odor or green/yellow discharge around incision site)     Call MD for:  hives     Call MD for:  persistent dizziness or light-headedness     Call MD for:  extreme fatigue     Medications:  Reconciled Home Medications:      Medication List      START taking these medications    acetaminophen 650 MG Tbsr  Commonly known as: TYLENOL  Take 1 tablet (650 mg total) by mouth  every 6 to 8 hours as needed (pain).     aspirin 81 MG EC tablet  Commonly known as: ECOTRIN  Take 1 tablet (81 mg total) by mouth 2 (two) times daily.     celecoxib 200 MG capsule  Commonly known as: CeleBREX  Take 1 capsule (200 mg total) by mouth once daily.     docusate sodium 100 MG capsule  Commonly known as: COLACE  Take 1 capsule (100 mg total) by mouth 2 (two) times daily as needed for Constipation.     methocarbamoL 750 MG Tab  Commonly known as: ROBAXIN  Take 1 tablet (750 mg total) by mouth 3 (three) times daily as needed (muscle spasms).     oxyCODONE 5 MG immediate release tablet  Commonly known as: ROXICODONE  Take 1-2 tablets every 4-6 hours as needed pain        CHANGE how you take these medications    metFORMIN 500 MG ER 24hr tablet  Commonly known as: GLUCOPHAGE-XR  Take 1 tablet (500 mg total) by mouth daily with breakfast.  What changed: when to take this     montelukast 10 mg tablet  Commonly known as: SINGULAIR  Take 1 tablet (10 mg total) by mouth every evening.  What changed:   · when to take this  · reasons to take this     nystatin cream  Commonly known as: MYCOSTATIN  Apply topically 2 (two) times daily.  What changed:   · when to take this  · reasons to take this        CONTINUE taking these medications    amLODIPine 10 MG tablet  Commonly known as: NORVASC  Take 1 tablet (10 mg total) by mouth once daily.     azelastine 137 mcg (0.1 %) nasal spray  Commonly known as: ASTELIN  1 spray (137 mcg total) by Nasal route daily as needed for Rhinitis.     diphenhydrAMINE 25 mg capsule  Commonly known as: BANOPHEN  Take 1 each (25 mg total) by mouth every 6 (six) hours as needed for Itching or Allergies.     econazole nitrate 1 % cream  Use bid     estradioL 0.01 % (0.1 mg/gram) vaginal cream  Commonly known as: ESTRACE  PLACE 1 GRAM VAGINALLY EVERY DAY     FINACEA 15 % gel  Generic drug: azelaic acid  AAA face qhs then moisturize     fluconazole 100 MG tablet  Commonly known as: DIFLUCAN  Take  1 tablet (100 mg total) by mouth once daily.     fluticasone propionate 50 mcg/actuation nasal spray  Commonly known as: FLONASE  SPRAY ONCE IN EACH NOSTRIL DAILY     hydroCHLOROthiazide 25 MG tablet  Commonly known as: HYDRODIURIL  Take 1 tablet (25 mg total) by mouth once daily.     ketoconazole 2 % cream  Commonly known as: NIZORAL  Apply topically once daily.     LUTEIN ORAL  Take by mouth.     neomycin-polymyxin-hydrocortisone 3.5-10,000-1 mg/mL-unit/mL-% otic suspension  Commonly known as: CORTISPORIN  Use bid for nails     ONE-A-DAY WOMEN'S ACTIVE ORAL  Take by mouth.     pantoprazole 40 MG tablet  Commonly known as: PROTONIX  TAKE 1 TABLET BY MOUTH EVERY DAY     potassium chloride SA 10 MEQ tablet  Commonly known as: K-DUR,KLOR-CON  Take 1 tablet (10 mEq total) by mouth 2 (two) times daily.     promethazine-dextromethorphan 6.25-15 mg/5 mL Syrp  Commonly known as: PROMETHAZINE-DM  Take 5 mLs by mouth every 6 (six) hours as needed.     RETIN-A 0.05 % cream  Generic drug: tretinoin  Apply topically nightly as needed.     rosuvastatin 5 MG tablet  Commonly known as: CRESTOR  Take 1 tablet (5 mg total) by mouth every other day.     spironolactone 50 MG tablet  Commonly known as: ALDACTONE  Take 1 tablet (50 mg total) by mouth once daily.     SYNTHROID 125 MCG tablet  Generic drug: levothyroxine  TAKE 1 TABLET BY MOUTH ONCE DAILY     triamcinolone acetonide 0.1% 0.1 % cream  Commonly known as: KENALOG  Apply topically 2 (two) times daily.     VITAMIN D ORAL  Take 1 tablet by mouth once daily.     ZyrTEC 10 MG tablet  Generic drug: cetirizine  Take 10 mg by mouth every evening.            Fritz Cox MD  Orthopedics  [facility]

## 2022-02-02 NOTE — PROGRESS NOTES
[facility])  Orthopedics  Progress Note    Patient Name: Carolina Knight  MRN: [id number]  Admission Date: 2/1/2022  Hospital Length of Stay: 0 days  Attending Provider: Emmanuel Garcia MD  Primary Care Provider: Kierra Cornejo MD  Follow-up For: Procedure(s) (LRB):  ARTHROPLASTY, KNEE, TOTAL, CARRIE COMPUTER-ASSISTED NAVIGATION (Left)    Post-Operative Day: 1 Day Post-Op  Subjective:     Principal Problem:Primary osteoarthritis of left knee    Principal Orthopedic Problem: s/p L TKA on 2/1/22    Interval History: NAEON. Mild htn 157/57 this am. Pain controlled. BG high 179 last night. Ambulated 92 ft with PT. Voiding well and tolerating PO.    Review of patient's allergies indicates:   Allergen Reactions    Lipitor [atorvastatin]      Severe myalgia    Losartan Swelling     Possible throat closing sensation.    Lovastatin Other (See Comments)     myalgia    Cefuroxime Itching and Rash    Latex, natural rubber Itching and Rash     Pt reported        Current Facility-Administered Medications   Medication    0.9%  NaCl infusion    acetaminophen tablet 1,000 mg    amLODIPine tablet 10 mg    aspirin EC tablet 81 mg    bisacodyL suppository 10 mg    celecoxib capsule 200 mg    dextrose 10% bolus 125 mL    dextrose 10% bolus 250 mL    famotidine tablet 20 mg    glucagon (human recombinant) injection 1 mg    glucose chewable tablet 16 g    glucose chewable tablet 24 g    hydroCHLOROthiazide tablet 25 mg    insulin aspart U-100 pen 0-5 Units    levothyroxine tablet 125 mcg    metFORMIN tablet 500 mg    methocarbamoL tablet 750 mg    morphine injection 2 mg    mupirocin 2 % ointment 1 g    naloxone 0.4 mg/mL injection 0.02 mg    ondansetron injection 4 mg    oxyCODONE immediate release tablet 5 mg    oxyCODONE immediate release tablet Tab 10 mg    pantoprazole EC tablet 40 mg    polyethylene glycol packet 17 g    pregabalin capsule 75 mg    prochlorperazine injection Soln 5 mg  "   ROPIvacaine (PF) 2 mg/ml (0.2%) solution    ropivacaine 0.2% Nimbus PainPRO Pump infusion 500 ML    senna-docusate 8.6-50 mg per tablet 1 tablet    spironolactone tablet 50 mg     Facility-Administered Medications Ordered in Other Encounters   Medication    carboxymethylcellulose 0.5 % ophthalmic solution    ceFAZolin injection    dexamethasone injection    dexmedeTOMIDine (PRECEDEX) 400 mcg in sodium chloride 0.9% 100 mL infusion    famotidine (PF) injection    isolyte infusion    ketamine in 0.9 % sod chloride 50 mg/5 mL (10 mg/mL) injection    LIDOcaine (cardiac) injection    phenylephrine HCl in 0.9% NaCl 1 mg/10 mL (100 mcg/mL)    propofol (DIPRIVAN) 10 mg/mL infusion    ropivacaine 0.2% Nimbus PainPRO Pump infusion 500 ML    sodium chloride 0.9% infusion    tranexamic acid injection Soln     Objective:     Vital Signs (Most Recent):  Temp: 98.2 °F (36.8 °C) (02/02/22 0410)  Pulse: 78 (02/02/22 0410)  Resp: 18 (02/02/22 0410)  BP: (!) 157/57 (02/02/22 0410)  SpO2: 98 % (02/02/22 0410) Vital Signs (24h Range):  Temp:  [97.4 °F (36.3 °C)-98.3 °F (36.8 °C)] 98.2 °F (36.8 °C)  Pulse:  [68-89] 78  Resp:  [11-27] 18  SpO2:  [94 %-100 %] 98 %  BP: (104-157)/(52-79) 157/57     Weight: 111.1 kg (245 lb)  Height: 5' 6" (167.6 cm)  Body mass index is 39.54 kg/m².      Intake/Output Summary (Last 24 hours) at 2/2/2022 0674  Last data filed at 2/2/2022 0424  Gross per 24 hour   Intake 2600 ml   Output 1900 ml   Net 700 ml       Ortho/SPM Exam   General appearance - no acute distress, conversant  Musculoskeletal -  Dressing C/D/I  Fires quad/TA/EHL/GSC  SILT SP/DP/TN  WWP distally  Calves soft, nontender bilateral      Significant Labs: All pertinent labs within the past 24 hours have been reviewed.    Significant Imaging: I have reviewed and interpreted all pertinent imaging results/findings.    Assessment/Plan:     * Primary osteoarthritis of left knee  Carolina Lenniemelina is a 66 y.o. female s/p L TKA " on 2/1/22      Pain control: multimodal  PT/OT: WBAT LLE  DVT PPx: asa 81 bid x 4 weeks, SCDs at all times when not ambulating  Abx: postop Ancef      Dispo: dc home with outpatient PT            Fritz Cox MD  Orthopedics  [facility])

## 2022-02-02 NOTE — PT/OT/SLP PROGRESS
"Occupational Therapy   Treatment    Name: Carolina Knight  MRN: [id number]  Admitting Diagnosis:  Primary osteoarthritis of left knee  1 Day Post-Op    Recommendations:     Discharge Recommendations: outpatient PT  Discharge Equipment Recommendations:   (Wheel kit for standard walker)  Barriers to discharge:  None    Assessment:     Carolina Knight is a 66 y.o. female with a medical diagnosis of Primary osteoarthritis of left knee.  She presents with the following performance deficits affecting function are weakness,gait instability,decreased lower extremity function,impaired balance,edema,impaired self care skills,impaired functional mobilty. Patient participated well with therapy, completed bed mobility, transfers/mobility for HH distance (~50 ft) in room with rolling walker, and seated/standing ADL's with supervision to stand by assist initially for cueing; demonstrates good progress and carryover, safe to discharge home when appropriate.    Rehab Prognosis:  Good; patient would benefit from acute skilled OT services to address these deficits and reach maximum level of function.       Plan:     Patient to be seen daily to address the above listed problems via self-care/home management,therapeutic activities,therapeutic exercises  · Plan of Care Expires: 02/04/22  · Plan of Care Reviewed with: patient    Subjective   "  Pain/Comfort:  · Pain Rating 1: 2/10  · Location - Side 1: Left  · Location - Orientation 1: generalized  · Location 1: knee  · Pain Addressed 1: Pre-medicate for activity,Reposition,Distraction  · Pain Rating Post-Intervention 1: 4/10    Objective:     Communicated with: Nursing prior to session.  Patient found supine with cryotherapy,perineural catheter upon OT entry to room.    General Precautions: Standard, fall   Orthopedic Precautions:LLE weight bearing as tolerated   Braces: N/A  Respiratory Status: Room air     Occupational Performance:     Bed Mobility:    · Patient completed " Rolling/Turning to Left with  stand by assistance  · Patient completed Scooting/Bridging with stand by assistance  · Patient completed Supine to Sit with stand by assistance     Functional Mobility/Transfers:  · Patient completed Sit <> Stand Transfer with contact guard assistance  with  rolling walker   · Patient completed Bed <> Chair Transfer using Step Transfer technique with stand by assistance with rolling walker  · Patient completed Toilet Transfer Step Transfer technique with stand by assistance with  rolling walker  · Functional Mobility: Stand by assistance with rolling walker    Activities of Daily Living:  · Grooming: stand by assistance standing at sink in bathroom, with rolling walekr  · Upper Body Dressing: supervision seated in bedside chair to doff gown and don overhead dress  · Lower Body Dressing: stand by assistance to prepare in long sit, and complete over hips in stance with rolling walker, cues for safety with nimbus with good carryvoer    [facility] 6 Click ADL: 22    Treatment & Education:  -Patient and family educated on roles/goals of OT and POC.  -White board updated.  -Therapist provided time for questions and answered within scope of practice.  -Patient educated on importance of EOB/OOB activity to maximize recovery.    Patient left up in chair with all lines intact and call button in reachEducation:      GOALS:   Multidisciplinary Problems     Occupational Therapy Goals        Problem: Occupational Therapy Goal    Goal Priority Disciplines Outcome Interventions   Occupational Therapy Goal     OT, PT/OT Ongoing, Progressing    Description: Goals to be met by: 2/4/22    Patient will increase functional independence with ADLs by performing:    UE Dressing with Modified [place].  LE Dressing with Supervision.  Grooming while standing at sink with Modified [place].  Toileting from toilet with Modified [place] for hygiene and clothing management.   Toilet transfer to toilet with  Modified [place].                     Time Tracking:     OT Date of Treatment: 02/02/22  OT Start Time: 0944  OT Stop Time: 1013  OT Total Time (min): 29 min    Billable Minutes:Self Care/Home Management 29    OT/HORACE: OT          2/2/2022

## 2022-02-02 NOTE — SUBJECTIVE & OBJECTIVE
Principal Problem:Primary osteoarthritis of left knee    Principal Orthopedic Problem: s/p L TKA on 2/1/22    Interval History: NAEON. Mild htn 157/57 this am. Pain controlled. BG high 179 last night. Ambulated 92 ft with PT. Voiding well and tolerating PO.    Review of patient's allergies indicates:   Allergen Reactions    Lipitor [atorvastatin]      Severe myalgia    Losartan Swelling     Possible throat closing sensation.    Lovastatin Other (See Comments)     myalgia    Cefuroxime Itching and Rash    Latex, natural rubber Itching and Rash     Pt reported        Current Facility-Administered Medications   Medication    0.9%  NaCl infusion    acetaminophen tablet 1,000 mg    amLODIPine tablet 10 mg    aspirin EC tablet 81 mg    bisacodyL suppository 10 mg    celecoxib capsule 200 mg    dextrose 10% bolus 125 mL    dextrose 10% bolus 250 mL    famotidine tablet 20 mg    glucagon (human recombinant) injection 1 mg    glucose chewable tablet 16 g    glucose chewable tablet 24 g    hydroCHLOROthiazide tablet 25 mg    insulin aspart U-100 pen 0-5 Units    levothyroxine tablet 125 mcg    metFORMIN tablet 500 mg    methocarbamoL tablet 750 mg    morphine injection 2 mg    mupirocin 2 % ointment 1 g    naloxone 0.4 mg/mL injection 0.02 mg    ondansetron injection 4 mg    oxyCODONE immediate release tablet 5 mg    oxyCODONE immediate release tablet Tab 10 mg    pantoprazole EC tablet 40 mg    polyethylene glycol packet 17 g    pregabalin capsule 75 mg    prochlorperazine injection Soln 5 mg    ROPIvacaine (PF) 2 mg/ml (0.2%) solution    ropivacaine 0.2% Nimbus PainPRO Pump infusion 500 ML    senna-docusate 8.6-50 mg per tablet 1 tablet    spironolactone tablet 50 mg     Facility-Administered Medications Ordered in Other Encounters   Medication    carboxymethylcellulose 0.5 % ophthalmic solution    ceFAZolin injection    dexamethasone injection    dexmedeTOMIDine (PRECEDEX) 400 mcg in  "sodium chloride 0.9% 100 mL infusion    famotidine (PF) injection    isolyte infusion    ketamine in 0.9 % sod chloride 50 mg/5 mL (10 mg/mL) injection    LIDOcaine (cardiac) injection    phenylephrine HCl in 0.9% NaCl 1 mg/10 mL (100 mcg/mL)    propofol (DIPRIVAN) 10 mg/mL infusion    ropivacaine 0.2% Nimbus PainPRO Pump infusion 500 ML    sodium chloride 0.9% infusion    tranexamic acid injection Soln     Objective:     Vital Signs (Most Recent):  Temp: 98.2 °F (36.8 °C) (02/02/22 0410)  Pulse: 78 (02/02/22 0410)  Resp: 18 (02/02/22 0410)  BP: (!) 157/57 (02/02/22 0410)  SpO2: 98 % (02/02/22 0410) Vital Signs (24h Range):  Temp:  [97.4 °F (36.3 °C)-98.3 °F (36.8 °C)] 98.2 °F (36.8 °C)  Pulse:  [68-89] 78  Resp:  [11-27] 18  SpO2:  [94 %-100 %] 98 %  BP: (104-157)/(52-79) 157/57     Weight: 111.1 kg (245 lb)  Height: 5' 6" (167.6 cm)  Body mass index is 39.54 kg/m².      Intake/Output Summary (Last 24 hours) at 2/2/2022 0649  Last data filed at 2/2/2022 0424  Gross per 24 hour   Intake 2600 ml   Output 1900 ml   Net 700 ml       Ortho/SPM Exam   General appearance - no acute distress, conversant  Musculoskeletal -  Dressing C/D/I  Fires quad/TA/EHL/GSC  SILT SP/DP/TN  WWP distally  Calves soft, nontender bilateral      Significant Labs: All pertinent labs within the past 24 hours have been reviewed.    Significant Imaging: I have reviewed and interpreted all pertinent imaging results/findings.  "

## 2022-02-02 NOTE — PLAN OF CARE
Has met unit/department guidelines for discharge from each phase of the post procedure continuum. Patient discharged.  Instructions and Prescriptions given.  IV removed, tolerated well.  Patient verbalized understanding instructions.  AAOx3, VSS, NADN, no complaints noted at this time.  Wheelchair to private vehicle in care of family.

## 2022-02-02 NOTE — PROGRESS NOTES
Acute Pain Service and Perioperative Surgical Home Progress Note    HPI  Carolina Knight is a 66 y.o., female, s/p left TKA.  No acute events overnight.   Interval history      Surgery:  Procedure(s) (LRB):  ARTHROPLASTY, KNEE, TOTAL, CARRIE COMPUTER-ASSISTED NAVIGATION (Left)    Post Op Day #: 1    Catheter type: Perineural Adductor Canal    Infusion type: Ropivacaine 0.2%, with a 7cc automatic bolus every 3 hours, combined with a 5 cc patient controlled bolus available [id number]    Problem List:    Active Hospital Problems    Diagnosis  POA    *Primary osteoarthritis of left knee [M17.12]  Yes      Resolved Hospital Problems   No resolved problems to display.       Subjective:       General appearance of alert, oriented, no complaints   Pain with rest: 2    Numbers   Pain with movement: 2    Numbers   Side Effects    1. Pruritis No    2. Nausea No    3. Motor Blockade Yes, 0=Ability to raise lower extremities off bed    4. Sedation No, 1=awake and alert    Schedule Medications:    acetaminophen  1,000 mg Oral Q6H    amLODIPine  10 mg Oral Daily    aspirin  81 mg Oral BID    celecoxib  200 mg Oral Daily    famotidine  20 mg Oral BID    hydroCHLOROthiazide  25 mg Oral Daily    levothyroxine  125 mcg Oral Daily    metFORMIN  500 mg Oral QHS    methocarbamoL  750 mg Oral TID    mupirocin  1 g Nasal BID    pantoprazole  40 mg Oral Daily    polyethylene glycol  17 g Oral Daily    pregabalin  75 mg Oral QHS    senna-docusate 8.6-50 mg  1 tablet Oral BID    spironolactone  50 mg Oral Daily        Continuous Infusions:   sodium chloride 0.9% 150 mL/hr at 02/01/22 1506    ROPIvacaine (PF) 2 mg/ml (0.2%)      ropivacaine (PF) 2 mg/ml (0.2%)          PRN Medications:  bisacodyL, dextrose 10%, dextrose 10%, glucagon (human recombinant), glucose, glucose, insulin aspart U-100, morphine, naloxone, ondansetron, oxyCODONE, oxyCODONE, prochlorperazine       Antibiotics:  Antibiotics (From admission, onward)             Start     Stop Route Frequency Ordered    02/01/22 2100  mupirocin 2 % ointment 1 g         02/06 2059 Nasl 2 times daily 02/01/22 1601             Objective:     Catheter site clean, dry, intact          Vital Signs (Most Recent):  Temp: 98.2 °F (36.8 °C) (02/02/22 0410)  Pulse: 78 (02/02/22 0410)  Resp: 18 (02/02/22 0410)  BP: (!) 157/57 (02/02/22 0410)  SpO2: 98 % (02/02/22 0410) Vital Signs Range (Last 24H):  Temp:  [97.4 °F (36.3 °C)-98.3 °F (36.8 °C)]   Pulse:  [68-89]   Resp:  [11-27]   BP: (104-157)/(52-79)   SpO2:  [94 %-100 %]          I & O (Last 24H):    Intake/Output Summary (Last 24 hours) at 2/2/2022 0557  Last data filed at 2/2/2022 0424  Gross per 24 hour   Intake 2600 ml   Output 1900 ml   Net 700 ml       Physical Exam:    GA: Alert, comfortable, no acute distress.   Pulmonary: Clear to auscultation A/P/L. No wheezing, crackles, or rhonchi.  Cardiac: RRR S1 & S2 w/o rubs/murmurs/gallops.   Abdominal:Bowel sounds present. No tenderness to palpation or distension. No appreciable hepatosplenomegaly.   Skin: No jaundice, rashes, or visible lesions.  M/S:  DF/PF/EHL         Laboratory:  CBC: No results for input(s): WBC, RBC, HGB, HCT, PLT, MCV, MCH, MCHC in the last 72 hours.    BMP: No results for input(s): NA, K, CO2, CL, BUN, CREATININE, GLU, MG, PHOS, CALCIUM in the last 72 hours.    No results for input(s): PT, INR, PROTIME, APTT in the last 72 hours.      Anticoagulant dose ASA 81mg at 2130.    Assessment:         Pain control adequate    Plan:     1) Pain:    Adductor canal perineural catheter in place and infusing. Good level. Multimodal pain regimen with acetaminophen, Celebrex, Lyrica, and prn oxycodone given  Will continue to monitor. Plan to discharge with Nimbus pain pump on POD #1.   2) DM:  Started pts nightly metformin.     3) asthma:  Controled overnight.    4) FEN/GI: Tolerating regular diet   5) Dispo: Pt working well with PT/OT. Case management and SW for placement. Plan for  discharge POD #1.        Evaluator Nubia Mondragon NP

## 2022-02-02 NOTE — PT/OT/SLP EVAL
Physical Therapy Evaluation and Treatment     Patient Name:  Carolina Knight   MRN:  [id number]    Recommendations:     Discharge Recommendations:  outpatient PT   Discharge Equipment Recommendations:  (wheel kit for standard walker)   Barriers to discharge: None    Assessment:     Carolina Knight is a 66 y.o. female admitted with a medical diagnosis of Primary osteoarthritis of left knee.  She presents with the following impairments/functional limitations:  weakness,impaired functional mobilty,gait instability,impaired balance,decreased lower extremity function,pain,decreased ROM,impaired skin,orthopedic precautions. She had a R TKA 3/9/2021 without difficulty per patient and  report. Patient tolerated PT session well. Patient ambulated 80ft and 12ft with RW and contact guard assistance. No LOB or SOB noted. Patient has an OPPT appointment on 2/3/2022.     Rehab Prognosis: Good; patient would benefit from acute skilled PT services to address these deficits and reach maximum level of function.    Recent Surgery: Procedure(s) (LRB):  ARTHROPLASTY, KNEE, TOTAL, CARRIE COMPUTER-ASSISTED NAVIGATION (Left) Day of Surgery    Plan:     During this hospitalization, patient to be seen daily to address the identified rehab impairments via gait training,therapeutic activities,therapeutic exercises and progress toward the following goals:    · Plan of Care Expires:  02/04/22    Subjective     Chief Complaint: Pain in left knee.   Patient/Family Comments/goals: To walk without pain.   Pain/Comfort:  · Pain Rating 1: 6/10  · Location - Side 1: Left  · Location 1: knee  · Pain Addressed 1: Pre-medicate for activity,Reposition,Distraction    Patients cultural, spiritual, Islam conflicts given the current situation:  n/a    Living Environment:  Patient lives in a 2SH with 1 step to enter. She is able to stay on the 1st floor. She has a flight of stairs with right handrail to get to 2nd floor. Prior to admission,  patients level of function was independent for functional mobility. Equipment at home: bedside commode,walker, standard,shower chair. Upon discharge, patient will have assistance from .    Objective:     Communicated with RN prior to session.  Patient found up in chair with perineural catheter,FCD,cryotherapy,peripheral IV (Nimbus)  upon PT entry to room.  left at beginning of session.     General Precautions: Standard, fall   Orthopedic Precautions:LLE weight bearing as tolerated   Braces: N/A    Exams:  · Cognitive Exam:  Patient is oriented to Person, Place, Time and Situation  · Gross Motor Coordination:  WFL  · Sensation:    · -       Intact  · RLE ROM: WFL  · RLE Strength: WFL  · LLE ROM: WFL except limited at knee due to pain  · LLE Strength: appears WFL but did not formally assess due to pain    Functional Mobility:  · Transfers:     · Sit to Stand:  contact guard assistance with rolling walker x1 from bedside chair   · Toilet Transfer (bedside commode over toilet): contact guard assistance with rolling walker   · Gait: Patient ambulated 80ft and 12ft with Rolling Walker and contact guard assistance  using 3-point gait. Patient demonstrated decreased delaney and decreased step length during gait due to pain, decreased ROM and decreased strength.  · Balance: Patient stood at the sink to wash her hands with RW and contact guard assistance.     Therapeutic Activities and Exercises:   Patient educated in:  -PT role and POC  -safety with transfers including hand placement  -gait sequencing and RW management  -OOB activity to maximize recovery including ambulating with nursing staff assistance and RW while in the hospital       AM-PAC 6 CLICK MOBILITY  Total Score:17     Patient left up in chair with all lines intact, call button in reach and RN notified.    GOALS:   Multidisciplinary Problems     Physical Therapy Goals        Problem: Physical Therapy Goal    Goal Priority Disciplines Outcome Goal  Variances Interventions   Physical Therapy Goal     PT, PT/OT Ongoing, Progressing     Description: Goals to be met by: 2022    Patient will increase functional independence with mobility by performin. Supine to sit with supervision  2. Sit to stand transfer with Supervision  3. Gait x300 feet with Supervision using Rolling Walker  4. Ascend/Descend 1 curb step with Stand by assistance using Rolling Walker  5. Lower extremity exercise program x30 reps per handout, with supervision  6. Ascend/Descend 4 steps with right handrail and stand by assistance                         History:     Past Medical History:   Diagnosis Date    Decreased mobility and endurance 2017    Decreased strength 2017    Decreased strength of lower extremity 2021    Difficult intubation     Early dry stage nonexudative age-related macular degeneration     Edema 2018    Edema of both legs 2020    Fatty liver 12/15/2014    Hypokalemia 2018    Lymphedema of both lower extremities 2020    Multiple gastric polyps 2012    Polyarthralgia 2015    Sjogren's disease     Sleep apnea        Past Surgical History:   Procedure Laterality Date    BREAST BIOPSY Left 2017    core,Benign breast with fibrocystic changes, including benign usual ductal hyperplasia and adenosis    BREAST BIOPSY Right     core    CERVICAL CONIZATION   W/ LASER       SECTION      x1    CHOLECYSTECTOMY      ESOPHAGOGASTRODUODENOSCOPY N/A 2018    Procedure: ESOPHAGOGASTRODUODENOSCOPY (EGD);  Surgeon: George Henriquez MD;  Location: [address]);  Service: Endoscopy;  Laterality: N/A;  possible difficult intubation - see anesthesia note dated 1/15/13 - 2nd floor/ generated from last EGD, 3 year f/u, Pt due/ Pt requesting Dr. Henriquez -     EYE SURGERY  Est 2014-15    Laser on each eye - tear in white part eye by Dr Nitish Ayala    HYSTERECTOMY  age 42    [place], LSO (uterine  fibroids, adhesions, endometriosis)    OOPHORECTOMY  age 42    LSO (endometriosis, cyst)    PERCUTANEOUS CRYOTHERAPY OF PERIPHERAL NERVE USING LIQUID NITROUS OXIDE IN CLOSED NEEDLE DEVICE Right 3/1/2021    Procedure: CRYOTHERAPY, NERVE, PERIPHERAL, PERCUTANEOUS, USING LIQUID NITROUS OXIDE IN CLOSED NEEDLE DEVICE;  Surgeon: CHACE Riley;  Location: [facility];  Service: Pain Management;  Laterality: Right;  RIGHT KNEE IOVERA    THYROIDECTOMY      TOE SURGERY      left    TOTAL THYROIDECTOMY         Time Tracking:     PT Received On: 02/01/22  PT Start Time: 1642     PT Stop Time: 1703  PT Total Time (min): 21 min     Billable Minutes: Evaluation 10 and Gait Training 11      02/01/2022

## 2022-02-02 NOTE — ASSESSMENT & PLAN NOTE
Carolina Eugene is a 66 y.o. female s/p L TKA on 2/1/22      Pain control: multimodal  PT/OT: WBAT LLE  DVT PPx: asa 81 bid x 4 weeks, SCDs at all times when not ambulating  Abx: postop Ancef      Dispo: NM home with outpatient PT

## 2022-02-02 NOTE — TRANSFER OF CARE
"Anesthesia Transfer of Care Note    Patient: Carolina Knight    Procedure(s) Performed: Procedure(s) (LRB):  ARTHROPLASTY, KNEE, TOTAL, CARRIE COMPUTER-ASSISTED NAVIGATION (Left)    Patient location: PACU    Anesthesia Type: spinal    Transport from OR: Transported from OR on 6-10 L/min O2 by face mask with adequate spontaneous ventilation    Post pain: adequate analgesia    Post assessment: no apparent anesthetic complications    Post vital signs: stable    Level of consciousness: sedated    Nausea/Vomiting: no nausea/vomiting    Complications: none    Transfer of care protocol was followed      Last vitals:   Visit Vitals  BP (!) 149/65 (BP Location: Right arm, Patient Position: Sitting)   Pulse 86   Temp 37.2 °C (98.9 °F) (Oral)   Resp 17   Ht 5' 6" (1.676 m)   Wt 111.1 kg (245 lb)   LMP  (LMP Unknown)   SpO2 100%   Breastfeeding No   BMI 39.54 kg/m²     "

## 2022-02-03 ENCOUNTER — CLINICAL SUPPORT (OUTPATIENT)
Dept: REHABILITATION | Facility: HOSPITAL | Age: 67
End: 2022-02-03
Attending: ORTHOPAEDIC SURGERY
Payer: MEDICARE

## 2022-02-03 ENCOUNTER — CLINICAL SUPPORT (OUTPATIENT)
Dept: ORTHOPEDICS | Facility: CLINIC | Age: 67
End: 2022-02-03
Payer: MEDICARE

## 2022-02-03 DIAGNOSIS — Z96.659 STATUS POST KNEE REPLACEMENT, UNSPECIFIED LATERALITY: Primary | ICD-10-CM

## 2022-02-03 DIAGNOSIS — R26.89 GAIT, ANTALGIC: ICD-10-CM

## 2022-02-03 DIAGNOSIS — R53.1 DECREASED STRENGTH: ICD-10-CM

## 2022-02-03 PROCEDURE — 97110 THERAPEUTIC EXERCISES: CPT | Mod: PN | Performed by: PHYSICAL THERAPIST

## 2022-02-03 PROCEDURE — 97164 PT RE-EVAL EST PLAN CARE: CPT | Mod: PN | Performed by: PHYSICAL THERAPIST

## 2022-02-03 PROCEDURE — 99499 UNLISTED E&M SERVICE: CPT | Mod: 95,,, | Performed by: ORTHOPAEDIC SURGERY

## 2022-02-03 PROCEDURE — 99499 NO LOS: ICD-10-PCS | Mod: 95,,, | Performed by: ORTHOPAEDIC SURGERY

## 2022-02-03 NOTE — PROGRESS NOTES
".temp      [facility]   Physical Therapy Treatment Note     [facility]  [facility] Number: [id number]    Therapy Diagnosis:   Encounter Diagnoses   Name Primary?    Decreased strength     Gait, antalgic      Physician: Emmanuel Garcia MD    Visit Date: 2/3/2022       Physician Orders: PT Eval and Treat  Medical Diagnosis from Referral: M17.12 (ICD-10-CM) - Primary osteoarthritis of left knee  Evaluation Date: 1/12/2022  Authorization Period Expiration: 12/15/2022  Plan of Care Expiration: 3/11/2022  Visit # / Visits authorized: 1/50  FOTO: 1/5  PTA Visit: 0/6    Time In: 8:06 am  Time Out: 9:13 am  Total Billable Time: 53 minutes (Re-eval, WILIAM)    SUBJECTIVE     Pt reports: She had a L TKA on 2/1/22 and was dc'd on 2/2/22. She arrived feeling groggy.  She reports that she didn't have "the shots to numb the nerves" bc insurance didn't approve, so pain is higher. Upon arrival, pain is 7/10. "The more activity, the more it hurts." It's stiffer than my R knee was.  She was not compliant with home exercise program.  Response to previous treatment: this is re-eval from new TKA  Functional change: this is re-eval from new TKA    Pain: 7/10  Location: left knee      OBJECTIVE     Objective Measures updated at progress report unless specified.     Treatment     Carolina received the treatments listed below:      therapeutic exercises to develop strength, endurance, ROM, flexibility, posture and core stabilization for 10 minutes including re-assessment and FOTO:  QS: 20x10"  Strap gastroc stretch: 3x20" L  Ankle pumps: 20x  Seated heel slides: 10x5"    manual therapy techniques: Joint mobilizations, Myofacial release and Soft tissue Mobilization were applied to the: L knee for 0 minutes, including:  Next        Patient Education and Home Exercises     Home Exercises Provided and Patient Education Provided     Education provided:   - expected recovery after TKA  - importance of HEP  - " benefit of compression stocking    Written Home Exercises Provided: yes. Exercises were reviewed and Carolina was able to demonstrate them prior to the end of the session.  Carolina demonstrated good  understanding of the education provided. See EMR under Patient Instructions for exercises provided during therapy sessions    ASSESSMENT     Carolina arrives for first post op visit (POD 2) after L TKA. She presents w/ significant L LE edema, L LE weakness (particularly the quad) and dec'd knee flexion ROM. Knee extension is already to zero degrees. Despite edema, no sign of blood clotting. No drainage or bruising able to be seen w/ clean surgical bandage. Pt did not have compression stocking on d/t being unable to put it on herself and stating that neither her nor her  would be able to. Her deficits limit her gait (using ww), transfers, ADL and IADL performance.     Carolina Is progressing well towards her goals.   Pt prognosis is Good.     Pt will continue to benefit from skilled outpatient physical therapy to address the deficits listed in the problem list box on initial evaluation, provide pt/family education and to maximize pt's level of independence in the home and community environment.     Pt's spiritual, cultural and educational needs considered and pt agreeable to plan of care and goals.     Anticipated barriers to physical therapy: co-morbidities, low back pain    Goals:   STG: 3 weeks  1. Patient will demonstrate an understanding of her HEP for improved ADL performance.  2. Patient will report worst L knee pain < 6/10 to improve ADL performance.  3. Patient will ambulate to and w/in the clinic with a SPC and safe pattern.  4. Patient will demonstrate improve L quad strength via MicroFET with > 10 kg to improve sit <> stand and gait.    LT wks  1. Patient will demonstrate ability to negotiate a flight of stairs w/ 1 handrail and reciprocal pattern safely.  2. Patient will be able to perform > 7 sit <> stands  from a chair w/o UE use.  3. Patient will complete TUG in < 11 sec w/o a device for improved safety w/ ambulation.  4. Patient will improve FOTO to < 44% for improved ADL performance.     PLAN     Cont per POC per TKA protocol    Arturo Ye, PT

## 2022-02-03 NOTE — PLAN OF CARE
"  Outpatient Therapy Updated Plan of Care     Visit Date: 2/3/2022  Name: Carolina Knight  [facility] Number: [id number]    Therapy Diagnosis:   Encounter Diagnoses   Name Primary?    Decreased strength     Gait, antalgic      Physician: Emmanuel Garcia MD    Physician Orders: PT Eval and Treat  Medical Diagnosis from Referral: M17.12 (ICD-10-CM) - Primary osteoarthritis of left knee  Evaluation Date: 2022    Total Visits Received: 2  Cancelled Visits: 0  No Show Visits: 0    Precautions:  Standard, Lymphodema of both legs, Sjogren's disease  Visits from Evaluation Date:  1  Functional Level Prior to Evaluation:  cannot kneel, unable to step up on L for stairs, stepping over large step, squatting/stooping    Subjective     Update: She had a L TKA on 22 and was dc'd on 22. She arrived feeling groggy.  She reports that she didn't have "the shots to numb the nerves" bc insurance didn't approve, so pain is higher. Upon arrival, pain is 7/10. "The more activity, the more it hurts." It's stiffer than my R knee was.    Objective     Update:      Sit <> stand: armrests, right weight shift, L leg kicked out   Gait: dec'd step length bilaterally, poor L push off and knee flexion (swing phase), slow  delaney.    NT = not tested  * = knee pain with testing  L knee MMT/ROM  AROM: 0-70  PROM: 2-0-79  L/E Strength w/ MicroFET Muscle Mariya Dynamometer Right Left Pain/Dysfunction with Movement   (approx 4 sec hold w/ max contraction)   Hip Flexion 11.7 kg  3.6 kg      Hip Abduction 16.5 kg  6.8 kg      Quadriceps 16.7 kg  2.8 kg      Hamstrings 15.2 kg  5.7 kg         TU seconds ww  30 second sit-to-stand test (with U/E support): 4x L knee slightly extended, R weight shift    Edema:   Girth Measurement Joint line 5 cm below 10 cm above   Left 54 cm 53 cm 67 cm   Right 51 cm 49 cm 64 cm     Assessment     Update: Carolina arrives for first post op visit (POD 2) after L TKA. She presents w/ significant L LE edema, " L LE weakness (particularly the quad) and dec'd knee flexion ROM. Knee extension is already to zero degrees. Despite edema, no sign of blood clotting. No drainage or bruising able to be seen w/ clean surgical bandage. Pt did not have compression stocking on d/t being unable to put it on herself and stating that neither her nor her  would be able to. Her deficits limit her gait (using ww), transfers, ADL and IADL performance. Based on her deficits, she is appropriate for skilled PT to help reach her goals.    New Goals:   STG: 3 weeks  1. Patient will demonstrate an understanding of her HEP for improved ADL performance.  2. Patient will report worst L knee pain < 6/10 to improve ADL performance.  3. Patient will ambulate to and w/in the clinic with a SPC and safe pattern.  4. Patient will demonstrate improve L quad strength via MicroFET with > 10 kg to improve sit <> stand and gait.    LT wks  1. Patient will demonstrate ability to negotiate a flight of stairs w/ 1 handrail and reciprocal pattern safely.  2. Patient will be able to perform > 7 sit <> stands from a chair w/o UE use.  3. Patient will complete TUG in < 11 sec w/o a device for improved safety w/ ambulation.  4. Patient will improve FOTO to < 44% for improved ADL performance.   Reasons for Recertification of Therapy:   Was previously seen pre-op    Plan     Updated Certification Period: 2/3/2022 to 3/18/22  Recommended Treatment Plan: 2-3 times per week for 6 weeks: Electrical Stimulation TENS, IFC, NMES, Gait Training, Manual Therapy, Moist Heat/ Ice, Neuromuscular Re-ed, Patient Education, Self Care, Therapeutic Activities, Therapeutic Exercise and dry needling  Other Recommendations: TKA protocol    Arturo Ye, PT  2/3/2022      I CERTIFY THE NEED FOR THESE SERVICES FURNISHED UNDER THIS PLAN OF TREATMENT AND WHILE UNDER MY CARE    Physician's comments:        Physician's Signature: ___________________________________________________

## 2022-02-03 NOTE — PROGRESS NOTES
I called the patient today regarding her surgery with Dr. Emmanuel Garcia. The patient had a left UKA on 2/1.     Pain Scale: 7 / 10    Any issues with Fever: No.    Any issues with medications (specifically DVT prophylaxis): No. ASA 81 BID    Any issues with bowel movements:  Passing morgan: No.                                                                 Urination: No.                                                                 Constipation: No. BM today 2/3    Completing at home exercises: Yes:     Any concerns regarding their dressing/bandage:  No.    Patient confirmed first OP-PT appointment:  [facility] on  2/3 at 0800 am.     Any other concerns: No.        The patient was informed that if they have any urgent issues with their bandage, medications or any other health concerns regarding their surgery to call the 24/7 Orthopedic Post-op Hot Line at [phone]. The patient was reminded that if they have any chest pain or shortness of breath to call 911 or go to the ER.    The patient verbalized understanding and does not have any other questions

## 2022-02-03 NOTE — ANESTHESIA POST-OP PAIN MANAGEMENT
Called patient at [phone]. Patient states pain well-controlled at this time. PNC infusing without issues. Reiterated plan for PNC removal on 2/6/2022. Patient stated understanding. Will follow up.

## 2022-02-04 ENCOUNTER — CLINICAL SUPPORT (OUTPATIENT)
Dept: REHABILITATION | Facility: HOSPITAL | Age: 67
End: 2022-02-04
Attending: ORTHOPAEDIC SURGERY
Payer: MEDICARE

## 2022-02-04 DIAGNOSIS — M17.12 PRIMARY OSTEOARTHRITIS OF LEFT KNEE: ICD-10-CM

## 2022-02-04 DIAGNOSIS — R26.89 GAIT, ANTALGIC: ICD-10-CM

## 2022-02-04 DIAGNOSIS — R53.1 DECREASED STRENGTH: ICD-10-CM

## 2022-02-04 PROCEDURE — 97110 THERAPEUTIC EXERCISES: CPT | Mod: PN

## 2022-02-04 NOTE — PROGRESS NOTES
"[facility]   Physical Therapy Treatment Note     Name: Carolina Knight  Clinic Number: [id number]    Therapy Diagnosis:   Encounter Diagnoses   Name Primary?    Primary osteoarthritis of left knee     Decreased strength     Gait, antalgic      Physician: Emmanuel Garcia MD    Visit Date: 2/4/2022    Physician Orders: PT Eval and Treat  Medical Diagnosis from Referral: M17.12 (ICD-10-CM) - Primary osteoarthritis of left knee  Evaluation Date: 1/12/2022  Authorization Period Expiration: 12/15/2022  Plan of Care Expiration: 3/11/2022  Visit # / Visits authorized: 2/50  FOTO: 2/5  PTA Visit: 0/6    Time In: 7:05 am  Time Out: 8:10 am  Total Billable Time: 55 minutes (4 TE)    SUBJECTIVE     Pt reports: Pain is better that last visit, but notices some extra drainage today on her bandage. Has been active at home doing self care activities and using rolling walker. Pt gave herself an extra dose of pain medication after she sat down on the treatment mat. Left leg does feel weaker than her right leg did after her R TKA.   She was not compliant with home exercise program.  Response to previous treatment: 1st after  Functional change: 1st after    Pain: 3/10  Location: left knee      OBJECTIVE     Objective Measures updated at progress report unless specified.     Treatment     Carolina received the treatments listed below:      therapeutic exercises to develop strength, endurance, ROM, flexibility, posture and core stabilization for 55 minutes including:  QS: 20x5" L, towel under knee  Strap hamstring/gastroc stretch: 4x20" L  SAQ: 2x8 L, Min A  Bridges: 2x10 DL  Sidelying hip abduction: 3x8 L  Supine AAROM heel slides: 2x10 L  AAROM SLR: 2x5 L, Mod A    Seated marching: 2x10 B, Min A for L  LAQ: 2x10 B    ice pack to left knee for 10 minutes with patient seated and LLE propped up on stool to decrease pain.    manual therapy techniques: Joint mobilizations, Myofacial release and Soft tissue " Mobilization were applied to the: L knee for 0 minutes, including:  Next    Patient Education and Home Exercises     Home Exercises Provided and Patient Education Provided   Education provided:   - expected recovery after TKA  - importance of HEP  - benefit of compression stocking    Written Home Exercises Provided: yes. Exercises were reviewed and Carolina was able to demonstrate them prior to the end of the session.  Carolina demonstrated good  understanding of the education provided. See EMR under Patient Instructions for exercises provided during therapy sessions    ASSESSMENT     Carolina arrives for first post op visit (POD 2) after L TKA. Minimal drainage on bandage present and compression stocking not donned today. Pt required minimal assist for the more difficult quad based exercises that gradually decreased with repetitions. Pt did well overall, and moderate edema and pain present throughout visit. Left knee AROM 0-80 degrees today.     Carolina Is progressing well towards her goals.   Pt prognosis is Good.     Pt will continue to benefit from skilled outpatient physical therapy to address the deficits listed in the problem list box on initial evaluation, provide pt/family education and to maximize pt's level of independence in the home and community environment.     Pt's spiritual, cultural and educational needs considered and pt agreeable to plan of care and goals.     Anticipated barriers to physical therapy: co-morbidities, low back pain    Goals:   STG: 3 weeks  1. Patient will demonstrate an understanding of her HEP for improved ADL performance.  2. Patient will report worst L knee pain < 6/10 to improve ADL performance.  3. Patient will ambulate to and w/in the clinic with a SPC and safe pattern.  4. Patient will demonstrate improve L quad strength via MicroFET with > 10 kg to improve sit <> stand and gait.    LT wks  1. Patient will demonstrate ability to negotiate a flight of stairs w/ 1 handrail and  reciprocal pattern safely.  2. Patient will be able to perform > 7 sit <> stands from a chair w/o UE use.  3. Patient will complete TUG in < 11 sec w/o a device for improved safety w/ ambulation.  4. Patient will improve FOTO to < 44% for improved ADL performance.     PLAN     Cont per POC per TKA protocol    Stephan Lugo, PT

## 2022-02-05 NOTE — ANESTHESIA POST-OP PAIN MANAGEMENT
02/05/2022    I called Carolina Knight in regards to the PNC and Nimbus pump.  Patient denies any symptoms of LAST.  Dressing clean, dry and intact.  No erythema swelling at the insertion site.  Patient reports infusion has completed and will take out the PNC.  I encouraged her to call should she have any questions or concerns.          Arturo Osorio MD   Fellow  Regional Anesthesiology and Acute Pain Medicine  [facility]

## 2022-02-07 ENCOUNTER — CLINICAL SUPPORT (OUTPATIENT)
Dept: REHABILITATION | Facility: HOSPITAL | Age: 67
End: 2022-02-07
Payer: MEDICARE

## 2022-02-07 DIAGNOSIS — R53.1 DECREASED STRENGTH: ICD-10-CM

## 2022-02-07 DIAGNOSIS — M17.12 PRIMARY OSTEOARTHRITIS OF LEFT KNEE: Primary | ICD-10-CM

## 2022-02-07 DIAGNOSIS — R26.89 GAIT, ANTALGIC: ICD-10-CM

## 2022-02-07 PROCEDURE — 97110 THERAPEUTIC EXERCISES: CPT | Mod: PN,CQ

## 2022-02-07 NOTE — PROGRESS NOTES
"[facility]   Physical Therapy Treatment Note     Name: Carolina Knight  [facility] Number: [id number]    Therapy Diagnosis:   Encounter Diagnoses   Name Primary?    Primary osteoarthritis of left knee Yes    Gait, antalgic     Decreased strength      Physician: Emmanuel Garcia MD    Visit Date: 2/7/2022    Physician Orders: PT Eval and Treat  Medical Diagnosis from Referral: M17.12 (ICD-10-CM) - Primary osteoarthritis of left knee  Evaluation Date: 1/12/2022  Authorization Period Expiration: 12/15/2022  Plan of Care Expiration: 3/11/2022  Visit # / Visits authorized: 3/50  FOTO: 3/5  PTA Visit: 1/6    Time In: 9:10 am  Time Out: 10:00 am  Total Billable Time: 40 minutes (3 TE)    SUBJECTIVE     Pt reports: they took her pain pump away on Saturday, her pain increased Sunday but is feeling a little better today, taking her oxy every 4 hours.   She was not compliant with home exercise program.  Response to previous treatment: increase in pain.  Functional change: not at this time.    Pain: 4/10  Location: left knee      OBJECTIVE     Objective Measures updated at progress report unless specified.     Treatment     Carolina received the treatments listed below:      therapeutic exercises to develop strength, endurance, ROM, flexibility, posture and core stabilization for 40 minutes including:    QS: 20x5" L, towel under knee  Strap hamstring/gastroc stretch: 4x20" L  AAROM SLR: 2x5 L, Mod A  Supine AAROM heel slides: 2x10 L  SAQ: 2x10 L, Min A  Bridges: 2x10 DL  Sidelying hip abduction: 3x10 L    Seated marching: 2x10 B, Min A for L  LAQ: 2x10 B    ice pack to left knee for 10 minutes with patient seated and LLE propped up on stool to decrease pain.    manual therapy techniques: Joint mobilizations, Myofacial release and Soft tissue Mobilization were applied to the: L knee for 0 minutes, including:  Next    Patient Education and Home Exercises     Home Exercises Provided and Patient " Education Provided   Education provided:   - expected recovery after TKA  - importance of HEP  - benefit of compression stocking    Written Home Exercises Provided: yes. Exercises were reviewed and Carolina was able to demonstrate them prior to the end of the session.  Carolina demonstrated good  understanding of the education provided. See EMR under Patient Instructions for exercises provided during therapy sessions    ASSESSMENT     Carolina arrives with minimal drainage on bandage present but no compression stocking donned today. Pt required minimal assist for the more difficult quad based exercises that gradually decreased with repetitions. Pt did well overall, and moderate edema and pain present throughout visit. Pt completed her therapy and had no difficulty with today's progressions noted in bold.     Carolina Is progressing well towards her goals.   Pt prognosis is Good.     Pt will continue to benefit from skilled outpatient physical therapy to address the deficits listed in the problem list box on initial evaluation, provide pt/family education and to maximize pt's level of independence in the home and community environment.     Pt's spiritual, cultural and educational needs considered and pt agreeable to plan of care and goals.     Anticipated barriers to physical therapy: co-morbidities, low back pain    Goals:   STG: 3 weeks  1. Patient will demonstrate an understanding of her HEP for improved ADL performance.  2. Patient will report worst L knee pain < 6/10 to improve ADL performance.  3. Patient will ambulate to and w/in the clinic with a SPC and safe pattern.  4. Patient will demonstrate improve L quad strength via MicroFET with > 10 kg to improve sit <> stand and gait.    LT wks  1. Patient will demonstrate ability to negotiate a flight of stairs w/ 1 handrail and reciprocal pattern safely.  2. Patient will be able to perform > 7 sit <> stands from a chair w/o UE use.  3. Patient will complete TUG in < 11  sec w/o a device for improved safety w/ ambulation.  4. Patient will improve FOTO to < 44% for improved ADL performance.     PLAN     Cont per POC per TKA protocol    Nathaniel Alves, PTA

## 2022-02-08 NOTE — PLAN OF CARE
02/01/22 1932   HOOKER Message   Medicare Outpatient and Observation Notification regarding financial responsibility Explained to patient/caregiver;Signed/date by patient/caregiver;Given to patient/caregiver   Date HOOKER was signed 02/01/22   Time HOOKER was signed 1932

## 2022-02-08 NOTE — PLAN OF CARE
[facility] (Hospital)  Discharge Final Note    Primary Care Provider: Kierra Cornejo MD    Expected Discharge Date: 2/2/2022    Final Discharge Note (most recent)     Final Note - 02/02/22 1156        Final Note    Assessment Type Final Discharge Note     Anticipated Discharge Disposition Home or Self Care     What phone number can be called within the next 1-3 days to see how you are doing after discharge? [phone]     Hospital Resources/Appts/Education Provided Provided patient/caregiver with written discharge plan information;Appointments scheduled by Navigator/Coordinator               Future Appointments   Date Time Provider Department Center   2/9/2022  8:15 AM Nathaniel Alves, PTA [facility] [place]   2/11/2022  9:30 AM Arturo Ye, PT [facility] [place]   2/14/2022 10:45 AM Deedee Mcgraw NP [facility] [place]   2/15/2022 10:00 AM Stephan Lugo, PT [facility] [place]   2/16/2022  9:00 AM Stephan Lugo, PT [facility] [place]   2/18/2022  9:30 AM Arturo Ye, PT [facility] [place]   2/21/2022  9:15 AM Arturo Ye, PT [facility] [place]   2/23/2022  9:00 AM Stephan Lugo, PT [facility] [place]   2/25/2022  9:30 AM Arturo Ye, PT [facility] [place]   2/28/2022  9:15 AM Arturo Ye, PT [facility] [place]   3/2/2022  9:15 AM Nathaniel Alves, PTA [facility] [place]   3/4/2022  9:00 AM Nathaniel Alves, PTA [facility] [place]   3/7/2022 10:00 AM Stephan Lugo, PT [facility] [place]   3/9/2022  9:15 AM Nathaniel Alves, PTA [facility] [place]   3/11/2022  9:00 AM Nathaniel Solimanred, PTA [facility] [place]   3/14/2022  9:15 AM Arturo Ye, PT [facility] [place]   3/16/2022  9:00 AM Stephan Lugo, PT [facility] [place]   3/18/2022  9:00 AM Nathaniel Alves, PTA [facility] [place]   3/21/2022  9:15 AM Arturo Ye, PT [facility] [place]   3/23/2022  9:00 AM Stephan Lugo, PT [facility] [place]   3/25/2022 10:00 AM Nathaniel W. Joselyn, PTA [facility] [place]   3/28/2022  9:15 AM Arturo Ye, PT [facility] [place]   3/30/2022  9:15 AM Nathaniel W. Joselyn, PTA [facility] [place]   3/31/2022 10:10 AM Samantha Avila MD [facility] [place]   4/1/2022  9:00 AM Stephan Lugo, PT [facility] [place]   4/4/2022  9:15 AM Arturo Ye, PT [facility] [place]   4/6/2022  9:15 AM Nathaniel MCINTOSH. Joselyn, PTA [facility] [place]   4/8/2022  9:00 AM Nathaniel Solimanred, PTA [facility] [place]   4/11/2022  9:15 AM Arturo Ye, PT [facility] [place]   4/13/2022  9:00 AM Stephan Lugo, PT [facility] [place]   4/25/2022 10:30 AM Mina Plummer MD PhD [facility] [place]   4/25/2022  1:40 PM Kierra Cornejo MD [facility]

## 2022-02-09 ENCOUNTER — CLINICAL SUPPORT (OUTPATIENT)
Dept: REHABILITATION | Facility: HOSPITAL | Age: 67
End: 2022-02-09
Payer: MEDICARE

## 2022-02-09 DIAGNOSIS — R53.1 DECREASED STRENGTH: ICD-10-CM

## 2022-02-09 DIAGNOSIS — R26.89 GAIT, ANTALGIC: ICD-10-CM

## 2022-02-09 DIAGNOSIS — M17.12 PRIMARY OSTEOARTHRITIS OF LEFT KNEE: ICD-10-CM

## 2022-02-09 PROCEDURE — 97110 THERAPEUTIC EXERCISES: CPT | Mod: PN,CQ

## 2022-02-09 NOTE — PROGRESS NOTES
"[facility]   Physical Therapy Treatment Note     Name: Carolina Knight  Clinic Number: [id number]    Therapy Diagnosis:   Encounter Diagnoses   Name Primary?    Decreased strength     Gait, antalgic     Primary osteoarthritis of left knee      Physician: Emmanuel Garcia MD    Visit Date: 2/9/2022    Physician Orders: PT Eval and Treat  Medical Diagnosis from Referral: M17.12 (ICD-10-CM) - Primary osteoarthritis of left knee  Evaluation Date: 1/12/2022  Authorization Period Expiration: 12/15/2022  Plan of Care Expiration: 3/11/2022  Visit # / Visits authorized: 4/50  FOTO: 4/5  PTA Visit: 2/6    Time In: 8:10 am  Time Out: 9:05 am  Total Billable Time: 45 minutes (3 TE)    SUBJECTIVE     Pt reports: feels her other knee surgery was easier than this one, struggling to get the swelling down regardless how long she ices/elevates  She was not compliant with home exercise program.  Response to previous treatment: soreness  Functional change: not at this time.    Pain: 4/10  Location: left knee      OBJECTIVE     Objective Measures updated at progress report unless specified.     Treatment     Carolina received the treatments listed below:      therapeutic exercises to develop strength, endurance, ROM, flexibility, posture and core stabilization for 40 minutes including:    QS: 20x5" L, towel under knee  Strap hamstring/gastroc stretch: 4x20" L  AAROM SLR: 2x5 L, Min A  Supine AAROM heel slides: 2x10 L  SAQ: 2x10 L, Min A  Bridges: 2x10 DL  Sidelying hip abduction: 3x10 L    Seated marching: 2x10 B,  LAQ: 2x10 B Min A    ice pack to left knee for 10 minutes with patient seated and LLE propped up on stool to decrease pain. - uncharged     manual therapy techniques: Joint mobilizations, Myofacial release and Soft tissue Mobilization were applied to the: L knee for 5 minutes, including:  Edema retro massage     Patient Education and Home Exercises     Home Exercises Provided and Patient " Education Provided   Education provided:   - expected recovery after TKA  - importance of HEP  - benefit of compression stocking    Written Home Exercises Provided: yes. Exercises were reviewed and Carolina was able to demonstrate them prior to the end of the session.  Carolina demonstrated good  understanding of the education provided. See EMR under Patient Instructions for exercises provided during therapy sessions    ASSESSMENT     Carolina arrived to session with 4/10 L knee pain and was agreeable to treatment.  Pt arrived with bandage, minimal drainage, and without compression stocking donned today. Pt reports more swelling and bruising this session and confirms elevation and ice use at home.  She required minimal assistance for straight leg raise and LAQ quad based exercises. Performed retro massage for edema reduction due to increased swelling this morning which pt responded positively. Carolina requested ice at completion of session for 10' on L knee to decrease pain and did not report any adverse response to cold modality.  She was appropriately fatigued at completion of session.     Carolina Is progressing well towards her goals.   Pt prognosis is Good.     Pt will continue to benefit from skilled outpatient physical therapy to address the deficits listed in the problem list box on initial evaluation, provide pt/family education and to maximize pt's level of independence in the home and community environment.     Pt's spiritual, cultural and educational needs considered and pt agreeable to plan of care and goals.     Anticipated barriers to physical therapy: co-morbidities, low back pain    Goals:   STG: 3 weeks  1. Patient will demonstrate an understanding of her HEP for improved ADL performance.  2. Patient will report worst L knee pain < 6/10 to improve ADL performance.  3. Patient will ambulate to and w/in the clinic with a SPC and safe pattern.  4. Patient will demonstrate improve L quad strength via MicroFET with >  10 kg to improve sit <> stand and gait.    LT wks  1. Patient will demonstrate ability to negotiate a flight of stairs w/ 1 handrail and reciprocal pattern safely.  2. Patient will be able to perform > 7 sit <> stands from a chair w/o UE use.  3. Patient will complete TUG in < 11 sec w/o a device for improved safety w/ ambulation.  4. Patient will improve FOTO to < 44% for improved ADL performance.     PLAN     Cont per POC per TKA protocol    Bhavana Mott, PTA

## 2022-02-11 ENCOUNTER — CLINICAL SUPPORT (OUTPATIENT)
Dept: REHABILITATION | Facility: HOSPITAL | Age: 67
End: 2022-02-11
Payer: MEDICARE

## 2022-02-11 DIAGNOSIS — R53.1 DECREASED STRENGTH: ICD-10-CM

## 2022-02-11 DIAGNOSIS — R26.89 GAIT, ANTALGIC: ICD-10-CM

## 2022-02-11 DIAGNOSIS — Z96.652 STATUS POST LEFT KNEE REPLACEMENT: Primary | ICD-10-CM

## 2022-02-11 DIAGNOSIS — M17.12 PRIMARY OSTEOARTHRITIS OF LEFT KNEE: ICD-10-CM

## 2022-02-11 PROCEDURE — 97110 THERAPEUTIC EXERCISES: CPT | Mod: PN | Performed by: PHYSICAL THERAPIST

## 2022-02-11 PROCEDURE — 97110 THERAPEUTIC EXERCISES: CPT | Mod: PN,CQ

## 2022-02-11 NOTE — PROGRESS NOTES
"[facility]   Physical Therapy Treatment Note     Name: Carolina Knight  Clinic Number: [id number]    Therapy Diagnosis:   Encounter Diagnoses   Name Primary?    Decreased strength     Gait, antalgic     Primary osteoarthritis of left knee      Physician: Emmanuel Garcia MD    Visit Date: 2/11/2022    Physician Orders: PT Eval and Treat  Medical Diagnosis from Referral: M17.12 (ICD-10-CM) - Primary osteoarthritis of left knee  Evaluation Date: 1/12/2022  Authorization Period Expiration: 12/15/2022  Plan of Care Expiration: 3/11/2022  Visit # / Visits authorized: 5/50  FOTO: 5/5 Perform next visit  PTA Visit: 0/6    Time In: 9:30 am  Time Out: 9:40 am  Total Billable Time: 10 1:1 minutes (1 TE)    SUBJECTIVE     Pt reports: she is concerned by what looks to be drainage in her surgical bandage  She was not compliant with home exercise program.  Response to previous treatment: soreness  Functional change: not at this time.    Pain: 3/10  Location: left knee      OBJECTIVE     2/11/22:  Knee AROM: 5-0-80 deg    Treatment     Carolina received the treatments listed below:      therapeutic exercises to develop strength, endurance, ROM, flexibility, posture and core stabilization for 10 minutes including ROM measurements:    QS: 20x5" L, towel under knee  Bandage removal and TKA incision inspection        Patient Education and Home Exercises     Home Exercises Provided and Patient Education Provided   Education provided:   - expected recovery after TKA  - importance of HEP  - benefit of compression stocking    Written Home Exercises Provided: yes. Exercises were reviewed and Carolina was able to demonstrate them prior to the end of the session.  Carolina demonstrated good  understanding of the education provided. See EMR under Patient Instructions for exercises provided during therapy sessions    ASSESSMENT     Carolina arrived w/ concerns regarding a quarter size Te-[place]ak of drainage on her surgical " bandage. She requested it be removed for inspection, so it was maintaining sterile technique. No active drainage was seen and incision appears to be healing well. Incision was re-covered with TEFLA bandages. See note from Afua Melo PTA for further assessment from today.    Carolina Is progressing well towards her goals.   Pt prognosis is Good.     Pt will continue to benefit from skilled outpatient physical therapy to address the deficits listed in the problem list box on initial evaluation, provide pt/family education and to maximize pt's level of independence in the home and community environment.     Pt's spiritual, cultural and educational needs considered and pt agreeable to plan of care and goals.     Anticipated barriers to physical therapy: co-morbidities, low back pain    Goals:   STG: 3 weeks  1. Patient will demonstrate an understanding of her HEP for improved ADL performance. MET  2. Patient will report worst L knee pain < 6/10 to improve ADL performance. PROGRESSING; NOT MET  3. Patient will ambulate to and w/in the clinic with a SPC and safe pattern. PROGRESSING; NOT MET  4. Patient will demonstrate improve L quad strength via MicroFET with > 10 kg to improve sit <> stand and gait. PROGRESSING; NOT MET    LT wks  1. Patient will demonstrate ability to negotiate a flight of stairs w/ 1 handrail and reciprocal pattern safely. PROGRESSING; NOT MET  2. Patient will be able to perform > 7 sit <> stands from a chair w/o UE use. PROGRESSING; NOT MET  3. Patient will complete TUG in < 11 sec w/o a device for improved safety w/ ambulation. PROGRESSING; NOT MET  4. Patient will improve FOTO to < 44% for improved ADL performance.  PROGRESSING; NOT MET    PLAN     Cont per POC per TKA protocol    Arturo Ye, PT

## 2022-02-11 NOTE — PROGRESS NOTES
"[facility]   Physical Therapy Treatment Note     Name: Carolina Knight  Clinic Number: [id number]    Therapy Diagnosis:   Encounter Diagnoses   Name Primary?    Decreased strength     Gait, antalgic     Primary osteoarthritis of left knee      Physician: Emmanuel Garcia MD    Visit Date: 2/11/2022    Physician Orders: PT Eval and Treat  Medical Diagnosis from Referral: M17.12 (ICD-10-CM) - Primary osteoarthritis of left knee  Evaluation Date: 1/12/2022  Authorization Period Expiration: 12/15/2022  Plan of Care Expiration: 3/11/2022  Visit # / Visits authorized: 5/50  FOTO: 5/5 NEXT  PTA Visit: 3/6    Time In: 9:40 am  Time Out: 10:30 am  Total Billable Time: 40 minutes (3 TE)    SUBJECTIVE     Pt reports: Continues to present with LE swelling but notes gradual improvements in strength and ROM.  She was not compliant with home exercise program.  Response to previous treatment: soreness  Functional change: not at this time.    Pain: 3/10  Location: left knee      OBJECTIVE     Objective Measures updated at progress report unless specified.     Treatment     Carolina received the treatments listed below:      therapeutic exercises to develop strength, endurance, ROM, flexibility, posture and core stabilization for 32 minutes including:    QS: 20x5" L, towel under knee NT  Strap hamstring/gastroc stretch: 4x20" L  AAROM SLR: 3x5 L, Min A  Supine AAROM heel slides: 2x10 L (Seated today)  SAQ: 2x10 L, Min A NT  Bridges: 2x10 DL NT  Sidelying hip abduction: 3x10 L    Seated marching: 2x10 B, NT  LAQ: 2x10 B Min A    Heel Raises 2x10  Standing hip ABD 2x10  Mini Squats 2x10      ice pack to left knee for 10 minutes with patient seated and LLE propped up on stool to decrease pain. - uncharged     manual therapy techniques: Joint mobilizations, Myofacial release and Soft tissue Mobilization were applied to the: L knee for 8 minutes, including:  Edema retro massage     Patient Education and " Home Exercises     Home Exercises Provided and Patient Education Provided   Education provided:   - expected recovery after TKA  - importance of HEP  - benefit of compression stocking    Written Home Exercises Provided: Patient instructed to cont prior HEP. Exercises were reviewed and Carolina was able to demonstrate them prior to the end of the session.  Carolina demonstrated good  understanding of the education provided. See EMR under Patient Instructions for exercises provided during therapy sessions    ASSESSMENT     Carolina arrived to session with 3/10 L knee pain and was agreeable to treatment.  Pt arrived with bandage and without compression stocking donned today. Bandage removed by Arturo Ye DPT see note for assessment. Continues to required minimal assistance for straight leg raise and LAQ quad based exercises. Good response to retro massage for edema reduction. Implemented weightbearing therex this date with overall fairly good tolerance. Some mild but tolerable discomfort with RLE weightbearing during hip abduction therex. Completed session with ice pack to left knee for pain and swelling management. Continue to progress as appropriate.     Carolina Is progressing well towards her goals.   Pt prognosis is Good.     Pt will continue to benefit from skilled outpatient physical therapy to address the deficits listed in the problem list box on initial evaluation, provide pt/family education and to maximize pt's level of independence in the home and community environment.     Pt's spiritual, cultural and educational needs considered and pt agreeable to plan of care and goals.     Anticipated barriers to physical therapy: co-morbidities, low back pain    Goals:   STG: 3 weeks  1. Patient will demonstrate an understanding of her HEP for improved ADL performance.  2. Patient will report worst L knee pain < 6/10 to improve ADL performance.  3. Patient will ambulate to and w/in the clinic with a SPC and safe pattern.  4.  Patient will demonstrate improve L quad strength via MicroFET with > 10 kg to improve sit <> stand and gait.    LT wks  1. Patient will demonstrate ability to negotiate a flight of stairs w/ 1 handrail and reciprocal pattern safely.  2. Patient will be able to perform > 7 sit <> stands from a chair w/o UE use.  3. Patient will complete TUG in < 11 sec w/o a device for improved safety w/ ambulation.  4. Patient will improve FOTO to < 44% for improved ADL performance.     PLAN     Cont per POC per TKA protocol    Deb Melo, PTA

## 2022-02-13 ENCOUNTER — PATIENT MESSAGE (OUTPATIENT)
Dept: ORTHOPEDICS | Facility: CLINIC | Age: 67
End: 2022-02-13
Payer: COMMERCIAL

## 2022-02-13 DIAGNOSIS — Z96.659 STATUS POST KNEE REPLACEMENT, UNSPECIFIED LATERALITY: Primary | ICD-10-CM

## 2022-02-13 RX ORDER — OXYCODONE HYDROCHLORIDE 5 MG/1
TABLET ORAL
Qty: 40 TABLET | Refills: 0 | Status: SHIPPED | OUTPATIENT
Start: 2022-02-13 | End: 2022-05-09

## 2022-02-13 RX ORDER — METHOCARBAMOL 750 MG/1
750 TABLET, FILM COATED ORAL 3 TIMES DAILY PRN
Qty: 30 TABLET | Refills: 0 | Status: SHIPPED | OUTPATIENT
Start: 2022-02-13 | End: 2022-05-09 | Stop reason: SDUPTHER

## 2022-02-14 ENCOUNTER — OFFICE VISIT (OUTPATIENT)
Dept: ORTHOPEDICS | Facility: CLINIC | Age: 67
End: 2022-02-14
Payer: MEDICARE

## 2022-02-14 DIAGNOSIS — Z96.652 STATUS POST LEFT KNEE REPLACEMENT: Primary | ICD-10-CM

## 2022-02-14 PROCEDURE — 99024 POSTOP FOLLOW-UP VISIT: CPT | Mod: POP,,, | Performed by: NURSE PRACTITIONER

## 2022-02-14 PROCEDURE — 99024 PR POST-OP FOLLOW-UP VISIT: ICD-10-PCS | Mod: POP,,, | Performed by: NURSE PRACTITIONER

## 2022-02-14 PROCEDURE — 99214 OFFICE O/P EST MOD 30 MIN: CPT | Mod: PBBFAC | Performed by: NURSE PRACTITIONER

## 2022-02-14 PROCEDURE — 99999 PR PBB SHADOW E&M-EST. PATIENT-LVL IV: ICD-10-PCS | Mod: PBBFAC,,, | Performed by: NURSE PRACTITIONER

## 2022-02-14 PROCEDURE — 99999 PR PBB SHADOW E&M-EST. PATIENT-LVL IV: CPT | Mod: PBBFAC,,, | Performed by: NURSE PRACTITIONER

## 2022-02-14 NOTE — PROGRESS NOTES
Carolina Knight presents for initial post-operative visit following a left total knee arthroplasty performed by Dr. Garcia on 2/1/2022.      Exam:   Ambulating well with assistive device.  Incision is clean and dry without drainage or erythema.   ROM:0-90- affected by considerable swelling. Pt admits that she hasn't elevated today and has been up and around all morning.    Initial post-operative radiographs reviewed today revealing a well fixed and aligned prosthesis.    A/P:  2 weeks s/p left total knee replacement  - The patient was advised to keep the incision clean and dry for the next 24 hours after which she may wash the area with antibacterial soap in the shower. Will not submerge until the incision is completely healed.   - Outpatient PT ongoing at [facility]  - Continue aspirin for 1 month post op  - Pain medication refilled this past weekend  - Follow up in 4 weeks with new x-rays. Pt will call clinic with problems/concerns.

## 2022-02-15 ENCOUNTER — CLINICAL SUPPORT (OUTPATIENT)
Dept: REHABILITATION | Facility: HOSPITAL | Age: 67
End: 2022-02-15
Payer: MEDICARE

## 2022-02-15 DIAGNOSIS — R53.1 DECREASED STRENGTH: ICD-10-CM

## 2022-02-15 DIAGNOSIS — M17.12 PRIMARY OSTEOARTHRITIS OF LEFT KNEE: ICD-10-CM

## 2022-02-15 DIAGNOSIS — R26.89 GAIT, ANTALGIC: ICD-10-CM

## 2022-02-15 PROCEDURE — 97140 MANUAL THERAPY 1/> REGIONS: CPT | Mod: PN,CQ

## 2022-02-15 PROCEDURE — 97110 THERAPEUTIC EXERCISES: CPT | Mod: PN,CQ

## 2022-02-15 NOTE — PROGRESS NOTES
"[facility]   Physical Therapy Treatment Note     Name: Carolina Knight  Clinic Number: [id number]    Therapy Diagnosis:   Encounter Diagnoses   Name Primary?    Decreased strength     Gait, antalgic     Primary osteoarthritis of left knee      Physician: Emmanuel Garcia MD    Visit Date: 2/15/2022    Physician Orders: PT Eval and Treat  Medical Diagnosis from Referral: M17.12 (ICD-10-CM) - Primary osteoarthritis of left knee  Evaluation Date: 1/12/2022  Authorization Period Expiration: 12/15/2022  Plan of Care Expiration: 3/11/2022  Visit # / Visits authorized: 6/50  FOTO: 7/5 NEXT  PTA Visit: 4/6    Time In: 10:00 am  Time Out: 10:55 am  Total Billable Time: 55 minutes (3 TE, 1MT)    SUBJECTIVE     Pt reports: getting bandage removed recently, reports increased swelling today that she attributes from being on her feet a lot yesterday. Pain is mostly in medial knee where most swelling is.  She was not compliant with home exercise program.  Response to previous treatment: soreness  Functional change: not at this time.    Pain: 5/10  Location: left knee      OBJECTIVE     Objective Measures updated at progress report unless specified.     L Knee Flexion: 103 AAROM    Treatment     Carolina received the treatments listed below:      therapeutic exercises to develop strength, endurance, ROM, flexibility, posture and core stabilization for 45 minutes including:    QS: 20x5" L, towel under ankle  Strap hamstring/gastroc stretch: 4x20" L  AAROM SLR: 3x5 L, Min A  Supine AAROM heel slides: 2x10 L (No seated)  SAQ: 2x10 L, Min A   Bridges: 2x10 DL   Sidelying hip abduction: 3x10 L    Seated marching: 2x10 B, NT  LAQ: 2x10 B Min A    Heel Raises 2x10  Standing hip ABD 2x10  Mini Squats 2x10  Lunges at 2nd step x20      ice pack to left knee for 00 minutes with patient seated and LLE propped up on stool to decrease pain. - uncharged     manual therapy techniques: Joint mobilizations, Myofacial " release and Soft tissue Mobilization were applied to the: L knee for 10 minutes, including:  Edema retro massage     Patient Education and Home Exercises     Home Exercises Provided and Patient Education Provided   Education provided:   - expected recovery after TKA  - importance of HEP  - benefit of compression stocking    Written Home Exercises Provided: Patient instructed to cont prior HEP. Exercises were reviewed and aCrolina was able to demonstrate them prior to the end of the session.  Carolina demonstrated good  understanding of the education provided. See EMR under Patient Instructions for exercises provided during therapy sessions    ASSESSMENT     Carolina arrived to session with 5/10 L knee pain. Reports increased LE swelling that she attributes to being on her feet a lot yesterday. Minimal drainage noted and incision appears to be healing well. Good response to retro massage for edema reduction. Improved strength noted by ability to complete SAQ and LAQ without assistance today. Improvement in knee AAROM also noted with measurements taken today. Continue to progress as appropriate.     Carolina Is progressing well towards her goals.   Pt prognosis is Good.     Pt will continue to benefit from skilled outpatient physical therapy to address the deficits listed in the problem list box on initial evaluation, provide pt/family education and to maximize pt's level of independence in the home and community environment.     Pt's spiritual, cultural and educational needs considered and pt agreeable to plan of care and goals.     Anticipated barriers to physical therapy: co-morbidities, low back pain    Goals:   STG: 3 weeks  1. Patient will demonstrate an understanding of her HEP for improved ADL performance.  2. Patient will report worst L knee pain < 6/10 to improve ADL performance.  3. Patient will ambulate to and w/in the clinic with a SPC and safe pattern.  4. Patient will demonstrate improve L quad strength via  MicroFET with > 10 kg to improve sit <> stand and gait.    LT wks  1. Patient will demonstrate ability to negotiate a flight of stairs w/ 1 handrail and reciprocal pattern safely.  2. Patient will be able to perform > 7 sit <> stands from a chair w/o UE use.  3. Patient will complete TUG in < 11 sec w/o a device for improved safety w/ ambulation.  4. Patient will improve FOTO to < 44% for improved ADL performance.     PLAN     Cont per POC per TKA protocol    Deb Melo, PTA

## 2022-02-16 ENCOUNTER — CLINICAL SUPPORT (OUTPATIENT)
Dept: REHABILITATION | Facility: HOSPITAL | Age: 67
End: 2022-02-16
Payer: MEDICARE

## 2022-02-16 DIAGNOSIS — R53.1 DECREASED STRENGTH: ICD-10-CM

## 2022-02-16 DIAGNOSIS — M17.12 PRIMARY OSTEOARTHRITIS OF LEFT KNEE: ICD-10-CM

## 2022-02-16 DIAGNOSIS — R26.89 GAIT, ANTALGIC: ICD-10-CM

## 2022-02-16 PROCEDURE — 97110 THERAPEUTIC EXERCISES: CPT | Mod: PN

## 2022-02-16 NOTE — PROGRESS NOTES
"[facility]   Physical Therapy Treatment Note     Name: Carolina Knight  Clinic Number: [id number]    Therapy Diagnosis:   Encounter Diagnoses   Name Primary?    Decreased strength     Gait, antalgic     Primary osteoarthritis of left knee      Physician: Emmanuel Garcia MD    Visit Date: 2/16/2022    Physician Orders: PT Eval and Treat  Medical Diagnosis from Referral: M17.12 (ICD-10-CM) - Primary osteoarthritis of left knee  Evaluation Date: 1/12/2022  Authorization Period Expiration: 12/15/2022  Plan of Care Expiration: 3/11/2022  Visit # / Visits authorized: 8/50  FOTO: 8/10 Done 2/16/2022  PTA Visit: 0/6    Time In: 9:05 AM  Time Out: 10:10 AM  Total Billable Time: 25 minutes (2 TE)    SUBJECTIVE     Pt reports: was sore after last visit and rested more yesterday. Pt still did some of her exercises yesterday. Sleeping ok but woke up in the morning to go sleep in her recliner with some ice today.   She was not compliant with home exercise program.  Response to previous treatment: soreness  Functional change: not at this time.    Pain: 3-4/10  Location: left knee      OBJECTIVE     Objective Measures updated at progress report unless specified.     L Knee Flexion: 0-103 AAROM    Treatment     Carolina received the treatments listed below:      therapeutic exercises to develop strength, endurance, ROM, flexibility, posture and core stabilization for 55 minutes including:    QS: 20x5" L, towel under ankle  AAROM SLR: 3x5 L, no assist today  Strap hamstring/gastroc stretch: 4x20" L  SAQ: 10x10'' holds L  Sidelying hip abduction: 3x8 L, 1#  Supine AAROM heel slides: 2x10 L  And 6x10'' holds with 2 straps (No seated)    LAQ: 2x10 B    Not done today: Next  Heel Raises 2x10  Standing hip ABD 2x10  Mini Squats 2x10  Lunges at 2nd step x20    ice pack to left knee for 10 minutes with patient seated and LLE propped up on stool to decrease pain    manual therapy techniques: Joint " mobilizations, Myofacial release and Soft tissue Mobilization were applied to the: L knee for 00 minutes, including:  Edema retro massage     Patient Education and Home Exercises     Home Exercises Provided and Patient Education Provided   Education provided:   - expected recovery after TKA  - importance of HEP  - benefit of compression stocking    Written Home Exercises Provided: Patient instructed to cont prior HEP. Exercises were reviewed and Carolina was able to demonstrate them prior to the end of the session.  Carolina demonstrated good  understanding of the education provided. See EMR under Patient Instructions for exercises provided during therapy sessions    ASSESSMENT     Pt progressing in range of motion and limited due to the amount of left knee edema present. She would benefit from long edema stockings that can be trialed next visit. Progress to back to standing activities next visit.     Caorlina Is progressing well towards her goals.   Pt prognosis is Good.     Pt will continue to benefit from skilled outpatient physical therapy to address the deficits listed in the problem list box on initial evaluation, provide pt/family education and to maximize pt's level of independence in the home and community environment.     Pt's spiritual, cultural and educational needs considered and pt agreeable to plan of care and goals.     Anticipated barriers to physical therapy: co-morbidities, low back pain    Goals:   STG: 3 weeks  1. Patient will demonstrate an understanding of her HEP for improved ADL performance.  2. Patient will report worst L knee pain < 6/10 to improve ADL performance.  3. Patient will ambulate to and w/in the clinic with a SPC and safe pattern.  4. Patient will demonstrate improve L quad strength via MicroFET with > 10 kg to improve sit <> stand and gait.    LT wks  1. Patient will demonstrate ability to negotiate a flight of stairs w/ 1 handrail and reciprocal pattern safely.  2. Patient will be  able to perform > 7 sit <> stands from a chair w/o UE use.  3. Patient will complete TUG in < 11 sec w/o a device for improved safety w/ ambulation.  4. Patient will improve FOTO to < 44% for improved ADL performance.     PLAN     Cont per POC per TKA protocol    Stephan Lugo, PT

## 2022-02-18 ENCOUNTER — CLINICAL SUPPORT (OUTPATIENT)
Dept: REHABILITATION | Facility: HOSPITAL | Age: 67
End: 2022-02-18
Payer: MEDICARE

## 2022-02-18 DIAGNOSIS — R53.1 DECREASED STRENGTH: ICD-10-CM

## 2022-02-18 DIAGNOSIS — R26.89 GAIT, ANTALGIC: ICD-10-CM

## 2022-02-18 DIAGNOSIS — M17.12 PRIMARY OSTEOARTHRITIS OF LEFT KNEE: ICD-10-CM

## 2022-02-18 PROCEDURE — 97110 THERAPEUTIC EXERCISES: CPT | Mod: PN

## 2022-02-18 PROCEDURE — 97140 MANUAL THERAPY 1/> REGIONS: CPT | Mod: PN

## 2022-02-18 NOTE — PROGRESS NOTES
"[facility]   Physical Therapy Treatment Note     Name: Carolina Knight  Clinic Number: [id number]    Therapy Diagnosis:   Encounter Diagnoses   Name Primary?    Decreased strength     Gait, antalgic     Primary osteoarthritis of left knee      Physician: Emmanuel Garcia MD    Visit Date: 2/18/2022    Physician Orders: PT Eval and Treat  Medical Diagnosis from Referral: M17.12 (ICD-10-CM) - Primary osteoarthritis of left knee  Evaluation Date: 1/12/2022  Authorization Period Expiration: 12/15/2022  Plan of Care Expiration: 3/11/2022  Visit # / Visits authorized: 9/50  FOTO: 9/10  PTA Visit: 0/5    Time In: 1001  Time Out: 1117  Total Billable Time: 61 minutes    SUBJECTIVE     Pt reports: sore this morning in front of her knee  She was compliant with home exercise program.  Response to previous treatment: continued achiness at rest  Functional change: presents with single point cane vs rolling walker     Pain: 4/10  Location: left knee      OBJECTIVE     Objective Measures updated at progress report unless specified.     L Knee Flexion: 0-103 AAROM    Treatment     Carolina received the treatments listed below:      therapeutic exercises to develop strength, endurance, ROM, flexibility, posture and core stabilization for 46 minutes including:    Recumbent bike: 5' at level 2.5 for endurance and knee flexion active assisted range of motion     Mat:  Quad sets: 5"x20 left into towel roll *  Straight leg: 2x8 left *  Heel slides: 5"x15 left with straps *  Long arc quads: 1 lb 2x10 left     *=head of mat elevated    Standing:  Heel raises: 2x10 double leg  Standing hip abduction: 2x10 bilateral   Gait x 100 feet with single point cane and 2 point pattern; modified independent  Stair navigation:  4 six-inch steps with step to pattern challenging left lower extremity and bilateral upper extremity support     4 six inch steps with step to pattern challenging right (unaffected) lower " extremity and unilateral upper extremity support    manual therapy techniques: were applied to the: L knee for 15 minutes, including:  Effleurage to lower leg and knee  Grade II-III patellar mobilizations  Passive physiological knee flexion and extension  Donning of knee high LENORE hose to left lower extremity for edema control. Unable to don thigh high secondary to pant wear    cold pack for 10 minutes to left knee for pain and edema reduction.    Patient Education and Home Exercises     Home Exercises Provided and Patient Education Provided   Education provided:   - rehab guidelines and goals post tka  - importance of home exercise program   - avoid stairs without handrails  - benefit of compression stocking; don personal sock/stocking if current sinches at and bothers ankle    Written Home Exercises Provided: Patient instructed to cont prior HEP. Exercises were reviewed and Carolina was able to demonstrate them prior to the end of the session.  Carolina demonstrated good  understanding of the education provided. See EMR under Patient Instructions for exercises provided during therapy sessions    ASSESSMENT     Patient presents to physical therapy 2 1/2 weeks post left total knee arthroplasty. Flexion progressing gradually; swelling plays a role. Quad control good during open chain exercises and stair navigation. Cane use appropriate.    Carolina Is progressing well towards her goals.   Pt prognosis is Good.     Pt will continue to benefit from skilled outpatient physical therapy to address the deficits listed in the problem list box on initial evaluation, provide pt/family education and to maximize pt's level of independence in the home and community environment.     Pt's spiritual, cultural and educational needs considered and pt agreeable to plan of care and goals.     Anticipated barriers to physical therapy: co-morbidities, low back pain    Goals:   STG: 3 weeks  1. Patient will demonstrate an understanding of her HEP  for improved ADL performance.  2. Patient will report worst L knee pain < 6/10 to improve ADL performance.  3. Patient will ambulate to and w/in the clinic with a SPC and safe pattern.  4. Patient will demonstrate improve L quad strength via MicroFET with > 10 kg to improve sit <> stand and gait.    LT wks  1. Patient will demonstrate ability to negotiate a flight of stairs w/ 1 handrail and reciprocal pattern safely.  2. Patient will be able to perform > 7 sit <> stands from a chair w/o UE use.  3. Patient will complete TUG in < 11 sec w/o a device for improved safety w/ ambulation.  4. Patient will improve FOTO to < 44% for improved ADL performance.     PLAN     Phase II-III of TKA protocol    Leigha Zhou, PT

## 2022-02-21 ENCOUNTER — CLINICAL SUPPORT (OUTPATIENT)
Dept: REHABILITATION | Facility: HOSPITAL | Age: 67
End: 2022-02-21
Payer: MEDICARE

## 2022-02-21 DIAGNOSIS — M17.12 PRIMARY OSTEOARTHRITIS OF LEFT KNEE: ICD-10-CM

## 2022-02-21 DIAGNOSIS — R26.89 GAIT, ANTALGIC: ICD-10-CM

## 2022-02-21 DIAGNOSIS — R53.1 DECREASED STRENGTH: Primary | ICD-10-CM

## 2022-02-21 PROCEDURE — 97110 THERAPEUTIC EXERCISES: CPT | Mod: PN | Performed by: PHYSICAL THERAPIST

## 2022-02-21 NOTE — PROGRESS NOTES
"[facility]   Physical Therapy Treatment Note     Name: Carolina Knight  [facility] Number: [id number]    Therapy Diagnosis:   Encounter Diagnoses   Name Primary?    Decreased strength Yes    Gait, antalgic     Primary osteoarthritis of left knee      Physician: Emmanuel Garcia MD    Visit Date: 2022    Physician Orders: PT Eval and Treat  Medical Diagnosis from Referral: M17.12 (ICD-10-CM) - Primary osteoarthritis of left knee  Evaluation Date: 2022  Authorization Period Expiration: 12/15/2022  Plan of Care Expiration: 3/11/2022  Visit # / Visits authorized: 10/50  FOTO: 10/10  PTA Visit: 0    Time In: 9:19 am  Time Out: 10:25 am  Total Billable Time: 54 minutes (TEx4) with 10 minutes cold pack unbilled    SUBJECTIVE     Pt reports: that she pushed herself this weekend and is doing pretty well. She is wearing compression stockings that were fitted for her because the swelling doesn't seem to go down.  She was compliant with home exercise program.  Response to previous treatment: continued achiness at rest  Functional change: presents with single point cane vs rolling walker     Pain: 3.5/10  Location: left knee      OBJECTIVE     Objective Measures updated at progress report unless specified.     22:  L Knee Flexion: 0-103 AAROM    30 second sit <> stand: 7x (armrests only for 1st repetition)  TU seconds with cane and 11 seconds no device    Treatment     Carolina received the treatments listed below:      therapeutic exercises to develop strength, endurance, ROM, flexibility, posture and core stabilization for 46 minutes including testing:    Recumbent bike: 5' at seat 12 at level 2.5 for endurance and knee flexion active assisted range of motion      Mat:  Quad sets: 5"x20 left into towel roll * (not today)  Straight leg: 2x10 left *  Heel slides: 5"x15 left with straps *  Long arc quads: 3 lb 2x10 left     *=head of mat elevated    Standing:  Heel raises: 2x10 " "double leg  Mini squats: 2x10 with visual cues in mirror for weight shifting  single leg stance: 5x10" L  Hurdles progression:   Step-to leading with left (1 lap holding on to railing and 1 lap without)   Reciprocal: 3 laps holding railing  Knee flexion lunge stretch at stairs: 15x5" L  Standing hip abduction: 2x10 bilateral   Step ups: 4" step 10x each (no railing)    manual therapy techniques: were applied to the: L knee for 00 minutes, including:  Effleurage to lower leg and knee  Grade II-III patellar mobilizations  Passive physiological knee flexion and extension  Donning of knee high LENORE hose to left lower extremity for edema control. Unable to don thigh high secondary to pant wear    cold pack for 10 minutes to left knee for pain and edema reduction.    Patient Education and Home Exercises     Home Exercises Provided and Patient Education Provided   Education provided:   - rehab guidelines and goals post tka  - importance of home exercise program   - avoid stairs without handrails  - benefit of compression stocking; don personal sock/stocking if current sinches at and bothers ankle    Written Home Exercises Provided: Patient instructed to cont prior HEP. Exercises were reviewed and Carolina was able to demonstrate them prior to the end of the session.  Carolina demonstrated good  understanding of the education provided. See EMR under Patient Instructions for exercises provided during therapy sessions    ASSESSMENT     Patient presents to physical therapy 3 weeks status post left total knee arthroplasty. Today's session focused more on closed chain strengthening and dynamic balance. She's recently progressed to using a cane, but is safe ambulating short distances without a device. Cues to avoid circumduction needed with hurdles.     Carolina Is progressing well towards her goals.   Pt prognosis is Good.     Pt will continue to benefit from skilled outpatient physical therapy to address the deficits listed in the " problem list box on initial evaluation, provide pt/family education and to maximize pt's level of independence in the home and community environment.     Pt's spiritual, cultural and educational needs considered and pt agreeable to plan of care and goals.     Anticipated barriers to physical therapy: co-morbidities, low back pain    Goals:   STG: 3 weeks  1. Patient will demonstrate an understanding of her HEP for improved ADL performance.  2. Patient will report worst L knee pain < 6/10 to improve ADL performance.  3. Patient will ambulate to and w/in the clinic with a SPC and safe pattern.  4. Patient will demonstrate improve L quad strength via MicroFET with > 10 kg to improve sit <> stand and gait.    LT wks  1. Patient will demonstrate ability to negotiate a flight of stairs w/ 1 handrail and reciprocal pattern safely.  2. Patient will be able to perform > 7 sit <> stands from a chair w/o UE use.  3. Patient will complete TUG in < 11 sec w/o a device for improved safety w/ ambulation.  4. Patient will improve FOTO to < 44% for improved ADL performance.     PLAN     Phase II-III of TKA protocol    Arturo Ye, PT

## 2022-02-23 ENCOUNTER — CLINICAL SUPPORT (OUTPATIENT)
Dept: REHABILITATION | Facility: HOSPITAL | Age: 67
End: 2022-02-23
Payer: MEDICARE

## 2022-02-23 DIAGNOSIS — D84.9 IMMUNOSUPPRESSED STATUS: ICD-10-CM

## 2022-02-23 DIAGNOSIS — R53.1 DECREASED STRENGTH: Primary | ICD-10-CM

## 2022-02-23 DIAGNOSIS — R26.89 GAIT, ANTALGIC: ICD-10-CM

## 2022-02-23 DIAGNOSIS — M17.12 PRIMARY OSTEOARTHRITIS OF LEFT KNEE: ICD-10-CM

## 2022-02-23 PROCEDURE — 97110 THERAPEUTIC EXERCISES: CPT | Mod: PN

## 2022-02-23 NOTE — PROGRESS NOTES
"[facility]   Physical Therapy Treatment Note     Name: Carolina Knight  [facility] Number: [id number]    Therapy Diagnosis:   Encounter Diagnoses   Name Primary?    Decreased strength Yes    Gait, antalgic     Primary osteoarthritis of left knee      Physician: Emmanuel Garcia MD    Visit Date: 2022    Physician Orders: PT Eval and Treat  Medical Diagnosis from Referral: M17.12 (ICD-10-CM) - Primary osteoarthritis of left knee  Evaluation Date: 2022  Authorization Period Expiration: 12/15/2022  Plan of Care Expiration: 3/11/2022  Visit # / Visits authorized:   FOTO: 11/10 - Done 2022  PTA Visit: 05    Time In: 9:01 am  Time Out: 10:00 am  Total Billable Time: 59 minutes (TEx4)    SUBJECTIVE     Pt reports: some soreness today, and trying to take less of her pain medication  She was compliant with home exercise program.  Response to previous treatment: continued achiness at rest  Functional change: presents with single point cane vs rolling walker     Pain: 3/10  Location: left knee      OBJECTIVE     Objective Measures updated at progress report unless specified.     2022:  30 second sit <> stand: 7x (armrests only for 1st repetition)  TU seconds with cane and 11 seconds no device    2022:  L Knee Flexion: 0-110 AAROM    Treatment     Carolina received the treatments listed below:      therapeutic exercises to develop strength, endurance, ROM, flexibility, posture and core stabilization for 59 minutes including testing:    Recumbent bike: 5' at seat 12 at level 2.5 for endurance and knee flexion active assisted range of motion      Mat: *head of mat elevated  Straight leg: 3x10 left (1# next)  Heel slides: 8x10'' holds, left with straps  Long arc quads: 3 lb 2x15 left (5# next)    Standing:  Heel raises: 2x15 off ramp double leg  Step ups: 6" step 2x12 each (no railing)  Standing hip abduction: 3x10 bilateral on airex  Lateral step downs: 2x8 left, 4 " inch step  Leg press: 3x10 double leg, 5 plates, sled=4 - Not done, Next    manual therapy techniques: were applied to the: L knee for 00 minutes, including:  Effleurage to lower leg and knee  Grade II-III patellar mobilizations  Passive physiological knee flexion and extension  Donning of knee high LENORE hose to left lower extremity for edema control. Unable to don thigh high secondary to pant wear    cold pack for 00 minutes to left knee for pain and edema reduction.    Patient Education and Home Exercises     Home Exercises Provided and Patient Education Provided   Education provided:   - rehab guidelines and goals post tka  - importance of home exercise program   - avoid stairs without handrails  - benefit of compression stocking; don personal sock/stocking if current sinches at and bothers ankle    Written Home Exercises Provided: Patient instructed to cont prior HEP. Exercises were reviewed and Carolina was able to demonstrate them prior to the end of the session.  Carolina demonstrated good  understanding of the education provided. See EMR under Patient Instructions for exercises provided during therapy sessions    ASSESSMENT     Patient presents to physical therapy 3 weeks status post left total knee arthroplasty. Progressing SPC use, and can gait train outside with SPC as needed. Focusing on more weight bearing activities and resistive training to progress quad strength. Pt reported a very small left knee buckle this morning though no loss of balance.     Carolina Is progressing well towards her goals.   Pt prognosis is Good.     Pt will continue to benefit from skilled outpatient physical therapy to address the deficits listed in the problem list box on initial evaluation, provide pt/family education and to maximize pt's level of independence in the home and community environment.     Pt's spiritual, cultural and educational needs considered and pt agreeable to plan of care and goals.     Anticipated barriers to  physical therapy: co-morbidities, low back pain    Goals:   STG: 3 weeks  1. Patient will demonstrate an understanding of her HEP for improved ADL performance. - progressing  2. Patient will report L knee pain < 6/10 to improve ADL performance. - progressing  3. Patient will ambulate to and w/in the clinic with a SPC and safe pattern. - progressing  4. Patient will demonstrate improve L quad strength via MicroFET with > 10 kg to improve sit <> stand and gait. - progressing    LT wks  1. Patient will demonstrate ability to negotiate a flight of stairs w/ 1 handrail and reciprocal pattern safely.- progressing  2. Patient will be able to perform > 7 sit <> stands from a chair w/o UE use. - progressing  3. Patient will complete TUG in < 11 sec w/o a device for improved safety w/ ambulation. - progressing  4. Patient will improve FOTO to < 44% for improved ADL performance. - progressing    PLAN     Phase II-III of TKA protocol    Stephan Lugo, PT

## 2022-02-25 ENCOUNTER — CLINICAL SUPPORT (OUTPATIENT)
Dept: REHABILITATION | Facility: HOSPITAL | Age: 67
End: 2022-02-25
Payer: MEDICARE

## 2022-02-25 DIAGNOSIS — M17.12 PRIMARY OSTEOARTHRITIS OF LEFT KNEE: ICD-10-CM

## 2022-02-25 DIAGNOSIS — R26.89 GAIT, ANTALGIC: ICD-10-CM

## 2022-02-25 DIAGNOSIS — R53.1 DECREASED STRENGTH: Primary | ICD-10-CM

## 2022-02-25 PROCEDURE — 97530 THERAPEUTIC ACTIVITIES: CPT | Mod: PN | Performed by: PHYSICAL THERAPIST

## 2022-02-25 PROCEDURE — 97110 THERAPEUTIC EXERCISES: CPT | Mod: PN | Performed by: PHYSICAL THERAPIST

## 2022-02-25 NOTE — PROGRESS NOTES
[facility]   Physical Therapy Treatment Note     Name: Carolina Knight  [facility] Number: [id number]    Therapy Diagnosis:   Encounter Diagnoses   Name Primary?    Decreased strength Yes    Gait, antalgic     Primary osteoarthritis of left knee      Physician: Emmanuel Garcia MD    Visit Date: 2022    Physician Orders: PT Eval and Treat  Medical Diagnosis from Referral: M17.12 (ICD-10-CM) - Primary osteoarthritis of left knee  Evaluation Date: 2022  Authorization Period Expiration: 12/15/2022  Plan of Care Expiration: 3/11/2022  Visit # / Visits authorized:  (+ eval)  FOTO:  - Done 2022  PTA Visit: 0    Time In: 9:40 am  Time Out: 10:35 am  Total Billable Time: 55 minutes (TEx2, TAx2)    SUBJECTIVE     Pt reports: She's stopped most of her medications now and she feels like she's gone through some withdrawal.  She was compliant with home exercise program.  Response to previous treatment: continued achiness at rest  Functional change: presents with single point cane vs rolling walker     Pain: 2/10  Location: left knee      OBJECTIVE     Objective Measures updated at progress report unless specified.     2022:  30 second sit <> stand: 7x (armrests only for 1st repetition)  TU seconds with cane and 11 seconds no device    2022:  L Knee Flexion: 0-110 AAROM    22  Quad strength: 10.1 kg    Treatment     Carolina received the treatments listed below:      therapeutic exercises to develop strength, endurance, ROM, flexibility, posture and core stabilization for 25 minutes including testing:    Recumbent bike: 5' at seat 12 at level 2.5 for endurance and knee flexion active assisted range of motion      Mat: *head of mat elevated  Straight leg: 3x10 left 1 lb   Heel slides: 20x5'' holds, left with straps  Long arc quads: 5 lb 2x15 left    Standing:  Heel raises: 3x10   Standing hip abduction: 3x10 bilateral on airex  Lateral step downs: 2x8 left,  "4 inch step - not today      therapeutic activities to improve functional performance for 30  minutes, including:  Leg press: 3x10 double leg, 6 plates, sled=5    2x10, 3 plates, Left, sled 5   TKE: 30x with Blue theraband   Step ups: 6" step 3x10 each (no railing)  Squats: 2x10 no upper extremity     manual therapy techniques: were applied to the: L knee for 00 minutes, including:  Effleurage to lower leg and knee  Grade II-III patellar mobilizations  Passive physiological knee flexion and extension  Donning of knee high LENORE hose to left lower extremity for edema control. Unable to don thigh high secondary to pant wear    cold pack for 00 minutes to left knee for pain and edema reduction.    Patient Education and Home Exercises     Home Exercises Provided and Patient Education Provided   Education provided:   - rehab guidelines and goals post tka  - importance of home exercise program   - avoid stairs without handrails  - benefit of compression stocking; don personal sock/stocking if current sinches at and bothers ankle    Written Home Exercises Provided: Patient instructed to cont prior HEP. Exercises were reviewed and Carolina was able to demonstrate them prior to the end of the session.  Carolina demonstrated good  understanding of the education provided. See EMR under Patient Instructions for exercises provided during therapy sessions    ASSESSMENT     Patient presents to physical therapy 3 weeks status post left total knee arthroplasty. Quad strength remains limited at 10 kg using microFET. Session concentrated on quad strength, particularly in the closed chain, with expected fatigue and soreness.    Carolina Is progressing well towards her goals.   Pt prognosis is Good.     Pt will continue to benefit from skilled outpatient physical therapy to address the deficits listed in the problem list box on initial evaluation, provide pt/family education and to maximize pt's level of independence in the home and community " environment.     Pt's spiritual, cultural and educational needs considered and pt agreeable to plan of care and goals.     Anticipated barriers to physical therapy: co-morbidities, low back pain    Goals:   STG: 3 weeks  1. Patient will demonstrate an understanding of her HEP for improved ADL performance. - progressing  2. Patient will report L knee pain < 6/10 to improve ADL performance. - progressing  3. Patient will ambulate to and w/in the clinic with a SPC and safe pattern. - progressing  4. Patient will demonstrate improve L quad strength via MicroFET with > 10 kg to improve sit <> stand and gait. - progressing    LT wks  1. Patient will demonstrate ability to negotiate a flight of stairs w/ 1 handrail and reciprocal pattern safely.- progressing  2. Patient will be able to perform > 7 sit <> stands from a chair w/o UE use. - progressing  3. Patient will complete TUG in < 11 sec w/o a device for improved safety w/ ambulation. - progressing  4. Patient will improve FOTO to < 44% for improved ADL performance. - progressing    PLAN     Phase II-III of TKA protocol    Arturo Ye, PT

## 2022-02-28 ENCOUNTER — CLINICAL SUPPORT (OUTPATIENT)
Dept: REHABILITATION | Facility: HOSPITAL | Age: 67
End: 2022-02-28
Payer: MEDICARE

## 2022-02-28 DIAGNOSIS — R53.1 DECREASED STRENGTH: Primary | ICD-10-CM

## 2022-02-28 DIAGNOSIS — M17.12 PRIMARY OSTEOARTHRITIS OF LEFT KNEE: ICD-10-CM

## 2022-02-28 DIAGNOSIS — R26.89 GAIT, ANTALGIC: ICD-10-CM

## 2022-02-28 PROCEDURE — 97110 THERAPEUTIC EXERCISES: CPT | Mod: PN | Performed by: PHYSICAL THERAPIST

## 2022-02-28 PROCEDURE — 97530 THERAPEUTIC ACTIVITIES: CPT | Mod: PN | Performed by: PHYSICAL THERAPIST

## 2022-02-28 NOTE — PROGRESS NOTES
[facility]   Physical Therapy Treatment Note     Name: Carolina Knight  [facility] Number: [id number]    Therapy Diagnosis:   Encounter Diagnoses   Name Primary?    Decreased strength Yes    Gait, antalgic     Primary osteoarthritis of left knee      Physician: Emmanuel Garcia MD    Visit Date: 2022    Physician Orders: PT Eval and Treat  Medical Diagnosis from Referral: M17.12 (ICD-10-CM) - Primary osteoarthritis of left knee  Evaluation Date: 2022  Authorization Period Expiration: 12/15/2022  Plan of Care Expiration: 3/11/2022  Visit # / Visits authorized:  (+ eval)  FOTO:  - Done 2022  PTA Visit: 05    Time In: 9:21 am  Time Out: 10:15 am  Total Billable Time: 54 minutes (TEx2, TAx2)    SUBJECTIVE     Pt reports: her knee is stiff.  She was compliant with home exercise program.  Response to previous treatment: continued achiness at rest  Functional change: presents with single point cane vs rolling walker     Pain: 2/10  Location: left knee      OBJECTIVE     Objective Measures updated at progress report unless specified.     2022:  30 second sit <> stand: 7x (armrests only for 1st repetition)  TU seconds with cane and 11 seconds no device    2022:  L Knee Flexion: 0-110 AAROM    22  Quad strength: 10.1 kg    Treatment     Carolina received the treatments listed below:      therapeutic exercises to develop strength, endurance, ROM, flexibility, posture and core stabilization for 25 minutes including testing:    Recumbent bike: 5' at seat 12 at level 2.5 for endurance and knee flexion active assisted range of motion      Mat: *head of mat elevated  Straight leg: 3x10 left 1 lb   Heel slides: 20x5'' holds, left with straps  Long arc quads: 5 lb 2x15 left    Standing:  Heel raises: 3x10   Standing hip abduction: 3x10 bilateral on airex  Lateral step downs: 2x8 left, 4 inch step - not today      therapeutic activities to improve functional  "performance for 29  minutes, including:  Leg press: 3x10 double leg, 6 plates, sled=5    2x10, 3 plates, Left, sled 5   TKE: 30x5" hold with Blue theraband   Step ups: 6" step 3x10 each (no railing)  Squats: 2x10 no upper extremity     manual therapy techniques: were applied to the: L knee for 00 minutes, including:  Effleurage to lower leg and knee  Grade II-III patellar mobilizations  Passive physiological knee flexion and extension  Donning of knee high LENORE hose to left lower extremity for edema control. Unable to don thigh high secondary to pant wear    cold pack for 00 minutes to left knee for pain and edema reduction.    Patient Education and Home Exercises     Home Exercises Provided and Patient Education Provided   Education provided:   - rehab guidelines and goals post tka  - importance of home exercise program   - avoid stairs without handrails  - benefit of compression stocking; don personal sock/stocking if current sinches at and bothers ankle    Written Home Exercises Provided: Patient instructed to cont prior HEP. Exercises were reviewed and Carolina was able to demonstrate them prior to the end of the session.  Carolina demonstrated good  understanding of the education provided. See EMR under Patient Instructions for exercises provided during therapy sessions    ASSESSMENT     Patient presents to physical therapy 4 weeks status post left total knee arthroplasty. Knee flexion range of motion is symmetrical at about 110 degrees. Continue to concentrate on quad strength, particularly functionally. Squat depth is limited to about 25%. She appears that she will be ready for discharge at end of POC.    Carolina Is progressing well towards her goals.   Pt prognosis is Good.     Pt will continue to benefit from skilled outpatient physical therapy to address the deficits listed in the problem list box on initial evaluation, provide pt/family education and to maximize pt's level of independence in the home and community " environment.     Pt's spiritual, cultural and educational needs considered and pt agreeable to plan of care and goals.     Anticipated barriers to physical therapy: co-morbidities, low back pain    Goals:   STG: 3 weeks  1. Patient will demonstrate an understanding of her HEP for improved ADL performance. - MET  2. Patient will report L knee pain < 6/10 to improve ADL performance. - MET  3. Patient will ambulate to and w/in the clinic with a SPC and safe pattern. - MET  4. Patient will demonstrate improve L quad strength via MicroFET with > 10 kg to improve sit <> stand and gait. - MET    LT wks  1. Patient will demonstrate ability to negotiate a flight of stairs w/ 1 handrail and reciprocal pattern safely.- progressing  2. Patient will be able to perform > 7 sit <> stands from a chair w/o UE use. - progressing  3. Patient will complete TUG in < 11 sec w/o a device for improved safety w/ ambulation. - progressing  4. Patient will improve FOTO to < 44% for improved ADL performance. - progressing    PLAN     Phase II-III of TKA protocol    Arturo Ye, PT

## 2022-03-02 ENCOUNTER — CLINICAL SUPPORT (OUTPATIENT)
Dept: REHABILITATION | Facility: HOSPITAL | Age: 67
End: 2022-03-02
Payer: MEDICARE

## 2022-03-02 DIAGNOSIS — R26.89 GAIT, ANTALGIC: ICD-10-CM

## 2022-03-02 DIAGNOSIS — R53.1 DECREASED STRENGTH: Primary | ICD-10-CM

## 2022-03-02 DIAGNOSIS — M17.12 PRIMARY OSTEOARTHRITIS OF LEFT KNEE: ICD-10-CM

## 2022-03-02 PROCEDURE — 97530 THERAPEUTIC ACTIVITIES: CPT | Mod: PN,CQ

## 2022-03-02 PROCEDURE — 97110 THERAPEUTIC EXERCISES: CPT | Mod: PN,CQ

## 2022-03-02 NOTE — PROGRESS NOTES
[facility]   Physical Therapy Treatment Note     Name: Carolina Knight  [facility] Number: [id number]    Therapy Diagnosis:   Encounter Diagnoses   Name Primary?    Decreased strength Yes    Gait, antalgic     Primary osteoarthritis of left knee      Physician: Emmanuel Garcia MD    Visit Date: 3/2/2022    Physician Orders: PT Eval and Treat  Medical Diagnosis from Referral: M17.12 (ICD-10-CM) - Primary osteoarthritis of left knee  Evaluation Date: 2022  Authorization Period Expiration: 12/15/2022  Plan of Care Expiration: 3/11/2022  Visit # / Visits authorized:  (+ eval)  FOTO: 3/5 - Done 2022  PTA Visit:     Time In: 9:18 am  Time Out: 10:14 am  Total Billable Time: 56 minutes (TEx2, TAx2)    SUBJECTIVE     Pt reports: her knee is feeling a little stiff but overall doing ok.  Patient reports she is no longer taking any pain medication.  She was compliant with home exercise program.  Response to previous treatment: continued achiness at rest  Functional change: presents with single point cane vs rolling walker     Pain: 3/10  Location: left knee      OBJECTIVE     Objective Measures updated at progress report unless specified.     2022:  30 second sit <> stand: 7x (armrests only for 1st repetition)  TU seconds with cane and 11 seconds no device    2022:  L Knee Flexion: 0-110 AAROM    22  Quad strength: 10.1 kg    Treatment     Carolina received the treatments listed below:      therapeutic exercises to develop strength, endurance, ROM, flexibility, posture and core stabilization for 28 minutes including testing:    Recumbent bike: 5' at seat 12 at level 2.5 for endurance and knee flexion active assisted range of motion      Mat: *head of mat elevated  Straight leg: 3x10 left 1 lb   Heel slides: 20x5'' holds, left with straps  Long arc quads: 5 lb 2x15 left    Standing:  Heel raises: 3x10   Standing hip abduction: 3x10 bilateral on airex  Lateral  "step downs: 2x8 left, 4 inch step - not today    therapeutic activities to improve functional performance for 28  minutes, including:  Leg press: 3x10 double leg, 6 plates, sled=5    3x10, 3 plates, Left, sled 5   TKE: 30x5" hold with Blue theraband   Step ups: 6" step 3x10 each (no railing)  Squats: 2x15 no upper extremity     manual therapy techniques: were applied to the: L knee for 00 minutes, including:  Effleurage to lower leg and knee  Grade II-III patellar mobilizations  Passive physiological knee flexion and extension  Donning of knee high LENORE hose to left lower extremity for edema control. Unable to don thigh high secondary to pant wear    cold pack for 00 minutes to left knee for pain and edema reduction.    Patient Education and Home Exercises     Home Exercises Provided and Patient Education Provided   Education provided:   - rehab guidelines and goals post tka  - importance of home exercise program   - avoid stairs without handrails  - benefit of compression stocking; don personal sock/stocking if current sinches at and bothers ankle    Written Home Exercises Provided: Patient instructed to cont prior HEP. Exercises were reviewed and Carolina was able to demonstrate them prior to the end of the session.  Carolina demonstrated good  understanding of the education provided. See EMR under Patient Instructions for exercises provided during therapy sessions    ASSESSMENT     Patient presents to physical therapy 4 weeks status post left total knee arthroplasty. Knee flexion range of motion is symmetrical at about 110 degrees. Continue to concentrate on quad strength, particularly functionally. Squat depth is limited to about 25%. Patient completed her therapy along with today's progressions, noted in bold, with no increase in symptoms prior to leaving the clinic.     Carolina Is progressing well towards her goals.   Pt prognosis is Good.     Pt will continue to benefit from skilled outpatient physical therapy to " address the deficits listed in the problem list box on initial evaluation, provide pt/family education and to maximize pt's level of independence in the home and community environment.     Pt's spiritual, cultural and educational needs considered and pt agreeable to plan of care and goals.     Anticipated barriers to physical therapy: co-morbidities, low back pain    Goals:   STG: 3 weeks  1. Patient will demonstrate an understanding of her HEP for improved ADL performance. - MET  2. Patient will report L knee pain < 6/10 to improve ADL performance. - MET  3. Patient will ambulate to and w/in the clinic with a SPC and safe pattern. - MET  4. Patient will demonstrate improve L quad strength via MicroFET with > 10 kg to improve sit <> stand and gait. - MET    LT wks  1. Patient will demonstrate ability to negotiate a flight of stairs w/ 1 handrail and reciprocal pattern safely.- progressing  2. Patient will be able to perform > 7 sit <> stands from a chair w/o UE use. - progressing  3. Patient will complete TUG in < 11 sec w/o a device for improved safety w/ ambulation. - progressing  4. Patient will improve FOTO to < 44% for improved ADL performance. - progressing    PLAN     Phase II-III of TKA protocol    Nathaniel Alves, PTA

## 2022-03-04 ENCOUNTER — CLINICAL SUPPORT (OUTPATIENT)
Dept: REHABILITATION | Facility: HOSPITAL | Age: 67
End: 2022-03-04
Payer: MEDICARE

## 2022-03-04 DIAGNOSIS — M17.12 PRIMARY OSTEOARTHRITIS OF LEFT KNEE: ICD-10-CM

## 2022-03-04 DIAGNOSIS — R26.89 GAIT, ANTALGIC: ICD-10-CM

## 2022-03-04 DIAGNOSIS — R53.1 DECREASED STRENGTH: Primary | ICD-10-CM

## 2022-03-04 PROCEDURE — 97110 THERAPEUTIC EXERCISES: CPT | Mod: PN,CQ

## 2022-03-04 PROCEDURE — 97530 THERAPEUTIC ACTIVITIES: CPT | Mod: PN,CQ

## 2022-03-04 NOTE — PROGRESS NOTES
[facility]   Physical Therapy Treatment Note     Name: Carolina Knight  [facility] Number: [id number]    Therapy Diagnosis:   Encounter Diagnoses   Name Primary?    Decreased strength Yes    Gait, antalgic     Primary osteoarthritis of left knee      Physician: Emmanuel Garcia MD    Visit Date: 3/4/2022    Physician Orders: PT Eval and Treat  Medical Diagnosis from Referral: M17.12 (ICD-10-CM) - Primary osteoarthritis of left knee  Evaluation Date: 2022  Authorization Period Expiration: 12/15/2022  Plan of Care Expiration: 3/11/2022  Visit # / Visits authorized:  (+ eval)  FOTO:  - Done 2022  PTA Visit:     Time In: 9:00 am  Time Out: 9:58 am  Total Billable Time: 58 minutes (TEx2, TAx2)    SUBJECTIVE     Pt reports: her knee is feeling a little stiff but overall doing ok.  Patient reports she is no longer taking any pain medication.  She was compliant with home exercise program.  Response to previous treatment: continued achiness at rest  Functional change: presents with single point cane vs rolling walker     Pain: 3/10  Location: left knee      OBJECTIVE     Objective Measures updated at progress report unless specified.     2022:  30 second sit <> stand: 7x (armrests only for 1st repetition)  TU seconds with cane and 11 seconds no device    2022:  L Knee Flexion: 0-110 AAROM    22  Quad strength: 10.1 kg    Treatment     Carolina received the treatments listed below:      therapeutic exercises to develop strength, endurance, ROM, flexibility, posture and core stabilization for 28 minutes including testing:    Recumbent bike: 5' at seat 12 at level 2.5 for endurance and knee flexion active assisted range of motion      Mat: *head of mat elevated  Straight leg: 3x10 left 1 lb   Heel slides: 20x5'' holds, left with straps  Long arc quads: 5 lb 2x15 left    Standing:  Heel raises: 3x10   Standing hip abduction: 3x10 bilateral on airex  Lateral  "step downs: 2x10 left, 4 inch step     therapeutic activities to improve functional performance for 28 minutes, including:  Leg press: 3x10 double leg, 6.5 plates, sled=5    3x10, 3.5 plates, Left, sled 5   TKE: 30x5" hold with Blue theraband    Step ups: 6" step 3x10 each (no railing)  Squats: 2x15 no upper extremity     manual therapy techniques: were applied to the: L knee for 00 minutes, including:  Effleurage to lower leg and knee  Grade II-III patellar mobilizations  Passive physiological knee flexion and extension  Donning of knee high LENORE hose to left lower extremity for edema control. Unable to don thigh high secondary to pant wear    cold pack for 00 minutes to left knee for pain and edema reduction.    Patient Education and Home Exercises     Home Exercises Provided and Patient Education Provided   Education provided:   - rehab guidelines and goals post tka  - importance of home exercise program   - avoid stairs without handrails  - benefit of compression stocking; don personal sock/stocking if current sinches at and bothers ankle    Written Home Exercises Provided: Patient instructed to cont prior HEP. Exercises were reviewed and Carolina was able to demonstrate them prior to the end of the session.  Carolina demonstrated good  understanding of the education provided. See EMR under Patient Instructions for exercises provided during therapy sessions    ASSESSMENT     Patient presents to physical therapy 4 weeks status post left total knee arthroplasty. Knee flexion range of motion is symmetrical at about 110 degrees. Continue to concentrate on quad strength, particularly functionally.  Patient requires cueing for proper execution of lateral step downs. Squat depth is limited to about 25%. Patient completed her therapy along with today's progressions, noted in bold, with no increase in symptoms prior to leaving the clinic.     Carolina Is progressing well towards her goals.   Pt prognosis is Good.     Pt will " continue to benefit from skilled outpatient physical therapy to address the deficits listed in the problem list box on initial evaluation, provide pt/family education and to maximize pt's level of independence in the home and community environment.     Pt's spiritual, cultural and educational needs considered and pt agreeable to plan of care and goals.     Anticipated barriers to physical therapy: co-morbidities, low back pain    Goals:   STG: 3 weeks  1. Patient will demonstrate an understanding of her HEP for improved ADL performance. - MET  2. Patient will report L knee pain < 6/10 to improve ADL performance. - MET  3. Patient will ambulate to and w/in the clinic with a SPC and safe pattern. - MET  4. Patient will demonstrate improve L quad strength via MicroFET with > 10 kg to improve sit <> stand and gait. - MET    LT wks  1. Patient will demonstrate ability to negotiate a flight of stairs w/ 1 handrail and reciprocal pattern safely.- progressing  2. Patient will be able to perform > 7 sit <> stands from a chair w/o UE use. - progressing  3. Patient will complete TUG in < 11 sec w/o a device for improved safety w/ ambulation. - progressing  4. Patient will improve FOTO to < 44% for improved ADL performance. - progressing    PLAN     Phase II-III of TKA protocol    Nathaniel Alves, PTA

## 2022-03-07 ENCOUNTER — CLINICAL SUPPORT (OUTPATIENT)
Dept: REHABILITATION | Facility: HOSPITAL | Age: 67
End: 2022-03-07
Payer: MEDICARE

## 2022-03-07 ENCOUNTER — DOCUMENTATION ONLY (OUTPATIENT)
Dept: REHABILITATION | Facility: HOSPITAL | Age: 67
End: 2022-03-07
Payer: COMMERCIAL

## 2022-03-07 DIAGNOSIS — R53.1 DECREASED STRENGTH: Primary | ICD-10-CM

## 2022-03-07 DIAGNOSIS — M17.12 PRIMARY OSTEOARTHRITIS OF LEFT KNEE: ICD-10-CM

## 2022-03-07 DIAGNOSIS — R26.89 GAIT, ANTALGIC: ICD-10-CM

## 2022-03-07 PROCEDURE — 97110 THERAPEUTIC EXERCISES: CPT | Mod: PN

## 2022-03-07 PROCEDURE — 97530 THERAPEUTIC ACTIVITIES: CPT | Mod: PN

## 2022-03-07 NOTE — PROGRESS NOTES
Physical therapist and physical therapy assistant Chris Alves discussed patient's treatment plan and goals face to face. Pt will be seen by a physical therapist minimally every 6th visit or every 30 days.    Stephan Lugo, PT, DPT

## 2022-03-07 NOTE — PROGRESS NOTES
[facility]   Physical Therapy Treatment Note     Name: Carolina Knight  [facility] Number: [id number]    Therapy Diagnosis:   Encounter Diagnoses   Name Primary?    Decreased strength Yes    Gait, antalgic     Primary osteoarthritis of left knee      Physician: Emmanuel Garcia MD    Visit Date: 3/7/2022    Physician Orders: PT Eval and Treat  Medical Diagnosis from Referral: M17.12 (ICD-10-CM) - Primary osteoarthritis of left knee  Evaluation Date: 2022  Authorization Period Expiration: 12/15/2022  Plan of Care Expiration: 3/11/2022  Visit # / Visits authorized: 15/50 (+ eval)  FOTO: 5/10 - Next for d/c  PTA Visit:     Time In: 10:00 am  Time Out: 11:00 am  Total Billable Time: 30 minutes (TEx1, TAx1)    SUBJECTIVE     Pt reports: did some prolonged walking and sitting for long periods of time. Feels like her left knee is swollen, but her knee overall feels good. Did have some trouble sleeping last night too.   She was compliant with home exercise program.  Response to previous treatment: decreased left knee pain  Functional change: no assistive device     Pain: 3/10  Location: left knee      OBJECTIVE     Objective Measures updated at progress report unless specified.     2022:  30 second sit <> stand: 7x (armrests only for 1st repetition)  TU seconds with cane and 11 seconds no device    2022:  L Knee Flexion: 0-110 AAROM    22  Quad strength: 10.1 kg    Treatment     Carolina received the treatments listed below:      therapeutic exercises to develop strength, endurance, ROM, flexibility, posture and core stabilization for 30 minutes including testing:    Recumbent bike: 5' at seat 12 at level 2.5 for endurance and knee flexion active assisted range of motion      Mat: *head of mat elevated  Prone quad stretch: 5x20'' left, towel under thigh with strap use  Straight leg: 3x10 left 2 lb     Standing:  Lateral step downs: 2x12 left, 6 inch step  "    therapeutic activities to improve functional performance for 30 minutes, including:  Leg press: 2x12 double leg, 7.5 plates, sled=5    2x10, 4.5 plates, Left, sled 5   Step ups: 6" step 3x10 each (no railing)  Single leg stand: 5x15'' L    manual therapy techniques: were applied to the: L knee for 00 minutes, including:  Effleurage to lower leg and knee  Grade II-III patellar mobilizations  Passive physiological knee flexion and extension  Donning of knee high LENORE hose to left lower extremity for edema control. Unable to don thigh high secondary to pant wear    cold pack for 00 minutes to left knee for pain and edema reduction.    Patient Education and Home Exercises     Home Exercises Provided and Patient Education Provided   Education provided:   - rehab guidelines and goals post tka  - importance of home exercise program   - avoid stairs without handrails  - benefit of compression stocking; don personal sock/stocking if current sinches at and bothers ankle    Written Home Exercises Provided: Patient instructed to cont prior HEP. Exercises were reviewed and Carolina was able to demonstrate them prior to the end of the session.  Carolina demonstrated good  understanding of the education provided. See EMR under Patient Instructions for exercises provided during therapy sessions    ASSESSMENT     Patient presents to physical therapy 5 weeks status post left total knee arthroplasty. Knee flexion range of motion is symmetrical at about 110 degrees. Can progress deep knee flexion in weight bearing activities, and pt doing very well overall. Minimal left knee pain, and 0-110 AROM in Bilateral knees. Pt nearing discharge overall, and advancing quad strength and dynamic activities until discharge.     Carolina Is progressing well towards her goals.   Pt prognosis is Good.     Pt will continue to benefit from skilled outpatient physical therapy to address the deficits listed in the problem list box on initial evaluation, provide " pt/family education and to maximize pt's level of independence in the home and community environment.     Pt's spiritual, cultural and educational needs considered and pt agreeable to plan of care and goals.     Anticipated barriers to physical therapy: co-morbidities, low back pain    Goals:   STG: 3 weeks  1. Patient will demonstrate an understanding of her HEP for improved ADL performance. - MET  2. Patient will report L knee pain < 6/10 to improve ADL performance. - MET  3. Patient will ambulate to and w/in the clinic with a SPC and safe pattern. - MET  4. Patient will demonstrate improve L quad strength via MicroFET with > 10 kg to improve sit <> stand and gait. - MET    LT wks  1. Patient will demonstrate ability to negotiate a flight of stairs w/ 1 handrail and reciprocal pattern safely.- progressing  2. Patient will be able to perform > 7 sit <> stands from a chair w/o UE use. - progressing  3. Patient will complete TUG in < 11 sec w/o a device for improved safety w/ ambulation. - progressing  4. Patient will improve FOTO to < 44% for improved ADL performance. - progressing    PLAN     Phase II-III of TKA protocol    Stephan Lugo, PT

## 2022-03-09 ENCOUNTER — CLINICAL SUPPORT (OUTPATIENT)
Dept: REHABILITATION | Facility: HOSPITAL | Age: 67
End: 2022-03-09
Payer: MEDICARE

## 2022-03-09 DIAGNOSIS — R26.89 GAIT, ANTALGIC: ICD-10-CM

## 2022-03-09 DIAGNOSIS — R53.1 DECREASED STRENGTH: Primary | ICD-10-CM

## 2022-03-09 DIAGNOSIS — M17.12 PRIMARY OSTEOARTHRITIS OF LEFT KNEE: ICD-10-CM

## 2022-03-09 PROCEDURE — 97110 THERAPEUTIC EXERCISES: CPT | Mod: PN

## 2022-03-09 PROCEDURE — 97530 THERAPEUTIC ACTIVITIES: CPT | Mod: PN

## 2022-03-09 NOTE — PROGRESS NOTES
[facility]   Physical Therapy Treatment and Discharge Note     Name: Carolina Knight  [facility] Number: [id number]    Therapy Diagnosis:   Encounter Diagnoses   Name Primary?    Decreased strength Yes    Gait, antalgic     Primary osteoarthritis of left knee      Physician: Emmanuel Garcia MD    Visit Date: 3/9/2022    Physician Orders: PT Eval and Treat  Medical Diagnosis from Referral: M17.12 (ICD-10-CM) - Primary osteoarthritis of left knee  Evaluation Date: 2022  Authorization Period Expiration: 12/15/2022  Plan of Care Expiration: 3/11/2022  Visit # / Visits authorized:  (+ eval)  FOTO: done today for d/c   PTA Visit: 0/5    Time In: 9:05 am  Time Out: 10:00 am  Total Billable Time: 55 minutes (TEx2, TAx2)    SUBJECTIVE     Pt reports: doing well today and feels like she is ready for her last day today  She was compliant with home exercise program.  Response to previous treatment: decreased left knee pain  Functional change: no assistive device     Pain: 0/10  Location: left knee      OBJECTIVE     Objective Measures updated at progress report unless specified.     3/9/2022  Left knee:  AROM: 0-112  PROM: 0-114  L/E Strength w/ MicroFET Muscle Mariya Dynamometer Right Left Pain/Dysfunction with Movement   (approx 4 sec hold w/ max contraction)   Hip Flexion 18.3 kg  17.8 kg     Hip Abduction 23.7 kg  20.5 kg     Quadriceps 23.6 kg  19.7 kg     Hamstrings 19.1 kg  17.2 kg     TU seconds without AD   30 second sit-to-stand test (without U/E support): 14 without upper extremity use    Treatment     Carolina received the treatments listed below:      therapeutic exercises to develop strength, endurance, ROM, flexibility, posture and core stabilization for 45 minutes including testing: re-assessment and FOTO    Recumbent bike: 5' at seat 12 at level 3 for endurance and knee flexion active assisted range of motion    Prone quad stretch: 5x20'' left, towel under thigh with  "strap use  Straight leg: 3x10 left 2 lb     Lateral step downs: 2x10 left, stair step    therapeutic activities to improve functional performance for 30 minutes, including:  Leg press: 2x12 double leg, 7.5 plates, sled=5         2x10, 4.5 plates, Left, sled 5   Step ups: 6" step 3x10 each (no railing)  Single leg stand: 5x15'' left  Standing hip abduction on airex: 2x15 Bilateral     Patient Education and Home Exercises     Home Exercises Provided and Patient Education Provided   Education provided:   - rehab guidelines and goals post tka  - importance of home exercise program   - avoid stairs without handrails  - benefit of compression stocking; don personal sock/stocking if current sinches at and bothers ankle    Written Home Exercises Provided: Patient instructed to cont prior HEP. Exercises were reviewed and Carolina was able to demonstrate them prior to the end of the session.  Caroilna demonstrated good  understanding of the education provided. See EMR under Patient Instructions for exercises provided during therapy sessions    ASSESSMENT     Pt has met all short and long term goals, FOTO and re-assessment done, and pt did very well overall. She is discharged from PT with an updated home exercise program. She was instructed to continue her stretching for knee flexion as she can continue to improve this at home over the next month.    Carolina Is progressing well towards her goals.   Pt prognosis is Good.     Pt will continue to benefit from skilled outpatient physical therapy to address the deficits listed in the problem list box on initial evaluation, provide pt/family education and to maximize pt's level of independence in the home and community environment.     Pt's spiritual, cultural and educational needs considered and pt agreeable to plan of care and goals.     Anticipated barriers to physical therapy: co-morbidities, low back pain    Goals:   STG: 3 weeks  1. Patient will demonstrate an understanding of her HEP " for improved ADL performance. - MET  2. Patient will report L knee pain < 6/10 to improve ADL performance. - MET  3. Patient will ambulate to and w/in the clinic with a SPC and safe pattern. - MET  4. Patient will demonstrate improve L quad strength via MicroFET with > 10 kg to improve sit <> stand and gait. - MET    LT wks  1. Patient will demonstrate ability to negotiate a flight of stairs w/ 1 handrail and reciprocal pattern safely.- MET  2. Patient will be able to perform > 7 sit <> stands from a chair w/o UE use. - MET  3. Patient will complete TUG in < 11 sec w/o a device for improved safety w/ ambulation. - MET  4. Patient will improve FOTO to < 44% for improved ADL performance. - MET    PLAN     Pt discharged from PT.    Stephan Lugo, PT

## 2022-03-14 ENCOUNTER — HOSPITAL ENCOUNTER (OUTPATIENT)
Dept: RADIOLOGY | Facility: HOSPITAL | Age: 67
Discharge: HOME | End: 2022-03-14
Attending: NURSE PRACTITIONER
Payer: MEDICARE

## 2022-03-14 ENCOUNTER — OFFICE VISIT (OUTPATIENT)
Dept: ORTHOPEDICS | Facility: CLINIC | Age: 67
End: 2022-03-14
Payer: MEDICARE

## 2022-03-14 DIAGNOSIS — Z96.653 STATUS POST BILATERAL KNEE REPLACEMENTS: Primary | ICD-10-CM

## 2022-03-14 DIAGNOSIS — Z96.652 STATUS POST LEFT KNEE REPLACEMENT: ICD-10-CM

## 2022-03-14 PROCEDURE — 73562 XR KNEE ORTHO LEFT: ICD-10-PCS | Mod: 26,LT,, | Performed by: RADIOLOGY

## 2022-03-14 PROCEDURE — 99214 OFFICE O/P EST MOD 30 MIN: CPT | Mod: PBBFAC | Performed by: NURSE PRACTITIONER

## 2022-03-14 PROCEDURE — 73560 X-RAY EXAM OF KNEE 1 OR 2: CPT | Mod: 26,RT,, | Performed by: RADIOLOGY

## 2022-03-14 PROCEDURE — 99024 PR POST-OP FOLLOW-UP VISIT: ICD-10-PCS | Mod: POP,,, | Performed by: NURSE PRACTITIONER

## 2022-03-14 PROCEDURE — 99999 PR PBB SHADOW E&M-EST. PATIENT-LVL IV: CPT | Mod: PBBFAC,,, | Performed by: NURSE PRACTITIONER

## 2022-03-14 PROCEDURE — 73562 X-RAY EXAM OF KNEE 3: CPT | Mod: 26,LT,, | Performed by: RADIOLOGY

## 2022-03-14 PROCEDURE — 99999 PR PBB SHADOW E&M-EST. PATIENT-LVL IV: ICD-10-PCS | Mod: PBBFAC,,, | Performed by: NURSE PRACTITIONER

## 2022-03-14 PROCEDURE — 99024 POSTOP FOLLOW-UP VISIT: CPT | Mod: POP,,, | Performed by: NURSE PRACTITIONER

## 2022-03-14 PROCEDURE — 73560 XR KNEE ORTHO LEFT: ICD-10-PCS | Mod: 26,RT,, | Performed by: RADIOLOGY

## 2022-03-14 PROCEDURE — 73560 X-RAY EXAM OF KNEE 1 OR 2: CPT | Mod: TC,RT

## 2022-03-14 RX ORDER — DEXTROMETHORPHAN HYDROBROMIDE, GUAIFENESIN 5; 100 MG/5ML; MG/5ML
650 LIQUID ORAL
Qty: 120 TABLET | Refills: 0 | Status: SHIPPED | OUTPATIENT
Start: 2022-03-14 | End: 2023-06-13

## 2022-03-14 RX ORDER — ASPIRIN 81 MG/1
81 TABLET ORAL 2 TIMES DAILY
Qty: 60 TABLET | Refills: 0 | Status: SHIPPED | OUTPATIENT
Start: 2022-03-14 | End: 2023-05-17

## 2022-03-15 NOTE — PROGRESS NOTES
Carolina GarciaMattye presents for 6 weeks post-operative visit following a left total knee arthroplasty performed by Dr. Garcia on 2/1/2022.      Exam:   Ambulating well with assistive device.  Incision is clean and dry without drainage or erythema.   ROM:0-110    Post-operative radiographs reviewed today revealing a well fixed and aligned prosthesis.    A/P:  6 weeks s/p right total knee replacement  - Follow up in 6 weeks with Dr. Garcia. Pt will call clinic with problems/concerns.

## 2022-03-30 ENCOUNTER — PATIENT OUTREACH (OUTPATIENT)
Dept: ADMINISTRATIVE | Facility: OTHER | Age: 67
End: 2022-03-30
Payer: COMMERCIAL

## 2022-03-30 DIAGNOSIS — E11.9 TYPE 2 DIABETES MELLITUS WITHOUT COMPLICATION, UNSPECIFIED WHETHER LONG TERM INSULIN USE: Primary | ICD-10-CM

## 2022-03-30 NOTE — PROGRESS NOTES
Care Everywhere: updated  Immunization: updated  Health Maintenance: updated  Media Review: review for outside eye exam report   Legacy Review:   DIS:  Order placed: diabetic eye screening photo   Upcoming appts:  EFAX:sent to Dr. Ayala office to possibly obtain eye exam report   Task Tickets:  Referrals:

## 2022-03-30 NOTE — LETTER
AUTHORIZATION FOR RELEASE OF   CONFIDENTIAL INFORMATION    Dear Nitish Ayala MD,    We are seeing Carolina Knight, date of birth 1955, in the clinic at [facility]. Kierra Cornejo MD is the patient's PCP. Carolina Knight has an outstanding lab/procedure at the time we reviewed her chart. In order to help keep her health information updated, she has authorized us to request the following medical record(s):        (  )  MAMMOGRAM                                      (  )  COLONOSCOPY      (  )  PAP SMEAR                                          (  )  OUTSIDE LAB RESULTS     (  )  DEXA SCAN                                          ( x )  DIABETIC EYE EXAM [id number]            (  )  FOOT EXAM                                          (  )  ENTIRE RECORD     (  )  OUTSIDE IMMUNIZATIONS                 (  )  _______________         Please fax records to [facility], Mary Yu, MD, [phone]     If you have any questions, please contact Cathy Bolanos at [phone].           Patient Name: Carolina Knight  : 1955  Patient Phone #: [phone]

## 2022-03-31 ENCOUNTER — OFFICE VISIT (OUTPATIENT)
Dept: DERMATOLOGY | Facility: CLINIC | Age: 67
End: 2022-03-31
Payer: MEDICARE

## 2022-03-31 DIAGNOSIS — L82.1 SK (SEBORRHEIC KERATOSIS): ICD-10-CM

## 2022-03-31 DIAGNOSIS — D22.9 NEVUS: ICD-10-CM

## 2022-03-31 DIAGNOSIS — M72.0 DUPUYTREN'S CONTRACTURE OF LEFT HAND: ICD-10-CM

## 2022-03-31 DIAGNOSIS — D18.01 CHERRY ANGIOMA: ICD-10-CM

## 2022-03-31 DIAGNOSIS — L81.4 LENTIGO: ICD-10-CM

## 2022-03-31 DIAGNOSIS — D17.24 LIPOMA OF LEFT LOWER EXTREMITY: Primary | ICD-10-CM

## 2022-03-31 PROCEDURE — 99213 OFFICE O/P EST LOW 20 MIN: CPT | Mod: S$PBB,,, | Performed by: DERMATOLOGY

## 2022-03-31 PROCEDURE — 99213 PR OFFICE/OUTPT VISIT, EST, LEVL III, 20-29 MIN: ICD-10-PCS | Mod: S$PBB,,, | Performed by: DERMATOLOGY

## 2022-03-31 PROCEDURE — 99999 PR PBB SHADOW E&M-EST. PATIENT-LVL IV: ICD-10-PCS | Mod: PBBFAC,,, | Performed by: DERMATOLOGY

## 2022-03-31 PROCEDURE — 99999 PR PBB SHADOW E&M-EST. PATIENT-LVL IV: CPT | Mod: PBBFAC,,, | Performed by: DERMATOLOGY

## 2022-03-31 PROCEDURE — 99214 OFFICE O/P EST MOD 30 MIN: CPT | Mod: PBBFAC,PO | Performed by: DERMATOLOGY

## 2022-03-31 NOTE — PROGRESS NOTES
"  Subjective:       Patient ID:  Carolina Knight is a 66 y.o. female who presents for   Chief Complaint   Patient presents with    Lesion     FACE  L PALM  INNER THIGHS  R LOWER ANKLE    Skin Check     TBSE     Patient is here today for a "mole" check.   Pt has a history of  moderate sun exposure in the past.   Pt recalls several blistering sunburns in the past- once  Pt has history of tanning bed use- no  Pt has  had moles removed in the past- yes  Pt has history of melanoma in first degree relatives-  Sister and brother hx of skin cancer, pt unsure of which    Patient with new complaint of lesion(s)  Location: face  Duration: months  Symptoms: none  Relieving factors/Previous treatments: cryo    Patient with new complaint of lesion(s)  Location: L palm  Duration: months  Symptoms: none  Relieving factors/Previous treatments: cortisone    Patient with new complaint of lesion(s)  Location: inner thighs  Duration: years  Symptoms: "fatty tissue'  Relieving factors/Previous treatments: none    Patient with new complaint of lesion(s)  Location: r lower ankle  Duration: months  Symptoms: none  Relieving factors/Previous treatments: efudex              Lesion        Review of Systems   Skin: Positive for activity-related sunscreen use. Negative for daily sunscreen use and tendency to form keloidal scars.   Hematologic/Lymphatic: Does not bruise/bleed easily.        Objective:    Physical Exam   Constitutional: She appears well-developed and well-nourished. No distress.   Neurological: She is alert and oriented to person, place, and time. She is not disoriented.   Psychiatric: She has a normal mood and affect.   Skin:   Areas Examined (abnormalities noted in diagram):   Scalp / Hair Palpated and Inspected  Head / Face Inspection Performed  Neck Inspection Performed  Chest / Axilla Inspection Performed  Abdomen Inspection Performed  Genitals / Buttocks / Groin Inspection Performed  Back Inspection Performed  RUE " Inspected  LUE Inspection Performed  RLE Inspected  LLE Inspection Performed  Nails and Digits Inspection Performed                       Diagram Legend     Erythematous scaling macule/papule c/w actinic keratosis       Vascular papule c/w angioma      Pigmented verrucoid papule/plaque c/w seborrheic keratosis      Yellow umbilicated papule c/w sebaceous hyperplasia      Irregularly shaped tan macule c/w lentigo     1-2 mm smooth white papules consistent with Milia      Movable subcutaneous cyst with punctum c/w epidermal inclusion cyst      Subcutaneous movable cyst c/w pilar cyst      Firm pink to brown papule c/w dermatofibroma      Pedunculated fleshy papule(s) c/w skin tag(s)      Evenly pigmented macule c/w junctional nevus     Mildly variegated pigmented, slightly irregular-bordered macule c/w mildly atypical nevus      Flesh colored to evenly pigmented papule c/w intradermal nevus       Pink pearly papule/plaque c/w basal cell carcinoma      Erythematous hyperkeratotic cursted plaque c/w SCC      Surgical scar with no sign of skin cancer recurrence      Open and closed comedones      Inflammatory papules and pustules      Verrucoid papule consistent consistent with wart     Erythematous eczematous patches and plaques     Dystrophic onycholytic nail with subungual debris c/w onychomycosis     Umbilicated papule    Erythematous-base heme-crusted tan verrucoid plaque consistent with inflamed seborrheic keratosis     Erythematous Silvery Scaling Plaque c/w Psoriasis     See annotation      Assessment / Plan:        Lipoma of left lower extremity  Reassurance   Monitor for new symptoms of growth or pain and if this occurs contact MD for evaluation     Nevus  Discussed ABCDE's of nevi.  Monitor for new mole or moles that are becoming bigger, darker, irritated, or developing irregular borders. Brochure provided. Instructed patient to observe lesion(s) for changes and follow up in clinic if changes are noted.  Patient to monitor skin at home for new or changing lesions.     Cherry angioma  These are benign vascular lesions that are inherited.  Treatment is not necessary.    SK (seborrheic keratosis)  These are benign inherited growths without a malignant potential. Reassurance given to patient. No treatment is necessary.     Lentigo  This is a benign hyperpigmented sun induced lesion. Recommend daily sun protection/avoidance and use of at least SPF 30, broad spectrum sunscreen (OTC drug) will reduce the number of new lesions. Treatment of these benign lesions are considered cosmetic.    The nature of sun-induced photo-aging and skin cancers is discussed.  Sun avoidance, protective clothing, and the use of 30-SPF sunscreens is advised. Observe closely for skin damage/changes, and call if such occurs.    Dupuytren's contracture of left hand  Reassurance   refer to hand orthopedics if pain or loss of motion occurs      Total body skin examination performed today including at least 12 points as noted in physical examination. No lesions suspicious for malignancy noted.    Recommend daily sun protection/avoidance, use of at least SPF 30, broad spectrum sunscreen (OTC drug), skin self examinations, and routine physician surveillance to optimize early detection             Follow up in about 1 year (around 3/31/2023).

## 2022-04-06 ENCOUNTER — PATIENT MESSAGE (OUTPATIENT)
Dept: ADMINISTRATIVE | Facility: OTHER | Age: 67
End: 2022-04-06
Payer: COMMERCIAL

## 2022-04-20 ENCOUNTER — PATIENT MESSAGE (OUTPATIENT)
Dept: ADMINISTRATIVE | Facility: OTHER | Age: 67
End: 2022-04-20
Payer: COMMERCIAL

## 2022-04-22 ENCOUNTER — PATIENT MESSAGE (OUTPATIENT)
Dept: ADMINISTRATIVE | Facility: OTHER | Age: 67
End: 2022-04-22
Payer: COMMERCIAL

## 2022-04-30 ENCOUNTER — PATIENT MESSAGE (OUTPATIENT)
Dept: ADMINISTRATIVE | Facility: OTHER | Age: 67
End: 2022-04-30
Payer: COMMERCIAL

## 2022-05-02 ENCOUNTER — OFFICE VISIT (OUTPATIENT)
Dept: ORTHOPEDICS | Facility: CLINIC | Age: 67
End: 2022-05-02
Payer: MEDICARE

## 2022-05-02 VITALS — HEIGHT: 66 IN | WEIGHT: 242.06 LBS | BODY MASS INDEX: 38.9 KG/M2

## 2022-05-02 DIAGNOSIS — Z96.652 STATUS POST LEFT KNEE REPLACEMENT: Primary | ICD-10-CM

## 2022-05-02 PROCEDURE — 99024 POSTOP FOLLOW-UP VISIT: CPT | Mod: POP,,, | Performed by: ORTHOPAEDIC SURGERY

## 2022-05-02 PROCEDURE — 99999 PR PBB SHADOW E&M-EST. PATIENT-LVL IV: CPT | Mod: PBBFAC,,, | Performed by: ORTHOPAEDIC SURGERY

## 2022-05-02 PROCEDURE — 99214 OFFICE O/P EST MOD 30 MIN: CPT | Mod: PBBFAC | Performed by: ORTHOPAEDIC SURGERY

## 2022-05-02 PROCEDURE — 99024 PR POST-OP FOLLOW-UP VISIT: ICD-10-PCS | Mod: POP,,, | Performed by: ORTHOPAEDIC SURGERY

## 2022-05-02 PROCEDURE — 99999 PR PBB SHADOW E&M-EST. PATIENT-LVL IV: ICD-10-PCS | Mod: PBBFAC,,, | Performed by: ORTHOPAEDIC SURGERY

## 2022-05-02 RX ORDER — MELOXICAM 15 MG/1
15 TABLET ORAL DAILY
Qty: 30 TABLET | Refills: 1 | Status: SHIPPED | OUTPATIENT
Start: 2022-05-02 | End: 2022-06-01

## 2022-05-02 NOTE — PROGRESS NOTES
Carolina Knight is in for 3 month follow up for a  Left TKA.  She is doing  well.  Mild pain in the knee.  She has resumed activities of daily living. She has been quite active  Exam demonstrates  A well developed female in no distress.  Alert and oriented.  Mood and affect are appropriate.    Knee incision is well healed.  ROM is 0-125.  The patella tracks well and there is no instability. The extremity is neurovascularly intact.    Xrays demonstrate a well fixed and positioned prosthesis.    PROMIS-10 Questionnaire Scores 4/22/2022 12/23/2021 12/8/2021 6/2/2021 5/24/2021 2/15/2021   Global Physical Health 11 18 11 12 11 16   Global Mental health Score 17 16 17 16 16 14     KOOS Jr. Questionnaire Score 4/20/2022 4/6/2022 12/23/2021 12/8/2021 6/2/2021 5/24/2021 2/15/2021   KOOS Jr Score 2 1 10 1 1 2 12     Oxford Knee Questionnaire Score 4/30/2022 12/23/2021 12/8/2021 6/2/2021 5/24/2021 2/15/2021   Oxford Knee Score 43 23 40 44 43 14       Imp:Doing well  Carolina Knight was given a prescription for Meloxicam.  The patient was given instructions on how to take the medication and side effects were discussed.    F/u in 8 months with xrays and PROMS

## 2022-05-09 ENCOUNTER — OFFICE VISIT (OUTPATIENT)
Dept: PRIMARY CARE CLINIC | Facility: CLINIC | Age: 67
End: 2022-05-09
Payer: MEDICARE

## 2022-05-09 ENCOUNTER — PATIENT MESSAGE (OUTPATIENT)
Dept: PRIMARY CARE CLINIC | Facility: CLINIC | Age: 67
End: 2022-05-09

## 2022-05-09 VITALS
BODY MASS INDEX: 39.82 KG/M2 | OXYGEN SATURATION: 96 % | TEMPERATURE: 98 F | HEIGHT: 66 IN | HEART RATE: 76 BPM | WEIGHT: 247.81 LBS | DIASTOLIC BLOOD PRESSURE: 88 MMHG | SYSTOLIC BLOOD PRESSURE: 128 MMHG

## 2022-05-09 DIAGNOSIS — E03.4 HYPOTHYROIDISM DUE TO ACQUIRED ATROPHY OF THYROID: ICD-10-CM

## 2022-05-09 DIAGNOSIS — K21.9 GASTROESOPHAGEAL REFLUX DISEASE WITHOUT ESOPHAGITIS: ICD-10-CM

## 2022-05-09 DIAGNOSIS — E87.6 HYPOKALEMIA: ICD-10-CM

## 2022-05-09 DIAGNOSIS — I10 ESSENTIAL HYPERTENSION: Primary | ICD-10-CM

## 2022-05-09 DIAGNOSIS — R73.03 PREDIABETES: ICD-10-CM

## 2022-05-09 DIAGNOSIS — Z96.659 STATUS POST KNEE REPLACEMENT, UNSPECIFIED LATERALITY: ICD-10-CM

## 2022-05-09 DIAGNOSIS — E78.2 MIXED HYPERLIPIDEMIA: ICD-10-CM

## 2022-05-09 DIAGNOSIS — Z12.11 ENCOUNTER FOR FIT (FECAL IMMUNOCHEMICAL TEST) SCREENING: ICD-10-CM

## 2022-05-09 PROCEDURE — 99215 OFFICE O/P EST HI 40 MIN: CPT | Mod: PBBFAC,PN | Performed by: INTERNAL MEDICINE

## 2022-05-09 PROCEDURE — 99999 PR PBB SHADOW E&M-EST. PATIENT-LVL V: ICD-10-PCS | Mod: PBBFAC,,, | Performed by: INTERNAL MEDICINE

## 2022-05-09 PROCEDURE — 99214 PR OFFICE/OUTPT VISIT, EST, LEVL IV, 30-39 MIN: ICD-10-PCS | Mod: S$PBB,,, | Performed by: INTERNAL MEDICINE

## 2022-05-09 PROCEDURE — 99999 PR PBB SHADOW E&M-EST. PATIENT-LVL V: CPT | Mod: PBBFAC,,, | Performed by: INTERNAL MEDICINE

## 2022-05-09 PROCEDURE — 99214 OFFICE O/P EST MOD 30 MIN: CPT | Mod: S$PBB,,, | Performed by: INTERNAL MEDICINE

## 2022-05-09 RX ORDER — ROSUVASTATIN CALCIUM 5 MG/1
5 TABLET, COATED ORAL EVERY OTHER DAY
Qty: 45 TABLET | Refills: 3 | Status: SHIPPED | OUTPATIENT
Start: 2022-05-09 | End: 2022-06-13

## 2022-05-09 RX ORDER — METFORMIN HYDROCHLORIDE 500 MG/1
500 TABLET, EXTENDED RELEASE ORAL NIGHTLY
Qty: 90 TABLET | Refills: 3 | Status: SHIPPED | OUTPATIENT
Start: 2022-05-09 | End: 2022-11-21 | Stop reason: SDUPTHER

## 2022-05-09 RX ORDER — POTASSIUM CHLORIDE 750 MG/1
10 CAPSULE, EXTENDED RELEASE ORAL DAILY
Qty: 90 CAPSULE | Refills: 3 | Status: SHIPPED | OUTPATIENT
Start: 2022-05-09 | End: 2022-11-21

## 2022-05-09 RX ORDER — PANTOPRAZOLE SODIUM 40 MG/1
40 TABLET, DELAYED RELEASE ORAL DAILY
Qty: 90 TABLET | Refills: 3 | Status: SHIPPED | OUTPATIENT
Start: 2022-05-09 | End: 2022-11-10 | Stop reason: SDUPTHER

## 2022-05-09 RX ORDER — METHOCARBAMOL 750 MG/1
750 TABLET, FILM COATED ORAL 3 TIMES DAILY PRN
Qty: 30 TABLET | Refills: 0 | Status: SHIPPED | OUTPATIENT
Start: 2022-05-09 | End: 2023-03-15

## 2022-05-09 RX ORDER — SPIRONOLACTONE 50 MG/1
50 TABLET, FILM COATED ORAL DAILY
Qty: 90 TABLET | Refills: 3 | Status: SHIPPED | OUTPATIENT
Start: 2022-05-09 | End: 2023-04-12

## 2022-05-09 RX ORDER — AMLODIPINE BESYLATE 10 MG/1
10 TABLET ORAL DAILY
Qty: 90 TABLET | Refills: 3 | Status: SHIPPED | OUTPATIENT
Start: 2022-05-09 | End: 2022-11-21 | Stop reason: SDUPTHER

## 2022-05-09 RX ORDER — HYDROCHLOROTHIAZIDE 25 MG/1
25 TABLET ORAL DAILY
Qty: 90 TABLET | Refills: 3 | Status: SHIPPED | OUTPATIENT
Start: 2022-05-09 | End: 2022-09-15 | Stop reason: SDUPTHER

## 2022-05-09 RX ORDER — LEVOTHYROXINE SODIUM 125 UG/1
125 TABLET ORAL DAILY
Qty: 90 TABLET | Refills: 3 | Status: SHIPPED | OUTPATIENT
Start: 2022-05-09 | End: 2022-11-09

## 2022-05-09 RX ORDER — POTASSIUM CHLORIDE 750 MG/1
10 TABLET, EXTENDED RELEASE ORAL 2 TIMES DAILY
Qty: 180 TABLET | Refills: 3 | Status: CANCELLED | OUTPATIENT
Start: 2022-05-09

## 2022-05-09 NOTE — PROGRESS NOTES
"Subjective:       Patient ID: Carolina Knight is a 67 y.o. female.    Chief Complaint: Follow-up    HPI   Since our last visit, pt is s/p L TKA w/ Dr. Garcia 2/1/22.  Has had multiple f/u visits w/ ortho - LOV Dr. Garcia 5/2/22.  Reports has been doing well. S/p PT. No pain.   Joined the gym. Plan on going back to working out.     prediabetes - metformin xr 500mg daily.  a1c - 1/25/22 5.6    HLD - crestor 5mg every other day.  Part of digital HLD program.     Hypothyroidism - synthroid 125mcg daily.   TSH wnl.     HTN - amlo 10 qd, aldactone 50mg qd, hctz 25mg qd  microK 10mEq qd.    Review of Systems   Constitutional: Negative for chills and fever.   HENT: Negative for congestion and postnasal drip.    Respiratory: Negative for cough, shortness of breath and wheezing.    Cardiovascular: Negative for chest pain, palpitations and leg swelling.   Gastrointestinal: Negative for abdominal pain, constipation, diarrhea, nausea and vomiting.   Genitourinary: Negative for dysuria and frequency.   Musculoskeletal: Negative for arthralgias and back pain.   Skin: Negative for rash.   Psychiatric/Behavioral: Negative for dysphoric mood. The patient is not nervous/anxious.          Objective:      Physical Exam    /88 (BP Location: Right arm, Patient Position: Sitting, BP Method: Large (Manual))   Pulse 76   Temp 98.2 °F (36.8 °C) (Oral)   Ht 5' 6" (1.676 m)   Wt 112.4 kg (247 lb 12.8 oz)   LMP  (LMP Unknown)   SpO2 96%   BMI 40.00 kg/m²     GEN - A+OX4, NAD   HEENT - PERRL, EOMI, OP clear. MMM.  Neck - No thyromegaly or cervical LAD. No thyroid masses felt.  CV - RRR, no m/r   Chest - CTAB, no wheezing or rhonchi  Abd - S/NT/ND/+BS.   Ext - 2+BDP and radial pulses. No C/C/E.  MSK - no spinal tenderness to palpation. Normal gait.   Skin - No rash.    Previous labs reviewed.     Assessment/Plan     Carolina was seen today for follow-up.    Diagnoses and all orders for this visit:    Essential hypertension  -     " amLODIPine (NORVASC) 10 MG tablet; Take 1 tablet (10 mg total) by mouth once daily.  -     hydroCHLOROthiazide (HYDRODIURIL) 25 MG tablet; Take 1 tablet (25 mg total) by mouth once daily.  -     spironolactone (ALDACTONE) 50 MG tablet; Take 1 tablet (50 mg total) by mouth once daily.  -     Comprehensive Metabolic Panel; Future    Mixed hyperlipidemia  -     rosuvastatin (CRESTOR) 5 MG tablet; Take 1 tablet (5 mg total) by mouth every other day.  -     Comprehensive Metabolic Panel; Future    Hypothyroidism due to acquired atrophy of thyroid  -     SYNTHROID 125 mcg tablet; Take 1 tablet (125 mcg total) by mouth once daily.    Hypokalemia  -     potassium chloride (MICRO-K) 10 MEQ CpSR; Take 1 capsule (10 mEq total) by mouth once daily.  -     Comprehensive Metabolic Panel; Future    Gastroesophageal reflux disease  -     pantoprazole (PROTONIX) 40 MG tablet; Take 1 tablet (40 mg total) by mouth once daily.    Status post knee replacement, unspecified laterality  -     methocarbamoL (ROBAXIN) 750 MG Tab; Take 1 tablet (750 mg total) by mouth 3 (three) times daily as needed (muscle spasms).    Encounter for FIT (fecal immunochemical test) screening  -     Fecal Immunochemical Test (iFOBT); Future    Prediabetes  -     metFORMIN (GLUCOPHAGE-XR) 500 MG ER 24hr tablet; Take 1 tablet (500 mg total) by mouth every evening.    Other orders  The following orders have not been finalized:  -     Cancel: potassium chloride SA (K-DUR,KLOR-CON) 10 MEQ tablet    Given ACP documents.    Follow up in about 6 months (around 11/9/2022).      Kierra Cornejo MD  Department of Internal Medicine - [facility]  10:00 AM

## 2022-05-10 ENCOUNTER — LAB VISIT (OUTPATIENT)
Dept: LAB | Facility: HOSPITAL | Age: 67
End: 2022-05-10
Attending: INTERNAL MEDICINE
Payer: MEDICARE

## 2022-05-10 DIAGNOSIS — E87.6 HYPOKALEMIA: ICD-10-CM

## 2022-05-10 DIAGNOSIS — E78.2 MIXED HYPERLIPIDEMIA: ICD-10-CM

## 2022-05-10 DIAGNOSIS — I10 ESSENTIAL HYPERTENSION: ICD-10-CM

## 2022-05-10 LAB
ALBUMIN SERPL BCP-MCNC: 3.8 G/DL (ref 3.5–5.2)
ALP SERPL-CCNC: 55 U/L (ref 55–135)
ALT SERPL W/O P-5'-P-CCNC: 13 U/L (ref 10–44)
ANION GAP SERPL CALC-SCNC: 11 MMOL/L (ref 8–16)
AST SERPL-CCNC: 8 U/L (ref 10–40)
BILIRUB SERPL-MCNC: 0.5 MG/DL (ref 0.1–1)
BUN SERPL-MCNC: 16 MG/DL (ref 8–23)
CALCIUM SERPL-MCNC: 9.6 MG/DL (ref 8.7–10.5)
CHLORIDE SERPL-SCNC: 104 MMOL/L (ref 95–110)
CHOLEST SERPL-MCNC: 147 MG/DL (ref 120–199)
CHOLEST/HDLC SERPL: 2.5 {RATIO} (ref 2–5)
CO2 SERPL-SCNC: 27 MMOL/L (ref 23–29)
CREAT SERPL-MCNC: 0.7 MG/DL (ref 0.5–1.4)
EST. GFR  (AFRICAN AMERICAN): >60 ML/MIN/1.73 M^2
EST. GFR  (NON AFRICAN AMERICAN): >60 ML/MIN/1.73 M^2
GLUCOSE SERPL-MCNC: 101 MG/DL (ref 70–110)
HDLC SERPL-MCNC: 59 MG/DL (ref 40–75)
HDLC SERPL: 40.1 % (ref 20–50)
LDLC SERPL CALC-MCNC: 69.8 MG/DL (ref 63–159)
NONHDLC SERPL-MCNC: 88 MG/DL
POTASSIUM SERPL-SCNC: 3.8 MMOL/L (ref 3.5–5.1)
PROT SERPL-MCNC: 7.1 G/DL (ref 6–8.4)
SODIUM SERPL-SCNC: 142 MMOL/L (ref 136–145)
TRIGL SERPL-MCNC: 91 MG/DL (ref 30–150)

## 2022-05-10 PROCEDURE — 36415 COLL VENOUS BLD VENIPUNCTURE: CPT | Mod: PO | Performed by: INTERNAL MEDICINE

## 2022-05-10 PROCEDURE — 80061 LIPID PANEL: CPT | Performed by: INTERNAL MEDICINE

## 2022-05-10 PROCEDURE — 80053 COMPREHEN METABOLIC PANEL: CPT | Performed by: INTERNAL MEDICINE

## 2022-06-06 ENCOUNTER — PATIENT MESSAGE (OUTPATIENT)
Dept: DERMATOLOGY | Facility: CLINIC | Age: 67
End: 2022-06-06
Payer: MEDICARE

## 2022-06-09 ENCOUNTER — OFFICE VISIT (OUTPATIENT)
Dept: DERMATOLOGY | Facility: CLINIC | Age: 67
End: 2022-06-09
Payer: MEDICARE

## 2022-06-09 DIAGNOSIS — L57.0 AK (ACTINIC KERATOSIS): Primary | ICD-10-CM

## 2022-06-09 PROCEDURE — 99214 PR OFFICE/OUTPT VISIT, EST, LEVL IV, 30-39 MIN: ICD-10-PCS | Mod: S$PBB,,, | Performed by: DERMATOLOGY

## 2022-06-09 PROCEDURE — 99999 PR PBB SHADOW E&M-EST. PATIENT-LVL V: ICD-10-PCS | Mod: PBBFAC,,, | Performed by: DERMATOLOGY

## 2022-06-09 PROCEDURE — 99999 PR PBB SHADOW E&M-EST. PATIENT-LVL V: CPT | Mod: PBBFAC,,, | Performed by: DERMATOLOGY

## 2022-06-09 PROCEDURE — 99214 OFFICE O/P EST MOD 30 MIN: CPT | Mod: S$PBB,,, | Performed by: DERMATOLOGY

## 2022-06-09 PROCEDURE — 99215 OFFICE O/P EST HI 40 MIN: CPT | Mod: PBBFAC,PO | Performed by: DERMATOLOGY

## 2022-06-09 RX ORDER — FLUOROURACIL 50 MG/G
CREAM TOPICAL
Qty: 40 G | Refills: 1 | Status: SHIPPED | OUTPATIENT
Start: 2022-06-09 | End: 2023-06-13 | Stop reason: SDUPTHER

## 2022-06-09 NOTE — PATIENT INSTRUCTIONS
Efudex/Florouracil treatment: right lower leg 2x per day x 2-3 weeks  Discussed to treat as directed and that area will be red, inflamed, and irritated which is a normal reaction.  Medication should be discontinued if area treated is ulcerated or bleeding.  Pt should wait 2 weeks to use medication if areas that are to be treated have also been treated with cryosurgery/freezing. Pt should avoid medication contacting eyes or mouth and wear sunscreen,  sun protective clothing, hat,  and sunglasses if going to be in the sun while undergoing treatment.  Pt can send photo or call if concerned about reaction.

## 2022-06-09 NOTE — PROGRESS NOTES
Subjective:       Patient ID:  Carolina Knight is a 67 y.o. female who presents for   Chief Complaint   Patient presents with    Lesion     Right ankle     Pt here for lesion on right lower leg.  Feels it is similar to a pre skin cancer she had close to this area. She has used what she thinks is efudex at home and has used for 5 days and feels area is more irritated.       Review of Systems   Skin: Negative for tendency to form keloidal scars.   Hematologic/Lymphatic: Does not bruise/bleed easily.        Objective:    Physical Exam   Constitutional: She appears well-developed and well-nourished. No distress.   Neurological: She is alert and oriented to person, place, and time. She is not disoriented.   Psychiatric: She has a normal mood and affect.   Skin:   Areas Examined (abnormalities noted in diagram):   RLE Inspected                  Diagram Legend     Erythematous scaling macule/papule c/w actinic keratosis       Vascular papule c/w angioma      Pigmented verrucoid papule/plaque c/w seborrheic keratosis      Yellow umbilicated papule c/w sebaceous hyperplasia      Irregularly shaped tan macule c/w lentigo     1-2 mm smooth white papules consistent with Milia      Movable subcutaneous cyst with punctum c/w epidermal inclusion cyst      Subcutaneous movable cyst c/w pilar cyst      Firm pink to brown papule c/w dermatofibroma      Pedunculated fleshy papule(s) c/w skin tag(s)      Evenly pigmented macule c/w junctional nevus     Mildly variegated pigmented, slightly irregular-bordered macule c/w mildly atypical nevus      Flesh colored to evenly pigmented papule c/w intradermal nevus       Pink pearly papule/plaque c/w basal cell carcinoma      Erythematous hyperkeratotic cursted plaque c/w SCC      Surgical scar with no sign of skin cancer recurrence      Open and closed comedones      Inflammatory papules and pustules      Verrucoid papule consistent consistent with wart     Erythematous eczematous  patches and plaques     Dystrophic onycholytic nail with subungual debris c/w onychomycosis     Umbilicated papule    Erythematous-base heme-crusted tan verrucoid plaque consistent with inflamed seborrheic keratosis     Erythematous Silvery Scaling Plaque c/w Psoriasis     See annotation      Assessment / Plan:        AK (actinic keratosis)- suspected  Lesion is partially treated so feel biopsy at this time would have a lot of inflammation. Will complete a course of efudex and if lesion still present will consider bx at follow up.    -     fluorouraciL (EFUDEX) 5 % cream; Apply thin film to right lower leg  2times per day for 2-3 weeks; d/c if area bleeding or ulcerated; avoid eyes or mouth  Dispense: 40 g; Refill: 1    Efudex/Florouracil treatment: right lower leg 2x per day x 2-3 weeks  Discussed to treat as directed and that area will be red, inflamed, and irritated which is a normal reaction.  Medication should be discontinued if area treated is ulcerated or bleeding.  Pt should wait 2 weeks to use medication if areas that are to be treated have also been treated with cryosurgery/freezing. Pt should avoid medication contacting eyes or mouth and wear sunscreen,  sun protective clothing, hat,  and sunglasses if going to be in the sun while undergoing treatment.  Pt can send photo or call if concerned about reaction.           Follow up in about 2 months (around 8/9/2022) for recheck R lower leg .

## 2022-06-09 NOTE — PROGRESS NOTES
"  Subjective:       Patient ID:  Carolina Knight is a 67 y.o. female who presents for   Chief Complaint   Patient presents with    Lesion     Right ankle     Patient is here today for a "mole" check.   Pt has a history of  moderate sun exposure in the past.   Pt recalls several blistering sunburns in the past- once  Pt has history of tanning bed use- no  Pt has  had moles removed in the past- yes  Pt has history of melanoma in first degree relatives-  Sister and brother hx of skin cancer, pt unsure of which    Patient with new complaint of lesion(s)  Location: r lower ankle  Duration: 2 weeks ago  Symptoms: itching  Relieving factors/Previous treatments: fluorouracil for 4 days        Review of Systems   Skin: Positive for daily sunscreen use and activity-related sunscreen use. Negative for recent sunburn.   Hematologic/Lymphatic: Does not bruise/bleed easily.        Objective:    Physical Exam       Diagram Legend     Erythematous scaling macule/papule c/w actinic keratosis       Vascular papule c/w angioma      Pigmented verrucoid papule/plaque c/w seborrheic keratosis      Yellow umbilicated papule c/w sebaceous hyperplasia      Irregularly shaped tan macule c/w lentigo     1-2 mm smooth white papules consistent with Milia      Movable subcutaneous cyst with punctum c/w epidermal inclusion cyst      Subcutaneous movable cyst c/w pilar cyst      Firm pink to brown papule c/w dermatofibroma      Pedunculated fleshy papule(s) c/w skin tag(s)      Evenly pigmented macule c/w junctional nevus     Mildly variegated pigmented, slightly irregular-bordered macule c/w mildly atypical nevus      Flesh colored to evenly pigmented papule c/w intradermal nevus       Pink pearly papule/plaque c/w basal cell carcinoma      Erythematous hyperkeratotic cursted plaque c/w SCC      Surgical scar with no sign of skin cancer recurrence      Open and closed comedones      Inflammatory papules and pustules      Verrucoid papule " consistent consistent with wart     Erythematous eczematous patches and plaques     Dystrophic onycholytic nail with subungual debris c/w onychomycosis     Umbilicated papule    Erythematous-base heme-crusted tan verrucoid plaque consistent with inflamed seborrheic keratosis     Erythematous Silvery Scaling Plaque c/w Psoriasis     See annotation      Assessment / Plan:        There are no diagnoses linked to this encounter.         No follow-ups on file.

## 2022-06-13 ENCOUNTER — TELEPHONE (OUTPATIENT)
Dept: GASTROENTEROLOGY | Facility: CLINIC | Age: 67
End: 2022-06-13
Payer: MEDICARE

## 2022-06-13 ENCOUNTER — OFFICE VISIT (OUTPATIENT)
Dept: PRIMARY CARE CLINIC | Facility: CLINIC | Age: 67
End: 2022-06-13
Payer: MEDICARE

## 2022-06-13 ENCOUNTER — LAB VISIT (OUTPATIENT)
Dept: LAB | Facility: HOSPITAL | Age: 67
End: 2022-06-13
Attending: INTERNAL MEDICINE
Payer: MEDICARE

## 2022-06-13 VITALS
HEART RATE: 88 BPM | BODY MASS INDEX: 40.67 KG/M2 | TEMPERATURE: 99 F | DIASTOLIC BLOOD PRESSURE: 72 MMHG | OXYGEN SATURATION: 97 % | HEIGHT: 66 IN | WEIGHT: 253.06 LBS | SYSTOLIC BLOOD PRESSURE: 134 MMHG

## 2022-06-13 DIAGNOSIS — Z12.11 COLON CANCER SCREENING: ICD-10-CM

## 2022-06-13 DIAGNOSIS — E78.2 MIXED HYPERLIPIDEMIA: ICD-10-CM

## 2022-06-13 DIAGNOSIS — M79.10 MYALGIA: ICD-10-CM

## 2022-06-13 DIAGNOSIS — K29.50 CHRONIC GASTRITIS WITHOUT BLEEDING, UNSPECIFIED GASTRITIS TYPE: ICD-10-CM

## 2022-06-13 DIAGNOSIS — Z78.9 STATIN INTOLERANCE: ICD-10-CM

## 2022-06-13 DIAGNOSIS — I10 ESSENTIAL HYPERTENSION: ICD-10-CM

## 2022-06-13 DIAGNOSIS — E78.2 MIXED HYPERLIPIDEMIA: Primary | ICD-10-CM

## 2022-06-13 LAB
ANION GAP SERPL CALC-SCNC: 13 MMOL/L (ref 8–16)
BUN SERPL-MCNC: 22 MG/DL (ref 8–23)
CALCIUM SERPL-MCNC: 9.9 MG/DL (ref 8.7–10.5)
CHLORIDE SERPL-SCNC: 101 MMOL/L (ref 95–110)
CK SERPL-CCNC: 62 U/L (ref 20–180)
CO2 SERPL-SCNC: 25 MMOL/L (ref 23–29)
CREAT SERPL-MCNC: 0.9 MG/DL (ref 0.5–1.4)
EST. GFR  (AFRICAN AMERICAN): >60 ML/MIN/1.73 M^2
EST. GFR  (NON AFRICAN AMERICAN): >60 ML/MIN/1.73 M^2
GLUCOSE SERPL-MCNC: 103 MG/DL (ref 70–110)
MAGNESIUM SERPL-MCNC: 2.1 MG/DL (ref 1.6–2.6)
POTASSIUM SERPL-SCNC: 4 MMOL/L (ref 3.5–5.1)
SODIUM SERPL-SCNC: 139 MMOL/L (ref 136–145)

## 2022-06-13 PROCEDURE — 99215 OFFICE O/P EST HI 40 MIN: CPT | Mod: PBBFAC,PN | Performed by: INTERNAL MEDICINE

## 2022-06-13 PROCEDURE — 99214 PR OFFICE/OUTPT VISIT, EST, LEVL IV, 30-39 MIN: ICD-10-PCS | Mod: S$PBB,,, | Performed by: INTERNAL MEDICINE

## 2022-06-13 PROCEDURE — 99214 OFFICE O/P EST MOD 30 MIN: CPT | Mod: S$PBB,,, | Performed by: INTERNAL MEDICINE

## 2022-06-13 PROCEDURE — 82550 ASSAY OF CK (CPK): CPT | Performed by: INTERNAL MEDICINE

## 2022-06-13 PROCEDURE — 36415 COLL VENOUS BLD VENIPUNCTURE: CPT | Mod: PN | Performed by: INTERNAL MEDICINE

## 2022-06-13 PROCEDURE — 99999 PR PBB SHADOW E&M-EST. PATIENT-LVL V: CPT | Mod: PBBFAC,,, | Performed by: INTERNAL MEDICINE

## 2022-06-13 PROCEDURE — 80048 BASIC METABOLIC PNL TOTAL CA: CPT | Performed by: INTERNAL MEDICINE

## 2022-06-13 PROCEDURE — 99999 PR PBB SHADOW E&M-EST. PATIENT-LVL V: ICD-10-PCS | Mod: PBBFAC,,, | Performed by: INTERNAL MEDICINE

## 2022-06-13 PROCEDURE — 83735 ASSAY OF MAGNESIUM: CPT | Performed by: INTERNAL MEDICINE

## 2022-06-13 NOTE — TELEPHONE ENCOUNTER
Contact patient inform of the referral and  schedule. No answer left message for patient to call the office back.

## 2022-06-13 NOTE — PROGRESS NOTES
"Subjective:       Patient ID: Carolina Knight is a 67 y.o. female.    Chief Complaint: cholesterol f/u    HPI   HLD - crestor 5mg qod.  LDL 5/10/22 69.8.  Part of digital HLD program.  Myalgia and muscle weakness. Keeping her from being able to sleep.  Stopped crestor last week. Slightly better myalgia but still w/ pain when picking up milk carton.     HTN - hctz 25mg daily, amlodipine 10mg daily, aldactone 50mg daily.   Part of digital HTN program.     Wants to know if she can take med for weight loss. Referral to bariatric medicine.     Review of Systems   Constitutional: Negative for activity change and unexpected weight change.   HENT: Negative for hearing loss, rhinorrhea and trouble swallowing.    Eyes: Negative for discharge and visual disturbance.   Respiratory: Negative for chest tightness and wheezing.    Cardiovascular: Negative for chest pain and palpitations.   Gastrointestinal: Negative for blood in stool, constipation, diarrhea and vomiting.   Endocrine: Negative for polydipsia and polyuria.   Genitourinary: Negative for difficulty urinating, dysuria, hematuria and menstrual problem.   Musculoskeletal: Negative for arthralgias, joint swelling and neck pain.   Neurological: Negative for weakness and headaches.   Psychiatric/Behavioral: Negative for confusion and dysphoric mood.         Objective:      Physical Exam    /72 (BP Location: Left arm, Patient Position: Sitting, BP Method: Large (Manual))   Pulse 88   Temp 98.7 °F (37.1 °C) (Oral)   Ht 5' 6" (1.676 m)   Wt 114.8 kg (253 lb 1.4 oz)   LMP  (LMP Unknown)   SpO2 97%   BMI 40.85 kg/m²     GEN - A+OX4, NAD   HEENT - PERRL, EOMI, OP clear. MMM.   Neck - No thyromegaly or cervical LAD. No thyroid masses felt.  CV - RRR, no m/r.    Chest - CTAB, no wheezing or rhonchi.   Abd - S/NT/ND/+BS.   Ext - 2+BDP and radial pulses. No C/C/E.  Skin - No rash.    LABS REVIEWED.     Assessment/Plan     Carolina was seen today for " hyperlipidemia.    Diagnoses and all orders for this visit:    Mixed hyperlipidemia - statin intolerance. Stop crestor. Still w/ some pain in the muscles. Check CPK. Consider zetia.   -     CK; Future    Essential hypertension - Stable and controlled. Continue current medications.  -     Basic Metabolic Panel; Future  -     Magnesium; Future    Myalgia  -     CK; Future  -     Basic Metabolic Panel; Future  -     Magnesium; Future    BMI 40.85, adult  -     Ambulatory referral/consult to Bariatric Medicine; Future    Colon cancer screening  -     Ambulatory referral/consult to Gastroenterology; Future    Chronic gastritis without bleeding, unspecified gastritis type  -     Ambulatory referral/consult to Gastroenterology; Future    Will give pt ACP documents again.     Advance Care Planning     Date: 06/13/2022    Power of   I initiated the process of advance care planning today and explained the importance of this process to the patient.  I introduced the concept of advance directives to the patient, as well. Then the patient received detailed information about the importance of designating a Health Care Power of  (HCPOA). She was also instructed to communicate with this person about their wishes for future healthcare, should she become sick and lose decision-making capacity. The patient has not previously appointed a HCPOA. After our discussion, the patient has decided to complete a HCPOA and has appointed her sister, health care agent: Cheryle & health care agent number: [phone] I spent a total time of 3 minutes discussing this issue with the patient.       Follow up in about 3 months (around 9/13/2022).      Kierra Cornejo MD  Department of Internal Medicine - [facility]  12:45 PM

## 2022-06-13 NOTE — TELEPHONE ENCOUNTER
----- Message from Marguerite Lewis sent at 6/13/2022  2:27 PM CDT -----  Regarding: Consult to Gastroenterology  Dr. Kierra Cornejo placed referral for patient to Consult to Gastroenterology. Patient is requesting a appointment in September with Dr. Henriquez, please assist with scheduling, thanks.    Chronic gastritis without bleeding, unspecified gastritis type [K29.50]

## 2022-06-14 ENCOUNTER — PATIENT MESSAGE (OUTPATIENT)
Dept: PRIMARY CARE CLINIC | Facility: CLINIC | Age: 67
End: 2022-06-14
Payer: MEDICARE

## 2022-06-14 ENCOUNTER — TELEPHONE (OUTPATIENT)
Dept: PRIMARY CARE CLINIC | Facility: CLINIC | Age: 67
End: 2022-06-14
Payer: MEDICARE

## 2022-06-14 ENCOUNTER — LAB VISIT (OUTPATIENT)
Dept: LAB | Facility: HOSPITAL | Age: 67
End: 2022-06-14
Attending: INTERNAL MEDICINE
Payer: MEDICARE

## 2022-06-14 DIAGNOSIS — Z12.11 ENCOUNTER FOR FIT (FECAL IMMUNOCHEMICAL TEST) SCREENING: ICD-10-CM

## 2022-06-14 DIAGNOSIS — E78.5 HYPERLIPIDEMIA, UNSPECIFIED HYPERLIPIDEMIA TYPE: Primary | ICD-10-CM

## 2022-06-14 PROCEDURE — 82274 ASSAY TEST FOR BLOOD FECAL: CPT | Performed by: INTERNAL MEDICINE

## 2022-06-14 RX ORDER — EZETIMIBE 10 MG/1
10 TABLET ORAL DAILY
Qty: 90 TABLET | Refills: 3 | Status: SHIPPED | OUTPATIENT
Start: 2022-06-14 | End: 2022-07-29

## 2022-06-14 NOTE — TELEPHONE ENCOUNTER
Spoke with Ms. Carolina. She is agreeable to starting Zetia 10mg.  She wanted to know exactly what it meant when the CK number goes down. She is ok if you message her in the patient chart regarding this question.

## 2022-06-14 NOTE — TELEPHONE ENCOUNTER
Please call and notify pt:    Muscle enzymes, potassium and magnesium are normal. She can wait another week but I do recommend trying another class of drug for cholesterol called zetia 10mg daily. Can start next week. Sent to pharmacy.      The 10-year ASCVD risk score (David MARIE Jr., et al., 2013) is: 16%    Values used to calculate the score:      Age: 67 years      Sex: Female      Is Non- : No      Diabetic: Yes      Tobacco smoker: No      Systolic Blood Pressure: 134 mmHg      Is BP treated: Yes      HDL Cholesterol: 59 mg/dL      Total Cholesterol: 147 mg/dL

## 2022-06-21 LAB — HEMOCCULT STL QL IA: NEGATIVE

## 2022-07-15 ENCOUNTER — HOSPITAL ENCOUNTER (EMERGENCY)
Facility: HOSPITAL | Age: 67
Discharge: HOME | End: 2022-07-15
Attending: EMERGENCY MEDICINE
Payer: MEDICARE

## 2022-07-15 VITALS
DIASTOLIC BLOOD PRESSURE: 59 MMHG | OXYGEN SATURATION: 95 % | HEART RATE: 79 BPM | TEMPERATURE: 99 F | RESPIRATION RATE: 16 BRPM | SYSTOLIC BLOOD PRESSURE: 122 MMHG | WEIGHT: 250 LBS | BODY MASS INDEX: 40.35 KG/M2

## 2022-07-15 DIAGNOSIS — R07.9 CHEST PAIN: Primary | ICD-10-CM

## 2022-07-15 LAB
ALBUMIN SERPL BCP-MCNC: 4 G/DL (ref 3.5–5.2)
ALP SERPL-CCNC: 65 U/L (ref 55–135)
ALT SERPL W/O P-5'-P-CCNC: 14 U/L (ref 10–44)
ANION GAP SERPL CALC-SCNC: 11 MMOL/L (ref 8–16)
AST SERPL-CCNC: 7 U/L (ref 10–40)
BASOPHILS # BLD AUTO: 0.09 K/UL (ref 0–0.2)
BASOPHILS NFR BLD: 0.8 % (ref 0–1.9)
BILIRUB SERPL-MCNC: 0.3 MG/DL (ref 0.1–1)
BUN SERPL-MCNC: 20 MG/DL (ref 8–23)
CALCIUM SERPL-MCNC: 9.7 MG/DL (ref 8.7–10.5)
CHLORIDE SERPL-SCNC: 102 MMOL/L (ref 95–110)
CO2 SERPL-SCNC: 27 MMOL/L (ref 23–29)
CREAT SERPL-MCNC: 0.9 MG/DL (ref 0.5–1.4)
DIFFERENTIAL METHOD: ABNORMAL
EOSINOPHIL # BLD AUTO: 0.3 K/UL (ref 0–0.5)
EOSINOPHIL NFR BLD: 2.9 % (ref 0–8)
ERYTHROCYTE [DISTWIDTH] IN BLOOD BY AUTOMATED COUNT: 15.4 % (ref 11.5–14.5)
EST. GFR  (AFRICAN AMERICAN): >60 ML/MIN/1.73 M^2
EST. GFR  (NON AFRICAN AMERICAN): >60 ML/MIN/1.73 M^2
GLUCOSE SERPL-MCNC: 104 MG/DL (ref 70–110)
HCT VFR BLD AUTO: 41.3 % (ref 37–48.5)
HGB BLD-MCNC: 13.9 G/DL (ref 12–16)
IMM GRANULOCYTES # BLD AUTO: 0.09 K/UL (ref 0–0.04)
IMM GRANULOCYTES NFR BLD AUTO: 0.8 % (ref 0–0.5)
LYMPHOCYTES # BLD AUTO: 2.8 K/UL (ref 1–4.8)
LYMPHOCYTES NFR BLD: 25.7 % (ref 18–48)
MAGNESIUM SERPL-MCNC: 2 MG/DL (ref 1.6–2.6)
MCH RBC QN AUTO: 29.2 PG (ref 27–31)
MCHC RBC AUTO-ENTMCNC: 33.7 G/DL (ref 32–36)
MCV RBC AUTO: 87 FL (ref 82–98)
MONOCYTES # BLD AUTO: 1.1 K/UL (ref 0.3–1)
MONOCYTES NFR BLD: 10 % (ref 4–15)
NEUTROPHILS # BLD AUTO: 6.6 K/UL (ref 1.8–7.7)
NEUTROPHILS NFR BLD: 59.8 % (ref 38–73)
NRBC BLD-RTO: 0 /100 WBC
PLATELET # BLD AUTO: 299 K/UL (ref 150–450)
PMV BLD AUTO: 9.6 FL (ref 9.2–12.9)
POTASSIUM SERPL-SCNC: 4.3 MMOL/L (ref 3.5–5.1)
PROT SERPL-MCNC: 7.9 G/DL (ref 6–8.4)
RBC # BLD AUTO: 4.76 M/UL (ref 4–5.4)
SODIUM SERPL-SCNC: 140 MMOL/L (ref 136–145)
TROPONIN I SERPL DL<=0.01 NG/ML-MCNC: 0.01 NG/ML (ref 0–0.03)
TROPONIN I SERPL DL<=0.01 NG/ML-MCNC: <0.006 NG/ML (ref 0–0.03)
WBC # BLD AUTO: 11.07 K/UL (ref 3.9–12.7)

## 2022-07-15 PROCEDURE — 99285 EMERGENCY DEPT VISIT HI MDM: CPT | Mod: 25

## 2022-07-15 PROCEDURE — 93005 ELECTROCARDIOGRAM TRACING: CPT

## 2022-07-15 PROCEDURE — 25000003 PHARM REV CODE 250: Performed by: EMERGENCY MEDICINE

## 2022-07-15 PROCEDURE — 93010 ELECTROCARDIOGRAM REPORT: CPT | Mod: ,,, | Performed by: INTERNAL MEDICINE

## 2022-07-15 PROCEDURE — 85025 COMPLETE CBC W/AUTO DIFF WBC: CPT | Performed by: EMERGENCY MEDICINE

## 2022-07-15 PROCEDURE — 93010 EKG 12-LEAD: ICD-10-PCS | Mod: ,,, | Performed by: INTERNAL MEDICINE

## 2022-07-15 PROCEDURE — 63600175 PHARM REV CODE 636 W HCPCS: Performed by: EMERGENCY MEDICINE

## 2022-07-15 PROCEDURE — 84484 ASSAY OF TROPONIN QUANT: CPT | Mod: 91 | Performed by: EMERGENCY MEDICINE

## 2022-07-15 PROCEDURE — 80053 COMPREHEN METABOLIC PANEL: CPT | Performed by: EMERGENCY MEDICINE

## 2022-07-15 PROCEDURE — 96361 HYDRATE IV INFUSION ADD-ON: CPT

## 2022-07-15 PROCEDURE — 96374 THER/PROPH/DIAG INJ IV PUSH: CPT

## 2022-07-15 PROCEDURE — 96375 TX/PRO/DX INJ NEW DRUG ADDON: CPT

## 2022-07-15 PROCEDURE — 83735 ASSAY OF MAGNESIUM: CPT | Performed by: EMERGENCY MEDICINE

## 2022-07-15 RX ORDER — KETOROLAC TROMETHAMINE 30 MG/ML
15 INJECTION, SOLUTION INTRAMUSCULAR; INTRAVENOUS
Status: COMPLETED | OUTPATIENT
Start: 2022-07-15 | End: 2022-07-15

## 2022-07-15 RX ORDER — METOCLOPRAMIDE HYDROCHLORIDE 5 MG/ML
10 INJECTION INTRAMUSCULAR; INTRAVENOUS
Status: COMPLETED | OUTPATIENT
Start: 2022-07-15 | End: 2022-07-15

## 2022-07-15 RX ADMIN — METOCLOPRAMIDE 10 MG: 5 INJECTION, SOLUTION INTRAMUSCULAR; INTRAVENOUS at 03:07

## 2022-07-15 RX ADMIN — SODIUM CHLORIDE 500 ML: 0.9 INJECTION, SOLUTION INTRAVENOUS at 03:07

## 2022-07-15 RX ADMIN — KETOROLAC TROMETHAMINE 15 MG: 30 INJECTION, SOLUTION INTRAMUSCULAR; INTRAVENOUS at 03:07

## 2022-07-15 NOTE — DISCHARGE INSTRUCTIONS
Please follow-up with your primary care physician for further evaluation and management.  Please return with any new or worsening symptoms.

## 2022-07-15 NOTE — ED PROVIDER NOTES
"Encounter Date: 7/15/2022       History     Chief Complaint   Patient presents with    Chest Pain     C/o left sided chest pain started approx 12PM today, SOB, denies nausea, states "I went for a walk and then started having this pain right under my left breast"      67-year-old female presents emergency department complaining of chest pain.  States she was out walking and she develops pain to the left lower/mid lower chest.  States it has been constant since onset.  Worse with certain movements and positions and deep inspiration without alleviating factors.  Denies any shortness of breath.  Denies any headache, lightheadedness, dizziness, nausea, vomiting, cough, congestion, fever.  No other symptoms reported at this time.         Review of patient's allergies indicates:   Allergen Reactions    Lipitor [atorvastatin]      Severe myalgia    Losartan Swelling     Possible throat closing sensation.    Lovastatin Other (See Comments)     myalgia    Cefuroxime Itching and Rash    Latex, natural rubber Itching and Rash     Pt reported      Past Medical History:   Diagnosis Date    Decreased mobility and endurance 2017    Decreased strength 2017    Decreased strength of lower extremity 2021    Difficult intubation     Early dry stage nonexudative age-related macular degeneration     Edema 2018    Edema of both legs 2020    Fatty liver 12/15/2014    Hypokalemia 2018    Lymphedema of both lower extremities 2020    Multiple gastric polyps 2012    Polyarthralgia 2015    Sjogren's disease     Sleep apnea      Past Surgical History:   Procedure Laterality Date    BREAST BIOPSY Left 2017    core,Benign breast with fibrocystic changes, including benign usual ductal hyperplasia and adenosis    BREAST BIOPSY Right     core    CERVICAL CONIZATION   W/ LASER       SECTION      x1    CHOLECYSTECTOMY      ESOPHAGOGASTRODUODENOSCOPY N/A 2018    " Procedure: ESOPHAGOGASTRODUODENOSCOPY (EGD);  Surgeon: George Henriquez MD;  [facility]  Service: Endoscopy;  Laterality: N/A;  possible difficult intubation - see anesthesia note dated 1/15/13 - 2nd floor/ generated from last EGD, 3 year f/u, Pt due/ Pt requesting Dr. Henriquez -     EYE SURGERY  Est 2014-15    Laser on each eye - tear in white part eye by Dr Nitish Ayala    HYSTERECTOMY  age 42    [place], LSO (uterine fibroids, adhesions, endometriosis)    OOPHORECTOMY  age 42    LSO (endometriosis, cyst)    PERCUTANEOUS CRYOTHERAPY OF PERIPHERAL NERVE USING LIQUID NITROUS OXIDE IN CLOSED NEEDLE DEVICE Right 3/1/2021    Procedure: CRYOTHERAPY, NERVE, PERIPHERAL, PERCUTANEOUS, USING LIQUID NITROUS OXIDE IN CLOSED NEEDLE DEVICE;  Surgeon: CHACE Riley;  Location: [facility];  Service: Pain Management;  Laterality: Right;  RIGHT KNEE IOVERA    THYROIDECTOMY      TOE SURGERY      left    TOTAL THYROIDECTOMY       Family History   Problem Relation Age of Onset    Asthma Father     COPD Father     Arthritis Mother     Breast cancer Maternal Aunt     Cancer Maternal Aunt     Melanoma Sister     COPD Sister     Melanoma Brother     Cancer Maternal Uncle         esophageal cancer    Asthma Son     Diabetes Brother         Adult onset got in his 60s    Ovarian cancer Neg Hx     Colon cancer Neg Hx      Social History     Tobacco Use    Smoking status: Never Smoker    Smokeless tobacco: Never Used   Substance Use Topics    Alcohol use: No     Comment: none lately.  rarely has a drink for special occassion    Drug use: No     Review of Systems   Constitutional: Negative for chills, fatigue and fever.   HENT: Negative for congestion and sore throat.    Eyes: Negative for photophobia and visual disturbance.   Respiratory: Negative for cough and shortness of breath.    Cardiovascular: Positive for chest pain. Negative for palpitations.   Gastrointestinal: Negative for  abdominal pain, diarrhea and vomiting.   Musculoskeletal: Negative for back pain, neck pain and neck stiffness.   Neurological: Negative for light-headedness, numbness and headaches.       Physical Exam     Initial Vitals [07/15/22 1351]   BP Pulse Resp Temp SpO2   (!) 170/83 88 18 98.9 °F (37.2 °C) 98 %      MAP       --         Physical Exam    Nursing note and vitals reviewed.  Constitutional: She appears well-developed and well-nourished. No distress.   HENT:   Head: Normocephalic and atraumatic.   Eyes: Conjunctivae and EOM are normal. Pupils are equal, round, and reactive to light.   Neck: Neck supple. No tracheal deviation present.   Normal range of motion.  Cardiovascular: Normal rate and intact distal pulses.   Pulmonary/Chest: No respiratory distress.   Musculoskeletal:         General: No tenderness or edema. Normal range of motion.      Cervical back: Normal range of motion and neck supple.     Neurological: She is alert and oriented to person, place, and time. She has normal strength. No cranial nerve deficit. GCS score is 15. GCS eye subscore is 4. GCS verbal subscore is 5. GCS motor subscore is 6.   Skin: Skin is warm and dry.         ED Course   Procedures  Labs Reviewed   CBC W/ AUTO DIFFERENTIAL - Abnormal; Notable for the following components:       Result Value    RDW 15.4 (*)     Immature Granulocytes 0.8 (*)     Immature Grans (Abs) 0.09 (*)     Mono # 1.1 (*)     All other components within normal limits   COMPREHENSIVE METABOLIC PANEL - Abnormal; Notable for the following components:    AST 7 (*)     All other components within normal limits   TROPONIN I   MAGNESIUM   TROPONIN I     EKG Readings: (Independently Interpreted)   Initial Reading: No STEMI. Previous EKG: Compared with most recent EKG Previous EKG Date: 1/31/2022 (Minimal change) . Rhythm: Normal Sinus Rhythm. Heart Rate: 86. Ectopy: No Ectopy. Conduction: Normal. ST Segments: Normal ST Segments. Axis: Normal.     ECG Results           EKG 12-lead (In process)  Result time 07/15/22 15:48:25    In process by Interface, Lab In [facility] (07/15/22 15:48:25)                 Narrative:    Test Reason : R07.9,    Vent. Rate : 086 BPM     Atrial Rate : 086 BPM     P-R Int : 138 ms          QRS Dur : 082 ms      QT Int : 374 ms       P-R-T Axes : 050 072 051 degrees     QTc Int : 447 ms    Normal sinus rhythm  Normal ECG  When compared with ECG of 31-JAN-2022 09:34,  No significant change was found    Referred By: [email or web address]   SELF           Confirmed By:                               X-Rays:   Independently Interpreted Readings:   Other Readings:  Imaging interpreted by radiologist and visualized by me:     Imaging Results          X-Ray Chest AP Portable (Final result)  Result time 07/15/22 14:34:34    Final result by Ramses Mora MD (07/15/22 14:34:34)                 Impression:      No convincing evidence of acute cardiopulmonary disease.      Electronically signed by: Ramses Mora  Date:    07/15/2022  Time:    14:34             Narrative:    EXAMINATION:  XR CHEST AP PORTABLE    CLINICAL HISTORY:  Chest Pain;    TECHNIQUE:  Single frontal view of the chest was performed.    COMPARISON:  Chest radiograph 07/09/2020    FINDINGS:  Monitoring leads overlie the chest.  Grossly unchanged cardiomediastinal contours.  Chronic interstitial coarsening, relatively similar to 07/09/2020 radiograph.  New linear platelike opacity right mid to lower lung zone favored to represent atelectasis.  Left basilar atelectasis is additionally suggested.  No definite pneumothorax or large volume pleural effusion.  No acute findings identified in the visualized abdomen.  Osseous and soft tissue structures appear without definite acute abnormality.                                Medications   sodium chloride 0.9% bolus 500 mL (0 mLs Intravenous Stopped 7/15/22 1717)   metoclopramide HCl injection 10 mg (10 mg Intravenous Given 7/15/22 1528)   ketorolac injection 15  mg (15 mg Intravenous Given 7/15/22 1526)     Medical Decision Making:   Initial Assessment:   67-year-old female presents emergency department complaining of chest pain  Differential Diagnosis:   ACS, dissection, pneumonia, pneumothorax, musculoskeletal, GERD  Independently Interpreted Test(s):   I have ordered and independently interpreted X-rays - see prior notes.  I have ordered and independently interpreted EKG Reading(s) - see prior notes  Clinical Tests:   Lab Tests: Reviewed       <> Summary of Lab: Benign  ED Management:  Patient given some IV fluid, Reglan, Toradol.  On re-evaluation reports almost total resolution of symptoms.  Vital signs stable, labs benign.  Informed of plan to discharge with instructions on home management, prompt follow-up with primary care physician, strict return precautions.                       Clinical Impression:   Final diagnoses:  [R07.9] Chest pain (Primary)          ED Disposition Condition    Discharge Stable        ED Prescriptions     None        Follow-up Information     Follow up With Specialties Details Why Contact Info    Kierra Cornejo MD Internal Medicine Schedule an appointment as soon as possible for a visit   [address] [id number] [phone]             Dawood Snowden MD  07/16/22 0605

## 2022-07-21 ENCOUNTER — TELEPHONE (OUTPATIENT)
Dept: GASTROENTEROLOGY | Facility: CLINIC | Age: 67
End: 2022-07-21
Payer: MEDICARE

## 2022-07-21 NOTE — TELEPHONE ENCOUNTER
Spoke to patient to let her know that she has an appointment with GENNA Lobato  on Monday. She requested Dr. Henriquez at [facility]. I told  Patient that  her appointment is here in [place] and that GENNA lobato  do not work under Dr. Henriquez. Patient  stated she doesn't know who schedule the appointment and why they schedule it here at the [place] location. I told patient if she wants to go see Dr. Garcia she can call there office in [facility] she states that he does  Her scopes always that is why she requested him she stated she will call [facility] so they can schedule an appointment with him.  Verbal understanding.

## 2022-07-21 NOTE — TELEPHONE ENCOUNTER
----- Message from Lorenza Cagle sent at 7/21/2022  3:27 PM CDT -----   Name of Who is Calling:     What is the request in detail: patient request call back in reference to endoscopy and   Colonoscopy want to know if doctor does both and if can do both at the  same time   Please contact to further discuss and advise      Can the clinic reply by [facility]:     What Number to Call Back if not in [facility]:  [phone]

## 2022-07-21 NOTE — TELEPHONE ENCOUNTER
Return call and spoke with patient. Schedule patient appointment with Dr. Henriquez. Patient verbalized understanding.

## 2022-07-21 NOTE — TELEPHONE ENCOUNTER
----- Message from Cheryle Enriquez NP sent at 7/21/2022  2:59 PM CDT -----  Regarding: appointment monday  Patient has appointment with me on Monday. She requested Dr. Henriquez at [facility]. Please let patient know her appointment is here in [place] and I do not work under Dr. Henriquez.

## 2022-07-25 ENCOUNTER — PATIENT MESSAGE (OUTPATIENT)
Dept: PRIMARY CARE CLINIC | Facility: CLINIC | Age: 67
End: 2022-07-25
Payer: MEDICARE

## 2022-08-03 ENCOUNTER — PATIENT MESSAGE (OUTPATIENT)
Dept: ORTHOPEDICS | Facility: CLINIC | Age: 67
End: 2022-08-03
Payer: MEDICARE

## 2022-08-08 ENCOUNTER — PES CALL (OUTPATIENT)
Dept: ADMINISTRATIVE | Facility: OTHER | Age: 67
End: 2022-08-08
Payer: MEDICARE

## 2022-08-10 ENCOUNTER — OFFICE VISIT (OUTPATIENT)
Dept: DERMATOLOGY | Facility: CLINIC | Age: 67
End: 2022-08-10
Payer: MEDICARE

## 2022-08-10 DIAGNOSIS — L24.9 IRRITANT CONTACT DERMATITIS, UNSPECIFIED TRIGGER: ICD-10-CM

## 2022-08-10 DIAGNOSIS — L57.0 AK (ACTINIC KERATOSIS): Primary | ICD-10-CM

## 2022-08-10 PROCEDURE — 99214 OFFICE O/P EST MOD 30 MIN: CPT | Mod: PBBFAC,PO | Performed by: DERMATOLOGY

## 2022-08-10 PROCEDURE — 99999 PR PBB SHADOW E&M-EST. PATIENT-LVL IV: ICD-10-PCS | Mod: PBBFAC,,, | Performed by: DERMATOLOGY

## 2022-08-10 PROCEDURE — 99213 PR OFFICE/OUTPT VISIT, EST, LEVL III, 20-29 MIN: ICD-10-PCS | Mod: S$PBB,,, | Performed by: DERMATOLOGY

## 2022-08-10 PROCEDURE — 99213 OFFICE O/P EST LOW 20 MIN: CPT | Mod: S$PBB,,, | Performed by: DERMATOLOGY

## 2022-08-10 PROCEDURE — 99999 PR PBB SHADOW E&M-EST. PATIENT-LVL IV: CPT | Mod: PBBFAC,,, | Performed by: DERMATOLOGY

## 2022-08-10 NOTE — PROGRESS NOTES
Subjective:       Patient ID:  Carolina Knight is a 67 y.o. female who presents for   Chief Complaint   Patient presents with    Actinic Keratosis     Pt here today for a follow up on AK on right lower leg tx with efudex cream x 3 weeks. Toleratesd tx well.  But area is now scaly and irritated in edges of where she treated . Itches.  No tx       Review of Systems   Skin: Positive for itching and rash. Negative for tendency to form keloidal scars.   Hematologic/Lymphatic: Does not bruise/bleed easily.        Objective:    Physical Exam   Constitutional: She appears well-developed and well-nourished. No distress.   Neurological: She is alert and oriented to person, place, and time. She is not disoriented.   Psychiatric: She has a normal mood and affect.   Skin:   Areas Examined (abnormalities noted in diagram):   RLE Inspected              Diagram Legend     Erythematous scaling macule/papule c/w actinic keratosis       Vascular papule c/w angioma      Pigmented verrucoid papule/plaque c/w seborrheic keratosis      Yellow umbilicated papule c/w sebaceous hyperplasia      Irregularly shaped tan macule c/w lentigo     1-2 mm smooth white papules consistent with Milia      Movable subcutaneous cyst with punctum c/w epidermal inclusion cyst      Subcutaneous movable cyst c/w pilar cyst      Firm pink to brown papule c/w dermatofibroma      Pedunculated fleshy papule(s) c/w skin tag(s)      Evenly pigmented macule c/w junctional nevus     Mildly variegated pigmented, slightly irregular-bordered macule c/w mildly atypical nevus      Flesh colored to evenly pigmented papule c/w intradermal nevus       Pink pearly papule/plaque c/w basal cell carcinoma      Erythematous hyperkeratotic cursted plaque c/w SCC      Surgical scar with no sign of skin cancer recurrence      Open and closed comedones      Inflammatory papules and pustules      Verrucoid papule consistent consistent with wart     Erythematous eczematous  patches and plaques     Dystrophic onycholytic nail with subungual debris c/w onychomycosis     Umbilicated papule    Erythematous-base heme-crusted tan verrucoid plaque consistent with inflamed seborrheic keratosis     Erythematous Silvery Scaling Plaque c/w Psoriasis     See annotation      Assessment / Plan:        AK (actinic keratosis)  S/p efudex with great response    Irritant contact dermatitis, unspecified trigger  Triamcinolone 0.1% cream then vaseline for 1-2 weeks   Moisturize regularly with cerave or cetaphil cream            Follow up for 8 months TBSE. or sooner if does not resolve

## 2022-08-17 ENCOUNTER — TELEPHONE (OUTPATIENT)
Dept: BARIATRICS | Facility: CLINIC | Age: 67
End: 2022-08-17
Payer: MEDICARE

## 2022-08-17 NOTE — TELEPHONE ENCOUNTER
Attempted to reach patient to offer to move [facility] bariatric medicine financial phone appointment to a sooner private time opened on [facility] surgery finance schedule. LVM to call [phone] if interested in earlier time.

## 2022-08-29 ENCOUNTER — TELEPHONE (OUTPATIENT)
Dept: ADMINISTRATIVE | Facility: CLINIC | Age: 67
End: 2022-08-29
Payer: MEDICARE

## 2022-08-30 ENCOUNTER — OFFICE VISIT (OUTPATIENT)
Dept: FAMILY MEDICINE | Facility: CLINIC | Age: 67
End: 2022-08-30
Payer: MEDICARE

## 2022-08-30 VITALS
SYSTOLIC BLOOD PRESSURE: 136 MMHG | WEIGHT: 258.63 LBS | OXYGEN SATURATION: 97 % | DIASTOLIC BLOOD PRESSURE: 70 MMHG | BODY MASS INDEX: 41.56 KG/M2 | HEIGHT: 66 IN | HEART RATE: 79 BPM

## 2022-08-30 DIAGNOSIS — E66.01 MORBID OBESITY WITH BMI OF 40.0-44.9, ADULT: ICD-10-CM

## 2022-08-30 DIAGNOSIS — I10 ESSENTIAL HYPERTENSION: ICD-10-CM

## 2022-08-30 DIAGNOSIS — E03.9 HYPOTHYROIDISM, UNSPECIFIED TYPE: ICD-10-CM

## 2022-08-30 DIAGNOSIS — I77.1 TORTUOUS AORTA: ICD-10-CM

## 2022-08-30 DIAGNOSIS — I87.2 VENOUS INSUFFICIENCY OF BOTH LOWER EXTREMITIES: ICD-10-CM

## 2022-08-30 DIAGNOSIS — Z00.00 ENCOUNTER FOR PREVENTIVE HEALTH EXAMINATION: Primary | ICD-10-CM

## 2022-08-30 DIAGNOSIS — Z74.09 OTHER REDUCED MOBILITY: ICD-10-CM

## 2022-08-30 DIAGNOSIS — K76.0 FATTY LIVER: ICD-10-CM

## 2022-08-30 DIAGNOSIS — Z12.31 ENCOUNTER FOR SCREENING MAMMOGRAM FOR MALIGNANT NEOPLASM OF BREAST: ICD-10-CM

## 2022-08-30 DIAGNOSIS — J30.9 ALLERGIC RHINITIS, UNSPECIFIED SEASONALITY, UNSPECIFIED TRIGGER: ICD-10-CM

## 2022-08-30 DIAGNOSIS — G47.33 OSA (OBSTRUCTIVE SLEEP APNEA): ICD-10-CM

## 2022-08-30 DIAGNOSIS — E78.5 HYPERLIPIDEMIA, UNSPECIFIED HYPERLIPIDEMIA TYPE: ICD-10-CM

## 2022-08-30 DIAGNOSIS — M35.00 SJOGREN'S SYNDROME, WITH UNSPECIFIED ORGAN INVOLVEMENT: ICD-10-CM

## 2022-08-30 DIAGNOSIS — K21.9 GASTROESOPHAGEAL REFLUX DISEASE, UNSPECIFIED WHETHER ESOPHAGITIS PRESENT: ICD-10-CM

## 2022-08-30 DIAGNOSIS — I83.93 VARICOSE VEINS OF BOTH LOWER EXTREMITIES, UNSPECIFIED WHETHER COMPLICATED: ICD-10-CM

## 2022-08-30 PROCEDURE — G0439 PPPS, SUBSEQ VISIT: HCPCS | Mod: ,,, | Performed by: NURSE PRACTITIONER

## 2022-08-30 PROCEDURE — 99999 PR PBB SHADOW E&M-EST. PATIENT-LVL III: CPT | Mod: PBBFAC,,, | Performed by: NURSE PRACTITIONER

## 2022-08-30 PROCEDURE — 99999 PR PBB SHADOW E&M-EST. PATIENT-LVL III: ICD-10-PCS | Mod: PBBFAC,,, | Performed by: NURSE PRACTITIONER

## 2022-08-30 PROCEDURE — G0439 PR MEDICARE ANNUAL WELLNESS SUBSEQUENT VISIT: ICD-10-PCS | Mod: ,,, | Performed by: NURSE PRACTITIONER

## 2022-08-30 PROCEDURE — 99213 OFFICE O/P EST LOW 20 MIN: CPT | Mod: PBBFAC,PO | Performed by: NURSE PRACTITIONER

## 2022-08-30 NOTE — PROGRESS NOTES
"Carolina Knight presented for a  Medicare AWV and comprehensive Health Risk Assessment today. The following components were reviewed and updated:    Medical history  Family History  Social history  Allergies and Current Medications  Health Risk Assessment  Health Maintenance  Care Team         ** See Completed Assessments for Annual Wellness Visit within the encounter summary.**         The following assessments were completed:  Living Situation  CAGE  Depression Screening  Timed Get Up and Go  Whisper Test  Cognitive Function Screening        Nutrition Screening  ADL Screening  PAQ Screening        Vitals:    08/30/22 1309   BP: 136/70   BP Location: Right arm   Patient Position: Sitting   BP Method: Medium (Manual)   Pulse: 79   SpO2: 97%   Weight: 117.3 kg (258 lb 9.6 oz)   Height: 5' 6" (1.676 m)     Body mass index is 41.74 kg/m².    Physical Exam  Vitals reviewed.   Constitutional:       General: She is awake. She is not in acute distress.     Appearance: Normal appearance. She is well-developed and well-groomed. She is morbidly obese.   HENT:      Head: Normocephalic.   Cardiovascular:      Rate and Rhythm: Normal rate.   Pulmonary:      Effort: Pulmonary effort is normal. No respiratory distress.   Skin:     General: Skin is warm and dry.      Coloration: Skin is not pale.   Neurological:      Mental Status: She is alert and oriented to person, place, and time.      Coordination: Coordination normal.   Psychiatric:         Attention and Perception: Attention normal.         Mood and Affect: Mood and affect normal.         Speech: Speech normal.         Behavior: Behavior normal. Behavior is cooperative.         Cognition and Memory: Cognition and memory normal.           Diagnoses and health risks identified today and associated recommendations/orders:    1. Encounter for preventive health examination    2. Essential hypertension  Chronic; stable on medication. Follow up with PCP.    3. Tortuous " aorta  Chronic; stable. As seen on previous imaging. Follow up with PCP.    4. Hyperlipidemia, unspecified hyperlipidemia type  Chronic; stable. Follow up with PCP.    5. Venous insufficiency of both lower extremities  Chronic; stable on medication. Follow up with PCP.    6. Varicose veins of both lower extremities, unspecified whether complicated  Chronic; stable on medication. Follow up with PCP.    7. Sjogren's syndrome, with unspecified organ involvement  Chronic; stable. No recent visits with Rheumatology. Follow up with PCP.    8. Hypothyroidism, unspecified type  Chronic; stable on medication. Follow up with PCP.    9. Fatty liver  Chronic; stable. Follow up with PCP.    10. QUINTIN (obstructive sleep apnea)  Chronic; stable. Not using CPAP machine at this time. Follow up with PCP.    11. Gastroesophageal reflux disease, unspecified whether esophagitis present  Chronic; stable on medication. Follow up with PCP.    12. Allergic rhinitis, unspecified seasonality, unspecified trigger  Chronic; stable on medication. Follow up with PCP.    13. Other reduced mobility  Ambulates independently; slowed but steady gait. Follow up with PCP.    14. Encounter for screening mammogram for malignant neoplasm of breast  - Mammo Digital Screening Bilat w/ Rafael; Future    15. Morbid obesity with BMI of 40.0-44.9, adult  Continue to eat a low salt/low fat diet and discussed importance of engaging in physical activity at least 5x/week for a minimum of 30 min/day.      Provided Carolina with a 5-10 year written screening schedule and personal prevention plan. Recommendations were developed using the USPSTF age appropriate recommendations. Education, counseling, and referrals were provided as needed. After Visit Summary printed and given to patient which includes a list of additional screenings/tests needed.    Follow up for your next annual wellness visit.    Linda Hunt NP        Advance Care Planning   I offered to discuss  advanced care planning, including how to pick a person who would make decisions for you if you were unable to make them for yourself, called a health care power of , and what kind of decisions you might make such as use of life sustaining treatments such as ventilators and tube feeding when faced with a life limiting illness recorded on a living will that they will need to know. (How you want to be cared for as you near the end of your natural life)     X Patient is interested in learning more about how to make advanced directives.  I provided them paperwork and offered to discuss this with them.

## 2022-08-30 NOTE — PATIENT INSTRUCTIONS
Counseling and Referral of Other Preventative  (Italic type indicates deductible and co-insurance are waived)    Patient Name: Carolina Knight  Today's Date: 8/30/2022    Health Maintenance       Date Due Completion Date    Diabetes Urine Screening Never done ---    Foot Exam Never done ---    COVID-19 Vaccine (4 - Booster for Moderna series) 03/08/2022 11/8/2021    Mammogram 07/15/2022 7/15/2021    Hemoglobin A1c 07/25/2022 1/25/2022    Influenza Vaccine (1) 09/01/2022 9/22/2021    Eye Exam 04/19/2023 4/19/2022    Lipid Panel 05/10/2023 5/10/2022    Colorectal Cancer Screening 06/18/2023 6/18/2022    DEXA Scan 08/03/2023 8/3/2020    TETANUS VACCINE 12/21/2026 12/21/2016        Orders Placed This Encounter   Procedures    Mammo Digital Screening Bilat w/ Rafael     The following information is provided to all patients.  This information is to help you find resources for any of the problems found today that may be affecting your health:                Living healthy guide: www.[facility].[place].gov      Understanding Diabetes: www.diabetes.org      Eating healthy: www.cdc.gov/healthyweight      CDC home safety checklist: www.cdc.gov/steadi/patient.html      Agency on Aging: www.goea.[place].[facility]      Alcoholics anonymous (AA): www.aa.org      Physical Activity: www.fabian.nih.gov/as9tzoe      Tobacco use: www.quitwithusla.org

## 2022-09-14 ENCOUNTER — OFFICE VISIT (OUTPATIENT)
Dept: DERMATOLOGY | Facility: CLINIC | Age: 67
End: 2022-09-14
Payer: MEDICARE

## 2022-09-14 DIAGNOSIS — L21.9 SEBORRHEIC DERMATITIS, UNSPECIFIED: Primary | ICD-10-CM

## 2022-09-14 DIAGNOSIS — L30.8 ASTEATOTIC ECZEMA: ICD-10-CM

## 2022-09-14 PROCEDURE — 99213 OFFICE O/P EST LOW 20 MIN: CPT | Mod: PBBFAC,PO | Performed by: DERMATOLOGY

## 2022-09-14 PROCEDURE — 99214 PR OFFICE/OUTPT VISIT, EST, LEVL IV, 30-39 MIN: ICD-10-PCS | Mod: 25,S$PBB,, | Performed by: DERMATOLOGY

## 2022-09-14 PROCEDURE — 99999 PR PBB SHADOW E&M-EST. PATIENT-LVL III: ICD-10-PCS | Mod: PBBFAC,,, | Performed by: DERMATOLOGY

## 2022-09-14 PROCEDURE — 87220 TISSUE EXAM FOR FUNGI: CPT | Mod: PBBFAC,PO | Performed by: DERMATOLOGY

## 2022-09-14 PROCEDURE — 99999 PR PBB SHADOW E&M-EST. PATIENT-LVL III: CPT | Mod: PBBFAC,,, | Performed by: DERMATOLOGY

## 2022-09-14 PROCEDURE — 99214 OFFICE O/P EST MOD 30 MIN: CPT | Mod: 25,S$PBB,, | Performed by: DERMATOLOGY

## 2022-09-14 RX ORDER — BETAMETHASONE DIPROPIONATE 0.5 MG/G
OINTMENT TOPICAL
Qty: 45 G | Refills: 1 | Status: SHIPPED | OUTPATIENT
Start: 2022-09-14

## 2022-09-14 RX ORDER — FLUOCINONIDE TOPICAL SOLUTION USP, 0.05% 0.5 MG/ML
SOLUTION TOPICAL
Qty: 60 ML | Refills: 3 | Status: SHIPPED | OUTPATIENT
Start: 2022-09-14

## 2022-09-14 NOTE — PATIENT INSTRUCTIONS
Discussed with patient good skin care regimen including avoiding fragranced products and very hot showers.  Recommended dove sensitive skin bar soap or cerave hydrating cleanser or bar.  Recommend Cerave cream  For moisturization daily -2x daily.     Pt educated that overuse of steroids can lead to skin thinning/atrophy, hypopigmentation, striae.

## 2022-09-14 NOTE — PROGRESS NOTES
Subjective:       Patient ID:  Carolina Knight is a 67 y.o. female who presents for   Chief Complaint   Patient presents with    Dermatitis    Actinic Keratosis     Pt c/o itchy bumps on posterior scalp x 1 month. No prev tx.   Pt also follow up on irritantcontact dermatitis on right lower leg tx with TAC 0.1% cream. Tx helped.  Pt had used efudex x 3 weeks for ak.  Then had irritated skin so used tac cream.  Flaring on the edges.  Resolved with tac cream but then recurred.  Now spreading  to left leg.       Dermatitis    Actinic Keratosis    Review of Systems   Skin:  Positive for itching, rash and dry skin.      Objective:    Physical Exam   Constitutional: She appears well-developed and well-nourished. No distress.   Neurological: She is alert and oriented to person, place, and time. She is not disoriented.   Psychiatric: She has a normal mood and affect.   Skin:   Areas Examined (abnormalities noted in diagram):   Scalp / Hair Palpated and Inspected  RLE Inspected  LLE Inspection Performed                 Diagram Legend     Erythematous scaling macule/papule c/w actinic keratosis       Vascular papule c/w angioma      Pigmented verrucoid papule/plaque c/w seborrheic keratosis      Yellow umbilicated papule c/w sebaceous hyperplasia      Irregularly shaped tan macule c/w lentigo     1-2 mm smooth white papules consistent with Milia      Movable subcutaneous cyst with punctum c/w epidermal inclusion cyst      Subcutaneous movable cyst c/w pilar cyst      Firm pink to brown papule c/w dermatofibroma      Pedunculated fleshy papule(s) c/w skin tag(s)      Evenly pigmented macule c/w junctional nevus     Mildly variegated pigmented, slightly irregular-bordered macule c/w mildly atypical nevus      Flesh colored to evenly pigmented papule c/w intradermal nevus       Pink pearly papule/plaque c/w basal cell carcinoma      Erythematous hyperkeratotic cursted plaque c/w SCC      Surgical scar with no sign of skin  cancer recurrence      Open and closed comedones      Inflammatory papules and pustules      Verrucoid papule consistent consistent with wart     Erythematous eczematous patches and plaques     Dystrophic onycholytic nail with subungual debris c/w onychomycosis     Umbilicated papule    Erythematous-base heme-crusted tan verrucoid plaque consistent with inflamed seborrheic keratosis     Erythematous Silvery Scaling Plaque c/w Psoriasis     See annotation      Assessment / Plan:        Seborrheic dermatitis, unspecified  -     fluocinonide (LIDEX) 0.05 % external solution; AAA scalp qday prn itching, scaling  Dispense: 60 mL; Refill: 3    Asteatotic eczema  KOh negative   -     betamethasone dipropionate (DIPROLENE) 0.05 % ointment; aaa qd- bid . Not more than 2 weeks straight in same location. Avoid use on face and groin  Dispense: 45 g; Refill: 1  Discussed with patient good skin care regimen including avoiding fragranced products and very hot showers.  Recommended dove sensitive skin bar soap or cerave hydrating cleanser or bar.  Recommend Cerave cream  For moisturization daily -2x daily.     Pt educated that overuse of steroids can lead to skin thinning/atrophy, hypopigmentation, striae.           Follow up if symptoms worsen or fail to improve.

## 2022-09-19 ENCOUNTER — OFFICE VISIT (OUTPATIENT)
Dept: PRIMARY CARE CLINIC | Facility: CLINIC | Age: 67
End: 2022-09-19
Payer: MEDICARE

## 2022-09-19 ENCOUNTER — LAB VISIT (OUTPATIENT)
Dept: LAB | Facility: HOSPITAL | Age: 67
End: 2022-09-19
Attending: INTERNAL MEDICINE
Payer: MEDICARE

## 2022-09-19 VITALS
HEART RATE: 81 BPM | HEIGHT: 66 IN | RESPIRATION RATE: 18 BRPM | TEMPERATURE: 99 F | BODY MASS INDEX: 41.1 KG/M2 | SYSTOLIC BLOOD PRESSURE: 136 MMHG | DIASTOLIC BLOOD PRESSURE: 72 MMHG | WEIGHT: 255.75 LBS

## 2022-09-19 DIAGNOSIS — F41.1 GAD (GENERALIZED ANXIETY DISORDER): ICD-10-CM

## 2022-09-19 DIAGNOSIS — R73.03 PREDIABETES: ICD-10-CM

## 2022-09-19 DIAGNOSIS — E03.9 HYPOTHYROIDISM, UNSPECIFIED TYPE: ICD-10-CM

## 2022-09-19 DIAGNOSIS — I10 BENIGN ESSENTIAL HYPERTENSION: ICD-10-CM

## 2022-09-19 DIAGNOSIS — B37.9 YEAST INFECTION: ICD-10-CM

## 2022-09-19 DIAGNOSIS — E78.5 HYPERLIPIDEMIA, UNSPECIFIED HYPERLIPIDEMIA TYPE: ICD-10-CM

## 2022-09-19 DIAGNOSIS — I10 BENIGN ESSENTIAL HYPERTENSION: Primary | ICD-10-CM

## 2022-09-19 LAB
ALBUMIN SERPL BCP-MCNC: 4.5 G/DL (ref 3.5–5.2)
ALP SERPL-CCNC: 64 U/L (ref 55–135)
ALT SERPL W/O P-5'-P-CCNC: 19 U/L (ref 10–44)
ANION GAP SERPL CALC-SCNC: 12 MMOL/L (ref 8–16)
AST SERPL-CCNC: 10 U/L (ref 10–40)
BILIRUB SERPL-MCNC: 0.5 MG/DL (ref 0.1–1)
BUN SERPL-MCNC: 24 MG/DL (ref 8–23)
CALCIUM SERPL-MCNC: 9.9 MG/DL (ref 8.7–10.5)
CHLORIDE SERPL-SCNC: 100 MMOL/L (ref 95–110)
CO2 SERPL-SCNC: 27 MMOL/L (ref 23–29)
CREAT SERPL-MCNC: 0.8 MG/DL (ref 0.5–1.4)
EST. GFR  (NO RACE VARIABLE): >60 ML/MIN/1.73 M^2
ESTIMATED AVG GLUCOSE: 111 MG/DL (ref 68–131)
GLUCOSE SERPL-MCNC: 91 MG/DL (ref 70–110)
HBA1C MFR BLD: 5.5 % (ref 4–5.6)
POTASSIUM SERPL-SCNC: 4.3 MMOL/L (ref 3.5–5.1)
PROT SERPL-MCNC: 7.8 G/DL (ref 6–8.4)
SODIUM SERPL-SCNC: 139 MMOL/L (ref 136–145)
TSH SERPL DL<=0.005 MIU/L-ACNC: 1.47 UIU/ML (ref 0.4–4)

## 2022-09-19 PROCEDURE — 83036 HEMOGLOBIN GLYCOSYLATED A1C: CPT | Performed by: INTERNAL MEDICINE

## 2022-09-19 PROCEDURE — 99999 PR PBB SHADOW E&M-EST. PATIENT-LVL V: CPT | Mod: PBBFAC,,, | Performed by: INTERNAL MEDICINE

## 2022-09-19 PROCEDURE — 99215 OFFICE O/P EST HI 40 MIN: CPT | Mod: PBBFAC,PN | Performed by: INTERNAL MEDICINE

## 2022-09-19 PROCEDURE — 99999 PR PBB SHADOW E&M-EST. PATIENT-LVL V: ICD-10-PCS | Mod: PBBFAC,,, | Performed by: INTERNAL MEDICINE

## 2022-09-19 PROCEDURE — 99214 PR OFFICE/OUTPT VISIT, EST, LEVL IV, 30-39 MIN: ICD-10-PCS | Mod: S$PBB,,, | Performed by: INTERNAL MEDICINE

## 2022-09-19 PROCEDURE — 99214 OFFICE O/P EST MOD 30 MIN: CPT | Mod: S$PBB,,, | Performed by: INTERNAL MEDICINE

## 2022-09-19 PROCEDURE — 36415 COLL VENOUS BLD VENIPUNCTURE: CPT | Mod: PN | Performed by: INTERNAL MEDICINE

## 2022-09-19 PROCEDURE — 80053 COMPREHEN METABOLIC PANEL: CPT | Performed by: INTERNAL MEDICINE

## 2022-09-19 PROCEDURE — 84443 ASSAY THYROID STIM HORMONE: CPT | Performed by: INTERNAL MEDICINE

## 2022-09-19 RX ORDER — BUPROPION HYDROCHLORIDE 150 MG/1
150 TABLET ORAL DAILY
Qty: 30 TABLET | Refills: 2 | Status: SHIPPED | OUTPATIENT
Start: 2022-09-19 | End: 2022-11-17

## 2022-09-19 RX ORDER — AMOXICILLIN 500 MG/1
2000 TABLET, FILM COATED ORAL ONCE AS NEEDED
Qty: 4 TABLET | Refills: 0 | Status: SHIPPED | OUTPATIENT
Start: 2022-09-19 | End: 2022-09-19

## 2022-09-19 RX ORDER — FLUCONAZOLE 100 MG/1
100 TABLET ORAL DAILY
Qty: 7 TABLET | Refills: 2 | Status: SHIPPED | OUTPATIENT
Start: 2022-09-19 | End: 2022-09-26

## 2022-09-19 NOTE — PROGRESS NOTES
"Subjective:       Patient ID: Carolina Knight is a 67 y.o. female.    Chief Complaint: yeast infection.     HPI  Had antibiotics for dentist prior to dental work. Always gets yeast infections after. Currently w/ yeast infection at the groin folds - red and itchy. Using nystatin and needs fluconazole.   Plan to start Ideal protein.  MMG scheduled for 22.    Pt went to ED 7/15/22 for CP while walking. EKG and CXR WNL. Trop neg.   DSE neg 21.   Palpitations if she drinks caffeine. Intermittently exercising.     HTN - hctz 12.5mg daily, aldactone 50mg daily, amlo 10mg daily.  Reviewed digital HTN flow sheet.   microK 10mEq daily.     Hypothyroidism - synthroid 125mcg daily.  TFT WNL 22    Prediabetes - metformin xr 500mg daily.   Last A1C 22 5.6    HLD - crestor 5mg daily.   Part of digital HLD program. Last LDL 2022 69.    Does have a little bit of stress that's interfering w/ sleep. Dealing w/ estate, taxes, etc. No depression.   Sister passed away a few weeks ago and  this week. Accepted it.   GAD7 2022   1. Feeling nervous, anxious, or on edge? 1 0   2. Not being able to stop or control worrying? 3 1   3. Worrying too much about different things? 3 1   4. Trouble relaxing? 1 0   5. Being so restless that it is hard to sit still? 2 0   6. Becoming easily annoyed or irritable? 1 0   7. Feeling afraid as if something awful might happen? 1 0   8. If you checked off any problems, how difficult have these problems made it for you to do your work, take care of things at home, or get along with other people? - 0   GIBSON-7 Score 12 2         Review of Systems  Comprehensive review of systems otherwise negative. See history/subjective section for more details.    Objective:      Physical Exam    /72 (BP Location: Left arm, Patient Position: Sitting, BP Method: Large (Manual))   Pulse 81   Temp 98.5 °F (36.9 °C) (Oral)   Resp 18   Ht 5' 6" (1.676 m)   Wt 116 kg (255 lb " 11.7 oz)   LMP  (LMP Unknown)   BMI 41.28 kg/m²     GEN - A+OX4, NAD   HEENT - PERRL, EOMI, OP clear but small. MMM.   Neck - No thyromegaly or cervical LAD. No thyroid masses felt.  CV - RRR, no m/r   Chest - CTAB, no wheezing or rhonchi  Abd - S/NT/ND/+BS.   Ext - 2+BDP and radial pulses. No trace BLE edema.   Skin - RLE redness. B groin redness.     Previous labs reviewed.     Assessment/Plan     Diagnoses and all orders for this visit:    Benign essential hypertension - Stable and controlled. Continue current medications.  -     Comprehensive Metabolic Panel; Future  -     TSH; Future    Hyperlipidemia, unspecified hyperlipidemia type - LDL 5/2022 within goal.   -     Comprehensive Metabolic Panel; Future    Prediabetes  -     Hemoglobin A1C; Future  -     Microalbumin/Creatinine Ratio, Urine; Future    GIBSON (generalized anxiety disorder)  - mod. Add wellbutrin.   -     buPROPion (WELLBUTRIN XL) 150 MG TB24 tablet; Take 1 tablet (150 mg total) by mouth once daily.    Hypothyroidism, unspecified type - cont synthroid.   -     TSH; Future    BMI 41.28, adult - starting ideal protein today. Inc exercise.     Yeast infection  -     fluconazole (DIFLUCAN) 100 MG tablet; Take 1 tablet (100 mg total) by mouth once daily. for 7 days    Other orders  -     amoxicillin (AMOXIL) 500 MG Tab; Take 4 tablets (2,000 mg total) by mouth once as needed (prior to dental procedure).      Follow up in about 2 months (around 11/19/2022).      Kierra Cornejo MD  Department of Internal Medicine - [facility]  8:39 AM

## 2022-09-28 ENCOUNTER — HOSPITAL ENCOUNTER (OUTPATIENT)
Dept: RADIOLOGY | Facility: HOSPITAL | Age: 67
Discharge: HOME | End: 2022-09-28
Attending: NURSE PRACTITIONER
Payer: MEDICARE

## 2022-09-28 DIAGNOSIS — Z12.31 ENCOUNTER FOR SCREENING MAMMOGRAM FOR MALIGNANT NEOPLASM OF BREAST: ICD-10-CM

## 2022-09-28 PROCEDURE — 77067 SCR MAMMO BI INCL CAD: CPT | Mod: 26,,, | Performed by: RADIOLOGY

## 2022-09-28 PROCEDURE — 77063 BREAST TOMOSYNTHESIS BI: CPT | Mod: 26,,, | Performed by: RADIOLOGY

## 2022-09-28 PROCEDURE — 77063 MAMMO DIGITAL SCREENING BILAT WITH TOMO: ICD-10-PCS | Mod: 26,,, | Performed by: RADIOLOGY

## 2022-09-28 PROCEDURE — 77067 SCR MAMMO BI INCL CAD: CPT | Mod: TC

## 2022-09-28 PROCEDURE — 77067 MAMMO DIGITAL SCREENING BILAT WITH TOMO: ICD-10-PCS | Mod: 26,,, | Performed by: RADIOLOGY

## 2022-10-11 ENCOUNTER — PATIENT MESSAGE (OUTPATIENT)
Dept: PRIMARY CARE CLINIC | Facility: CLINIC | Age: 67
End: 2022-10-11
Payer: MEDICARE

## 2022-10-11 ENCOUNTER — TELEPHONE (OUTPATIENT)
Dept: PRIMARY CARE CLINIC | Facility: CLINIC | Age: 67
End: 2022-10-11
Payer: MEDICARE

## 2022-10-11 NOTE — TELEPHONE ENCOUNTER
Sea, I see that she hasn't read the portal message yet. Can you call her to seeif she wants to come see me tomorrow at 11am?

## 2022-10-19 ENCOUNTER — OFFICE VISIT (OUTPATIENT)
Dept: PRIMARY CARE CLINIC | Facility: CLINIC | Age: 67
End: 2022-10-19
Payer: MEDICARE

## 2022-10-19 VITALS
HEART RATE: 84 BPM | DIASTOLIC BLOOD PRESSURE: 82 MMHG | WEIGHT: 259.25 LBS | BODY MASS INDEX: 41.66 KG/M2 | SYSTOLIC BLOOD PRESSURE: 130 MMHG | HEIGHT: 66 IN | OXYGEN SATURATION: 98 %

## 2022-10-19 DIAGNOSIS — M54.2 NECK PAIN: Primary | ICD-10-CM

## 2022-10-19 DIAGNOSIS — M25.512 ACUTE PAIN OF BOTH SHOULDERS: ICD-10-CM

## 2022-10-19 DIAGNOSIS — M25.511 ACUTE PAIN OF BOTH SHOULDERS: ICD-10-CM

## 2022-10-19 PROCEDURE — 99999 PR PBB SHADOW E&M-EST. PATIENT-LVL V: ICD-10-PCS | Mod: PBBFAC,,, | Performed by: INTERNAL MEDICINE

## 2022-10-19 PROCEDURE — 99215 OFFICE O/P EST HI 40 MIN: CPT | Mod: PBBFAC,PN | Performed by: INTERNAL MEDICINE

## 2022-10-19 PROCEDURE — 99213 OFFICE O/P EST LOW 20 MIN: CPT | Mod: S$PBB,,, | Performed by: INTERNAL MEDICINE

## 2022-10-19 PROCEDURE — 99999 PR PBB SHADOW E&M-EST. PATIENT-LVL V: CPT | Mod: PBBFAC,,, | Performed by: INTERNAL MEDICINE

## 2022-10-19 PROCEDURE — 99213 PR OFFICE/OUTPT VISIT, EST, LEVL III, 20-29 MIN: ICD-10-PCS | Mod: S$PBB,,, | Performed by: INTERNAL MEDICINE

## 2022-10-19 RX ORDER — METHYLPREDNISOLONE 4 MG/1
TABLET ORAL
Qty: 21 TABLET | Refills: 0 | Status: SHIPPED | OUTPATIENT
Start: 2022-10-19 | End: 2022-11-09

## 2022-10-19 NOTE — PROGRESS NOTES
"Subjective:       Patient ID: Carolina Knight is a 67 y.o. female.    Chief Complaint: neck and arm pains    HPI  Had COVID vaccine on L arm and flu vaccine on the R arm 8/31/22.   Intermittently w/ myalgia. Had stopped taking statin.   Did a lot of lifting.   Reports any time she lifts something, she has pain in the neck, shoulders, upper arms. At night, pain in the neck and radiates down to the upper arm.   Despite the pain, tried to lift a table and had to stop due to pain.   When she lifts milk w/ the L arm, pain in the L upper up.   Sometimes when she sleeps at night (does lay on the arms) and may have numbness if she lays on that side. But resolves once rolls over. No numbness/tingling otherwise.   Did intermittent naproxen but stopped taking it. Naproxen helped temporarily.     Review of Systems  as above in HPI.     Objective:      Physical Exam    /82 (BP Location: Left arm, Patient Position: Sitting, BP Method: Large (Manual))   Pulse 84   Ht 5' 6" (1.676 m)   Wt 117.6 kg (259 lb 4.2 oz)   LMP  (LMP Unknown)   SpO2 98%   BMI 41.85 kg/m²     GEN - a+ox4, nad  MSK - no spinal tenderness to palpation. Mid thoracic, inner shoulder blades w/ tenderness to palpation over the muscles. Tense B upper trapezius m. Normal gait. FROM of neck and shoulders. Some pain on palpation near the deltoid insertion on the L. No shortening of biceps. 5/5 BUE m strength along w/ good hand .  Neuro - as above. Normal gait.     Assessment/Plan     Carolina was seen today for arm pain and neck pain.    Diagnoses and all orders for this visit:    Neck pain  -     methylPREDNISolone (MEDROL DOSEPACK) 4 mg tablet; use as directed    Acute pain of both shoulders  -     methylPREDNISolone (MEDROL DOSEPACK) 4 mg tablet; use as directed    Declined PT. If symptoms cont, consider referral to PT/sports medicine.   Follow up as scheduled in a mo.     Follow up if symptoms worsen or fail to improve.      Kierra Cornejo, " MD  Department of Internal Medicine - [facility]  8:30 AM

## 2022-11-12 ENCOUNTER — PATIENT MESSAGE (OUTPATIENT)
Dept: ADMINISTRATIVE | Facility: OTHER | Age: 67
End: 2022-11-12
Payer: MEDICARE

## 2022-11-21 ENCOUNTER — OFFICE VISIT (OUTPATIENT)
Dept: PRIMARY CARE CLINIC | Facility: CLINIC | Age: 67
End: 2022-11-21
Payer: MEDICARE

## 2022-11-21 VITALS
HEIGHT: 66 IN | WEIGHT: 256.63 LBS | HEART RATE: 88 BPM | TEMPERATURE: 98 F | RESPIRATION RATE: 18 BRPM | BODY MASS INDEX: 41.25 KG/M2 | SYSTOLIC BLOOD PRESSURE: 122 MMHG | DIASTOLIC BLOOD PRESSURE: 73 MMHG | OXYGEN SATURATION: 96 %

## 2022-11-21 DIAGNOSIS — E87.6 HYPOKALEMIA: ICD-10-CM

## 2022-11-21 DIAGNOSIS — R09.82 POSTNASAL DRIP: ICD-10-CM

## 2022-11-21 DIAGNOSIS — M25.512 PAIN OF BOTH SHOULDER JOINTS: Primary | ICD-10-CM

## 2022-11-21 DIAGNOSIS — E03.9 HYPOTHYROIDISM, UNSPECIFIED TYPE: ICD-10-CM

## 2022-11-21 DIAGNOSIS — E78.5 HYPERLIPIDEMIA, UNSPECIFIED HYPERLIPIDEMIA TYPE: ICD-10-CM

## 2022-11-21 DIAGNOSIS — I10 ESSENTIAL HYPERTENSION: ICD-10-CM

## 2022-11-21 DIAGNOSIS — M25.511 PAIN OF BOTH SHOULDER JOINTS: Primary | ICD-10-CM

## 2022-11-21 DIAGNOSIS — E03.4 HYPOTHYROIDISM DUE TO ACQUIRED ATROPHY OF THYROID: ICD-10-CM

## 2022-11-21 DIAGNOSIS — K21.9 GASTROESOPHAGEAL REFLUX DISEASE WITHOUT ESOPHAGITIS: ICD-10-CM

## 2022-11-21 DIAGNOSIS — I10 BENIGN ESSENTIAL HYPERTENSION: ICD-10-CM

## 2022-11-21 DIAGNOSIS — R73.03 PREDIABETES: ICD-10-CM

## 2022-11-21 DIAGNOSIS — F41.9 ANXIETY: ICD-10-CM

## 2022-11-21 PROCEDURE — 99999 PR PBB SHADOW E&M-EST. PATIENT-LVL V: CPT | Mod: PBBFAC,,, | Performed by: INTERNAL MEDICINE

## 2022-11-21 PROCEDURE — 99215 OFFICE O/P EST HI 40 MIN: CPT | Mod: PBBFAC,PN | Performed by: INTERNAL MEDICINE

## 2022-11-21 PROCEDURE — 99999 PR PBB SHADOW E&M-EST. PATIENT-LVL V: ICD-10-PCS | Mod: PBBFAC,,, | Performed by: INTERNAL MEDICINE

## 2022-11-21 PROCEDURE — 99214 PR OFFICE/OUTPT VISIT, EST, LEVL IV, 30-39 MIN: ICD-10-PCS | Mod: S$PBB,,, | Performed by: INTERNAL MEDICINE

## 2022-11-21 PROCEDURE — 99214 OFFICE O/P EST MOD 30 MIN: CPT | Mod: S$PBB,,, | Performed by: INTERNAL MEDICINE

## 2022-11-21 RX ORDER — HYDROCHLOROTHIAZIDE 12.5 MG/1
12.5 TABLET ORAL 2 TIMES DAILY
Qty: 180 TABLET | Refills: 3 | Status: SHIPPED | OUTPATIENT
Start: 2022-11-21 | End: 2023-03-02

## 2022-11-21 RX ORDER — AMLODIPINE BESYLATE 10 MG/1
10 TABLET ORAL DAILY
Qty: 90 TABLET | Refills: 3 | Status: SHIPPED | OUTPATIENT
Start: 2022-11-21 | End: 2023-05-17 | Stop reason: SDUPTHER

## 2022-11-21 RX ORDER — PANTOPRAZOLE SODIUM 40 MG/1
40 TABLET, DELAYED RELEASE ORAL DAILY
Qty: 90 TABLET | Refills: 3 | Status: SHIPPED | OUTPATIENT
Start: 2022-11-21 | End: 2023-05-17 | Stop reason: SDUPTHER

## 2022-11-21 RX ORDER — POTASSIUM CHLORIDE 750 MG/1
10 CAPSULE, EXTENDED RELEASE ORAL DAILY
Qty: 90 CAPSULE | Refills: 3 | Status: SHIPPED | OUTPATIENT
Start: 2022-11-21 | End: 2023-05-17

## 2022-11-21 RX ORDER — ROSUVASTATIN CALCIUM 5 MG/1
5 TABLET, COATED ORAL EVERY OTHER DAY
Qty: 45 TABLET | Refills: 3 | Status: SHIPPED | OUTPATIENT
Start: 2022-11-21 | End: 2023-05-17 | Stop reason: SDUPTHER

## 2022-11-21 RX ORDER — LEVOTHYROXINE SODIUM 125 UG/1
125 TABLET ORAL DAILY
Qty: 90 TABLET | Refills: 3 | Status: SHIPPED | OUTPATIENT
Start: 2022-11-21 | End: 2023-05-17 | Stop reason: SDUPTHER

## 2022-11-21 RX ORDER — METFORMIN HYDROCHLORIDE 500 MG/1
500 TABLET, EXTENDED RELEASE ORAL NIGHTLY
Qty: 90 TABLET | Refills: 3 | Status: SHIPPED | OUTPATIENT
Start: 2022-11-21 | End: 2023-05-09 | Stop reason: SDUPTHER

## 2022-11-21 NOTE — PROGRESS NOTES
Subjective:       Patient ID: Carolina Knight is a 67 y.o. female.    Chief Complaint: neck and shoulder pain f/u    HPI  About 5 days ago, had rhinorrhea, postnasal drip, productive cough, congestion, vomiting from postnasal drip, fever up to 101. After 3 days, felt much better. Ribs were sore from coughing.   Hasn't ran a fever since Saturday night. Still w/ mild cough and some congestion. Using cough syrup, which helps. Was still using CPAP.     Saw pt 10/19/22. Myalgia from statins (lovastatin, atorva, rosuvastatin). Stopped on statin x 2 weeks. Plans to go back to restarted every other day. Also sgarted on medrol dose pack for shoulder and neck pains. Here for f/u today.  While on medrol dosepack, shoulder and neck was better but once off of the medrol dosepack, pain came back. But since she's been resting x 5 days since she was feeling sick, she had no pain. She was back to doing housework this morning and slight 2 of 10 pain of the shoulders.     Hypothyroidism - synthroid 125mcg daily.   TSH wnl 9/19/22     HTN - amlo 10 qd, aldactone 50mg qd, hctz 25mg qd  microK 10mEq qd.    In September, added wellbutrin xl 150mg daily for GIBSON.   GAD7 11/21/2022 9/19/2022 9/11/2020   1. Feeling nervous, anxious, or on edge? 1 1 0   2. Not being able to stop or control worrying? 1 3 1   3. Worrying too much about different things? 1 3 1   4. Trouble relaxing? 1 1 0   5. Being so restless that it is hard to sit still? 1 2 0   6. Becoming easily annoyed or irritable? 2 1 0   7. Feeling afraid as if something awful might happen? 2 1 0   8. If you checked off any problems, how difficult have these problems made it for you to do your work, take care of things at home, or get along with other people? 1 - 0   GIBSON-7 Score 9 12 2   Finished dealing w/ estate issue from mom's death a few yrs ago.   Increased constipation w/ wellbutrin. Feels like she's doing well from anxiety standpoint.     Review of Systems  Comprehensive  "review of systems otherwise negative. See history/subjective section for more details.    Objective:      Physical Exam    /73 (BP Location: Left arm, Patient Position: Sitting, BP Method: Large (Manual))   Pulse 88   Temp 98.1 °F (36.7 °C) (Oral)   Resp 18   Ht 5' 6" (1.676 m)   Wt 116.4 kg (256 lb 9.9 oz)   LMP  (LMP Unknown)   SpO2 96%   BMI 41.42 kg/m²     GEN - A+OX4, NAD   HEENT - PERRL, EOMI, OP small and erythematous. TM bulge B but no erythema. No sinus tenderness to palpation.   Neck - No thyromegaly or cervical LAD. No thyroid masses felt.  CV - RRR, no m/r   Chest - CTAB, no wheezing or rhonchi. Normal work of breathing.   Abd - S/NT/ND/+BS.   Ext - 2+BDP and radial pulses. No LE edema.   Neuro - 5/5 BUE and BLE strength. Normal gait.   Skin - No rash.    Previous labs reviewed.     Assessment/Plan     Diagnoses and all orders for this visit:    Pain of both shoulder joints - doing better w/ rest due to being ill. Recommended PT again but pt reports will look online for exercises to do. If pain recurs, will let me know. Can refer to ortho if needed.     Postnasal drip - restart flonase. Sounds like on the mends w/ acute URI. If symptoms worsen, pt to let us know.     Benign essential hypertension - Stable and controlled. Continue current medications.    Hyperlipidemia, unspecified hyperlipidemia type - will restart crestor 5 every other day.    Hypothyroidism, unspecified type - cont synthroid. TFT WNL 9/2022.    Anxiety - trial of weaning off of wellbutrin. Ok to stay off if doing well.    Wants all her refills sent as 90 days supply to [facility] on [address].    Follow up in about 12 weeks (around 2/13/2023).      Kierra Cornejo MD  Department of Internal Medicine - [facility]  10:17 AM      "

## 2022-12-12 ENCOUNTER — OFFICE VISIT (OUTPATIENT)
Dept: DERMATOLOGY | Facility: CLINIC | Age: 67
End: 2022-12-12
Payer: MEDICARE

## 2022-12-12 DIAGNOSIS — L82.1 SK (SEBORRHEIC KERATOSIS): ICD-10-CM

## 2022-12-12 DIAGNOSIS — D23.9 DERMATOFIBROMA: ICD-10-CM

## 2022-12-12 DIAGNOSIS — L98.8 RHYTIDES: ICD-10-CM

## 2022-12-12 DIAGNOSIS — L30.9 DERMATITIS: Primary | ICD-10-CM

## 2022-12-12 PROCEDURE — 99214 PR OFFICE/OUTPT VISIT, EST, LEVL IV, 30-39 MIN: ICD-10-PCS | Mod: S$PBB,,, | Performed by: DERMATOLOGY

## 2022-12-12 PROCEDURE — 87220 TISSUE EXAM FOR FUNGI: CPT | Mod: PBBFAC,PO | Performed by: DERMATOLOGY

## 2022-12-12 PROCEDURE — 99214 OFFICE O/P EST MOD 30 MIN: CPT | Mod: S$PBB,,, | Performed by: DERMATOLOGY

## 2022-12-12 PROCEDURE — 99999 PR PBB SHADOW E&M-EST. PATIENT-LVL V: CPT | Mod: PBBFAC,,, | Performed by: DERMATOLOGY

## 2022-12-12 PROCEDURE — 99999 PR PBB SHADOW E&M-EST. PATIENT-LVL V: ICD-10-PCS | Mod: PBBFAC,,, | Performed by: DERMATOLOGY

## 2022-12-12 PROCEDURE — 99215 OFFICE O/P EST HI 40 MIN: CPT | Mod: PBBFAC,PO | Performed by: DERMATOLOGY

## 2022-12-12 RX ORDER — KETOCONAZOLE 20 MG/G
CREAM TOPICAL
Qty: 60 G | Refills: 3 | Status: SHIPPED | OUTPATIENT
Start: 2022-12-12 | End: 2023-05-17 | Stop reason: SDUPTHER

## 2022-12-12 NOTE — PROGRESS NOTES
Subjective:       Patient ID:  Carolina Knight is a 67 y.o. female who presents for   Chief Complaint   Patient presents with    Rash     Legs      C/o growth on right cheek.  Was tx with cryo  and still present  C/o lesion on left post calf. Firm not resolving. Not bleeding. No tx.     Rash - Follow-up  Symptom course: worsening  Currently using: diprolene.  Affected locations: right lower leg and left lower leg  Signs / symptoms: itching  Severity: mild to moderate    Review of Systems   Skin:  Positive for itching and rash.      Objective:    Physical Exam   Constitutional: She appears well-developed and well-nourished. No distress.   Neurological: She is alert and oriented to person, place, and time. She is not disoriented.   Psychiatric: She has a normal mood and affect.   Skin:   Areas Examined (abnormalities noted in diagram):   Head / Face Inspection Performed  RLE Inspected  LLE Inspection Performed                 Diagram Legend     Erythematous scaling macule/papule c/w actinic keratosis       Vascular papule c/w angioma      Pigmented verrucoid papule/plaque c/w seborrheic keratosis      Yellow umbilicated papule c/w sebaceous hyperplasia      Irregularly shaped tan macule c/w lentigo     1-2 mm smooth white papules consistent with Milia      Movable subcutaneous cyst with punctum c/w epidermal inclusion cyst      Subcutaneous movable cyst c/w pilar cyst      Firm pink to brown papule c/w dermatofibroma      Pedunculated fleshy papule(s) c/w skin tag(s)      Evenly pigmented macule c/w junctional nevus     Mildly variegated pigmented, slightly irregular-bordered macule c/w mildly atypical nevus      Flesh colored to evenly pigmented papule c/w intradermal nevus       Pink pearly papule/plaque c/w basal cell carcinoma      Erythematous hyperkeratotic cursted plaque c/w SCC      Surgical scar with no sign of skin cancer recurrence      Open and closed comedones      Inflammatory papules and pustules       Verrucoid papule consistent consistent with wart     Erythematous eczematous patches and plaques     Dystrophic onycholytic nail with subungual debris c/w onychomycosis     Umbilicated papule    Erythematous-base heme-crusted tan verrucoid plaque consistent with inflamed seborrheic keratosis     Erythematous Silvery Scaling Plaque c/w Psoriasis     See annotation      Assessment / Plan:        Dermatitis  Very few fragmented hyphae on KOH   Will use ketoconazole to clear any tinea then pt to switch to betamethasone  Use ketoconazole cream 2x per day to leg lesions x 3 weeks, then you can use betamethasone cream 2x per day as needed for flares    Discussed with patient good skin care regimen including avoiding fragranced products and very hot showers.  Recommended dove sensitive skin bar soap or cerave hydrating cleanser or bar.  Recommend Cerave cream  For moisturization daily -2x daily.     -     ketoconazole (NIZORAL) 2 % cream; aaa bid on  right posterior leg x 3 weeks  Dispense: 60 g; Refill: 3    Dermatofibroma  These are benign bundles of scar tissue that can arise spontaneously or after trauma from a bug bite or nicking yourself shaving, or other minimal trauma.   They are very common in middle aged adults and commonly found on the lower legs, arms above the elbows, and trunk.   Removal of lesions is not recommended as the lesion is just replaced with an additional scar, however if lesion is symptomatic, removal can be considered. Lesions can recur.     SK (seborrheic keratosis)  These are benign inherited growths without a malignant potential. Reassurance given to patient. No treatment is necessary.     Rhytides  OTC retinol then will increase to rx strength  Retinoid handout provided             Follow up if symptoms worsen or fail to improve.

## 2022-12-12 NOTE — PATIENT INSTRUCTIONS
Use ketoconazole cream 2x per day to leg lesions x 3 weeks, then you can use betamethasone cream 2x per day as needed for flares    Discussed with patient good skin care regimen including avoiding fragranced products and very hot showers.  Recommended dove sensitive skin bar soap or cerave hydrating cleanser or bar.  Recommend Cerave cream  For moisturization daily -2x daily.

## 2022-12-22 ENCOUNTER — PATIENT MESSAGE (OUTPATIENT)
Dept: DERMATOLOGY | Facility: CLINIC | Age: 67
End: 2022-12-22
Payer: MEDICARE

## 2022-12-24 ENCOUNTER — PATIENT MESSAGE (OUTPATIENT)
Dept: DERMATOLOGY | Facility: CLINIC | Age: 67
End: 2022-12-24
Payer: MEDICARE

## 2022-12-24 DIAGNOSIS — L30.9 DERMATITIS: Primary | ICD-10-CM

## 2022-12-28 RX ORDER — BETAMETHASONE DIPROPIONATE 0.5 MG/G
CREAM TOPICAL
Qty: 50 G | Refills: 1 | Status: SHIPPED | OUTPATIENT
Start: 2022-12-28

## 2023-01-11 DIAGNOSIS — M79.642 LEFT HAND PAIN: Primary | ICD-10-CM

## 2023-01-17 ENCOUNTER — TELEPHONE (OUTPATIENT)
Dept: SPORTS MEDICINE | Facility: CLINIC | Age: 68
End: 2023-01-17
Payer: MEDICARE

## 2023-01-17 ENCOUNTER — HOSPITAL ENCOUNTER (OUTPATIENT)
Dept: RADIOLOGY | Facility: HOSPITAL | Age: 68
Discharge: HOME | End: 2023-01-17
Attending: ORTHOPAEDIC SURGERY
Payer: MEDICARE

## 2023-01-17 ENCOUNTER — OFFICE VISIT (OUTPATIENT)
Dept: ORTHOPEDICS | Facility: CLINIC | Age: 68
End: 2023-01-17
Payer: MEDICARE

## 2023-01-17 VITALS — BODY MASS INDEX: 41.14 KG/M2 | HEIGHT: 66 IN | WEIGHT: 256 LBS

## 2023-01-17 DIAGNOSIS — M79.642 LEFT HAND PAIN: ICD-10-CM

## 2023-01-17 DIAGNOSIS — M72.0 DUPUYTREN'S CONTRACTURE: Primary | ICD-10-CM

## 2023-01-17 PROCEDURE — 99999 PR PBB SHADOW E&M-EST. PATIENT-LVL IV: CPT | Mod: PBBFAC,,, | Performed by: ORTHOPAEDIC SURGERY

## 2023-01-17 PROCEDURE — 99203 PR OFFICE/OUTPT VISIT, NEW, LEVL III, 30-44 MIN: ICD-10-PCS | Mod: S$PBB,,, | Performed by: ORTHOPAEDIC SURGERY

## 2023-01-17 PROCEDURE — 73130 X-RAY EXAM OF HAND: CPT | Mod: TC,LT

## 2023-01-17 PROCEDURE — 99203 OFFICE O/P NEW LOW 30 MIN: CPT | Mod: S$PBB,,, | Performed by: ORTHOPAEDIC SURGERY

## 2023-01-17 PROCEDURE — 73130 XR HAND COMPLETE 3 VIEW LEFT: ICD-10-PCS | Mod: 26,LT,, | Performed by: RADIOLOGY

## 2023-01-17 PROCEDURE — 99214 OFFICE O/P EST MOD 30 MIN: CPT | Mod: PBBFAC | Performed by: ORTHOPAEDIC SURGERY

## 2023-01-17 PROCEDURE — 73130 X-RAY EXAM OF HAND: CPT | Mod: 26,LT,, | Performed by: RADIOLOGY

## 2023-01-17 PROCEDURE — 99999 PR PBB SHADOW E&M-EST. PATIENT-LVL IV: ICD-10-PCS | Mod: PBBFAC,,, | Performed by: ORTHOPAEDIC SURGERY

## 2023-01-17 NOTE — PROGRESS NOTES
[facility]  History & Physical  Orthopedics    SUBJECTIVE:      COVID-19 attestation:  This patient was treated during the COVID-19 pandemic.  This was discussed with the patient, they are aware of our current policies and procedures, were given the option of delaying their visit and or switching to a virtual visit, delaying their surgery when applicable, and they elect to proceed.    Chief Complaint:  Left hand    Referring Provider: Kierra Cornejo MD     History of Present Illness:  Patient is a 67 y.o. right hand dominant female who presents today with complaints of issues with her left hand.  That she notes that she has noticed onset of some nodular type structures in the left palm.  She does recall that her father had some hand problems which were attributed to gout back in the day.  She does believe there was some deformity there.  She does not have any pain in regards to her left hand.  She digital in notes some pain left shoulder.  She presents for initial evaluation no other complaints.     The patient is a/an retired.    Symptoms consist of deformity.    The patient rates their pain as a 0/10.    Attempted treatment(s) and/or interventions include activity modifications, rest     The patient denies any fevers, chills, N/V, D/C and presents for evaluation.       Past Medical History:   Diagnosis Date    Decreased mobility and endurance 11/16/2017    Decreased strength 11/16/2017    Decreased strength of lower extremity 2/22/2021    Difficult intubation     Early dry stage nonexudative age-related macular degeneration 2020    Edema 9/26/2018    Edema of both legs 7/29/2020    Fatty liver 12/15/2014    Hypokalemia 9/26/2018    Lymphedema of both lower extremities 7/29/2020    Multiple gastric polyps 7/21/2012    Polyarthralgia 4/6/2015    Sjogren's disease     Sleep apnea      Past Surgical History:   Procedure Laterality Date    BREAST BIOPSY Left 2017    core,Benign breast with fibrocystic  changes, including benign usual ductal hyperplasia and adenosis    BREAST BIOPSY Right     core    CERVICAL CONIZATION   W/ LASER       SECTION      x1    CHOLECYSTECTOMY      ESOPHAGOGASTRODUODENOSCOPY N/A 2018    Procedure: ESOPHAGOGASTRODUODENOSCOPY (EGD);  Surgeon: George Henriquez MD;  [facility]  Service: Endoscopy;  Laterality: N/A;  possible difficult intubation - see anesthesia note dated 1/15/13 - 2nd floor/ generated from last EGD, 3 year f/u, Pt due/ Pt requesting Dr. Henriquez -     EYE SURGERY  Est 2014-15    Laser on each eye - tear in white part eye by Dr Nitish Ayala    HYSTERECTOMY  age 42    [place], LSO (uterine fibroids, adhesions, endometriosis)    OOPHORECTOMY  age 42    LSO (endometriosis, cyst)    PERCUTANEOUS CRYOTHERAPY OF PERIPHERAL NERVE USING LIQUID NITROUS OXIDE IN CLOSED NEEDLE DEVICE Right 3/1/2021    Procedure: CRYOTHERAPY, NERVE, PERIPHERAL, PERCUTANEOUS, USING LIQUID NITROUS OXIDE IN CLOSED NEEDLE DEVICE;  Surgeon: CHACE Riley;  Location: [facility];  Service: Pain Management;  Laterality: Right;  RIGHT KNEE IOVERA    THYROIDECTOMY      TOE SURGERY      left    TOTAL THYROIDECTOMY       Review of patient's allergies indicates:   Allergen Reactions    Lipitor [atorvastatin]      Severe myalgia    Losartan Swelling     Possible throat closing sensation.    Lovastatin Other (See Comments)     myalgia    Cefuroxime Itching and Rash    Latex, natural rubber Itching and Rash     Pt reported      Social History     Social History Narrative    House- has stairs     Family History   Problem Relation Age of Onset    Arthritis Mother     Asthma Father     COPD Father     COPD Sister     Melanoma Sister     Melanoma Brother     Diabetes Brother         Adult onset got in his 60s    Asthma Son     Breast cancer Maternal Aunt     Cancer Maternal Aunt     Cancer Maternal Uncle         esophageal cancer    Ovarian cancer Neg Hx     Colon cancer Neg  Hx          Current Outpatient Medications:     acetaminophen (TYLENOL) 650 MG TbSR, Take 1 tablet (650 mg total) by mouth every 6 to 8 hours as needed (pain)., Disp: 120 tablet, Rfl: 0    amLODIPine (NORVASC) 10 MG tablet, Take 1 tablet (10 mg total) by mouth once daily., Disp: 90 tablet, Rfl: 3    aspirin (ECOTRIN) 81 MG EC tablet, Take 1 tablet (81 mg total) by mouth 2 (two) times daily., Disp: 60 tablet, Rfl: 0    augmented betamethasone dipropionate (DIPROLENE-AF) 0.05 % cream, aaa 1-2 x daily. Not more than 2 weeks straight in same location, avoid use on face and groin, Disp: 50 g, Rfl: 1    azelastine (ASTELIN) 137 mcg (0.1 %) nasal spray, 1 spray (137 mcg total) by Nasal route daily as needed for Rhinitis., Disp: 30 mL, Rfl: 1    betamethasone dipropionate (DIPROLENE) 0.05 % ointment, aaa qd- bid . Not more than 2 weeks straight in same location. Avoid use on face and groin, Disp: 45 g, Rfl: 1    cetirizine (ZYRTEC) 10 MG tablet, Take 10 mg by mouth every evening. , Disp: , Rfl:     diphenhydrAMINE (BANOPHEN) 25 mg capsule, Take 1 each (25 mg total) by mouth every 6 (six) hours as needed for Itching or Allergies., Disp: 60 capsule, Rfl: 0    econazole nitrate 1 % cream, Use bid, Disp: 30 g, Rfl: 2    ergocalciferol, vitamin D2, (VITAMIN D ORAL), Take 1 tablet by mouth once daily., Disp: , Rfl:     estradioL (ESTRACE) 0.01 % (0.1 mg/gram) vaginal cream, PLACE 1 GRAM VAGINALLY EVERY DAY, Disp: 42.5 g, Rfl: 1    FINACEA 15 % gel, AAA face qhs then moisturize, Disp: 50 g, Rfl: 3    fluocinonide (LIDEX) 0.05 % external solution, AAA scalp qday prn itching, scaling, Disp: 60 mL, Rfl: 3    fluorouraciL (EFUDEX) 5 % cream, Apply thin film to right lower leg  2times per day for 2-3 weeks; d/c if area bleeding or ulcerated; avoid eyes or mouth (Patient not taking: Reported on 12/12/2022), Disp: 40 g, Rfl: 1    fluticasone propionate (FLONASE) 50 mcg/actuation nasal spray, SPRAY ONCE IN EACH NOSTRIL DAILY, Disp: 16 g,  Rfl: 3    hydroCHLOROthiazide (HYDRODIURIL) 12.5 MG Tab, Take 1 tablet (12.5 mg total) by mouth 2 (two) times daily., Disp: 180 tablet, Rfl: 3    ketoconazole (NIZORAL) 2 % cream, Apply topically once daily., Disp: 30 g, Rfl: 0    ketoconazole (NIZORAL) 2 % cream, aaa bid on  right posterior leg x 3 weeks, Disp: 60 g, Rfl: 3    levothyroxine (SYNTHROID) 125 MCG tablet, Take 1 tablet (125 mcg total) by mouth once daily., Disp: 90 tablet, Rfl: 3    LUTEIN ORAL, Take by mouth., Disp: , Rfl:     metFORMIN (GLUCOPHAGE-XR) 500 MG ER 24hr tablet, Take 1 tablet (500 mg total) by mouth every evening., Disp: 90 tablet, Rfl: 3    methocarbamoL (ROBAXIN) 750 MG Tab, Take 1 tablet (750 mg total) by mouth 3 (three) times daily as needed (muscle spasms)., Disp: 30 tablet, Rfl: 0    montelukast (SINGULAIR) 10 mg tablet, Take 1 tablet (10 mg total) by mouth every evening. (Patient taking differently: Take 10 mg by mouth as needed.), Disp: 90 tablet, Rfl: 3    mv,Ca,min/iron/FA/guarana/caff (ONE-A-DAY WOMEN'S ACTIVE ORAL), Take by mouth., Disp: , Rfl:     neomycin-polymyxin-hydrocortisone (CORTISPORIN) 3.5-10,000-1 mg/mL-unit/mL-% otic suspension, Use bid for nails, Disp: 10 mL, Rfl: 6    nystatin (MYCOSTATIN) cream, Apply topically 2 (two) times daily., Disp: 30 g, Rfl: 0    pantoprazole (PROTONIX) 40 MG tablet, Take 1 tablet (40 mg total) by mouth once daily., Disp: 90 tablet, Rfl: 3    potassium chloride (MICRO-K) 10 MEQ CpSR, Take 1 capsule (10 mEq total) by mouth once daily., Disp: 90 capsule, Rfl: 3    promethazine-dextromethorphan (PROMETHAZINE-DM) 6.25-15 mg/5 mL Syrp, Take 5 mLs by mouth every 6 (six) hours as needed., Disp: , Rfl:     RETIN-A 0.05 % cream, Apply topically nightly as needed., Disp: , Rfl:     rosuvastatin (CRESTOR) 5 MG tablet, Take 1 tablet (5 mg total) by mouth every other day., Disp: 45 tablet, Rfl: 3    spironolactone (ALDACTONE) 50 MG tablet, Take 1 tablet (50 mg total) by mouth once daily., Disp: 90  "tablet, Rfl: 3  No current facility-administered medications for this visit.    Facility-Administered Medications Ordered in Other Visits:     ropivacaine 0.2% Nimbus PainPRO Pump infusion 500 ML, , Perineural, Continuous, aMriana Vergara MD, New Bag at 03/09/21 1323      Review of Systems:  As per HPI otherwise noncontributory    OBJECTIVE:      Vital Signs (Most Recent):  Vitals:    01/17/23 1035   Weight: 116.1 kg (256 lb)   Height: 5' 6" (1.676 m)     Body mass index is 41.32 kg/m².      Physical Exam:  Constitutional: The patient appears well-developed and well-nourished. No distress.   Skin: No lesions appreciated  Head: Normocephalic and atraumatic.   Nose: Nose normal.   Ears: No deformities seen  Eyes: Conjunctivae and EOM are normal.   Neck: No tracheal deviation present.   Cardiovascular: Normal rate and intact distal pulses.    Pulmonary/Chest: Effort normal. No respiratory distress.   Abdominal: There is no guarding.   Neurological: The patient is alert.   Psychiatric: The patient has a normal mood and affect.     Left Hand/Wrist Examination:    Observation/Inspection:  Swelling  none    Deformity  Dupuytren's nodules left hand with a cord-like structure at the ring finger with no bj contracture  Discoloration  none     Scars   none    Atrophy  none    HAND/WRIST EXAMINATION:  Finkelstein's Test   Neg  WHAT Test    Neg  Snuff box tenderness   Neg  Cordon's Test    Neg  Hook of Hamate Tenderness  Neg  CMC grind    Neg  Circumduction test   Neg    Neurovascular Exam:  Digits WWP, brisk CR < 3s throughout  NVI motor/LTS to M/R/U nerves, radial pulse 2+  Tinel's Test - Carpal Tunnel  Neg  Tinel's Test - Cubital Tunnel  Neg  Phalen's Test    Neg  Median Nerve Compression Test Neg    ROM hand full, painless    ROM wrist full, painless    ROM elbow full, painless    Abdomen not guarded  Respirations nonlabored  Perfusion intact    Diagnostic Results:     Imaging - I independently viewed the patient's " imaging as well as the radiology report.  Xrays of the patient's left hand  demonstrate no evidence of any acute fractures or dislocations with some degenerative changes    EMG - none    ASSESSMENT/PLAN:      67 y.o. yo female with Dupuytren's nodule/cord left palm without contracture  Plan: The patient and I had a thorough discussion today.  We discussed the working diagnosis as well as several other potential alternative diagnoses.  Treatment options were discussed, both conservative and surgical.  Conservative treatment options would include things such as activity modifications, workplace modifications, a period of rest, oral vs topical OTC and prescription anti-inflammatory medications, occupational therapy, splinting/bracing, immobilization, corticosteroid injections, and others.  Surgical options were discussed as well.     At this time, the patient would like to proceed with a trial of observation.  I educated her on and showed her the tabletop test.  She should follow up should she develop any contracture.  In regards to her left shoulder pain I will send a referral over to my sports medicine colleagues for evaluation of that.  She may follow up with me on as needed/if needed basis.    Should the patient's symptoms worsen, persist, or fail to improve they should return for reevaluation and I would be happy to see them back anytime.        Jamie Preciado M.D.    Please be aware that this note has been generated with the assistance of [employer] voice-to-text.  Please excuse any spelling or grammatical errors.    Thank you for choosing Dr. Jamie Preciado for your orthopedic hand and upper extremity care. It is our goal to provide you with exceptional care that will help keep you healthy, active, and get you back in the game.     If you felt that you received exemplary care today, please consider leaving feedback for Dr. Preciado on [employer] at [email or web address].    Please  do not hesitate to reach out to us via email, phone, or MyChart with any questions, concerns, or feedback.

## 2023-01-17 NOTE — TELEPHONE ENCOUNTER
Left message for patient to call back to schedule appointment with Dr Jones for left shoulder pain.

## 2023-01-17 NOTE — TELEPHONE ENCOUNTER
----- Message from Jamie Preciado MD sent at 1/17/2023 11:20 AM CST -----  Hey man -    Nice patient with left shoulder pain.  Mind seeing her?    Thanks,  RD

## 2023-02-06 ENCOUNTER — PATIENT MESSAGE (OUTPATIENT)
Dept: ADMINISTRATIVE | Facility: OTHER | Age: 68
End: 2023-02-06
Payer: MEDICARE

## 2023-02-23 DIAGNOSIS — Z96.659 STATUS POST KNEE REPLACEMENT, UNSPECIFIED LATERALITY: Primary | ICD-10-CM

## 2023-02-24 ENCOUNTER — OFFICE VISIT (OUTPATIENT)
Dept: SLEEP MEDICINE | Facility: CLINIC | Age: 68
End: 2023-02-24
Payer: MEDICARE

## 2023-02-24 DIAGNOSIS — G47.33 OSA (OBSTRUCTIVE SLEEP APNEA): ICD-10-CM

## 2023-02-24 DIAGNOSIS — I10 ESSENTIAL HYPERTENSION: Primary | ICD-10-CM

## 2023-02-24 PROCEDURE — 99214 PR OFFICE/OUTPT VISIT, EST, LEVL IV, 30-39 MIN: ICD-10-PCS | Mod: CR,25,S$PBB, | Performed by: NURSE PRACTITIONER

## 2023-02-24 PROCEDURE — 99214 OFFICE O/P EST MOD 30 MIN: CPT | Mod: CR,25,S$PBB, | Performed by: NURSE PRACTITIONER

## 2023-02-24 NOTE — PROGRESS NOTES
The patient location is: home  The chief complaint leading to consultation is: QUINTIN    Visit type: audio only    Time with patient: 10 minutes of total time spent on the encounter, which includes face to face time and non-face to face time preparing to see the patient (eg, review of tests), Obtaining and/or reviewing separately obtained history, Documenting clinical information in the electronic or other health record, Independently interpreting results (not separately reported) and communicating results to the patient/family/caregiver, or Care coordination (not separately reported).Each patient to whom he or she provides medical services by telemedicine is:  (1) informed of the relationship between the physician and patient and the respective role of any other health care provider with respect to management of the patient; and (2) notified that he or she may decline to receive medical services by telemedicine and may withdraw from such care at any time.        Got DS2 replacement machine 6mos ago, at fixed 8cm instead of apap mode. Water used a lot in am. With mask use, snoring resolved and sleep more consolidated.   BP - in digital program  [employer] FFM  Remote 60d avg 6:21h/n AHI 11    HST 6-5-18 AHI 23/low SpO2: 82.7%      SH: . Former ,  volunteers at [facility] [employer]      ASSESSMENT:   QUINTIN, moderate. She remains adherent with PAP, AHI mild residual due to fixed mode vs apap  She has  medical comorbidities of asthma, hypertension, hypothyroidism      PLAN:   1. Stop cpap 8cm begin apap 8-12cm.  [facility] DME prn supplies   2. Discussed goal of therapy AHI<5 notify me if persistent elevated  See PCP re: HTN/continue med  Rtc 1 yr, sooner if needed        
Detail Level: Detailed
Quality 110: Preventive Care And Screening: Influenza Immunization: Influenza Immunization not Administered because Patient Refused.
Quality 431: Preventive Care And Screening: Unhealthy Alcohol Use - Screening: Patient screened for unhealthy alcohol use using a single question and scores less than 2 times per year
Quality 226: Preventive Care And Screening: Tobacco Use: Screening And Cessation Intervention: Patient screened for tobacco use and is an ex/non-smoker

## 2023-03-06 ENCOUNTER — PATIENT MESSAGE (OUTPATIENT)
Dept: ADMINISTRATIVE | Facility: OTHER | Age: 68
End: 2023-03-06
Payer: MEDICARE

## 2023-03-06 ENCOUNTER — OFFICE VISIT (OUTPATIENT)
Dept: ORTHOPEDICS | Facility: CLINIC | Age: 68
End: 2023-03-06
Payer: MEDICARE

## 2023-03-06 ENCOUNTER — HOSPITAL ENCOUNTER (OUTPATIENT)
Dept: RADIOLOGY | Facility: HOSPITAL | Age: 68
Discharge: HOME | End: 2023-03-06
Attending: ORTHOPAEDIC SURGERY
Payer: MEDICARE

## 2023-03-06 VITALS — WEIGHT: 255.75 LBS | BODY MASS INDEX: 41.1 KG/M2 | HEIGHT: 66 IN

## 2023-03-06 DIAGNOSIS — Z96.659 STATUS POST KNEE REPLACEMENT, UNSPECIFIED LATERALITY: ICD-10-CM

## 2023-03-06 DIAGNOSIS — M25.562 CHRONIC PAIN OF LEFT KNEE: Primary | ICD-10-CM

## 2023-03-06 DIAGNOSIS — G89.29 CHRONIC PAIN OF LEFT KNEE: Primary | ICD-10-CM

## 2023-03-06 PROCEDURE — 73560 X-RAY EXAM OF KNEE 1 OR 2: CPT | Mod: TC,RT

## 2023-03-06 PROCEDURE — 73560 XR KNEE ORTHO LEFT: ICD-10-PCS | Mod: 26,RT,, | Performed by: RADIOLOGY

## 2023-03-06 PROCEDURE — 99212 OFFICE O/P EST SF 10 MIN: CPT | Mod: S$PBB,,, | Performed by: ORTHOPAEDIC SURGERY

## 2023-03-06 PROCEDURE — 73562 XR KNEE ORTHO LEFT: ICD-10-PCS | Mod: 26,LT,, | Performed by: RADIOLOGY

## 2023-03-06 PROCEDURE — 99212 PR OFFICE/OUTPT VISIT, EST, LEVL II, 10-19 MIN: ICD-10-PCS | Mod: S$PBB,,, | Performed by: ORTHOPAEDIC SURGERY

## 2023-03-06 PROCEDURE — 73562 X-RAY EXAM OF KNEE 3: CPT | Mod: 26,LT,, | Performed by: RADIOLOGY

## 2023-03-06 PROCEDURE — 73560 X-RAY EXAM OF KNEE 1 OR 2: CPT | Mod: 26,RT,, | Performed by: RADIOLOGY

## 2023-03-06 PROCEDURE — 99214 OFFICE O/P EST MOD 30 MIN: CPT | Mod: PBBFAC | Performed by: ORTHOPAEDIC SURGERY

## 2023-03-06 PROCEDURE — 99999 PR PBB SHADOW E&M-EST. PATIENT-LVL IV: ICD-10-PCS | Mod: PBBFAC,,, | Performed by: ORTHOPAEDIC SURGERY

## 2023-03-06 PROCEDURE — 99999 PR PBB SHADOW E&M-EST. PATIENT-LVL IV: CPT | Mod: PBBFAC,,, | Performed by: ORTHOPAEDIC SURGERY

## 2023-03-06 NOTE — PROGRESS NOTES
Carolina GarciaXochitlGarrett is in for one year follow up for a  left TKA.  She is doing  well.  No pain in the left knee.  She has resumed activities of daily living. She has been quite acitve  Exam demonstrates  A well developed female in no distress.  Alert and oriented.  Mood and affect are appropriate.    Knee incision is well healed.  ROM is 0-120.  The patella tracks well and there is no instability. The extremity is neurovascularly intact.    Xrays demonstrate a well fixed and positioned prosthesis.    PROMIS-10 Questionnaire Scores 4/22/2022 12/23/2021 12/8/2021 6/2/2021 5/24/2021 2/15/2021   Global Physical Health 11 18 11 12 11 16   Global Mental health Score 17 16 17 16 16 14       KOOS Jr. Questionnaire Score 2/6/2023 4/20/2022 4/6/2022 12/23/2021 12/8/2021 6/2/2021 5/24/2021   KOOS Jr Score 0 2 1 10 1 1 2       Oxford Knee Questionnaire Score 4/30/2022 12/23/2021 12/8/2021 6/2/2021 5/24/2021 2/15/2021   Oxford Knee Score 43 23 40 44 43 14       Knee Society and Function Score 3/6/2023 5/2/2022 1/24/2022 12/8/2021 6/2/2021   FINDINGS - KNEE SOCIETY SCORE 99 93 50 100 100   FINDINGS - KNEE SOCIETY FUNCTION SCORE 100 90 50 100 100       No flowsheet data found.    Imp:Doing well    F/u in 2 years with xrays and PROMS.  Sooner if needed

## 2023-03-15 ENCOUNTER — LAB VISIT (OUTPATIENT)
Dept: LAB | Facility: HOSPITAL | Age: 68
End: 2023-03-15
Attending: INTERNAL MEDICINE
Payer: MEDICARE

## 2023-03-15 ENCOUNTER — OFFICE VISIT (OUTPATIENT)
Dept: PRIMARY CARE CLINIC | Facility: CLINIC | Age: 68
End: 2023-03-15
Payer: MEDICARE

## 2023-03-15 VITALS
HEIGHT: 66 IN | DIASTOLIC BLOOD PRESSURE: 68 MMHG | TEMPERATURE: 99 F | HEART RATE: 78 BPM | SYSTOLIC BLOOD PRESSURE: 128 MMHG | BODY MASS INDEX: 42.55 KG/M2 | OXYGEN SATURATION: 96 % | WEIGHT: 264.75 LBS

## 2023-03-15 DIAGNOSIS — M79.10 MYALGIA DUE TO STATIN: ICD-10-CM

## 2023-03-15 DIAGNOSIS — E03.9 HYPOTHYROIDISM, UNSPECIFIED TYPE: ICD-10-CM

## 2023-03-15 DIAGNOSIS — G47.33 OSA ON CPAP: ICD-10-CM

## 2023-03-15 DIAGNOSIS — T46.6X5A MYALGIA DUE TO STATIN: ICD-10-CM

## 2023-03-15 DIAGNOSIS — M35.00 SJOGREN'S SYNDROME, WITH UNSPECIFIED ORGAN INVOLVEMENT: ICD-10-CM

## 2023-03-15 DIAGNOSIS — E66.01 MORBID OBESITY WITH BODY MASS INDEX (BMI) OF 40.0 TO 49.9: ICD-10-CM

## 2023-03-15 DIAGNOSIS — I77.1 TORTUOUS AORTA: ICD-10-CM

## 2023-03-15 DIAGNOSIS — I10 BENIGN ESSENTIAL HYPERTENSION: Primary | ICD-10-CM

## 2023-03-15 DIAGNOSIS — E78.5 HYPERLIPIDEMIA, UNSPECIFIED HYPERLIPIDEMIA TYPE: ICD-10-CM

## 2023-03-15 DIAGNOSIS — F41.1 GAD (GENERALIZED ANXIETY DISORDER): ICD-10-CM

## 2023-03-15 LAB
CHOLEST SERPL-MCNC: 165 MG/DL (ref 120–199)
CHOLEST/HDLC SERPL: 3 {RATIO} (ref 2–5)
HDLC SERPL-MCNC: 55 MG/DL (ref 40–75)
HDLC SERPL: 33.3 % (ref 20–50)
LDLC SERPL CALC-MCNC: 85.4 MG/DL (ref 63–159)
NONHDLC SERPL-MCNC: 110 MG/DL
TRIGL SERPL-MCNC: 123 MG/DL (ref 30–150)

## 2023-03-15 PROCEDURE — 99214 PR OFFICE/OUTPT VISIT, EST, LEVL IV, 30-39 MIN: ICD-10-PCS | Mod: S$PBB,,, | Performed by: INTERNAL MEDICINE

## 2023-03-15 PROCEDURE — 99215 OFFICE O/P EST HI 40 MIN: CPT | Mod: PBBFAC,PN | Performed by: INTERNAL MEDICINE

## 2023-03-15 PROCEDURE — 99999 PR PBB SHADOW E&M-EST. PATIENT-LVL V: CPT | Mod: PBBFAC,,, | Performed by: INTERNAL MEDICINE

## 2023-03-15 PROCEDURE — 80061 LIPID PANEL: CPT | Performed by: INTERNAL MEDICINE

## 2023-03-15 PROCEDURE — 99214 OFFICE O/P EST MOD 30 MIN: CPT | Mod: S$PBB,,, | Performed by: INTERNAL MEDICINE

## 2023-03-15 PROCEDURE — 36415 COLL VENOUS BLD VENIPUNCTURE: CPT | Mod: PN | Performed by: INTERNAL MEDICINE

## 2023-03-15 PROCEDURE — 99999 PR PBB SHADOW E&M-EST. PATIENT-LVL V: ICD-10-PCS | Mod: PBBFAC,,, | Performed by: INTERNAL MEDICINE

## 2023-03-15 NOTE — PROGRESS NOTES
"Subjective:       Patient ID: Carolina Knight is a 67 y.o. female.    Chief Complaint: Arm Pain    HPI  QUINTIN on autoPAP. LORI Desir NP 2/24/23. F/u in 1 yr.  Doing ok on it.     HTN - amlodipine 10mg qd, aldactone 50mg qd, hctz 25mg daily  hypoK - micro K 10mEq daily  Part of digital HTN program.     Hypothyroidism - synthroid 125mcg daily.   Tsh 9/19/22 1.469    GIBSON - had to be placed on wellbutrin when she was trying to setting her mom's estate after she passed away. Doing better now.    PDM - but last a1c 9/19/22 5.5.  Metformin xr 500mg daily.    HLD - crestor 5mg qod - she started having L arm muscle pains again.   Statin induced myalgia (lova, atorva, rosuva, zetia also).   Cxr 2018 - Thoracic aorta is tortuous.  Part of digital HLD program.  LOL 5/10/22 69.8    Easy bruising    Sjogren's - same. Following w/ dentist and eye exams.     Review of Systems   Constitutional:  Negative for activity change and unexpected weight change.   HENT:  Negative for hearing loss, rhinorrhea and trouble swallowing.    Eyes:  Negative for discharge and visual disturbance.   Respiratory:  Negative for chest tightness and wheezing.    Cardiovascular:  Negative for chest pain and palpitations.   Gastrointestinal:  Negative for blood in stool, constipation, diarrhea and vomiting.   Endocrine: Negative for polydipsia and polyuria.   Genitourinary:  Negative for difficulty urinating, dysuria, hematuria and menstrual problem.   Musculoskeletal:  Negative for arthralgias, joint swelling and neck pain.   Neurological:  Negative for weakness and headaches.   Psychiatric/Behavioral:  Negative for confusion and dysphoric mood.        Objective:      Physical Exam    /68 (BP Location: Left arm, Patient Position: Sitting, BP Method: Large (Manual))   Pulse 78   Temp 98.7 °F (37.1 °C) (Oral)   Ht 5' 6" (1.676 m)   Wt 120.1 kg (264 lb 12.4 oz)   LMP  (LMP Unknown)   SpO2 96%   BMI 42.74 kg/m²     GEN - A+OX4, NAD "   HEENT - PERRL, EOMI, OP small. MMM.    Neck - No thyromegaly or cervical LAD. No thyroid masses felt.  CV - RRR, no m/r   Chest - CTAB, no wheezing or rhonchi. Normal work of breathing.   Abd - S/NT/ND/+BS.   Ext - 2+BDP and radial pulses. No LE edema.   MSK/Neuro - 5/5 BUE and BLE strength. Normal gait. L deltoid soreness on palpation.   Skin - No rash.    Previous labs reviewed.    Assessment/Plan     Carolina was seen today for arm pain.    Diagnoses and all orders for this visit:    Benign essential hypertension - Stable and controlled. Continue current medications.    QUINTIN on CPAP - cont autoPap. F/u w/ sleep d/o clinic.     Hypothyroidism, unspecified type - tft wnl. Cont synthroid.     GIBSON (generalized anxiety disorder) - lots of stressors but thinks ok for now. Will let me know.     Tortuous aorta - trying to tolerate the statin qod. Consider praluent.     Sjogren's syndrome, with unspecified organ involvement - f/u w/ optometry and dentist.    Morbid obesity with body mass index (BMI) of 42.74) - trial of wegovy. HTN as above. QUINTIN as above.   -     semaglutide, weight loss, 0.25 mg/0.5 mL PnIj; Inject 0.25mg weekly x 4 weeks and then 0.5mg weekly onwards.    Myalgia due to statin - pending lipids today.     Advance Care Planning     Date: 03/15/2023    Living Will  Reviewed living will and HCPOA on file. Pt wishes no changes to be made.        Power of   Reviewed living will and HCPOA on file. Pt wishes no changes to be made.          Follow up in about 2 months (around 5/15/2023). For weight loss.      Kierra Cornejo MD  [facility] Kristofer clotilde  8:25 AM

## 2023-03-20 ENCOUNTER — TELEPHONE (OUTPATIENT)
Dept: DERMATOLOGY | Facility: CLINIC | Age: 68
End: 2023-03-20
Payer: MEDICARE

## 2023-03-27 ENCOUNTER — OFFICE VISIT (OUTPATIENT)
Dept: DERMATOLOGY | Facility: CLINIC | Age: 68
End: 2023-03-27
Payer: MEDICARE

## 2023-03-27 DIAGNOSIS — L82.0 INFLAMED SEBORRHEIC KERATOSIS: ICD-10-CM

## 2023-03-27 DIAGNOSIS — L30.8 ASTEATOTIC ECZEMA: Primary | ICD-10-CM

## 2023-03-27 DIAGNOSIS — L98.8 RHYTIDES: ICD-10-CM

## 2023-03-27 PROCEDURE — 99999 PR PBB SHADOW E&M-EST. PATIENT-LVL V: CPT | Mod: PBBFAC,,, | Performed by: DERMATOLOGY

## 2023-03-27 PROCEDURE — 99214 PR OFFICE/OUTPT VISIT, EST, LEVL IV, 30-39 MIN: ICD-10-PCS | Mod: S$PBB,,, | Performed by: DERMATOLOGY

## 2023-03-27 PROCEDURE — 99999 PR PBB SHADOW E&M-EST. PATIENT-LVL V: ICD-10-PCS | Mod: PBBFAC,,, | Performed by: DERMATOLOGY

## 2023-03-27 PROCEDURE — 99215 OFFICE O/P EST HI 40 MIN: CPT | Mod: PBBFAC,PO | Performed by: DERMATOLOGY

## 2023-03-27 PROCEDURE — 99214 OFFICE O/P EST MOD 30 MIN: CPT | Mod: S$PBB,,, | Performed by: DERMATOLOGY

## 2023-03-27 NOTE — PROGRESS NOTES
Subjective:       Patient ID:  Carolina Knight is a 67 y.o. female who presents for   Chief Complaint   Patient presents with    Rash     F/U- better       Pt states a new flare to L lower leg. Itching scaly. No prev tx.  Pt states she also would like an rx for retin-a    Rash - Follow-up  Symptom course: improving  Currently using: keto cream, betamethasone PRN.  Affected locations: right lower leg and left lower leg  Signs / symptoms: asymptomatic  Severity: mild    Review of Systems   Skin:  Positive for itching, rash and dry skin.      Objective:    Physical Exam   Constitutional: She appears well-developed and well-nourished. No distress.   Neurological: She is alert and oriented to person, place, and time. She is not disoriented.   Psychiatric: She has a normal mood and affect.   Skin:   Areas Examined (abnormalities noted in diagram):   Head / Face Inspection Performed  RLE Inspected  LLE Inspection Performed                 Diagram Legend     Erythematous scaling macule/papule c/w actinic keratosis       Vascular papule c/w angioma      Pigmented verrucoid papule/plaque c/w seborrheic keratosis      Yellow umbilicated papule c/w sebaceous hyperplasia      Irregularly shaped tan macule c/w lentigo     1-2 mm smooth white papules consistent with Milia      Movable subcutaneous cyst with punctum c/w epidermal inclusion cyst      Subcutaneous movable cyst c/w pilar cyst      Firm pink to brown papule c/w dermatofibroma      Pedunculated fleshy papule(s) c/w skin tag(s)      Evenly pigmented macule c/w junctional nevus     Mildly variegated pigmented, slightly irregular-bordered macule c/w mildly atypical nevus      Flesh colored to evenly pigmented papule c/w intradermal nevus       Pink pearly papule/plaque c/w basal cell carcinoma      Erythematous hyperkeratotic cursted plaque c/w SCC      Surgical scar with no sign of skin cancer recurrence      Open and closed comedones      Inflammatory papules and  pustules      Verrucoid papule consistent consistent with wart     Erythematous eczematous patches and plaques     Dystrophic onycholytic nail with subungual debris c/w onychomycosis     Umbilicated papule    Erythematous-base heme-crusted tan verrucoid plaque consistent with inflamed seborrheic keratosis     Erythematous Silvery Scaling Plaque c/w Psoriasis     See annotation      Assessment / Plan:        Asteatotic eczema  Discussed with patient good skin care regimen including avoiding fragranced products and very hot showers.  Recommended dove sensitive skin bar soap or cerave hydrating cleanser or bar.  Recommend Cerave cream  For moisturization daily -2x daily.       Betamethasone- use only for flares  Pt educated that overuse of steroids can lead to skin thinning/atrophy, hypopigmentation, striae.    Inflamed seborrheic keratosis  Reassurance given to patient. No treatment is necessary.   Treatment of benign, asymptomatic lesions may be considered cosmetic.    Rhytides  Retinol 0.25 to face , elta elements, handout provided  Can increase retinol as tolerated            Follow up in about 1 year (around 3/27/2024) for Medication Management.

## 2023-03-27 NOTE — PATIENT INSTRUCTIONS
Discussed with patient good skin care regimen including avoiding fragranced products and very hot showers.  Recommended dove sensitive skin bar soap or cerave hydrating cleanser or bar.  Recommend Cerave cream  For moisturization daily -2x daily.     Retinol 0.25 to face , elta elements, handout provided    Betamethasone- use only for flares  Pt educated that overuse of steroids can lead to skin thinning/atrophy, hypopigmentation, striae.

## 2023-04-03 DIAGNOSIS — R05.9 COUGH, UNSPECIFIED TYPE: Primary | ICD-10-CM

## 2023-04-04 ENCOUNTER — HOSPITAL ENCOUNTER (OUTPATIENT)
Dept: RADIOLOGY | Facility: HOSPITAL | Age: 68
Discharge: HOME | End: 2023-04-04
Attending: INTERNAL MEDICINE
Payer: MEDICARE

## 2023-04-04 DIAGNOSIS — R05.9 COUGH, UNSPECIFIED TYPE: ICD-10-CM

## 2023-04-04 PROCEDURE — 71046 X-RAY EXAM CHEST 2 VIEWS: CPT | Mod: 26,,, | Performed by: RADIOLOGY

## 2023-04-04 PROCEDURE — 71046 XR CHEST PA AND LATERAL: ICD-10-PCS | Mod: 26,,, | Performed by: RADIOLOGY

## 2023-04-04 PROCEDURE — 71046 X-RAY EXAM CHEST 2 VIEWS: CPT | Mod: TC,FY

## 2023-04-30 ENCOUNTER — PATIENT MESSAGE (OUTPATIENT)
Dept: DERMATOLOGY | Facility: CLINIC | Age: 68
End: 2023-04-30
Payer: MEDICARE

## 2023-05-09 DIAGNOSIS — R73.03 PREDIABETES: ICD-10-CM

## 2023-05-09 RX ORDER — METFORMIN HYDROCHLORIDE 500 MG/1
500 TABLET, EXTENDED RELEASE ORAL NIGHTLY
Qty: 90 TABLET | Refills: 3 | Status: SHIPPED | OUTPATIENT
Start: 2023-05-09 | End: 2023-05-17 | Stop reason: SDUPTHER

## 2023-05-17 ENCOUNTER — LAB VISIT (OUTPATIENT)
Dept: LAB | Facility: HOSPITAL | Age: 68
End: 2023-05-17
Attending: INTERNAL MEDICINE
Payer: MEDICARE

## 2023-05-17 ENCOUNTER — OFFICE VISIT (OUTPATIENT)
Dept: PRIMARY CARE CLINIC | Facility: CLINIC | Age: 68
End: 2023-05-17
Payer: MEDICARE

## 2023-05-17 VITALS
OXYGEN SATURATION: 96 % | WEIGHT: 267.63 LBS | HEART RATE: 75 BPM | DIASTOLIC BLOOD PRESSURE: 82 MMHG | TEMPERATURE: 98 F | SYSTOLIC BLOOD PRESSURE: 124 MMHG | HEIGHT: 66 IN | BODY MASS INDEX: 43.01 KG/M2

## 2023-05-17 DIAGNOSIS — J31.0 CHRONIC RHINITIS: ICD-10-CM

## 2023-05-17 DIAGNOSIS — K21.9 GASTROESOPHAGEAL REFLUX DISEASE WITHOUT ESOPHAGITIS: ICD-10-CM

## 2023-05-17 DIAGNOSIS — E03.4 HYPOTHYROIDISM DUE TO ACQUIRED ATROPHY OF THYROID: ICD-10-CM

## 2023-05-17 DIAGNOSIS — L30.9 DERMATITIS: ICD-10-CM

## 2023-05-17 DIAGNOSIS — E78.5 HYPERLIPIDEMIA, UNSPECIFIED HYPERLIPIDEMIA TYPE: ICD-10-CM

## 2023-05-17 DIAGNOSIS — F33.1 MDD (MAJOR DEPRESSIVE DISORDER), RECURRENT EPISODE, MODERATE: ICD-10-CM

## 2023-05-17 DIAGNOSIS — F41.1 GAD (GENERALIZED ANXIETY DISORDER): ICD-10-CM

## 2023-05-17 DIAGNOSIS — I10 ESSENTIAL HYPERTENSION: ICD-10-CM

## 2023-05-17 DIAGNOSIS — R73.03 PREDIABETES: ICD-10-CM

## 2023-05-17 DIAGNOSIS — Z12.31 ENCOUNTER FOR SCREENING MAMMOGRAM FOR MALIGNANT NEOPLASM OF BREAST: ICD-10-CM

## 2023-05-17 DIAGNOSIS — I10 ESSENTIAL HYPERTENSION: Primary | ICD-10-CM

## 2023-05-17 LAB
ALBUMIN SERPL BCP-MCNC: 4.1 G/DL (ref 3.5–5.2)
ALP SERPL-CCNC: 59 U/L (ref 55–135)
ALT SERPL W/O P-5'-P-CCNC: 19 U/L (ref 10–44)
ANION GAP SERPL CALC-SCNC: 11 MMOL/L (ref 8–16)
AST SERPL-CCNC: 10 U/L (ref 10–40)
BASOPHILS # BLD AUTO: 0.07 K/UL (ref 0–0.2)
BASOPHILS NFR BLD: 1 % (ref 0–1.9)
BILIRUB SERPL-MCNC: 0.4 MG/DL (ref 0.1–1)
BUN SERPL-MCNC: 20 MG/DL (ref 8–23)
CALCIUM SERPL-MCNC: 10.2 MG/DL (ref 8.7–10.5)
CHLORIDE SERPL-SCNC: 100 MMOL/L (ref 95–110)
CO2 SERPL-SCNC: 28 MMOL/L (ref 23–29)
CREAT SERPL-MCNC: 0.8 MG/DL (ref 0.5–1.4)
DIFFERENTIAL METHOD: ABNORMAL
EOSINOPHIL # BLD AUTO: 0.2 K/UL (ref 0–0.5)
EOSINOPHIL NFR BLD: 2.3 % (ref 0–8)
ERYTHROCYTE [DISTWIDTH] IN BLOOD BY AUTOMATED COUNT: 14.9 % (ref 11.5–14.5)
EST. GFR  (NO RACE VARIABLE): >60 ML/MIN/1.73 M^2
ESTIMATED AVG GLUCOSE: 108 MG/DL (ref 68–131)
GLUCOSE SERPL-MCNC: 91 MG/DL (ref 70–110)
HBA1C MFR BLD: 5.4 % (ref 4–5.6)
HCT VFR BLD AUTO: 45.5 % (ref 37–48.5)
HGB BLD-MCNC: 14.4 G/DL (ref 12–16)
IMM GRANULOCYTES # BLD AUTO: 0.07 K/UL (ref 0–0.04)
IMM GRANULOCYTES NFR BLD AUTO: 1 % (ref 0–0.5)
LYMPHOCYTES # BLD AUTO: 2.6 K/UL (ref 1–4.8)
LYMPHOCYTES NFR BLD: 35 % (ref 18–48)
MCH RBC QN AUTO: 28.7 PG (ref 27–31)
MCHC RBC AUTO-ENTMCNC: 31.6 G/DL (ref 32–36)
MCV RBC AUTO: 91 FL (ref 82–98)
MONOCYTES # BLD AUTO: 0.7 K/UL (ref 0.3–1)
MONOCYTES NFR BLD: 9.1 % (ref 4–15)
NEUTROPHILS # BLD AUTO: 3.8 K/UL (ref 1.8–7.7)
NEUTROPHILS NFR BLD: 51.6 % (ref 38–73)
NRBC BLD-RTO: 0 /100 WBC
PLATELET # BLD AUTO: 323 K/UL (ref 150–450)
PMV BLD AUTO: 10.3 FL (ref 9.2–12.9)
POTASSIUM SERPL-SCNC: 3.9 MMOL/L (ref 3.5–5.1)
PROT SERPL-MCNC: 8 G/DL (ref 6–8.4)
RBC # BLD AUTO: 5.01 M/UL (ref 4–5.4)
SODIUM SERPL-SCNC: 139 MMOL/L (ref 136–145)
TSH SERPL DL<=0.005 MIU/L-ACNC: 1.23 UIU/ML (ref 0.4–4)
WBC # BLD AUTO: 7.29 K/UL (ref 3.9–12.7)

## 2023-05-17 PROCEDURE — 36415 COLL VENOUS BLD VENIPUNCTURE: CPT | Mod: PN | Performed by: INTERNAL MEDICINE

## 2023-05-17 PROCEDURE — 83036 HEMOGLOBIN GLYCOSYLATED A1C: CPT | Performed by: INTERNAL MEDICINE

## 2023-05-17 PROCEDURE — 86235 NUCLEAR ANTIGEN ANTIBODY: CPT | Mod: 59 | Performed by: INTERNAL MEDICINE

## 2023-05-17 PROCEDURE — 85025 COMPLETE CBC W/AUTO DIFF WBC: CPT | Performed by: INTERNAL MEDICINE

## 2023-05-17 PROCEDURE — 99215 OFFICE O/P EST HI 40 MIN: CPT | Mod: PBBFAC,PN | Performed by: INTERNAL MEDICINE

## 2023-05-17 PROCEDURE — 86038 ANTINUCLEAR ANTIBODIES: CPT | Performed by: INTERNAL MEDICINE

## 2023-05-17 PROCEDURE — 86225 DNA ANTIBODY NATIVE: CPT | Performed by: INTERNAL MEDICINE

## 2023-05-17 PROCEDURE — 84443 ASSAY THYROID STIM HORMONE: CPT | Performed by: INTERNAL MEDICINE

## 2023-05-17 PROCEDURE — 99999 PR PBB SHADOW E&M-EST. PATIENT-LVL V: CPT | Mod: PBBFAC,,, | Performed by: INTERNAL MEDICINE

## 2023-05-17 PROCEDURE — 99214 OFFICE O/P EST MOD 30 MIN: CPT | Mod: S$PBB,,, | Performed by: INTERNAL MEDICINE

## 2023-05-17 PROCEDURE — 86039 ANTINUCLEAR ANTIBODIES (ANA): CPT | Performed by: INTERNAL MEDICINE

## 2023-05-17 PROCEDURE — 80053 COMPREHEN METABOLIC PANEL: CPT | Performed by: INTERNAL MEDICINE

## 2023-05-17 PROCEDURE — 99999 PR PBB SHADOW E&M-EST. PATIENT-LVL V: ICD-10-PCS | Mod: PBBFAC,,, | Performed by: INTERNAL MEDICINE

## 2023-05-17 PROCEDURE — 99214 PR OFFICE/OUTPT VISIT, EST, LEVL IV, 30-39 MIN: ICD-10-PCS | Mod: S$PBB,,, | Performed by: INTERNAL MEDICINE

## 2023-05-17 RX ORDER — AMLODIPINE BESYLATE 10 MG/1
10 TABLET ORAL DAILY
Qty: 90 TABLET | Refills: 3 | Status: SHIPPED | OUTPATIENT
Start: 2023-05-17

## 2023-05-17 RX ORDER — FLUCONAZOLE 150 MG/1
150 TABLET ORAL DAILY
Qty: 1 TABLET | Refills: 3 | Status: SHIPPED | OUTPATIENT
Start: 2023-05-17

## 2023-05-17 RX ORDER — BUPROPION HYDROCHLORIDE 150 MG/1
150 TABLET ORAL DAILY
Qty: 30 TABLET | Refills: 3 | Status: SHIPPED | OUTPATIENT
Start: 2023-05-17 | End: 2023-09-26 | Stop reason: SDUPTHER

## 2023-05-17 RX ORDER — ROSUVASTATIN CALCIUM 5 MG/1
5 TABLET, COATED ORAL EVERY OTHER DAY
Qty: 45 TABLET | Refills: 3 | Status: SHIPPED | OUTPATIENT
Start: 2023-05-17 | End: 2024-02-29 | Stop reason: SDUPTHER

## 2023-05-17 RX ORDER — PANTOPRAZOLE SODIUM 40 MG/1
40 TABLET, DELAYED RELEASE ORAL DAILY
Qty: 90 TABLET | Refills: 3 | Status: SHIPPED | OUTPATIENT
Start: 2023-05-17 | End: 2023-07-31 | Stop reason: SDUPTHER

## 2023-05-17 RX ORDER — LEVOTHYROXINE SODIUM 125 UG/1
125 TABLET ORAL DAILY
Qty: 90 TABLET | Refills: 3 | Status: SHIPPED | OUTPATIENT
Start: 2023-05-17 | End: 2023-07-31 | Stop reason: SDUPTHER

## 2023-05-17 RX ORDER — HYDROCHLOROTHIAZIDE 12.5 MG/1
25 TABLET ORAL DAILY
Qty: 180 TABLET | Refills: 1 | Status: SHIPPED | OUTPATIENT
Start: 2023-05-17

## 2023-05-17 RX ORDER — MONTELUKAST SODIUM 10 MG/1
10 TABLET ORAL NIGHTLY
Qty: 90 TABLET | Refills: 3 | Status: SHIPPED | OUTPATIENT
Start: 2023-05-17

## 2023-05-17 RX ORDER — ALBUTEROL SULFATE 90 UG/1
1-2 AEROSOL, METERED RESPIRATORY (INHALATION) EVERY 6 HOURS PRN
Qty: 18 G | Refills: 2 | Status: SHIPPED | OUTPATIENT
Start: 2023-05-17

## 2023-05-17 RX ORDER — AZELASTINE 1 MG/ML
1 SPRAY, METERED NASAL DAILY PRN
Qty: 30 ML | Refills: 11 | Status: SHIPPED | OUTPATIENT
Start: 2023-05-17 | End: 2030-07-27

## 2023-05-17 RX ORDER — METFORMIN HYDROCHLORIDE 500 MG/1
500 TABLET, EXTENDED RELEASE ORAL NIGHTLY
Qty: 90 TABLET | Refills: 3 | Status: SHIPPED | OUTPATIENT
Start: 2023-05-17 | End: 2024-05-16

## 2023-05-17 RX ORDER — SPIRONOLACTONE 50 MG/1
50 TABLET, FILM COATED ORAL DAILY
Qty: 90 TABLET | Refills: 3 | Status: SHIPPED | OUTPATIENT
Start: 2023-05-17

## 2023-05-17 RX ORDER — HYDROCHLOROTHIAZIDE 12.5 MG/1
25 TABLET ORAL DAILY
Qty: 180 TABLET | Refills: 1 | Status: CANCELLED | OUTPATIENT
Start: 2023-05-17

## 2023-05-17 RX ORDER — KETOCONAZOLE 20 MG/G
CREAM TOPICAL
Qty: 60 G | Refills: 3 | Status: SHIPPED | OUTPATIENT
Start: 2023-05-17 | End: 2023-06-13 | Stop reason: SDUPTHER

## 2023-05-17 NOTE — PROGRESS NOTES
Subjective:       Patient ID: Carolina Knight is a 68 y.o. female.    Chief Complaint: Hypertension    HPI  Gets a lot of sinus issues. Wants a diflucan prn.   Every time seasons change, allergies get bad.   On singulair nightly, albuterol prn, zyrtec daily.   PDM - metformin xr 500mg daily    HLD - crestor 5mg qod.  Tortuous aorta.     At our last visit, trial of wegovy for morbid obesity - BMI was 42.74.  Was not able to get wegovy and couldn't afford.    was in the hospital. He's 82 y/o. Anxious about the future, watching the news, etc.   .  Depression Patient Health Questionnaire 5/17/2023 3/15/2023 8/30/2022 5/9/2022 9/11/2020 9/29/2019 8/20/2019   Over the last two weeks how often have you been bothered by little interest or pleasure in doing things More than half the days Not at all Not at all Not at all Not at all Not at all Not at all   Over the last two weeks how often have you been bothered by feeling down, depressed or hopeless Several days Not at all Not at all Not at all Not at all Not at all Not at all   PHQ-2 Total Score 3 0 0 0 0 0 0   Over the last two weeks how often have you been bothered by trouble falling or staying asleep, or sleeping too much More than half the days - - - Several days Not at all -   Over the last two weeks how often have you been bothered by feeling tired or having little energy Nearly every day - - - Several days Not at all -   Over the last two weeks how often have you been bothered by a poor appetite or overeating Nearly every day - - - Several days Not at all -   Over the last two weeks how often have you been bothered by feeling bad about yourself - or that you are a failure or have let yourself or your family down Several days - - - Not at all Not at all -   Over the last two weeks how often have you been bothered by trouble concentrating on things, such as reading the newspaper or watching television Several days - - - Not at all Not at all -   Over the last  "two weeks how often have you been bothered by moving or speaking so slowly that other people could have noticed. Or the opposite - being so fidgety or restless that you have been moving around a lot more than usual. Several days - - - Not at all Not at all -   Over the last two weeks how often have you been bothered by thoughts that you would be better off dead, or of hurting yourself Not at all - - - Not at all Not at all -   If you checked off any problems, how difficult have these problems made it for you to do your work, take care of things at home or get along with other people? - - - - Not difficult at all Not difficult at all -   Total Score 14 - - - 3 0 -   Interpretation Moderate - - - - - -     GAD7 5/17/2023 11/21/2022 9/19/2022   1. Feeling nervous, anxious, or on edge? 1 1 1   2. Not being able to stop or control worrying? 1 1 3   3. Worrying too much about different things? 3 1 3   4. Trouble relaxing? 1 1 1   5. Being so restless that it is hard to sit still? 1 1 2   6. Becoming easily annoyed or irritable? 1 2 1   7. Feeling afraid as if something awful might happen? 3 2 1   8. If you checked off any problems, how difficult have these problems made it for you to do your work, take care of things at home, or get along with other people? - 1 -   GIBSON-7 Score 11 9 12     Review of Systems  Comprehensive review of systems otherwise negative. See history/subjective section for more details.    Objective:      Physical Exam    /82 (BP Location: Left arm, Patient Position: Sitting, BP Method: Large (Manual))   Pulse 75   Temp 98 °F (36.7 °C) (Oral)   Ht 5' 6" (1.676 m)   Wt 121.4 kg (267 lb 10.2 oz)   LMP  (LMP Unknown)   SpO2 96%   BMI 43.20 kg/m²     GEN - A+OX4, NAD   HEENT - PERRL, EOMI, OP clear. MMM.   Neck - No thyromegaly or cervical LAD. No thyroid masses felt.  CV - RRR, no m/r   Chest - CTAB, no wheezing or rhonchi  Abd - S/NT/ND/+BS.   Ext - 2+BDP and radial pulses. No " C/C/E.  Protective Sensation (w/ 10 gram monofilament):  Right: Intact  Left: Intact    Visual Inspection:  Callus -  Bilateral    Pedal Pulses:   Right: Present  Left: Present    Posterior Tibialis Pulses:   Right:Present  Left: Present    Neuro - 5/5 BUE and BLE strength. Normal gait.   Skin - rash at the back of the R calf.     Previous labs reviewed.     Assessment/Plan     Carolina was seen today for hypertension.    Diagnoses and all orders for this visit:    Essential hypertension  -     spironolactone (ALDACTONE) 50 MG tablet; Take 1 tablet (50 mg total) by mouth once daily.  -     amLODIPine (NORVASC) 10 MG tablet; Take 1 tablet (10 mg total) by mouth once daily.  -     hydroCHLOROthiazide (HYDRODIURIL) 12.5 MG Tab; Take 2 tablets (25 mg total) by mouth once daily.  -     Comprehensive Metabolic Panel; Future    Hypothyroidism due to acquired atrophy of thyroid  -     levothyroxine (SYNTHROID) 125 MCG tablet; Take 1 tablet (125 mcg total) by mouth once daily.  -     TSH; Future    Gastroesophageal reflux disease without esophagitis  -     pantoprazole (PROTONIX) 40 MG tablet; Take 1 tablet (40 mg total) by mouth once daily.  -     CBC Auto Differential; Future    Prediabetes  -     metFORMIN (GLUCOPHAGE-XR) 500 MG ER 24hr tablet; Take 1 tablet (500 mg total) by mouth every evening.  -     HEMOGLOBIN A1C; Future  -     Microalbumin/Creatinine Ratio, Urine; Future    Chronic rhinitis  Comments:  asked pt. to alternate astelin with flonase daily and continue zyrtec prn   Orders:  -     azelastine (ASTELIN) 137 mcg (0.1 %) nasal spray; 1 spray (137 mcg total) by Nasal route daily as needed for Rhinitis.  -     montelukast (SINGULAIR) 10 mg tablet; Take 1 tablet (10 mg total) by mouth every evening.  -     albuterol (VENTOLIN HFA) 90 mcg/actuation inhaler; Inhale 1-2 puffs into the lungs every 6 (six) hours as needed for Wheezing or Shortness of Breath. Rescue  -     CBC Auto Differential; Future    Hyperlipidemia,  unspecified hyperlipidemia type  -     rosuvastatin (CRESTOR) 5 MG tablet; Take 1 tablet (5 mg total) by mouth every other day.  -     Comprehensive Metabolic Panel; Future    MDD (major depressive disorder), recurrent episode, moderate  -     buPROPion (WELLBUTRIN XL) 150 MG TB24 tablet; Take 1 tablet (150 mg total) by mouth once daily.  -     Ambulatory referral/consult to [facility]; Future    GIBSON (generalized anxiety disorder)  -     buPROPion (WELLBUTRIN XL) 150 MG TB24 tablet; Take 1 tablet (150 mg total) by mouth once daily.  -     Ambulatory referral/consult to [facility]; Future    Encounter for screening mammogram for malignant neoplasm of breast  -     Mammo Digital Screening Bilat w/ Rafael; Future    Dermatitis  -     ketoconazole (NIZORAL) 2 % cream; aaa bid on  right posterior leg x 3 weeks    Other orders  -     fluconazole (DIFLUCAN) 150 MG Tab; Take 1 tablet (150 mg total) by mouth once daily.  The following orders have not been finalized:  -     Cancel: hydroCHLOROthiazide (HYDRODIURIL) 12.5 MG Tab    Refer to [facility] for anxiety/depression.  Last eye exam from Dr. Ayala yesterday.     Follow up in about 8 weeks (around 7/12/2023).      Kierra Cornejo MD  Department of Internal Medicine - [facility] Kristofer Farmer  8:37 AM

## 2023-05-18 LAB
ANA PATTERN 1: NORMAL
ANA PATTERN 2: NORMAL
ANA SER QL IF: POSITIVE
ANA TITER 2: NORMAL
ANA TITR SER IF: NORMAL {TITER}
DSDNA AB SER-ACNC: NORMAL [IU]/ML

## 2023-05-22 ENCOUNTER — PATIENT MESSAGE (OUTPATIENT)
Dept: PRIMARY CARE CLINIC | Facility: CLINIC | Age: 68
End: 2023-05-22
Payer: MEDICARE

## 2023-05-23 LAB
ANTI SM ANTIBODY: 0.14 RATIO (ref 0–0.99)
ANTI SM/RNP ANTIBODY: 0.32 RATIO (ref 0–0.99)
ANTI-SM INTERPRETATION: NEGATIVE
ANTI-SM/RNP INTERPRETATION: NEGATIVE
ANTI-SSA ANTIBODY: 0.13 RATIO (ref 0–0.99)
ANTI-SSA INTERPRETATION: NEGATIVE
ANTI-SSB ANTIBODY: 0.08 RATIO (ref 0–0.99)
ANTI-SSB INTERPRETATION: NEGATIVE
DSDNA AB SER-ACNC: NORMAL [IU]/ML

## 2023-05-25 ENCOUNTER — CLINICAL SUPPORT (OUTPATIENT)
Dept: PRIMARY CARE CLINIC | Facility: CLINIC | Age: 68
End: 2023-05-25
Payer: MEDICARE

## 2023-05-25 DIAGNOSIS — F33.1 MDD (MAJOR DEPRESSIVE DISORDER), RECURRENT EPISODE, MODERATE: ICD-10-CM

## 2023-05-25 DIAGNOSIS — F41.1 GAD (GENERALIZED ANXIETY DISORDER): ICD-10-CM

## 2023-05-25 PROCEDURE — 99211 OFF/OP EST MAY X REQ PHY/QHP: CPT | Mod: PBBFAC,PN | Performed by: SOCIAL WORKER

## 2023-05-25 PROCEDURE — 99999 PR PBB SHADOW E&M-EST. PATIENT-LVL I: CPT | Mod: PBBFAC,,, | Performed by: SOCIAL WORKER

## 2023-05-25 PROCEDURE — 99999 PR PBB SHADOW E&M-EST. PATIENT-LVL I: ICD-10-PCS | Mod: PBBFAC,,, | Performed by: SOCIAL WORKER

## 2023-05-25 PROCEDURE — 99499 UNLISTED E&M SERVICE: CPT | Mod: S$PBB,,, | Performed by: SOCIAL WORKER

## 2023-05-25 PROCEDURE — 99499 NO LOS: ICD-10-PCS | Mod: S$PBB,,, | Performed by: SOCIAL WORKER

## 2023-05-25 NOTE — PROGRESS NOTES
"[facility] BEHAVIORAL HEALTH INTAKE    DATE:  5/25/2023  REFERRAL SOURCE:  Kierra Cornejo MD  TYPE OF VISIT:  In person  LENGTH OF SESSION: 60  .  HISTORY OF PRESENTING ILLNESS:  Carolina Knight, a 68 y.o. female with history of Anxiety disorders; generalized anxiety disorder [F41.1].    CHIEF COMPLAINT/REASON FOR ENCOUNTER: Pt presented for initial assessment. Met with patient. Pt's chief complaint includes the following: anxiety.    Patient does not currently have a psychiatrist.    Patient does not currently have a therapist.     Pt is taking bupropion SR (Wellbutrin) 150mg once daily for Depression. They are not interested in medication changes.    Current symptoms:Depression: denies.  Anxiety: excessive worrying, restlessness, and obsessions/compulsions.  Insomnia:  denies .  Vida:  denies.  Psychosis: denies .      Session Content/Presenting Problem Hx:  Pt and LCSW discussed challenges with supporting  and coping with their health challenges. Pt is feeling increased anxiety related to new challenges. Pt is concerned about their diet and self-care. LCSW shared "I" statement communication materials for pt to review.     Goals:  1) set better boundaries with family  2) increase self-care I.e. reduce stress eating    Current social stressors:   Recent health issues; interpersonal challenges    Risk assessment:  Patient reports no suicidal ideation  Patient reports no homicidal ideation  Patient reports no self-injurious behavior  Patient reports no violent behavior    PSYCHIATRIC HISTORY:  History of Vida or diagnosis of Bipolar Disorder in the past:  No  History of Psychosis or diagnosis of Schizophrenia in the past:  No  Previous Psychiatric Hospitalizations:  No  Previous SI/HI:   No  Previous Suicide Attempts:  No  Previous Medication Trials: No   Previous Psychiatric Outpatient Treatment:  No  History of Trauma:  No  History of Violence:  No  Access to a Gun:  No    SUBSTANCE ABUSE HISTORY:  Tobacco:  No "   Alcohol: infrequent  Illicit Substances: No  Misuse of Prescription Medications:  No    MEDICAL HISTORY:  Past Medical History:   Diagnosis Date    Decreased mobility and endurance 11/16/2017    Decreased strength 11/16/2017    Decreased strength of lower extremity 2/22/2021    Difficult intubation     Early dry stage nonexudative age-related macular degeneration 2020    Edema 9/26/2018    Edema of both legs 7/29/2020    Fatty liver 12/15/2014    Hypokalemia 9/26/2018    Lymphedema of both lower extremities 7/29/2020    Multiple gastric polyps 7/21/2012    Polyarthralgia 4/6/2015    Sjogren's disease     Sleep apnea        NEUROLOGIC HISTORY:  Seizures:  No  Head trauma:  No  Memory loss:  No    SOCIAL HISTORY (MARRIAGE, EMPLOYMENT, etc.):  Living Situation: lives with   Family Life Cycle:   Family: son  Nuclear/Marriage:   Extended Family:   Supports: friends  Education/Vocation: CFA; finance  Protestant/Spirituality:   Hobbies and Interests: social groups    PSYCHIATRIC FAMILY HISTORY: not known      MENTAL HEALTH STATUS EXAM  General Appearance:  unremarkable, age appropriate   Speech: normal tone, normal rate, normal pitch, normal volume      Level of Cooperation: cooperative      Thought Processes: normal and logical   Mood: steady      Thought Content: normal, no suicidality, no homicidality, delusions, or paranoia   Affect: congruent and appropriate   Orientation: Oriented x3   Memory: recent >  intact, remote >  intact   Attention Span & Concentration: intact   Fund of General Knowledge: intact and appropriate to age and level of education   Abstract Reasoning: interpretation of similarities was abstract   Judgment & Insight: good     Language  intact       IMPRESSION:   My diagnostic impression is Anxiety disorders; generalized anxiety disorder [F41.1], as evidenced by GIBSON-7.     PROVISIONAL DIAGNOSES:  1. MDD (major depressive disorder), recurrent episode, moderate    2. GIBSON (generalized anxiety  disorder)         STRENGTHS AND LIABILITIES: Strength: Patient accepts guidance/feedback, Strength: Patient is expressive/articulate., Strength: Patient is intelligent., Strength: Patient is motivated for change., Liability: Patient lacks coping skills.    TREATMENT GOALS: Anxiety: acquiring relapse prevention skills, eliminating assumptions about dangerousness of anxiety, positive value of worry, or other assumptions (specify  ), and eliminating avoidance (specify  )    PLAN: In this session a psych evaluation was conducted to get history and process pt's life. CBT will be utilized in future individual  therapy sessions to increase insight, support, and parent/behavior management.     RETURN TO CLINIC: No follow-ups on file.  Answers submitted by the patient for this visit:  Review of Systems Questionnaire (Submitted on 5/22/2023)  activity change: No  unexpected weight change: Yes  neck pain: No  hearing loss: No  rhinorrhea: No  trouble swallowing: No  eye discharge: No  visual disturbance: No  chest tightness: No  wheezing: No  chest pain: No  palpitations: No  blood in stool: No  constipation: No  vomiting: No  diarrhea: No  polydipsia: No  polyuria: No  difficulty urinating: No  hematuria: No  menstrual problem: No  dysuria: No  joint swelling: No  arthralgias: No  headaches: No  weakness: No  confusion: No  dysphoric mood: Yes

## 2023-06-04 ENCOUNTER — PATIENT MESSAGE (OUTPATIENT)
Dept: PRIMARY CARE CLINIC | Facility: CLINIC | Age: 68
End: 2023-06-04
Payer: MEDICARE

## 2023-06-12 ENCOUNTER — PATIENT MESSAGE (OUTPATIENT)
Dept: PRIMARY CARE CLINIC | Facility: CLINIC | Age: 68
End: 2023-06-12
Payer: MEDICARE

## 2023-06-13 ENCOUNTER — OFFICE VISIT (OUTPATIENT)
Dept: PRIMARY CARE CLINIC | Facility: CLINIC | Age: 68
End: 2023-06-13
Payer: MEDICARE

## 2023-06-13 DIAGNOSIS — L30.9 DERMATITIS: ICD-10-CM

## 2023-06-13 DIAGNOSIS — I10 BENIGN ESSENTIAL HYPERTENSION: Primary | ICD-10-CM

## 2023-06-13 DIAGNOSIS — L57.0 AK (ACTINIC KERATOSIS): ICD-10-CM

## 2023-06-13 DIAGNOSIS — F33.1 MODERATE EPISODE OF RECURRENT MAJOR DEPRESSIVE DISORDER: ICD-10-CM

## 2023-06-13 PROCEDURE — 99213 PR OFFICE/OUTPT VISIT, EST, LEVL III, 20-29 MIN: ICD-10-PCS | Mod: 95,,, | Performed by: INTERNAL MEDICINE

## 2023-06-13 PROCEDURE — 99213 OFFICE O/P EST LOW 20 MIN: CPT | Mod: 95,,, | Performed by: INTERNAL MEDICINE

## 2023-06-13 RX ORDER — FLUOROURACIL 50 MG/G
CREAM TOPICAL
Qty: 40 G | Refills: 1 | Status: SHIPPED | OUTPATIENT
Start: 2023-06-13

## 2023-06-13 RX ORDER — KETOCONAZOLE 20 MG/G
CREAM TOPICAL
Qty: 60 G | Refills: 3 | Status: SHIPPED | OUTPATIENT
Start: 2023-06-13

## 2023-06-13 NOTE — PROGRESS NOTES
The patient location is: [place], [place]  The chief complaint leading to consultation is: elevated BP    Visit type: audiovisual    Face to Face time with patient: 15 MIN  15 minutes of total time spent on the encounter, which includes face to face time and non-face to face time preparing to see the patient (eg, review of tests), Obtaining and/or reviewing separately obtained history, Documenting clinical information in the electronic or other health record, Independently interpreting results (not separately reported) and communicating results to the patient/family/caregiver, or Care coordination (not separately reported).         Each patient to whom he or she provides medical services by telemedicine is:  (1) informed of the relationship between the physician and patient and the respective role of any other health care provider with respect to management of the patient; and (2) notified that he or she may decline to receive medical services by telemedicine and may withdraw from such care at any time.    Notes:     Subjective:       Patient ID: Carolina Knight is a 68 y.o. female.    Hypertension  This is a recurrent problem. The current episode started more than 1 year ago. The problem has been waxing and waning since onset. The problem is controlled. Pertinent negatives include no anxiety, blurred vision, chest pain, headaches, malaise/fatigue, neck pain, orthopnea, palpitations, peripheral edema, PND, shortness of breath or sweats. Agents associated with hypertension include decongestants and thyroid hormones. Risk factors for coronary artery disease include obesity and post-menopausal state. Past treatments include diuretics. The current treatment provides significant improvement. Compliance problems include diet.    Restarted on wellbutrin about 3 weeks ago for MDD. Feels that's better. However thinks it's elevating her BP. Part of digital HTN program and reviewed flow sheet. There are some BP readings  that are higher but reminded pt that BP similar to prior. Pt was afraid to exercise while SBP 140s. Reassured that it's ok. BP today was taken after eating burger and drinking iced tea.     Reports ring worm and also previous rash that Dr. Avila was treating came back. Wants refill on fluorouracil and ketoconazole. Has appt for f/u but couldn't get it sooner than end of Aug.     Review of Systems   Constitutional:  Negative for malaise/fatigue.   Eyes:  Negative for blurred vision.   Respiratory:  Negative for shortness of breath.    Cardiovascular:  Negative for chest pain, palpitations, orthopnea and PND.   Musculoskeletal:  Negative for neck pain.   Neurological:  Negative for headaches.       Objective:     Gen - A+OX4, NAD  CHEST - completing full sentences. Normal work of breathing.     Assessment/Plan     Diagnoses and all orders for this visit:    Benign essential hypertension - reassured. Cont to monitor BP daily but to make sure wait 5 min after sitting down quietly to check BP. Low salt diet. Reassurance ok to exercise.     Moderate episode of recurrent major depressive disorder - doing better per pt. Cont current meds including wellbutrin.     Dermatitis  -     ketoconazole (NIZORAL) 2 % cream; aaa bid on  right posterior leg x 3 weeks    AK (actinic keratosis)  -     fluorouraciL (EFUDEX) 5 % cream; Apply thin film to right lower leg  2times per day for 2-3 weeks; d/c if area bleeding or ulcerated; avoid eyes or mouth      Follow up in about 6 weeks (around 7/25/2023).      Kierra Cornejo MD  Department of Internal Medicine - [facility]

## 2023-06-15 ENCOUNTER — CLINICAL SUPPORT (OUTPATIENT)
Dept: PRIMARY CARE CLINIC | Facility: CLINIC | Age: 68
End: 2023-06-15
Payer: MEDICARE

## 2023-06-15 DIAGNOSIS — Z78.9 STATIN INTOLERANCE: Primary | ICD-10-CM

## 2023-06-15 DIAGNOSIS — M17.12 PRIMARY OSTEOARTHRITIS OF LEFT KNEE: ICD-10-CM

## 2023-06-15 PROCEDURE — 99499 NO LOS: ICD-10-PCS | Mod: S$PBB,,, | Performed by: SOCIAL WORKER

## 2023-06-15 PROCEDURE — 99499 UNLISTED E&M SERVICE: CPT | Mod: S$PBB,,, | Performed by: SOCIAL WORKER

## 2023-06-15 NOTE — PROGRESS NOTES
"Individual Psychotherapy (LCSW/PhD)  Carolina Lenniemelina,  6/15/2023    Site:  [facility]         Therapeutic Intervention: Met with patient for individual psychotherapy.    Chief complaint/reason for encounter: anxiety     Interval history and content of current session: Pt reviewed "I" statements and stated and shared their opinions. LCSW and pt discussed dynamics between pt and  including communication styles and barriers to more understanding.    Pt reports feeling frustrated with relationship and having difficulties communicating needs and wants to . LCSW and pt practiced "I" statements to support pt in using new communication strategies. LCSW supported pt in processing feelings of sadness and frustration.     Treatment plan:  Target symptoms: anxiety   Why chosen therapy is appropriate versus another modality: relevant to diagnosis, evidence based practice  Outcome monitoring methods: self-report, checklist/rating scale  Therapeutic intervention type: supportive psychotherapy    Risk parameters:  Patient reports no suicidal ideation  Patient reports no homicidal ideation  Patient reports no self-injurious behavior  Patient reports no violent behavior    Verbal deficits: None    Patient's response to intervention:  The patient's response to intervention is accepting.    Progress toward goals and other mental status changes:  The patient's progress toward goals is limited.    Diagnosis:   No diagnosis found.    Plan: Pt plans to continue individual psychotherapy    Return to clinic: 2 weeks    Length of Service (minutes): 60    Answers submitted by the patient for this visit:  Review of Systems Questionnaire (Submitted on 6/13/2023)  activity change: No  unexpected weight change: No  neck pain: No  hearing loss: No  rhinorrhea: No  trouble swallowing: No  eye discharge: No  visual disturbance: No  chest tightness: No  wheezing: No  chest pain: No  palpitations: No  blood in stool: No  constipation: " No  vomiting: No  diarrhea: No  polydipsia: No  polyuria: No  difficulty urinating: No  hematuria: No  menstrual problem: No  dysuria: No  joint swelling: No  arthralgias: No  headaches: No  weakness: No  confusion: No  dysphoric mood: No

## 2023-06-26 ENCOUNTER — CLINICAL SUPPORT (OUTPATIENT)
Dept: PRIMARY CARE CLINIC | Facility: CLINIC | Age: 68
End: 2023-06-26
Payer: MEDICARE

## 2023-06-26 DIAGNOSIS — F41.1 GAD (GENERALIZED ANXIETY DISORDER): Primary | ICD-10-CM

## 2023-06-26 PROCEDURE — 99499 NO LOS: ICD-10-PCS | Mod: S$GLB,,, | Performed by: SOCIAL WORKER

## 2023-06-26 PROCEDURE — 99499 UNLISTED E&M SERVICE: CPT | Mod: S$GLB,,, | Performed by: SOCIAL WORKER

## 2023-06-26 NOTE — PROGRESS NOTES
"Individual Psychotherapy (LCSW/PhD)  Carolina Eugene,  6/26/2023    Site:  [facility]         Therapeutic Intervention: Met with patient for individual psychotherapy.    Chief complaint/reason for encounter: anxiety     Interval history and content of current session: Pt reports some use of "I" statements in relationship. Pt reports continuing difficulty communicating with  including arguments over finances etc.    LCSW supported pt in processing feelings of anxiety and frustration at engaging in conversations with . LCSW and pt reviewed strategies for engaging with  to have more productive conversations. LCSW provided feedback on good communicating and reading. LCSW challenged pt to identify thoughts and feelings and avoid assuming. Pt also reports frustration with allergies and medical difficulties. LCSW and pt discussed nature of anger and helpful ways to address boundaries and anger. LCSW and pt discussed pt relationship HX and pt's life narrative. Pt states they would like to understand their relationship with son better to improve their relationship.    LCSW and pt discussed pt's current relationship with son and barriers to more connection.    Treatment plan:  Target symptoms: anxiety   Why chosen therapy is appropriate versus another modality: relevant to diagnosis, evidence based practice  Outcome monitoring methods: self-report, checklist/rating scale  Therapeutic intervention type: behavior modifying psychotherapy, supportive psychotherapy    Risk parameters:  Patient reports no suicidal ideation  Patient reports no homicidal ideation  Patient reports no self-injurious behavior  Patient reports no violent behavior    Verbal deficits: None    Patient's response to intervention:  The patient's response to intervention is accepting.    Progress toward goals and other mental status changes:  The patient's progress toward goals is limited.    Diagnosis:   No diagnosis " found.    Plan: Pt plans to continue individual psychotherapy    Return to clinic: 2 weeks    Length of Service (minutes): 60       I have personally seen and examined this patient.  I have fully participated in the care of this patient. I have reviewed all pertinent clinical information, including history, physical exam, plan and the Resident’s note and agree except as noted.

## 2023-07-10 ENCOUNTER — CLINICAL SUPPORT (OUTPATIENT)
Dept: PRIMARY CARE CLINIC | Facility: CLINIC | Age: 68
End: 2023-07-10
Payer: MEDICARE

## 2023-07-10 DIAGNOSIS — F32.A MODERATELY SEVERE DEPRESSION: ICD-10-CM

## 2023-07-10 DIAGNOSIS — F41.1 GAD (GENERALIZED ANXIETY DISORDER): Primary | ICD-10-CM

## 2023-07-10 PROCEDURE — 99499 NO LOS: ICD-10-PCS | Mod: S$GLB,,, | Performed by: INTERNAL MEDICINE

## 2023-07-10 PROCEDURE — 99499 UNLISTED E&M SERVICE: CPT | Mod: S$GLB,,, | Performed by: INTERNAL MEDICINE

## 2023-07-10 NOTE — PROGRESS NOTES
"Individual Psychotherapy (LCSW/PhD)  Carolina Eugene,  7/10/2023    Site:  [facility]         Therapeutic Intervention: Met with patient for individual psychotherapy.    Chief complaint/reason for encounter: anxiety, depression    Interval history and content of current session: Pt states that their relationship with  has been "ok" since last session. Pt and  have been talking more about 's mood. Pt states that  has not been "feeling well" which has "mellowed" him. Pt states they have been socializing which has helped their mood.     LCSW and pt discussed pt's feelings towards 's declining health. LCSW and pt discussed pt's anxiety and fears regarding potentially losing  and thoughts about their future. Pt provided feedback on concerns and potential barriers to future goals.         Treatment plan:  Target symptoms: anxiety   Why chosen therapy is appropriate versus another modality: relevant to diagnosis, evidence based practice  Outcome monitoring methods: self-report, checklist/rating scale  Therapeutic intervention type: supportive psychotherapy    Risk parameters:  Patient reports no suicidal ideation  Patient reports no homicidal ideation  Patient reports no self-injurious behavior  Patient reports no violent behavior    Verbal deficits: None    Patient's response to intervention:  The patient's response to intervention is accepting.    Progress toward goals and other mental status changes:  The patient's progress toward goals is fair .    Diagnosis:   No diagnosis found.    Plan: Pt plans to continue individual psychotherapy    Return to clinic: 2 weeks    Length of Service (minutes): 60      "

## 2023-07-23 ENCOUNTER — PATIENT MESSAGE (OUTPATIENT)
Dept: DERMATOLOGY | Facility: CLINIC | Age: 68
End: 2023-07-23
Payer: MEDICARE

## 2023-07-24 ENCOUNTER — CLINICAL SUPPORT (OUTPATIENT)
Dept: PRIMARY CARE CLINIC | Facility: CLINIC | Age: 68
End: 2023-07-24
Payer: MEDICARE

## 2023-07-24 DIAGNOSIS — F32.A MODERATELY SEVERE DEPRESSION: Primary | ICD-10-CM

## 2023-07-24 PROCEDURE — 90837 PSYTX W PT 60 MINUTES: CPT | Mod: S$GLB,,, | Performed by: SOCIAL WORKER

## 2023-07-24 PROCEDURE — 90837 PR PSYCHOTHERAPY W/PATIENT, 60 MIN: ICD-10-PCS | Mod: S$GLB,,, | Performed by: SOCIAL WORKER

## 2023-07-24 NOTE — PROGRESS NOTES
"Individual Psychotherapy (LCSW/PhD)  Carolina Eugene,  7/24/2023    Site:  [facility]         Therapeutic Intervention: Met with patient for individual psychotherapy.    Chief complaint/reason for encounter: anxiety and interpersonal     Interval history and content of current session: Pt and LCSW discussed pt's personality and impact of pt's Hx on pt's mood. Pt has been in contact with son who is seeking additional help. Pt and LCSW discussed conversations and boundaries with son including difficulties with expressing emotions. LCSW and pt discussed impact of divorce on son's functioning and relationship on pt's mood. Pt and LCSW discussed difficulties with current relationships and pattern of relationships.     LCSW and pt discussed importance of using empathic and emotionally focused language to connect with . PT and LCSW discussed emotional vs. "Logical" communication.   Treatment plan:  Target symptoms: anxiety   Why chosen therapy is appropriate versus another modality: relevant to diagnosis, evidence based practice  Outcome monitoring methods: self-report, checklist/rating scale  Therapeutic intervention type: insight oriented psychotherapy, supportive psychotherapy    Risk parameters:  Patient reports no suicidal ideation  Patient reports no homicidal ideation  Patient reports no self-injurious behavior  Patient reports no violent behavior    Verbal deficits: None    Patient's response to intervention:  The patient's response to intervention is accepting.    Progress toward goals and other mental status changes:  The patient's progress toward goals is fair .    Diagnosis:   No diagnosis found.    Plan: Pt plans to continue individual psychotherapy    Return to clinic: 2 weeks    Length of Service (minutes): 60      "

## 2023-07-25 ENCOUNTER — PATIENT MESSAGE (OUTPATIENT)
Dept: PRIMARY CARE CLINIC | Facility: CLINIC | Age: 68
End: 2023-07-25
Payer: MEDICARE

## 2023-07-26 ENCOUNTER — OFFICE VISIT (OUTPATIENT)
Dept: PRIMARY CARE CLINIC | Facility: CLINIC | Age: 68
End: 2023-07-26
Payer: MEDICARE

## 2023-07-26 DIAGNOSIS — I10 BENIGN ESSENTIAL HYPERTENSION: Primary | ICD-10-CM

## 2023-07-26 DIAGNOSIS — F33.42 MDD (MAJOR DEPRESSIVE DISORDER), RECURRENT, IN FULL REMISSION: ICD-10-CM

## 2023-07-26 PROCEDURE — 99499 NO LOS: ICD-10-PCS | Mod: 95,,, | Performed by: INTERNAL MEDICINE

## 2023-07-26 PROCEDURE — 99499 UNLISTED E&M SERVICE: CPT | Mod: 95,,, | Performed by: INTERNAL MEDICINE

## 2023-07-26 NOTE — PROGRESS NOTES
Established Patient - Audio Only Telehealth Visit     The patient location is: [place], [place]  The chief complaint leading to consultation is: HTN, anxiety/depresion f/u  Visit type: Virtual visit with audio only (telephone)  Total time spent with patient: 5 min       The reason for the audio only service rather than synchronous audio and video virtual visit was related to technical difficulties or patient preference/necessity.     Each patient to whom I provide medical services by telemedicine is:  (1) informed of the relationship between the physician and patient and the respective role of any other health care provider with respect to management of the patient; and (2) notified that they may decline to receive medical services by telemedicine and may withdraw from such care at any time. Patient verbally consented to receive this service via voice-only telephone call.       HPI:   Reviewed digital HTN flow sheet. BP mostly at goal on ucrrent regimen.     MDD/GIBSON controlled. Questionnaire via portal message was 0 for PHQ9 and GIBSON 7 while on wellbutrin xl 150mg daily. Reports thinks the wellbutrin is drying her out too much and can interfere w/ her using her CPAP. Not suing humidifier nightly. Takes a zyrtec 10mg nightly (makes her drowsy if taking in the AM) and also uses flonase.   Also has known Sjogren's.    Started seeing weight loss doctor at [facility] (Dr. Way) - started on generic ozempic (she thinks they do their own mix and thinks it's 0.3mg weekly) last week. Will be going to get second dose tomorrow. Tolerating. Wonders if any interference w/ other meds she's on.      Assessment and plan:    Diagnoses and all orders for this visit:    Benign essential hypertension - Stable and controlled. Continue current medications.    MDD (major depressive disorder), recurrent, in full remission - cont wellbutrin.     BMI 40.0-44.9, adult - no worrisome interactions of her current meds w/ ozempic.     Discussed first  try using humidifier on CPAP nightly. If still too dry, then change zyrtec nightly to nondrowsy claritin in the AM.     F/u as needed if symptoms worsen.     Kierra Cornejo MD  Department of Internal Medicine - [facility]+  1:15 PM     This service was not originating from a related E/M service provided within the previous 7 days nor will  to an E/M service or procedure within the next 24 hours or my soonest available appointment.  Prevailing standard of care was able to be met in this audio-only visit.

## 2023-07-28 NOTE — INTERVAL H&P NOTE
Carolina Knight was interviewed, examined and the H and P reviewed.  There has been no interval change in her History and Physical.         Active Hospital Problems    Diagnosis  POA    *Primary osteoarthritis of left knee [M17.12]  Yes      Resolved Hospital Problems   No resolved problems to display.      Yes

## 2023-07-31 ENCOUNTER — PATIENT MESSAGE (OUTPATIENT)
Dept: PRIMARY CARE CLINIC | Facility: CLINIC | Age: 68
End: 2023-07-31
Payer: MEDICARE

## 2023-07-31 DIAGNOSIS — K21.9 GASTROESOPHAGEAL REFLUX DISEASE WITHOUT ESOPHAGITIS: ICD-10-CM

## 2023-07-31 DIAGNOSIS — E03.4 HYPOTHYROIDISM DUE TO ACQUIRED ATROPHY OF THYROID: ICD-10-CM

## 2023-07-31 RX ORDER — LEVOTHYROXINE SODIUM 125 UG/1
125 TABLET ORAL DAILY
Qty: 90 TABLET | Refills: 3 | Status: SHIPPED | OUTPATIENT
Start: 2023-07-31

## 2023-07-31 RX ORDER — PANTOPRAZOLE SODIUM 40 MG/1
40 TABLET, DELAYED RELEASE ORAL DAILY
Qty: 90 TABLET | Refills: 3 | Status: SHIPPED | OUTPATIENT
Start: 2023-07-31

## 2023-08-07 ENCOUNTER — CLINICAL SUPPORT (OUTPATIENT)
Dept: PRIMARY CARE CLINIC | Facility: CLINIC | Age: 68
End: 2023-08-07
Payer: MEDICARE

## 2023-08-07 DIAGNOSIS — F32.A MODERATELY SEVERE DEPRESSION: Primary | ICD-10-CM

## 2023-08-07 PROCEDURE — 90837 PSYTX W PT 60 MINUTES: CPT | Mod: S$GLB,,, | Performed by: SOCIAL WORKER

## 2023-08-07 PROCEDURE — 90837 PR PSYCHOTHERAPY W/PATIENT, 60 MIN: ICD-10-PCS | Mod: S$GLB,,, | Performed by: SOCIAL WORKER

## 2023-08-07 NOTE — PROGRESS NOTES
"Individual Psychotherapy (LCSW/PhD)  Carolina Eugene,  8/7/2023    Site:  [facility]         Therapeutic Intervention: Met with patient for individual psychotherapy.    Chief complaint/reason for encounter: depression and anxiety     Interval history and content of current session: Pt reports difficulty with communicating needs and feelings with  needs and feelings. Pt and LCSW discussed importance of using "I" statements to express their-self. Pt and LCSW discussed impact of pt's upbringing and impact of gender-roles on pt's feelings of belonging.    Pt and LCSW discussed how pt balancing personal vs. Professional life  is important and discussed ways pt can find balance in their life.     Pt and LCSW discussed engaging  to discuses end of life goals and possible need for a crisis plan. Pt and LCSW discussed toxic masculinity and impact on husbands reasoning.     Treatment plan:  Target symptoms: depression, anxiety   Why chosen therapy is appropriate versus another modality: relevant to diagnosis, evidence based practice  Outcome monitoring methods: self-report, checklist/rating scale  Therapeutic intervention type: behavior modifying psychotherapy, supportive psychotherapy    Risk parameters:  Patient reports no suicidal ideation  Patient reports no homicidal ideation  Patient reports no self-injurious behavior  Patient reports no violent behavior    Verbal deficits: None    Patient's response to intervention:  The patient's response to intervention is accepting.    Progress toward goals and other mental status changes:  The patient's progress toward goals is good.    Diagnosis:   No diagnosis found.    Plan: Pt plans to continue individual psychotherapy    Return to clinic: 2 weeks    Length of Service (minutes): 60      "

## 2023-08-15 ENCOUNTER — OFFICE VISIT (OUTPATIENT)
Dept: DERMATOLOGY | Facility: CLINIC | Age: 68
End: 2023-08-15
Payer: MEDICARE

## 2023-08-15 ENCOUNTER — OFFICE VISIT (OUTPATIENT)
Dept: OBSTETRICS AND GYNECOLOGY | Facility: CLINIC | Age: 68
End: 2023-08-15
Payer: MEDICARE

## 2023-08-15 VITALS
BODY MASS INDEX: 42.02 KG/M2 | SYSTOLIC BLOOD PRESSURE: 148 MMHG | DIASTOLIC BLOOD PRESSURE: 82 MMHG | WEIGHT: 260.38 LBS

## 2023-08-15 DIAGNOSIS — L30.4 INTERTRIGO: ICD-10-CM

## 2023-08-15 DIAGNOSIS — Z12.4 PAP SMEAR FOR CERVICAL CANCER SCREENING: ICD-10-CM

## 2023-08-15 DIAGNOSIS — Z12.89 ENCOUNTER FOR PELVIC SCREENING FOR CANCER: Primary | ICD-10-CM

## 2023-08-15 DIAGNOSIS — L30.9 DERMATITIS: Primary | ICD-10-CM

## 2023-08-15 DIAGNOSIS — L81.9 DISCOLORED SKIN: ICD-10-CM

## 2023-08-15 DIAGNOSIS — N89.8 VAGINAL LESION: ICD-10-CM

## 2023-08-15 PROCEDURE — 99212 OFFICE O/P EST SF 10 MIN: CPT | Mod: PBBFAC,PO | Performed by: OBSTETRICS & GYNECOLOGY

## 2023-08-15 PROCEDURE — 11104 PUNCH BX SKIN SINGLE LESION: CPT | Mod: PBBFAC | Performed by: DERMATOLOGY

## 2023-08-15 PROCEDURE — 99999 PR PBB SHADOW E&M-EST. PATIENT-LVL II: CPT | Mod: PBBFAC,,, | Performed by: OBSTETRICS & GYNECOLOGY

## 2023-08-15 PROCEDURE — 57100 PR BIOPSY OF VAGINA,SIMPLE: ICD-10-PCS | Mod: S$PBB,,, | Performed by: OBSTETRICS & GYNECOLOGY

## 2023-08-15 PROCEDURE — G0101 PR CA SCREEN;PELVIC/BREAST EXAM: ICD-10-PCS | Mod: S$PBB,GZ,, | Performed by: OBSTETRICS & GYNECOLOGY

## 2023-08-15 PROCEDURE — 99999 PR PBB SHADOW E&M-EST. PATIENT-LVL II: ICD-10-PCS | Mod: PBBFAC,,,

## 2023-08-15 PROCEDURE — G0101 CA SCREEN;PELVIC/BREAST EXAM: HCPCS | Mod: 25,PBBFAC,PO | Performed by: OBSTETRICS & GYNECOLOGY

## 2023-08-15 PROCEDURE — 99212 OFFICE O/P EST SF 10 MIN: CPT | Mod: PBBFAC,27

## 2023-08-15 PROCEDURE — 57100 BIOPSY VAGINAL MUCOSA SIMPLE: CPT | Mod: PBBFAC,PO | Performed by: OBSTETRICS & GYNECOLOGY

## 2023-08-15 PROCEDURE — 57100 BIOPSY VAGINAL MUCOSA SIMPLE: CPT | Mod: S$PBB,,, | Performed by: OBSTETRICS & GYNECOLOGY

## 2023-08-15 PROCEDURE — 88305 TISSUE EXAM BY PATHOLOGIST: ICD-10-PCS | Mod: 26,,, | Performed by: PATHOLOGY

## 2023-08-15 PROCEDURE — G0101 CA SCREEN;PELVIC/BREAST EXAM: HCPCS | Mod: S$PBB,GZ,, | Performed by: OBSTETRICS & GYNECOLOGY

## 2023-08-15 PROCEDURE — 99999 PR PBB SHADOW E&M-EST. PATIENT-LVL II: ICD-10-PCS | Mod: PBBFAC,,, | Performed by: OBSTETRICS & GYNECOLOGY

## 2023-08-15 PROCEDURE — 88305 TISSUE EXAM BY PATHOLOGIST: CPT | Mod: 59 | Performed by: PATHOLOGY

## 2023-08-15 PROCEDURE — 88305 TISSUE EXAM BY PATHOLOGIST: CPT | Mod: 26,,, | Performed by: PATHOLOGY

## 2023-08-15 PROCEDURE — 88305 TISSUE EXAM BY PATHOLOGIST: ICD-10-PCS | Mod: 26,59,, | Performed by: PATHOLOGY

## 2023-08-15 PROCEDURE — 88305 TISSUE EXAM BY PATHOLOGIST: CPT | Performed by: PATHOLOGY

## 2023-08-15 PROCEDURE — 11104 PUNCH BX SKIN SINGLE LESION: CPT | Mod: S$PBB,,, | Performed by: DERMATOLOGY

## 2023-08-15 PROCEDURE — 11104 PR PUNCH BIOPSY, SKIN, SINGLE LESION: ICD-10-PCS | Mod: S$PBB,,, | Performed by: DERMATOLOGY

## 2023-08-15 PROCEDURE — 99999 PR PBB SHADOW E&M-EST. PATIENT-LVL II: CPT | Mod: PBBFAC,,,

## 2023-08-15 PROCEDURE — 99213 PR OFFICE/OUTPT VISIT, EST, LEVL III, 20-29 MIN: ICD-10-PCS | Mod: 25,S$PBB,, | Performed by: DERMATOLOGY

## 2023-08-15 PROCEDURE — 99213 OFFICE O/P EST LOW 20 MIN: CPT | Mod: 25,S$PBB,, | Performed by: DERMATOLOGY

## 2023-08-15 PROCEDURE — 88175 CYTOPATH C/V AUTO FLUID REDO: CPT | Performed by: OBSTETRICS & GYNECOLOGY

## 2023-08-15 PROCEDURE — 88305 TISSUE EXAM BY PATHOLOGIST: CPT | Mod: 26,59,, | Performed by: PATHOLOGY

## 2023-08-15 RX ORDER — TRIAMCINOLONE ACETONIDE 1 MG/G
CREAM TOPICAL 2 TIMES DAILY
Qty: 454 G | Refills: 3 | Status: SHIPPED | OUTPATIENT
Start: 2023-08-15 | End: 2023-09-14

## 2023-08-15 RX ORDER — KETOCONAZOLE 20 MG/G
CREAM TOPICAL 2 TIMES DAILY
Qty: 60 G | Refills: 3 | Status: SHIPPED | OUTPATIENT
Start: 2023-08-15 | End: 2023-09-14

## 2023-08-15 RX ORDER — TRIAMCINOLONE ACETONIDE 1 MG/G
OINTMENT TOPICAL 2 TIMES DAILY
Qty: 30 G | Refills: 0 | Status: SHIPPED | OUTPATIENT
Start: 2023-08-15

## 2023-08-15 NOTE — PROGRESS NOTES
Subjective:      Patient ID:  Carolina Knight is a 68 y.o. female who presents for   Chief Complaint   Patient presents with    Rash     67yo F presents to clinic for follow up of scaly red rash. Initially began 12/22 to lower legs, at which time seen by Dr. Avila who found some fungal hyphae on KOH. She was treated with 3 weeks of keto at that time with resolution. In 3/23 seen by Dr. Avila again where she was found to have only asteatotic eczema on exam which she was given betamethasone cream which helped with scaliness of ankles. Since then her initial scaly red rash has returned to her legs. She notes that it is intermittently pruritic and dry. Not using emollients at this time given concern for spreading a fungal rash. Denies umbilical, nail, or scalp involvement. Does report father had psoriasis.     Rash    Review of Systems   Constitutional:  Negative for fever, chills and night sweats.   Skin:  Positive for rash.     Objective:   Physical Exam   Constitutional: She appears well-developed and well-nourished. No distress.   Neurological: She is alert and oriented to person, place, and time. She is not disoriented.   Psychiatric: She has a normal mood and affect.   Skin:             Diagram Legend     Erythematous scaling macule/papule c/w actinic keratosis       Vascular papule c/w angioma      Pigmented verrucoid papule/plaque c/w seborrheic keratosis      Yellow umbilicated papule c/w sebaceous hyperplasia      Irregularly shaped tan macule c/w lentigo     1-2 mm smooth white papules consistent with Milia      Movable subcutaneous cyst with punctum c/w epidermal inclusion cyst      Subcutaneous movable cyst c/w pilar cyst      Firm pink to brown papule c/w dermatofibroma      Pedunculated fleshy papule(s) c/w skin tag(s)      Evenly pigmented macule c/w junctional nevus     Mildly variegated pigmented, slightly irregular-bordered macule c/w mildly atypical nevus      Flesh colored to evenly pigmented  papule c/w intradermal nevus       Pink pearly papule/plaque c/w basal cell carcinoma      Erythematous hyperkeratotic cursted plaque c/w SCC      Surgical scar with no sign of skin cancer recurrence      Open and closed comedones      Inflammatory papules and pustules      Verrucoid papule consistent consistent with wart     Erythematous eczematous patches and plaques     Dystrophic onycholytic nail with subungual debris c/w onychomycosis     Umbilicated papule    Erythematous-base heme-crusted tan verrucoid plaque consistent with inflamed seborrheic keratosis     Erythematous Silvery Scaling Plaque c/w Psoriasis     See annotation                    Assessment / Plan:        Dermatitis  DDX: nummular dermatitis vs psoriasis vs other  - Punch biopsy of anterior R thigh as below  -     triamcinolone acetonide 0.1% (KENALOG) 0.1 % cream; Apply topically 2 (two) times daily.  Dispense: 454 g; Refill: 3  -    gentle, dry skin precautions given    Punch biopsy procedure note:  Punch biopsy performed after verbal consent obtained. Area marked and prepped with alcohol. Approximately 1cc of 1% lidocaine with epinephrine injected. 4 mm disposable punch used to remove lesion. Hemostasis obtained and biopsy site closed with 1 - 2 Prolene sutures. Wound care instructions reviewed with patient and handout given.     Intertrigo  -     ketoconazole (NIZORAL) 2 % cream; Apply topically 2 (two) times daily.  Dispense: 60 g; Refill: 3      FU in 2 weeks for SR and 1mo for FU rash

## 2023-08-21 ENCOUNTER — CLINICAL SUPPORT (OUTPATIENT)
Dept: PRIMARY CARE CLINIC | Facility: CLINIC | Age: 68
End: 2023-08-21
Payer: MEDICARE

## 2023-08-21 DIAGNOSIS — F32.A MODERATELY SEVERE DEPRESSION: Primary | ICD-10-CM

## 2023-08-21 LAB
FINAL PATHOLOGIC DIAGNOSIS: NORMAL
FINAL PATHOLOGIC DIAGNOSIS: NORMAL
GROSS: NORMAL
GROSS: NORMAL
Lab: NORMAL
Lab: NORMAL
MICROSCOPIC EXAM: NORMAL

## 2023-08-21 PROCEDURE — 99499 NO LOS: ICD-10-PCS | Mod: S$GLB,,, | Performed by: SOCIAL WORKER

## 2023-08-21 PROCEDURE — 99499 UNLISTED E&M SERVICE: CPT | Mod: S$GLB,,, | Performed by: SOCIAL WORKER

## 2023-08-21 NOTE — PROGRESS NOTES
"Individual Psychotherapy (LCSW/PhD)  Carolina Eugene,  8/21/2023    Site:  [facility]         Therapeutic Intervention: Met with patient for individual psychotherapy.    Chief complaint/reason for encounter: depression and anxiety     Interval history and content of current session: Pt has been spending time with son. Son and pt's  had conflict which bothered pt and created anxiety. Pt reports that  and son were in conflict with each other. LCSW and pt discussed situation and using "I" statements to engage with son. LCSW provided feedback on empathic communication.     Pt reports that son "exploded" on her and stated that son was very angry about their past. Pt reports sadness at not being able to express their-self. LCSW and pt discussed family Hx and impact of arguments etc. On family functioning. LCSW supported pt in processing feelings of sadness and regret.     LCSW and pt discussed how using "I" feel statements helps increase mutual understanding and engagement.Pt reports ongoing feelings of depression and worry related to relationship with son.    Treatment plan:  Target symptoms: depression, anxiety   Why chosen therapy is appropriate versus another modality: relevant to diagnosis, evidence based practice  Outcome monitoring methods: self-report, checklist/rating scale  Therapeutic intervention type: supportive psychotherapy    Risk parameters:  Patient reports no suicidal ideation  Patient reports no homicidal ideation  Patient reports no self-injurious behavior  Patient reports no violent behavior    Verbal deficits: None    Patient's response to intervention:  The patient's response to intervention is accepting.    Progress toward goals and other mental status changes:  The patient's progress toward goals is fair .    Diagnosis:   No diagnosis found.    Plan: Pt plans to continue individual psychotherapy    Return to clinic: 2 weeks    Length of Service (minutes): 60      "

## 2023-08-22 ENCOUNTER — PATIENT MESSAGE (OUTPATIENT)
Dept: OBSTETRICS AND GYNECOLOGY | Facility: CLINIC | Age: 68
End: 2023-08-22
Payer: MEDICARE

## 2023-08-22 LAB
FINAL PATHOLOGIC DIAGNOSIS: NORMAL
Lab: NORMAL

## 2023-08-28 ENCOUNTER — CLINICAL SUPPORT (OUTPATIENT)
Dept: DERMATOLOGY | Facility: CLINIC | Age: 68
End: 2023-08-28
Payer: MEDICARE

## 2023-08-28 DIAGNOSIS — Z48.02 VISIT FOR SUTURE REMOVAL: Primary | ICD-10-CM

## 2023-08-28 PROCEDURE — 99999 PR PBB SHADOW E&M-EST. PATIENT-LVL I: ICD-10-PCS | Mod: PBBFAC,,,

## 2023-08-28 PROCEDURE — 99211 OFF/OP EST MAY X REQ PHY/QHP: CPT | Mod: PBBFAC

## 2023-08-28 PROCEDURE — 99999 PR PBB SHADOW E&M-EST. PATIENT-LVL I: CPT | Mod: PBBFAC,,,

## 2023-08-28 PROCEDURE — 99024 PR POST-OP FOLLOW-UP VISIT: ICD-10-PCS | Mod: POP,,, | Performed by: DERMATOLOGY

## 2023-08-28 PROCEDURE — 99024 POSTOP FOLLOW-UP VISIT: CPT | Mod: POP,,, | Performed by: DERMATOLOGY

## 2023-08-28 NOTE — PROGRESS NOTES
CC: 68 y.o.female patient is here for suture removal.     HPI: Patient is s/p punch biopsy of R anterior thigh on 8/15/2023.  Patient reports no problems.    WOUND PE:  Sutures intact.  Wound healing well.  Good approximation of skin edges.  No signs or symptoms of infection.    IMPRESSION: 1. Skin, right anterior thigh, punch biopsy: - CHRONIC SPONGIOTIC DERMATITIS (SEE COMMENT).     PLAN:  Sutures removed today.  Continue wound care.    RTC: In 6 month(s) for skin check or sooner should any new concerns arise

## 2023-09-05 ENCOUNTER — TELEPHONE (OUTPATIENT)
Dept: PRIMARY CARE CLINIC | Facility: CLINIC | Age: 68
End: 2023-09-05
Payer: MEDICARE

## 2023-09-05 ENCOUNTER — PATIENT MESSAGE (OUTPATIENT)
Dept: PRIMARY CARE CLINIC | Facility: CLINIC | Age: 68
End: 2023-09-05
Payer: MEDICARE

## 2023-09-05 ENCOUNTER — CLINICAL SUPPORT (OUTPATIENT)
Dept: PRIMARY CARE CLINIC | Facility: CLINIC | Age: 68
End: 2023-09-05
Payer: MEDICARE

## 2023-09-05 DIAGNOSIS — F32.A MODERATELY SEVERE DEPRESSION: Primary | ICD-10-CM

## 2023-09-05 PROCEDURE — 99499 UNLISTED E&M SERVICE: CPT | Mod: S$GLB,,, | Performed by: SOCIAL WORKER

## 2023-09-05 PROCEDURE — 99499 NO LOS: ICD-10-PCS | Mod: S$GLB,,, | Performed by: SOCIAL WORKER

## 2023-09-05 NOTE — PROGRESS NOTES
Individual Psychotherapy (LCSW/PhD)  Carolinatobi Knight,  9/5/2023    Site:  [facility]         Therapeutic Intervention: Met with patient for individual psychotherapy.    Chief complaint/reason for encounter: depression and anxiety     Interval history and content of current session: Pt reports concern for jury duty because of their anxiety and depression as well as their medical needs. Pt is requesting a letter for a jury excuse. LCSW stated they would speak with PCP first regarding medical concerns before writing a letter.    LCSW and pt discussed ongoing relationship with son and . LCSW supported pt in processing feelings of frustration and anxiety related to relationships.Pt reports improvement in son's status I.e. got a new job. LCSW and pt discussed boundaries with son including not feeling emotionally responsible for son. LCSW and pt discussed relationship Hx with son. Pt reports being able to set boundaries with  regarding yelling.    LCSW sent message to PCP staff per pt's letter request.     Treatment plan:  Target symptoms: depression, anxiety   Why chosen therapy is appropriate versus another modality: relevant to diagnosis, evidence based practice  Outcome monitoring methods: self-report, checklist/rating scale  Therapeutic intervention type: insight oriented psychotherapy, behavior modifying psychotherapy, supportive psychotherapy    Risk parameters:  Patient reports no suicidal ideation  Patient reports no homicidal ideation  Patient reports no self-injurious behavior  Patient reports no violent behavior    Verbal deficits: None    Patient's response to intervention:  The patient's response to intervention is accepting.    Progress toward goals and other mental status changes:  The patient's progress toward goals is fair .    Diagnosis:   No diagnosis found.    Plan: Pt plans to continue individual psychotherapy    Return to clinic: 2 weeks    Length of Service (minutes):  60

## 2023-09-05 NOTE — TELEPHONE ENCOUNTER
[11:04 AM] Douige vargas,  I just met with MRN [id number]. She is requesting a letter for jury duty. I messaged with Dr. Cornejo and she wanted me to message you to see if ya'll could call her for more information. In general, she is stating that she cannot do jury duty because of her knee issues/medical issues.  Sorry to bug you guys with this.... Thanks

## 2023-09-05 NOTE — TELEPHONE ENCOUNTER
Unfortunately, current medical conditions in the chart do not excuse her from jury duty. She's had both knees replaced and last xray w/ good alignment. If there's something else, let us know.

## 2023-09-15 ENCOUNTER — PATIENT MESSAGE (OUTPATIENT)
Dept: OBSTETRICS AND GYNECOLOGY | Facility: CLINIC | Age: 68
End: 2023-09-15
Payer: MEDICARE

## 2023-09-18 ENCOUNTER — TELEPHONE (OUTPATIENT)
Dept: OBSTETRICS AND GYNECOLOGY | Facility: CLINIC | Age: 68
End: 2023-09-18
Payer: MEDICARE

## 2023-09-18 ENCOUNTER — TELEPHONE (OUTPATIENT)
Dept: PRIMARY CARE CLINIC | Facility: CLINIC | Age: 68
End: 2023-09-18
Payer: MEDICARE

## 2023-09-18 DIAGNOSIS — R30.0 DYSURIA: Primary | ICD-10-CM

## 2023-09-19 ENCOUNTER — CLINICAL SUPPORT (OUTPATIENT)
Dept: PRIMARY CARE CLINIC | Facility: CLINIC | Age: 68
End: 2023-09-19
Payer: MEDICARE

## 2023-09-19 ENCOUNTER — LAB VISIT (OUTPATIENT)
Dept: LAB | Facility: HOSPITAL | Age: 68
End: 2023-09-19
Attending: OBSTETRICS & GYNECOLOGY
Payer: MEDICARE

## 2023-09-19 DIAGNOSIS — R30.0 DYSURIA: ICD-10-CM

## 2023-09-19 DIAGNOSIS — F32.A MODERATELY SEVERE DEPRESSION: Primary | ICD-10-CM

## 2023-09-19 PROCEDURE — 90832 PR PSYCHOTHERAPY W/PATIENT, 30 MIN: ICD-10-PCS | Mod: S$GLB,,, | Performed by: SOCIAL WORKER

## 2023-09-19 PROCEDURE — 90832 PSYTX W PT 30 MINUTES: CPT | Mod: S$GLB,,, | Performed by: SOCIAL WORKER

## 2023-09-19 PROCEDURE — 87086 URINE CULTURE/COLONY COUNT: CPT | Performed by: OBSTETRICS & GYNECOLOGY

## 2023-09-19 NOTE — PROGRESS NOTES
"Individual Psychotherapy (LCSW/PhD)  Carolina Lenniemelina,  9/19/2023    Site:  [facility]         Therapeutic Intervention: Met with patient for individual psychotherapy.    Chief complaint/reason for encounter: depression and anxiety     Interval history and content of current session: Pt and LCSW discussed ongoing relationship issues with son. Pt states things are "better" and that they feel like they are better able to communicate with son. LCSW and pt discussed pt's efforts to address their concerns/feelings with . Pt states they realize they sometimes "hesitate" to address concerns with significant others.    Pt and LCSW discussed pt's importance of pt continuing to express their thoughts and feelings to  and son using "I' statements. LCSW and pt discussed impact of interpersonal relationships on pt's feeling of anxiety and depression. Pt and LCSW discussed ways for pt to support son and their drinking.LCSW and pt discussed concepts of "high risk behavior" and "substance use disorder".     Treatment plan:  Target symptoms: depression, anxiety   Why chosen therapy is appropriate versus another modality: relevant to diagnosis, evidence based practice  Outcome monitoring methods: self-report, checklist/rating scale  Therapeutic intervention type: supportive psychotherapy    Risk parameters:  Patient reports no suicidal ideation  Patient reports no homicidal ideation  Patient reports no self-injurious behavior  Patient reports no violent behavior    Verbal deficits: None    Patient's response to intervention:  The patient's response to intervention is accepting.    Progress toward goals and other mental status changes:  The patient's progress toward goals is fair .    Diagnosis:   No diagnosis found.    Plan: Pt plans to continue individual psychotherapy    Return to clinic: 2 weeks    Length of Service (minutes): 30      "

## 2023-09-21 LAB — BACTERIA UR CULT: NORMAL

## 2023-09-25 ENCOUNTER — TELEPHONE (OUTPATIENT)
Dept: OBSTETRICS AND GYNECOLOGY | Facility: CLINIC | Age: 68
End: 2023-09-25
Payer: MEDICARE

## 2023-09-25 NOTE — TELEPHONE ENCOUNTER
----- Message from Isabel Hastings MD sent at 9/25/2023 12:55 PM CDT -----  Please let her know that her urine test was negative with no signs of a UTI so if she is still feeling symptoms then we would need to see her for an evaluation

## 2023-09-25 NOTE — PROGRESS NOTES
Please let her know that her urine test was negative with no signs of a UTI so if she is still feeling symptoms then we would need to see her for an evaluation

## 2023-09-26 ENCOUNTER — PATIENT MESSAGE (OUTPATIENT)
Dept: PRIMARY CARE CLINIC | Facility: CLINIC | Age: 68
End: 2023-09-26
Payer: MEDICARE

## 2023-09-26 DIAGNOSIS — F33.1 MDD (MAJOR DEPRESSIVE DISORDER), RECURRENT EPISODE, MODERATE: ICD-10-CM

## 2023-09-26 DIAGNOSIS — F41.1 GAD (GENERALIZED ANXIETY DISORDER): ICD-10-CM

## 2023-09-27 RX ORDER — BUPROPION HYDROCHLORIDE 150 MG/1
150 TABLET ORAL DAILY
Qty: 30 TABLET | Refills: 3 | Status: SHIPPED | OUTPATIENT
Start: 2023-09-27 | End: 2024-02-27

## 2023-09-28 ENCOUNTER — TELEPHONE (OUTPATIENT)
Dept: OBSTETRICS AND GYNECOLOGY | Facility: CLINIC | Age: 68
End: 2023-09-28
Payer: MEDICARE

## 2023-09-28 NOTE — TELEPHONE ENCOUNTER
----- Message from Miguelito Ferraro sent at 9/28/2023 10:54 AM CDT -----  Type:  Patient Returning Call    Who Called:pt  Who Left Message for Patient:Morelia Flor  Does the patient know what this is regarding?:res  Would the patient rather a call back or a response via [facility]? call  Best Call Back Number:[phone]  Additional Information:

## 2023-09-28 NOTE — TELEPHONE ENCOUNTER
Returned pt call and advised her on message charted by mignon. Pt states she is unsure if she has a uti or if she picked up on something heavy will wait a couple days. Advised her that in the meantime she can take azo otc, cranberry juice and lots of water while she calls back to schedule an appt if needed. Pt verbalized understanding.

## 2023-10-16 ENCOUNTER — PATIENT MESSAGE (OUTPATIENT)
Dept: PRIMARY CARE CLINIC | Facility: CLINIC | Age: 68
End: 2023-10-16
Payer: MEDICARE

## 2023-10-17 ENCOUNTER — TELEPHONE (OUTPATIENT)
Dept: PRIMARY CARE CLINIC | Facility: CLINIC | Age: 68
End: 2023-10-17
Payer: MEDICARE

## 2023-10-17 ENCOUNTER — PATIENT MESSAGE (OUTPATIENT)
Dept: ADMINISTRATIVE | Facility: OTHER | Age: 68
End: 2023-10-17
Payer: MEDICARE

## 2023-10-24 ENCOUNTER — PATIENT MESSAGE (OUTPATIENT)
Dept: ORTHOPEDICS | Facility: CLINIC | Age: 68
End: 2023-10-24

## 2023-10-24 ENCOUNTER — OFFICE VISIT (OUTPATIENT)
Dept: ORTHOPEDICS | Facility: CLINIC | Age: 68
End: 2023-10-24
Payer: MEDICARE

## 2023-10-24 VITALS — BODY MASS INDEX: 41.78 KG/M2 | HEIGHT: 66 IN | WEIGHT: 260 LBS

## 2023-10-24 DIAGNOSIS — M72.0 DUPUYTREN'S CONTRACTURE OF LEFT HAND: Primary | ICD-10-CM

## 2023-10-24 PROCEDURE — 99999 PR PBB SHADOW E&M-EST. PATIENT-LVL III: CPT | Mod: PBBFAC,,, | Performed by: ORTHOPAEDIC SURGERY

## 2023-10-24 PROCEDURE — 99214 OFFICE O/P EST MOD 30 MIN: CPT | Mod: S$PBB,,, | Performed by: ORTHOPAEDIC SURGERY

## 2023-10-24 PROCEDURE — 99214 PR OFFICE/OUTPT VISIT, EST, LEVL IV, 30-39 MIN: ICD-10-PCS | Mod: S$PBB,,, | Performed by: ORTHOPAEDIC SURGERY

## 2023-10-24 PROCEDURE — 99999 PR PBB SHADOW E&M-EST. PATIENT-LVL III: ICD-10-PCS | Mod: PBBFAC,,, | Performed by: ORTHOPAEDIC SURGERY

## 2023-10-24 PROCEDURE — 99213 OFFICE O/P EST LOW 20 MIN: CPT | Mod: PBBFAC | Performed by: ORTHOPAEDIC SURGERY

## 2023-10-24 RX ORDER — CLINDAMYCIN PHOSPHATE 900 MG/50ML
900 INJECTION, SOLUTION INTRAVENOUS
Status: CANCELLED | OUTPATIENT
Start: 2023-10-24

## 2023-10-24 RX ORDER — MUPIROCIN 20 MG/G
OINTMENT TOPICAL
Status: CANCELLED | OUTPATIENT
Start: 2023-10-24

## 2023-10-24 NOTE — H&P
[facility]  History & Physical  Orthopedics     SUBJECTIVE:       COVID-19 attestation:  This patient was treated during the COVID-19 pandemic.  This was discussed with the patient, they are aware of our current policies and procedures, were given the option of delaying their visit and or switching to a virtual visit, delaying their surgery when applicable, and they elect to proceed.     Chief Complaint:  Left hand     Referring Provider: No ref. provider found      History of Present Illness:  Patient is a 68 y.o. right hand dominant female who presents today with complaints of issues with her left hand.  That she notes that she has noticed onset of some nodular type structures in the left palm.  She does recall that her father had some hand problems which were attributed to gout back in the day.  She does believe there was some deformity there.  She does not have any pain in regards to her left hand.  She digital in notes some pain left shoulder.  She presents for initial evaluation no other complaints.      Interval history October 24, 2023: The patient returns today for re-evaluation.  She notes that her Dupuytren's contracture of the left hand has worsened since her initial visit.  She is interested in discussing treatment options for this today.  No other new complaints.     The patient is a/an retired.     Symptoms consist of deformity.     The patient rates their pain as 7/10.     Attempted treatment(s) and/or interventions include activity modifications, rest     The patient denies any fevers, chills, N/V, D/C and presents for evaluation.             Past Medical History:   Diagnosis Date    Decreased mobility and endurance 11/16/2017    Decreased strength 11/16/2017    Decreased strength of lower extremity 2/22/2021    Difficult intubation      Early dry stage nonexudative age-related macular degeneration 2020    Edema 9/26/2018    Edema of both legs 7/29/2020    Fatty liver 12/15/2014     Hypokalemia 2018    Lymphedema of both lower extremities 2020    Multiple gastric polyps 2012    Polyarthralgia 2015    Sjogren's disease      Sleep apnea              Past Surgical History:   Procedure Laterality Date    BREAST BIOPSY Left 2017     core,Benign breast with fibrocystic changes, including benign usual ductal hyperplasia and adenosis    BREAST BIOPSY Right       core    CERVICAL CONIZATION   W/ LASER        SECTION         x1    CHOLECYSTECTOMY        ESOPHAGOGASTRODUODENOSCOPY N/A 2018     Procedure: ESOPHAGOGASTRODUODENOSCOPY (EGD);  Surgeon: George Henriquez MD;  Location: [facility] (2ND FLR);  Service: Endoscopy;  Laterality: N/A;  possible difficult intubation - see anesthesia note dated 1/15/13 - 2nd floor/ generated from last EGD, 3 year f/u, Pt due/ Pt requesting Dr. Henriquez -     EYE SURGERY   Est 2014-15     Laser on each eye - tear in white part eye by Dr Nitish Ayala    HYSTERECTOMY   age 42     [place], LSO (uterine fibroids, adhesions, endometriosis)    OOPHORECTOMY   age 42     LSO (endometriosis, cyst)    PERCUTANEOUS CRYOTHERAPY OF PERIPHERAL NERVE USING LIQUID NITROUS OXIDE IN CLOSED NEEDLE DEVICE Right 2021     Procedure: CRYOTHERAPY, NERVE, PERIPHERAL, PERCUTANEOUS, USING LIQUID NITROUS OXIDE IN CLOSED NEEDLE DEVICE;  Surgeon: CHACE Riley;  Location: [facility];  Service: Pain Management;  Laterality: Right;  RIGHT KNEE IOVERA    THYROIDECTOMY        TOE SURGERY         left    TOTAL REPLACEMENT OF BOTH KNEES USING COMPUTER-ASSISTED NAVIGATION       TOTAL THYROIDECTOMY                Review of patient's allergies indicates:   Allergen Reactions    Lipitor [atorvastatin]         Severe myalgia    Losartan Swelling       Possible throat closing sensation.    Lovastatin Other (See Comments)       myalgia    Cefuroxime Itching and Rash    Latex, natural rubber Itching and Rash       Pt reported       Social History           Social History Narrative     House- has stairs            Family History   Problem Relation Age of Onset    Arthritis Mother      Asthma Father      COPD Father      COPD Sister      Melanoma Sister      Melanoma Brother      Diabetes Brother           Adult onset got in his 60s    Asthma Son      Breast cancer Maternal Aunt      Cancer Maternal Aunt      Cancer Maternal Uncle           esophageal cancer    Ovarian cancer Neg Hx      Colon cancer Neg Hx              Current Outpatient Medications:     albuterol (VENTOLIN HFA) 90 mcg/actuation inhaler, Inhale 1-2 puffs into the lungs every 6 (six) hours as needed for Wheezing or Shortness of Breath. Rescue (Patient not taking: Reported on 8/15/2023), Disp: 18 g, Rfl: 2    amLODIPine (NORVASC) 10 MG tablet, Take 1 tablet (10 mg total) by mouth once daily., Disp: 90 tablet, Rfl: 3    augmented betamethasone dipropionate (DIPROLENE-AF) 0.05 % cream, aaa 1-2 x daily. Not more than 2 weeks straight in same location, avoid use on face and groin, Disp: 50 g, Rfl: 1    azelastine (ASTELIN) 137 mcg (0.1 %) nasal spray, 1 spray (137 mcg total) by Nasal route daily as needed for Rhinitis., Disp: 30 mL, Rfl: 11    betamethasone dipropionate (DIPROLENE) 0.05 % ointment, aaa qd- bid . Not more than 2 weeks straight in same location. Avoid use on face and groin (Patient not taking: Reported on 8/15/2023), Disp: 45 g, Rfl: 1    buPROPion (WELLBUTRIN XL) 150 MG TB24 tablet, Take 1 tablet (150 mg total) by mouth once daily., Disp: 30 tablet, Rfl: 3    cetirizine (ZYRTEC) 10 MG tablet, Take 10 mg by mouth every evening. , Disp: , Rfl:     diphenhydrAMINE (BANOPHEN) 25 mg capsule, Take 1 each (25 mg total) by mouth every 6 (six) hours as needed for Itching or Allergies. (Patient not taking: Reported on 8/15/2023), Disp: 60 capsule, Rfl: 0    econazole nitrate 1 % cream, Use bid (Patient not taking: Reported on 8/15/2023), Disp: 30 g, Rfl: 2    ergocalciferol, vitamin D2, (VITAMIN D  ORAL), Take 1 tablet by mouth once daily., Disp: , Rfl:     estradioL (ESTRACE) 0.01 % (0.1 mg/gram) vaginal cream, PLACE 1 GRAM VAGINALLY EVERY DAY (Patient not taking: Reported on 8/15/2023), Disp: 42.5 g, Rfl: 1    FINACEA 15 % gel, AAA face qhs then moisturize (Patient not taking: Reported on 8/15/2023), Disp: 50 g, Rfl: 3    fluconazole (DIFLUCAN) 150 MG Tab, Take 1 tablet (150 mg total) by mouth once daily. (Patient not taking: Reported on 8/15/2023), Disp: 1 tablet, Rfl: 3    fluocinonide (LIDEX) 0.05 % external solution, AAA scalp qday prn itching, scaling (Patient not taking: Reported on 8/15/2023), Disp: 60 mL, Rfl: 3    fluorouraciL (EFUDEX) 5 % cream, Apply thin film to right lower leg  2times per day for 2-3 weeks; d/c if area bleeding or ulcerated; avoid eyes or mouth (Patient not taking: Reported on 8/15/2023), Disp: 40 g, Rfl: 1    fluticasone propionate (FLONASE) 50 mcg/actuation nasal spray, SPRAY ONCE IN EACH NOSTRIL DAILY (Patient not taking: Reported on 8/15/2023), Disp: 16 g, Rfl: 3    hydroCHLOROthiazide (HYDRODIURIL) 12.5 MG Tab, Take 2 tablets (25 mg total) by mouth once daily., Disp: 180 tablet, Rfl: 1    ketoconazole (NIZORAL) 2 % cream, aaa bid on  right posterior leg x 3 weeks, Disp: 60 g, Rfl: 3    levothyroxine (SYNTHROID) 125 MCG tablet, Take 1 tablet (125 mcg total) by mouth once daily., Disp: 90 tablet, Rfl: 3    LUTEIN ORAL, Take by mouth., Disp: , Rfl:     metFORMIN (GLUCOPHAGE-XR) 500 MG ER 24hr tablet, Take 1 tablet (500 mg total) by mouth every evening., Disp: 90 tablet, Rfl: 3    montelukast (SINGULAIR) 10 mg tablet, Take 1 tablet (10 mg total) by mouth every evening. (Patient not taking: Reported on 8/15/2023), Disp: 90 tablet, Rfl: 3    mv,Ca,min/iron/FA/guarana/caff (ONE-A-DAY WOMEN'S ACTIVE ORAL), Take by mouth., Disp: , Rfl:     nystatin (MYCOSTATIN) cream, Apply topically 2 (two) times daily. (Patient not taking: Reported on 8/15/2023), Disp: 30 g, Rfl: 0    pantoprazole  "(PROTONIX) 40 MG tablet, Take 1 tablet (40 mg total) by mouth once daily., Disp: 90 tablet, Rfl: 3    RETIN-A 0.05 % cream, Apply topically nightly as needed., Disp: , Rfl:     rosuvastatin (CRESTOR) 5 MG tablet, Take 1 tablet (5 mg total) by mouth every other day. (Patient not taking: Reported on 8/15/2023), Disp: 45 tablet, Rfl: 3    spironolactone (ALDACTONE) 50 MG tablet, Take 1 tablet (50 mg total) by mouth once daily., Disp: 90 tablet, Rfl: 3    triamcinolone acetonide 0.1% (KENALOG) 0.1 % ointment, Apply topically 2 (two) times daily., Disp: 30 g, Rfl: 0  No current facility-administered medications for this visit.     Facility-Administered Medications Ordered in Other Visits:     ropivacaine 0.2% Nimbus PainPRO Pump infusion 500 ML, , Perineural, Continuous, Mariana Vergara MD, New Bag at 03/09/21 1323        Review of Systems:  As per HPI otherwise noncontributory     OBJECTIVE:       Vital Signs (Most Recent):  Vitals       Vitals:     10/24/23 0955   Weight: 117.9 kg (260 lb)   Height: 5' 6" (1.676 m)         Body mass index is 41.97 kg/m².        Physical Exam:  Constitutional: The patient appears well-developed and well-nourished. No distress.   Skin: No lesions appreciated  Head: Normocephalic and atraumatic.   Nose: Nose normal.   Ears: No deformities seen  Eyes: Conjunctivae and EOM are normal.   Neck: No tracheal deviation present.   Cardiovascular: Normal rate and intact distal pulses.    Pulmonary/Chest: Effort normal. No respiratory distress.   Abdominal: There is no guarding.   Neurological: The patient is alert.   Psychiatric: The patient has a normal mood and affect.      Left Hand/Wrist Examination:     Observation/Inspection:  Swelling                       none                  Deformity                     Dupuytren's nodules left hand with a cord-like structure at the ring finger with mild contracture of the left ring finger MCP joint contracture  Discoloration               none       "            Scars                           none                  Atrophy                        none     HAND/WRIST EXAMINATION:  Finkelstein's Test                                Neg  WHAT Test                                         Neg  Snuff box tenderness                          Neg  Cordon's Test                                     Neg  Hook of Hamate Tenderness              Neg  CMC grind                                           Neg  Circumduction test                              Neg     Neurovascular Exam:  Digits WWP, brisk CR < 3s throughout  NVI motor/LTS to M/R/U nerves, radial pulse 2+  Tinel's Test - Carpal Tunnel                Neg  Tinel's Test - Cubital Tunnel               Neg  Phalen's Test                                      Neg  Median Nerve Compression Test       Neg     ROM hand full, painless     ROM wrist full, painless    ROM elbow full, painless     Abdomen not guarded  Respirations nonlabored  Perfusion intact     Diagnostic Results:     Imaging - I independently viewed the patient's imaging as well as the radiology report.  Xrays of the patient's left hand  demonstrate no evidence of any acute fractures or dislocations with some degenerative changes     EMG - none     ASSESSMENT/PLAN:       68 y.o. yo female with Dupuytren's nodule/cord left palm with mild left ring finger MCP contracture  Plan: The patient and I had a thorough discussion today.  We discussed the working diagnosis as well as several other potential alternative diagnoses.  Treatment options were discussed, both conservative and surgical.  Conservative treatment options would include things such as activity modifications, workplace modifications, a period of rest, oral vs topical OTC and prescription anti-inflammatory medications, occupational therapy, splinting/bracing, immobilization, corticosteroid injections, and others.  Surgical options were discussed as well.      At this time, the patient would like to proceed  with partial palmar fasciectomy to the left hand and ring finger on November 8, 2023 which I feel is reasonable.  This has been worsening.  I will get this set up.       The patient has not responded to adequate non operative treatment at this time and/or non operative treatment is not indicated. Thus, the risks, benefits and alternatives to surgery were discussed with the patient in detail.  Specific risks include but are not limited to bleeding, infection, vessel and/or nerve damage, pain, numbness, tingling, complex regional pain syndrome, compartment syndrome, failure to return to pre-injury and/or preoperative functional status, scar sensitivity, delayed healing, inability to return to work, pulley injury, tendon injury, bowstringing, partial and/or incomplete relief of symptoms, weakness, persistence of and/or worsening of symptoms, surgical failure, osteomyelitis, amputation, loss of function, stiffness, functional debility, dysfunction, decreased  strength, need for prolonged postoperative rehabilitation, need for further surgery, deep venous thrombosis, pulmonary embolism, arthritis and death.  The patient states an understanding and wishes to proceed with surgery.   All questions were answered.  No guarantees were implied or stated.  Written informed consent was obtained.          Jamie Preciado M.D.     Please be aware that this note has been generated with the assistance of [employer] voice-to-text.  Please excuse any spelling or grammatical errors.     Thank you for choosing Dr. Jamie Preciado for your orthopedic hand and upper extremity care. It is our goal to provide you with exceptional care that will help keep you healthy, active, and get you back in the game.     If you felt that you received exemplary care today, please consider leaving feedback for Dr. Preciado on [email or web address] at [email or web address].     Please do not hesitate to reach out to us via email,  phone, or MyChart with any questions, concerns, or feedback.

## 2023-10-24 NOTE — PROGRESS NOTES
[facility]  History & Physical  Orthopedics    SUBJECTIVE:      COVID-19 attestation:  This patient was treated during the COVID-19 pandemic.  This was discussed with the patient, they are aware of our current policies and procedures, were given the option of delaying their visit and or switching to a virtual visit, delaying their surgery when applicable, and they elect to proceed.    Chief Complaint:  Left hand    Referring Provider: No ref. provider found     History of Present Illness:  Patient is a 68 y.o. right hand dominant female who presents today with complaints of issues with her left hand.  That she notes that she has noticed onset of some nodular type structures in the left palm.  She does recall that her father had some hand problems which were attributed to gout back in the day.  She does believe there was some deformity there.  She does not have any pain in regards to her left hand.  She digital in notes some pain left shoulder.  She presents for initial evaluation no other complaints.     Interval history October 24, 2023: The patient returns today for re-evaluation.  She notes that her Dupuytren's contracture of the left hand has worsened since her initial visit.  She is interested in discussing treatment options for this today.  No other new complaints.    The patient is a/an retired.    Symptoms consist of deformity.    The patient rates their pain as 7/10.    Attempted treatment(s) and/or interventions include activity modifications, rest     The patient denies any fevers, chills, N/V, D/C and presents for evaluation.       Past Medical History:   Diagnosis Date    Decreased mobility and endurance 11/16/2017    Decreased strength 11/16/2017    Decreased strength of lower extremity 2/22/2021    Difficult intubation     Early dry stage nonexudative age-related macular degeneration 2020    Edema 9/26/2018    Edema of both legs 7/29/2020    Fatty liver 12/15/2014    Hypokalemia  2018    Lymphedema of both lower extremities 2020    Multiple gastric polyps 2012    Polyarthralgia 2015    Sjogren's disease     Sleep apnea      Past Surgical History:   Procedure Laterality Date    BREAST BIOPSY Left 2017    core,Benign breast with fibrocystic changes, including benign usual ductal hyperplasia and adenosis    BREAST BIOPSY Right     core    CERVICAL CONIZATION   W/ LASER       SECTION      x1    CHOLECYSTECTOMY      ESOPHAGOGASTRODUODENOSCOPY N/A 2018    Procedure: ESOPHAGOGASTRODUODENOSCOPY (EGD);  Surgeon: George Henriquez MD;  [facility]  Service: Endoscopy;  Laterality: N/A;  possible difficult intubation - see anesthesia note dated 1/15/13 - 2nd floor/ generated from last EGD, 3 year f/u, Pt due/ Pt requesting Dr. Henriquez -     EYE SURGERY  Est 2014-15    Laser on each eye - tear in white part eye by Dr Nitish Ayala    HYSTERECTOMY  age 42    [place], LSO (uterine fibroids, adhesions, endometriosis)    OOPHORECTOMY  age 42    LSO (endometriosis, cyst)    PERCUTANEOUS CRYOTHERAPY OF PERIPHERAL NERVE USING LIQUID NITROUS OXIDE IN CLOSED NEEDLE DEVICE Right 2021    Procedure: CRYOTHERAPY, NERVE, PERIPHERAL, PERCUTANEOUS, USING LIQUID NITROUS OXIDE IN CLOSED NEEDLE DEVICE;  Surgeon: CHACE Riley;  Location: [facility];  Service: Pain Management;  Laterality: Right;  RIGHT KNEE IOVERA    THYROIDECTOMY      TOE SURGERY      left    TOTAL REPLACEMENT OF BOTH KNEES USING COMPUTER-ASSISTED NAVIGATION      TOTAL THYROIDECTOMY       Review of patient's allergies indicates:   Allergen Reactions    Lipitor [atorvastatin]      Severe myalgia    Losartan Swelling     Possible throat closing sensation.    Lovastatin Other (See Comments)     myalgia    Cefuroxime Itching and Rash    Latex, natural rubber Itching and Rash     Pt reported      Social History     Social History Narrative    House- has stairs     Family History    Problem Relation Age of Onset    Arthritis Mother     Asthma Father     COPD Father     COPD Sister     Melanoma Sister     Melanoma Brother     Diabetes Brother         Adult onset got in his 60s    Asthma Son     Breast cancer Maternal Aunt     Cancer Maternal Aunt     Cancer Maternal Uncle         esophageal cancer    Ovarian cancer Neg Hx     Colon cancer Neg Hx          Current Outpatient Medications:     albuterol (VENTOLIN HFA) 90 mcg/actuation inhaler, Inhale 1-2 puffs into the lungs every 6 (six) hours as needed for Wheezing or Shortness of Breath. Rescue (Patient not taking: Reported on 8/15/2023), Disp: 18 g, Rfl: 2    amLODIPine (NORVASC) 10 MG tablet, Take 1 tablet (10 mg total) by mouth once daily., Disp: 90 tablet, Rfl: 3    augmented betamethasone dipropionate (DIPROLENE-AF) 0.05 % cream, aaa 1-2 x daily. Not more than 2 weeks straight in same location, avoid use on face and groin, Disp: 50 g, Rfl: 1    azelastine (ASTELIN) 137 mcg (0.1 %) nasal spray, 1 spray (137 mcg total) by Nasal route daily as needed for Rhinitis., Disp: 30 mL, Rfl: 11    betamethasone dipropionate (DIPROLENE) 0.05 % ointment, aaa qd- bid . Not more than 2 weeks straight in same location. Avoid use on face and groin (Patient not taking: Reported on 8/15/2023), Disp: 45 g, Rfl: 1    buPROPion (WELLBUTRIN XL) 150 MG TB24 tablet, Take 1 tablet (150 mg total) by mouth once daily., Disp: 30 tablet, Rfl: 3    cetirizine (ZYRTEC) 10 MG tablet, Take 10 mg by mouth every evening. , Disp: , Rfl:     diphenhydrAMINE (BANOPHEN) 25 mg capsule, Take 1 each (25 mg total) by mouth every 6 (six) hours as needed for Itching or Allergies. (Patient not taking: Reported on 8/15/2023), Disp: 60 capsule, Rfl: 0    econazole nitrate 1 % cream, Use bid (Patient not taking: Reported on 8/15/2023), Disp: 30 g, Rfl: 2    ergocalciferol, vitamin D2, (VITAMIN D ORAL), Take 1 tablet by mouth once daily., Disp: , Rfl:     estradioL (ESTRACE) 0.01 % (0.1  mg/gram) vaginal cream, PLACE 1 GRAM VAGINALLY EVERY DAY (Patient not taking: Reported on 8/15/2023), Disp: 42.5 g, Rfl: 1    FINACEA 15 % gel, AAA face qhs then moisturize (Patient not taking: Reported on 8/15/2023), Disp: 50 g, Rfl: 3    fluconazole (DIFLUCAN) 150 MG Tab, Take 1 tablet (150 mg total) by mouth once daily. (Patient not taking: Reported on 8/15/2023), Disp: 1 tablet, Rfl: 3    fluocinonide (LIDEX) 0.05 % external solution, AAA scalp qday prn itching, scaling (Patient not taking: Reported on 8/15/2023), Disp: 60 mL, Rfl: 3    fluorouraciL (EFUDEX) 5 % cream, Apply thin film to right lower leg  2times per day for 2-3 weeks; d/c if area bleeding or ulcerated; avoid eyes or mouth (Patient not taking: Reported on 8/15/2023), Disp: 40 g, Rfl: 1    fluticasone propionate (FLONASE) 50 mcg/actuation nasal spray, SPRAY ONCE IN EACH NOSTRIL DAILY (Patient not taking: Reported on 8/15/2023), Disp: 16 g, Rfl: 3    hydroCHLOROthiazide (HYDRODIURIL) 12.5 MG Tab, Take 2 tablets (25 mg total) by mouth once daily., Disp: 180 tablet, Rfl: 1    ketoconazole (NIZORAL) 2 % cream, aaa bid on  right posterior leg x 3 weeks, Disp: 60 g, Rfl: 3    levothyroxine (SYNTHROID) 125 MCG tablet, Take 1 tablet (125 mcg total) by mouth once daily., Disp: 90 tablet, Rfl: 3    LUTEIN ORAL, Take by mouth., Disp: , Rfl:     metFORMIN (GLUCOPHAGE-XR) 500 MG ER 24hr tablet, Take 1 tablet (500 mg total) by mouth every evening., Disp: 90 tablet, Rfl: 3    montelukast (SINGULAIR) 10 mg tablet, Take 1 tablet (10 mg total) by mouth every evening. (Patient not taking: Reported on 8/15/2023), Disp: 90 tablet, Rfl: 3    mv,Ca,min/iron/FA/guarana/caff (ONE-A-DAY WOMEN'S ACTIVE ORAL), Take by mouth., Disp: , Rfl:     nystatin (MYCOSTATIN) cream, Apply topically 2 (two) times daily. (Patient not taking: Reported on 8/15/2023), Disp: 30 g, Rfl: 0    pantoprazole (PROTONIX) 40 MG tablet, Take 1 tablet (40 mg total) by mouth once daily., Disp: 90 tablet,  "Rfl: 3    RETIN-A 0.05 % cream, Apply topically nightly as needed., Disp: , Rfl:     rosuvastatin (CRESTOR) 5 MG tablet, Take 1 tablet (5 mg total) by mouth every other day. (Patient not taking: Reported on 8/15/2023), Disp: 45 tablet, Rfl: 3    spironolactone (ALDACTONE) 50 MG tablet, Take 1 tablet (50 mg total) by mouth once daily., Disp: 90 tablet, Rfl: 3    triamcinolone acetonide 0.1% (KENALOG) 0.1 % ointment, Apply topically 2 (two) times daily., Disp: 30 g, Rfl: 0  No current facility-administered medications for this visit.    Facility-Administered Medications Ordered in Other Visits:     ropivacaine 0.2% Nimbus PainPRO Pump infusion 500 ML, , Perineural, Continuous, Mariana Vergara MD, New Bag at 03/09/21 1323      Review of Systems:  As per HPI otherwise noncontributory    OBJECTIVE:      Vital Signs (Most Recent):  Vitals:    10/24/23 0955   Weight: 117.9 kg (260 lb)   Height: 5' 6" (1.676 m)     Body mass index is 41.97 kg/m².      Physical Exam:  Constitutional: The patient appears well-developed and well-nourished. No distress.   Skin: No lesions appreciated  Head: Normocephalic and atraumatic.   Nose: Nose normal.   Ears: No deformities seen  Eyes: Conjunctivae and EOM are normal.   Neck: No tracheal deviation present.   Cardiovascular: Normal rate and intact distal pulses.    Pulmonary/Chest: Effort normal. No respiratory distress.   Abdominal: There is no guarding.   Neurological: The patient is alert.   Psychiatric: The patient has a normal mood and affect.     Left Hand/Wrist Examination:    Observation/Inspection:  Swelling  none    Deformity  Dupuytren's nodules left hand with a cord-like structure at the ring finger with mild contracture of the left ring finger MCP joint contracture  Discoloration  none     Scars   none    Atrophy  none    HAND/WRIST EXAMINATION:  Finkelstein's Test   Neg  WHAT Test    Neg  Snuff box tenderness   Neg  Cordon's Test    Neg  Hook of Hamate Tenderness  Neg  CMC " grind    Neg  Circumduction test   Neg    Neurovascular Exam:  Digits WWP, brisk CR < 3s throughout  NVI motor/LTS to M/R/U nerves, radial pulse 2+  Tinel's Test - Carpal Tunnel  Neg  Tinel's Test - Cubital Tunnel  Neg  Phalen's Test    Neg  Median Nerve Compression Test Neg    ROM hand full, painless    ROM wrist full, painless    ROM elbow full, painless    Abdomen not guarded  Respirations nonlabored  Perfusion intact    Diagnostic Results:     Imaging - I independently viewed the patient's imaging as well as the radiology report.  Xrays of the patient's left hand  demonstrate no evidence of any acute fractures or dislocations with some degenerative changes    EMG - none    ASSESSMENT/PLAN:      68 y.o. yo female with Dupuytren's nodule/cord left palm with mild left ring finger MCP contracture  Plan: The patient and I had a thorough discussion today.  We discussed the working diagnosis as well as several other potential alternative diagnoses.  Treatment options were discussed, both conservative and surgical.  Conservative treatment options would include things such as activity modifications, workplace modifications, a period of rest, oral vs topical OTC and prescription anti-inflammatory medications, occupational therapy, splinting/bracing, immobilization, corticosteroid injections, and others.  Surgical options were discussed as well.     At this time, the patient would like to proceed with partial palmar fasciectomy to the left hand and ring finger on November 8, 2023 which I feel is reasonable.  This has been worsening.  I will get this set up.      The patient has not responded to adequate non operative treatment at this time and/or non operative treatment is not indicated. Thus, the risks, benefits and alternatives to surgery were discussed with the patient in detail.  Specific risks include but are not limited to bleeding, infection, vessel and/or nerve damage, pain, numbness, tingling, complex regional  pain syndrome, compartment syndrome, failure to return to pre-injury and/or preoperative functional status, scar sensitivity, delayed healing, inability to return to work, pulley injury, tendon injury, bowstringing, partial and/or incomplete relief of symptoms, weakness, persistence of and/or worsening of symptoms, surgical failure, osteomyelitis, amputation, loss of function, stiffness, functional debility, dysfunction, decreased  strength, need for prolonged postoperative rehabilitation, need for further surgery, deep venous thrombosis, pulmonary embolism, arthritis and death.  The patient states an understanding and wishes to proceed with surgery.   All questions were answered.  No guarantees were implied or stated.  Written informed consent was obtained.        Jamie Preciado M.D.    Please be aware that this note has been generated with the assistance of [employer] voice-to-text.  Please excuse any spelling or grammatical errors.    Thank you for choosing Dr. Jamie Preciado for your orthopedic hand and upper extremity care. It is our goal to provide you with exceptional care that will help keep you healthy, active, and get you back in the game.     If you felt that you received exemplary care today, please consider leaving feedback for Dr. Preciado on Layer at [email or web address].    Please do not hesitate to reach out to us via email, phone, or MyChart with any questions, concerns, or feedback.

## 2023-10-26 ENCOUNTER — TELEPHONE (OUTPATIENT)
Dept: ORTHOPEDICS | Facility: CLINIC | Age: 68
End: 2023-10-26
Payer: MEDICARE

## 2023-10-26 NOTE — TELEPHONE ENCOUNTER
----- Message from Thais Arndt sent at 10/26/2023 10:16 AM CDT -----  Regarding: Pt calling, her  is recommending surgery be postponed into next year, closer to June 2024  Contact: @[phone]  Regarding:Pt calling, her  is recommending surgery be postponed into next year, will need to cancel 11/8/2023 appt closer to June 2024    Contact: Carolina    Type: Please cancel, pt will see update on [facility]    Who Called: Carolina    Would the patient rather a call back or a response via [facility]? Phone call (If needed call after 3 today    Best Call Back Number: @[phone]

## 2023-10-27 ENCOUNTER — HOSPITAL ENCOUNTER (OUTPATIENT)
Dept: RADIOLOGY | Facility: HOSPITAL | Age: 68
Discharge: HOME | End: 2023-10-27
Attending: INTERNAL MEDICINE
Payer: MEDICARE

## 2023-10-27 DIAGNOSIS — Z12.31 ENCOUNTER FOR SCREENING MAMMOGRAM FOR MALIGNANT NEOPLASM OF BREAST: ICD-10-CM

## 2023-10-27 PROCEDURE — 77067 SCR MAMMO BI INCL CAD: CPT | Mod: 26,,, | Performed by: RADIOLOGY

## 2023-10-27 PROCEDURE — 77063 BREAST TOMOSYNTHESIS BI: CPT | Mod: 26,,, | Performed by: RADIOLOGY

## 2023-10-27 PROCEDURE — 77063 MAMMO DIGITAL SCREENING BILAT WITH TOMO: ICD-10-PCS | Mod: 26,,, | Performed by: RADIOLOGY

## 2023-10-27 PROCEDURE — 77067 MAMMO DIGITAL SCREENING BILAT WITH TOMO: ICD-10-PCS | Mod: 26,,, | Performed by: RADIOLOGY

## 2023-10-27 PROCEDURE — 77067 SCR MAMMO BI INCL CAD: CPT | Mod: TC

## 2023-11-01 ENCOUNTER — PATIENT MESSAGE (OUTPATIENT)
Dept: PRIMARY CARE CLINIC | Facility: CLINIC | Age: 68
End: 2023-11-01
Payer: MEDICARE

## 2023-11-01 ENCOUNTER — PATIENT MESSAGE (OUTPATIENT)
Dept: ADMINISTRATIVE | Facility: OTHER | Age: 68
End: 2023-11-01
Payer: MEDICARE

## 2024-01-11 DIAGNOSIS — Z00.00 ENCOUNTER FOR MEDICARE ANNUAL WELLNESS EXAM: ICD-10-CM

## 2024-02-06 ENCOUNTER — TELEPHONE (OUTPATIENT)
Dept: ADMINISTRATIVE | Facility: CLINIC | Age: 69
End: 2024-02-06
Payer: MEDICARE

## 2024-02-22 ENCOUNTER — TELEPHONE (OUTPATIENT)
Dept: PRIMARY CARE CLINIC | Facility: CLINIC | Age: 69
End: 2024-02-22
Payer: MEDICARE

## 2024-02-22 NOTE — TELEPHONE ENCOUNTER
Appointment Request   Carolina JoseGorge   Sent:  10:47 PM   To: [facility] Clinical Support Staff      Carolina Knight   MRN: [id number] : 1955   Pt Home: [phone]     Entered: [phone]        Message    Appointment Request From: Carolina Knight      With Provider: Kierra Cornejo MD [[facility] [place]]      Preferred Date Range: 3/4/2024 - 2024      Preferred Times: Any Time      Reason for visit: Annual      Comments:   Annual and schedule tests needed

## 2024-02-27 ENCOUNTER — OFFICE VISIT (OUTPATIENT)
Dept: OBSTETRICS AND GYNECOLOGY | Facility: CLINIC | Age: 69
End: 2024-02-27
Payer: MEDICARE

## 2024-02-27 VITALS — SYSTOLIC BLOOD PRESSURE: 105 MMHG | DIASTOLIC BLOOD PRESSURE: 71 MMHG | WEIGHT: 252 LBS | BODY MASS INDEX: 40.67 KG/M2

## 2024-02-27 DIAGNOSIS — Z12.31 SCREENING MAMMOGRAM FOR BREAST CANCER: ICD-10-CM

## 2024-02-27 DIAGNOSIS — R21 RASH: Primary | ICD-10-CM

## 2024-02-27 PROCEDURE — 99212 OFFICE O/P EST SF 10 MIN: CPT | Mod: S$PBB,,, | Performed by: OBSTETRICS & GYNECOLOGY

## 2024-02-27 PROCEDURE — 81514 NFCT DS BV&VAGINITIS DNA ALG: CPT | Performed by: OBSTETRICS & GYNECOLOGY

## 2024-02-27 PROCEDURE — 99214 OFFICE O/P EST MOD 30 MIN: CPT | Mod: PBBFAC,PO | Performed by: OBSTETRICS & GYNECOLOGY

## 2024-02-27 PROCEDURE — 99999 PR PBB SHADOW E&M-EST. PATIENT-LVL IV: CPT | Mod: PBBFAC,,, | Performed by: OBSTETRICS & GYNECOLOGY

## 2024-02-27 RX ORDER — AZITHROMYCIN 250 MG/1
250 TABLET, FILM COATED ORAL
COMMUNITY
Start: 2024-01-22 | End: 2024-03-11 | Stop reason: ALTCHOICE

## 2024-02-27 RX ORDER — NYSTATIN 100000 [USP'U]/G
POWDER TOPICAL 2 TIMES DAILY
Qty: 60 G | Refills: 1 | Status: SHIPPED | OUTPATIENT
Start: 2024-02-27

## 2024-02-27 RX ORDER — BROMPHENIRAMINE MALEATE, PSEUDOEPHEDRINE HYDROCHLORIDE, AND DEXTROMETHORPHAN HYDROBROMIDE 2; 30; 10 MG/5ML; MG/5ML; MG/5ML
SYRUP ORAL
COMMUNITY
Start: 2024-01-22 | End: 2024-04-04

## 2024-02-27 RX ORDER — AMOXICILLIN 500 MG/1
1000 CAPSULE ORAL 2 TIMES DAILY
COMMUNITY
Start: 2023-10-16 | End: 2024-03-11 | Stop reason: ALTCHOICE

## 2024-02-27 NOTE — PROGRESS NOTES
Chief Complaint   Patient presents with    Possible yeast        HISTORY OF PRESENT ILLNESS:   Carolina Knight is a 68 y.o. female  who presents for rash  No LMP recorded (lmp unknown). Patient has had a hysterectomy.. Menopause at age  42 2/2 [place]O/LSO due to endometrosis  .  She c/o possible yeast under panus. Has an odor and irritation. Skin will split there.      Past Medical History:   Diagnosis Date    Decreased mobility and endurance 2017    Decreased strength 2017    Decreased strength of lower extremity 2021    Difficult intubation     Early dry stage nonexudative age-related macular degeneration     Edema 2018    Edema of both legs 2020    Fatty liver 12/15/2014    Hypokalemia 2018    Lymphedema of both lower extremities 2020    Multiple gastric polyps 2012    Polyarthralgia 2015    Sjogren's disease     Sleep apnea           Past Surgical History:   Procedure Laterality Date    BREAST BIOPSY Left 2017    core,Benign breast with fibrocystic changes, including benign usual ductal hyperplasia and adenosis    BREAST BIOPSY Right     core    CERVICAL CONIZATION   W/ LASER       SECTION      x1    CHOLECYSTECTOMY      ESOPHAGOGASTRODUODENOSCOPY N/A 2018    Procedure: ESOPHAGOGASTRODUODENOSCOPY (EGD);  Surgeon: George Henriquez MD;  Location: [facility] ([address] [place] [address]);  Service: Endoscopy;  Laterality: N/A;  possible difficult intubation - see anesthesia note dated 1/15/13 - 2nd floor/ generated from last EGD, 3 year f/u, Pt due/ Pt requesting Dr. Henriquez -     EYE SURGERY  Est 2014-15    Laser on each eye - tear in white part eye by Dr Nitish Ayala    HYSTERECTOMY  age 42    [place], LSO (uterine fibroids, adhesions, endometriosis)    OOPHORECTOMY  age 42    LSO (endometriosis, cyst)    PERCUTANEOUS CRYOTHERAPY OF PERIPHERAL NERVE USING LIQUID NITROUS OXIDE IN CLOSED NEEDLE DEVICE Right 2021    Procedure: CRYOTHERAPY, NERVE, PERIPHERAL,  PERCUTANEOUS, USING LIQUID NITROUS OXIDE IN CLOSED NEEDLE DEVICE;  Surgeon: CHACE Riley;  Location: [facility];  Service: Pain Management;  Laterality: Right;  RIGHT KNEE IOVERA    THYROIDECTOMY      TOE SURGERY      left    TOTAL REPLACEMENT OF BOTH KNEES USING COMPUTER-ASSISTED NAVIGATION  2023    TOTAL THYROIDECTOMY           Social History     Socioeconomic History    Marital status:      Spouse name: Nitish    Number of children: 1   Occupational History    Occupation:      Comment: retired   Tobacco Use    Smoking status: Never    Smokeless tobacco: Never   Substance and Sexual Activity    Alcohol use: No     Comment: none lately.  rarely has a drink for special occassion    Drug use: No    Sexual activity: Not Currently     Partners: Male     Birth control/protection: Post-menopausal, None   Social History Narrative    House- has stairs     Social Determinants of Health     Financial Resource Strain: Low Risk  (2/26/2024)    Overall Financial Resource Strain (CARDIA)     Difficulty of Paying Living Expenses: Not very hard   Food Insecurity: No Food Insecurity (2/26/2024)    Hunger Vital Sign     Worried About Running Out of Food in the Last Year: Never true     Ran Out of Food in the Last Year: Never true   Transportation Needs: No Transportation Needs (2/26/2024)    PRAPARE - Transportation     Lack of Transportation (Medical): No     Lack of Transportation (Non-Medical): No   Physical Activity: Insufficiently Active (2/26/2024)    Exercise Vital Sign     Days of Exercise per Week: 2 days     Minutes of Exercise per Session: 20 min   Stress: No Stress Concern Present (2/26/2024)    [employer] - Occupational Stress Questionnaire     Feeling of Stress : Not at all   Social Connections: Unknown (2/26/2024)    Social Connection and Isolation Panel [NHANES]     Frequency of Communication with Friends and Family: More than three times a week      Frequency of Social Gatherings with Friends and Family: Twice a week     Active Member of Clubs or Organizations: Yes     Attends Club or Organization Meetings: More than 4 times per year     Marital Status:    Housing Stability: Low Risk  (2024)    Housing Stability Vital Sign     Unable to Pay for Housing in the Last Year: No     Number of Places Lived in the Last Year: 1     Unstable Housing in the Last Year: No       Family History   Problem Relation Age of Onset    Arthritis Mother     Asthma Father     COPD Father     COPD Sister     Melanoma Sister     Melanoma Brother     Diabetes Brother         Adult onset got in his 60s    Asthma Son     Breast cancer Maternal Aunt     Cancer Maternal Aunt     Cancer Maternal Uncle         esophageal cancer    Ovarian cancer Neg Hx     Colon cancer Neg Hx          OB History    Para Term  AB Living   1 1 1     1   SAB IAB Ectopic Multiple Live Births           1      # Outcome Date GA Lbr Eric/2nd Weight Sex Delivery Anes PTL Lv   1 Term 84    M CS-LTranv  N BURT       COMPREHENSIVE GYN HISTORY:  PAP History: had CKC in  but normal paps since;  NILM;  NILM   Infection History: Denies STDs. Denies PID.  Benign History: had uterine fibroids. Denies ovarian cysts. had endometriosis Denies other conditions.  Cancer History: Denies cervical cancer. Denies uterine cancer or hyperplasia. Denies ovarian cancer. Denies vulvar cancer or pre-cancer. Denies vaginal cancer or pre-cancer. Denies breast cancer. Denies colon cancer.  Cycle: normal until had hyst at 42 2/2 endo and an enlarging uterus due to fibroids  Biopsy vulvar area, benign skin but biopsy on thigh looks similar came back spongiotic dermatitis (chronic eczema)        /71   Wt 114.3 kg (251 lb 15.8 oz)   LMP  (LMP Unknown)   BMI 40.67 kg/m²     APPEARANCE: Well nourished, well developed, in no acute distress.  NECK: Neck symmetric   BREASTS: Symmetrical, no skin  changes or visible lesions. No palpable masses, nipple discharge or adenopathy bilaterally.  PELVIC: erythema in area above mons, not raised  VULVA: No lesions. Normal female genitalia.  URETHRAL MEATUS: Normal size and location, no lesions, no prolapse.  URETHRA: No masses, tenderness, prolapse or scarring.  VAGINA: atrophic, , no discharge, no significant cystocele or rectocele. Well healed cuff           1. Rash    2. Screening mammogram for breast cancer            A/P 1. Most likely moisture related, discussed ways to decrease moisture in the area, doesn't appear like yeast but will do nystatin powder if needed to help keep area dry.

## 2024-02-28 ENCOUNTER — OFFICE VISIT (OUTPATIENT)
Dept: DERMATOLOGY | Facility: CLINIC | Age: 69
End: 2024-02-28
Payer: MEDICARE

## 2024-02-28 VITALS — BODY MASS INDEX: 40.51 KG/M2 | WEIGHT: 251 LBS

## 2024-02-28 DIAGNOSIS — L81.4 LENTIGINES: ICD-10-CM

## 2024-02-28 DIAGNOSIS — L82.1 SEBORRHEIC KERATOSES: ICD-10-CM

## 2024-02-28 DIAGNOSIS — L30.0 NUMMULAR ECZEMATOUS DERMATITIS: Primary | ICD-10-CM

## 2024-02-28 DIAGNOSIS — D22.9 NEVUS OF MULTIPLE SITES: ICD-10-CM

## 2024-02-28 DIAGNOSIS — D18.01 CHERRY ANGIOMA: ICD-10-CM

## 2024-02-28 PROCEDURE — 99213 OFFICE O/P EST LOW 20 MIN: CPT | Mod: S$PBB,,, | Performed by: DERMATOLOGY

## 2024-02-28 PROCEDURE — 99213 OFFICE O/P EST LOW 20 MIN: CPT | Mod: PBBFAC,PO | Performed by: DERMATOLOGY

## 2024-02-28 PROCEDURE — 99999 PR PBB SHADOW E&M-EST. PATIENT-LVL III: CPT | Mod: PBBFAC,,, | Performed by: DERMATOLOGY

## 2024-02-28 RX ORDER — CLOBETASOL PROPIONATE 0.5 MG/G
CREAM TOPICAL 2 TIMES DAILY
Qty: 60 G | Refills: 3 | Status: SHIPPED | OUTPATIENT
Start: 2024-02-28

## 2024-02-28 NOTE — PROGRESS NOTES
Subjective:      Patient ID:  Carolina Knight is a 68 y.o. female who presents for   Chief Complaint   Patient presents with    Eczema     Recurrent flare/ up on bilateral legs,     Skin Check     UBSE     Follow up asteototic eczema, see previous notes and biopsy Drs Margarita and Catracho.  Has recent flare on lower legs, no tx at present.      Eczema - Initial  Affected locations: scalp, face, left arm, right arm, back, abdomen, chest, torso, right lower leg and left lower leg  Signs / symptoms: itching      Review of Systems   Constitutional:  Negative for fever, chills, weight loss, weight gain, fatigue, night sweats and malaise.   Skin:  Negative for daily sunscreen use, activity-related sunscreen use and wears hat.   Hematologic/Lymphatic: Does not bruise/bleed easily.       Objective:   Physical Exam   Constitutional: She appears well-developed and well-nourished. No distress.   Neurological: She is alert and oriented to person, place, and time. She is not disoriented.   Psychiatric: She has a normal mood and affect.   Skin:   Areas Examined (abnormalities noted in diagram):   Head / Face Inspection Performed  Neck Inspection Performed  Chest / Axilla Inspection Performed  Abdomen Inspection Performed  Back Inspection Performed  RUE Inspected  LUE Inspection Performed  RLE Inspected  LLE Inspection Performed  Nails and Digits Inspection Performed                     Diagram Legend     Erythematous scaling macule/papule c/w actinic keratosis       Vascular papule c/w angioma      Pigmented verrucoid papule/plaque c/w seborrheic keratosis      Yellow umbilicated papule c/w sebaceous hyperplasia      Irregularly shaped tan macule c/w lentigo     1-2 mm smooth white papules consistent with Milia      Movable subcutaneous cyst with punctum c/w epidermal inclusion cyst      Subcutaneous movable cyst c/w pilar cyst      Firm pink to brown papule c/w dermatofibroma      Pedunculated fleshy papule(s) c/w skin tag(s)     "  Evenly pigmented macule c/w junctional nevus     Mildly variegated pigmented, slightly irregular-bordered macule c/w mildly atypical nevus      Flesh colored to evenly pigmented papule c/w intradermal nevus       Pink pearly papule/plaque c/w basal cell carcinoma      Erythematous hyperkeratotic cursted plaque c/w SCC      Surgical scar with no sign of skin cancer recurrence      Open and closed comedones      Inflammatory papules and pustules      Verrucoid papule consistent consistent with wart     Erythematous eczematous patches and plaques     Dystrophic onycholytic nail with subungual debris c/w onychomycosis     Umbilicated papule    Erythematous-base heme-crusted tan verrucoid plaque consistent with inflamed seborrheic keratosis     Erythematous Silvery Scaling Plaque c/w Psoriasis     See annotation      Assessment / Plan:        Nummular eczematous dermatitis  -     clobetasoL (TEMOVATE) 0.05 % cream; Apply topically 2 (two) times daily.  Dispense: 60 g; Refill: 3  No hot water  Leg elevation/support socks  Cetaphil restoraderm lotion after showers  Call if recurrent      Seborrheic keratoses  Seborrheic keratosis scattered, told benign no treatment needed.  Brochure provided.      Lentigines  The "ABCD" rules to observe pigmented lesions were reviewed.      Nevus of multiple sites  The "ABCD" rules to observe pigmented lesions were reviewed.  Brochure provided               Follow up in about 1 year (around 2/28/2025).  "

## 2024-03-01 ENCOUNTER — PATIENT MESSAGE (OUTPATIENT)
Dept: OBSTETRICS AND GYNECOLOGY | Facility: HOSPITAL | Age: 69
End: 2024-03-01
Payer: MEDICARE

## 2024-03-01 ENCOUNTER — PATIENT MESSAGE (OUTPATIENT)
Dept: OBSTETRICS AND GYNECOLOGY | Facility: CLINIC | Age: 69
End: 2024-03-01
Payer: MEDICARE

## 2024-03-08 ENCOUNTER — TELEPHONE (OUTPATIENT)
Dept: PRIMARY CARE CLINIC | Facility: CLINIC | Age: 69
End: 2024-03-08
Payer: MEDICARE

## 2024-03-08 NOTE — TELEPHONE ENCOUNTER
Appointment Request   Carolina Lenniemelina   Sent:  11:57 PM   To: [facility] Primary [facility] Clinical Support Staff      Carolina Knight   MRN: [id number] : 1955   Pt Home: [phone]     Entered: [phone]        Message    Appointment Request From: Carolina Knight      With Provider: Kierra Cornejo MD [[facility] [place]]      Preferred Date Range: 3/8/2024 - 3/11/2024      Preferred Times: Any Time      Reason for visit: Dizziness      Comments:   Dizziness n blood pressure

## 2024-03-08 NOTE — TELEPHONE ENCOUNTER
Pt having some dizziness lately, not sure if it is her blood pressure or ears due to sinus. Could not give exact blood pressure numbers.  Would like to be seen / eval

## 2024-03-11 ENCOUNTER — LAB VISIT (OUTPATIENT)
Dept: LAB | Facility: HOSPITAL | Age: 69
End: 2024-03-11
Attending: PHYSICIAN ASSISTANT
Payer: MEDICARE

## 2024-03-11 ENCOUNTER — OFFICE VISIT (OUTPATIENT)
Dept: PRIMARY CARE CLINIC | Facility: CLINIC | Age: 69
End: 2024-03-11
Payer: MEDICARE

## 2024-03-11 VITALS
OXYGEN SATURATION: 95 % | HEIGHT: 66 IN | WEIGHT: 251.13 LBS | BODY MASS INDEX: 40.36 KG/M2 | SYSTOLIC BLOOD PRESSURE: 132 MMHG | HEART RATE: 76 BPM | DIASTOLIC BLOOD PRESSURE: 88 MMHG

## 2024-03-11 DIAGNOSIS — R42 DIZZINESS: ICD-10-CM

## 2024-03-11 DIAGNOSIS — E03.9 HYPOTHYROIDISM, UNSPECIFIED TYPE: ICD-10-CM

## 2024-03-11 DIAGNOSIS — E66.01 MORBID OBESITY WITH BODY MASS INDEX (BMI) OF 40.0 TO 49.9: ICD-10-CM

## 2024-03-11 DIAGNOSIS — H91.90 HEARING LOSS, UNSPECIFIED HEARING LOSS TYPE, UNSPECIFIED LATERALITY: ICD-10-CM

## 2024-03-11 DIAGNOSIS — R42 DIZZINESS: Primary | ICD-10-CM

## 2024-03-11 DIAGNOSIS — J30.9 ALLERGIC RHINITIS, UNSPECIFIED SEASONALITY, UNSPECIFIED TRIGGER: ICD-10-CM

## 2024-03-11 DIAGNOSIS — I10 ESSENTIAL HYPERTENSION: ICD-10-CM

## 2024-03-11 LAB
ANION GAP SERPL CALC-SCNC: 10 MMOL/L (ref 8–16)
BUN SERPL-MCNC: 20 MG/DL (ref 8–23)
CALCIUM SERPL-MCNC: 10.1 MG/DL (ref 8.7–10.5)
CHLORIDE SERPL-SCNC: 101 MMOL/L (ref 95–110)
CO2 SERPL-SCNC: 28 MMOL/L (ref 23–29)
CREAT SERPL-MCNC: 1.1 MG/DL (ref 0.5–1.4)
EST. GFR  (NO RACE VARIABLE): 54.7 ML/MIN/1.73 M^2
GLUCOSE SERPL-MCNC: 101 MG/DL (ref 70–110)
POTASSIUM SERPL-SCNC: 4.3 MMOL/L (ref 3.5–5.1)
SODIUM SERPL-SCNC: 139 MMOL/L (ref 136–145)
T4 FREE SERPL-MCNC: 1.5 NG/DL (ref 0.71–1.51)
TSH SERPL DL<=0.005 MIU/L-ACNC: 0.38 UIU/ML (ref 0.4–4)

## 2024-03-11 PROCEDURE — 99999 PR PBB SHADOW E&M-EST. PATIENT-LVL V: CPT | Mod: PBBFAC,,, | Performed by: PHYSICIAN ASSISTANT

## 2024-03-11 PROCEDURE — 84443 ASSAY THYROID STIM HORMONE: CPT | Performed by: PHYSICIAN ASSISTANT

## 2024-03-11 PROCEDURE — 84439 ASSAY OF FREE THYROXINE: CPT | Performed by: PHYSICIAN ASSISTANT

## 2024-03-11 PROCEDURE — 80048 BASIC METABOLIC PNL TOTAL CA: CPT | Performed by: PHYSICIAN ASSISTANT

## 2024-03-11 PROCEDURE — 36415 COLL VENOUS BLD VENIPUNCTURE: CPT | Mod: PO | Performed by: PHYSICIAN ASSISTANT

## 2024-03-11 PROCEDURE — 99214 OFFICE O/P EST MOD 30 MIN: CPT | Mod: S$PBB,,, | Performed by: PHYSICIAN ASSISTANT

## 2024-03-11 PROCEDURE — 99215 OFFICE O/P EST HI 40 MIN: CPT | Mod: PBBFAC,PN | Performed by: PHYSICIAN ASSISTANT

## 2024-03-11 NOTE — PROGRESS NOTES
Primary Care Provider Appointment   [facility]        Subjective:       Patient ID:  Carolina is a 68 y.o. female being seen for Follow-up (Part of digital medicine, has taking blood pressure and was low one time ) and Dizziness (Started a month ago)      Chief Complaint: Follow-up (Part of digital medicine, has taking blood pressure and was low one time ) and Dizziness (Started a month ago)    HPI: 67 yo female with hypothyroidism, sleep apnea, fatty liver, sjogrens, HTN, HLD, morbid obesity, presents for dizziness. Occurring for about 1 month on 2 separate occasions. States once she went to lie down on bed and felt she was spinning. The other she bent over to pick something up and she felt faint. Both instances were quick. She also does feel off balanced at times.  She is dealing with severe sinus congestion and pressure. Her ears feels clogged with hearing loss, mild headache. Using astelin and floase and zyrtec. She was also concerned as her BP has ran in the low 100s once when feeling dizzy. She confesses that her fluid intake is poor. She denies palpitations.     Past Medical History:   Diagnosis Date    Decreased mobility and endurance 11/16/2017    Decreased strength 11/16/2017    Decreased strength of lower extremity 2/22/2021    Difficult intubation     Early dry stage nonexudative age-related macular degeneration 2020    Edema 9/26/2018    Edema of both legs 7/29/2020    Fatty liver 12/15/2014    Hypokalemia 9/26/2018    Lymphedema of both lower extremities 7/29/2020    Multiple gastric polyps 7/21/2012    Polyarthralgia 4/6/2015    Sjogren's disease     Sleep apnea        Review of Systems   HENT:  Positive for congestion, ear pain, hearing loss and sinus pain.    Respiratory:  Negative for shortness of breath.    Cardiovascular:  Negative for chest pain and palpitations.   Neurological:  Positive for dizziness and headaches. Negative for weakness.               Health  "Maintenance         Date Due Completion Date    RSV Vaccine (Age 60+ and Pregnant patients) (1 - 1-dose 60+ series) Never done ---    Colorectal Cancer Screening 06/18/2023 6/18/2022    DEXA Scan 08/03/2023 8/3/2020    COVID-19 Vaccine (5 - 2023-24 season) 09/01/2023 8/31/2022    Hemoglobin A1c (Prediabetes) 05/17/2024 5/17/2023    Mammogram 10/27/2024 10/27/2023    TETANUS VACCINE 12/21/2026 12/21/2016    Lipid Panel 03/15/2028 3/15/2023                Objective:      Vitals:    03/11/24 0915 03/11/24 0946   BP: (!) 130/90 132/88   BP Location: Right arm    Patient Position: Sitting    BP Method: Medium (Manual)    Pulse: 76    SpO2: 95%    Weight: 113.9 kg (251 lb 1.7 oz)    Height: 5' 6" (1.676 m)      Estimated body mass index is 40.53 kg/m² as calculated from the following:    Height as of this encounter: 5' 6" (1.676 m).    Weight as of this encounter: 113.9 kg (251 lb 1.7 oz).  Physical Exam  Constitutional:       Appearance: Normal appearance.   HENT:      Head: Normocephalic and atraumatic.      Right Ear: A middle ear effusion is present.      Left Ear: A middle ear effusion is present.      Nose: Congestion present.   Eyes:      Extraocular Movements:      Right eye: No nystagmus.      Left eye: No nystagmus.      Conjunctiva/sclera: Conjunctivae normal.      Pupils: Pupils are equal, round, and reactive to light.   Cardiovascular:      Rate and Rhythm: Normal rate and regular rhythm.      Heart sounds: Normal heart sounds. No murmur heard.     Comments: No carotid bruits  Pulmonary:      Effort: Pulmonary effort is normal.      Breath sounds: Normal breath sounds. No wheezing, rhonchi or rales.   Neurological:      General: No focal deficit present.      Mental Status: She is alert.      Coordination: Coordination normal.      Gait: Gait normal.      Comments: Facial expressions equal bilaterally           Assessment and Plan:         1. Dizziness  Assessment & Plan:  Potential combo of sinus causing " vertigo (spinning) and drop in BP causing faint feeling when bending over as her fluid intake is poor.  Advised to change her antihistamine and resume singulair and continue both nasal sprays  Also advised to increase water intake try for a minimum of 4 bottles per day  Will continue to monitor, pt will notify me if no change or worse    Orders:  -     Basic Metabolic Panel; Future; Expected date: 03/11/2024  -     TSH; Future; Expected date: 03/11/2024  -     T4, FREE; Future; Expected date: 03/11/2024    2. Hypothyroidism, unspecified type  Assessment & Plan:  Will assess labs as potential cause of dizziness    Orders:  -     TSH; Future; Expected date: 03/11/2024  -     T4, FREE; Future; Expected date: 03/11/2024    3. Hearing loss, unspecified hearing loss type, unspecified laterality  Assessment & Plan:  Pt would like to be evaluated by audiology, referral placed    Orders:  -     Ambulatory referral/consult to Audiology; Future; Expected date: 03/18/2024    4. Allergic rhinitis, unspecified seasonality, unspecified trigger  Assessment & Plan:  Change antihistamine to xyzal or allegra. Resume singulair. Continue nasal sprays      5. Morbid obesity with body mass index (BMI) of 40.0 to 49.9  Overview:  BMI 41.74 kg/m2 as of 8/30/2022    Assessment & Plan:  She was on ozempic and doing well. She is interested in working with health  here in clinic. Will send referral      6. Essential hypertension  Assessment & Plan:  Continue amlodipine and spironolactone and home monitoring           Follow Up:   F/u with pcp in 3 months        Amy Lado, PA-C [facility]

## 2024-03-12 ENCOUNTER — TELEPHONE (OUTPATIENT)
Dept: PRIMARY CARE CLINIC | Facility: CLINIC | Age: 69
End: 2024-03-12
Payer: MEDICARE

## 2024-03-12 PROBLEM — R42 DIZZINESS: Status: ACTIVE | Noted: 2024-03-12

## 2024-03-12 PROBLEM — H91.90 HEARING LOSS: Status: ACTIVE | Noted: 2024-03-12

## 2024-03-12 NOTE — ASSESSMENT & PLAN NOTE
She was on ozempic and doing well. She is interested in working with health  here in clinic. Will send referral

## 2024-03-12 NOTE — ASSESSMENT & PLAN NOTE
Potential combo of sinus causing vertigo (spinning) and drop in BP causing faint feeling when bending over as her fluid intake is poor.  Advised to change her antihistamine and resume singulair and continue both nasal sprays  Also advised to increase water intake try for a minimum of 4 bottles per day  Will continue to monitor, pt will notify me if no change or worse

## 2024-03-13 ENCOUNTER — CLINICAL SUPPORT (OUTPATIENT)
Dept: AUDIOLOGY | Facility: CLINIC | Age: 69
End: 2024-03-13
Payer: MEDICARE

## 2024-03-13 DIAGNOSIS — H93.12 TINNITUS AURIUM, LEFT: ICD-10-CM

## 2024-03-13 DIAGNOSIS — H91.90 HEARING LOSS, UNSPECIFIED HEARING LOSS TYPE, UNSPECIFIED LATERALITY: Primary | ICD-10-CM

## 2024-03-13 DIAGNOSIS — R42 DIZZINESS AND GIDDINESS: ICD-10-CM

## 2024-03-13 PROCEDURE — 92567 TYMPANOMETRY: CPT | Mod: PBBFAC | Performed by: AUDIOLOGIST

## 2024-03-13 PROCEDURE — 99212 OFFICE O/P EST SF 10 MIN: CPT | Mod: PBBFAC,25 | Performed by: AUDIOLOGIST

## 2024-03-13 PROCEDURE — 99999 PR PBB SHADOW E&M-EST. PATIENT-LVL II: CPT | Mod: PBBFAC,,, | Performed by: AUDIOLOGIST

## 2024-03-13 PROCEDURE — 92557 COMPREHENSIVE HEARING TEST: CPT | Mod: PBBFAC | Performed by: AUDIOLOGIST

## 2024-03-13 NOTE — PROGRESS NOTES
Carolina Knight, a 68 y.o. female, was seen today in the clinic for an audiologic evaluation.  The patient's main complaint was intermittent tinnitus in the left ear and dizziness. When asked about her dizziness, Carolina details she occasionally feels dizzy rolling over in bed and when lifting heavy object. Patient reports a history of persist sinus issues related to seasonal allergies. Patient perceives aural fullness and decreased hearing perception in her left ear with seasonal/weather changes that she attributes to her allergies.       Tympanometry revealed Type A in the right ear and Type A in the left ear.  Audiogram results revealed normal hearing bilaterally.  Speech reception thresholds were noted at 10 dB in the right ear and 10 dB in the left ear.  Speech discrimination scores were 100% in the right ear and 100% in the left ear.    Results discussed with patient. Referral made to ENT to further discuss dizziness and recurrent sinus issues.    Recommendations:  Otologic evaluation  Repeat audiogram as needed per medical management.  Hearing protection when in noise

## 2024-04-04 ENCOUNTER — OFFICE VISIT (OUTPATIENT)
Dept: CARDIOLOGY | Facility: CLINIC | Age: 69
End: 2024-04-04
Payer: MEDICARE

## 2024-04-04 ENCOUNTER — HOSPITAL ENCOUNTER (OUTPATIENT)
Dept: CARDIOLOGY | Facility: HOSPITAL | Age: 69
Discharge: HOME | End: 2024-04-04
Attending: PHYSICIAN ASSISTANT
Payer: MEDICARE

## 2024-04-04 ENCOUNTER — TELEPHONE (OUTPATIENT)
Dept: PRIMARY CARE CLINIC | Facility: CLINIC | Age: 69
End: 2024-04-04
Payer: MEDICARE

## 2024-04-04 VITALS
BODY MASS INDEX: 40.57 KG/M2 | SYSTOLIC BLOOD PRESSURE: 122 MMHG | WEIGHT: 252.44 LBS | DIASTOLIC BLOOD PRESSURE: 84 MMHG | HEIGHT: 66 IN | HEART RATE: 96 BPM

## 2024-04-04 DIAGNOSIS — R42 DIZZINESS: Primary | ICD-10-CM

## 2024-04-04 DIAGNOSIS — Z78.9 STATIN INTOLERANCE: ICD-10-CM

## 2024-04-04 DIAGNOSIS — E78.5 HYPERLIPIDEMIA, UNSPECIFIED HYPERLIPIDEMIA TYPE: ICD-10-CM

## 2024-04-04 DIAGNOSIS — R42 DIZZINESS: ICD-10-CM

## 2024-04-04 DIAGNOSIS — I77.1 TORTUOUS AORTA: ICD-10-CM

## 2024-04-04 DIAGNOSIS — I10 ESSENTIAL HYPERTENSION: ICD-10-CM

## 2024-04-04 PROCEDURE — 99214 OFFICE O/P EST MOD 30 MIN: CPT | Mod: S$PBB,,, | Performed by: PHYSICIAN ASSISTANT

## 2024-04-04 PROCEDURE — 93225 XTRNL ECG REC<48 HRS REC: CPT | Mod: PO

## 2024-04-04 PROCEDURE — 99999 PR PBB SHADOW E&M-EST. PATIENT-LVL III: CPT | Mod: PBBFAC,,, | Performed by: PHYSICIAN ASSISTANT

## 2024-04-04 PROCEDURE — 99213 OFFICE O/P EST LOW 20 MIN: CPT | Mod: PBBFAC,25,PO | Performed by: PHYSICIAN ASSISTANT

## 2024-04-04 PROCEDURE — 93005 ELECTROCARDIOGRAM TRACING: CPT | Mod: PBBFAC,PO | Performed by: INTERNAL MEDICINE

## 2024-04-04 PROCEDURE — 93227 XTRNL ECG REC<48 HR R&I: CPT | Mod: ,,, | Performed by: INTERNAL MEDICINE

## 2024-04-04 PROCEDURE — 93010 ELECTROCARDIOGRAM REPORT: CPT | Mod: S$PBB,,, | Performed by: INTERNAL MEDICINE

## 2024-04-04 RX ORDER — MECLIZINE HYDROCHLORIDE 25 MG/1
25 TABLET ORAL NIGHTLY PRN
COMMUNITY
Start: 2024-03-03

## 2024-04-04 NOTE — PROGRESS NOTES
Cardiology Clinic Note  Reason for Visit: Dizziness    HPI:     Problem List:  Hypertension  Hypercholesterolemia  CACS 65 in 2019 (75th percentile)  Diabetes  Hypothyroidism  QUINTIN  Overweight BMI ~40      Carolina Knight is a 68 y.o. F, who presents with complaint of dizziness. She has two episodes where it felt like the room was spinning. She noticed that her BP was low both times. She denies any palpitations, but when she felt dizzy yesterday she did not that her pulse was elevated. She denies any light headedness, shortness of breath or chest pain. She has been evaluated by audiology recently with normal results. She has sinus issues as well.     ROS:    Pertinent ROS included in HPI and below.  PMH:     Past Medical History:   Diagnosis Date    Decreased mobility and endurance 2017    Decreased strength 2017    Decreased strength of lower extremity 2021    Difficult intubation     Early dry stage nonexudative age-related macular degeneration     Edema 2018    Edema of both legs 2020    Fatty liver 12/15/2014    Hypokalemia 2018    Lymphedema of both lower extremities 2020    Multiple gastric polyps 2012    Polyarthralgia 2015    Sjogren's disease     Sleep apnea      Past Surgical History:   Procedure Laterality Date    BREAST BIOPSY Left 2017    core,Benign breast with fibrocystic changes, including benign usual ductal hyperplasia and adenosis    BREAST BIOPSY Right     core    CERVICAL CONIZATION   W/ LASER       SECTION      x1    CHOLECYSTECTOMY      ESOPHAGOGASTRODUODENOSCOPY N/A 2018    Procedure: ESOPHAGOGASTRODUODENOSCOPY (EGD);  Surgeon: George Henriquez MD;  Location: [facility] ([address] [place] [address]);  Service: Endoscopy;  Laterality: N/A;  possible difficult intubation - see anesthesia note dated 1/15/13 - 2nd floor/ generated from last EGD, 3 year f/u, Pt due/ Pt requesting Dr. Henriquez -     EYE SURGERY  Est -15    Laser on each  eye - tear in white part eye by Dr Nitish Ayala    HYSTERECTOMY  age 42    ORLANDO, LSO (uterine fibroids, adhesions, endometriosis)    OOPHORECTOMY  age 42    LSO (endometriosis, cyst)    PERCUTANEOUS CRYOTHERAPY OF PERIPHERAL NERVE USING LIQUID NITROUS OXIDE IN CLOSED NEEDLE DEVICE Right 03/01/2021    Procedure: CRYOTHERAPY, NERVE, PERIPHERAL, PERCUTANEOUS, USING LIQUID NITROUS OXIDE IN CLOSED NEEDLE DEVICE;  Surgeon: CHACE Riley;  Location: [facility];  Service: Pain Management;  Laterality: Right;  RIGHT KNEE IOVERA    THYROIDECTOMY      TOE SURGERY      left    TOTAL REPLACEMENT OF BOTH KNEES USING COMPUTER-ASSISTED NAVIGATION  2023    TOTAL THYROIDECTOMY       Allergies:     Review of patient's allergies indicates:   Allergen Reactions    Lipitor [atorvastatin]      Severe myalgia    Losartan Swelling     Possible throat closing sensation.    Lovastatin Other (See Comments)     myalgia    Cefuroxime Itching and Rash    Latex, natural rubber Itching and Rash     Pt reported      Medications:     Current Outpatient Medications on File Prior to Visit   Medication Sig Dispense Refill    albuterol (VENTOLIN HFA) 90 mcg/actuation inhaler Inhale 1-2 puffs into the lungs every 6 (six) hours as needed for Wheezing or Shortness of Breath. Rescue 18 g 2    amLODIPine (NORVASC) 10 MG tablet Take 1 tablet (10 mg total) by mouth once daily. 90 tablet 3    azelastine (ASTELIN) 137 mcg (0.1 %) nasal spray 1 spray (137 mcg total) by Nasal route daily as needed for Rhinitis. 30 mL 11    betamethasone dipropionate (DIPROLENE) 0.05 % ointment aaa qd- bid . Not more than 2 weeks straight in same location. Avoid use on face and groin 45 g 1    cetirizine (ZYRTEC) 10 MG tablet Take 10 mg by mouth every evening.       clobetasoL (TEMOVATE) 0.05 % cream Apply topically 2 (two) times daily. 60 g 3    diphenhydrAMINE (BANOPHEN) 25 mg capsule Take 1 each (25 mg total) by mouth every 6 (six) hours as needed for Itching or  Allergies. 60 capsule 0    econazole nitrate 1 % cream Use bid 30 g 2    fluconazole (DIFLUCAN) 150 MG Tab Take 1 tablet (150 mg total) by mouth once daily. 1 tablet 3    fluocinonide (LIDEX) 0.05 % external solution AAA scalp qday prn itching, scaling 60 mL 3    fluorouraciL (EFUDEX) 5 % cream Apply thin film to right lower leg  2times per day for 2-3 weeks; d/c if area bleeding or ulcerated; avoid eyes or mouth 40 g 1    fluticasone propionate (FLONASE) 50 mcg/actuation nasal spray SPRAY ONCE IN EACH NOSTRIL DAILY 16 g 3    hydroCHLOROthiazide (HYDRODIURIL) 12.5 MG Tab Take 2 tablets (25 mg total) by mouth once daily. 180 tablet 1    ketoconazole (NIZORAL) 2 % cream aaa bid on  right posterior leg x 3 weeks 60 g 3    levothyroxine (SYNTHROID) 125 MCG tablet Take 1 tablet (125 mcg total) by mouth once daily. 90 tablet 3    LUTEIN ORAL Take by mouth.      meclizine (ANTIVERT) 25 mg tablet Take 25 mg by mouth nightly as needed for Dizziness.      metFORMIN (GLUCOPHAGE-XR) 500 MG ER 24hr tablet Take 1 tablet (500 mg total) by mouth every evening. 90 tablet 3    montelukast (SINGULAIR) 10 mg tablet Take 1 tablet (10 mg total) by mouth every evening. (Patient taking differently: Take 10 mg by mouth nightly as needed.) 90 tablet 3    mv,Ca,min/iron/FA/guarana/caff (ONE-A-DAY WOMEN'S ACTIVE ORAL) Take by mouth.      nystatin (MYCOSTATIN) cream Apply topically 2 (two) times daily. 30 g 0    nystatin (MYCOSTATIN) powder Apply topically 2 (two) times daily. 60 g 1    pantoprazole (PROTONIX) 40 MG tablet Take 1 tablet (40 mg total) by mouth once daily. 90 tablet 3    RETIN-A 0.05 % cream Apply topically nightly as needed.      rosuvastatin (CRESTOR) 5 MG tablet Take 1 tablet (5 mg total) by mouth every Mon, Wed, Fri. 36 tablet 1    semaglutide (OZEMPIC SUBQ) Inject into the skin.      spironolactone (ALDACTONE) 50 MG tablet Take 1 tablet (50 mg total) by mouth once daily. 90 tablet 3    triamcinolone acetonide 0.1% (KENALOG)  "0.1 % ointment Apply topically 2 (two) times daily. 30 g 0    ergocalciferol, vitamin D2, (VITAMIN D ORAL) Take 1 tablet by mouth once daily.      FINACEA 15 % gel AAA face qhs then moisturize (Patient not taking: Reported on 8/15/2023) 50 g 3    [DISCONTINUED] augmented betamethasone dipropionate (DIPROLENE-AF) 0.05 % cream aaa 1-2 x daily. Not more than 2 weeks straight in same location, avoid use on face and groin 50 g 1    [DISCONTINUED] brompheniramine-pseudoeph-DM (BROMFED DM) 2-30-10 mg/5 mL Syrp SMARTSI Teaspoon By Mouth Every 6 Hours PRN      [DISCONTINUED] estradioL (ESTRACE) 0.01 % (0.1 mg/gram) vaginal cream PLACE 1 GRAM VAGINALLY EVERY DAY (Patient not taking: Reported on 8/15/2023) 42.5 g 1     Current Facility-Administered Medications on File Prior to Visit   Medication Dose Route Frequency Provider Last Rate Last Admin    ropivacaine 0.2% Nimbus PainPRO Pump infusion 500 ML   Perineural Continuous Mariana Vergara MD   New Bag at 21 1323     Social History:     Social History     Tobacco Use    Smoking status: Never    Smokeless tobacco: Never   Substance Use Topics    Alcohol use: No     Comment: none lately.  rarely has a drink for special occassion     Family History:     Family History   Problem Relation Age of Onset    Arthritis Mother     Asthma Father     COPD Father     COPD Sister     Melanoma Sister     Melanoma Brother     Diabetes Brother         Adult onset got in his 60s    Asthma Son     Breast cancer Maternal Aunt     Cancer Maternal Aunt     Cancer Maternal Uncle         esophageal cancer    Ovarian cancer Neg Hx     Colon cancer Neg Hx      Physical Exam:   /84 (BP Location: Left arm, Patient Position: Sitting, BP Method: Medium (Manual))   Pulse 96   Ht 5' 6" (1.676 m)   Wt 114.5 kg (252 lb 6.8 oz)   LMP  (LMP Unknown)   BMI 40.74 kg/m²      Physical Exam  Vitals and nursing note reviewed.   Constitutional:       Appearance: Normal appearance.   HENT:      " Head: Normocephalic and atraumatic.   Neck:      Vascular: Normal carotid pulses. No carotid bruit or hepatojugular reflux.   Cardiovascular:      Rate and Rhythm: Normal rate and regular rhythm.      Chest Wall: PMI is not displaced.      Pulses:           Radial pulses are 2+ on the right side and 2+ on the left side.        Dorsalis pedis pulses are 2+ on the right side and 2+ on the left side.        Posterior tibial pulses are 2+ on the right side and 2+ on the left side.      Heart sounds: No murmur heard.  Pulmonary:      Effort: Pulmonary effort is normal.      Breath sounds: Normal breath sounds. No wheezing, rhonchi or rales.   Abdominal:      General: Bowel sounds are normal. There is no abdominal bruit.      Palpations: Abdomen is soft. There is no pulsatile mass.      Tenderness: There is no abdominal tenderness.   Feet:      Right foot:      Skin integrity: Skin integrity normal.      Left foot:      Skin integrity: Skin integrity normal.   Skin:     Capillary Refill: Capillary refill takes less than 2 seconds.   Neurological:      General: No focal deficit present.      Mental Status: She is alert.   Psychiatric:         Mood and Affect: Mood and affect normal.         Speech: Speech normal.         Behavior: Behavior is cooperative.         Thought Content: Thought content normal.          Labs:     Blood Tests:  Lab Results   Component Value Date    BNP 21 07/09/2020     03/11/2024    K 4.3 03/11/2024     03/11/2024    CO2 28 03/11/2024    BUN 20 03/11/2024    CREATININE 1.1 03/11/2024     03/11/2024    HGBA1C 5.4 05/17/2023    MG 2.0 07/15/2022    AST 10 05/17/2023    ALT 19 05/17/2023    ALBUMIN 4.1 05/17/2023    PROT 8.0 05/17/2023    BILITOT 0.4 05/17/2023    WBC 7.29 05/17/2023    HGB 14.4 05/17/2023    HCT 45.5 05/17/2023    MCV 91 05/17/2023     05/17/2023    INR 0.9 01/25/2022    TSH 0.382 (L) 03/11/2024       Lab Results   Component Value Date    CHOL 165  03/15/2023    HDL 55 03/15/2023    TRIG 123 03/15/2023       Lab Results   Component Value Date    LDLCALC 85.4 03/15/2023         Imaging:     Echocardiogram  None    Stress testing  DSE 2021  A pharmacological stress test was performed using dobutamine. Atropine was used as an additional agent.  Peak heart rate acheived is 137 bpm, which is 92% of the age predicted maximum heart rate.  The study was stopped because the patient reached the end of the protocol.  There were no arrhythmias during stress.  The ECG portion of this study is negative for myocardial ischemia.  The left ventricle is normal in size with normal systolic function. The estimated ejection fraction is 60%  Normal left ventricular diastolic function.  Normal right ventricular size with normal right ventricular systolic function.  Normal central venous pressure (3 mmHg).  The stress echo portion of this study is negative for myocardial ischemia.    Cath Lab  None    Other  None    [id number]  NSR    Assessment:     1. Dizziness    2. Tortuous aorta    3. Essential hypertension    4. Hyperlipidemia, unspecified hyperlipidemia type    5. Statin intolerance        Plan:     Dizziness  -     IN OFFICE EKG 12-LEAD (to Muse); Future  -     Holter monitor - 48 hour; Future  Her symptoms are more consistent with BPPV vs. Eustachian tube dysfunction. We discussed a trial of reducing HCTZ from 25 down to 12.5 mg qD. She will continue to monitor BP in digital HTN program. Low suspicion of arrhythmia, but worthy of checking 48 hour holter monitor.     Tortuous aorta    Essential hypertension  Continue amlodipine 10 mg q.d., hydrochlorothiazide 12.5 mg q.d., spironolactone 50 mg q.d.  Recommend low-sodium diet and ongoing monitoring at home    Hyperlipidemia, unspecified hyperlipidemia type  Statin intolerance  She has a history of myalgias with statins, but is currently tolerating rosuvastatin 5 mg three days per week      Signed:  Korina Jacques  PAMAVERICK  Cardiology     4/4/2024 3:17 PM    Follow-up:     Future Appointments   Date Time Provider Department Center   5/6/2024 11:40 AM Austen Lucero MD [facility] ENT [address]   5/13/2024  3:00 PM [facility] [place]   6/3/2024 10:50 AM Samantha Avila MD [facility]ER[facility] [place]   6/14/2024 11:20 AM Soraya Desir NP [facility] SLEEP [facility]   10/29/2024 10:20 AM [facility] MAMMO [facility]

## 2024-04-04 NOTE — TELEPHONE ENCOUNTER
Sea, can you make sure pt has a f/u or annual appt w/ me please? Thanks! Looks like she had appt in April but canceled b/c time didn't work out.

## 2024-04-05 LAB
OHS QRS DURATION: 84 MS
OHS QTC CALCULATION: 440 MS

## 2024-04-09 LAB
OHS CV EVENT MONITOR DAY: 0
OHS CV HOLTER LENGTH DECIMAL HOURS: 48
OHS CV HOLTER LENGTH HOURS: 48
OHS CV HOLTER LENGTH MINUTES: 0
OHS CV HOLTER SINUS AVERAGE HR: 81
OHS CV HOLTER SINUS MAX HR: 122
OHS CV HOLTER SINUS MIN HR: 56

## 2024-04-20 DIAGNOSIS — J31.0 CHRONIC RHINITIS: ICD-10-CM

## 2024-04-22 ENCOUNTER — TELEPHONE (OUTPATIENT)
Dept: REHABILITATION | Facility: OTHER | Age: 69
End: 2024-04-22
Payer: MEDICARE

## 2024-04-22 RX ORDER — MONTELUKAST SODIUM 10 MG/1
10 TABLET ORAL NIGHTLY
Qty: 90 TABLET | Refills: 0 | Status: SHIPPED | OUTPATIENT
Start: 2024-04-22 | End: 2024-05-13 | Stop reason: SDUPTHER

## 2024-04-22 NOTE — TELEPHONE ENCOUNTER
Addended by: ODALYS BARTONIA on: 12/14/2020 01:58 PM     Modules accepted: Orders     Inquired about patient's interest in scheduling with health  in [employer] [facility].  (Left vm)

## 2024-05-01 ENCOUNTER — PATIENT MESSAGE (OUTPATIENT)
Dept: ADMINISTRATIVE | Facility: OTHER | Age: 69
End: 2024-05-01
Payer: MEDICARE

## 2024-05-07 DIAGNOSIS — I10 ESSENTIAL HYPERTENSION: ICD-10-CM

## 2024-05-08 RX ORDER — AMLODIPINE BESYLATE 10 MG/1
10 TABLET ORAL
Qty: 90 TABLET | Refills: 3 | Status: SHIPPED | OUTPATIENT
Start: 2024-05-08 | End: 2024-05-13 | Stop reason: SDUPTHER

## 2024-05-13 ENCOUNTER — LAB VISIT (OUTPATIENT)
Dept: LAB | Facility: HOSPITAL | Age: 69
End: 2024-05-13
Attending: INTERNAL MEDICINE
Payer: MEDICARE

## 2024-05-13 ENCOUNTER — OFFICE VISIT (OUTPATIENT)
Dept: PRIMARY CARE CLINIC | Facility: CLINIC | Age: 69
End: 2024-05-13
Payer: MEDICARE

## 2024-05-13 VITALS
TEMPERATURE: 98 F | SYSTOLIC BLOOD PRESSURE: 114 MMHG | OXYGEN SATURATION: 97 % | HEART RATE: 84 BPM | DIASTOLIC BLOOD PRESSURE: 84 MMHG | HEIGHT: 66 IN | BODY MASS INDEX: 40.6 KG/M2 | WEIGHT: 252.63 LBS

## 2024-05-13 DIAGNOSIS — E03.9 HYPOTHYROIDISM, UNSPECIFIED TYPE: ICD-10-CM

## 2024-05-13 DIAGNOSIS — R73.03 PREDIABETES: ICD-10-CM

## 2024-05-13 DIAGNOSIS — I10 ESSENTIAL HYPERTENSION: Primary | ICD-10-CM

## 2024-05-13 DIAGNOSIS — Z78.0 POSTMENOPAUSAL ESTROGEN DEFICIENCY: ICD-10-CM

## 2024-05-13 DIAGNOSIS — K76.0 FATTY LIVER: ICD-10-CM

## 2024-05-13 DIAGNOSIS — K21.9 GASTROESOPHAGEAL REFLUX DISEASE WITHOUT ESOPHAGITIS: ICD-10-CM

## 2024-05-13 DIAGNOSIS — R22.2 MASS OF SUBCUTANEOUS TISSUE OF BACK: ICD-10-CM

## 2024-05-13 DIAGNOSIS — Z78.9 STATIN INTOLERANCE: ICD-10-CM

## 2024-05-13 DIAGNOSIS — E78.5 HYPERLIPIDEMIA, UNSPECIFIED HYPERLIPIDEMIA TYPE: ICD-10-CM

## 2024-05-13 DIAGNOSIS — I10 ESSENTIAL HYPERTENSION: ICD-10-CM

## 2024-05-13 DIAGNOSIS — G47.33 OSA (OBSTRUCTIVE SLEEP APNEA): ICD-10-CM

## 2024-05-13 DIAGNOSIS — Z12.11 COLON CANCER SCREENING: ICD-10-CM

## 2024-05-13 DIAGNOSIS — M35.00 SJOGREN'S SYNDROME, WITH UNSPECIFIED ORGAN INVOLVEMENT: ICD-10-CM

## 2024-05-13 DIAGNOSIS — E66.01 MORBID OBESITY WITH BODY MASS INDEX (BMI) OF 40.0 TO 49.9: ICD-10-CM

## 2024-05-13 DIAGNOSIS — J31.0 CHRONIC RHINITIS: ICD-10-CM

## 2024-05-13 LAB
ALBUMIN SERPL BCP-MCNC: 4 G/DL (ref 3.5–5.2)
ALP SERPL-CCNC: 61 U/L (ref 55–135)
ALT SERPL W/O P-5'-P-CCNC: 22 U/L (ref 10–44)
ANION GAP SERPL CALC-SCNC: 11 MMOL/L (ref 8–16)
AST SERPL-CCNC: 11 U/L (ref 10–40)
BASOPHILS # BLD AUTO: 0.08 K/UL (ref 0–0.2)
BASOPHILS NFR BLD: 0.7 % (ref 0–1.9)
BILIRUB SERPL-MCNC: 0.3 MG/DL (ref 0.1–1)
BUN SERPL-MCNC: 23 MG/DL (ref 8–23)
CALCIUM SERPL-MCNC: 9.8 MG/DL (ref 8.7–10.5)
CHLORIDE SERPL-SCNC: 103 MMOL/L (ref 95–110)
CHOLEST SERPL-MCNC: 175 MG/DL (ref 120–199)
CHOLEST/HDLC SERPL: 3 {RATIO} (ref 2–5)
CO2 SERPL-SCNC: 24 MMOL/L (ref 23–29)
CREAT SERPL-MCNC: 0.9 MG/DL (ref 0.5–1.4)
DIFFERENTIAL METHOD BLD: ABNORMAL
EOSINOPHIL # BLD AUTO: 0.3 K/UL (ref 0–0.5)
EOSINOPHIL NFR BLD: 2.5 % (ref 0–8)
ERYTHROCYTE [DISTWIDTH] IN BLOOD BY AUTOMATED COUNT: 14.6 % (ref 11.5–14.5)
EST. GFR  (NO RACE VARIABLE): >60 ML/MIN/1.73 M^2
ESTIMATED AVG GLUCOSE: 111 MG/DL (ref 68–131)
GLUCOSE SERPL-MCNC: 101 MG/DL (ref 70–110)
HBA1C MFR BLD: 5.5 % (ref 4–5.6)
HCT VFR BLD AUTO: 42.8 % (ref 37–48.5)
HDLC SERPL-MCNC: 58 MG/DL (ref 40–75)
HDLC SERPL: 33.1 % (ref 20–50)
HGB BLD-MCNC: 14.1 G/DL (ref 12–16)
IMM GRANULOCYTES # BLD AUTO: 0.1 K/UL (ref 0–0.04)
IMM GRANULOCYTES NFR BLD AUTO: 0.9 % (ref 0–0.5)
LDLC SERPL CALC-MCNC: 95.2 MG/DL (ref 63–159)
LYMPHOCYTES # BLD AUTO: 3.3 K/UL (ref 1–4.8)
LYMPHOCYTES NFR BLD: 29.4 % (ref 18–48)
MCH RBC QN AUTO: 29.9 PG (ref 27–31)
MCHC RBC AUTO-ENTMCNC: 32.9 G/DL (ref 32–36)
MCV RBC AUTO: 91 FL (ref 82–98)
MONOCYTES # BLD AUTO: 1 K/UL (ref 0.3–1)
MONOCYTES NFR BLD: 8.8 % (ref 4–15)
NEUTROPHILS # BLD AUTO: 6.4 K/UL (ref 1.8–7.7)
NEUTROPHILS NFR BLD: 57.7 % (ref 38–73)
NONHDLC SERPL-MCNC: 117 MG/DL
NRBC BLD-RTO: 0 /100 WBC
PLATELET # BLD AUTO: 323 K/UL (ref 150–450)
PMV BLD AUTO: 10.3 FL (ref 9.2–12.9)
POTASSIUM SERPL-SCNC: 4.2 MMOL/L (ref 3.5–5.1)
PROT SERPL-MCNC: 7.9 G/DL (ref 6–8.4)
RBC # BLD AUTO: 4.72 M/UL (ref 4–5.4)
SODIUM SERPL-SCNC: 138 MMOL/L (ref 136–145)
T4 FREE SERPL-MCNC: 1.38 NG/DL (ref 0.71–1.51)
TRIGL SERPL-MCNC: 109 MG/DL (ref 30–150)
TSH SERPL DL<=0.005 MIU/L-ACNC: 0.21 UIU/ML (ref 0.4–4)
WBC # BLD AUTO: 11.09 K/UL (ref 3.9–12.7)

## 2024-05-13 PROCEDURE — 80061 LIPID PANEL: CPT | Performed by: INTERNAL MEDICINE

## 2024-05-13 PROCEDURE — 99214 OFFICE O/P EST MOD 30 MIN: CPT | Mod: S$PBB,,, | Performed by: INTERNAL MEDICINE

## 2024-05-13 PROCEDURE — 84443 ASSAY THYROID STIM HORMONE: CPT | Performed by: INTERNAL MEDICINE

## 2024-05-13 PROCEDURE — 83036 HEMOGLOBIN GLYCOSYLATED A1C: CPT | Performed by: INTERNAL MEDICINE

## 2024-05-13 PROCEDURE — 85025 COMPLETE CBC W/AUTO DIFF WBC: CPT | Performed by: INTERNAL MEDICINE

## 2024-05-13 PROCEDURE — 99999 PR PBB SHADOW E&M-EST. PATIENT-LVL V: CPT | Mod: PBBFAC,,, | Performed by: INTERNAL MEDICINE

## 2024-05-13 PROCEDURE — 99215 OFFICE O/P EST HI 40 MIN: CPT | Mod: PBBFAC,PN | Performed by: INTERNAL MEDICINE

## 2024-05-13 PROCEDURE — 80053 COMPREHEN METABOLIC PANEL: CPT | Performed by: INTERNAL MEDICINE

## 2024-05-13 PROCEDURE — 84439 ASSAY OF FREE THYROXINE: CPT | Performed by: INTERNAL MEDICINE

## 2024-05-13 PROCEDURE — 36415 COLL VENOUS BLD VENIPUNCTURE: CPT | Mod: PN | Performed by: INTERNAL MEDICINE

## 2024-05-13 RX ORDER — SPIRONOLACTONE 50 MG/1
50 TABLET, FILM COATED ORAL DAILY
Qty: 90 TABLET | Refills: 3 | Status: SHIPPED | OUTPATIENT
Start: 2024-05-13

## 2024-05-13 RX ORDER — MONTELUKAST SODIUM 10 MG/1
10 TABLET ORAL NIGHTLY
Qty: 90 TABLET | Refills: 3 | Status: SHIPPED | OUTPATIENT
Start: 2024-05-13

## 2024-05-13 RX ORDER — HYDROCHLOROTHIAZIDE 12.5 MG/1
12.5 TABLET ORAL 2 TIMES DAILY
Qty: 180 TABLET | Refills: 3 | Status: SHIPPED | OUTPATIENT
Start: 2024-05-13

## 2024-05-13 RX ORDER — AMLODIPINE BESYLATE 10 MG/1
10 TABLET ORAL DAILY
Qty: 90 TABLET | Refills: 3 | Status: SHIPPED | OUTPATIENT
Start: 2024-05-13

## 2024-05-13 RX ORDER — ROSUVASTATIN CALCIUM 5 MG/1
5 TABLET, COATED ORAL
Qty: 36 TABLET | Refills: 3 | Status: SHIPPED | OUTPATIENT
Start: 2024-05-13 | End: 2025-05-13

## 2024-05-13 RX ORDER — METFORMIN HYDROCHLORIDE 500 MG/1
500 TABLET, EXTENDED RELEASE ORAL NIGHTLY
Qty: 90 TABLET | Refills: 3 | Status: SHIPPED | OUTPATIENT
Start: 2024-05-13 | End: 2025-05-13

## 2024-05-13 RX ORDER — PANTOPRAZOLE SODIUM 40 MG/1
40 TABLET, DELAYED RELEASE ORAL DAILY
Qty: 90 TABLET | Refills: 3 | Status: SHIPPED | OUTPATIENT
Start: 2024-05-13

## 2024-05-13 NOTE — PROGRESS NOTES
INTERNAL MEDICINE ANNUAL VISIT NOTE      CHIEF COMPLAINT     Chief Complaint   Patient presents with    Annual Exam       HPI     Carolina Knight is a 69 y.o. C female who presents for annual.  MMG - 10/2023 neg.  CSCOPE -   DEXA - 8/3/2020 osteopenia. Not taking d. Drinking a glass of milk a day.   Saw Dr. Hastings in GYn 24  Derm exam 24. Family h/o melanoma.   Cleaning out the closets at home and getting rid of junk. Walks a lot.     Sjogren's - more dry eyes. Seeing retina specialist recently. Taking benadryl due to sinus issues. Off now.   Follows w/ dentist regularly and had appt a few weeks ago and was normal.     HTN - amlo 10mg daily, hctz 25mg daily, aldactone 50mg daily  Baseline Cr 0.7-0.9. most recent 3/11/24 1.1.     HLD - crestor 5mg 3 days a week.   H/o statin induced myalgia.     Hypothyroidism - synthroid 125mcg daily - does not wait the full 60 min between meds.  TSH mildly low.     GERD - protonix 40mg daily.     Was on ozempic briefly. Off now.       Past Medical History:  Past Medical History:   Diagnosis Date    Decreased mobility and endurance 2017    Decreased strength 2017    Decreased strength of lower extremity 2021    Difficult intubation     Early dry stage nonexudative age-related macular degeneration     Edema 2018    Edema of both legs 2020    Fatty liver 12/15/2014    Hypokalemia 2018    Lymphedema of both lower extremities 2020    Multiple gastric polyps 2012    Polyarthralgia 2015    Sjogren's disease     Sleep apnea        Past Surgical History:  Past Surgical History:   Procedure Laterality Date    BREAST BIOPSY Left 2017    core,Benign breast with fibrocystic changes, including benign usual ductal hyperplasia and adenosis    BREAST BIOPSY Right     core    CERVICAL CONIZATION   W/ LASER       SECTION      x1    CHOLECYSTECTOMY      ESOPHAGOGASTRODUODENOSCOPY N/A 2018    Procedure:  ESOPHAGOGASTRODUODENOSCOPY (EGD);  Surgeon: George Henriquez MD;  [facility]  Service: Endoscopy;  Laterality: N/A;  possible difficult intubation - see anesthesia note dated 1/15/13 - 2nd floor/ generated from last EGD, 3 year f/u, Pt due/ Pt requesting Dr. Henriquez -     EYE SURGERY  Est 2014-15    Laser on each eye - tear in white part eye by Dr Nitish Ayala    HYSTERECTOMY  age 42    [place], LSO (uterine fibroids, adhesions, endometriosis)    OOPHORECTOMY  age 42    LSO (endometriosis, cyst)    PERCUTANEOUS CRYOTHERAPY OF PERIPHERAL NERVE USING LIQUID NITROUS OXIDE IN CLOSED NEEDLE DEVICE Right 03/01/2021    Procedure: CRYOTHERAPY, NERVE, PERIPHERAL, PERCUTANEOUS, USING LIQUID NITROUS OXIDE IN CLOSED NEEDLE DEVICE;  Surgeon: CHACE Riley;  Location: [facility];  Service: Pain Management;  Laterality: Right;  RIGHT KNEE IOVERA    THYROIDECTOMY      TOE SURGERY      left    TOTAL REPLACEMENT OF BOTH KNEES USING COMPUTER-ASSISTED NAVIGATION  2023    TOTAL THYROIDECTOMY         Allergies:  Review of patient's allergies indicates:   Allergen Reactions    Lipitor [atorvastatin]      Severe myalgia    Losartan Swelling     Possible throat closing sensation.    Lovastatin Other (See Comments)     myalgia    Cefuroxime Itching and Rash    Latex, natural rubber Itching and Rash     Pt reported        Home Medications:   reviewed.     Family History:  Family History   Problem Relation Name Age of Onset    Arthritis Mother Siomara Skaggs     Asthma Father Mariana Dudley  Sr     COPD Father Mariana Dudley  Sr     COPD Sister Jolynn dudley     Melanoma Sister Cheryle     Melanoma Brother Mariana     Diabetes Brother Chris dudley         Adult onset got in his 60s    Asthma Son Sergio Garcia     Breast cancer Maternal Aunt One Aunt had breast cancer     Cancer Maternal Aunt One Aunt had breast cancer     Cancer Maternal Uncle Uncle Al had rare liver cancer         esophageal cancer     "Ovarian cancer Neg Hx      Colon cancer Neg Hx         Social History:  Social History     Tobacco Use    Smoking status: Never    Smokeless tobacco: Never   Substance Use Topics    Alcohol use: No     Comment: none lately.  rarely has a drink for special occassion    Drug use: No       Review of Systems:  Review of Systems   Constitutional:  Negative for activity change and unexpected weight change.   HENT:  Negative for hearing loss, rhinorrhea and trouble swallowing.    Eyes:  Negative for discharge and visual disturbance.   Respiratory:  Negative for chest tightness and wheezing.    Cardiovascular:  Negative for chest pain and palpitations.   Gastrointestinal:  Negative for blood in stool, constipation, diarrhea and vomiting.   Endocrine: Negative for polydipsia and polyuria.   Genitourinary:  Negative for difficulty urinating, dysuria, hematuria and menstrual problem.   Musculoskeletal:  Negative for arthralgias, joint swelling and neck pain.   Neurological:  Negative for weakness and headaches.   Psychiatric/Behavioral:  Negative for confusion and dysphoric mood.      Health Maintainence:    reviewed    PHYSICAL EXAM     /84 (BP Location: Right arm, Patient Position: Sitting, BP Method: Large (Manual))   Pulse 84   Temp 98.2 °F (36.8 °C) (Oral)   Ht 5' 6" (1.676 m)   Wt 114.6 kg (252 lb 10.4 oz)   LMP  (LMP Unknown)   SpO2 97%   BMI 40.78 kg/m²     GEN - A+OX4, NAD   HEENT - PERRL, EOMI, OP clear. MMM. TM normal.   Neck - No thyromegaly or cervical LAD. No thyroid masses felt.  CV - RRR, no m/r   Chest - CTAB, no wheezing or rhonchi  Abd - S/NT/ND/+BS.   Ext - 2+BDP and radial pulses. No LE edema.   Neuro - PERRL, EOMI, no nystagmus, eyebrow raise, facial sensation, hearing, m of mastication, smile, palatal raise, shoulder shrug, tongue protrusion symmetric and intact. 5/5 BUE and BLE strength. Sensation to light touch intact throughout. Dec B patellar DTRs due to h/o TKA. Normal gait.   MSK - " No spinal tenderness to palpation. Normal gait. Multiple lipomas of the upper thigh. Small, round, well defined and mobile lump at the R mid paraspinal region.   Skin - excoriation. Dry skin.     LABS     Previous labs reviewed.    ASSESSMENT/PLAN     Carolina Knight is a 69 y.o. female with  Carolina was seen today for annual exam.    Diagnoses and all orders for this visit:    Essential hypertension  -     Comprehensive Metabolic Panel; Future  -     amLODIPine (NORVASC) 10 MG tablet; Take 1 tablet (10 mg total) by mouth once daily.  -     spironolactone (ALDACTONE) 50 MG tablet; Take 1 tablet (50 mg total) by mouth once daily.  -     hydroCHLOROthiazide (HYDRODIURIL) 12.5 MG Tab; Take 1 tablet (12.5 mg total) by mouth 2 (two) times a day.    Sjogren's syndrome, with unspecified organ involvement - f/u w/ dentist and eye doctor    Hyperlipidemia, unspecified hyperlipidemia type  -     Comprehensive Metabolic Panel; Future  -     Lipid Panel; Future  -     rosuvastatin (CRESTOR) 5 MG tablet; Take 1 tablet (5 mg total) by mouth every Mon, Wed, Fri.    Hypothyroidism, unspecified type - cont synthroid.   -     TSH; Future    Morbid obesity with body mass index (BMI) of 40.78 - start exercising. HTN as above.   -     CBC Auto Differential; Future    Statin intolerance  -     Lipid Panel; Future    QUINTIN (obstructive sleep apnea) - cont CPAP.     Prediabetes  -     Hemoglobin A1C; Future  -     metFORMIN (GLUCOPHAGE-XR) 500 MG ER 24hr tablet; Take 1 tablet (500 mg total) by mouth every evening.    Postmenopausal estrogen deficiency  -     DXA Bone Density Axial Skeleton 1 or more sites; Future    Chronic rhinitis  Comments:  asked pt. to alternate astelin with flonase daily and continue zyrtec prn   Orders:  -     montelukast (SINGULAIR) 10 mg tablet; Take 1 tablet (10 mg total) by mouth every evening.    Gastroesophageal reflux disease without esophagitis  -     pantoprazole (PROTONIX) 40 MG tablet; Take 1 tablet (40  mg total) by mouth once daily.  -     Ambulatory referral/consult to Endo Procedure ; Future    Colon cancer screening  -     Ambulatory referral/consult to Endo Procedure ; Future    Advance Care Planning     Date: 05/13/2024    Living Will  During this visit, I engaged the patient  in the voluntary advance care planning process.  The patient and I reviewed the role for advance directives and their purpose in directing future healthcare if the patient's unable to speak for him/herself.  At this point in time, the patient does have full decision-making capacity.  We discussed different extreme health states that she could experience, and reviewed what kind of medical care she would want in those situations.  The patient communicated that if she were comatose and had little chance of a meaningful recovery, she would not want machines/life-sustaining treatments used. In addition to the above preference, other important end-of-life issues for the patient include  see paperwork . The patient has completed a living will to reflect these preferences and The patient has already designated a healthcare power of  to make decisions on her behalf.  I spent a total of 1 minutes engaging the patient in this advance care planning discussion.          Power of   I initiated the process of voluntary advance care planning today and explained the importance of this process to the patient.  I introduced the concept of advance directives to the patient, as well. Then the patient received detailed information about the importance of designating a Health Care Power of  (HCPOA). She was also instructed to communicate with this person about their wishes for future healthcare, should she become sick and lose decision-making capacity. The patient has previously appointed a HCPOA. After our discussion, the patient has decided to complete a HCPOA and has appointed her sister, health care agent:  Cheryle   & health care agent number:  [phone]  I spent a total time of 1 minutes discussing this issue with the patient.       RTC in 4 months, sooner if needed and depending on labs.    Kierra Cornejo MD  Department of Internal Medicine - [facility]  2:34 PM

## 2024-05-14 ENCOUNTER — TELEPHONE (OUTPATIENT)
Dept: PRIMARY CARE CLINIC | Facility: CLINIC | Age: 69
End: 2024-05-14
Payer: MEDICARE

## 2024-05-14 DIAGNOSIS — N39.0 URINARY TRACT INFECTION WITHOUT HEMATURIA, SITE UNSPECIFIED: Primary | ICD-10-CM

## 2024-05-14 DIAGNOSIS — E03.4 HYPOTHYROIDISM DUE TO ACQUIRED ATROPHY OF THYROID: ICD-10-CM

## 2024-05-14 RX ORDER — LEVOTHYROXINE SODIUM 125 UG/1
125 TABLET ORAL DAILY
Start: 2024-05-14

## 2024-05-14 NOTE — TELEPHONE ENCOUNTER
Looks like your TSH is still low, which means you're on too much thyroid hormone. Instead of taking levothyroxine 125mcg daily, please take it daily EXCEPT skip the pill on Sunday.  Sugars, liver enzymes, kidney function, cholesterol are ok.   Urine with a few white blood cells but doesn't look like any urinary tract infection. There is no extra protein spilling into the urine.  Blood counts are ok.     Sent message via portal.

## 2024-05-16 RX ORDER — NITROFURANTOIN 25; 75 MG/1; MG/1
100 CAPSULE ORAL 2 TIMES DAILY
Qty: 10 CAPSULE | Refills: 0 | Status: SHIPPED | OUTPATIENT
Start: 2024-05-16 | End: 2024-05-21

## 2024-05-17 ENCOUNTER — HOSPITAL ENCOUNTER (OUTPATIENT)
Dept: RADIOLOGY | Facility: HOSPITAL | Age: 69
Discharge: HOME | End: 2024-05-17
Attending: INTERNAL MEDICINE
Payer: MEDICARE

## 2024-05-17 DIAGNOSIS — R22.2 MASS OF SUBCUTANEOUS TISSUE OF BACK: ICD-10-CM

## 2024-05-17 DIAGNOSIS — K76.0 FATTY LIVER: ICD-10-CM

## 2024-05-17 PROCEDURE — 76700 US EXAM ABDOM COMPLETE: CPT | Mod: 26,,, | Performed by: RADIOLOGY

## 2024-05-17 PROCEDURE — 76604 US EXAM CHEST: CPT | Mod: TC

## 2024-05-17 PROCEDURE — 76700 US EXAM ABDOM COMPLETE: CPT | Mod: TC

## 2024-05-17 PROCEDURE — 76604 US EXAM CHEST: CPT | Mod: 26,,, | Performed by: RADIOLOGY

## 2024-05-24 ENCOUNTER — HOSPITAL ENCOUNTER (OUTPATIENT)
Dept: RADIOLOGY | Facility: HOSPITAL | Age: 69
Discharge: HOME | End: 2024-05-24
Attending: INTERNAL MEDICINE
Payer: MEDICARE

## 2024-05-24 DIAGNOSIS — Z78.0 POSTMENOPAUSAL ESTROGEN DEFICIENCY: ICD-10-CM

## 2024-05-24 PROCEDURE — 77080 DXA BONE DENSITY AXIAL: CPT | Mod: 26,,, | Performed by: RADIOLOGY

## 2024-05-24 PROCEDURE — 77080 DXA BONE DENSITY AXIAL: CPT | Mod: TC

## 2024-06-03 ENCOUNTER — OFFICE VISIT (OUTPATIENT)
Dept: DERMATOLOGY | Facility: CLINIC | Age: 69
End: 2024-06-03
Payer: MEDICARE

## 2024-06-03 ENCOUNTER — PATIENT MESSAGE (OUTPATIENT)
Dept: ORTHOPEDICS | Facility: CLINIC | Age: 69
End: 2024-06-03
Payer: MEDICARE

## 2024-06-03 DIAGNOSIS — L82.0 INFLAMED SEBORRHEIC KERATOSIS: Primary | ICD-10-CM

## 2024-06-03 DIAGNOSIS — L98.8 RHYTIDES: ICD-10-CM

## 2024-06-03 DIAGNOSIS — L82.1 SEBORRHEIC KERATOSES: ICD-10-CM

## 2024-06-03 DIAGNOSIS — L30.9 DERMATITIS: ICD-10-CM

## 2024-06-03 PROCEDURE — 17110 DESTRUCTION B9 LES UP TO 14: CPT | Mod: PBBFAC,PO | Performed by: DERMATOLOGY

## 2024-06-03 PROCEDURE — 99213 OFFICE O/P EST LOW 20 MIN: CPT | Mod: PBBFAC,PO,25 | Performed by: DERMATOLOGY

## 2024-06-03 PROCEDURE — 99213 OFFICE O/P EST LOW 20 MIN: CPT | Mod: 25,S$PBB,, | Performed by: DERMATOLOGY

## 2024-06-03 PROCEDURE — 17110 DESTRUCTION B9 LES UP TO 14: CPT | Mod: S$PBB,,, | Performed by: DERMATOLOGY

## 2024-06-03 PROCEDURE — 99999 PR PBB SHADOW E&M-EST. PATIENT-LVL III: CPT | Mod: PBBFAC,,, | Performed by: DERMATOLOGY

## 2024-06-03 RX ORDER — TRIAMCINOLONE ACETONIDE 1 MG/G
CREAM TOPICAL
Qty: 45 G | Refills: 3 | Status: SHIPPED | OUTPATIENT
Start: 2024-06-03

## 2024-06-03 RX ORDER — TRETINOIN 0.5 MG/G
CREAM TOPICAL
Qty: 30 G | Refills: 5 | Status: SHIPPED | OUTPATIENT
Start: 2024-06-03

## 2024-06-03 NOTE — PROGRESS NOTES
Subjective:      Patient ID:  Carolina Knight is a 69 y.o. female who presents for   Chief Complaint   Patient presents with    Lesion     L leg      Pt here today for lesion io back of L leg ;; scaly, itchy , irritated.   Pt here today for spots on L side of face , raised and brown. No tx  Pt here today for itching on R side back     Lesion      Review of Systems   Skin:  Positive for itching and rash.       Objective:   Physical Exam   Constitutional: She appears well-developed and well-nourished. No distress.   Neurological: She is alert and oriented to person, place, and time. She is not disoriented.   Psychiatric: She has a normal mood and affect.   Skin:   Areas Examined (abnormalities noted in diagram):   Head / Face Inspection Performed  Back Inspection Performed  RLE Inspected  LLE Inspection Performed                 Diagram Legend     Erythematous scaling macule/papule c/w actinic keratosis       Vascular papule c/w angioma      Pigmented verrucoid papule/plaque c/w seborrheic keratosis      Yellow umbilicated papule c/w sebaceous hyperplasia      Irregularly shaped tan macule c/w lentigo     1-2 mm smooth white papules consistent with Milia      Movable subcutaneous cyst with punctum c/w epidermal inclusion cyst      Subcutaneous movable cyst c/w pilar cyst      Firm pink to brown papule c/w dermatofibroma      Pedunculated fleshy papule(s) c/w skin tag(s)      Evenly pigmented macule c/w junctional nevus     Mildly variegated pigmented, slightly irregular-bordered macule c/w mildly atypical nevus      Flesh colored to evenly pigmented papule c/w intradermal nevus       Pink pearly papule/plaque c/w basal cell carcinoma      Erythematous hyperkeratotic cursted plaque c/w SCC      Surgical scar with no sign of skin cancer recurrence      Open and closed comedones      Inflammatory papules and pustules      Verrucoid papule consistent consistent with wart     Erythematous eczematous patches and  plaques     Dystrophic onycholytic nail with subungual debris c/w onychomycosis     Umbilicated papule    Erythematous-base heme-crusted tan verrucoid plaque consistent with inflamed seborrheic keratosis     Erythematous Silvery Scaling Plaque c/w Psoriasis     See annotation      Assessment / Plan:          Inflamed seborrheic keratosis  Cryosurgery procedure note:    Verbal consent from the patient is obtained including, but not limited to, risk of hypopigmentation/hyperpigmentation, scar, recurrence of lesion. Liquid nitrogen cryosurgery is applied to 3 lesions to produce a freeze injury. The patient is aware that blisters may form and is instructed on wound care with gentle cleansing and use of vaseline ointment to keep moist until healed. The patient is supplied a handout on cryosurgery and is instructed to call if lesions do not completely resolve.    Dermatitis  -     triamcinolone acetonide 0.1% (KENALOG) 0.1 % cream; Aaa qd- bid prn back itching . Not more than 2 weeks straight in the same location. Avoid use on face and groin  Dispense: 45 g; Refill: 3    Rhytides  -     tretinoin (RETIN-A) 0.05 % cream; Compound tretinoin 0.05% / niacinamide 2% cream. Apply a pea-sized amount to entire face qhs then moisturize  Dispense: 30 g; Refill: 5    Seborrheic keratoses  These are benign inherited growths without a malignant potential. Reassurance given to patient. No treatment is necessary.              Follow up in about 6 months (around 12/3/2024) for TBSE. Recheck nevus on right medial calf for changes

## 2024-06-06 ENCOUNTER — OFFICE VISIT (OUTPATIENT)
Dept: ORTHOPEDICS | Facility: CLINIC | Age: 69
End: 2024-06-06
Payer: MEDICARE

## 2024-06-06 ENCOUNTER — HOSPITAL ENCOUNTER (OUTPATIENT)
Dept: RADIOLOGY | Facility: HOSPITAL | Age: 69
Discharge: HOME | End: 2024-06-06
Payer: MEDICARE

## 2024-06-06 DIAGNOSIS — M25.561 ACUTE PAIN OF RIGHT KNEE: ICD-10-CM

## 2024-06-06 DIAGNOSIS — Z96.651 S/P TOTAL KNEE REPLACEMENT, RIGHT: Primary | ICD-10-CM

## 2024-06-06 PROCEDURE — 99999 PR PBB SHADOW E&M-EST. PATIENT-LVL II: CPT | Mod: PBBFAC,,,

## 2024-06-06 PROCEDURE — 99212 OFFICE O/P EST SF 10 MIN: CPT | Mod: PBBFAC,25

## 2024-06-06 PROCEDURE — 73562 X-RAY EXAM OF KNEE 3: CPT | Mod: TC,50

## 2024-06-06 PROCEDURE — 73562 X-RAY EXAM OF KNEE 3: CPT | Mod: 26,50,, | Performed by: RADIOLOGY

## 2024-06-06 PROCEDURE — 99213 OFFICE O/P EST LOW 20 MIN: CPT | Mod: S$PBB,,,

## 2024-06-06 NOTE — PROGRESS NOTES
SUBJECTIVE:     Chief Complaint & History of Present Illness:  Carolina Knight is a 69 y.o. female with a past surgical history of bilateral TKA by Dr. Marley with left on 2021 and right on 2022 who is seen here today with a complaint of right knee pain. The pain has been present for 3 weeks following twisting her knee. The patient describes the pain as a mild-to-moderate dull ache located in the lateral knee at the time of the injury, but now she denies any pain of the right knee. The pain was worse with any weight bearing and improved by nothing. The patient notes feeling a pop on injury but no associated effusion, knee giving out, knee locking.  She denies having any current pain, but she wanted to come into clinic to verify no damage was done to the TKA.      Past Medical History:   Diagnosis Date    Decreased mobility and endurance 2017    Decreased strength 2017    Decreased strength of lower extremity 2021    Difficult intubation     Early dry stage nonexudative age-related macular degeneration     Edema 2018    Edema of both legs 2020    Fatty liver 12/15/2014    Hypokalemia 2018    Lymphedema of both lower extremities 2020    Multiple gastric polyps 2012    Polyarthralgia 2015    Sjogren's disease     Sleep apnea        Past Surgical History:   Procedure Laterality Date    BREAST BIOPSY Left     core,Benign breast with fibrocystic changes, including benign usual ductal hyperplasia and adenosis    BREAST BIOPSY Right     core    CERVICAL CONIZATION   W/ LASER       SECTION      x1    CHOLECYSTECTOMY      ESOPHAGOGASTRODUODENOSCOPY N/A 2018    Procedure: ESOPHAGOGASTRODUODENOSCOPY (EGD);  Surgeon: George Henriquez MD;  Location: [facility] ([address] [place] [address]);  Service: Endoscopy;  Laterality: N/A;  possible difficult intubation - see anesthesia note dated 1/15/13 - 2nd floor/ generated from last EGD, 3 year f/u, Pt due/ Pt  requesting Dr. Henriquez -     EYE SURGERY  Est 2014-15    Laser on each eye - tear in white part eye by Dr Nitish Ayala    HYSTERECTOMY  age 42    [place], LSO (uterine fibroids, adhesions, endometriosis)    OOPHORECTOMY  age 42    LSO (endometriosis, cyst)    PERCUTANEOUS CRYOTHERAPY OF PERIPHERAL NERVE USING LIQUID NITROUS OXIDE IN CLOSED NEEDLE DEVICE Right 03/01/2021    Procedure: CRYOTHERAPY, NERVE, PERIPHERAL, PERCUTANEOUS, USING LIQUID NITROUS OXIDE IN CLOSED NEEDLE DEVICE;  Surgeon: CHACE Riley;  Location: [facility];  Service: Pain Management;  Laterality: Right;  RIGHT KNEE IOVERA    THYROIDECTOMY      TOE SURGERY      left    TOTAL REPLACEMENT OF BOTH KNEES USING COMPUTER-ASSISTED NAVIGATION  2023    TOTAL THYROIDECTOMY         Family History   Problem Relation Name Age of Onset    Arthritis Mother Siomara Skaggs     Asthma Father Mariana Dudley  Sr     COPD Father Mariana Dudley  Sr     COPD Sister Jolynn dudley     Melanoma Sister Cheryle     Melanoma Brother Mariana     Diabetes Brother Chris dudley         Adult onset got in his 60s    Asthma Son Sergio Garcia     Breast cancer Maternal Aunt One Aunt had breast cancer     Cancer Maternal Aunt One Aunt had breast cancer     Cancer Maternal Uncle Uncle Al had rare liver cancer         esophageal cancer    Ovarian cancer Neg Hx      Colon cancer Neg Hx         Review of patient's allergies indicates:   Allergen Reactions    Lipitor [atorvastatin]      Severe myalgia    Losartan Swelling     Possible throat closing sensation.    Lovastatin Other (See Comments)     myalgia    Cefuroxime Itching and Rash    Latex, natural rubber Itching and Rash     Pt reported            Current Outpatient Medications:     albuterol (VENTOLIN HFA) 90 mcg/actuation inhaler, Inhale 1-2 puffs into the lungs every 6 (six) hours as needed for Wheezing or Shortness of Breath. Rescue, Disp: 18 g, Rfl: 2    amLODIPine (NORVASC) 10 MG tablet, Take 1 tablet (10 mg  total) by mouth once daily., Disp: 90 tablet, Rfl: 3    azelastine (ASTELIN) 137 mcg (0.1 %) nasal spray, 1 spray (137 mcg total) by Nasal route daily as needed for Rhinitis., Disp: 30 mL, Rfl: 11    betamethasone dipropionate (DIPROLENE) 0.05 % ointment, aaa qd- bid . Not more than 2 weeks straight in same location. Avoid use on face and groin, Disp: 45 g, Rfl: 1    cetirizine (ZYRTEC) 10 MG tablet, Take 10 mg by mouth every evening. , Disp: , Rfl:     clobetasoL (TEMOVATE) 0.05 % cream, Apply topically 2 (two) times daily., Disp: 60 g, Rfl: 3    diphenhydrAMINE (BANOPHEN) 25 mg capsule, Take 1 each (25 mg total) by mouth every 6 (six) hours as needed for Itching or Allergies., Disp: 60 capsule, Rfl: 0    econazole nitrate 1 % cream, Use bid, Disp: 30 g, Rfl: 2    FINACEA 15 % gel, AAA face qhs then moisturize, Disp: 50 g, Rfl: 3    fluocinonide (LIDEX) 0.05 % external solution, AAA scalp qday prn itching, scaling, Disp: 60 mL, Rfl: 3    fluorouraciL (EFUDEX) 5 % cream, Apply thin film to right lower leg  2times per day for 2-3 weeks; d/c if area bleeding or ulcerated; avoid eyes or mouth, Disp: 40 g, Rfl: 1    fluticasone propionate (FLONASE) 50 mcg/actuation nasal spray, SPRAY ONCE IN EACH NOSTRIL DAILY, Disp: 16 g, Rfl: 3    hydroCHLOROthiazide (HYDRODIURIL) 12.5 MG Tab, Take 1 tablet (12.5 mg total) by mouth 2 (two) times a day., Disp: 180 tablet, Rfl: 3    ketoconazole (NIZORAL) 2 % cream, aaa bid on  right posterior leg x 3 weeks, Disp: 60 g, Rfl: 3    levothyroxine (SYNTHROID) 125 MCG tablet, Take 1 tablet (125 mcg total) by mouth once daily. EXCEPT skip sundays, Disp: , Rfl:     LUTEIN ORAL, Take by mouth., Disp: , Rfl:     meclizine (ANTIVERT) 25 mg tablet, Take 25 mg by mouth nightly as needed for Dizziness., Disp: , Rfl:     metFORMIN (GLUCOPHAGE-XR) 500 MG ER 24hr tablet, Take 1 tablet (500 mg total) by mouth every evening., Disp: 90 tablet, Rfl: 3    montelukast (SINGULAIR) 10 mg tablet, Take 1 tablet  (10 mg total) by mouth every evening., Disp: 90 tablet, Rfl: 3    nystatin (MYCOSTATIN) cream, Apply topically 2 (two) times daily., Disp: 30 g, Rfl: 0    nystatin (MYCOSTATIN) powder, Apply topically 2 (two) times daily., Disp: 60 g, Rfl: 1    pantoprazole (PROTONIX) 40 MG tablet, Take 1 tablet (40 mg total) by mouth once daily., Disp: 90 tablet, Rfl: 3    RETIN-A 0.05 % cream, Apply topically nightly as needed., Disp: , Rfl:     rosuvastatin (CRESTOR) 5 MG tablet, Take 1 tablet (5 mg total) by mouth every Mon, Wed, Fri., Disp: 36 tablet, Rfl: 3    spironolactone (ALDACTONE) 50 MG tablet, Take 1 tablet (50 mg total) by mouth once daily., Disp: 90 tablet, Rfl: 3    tretinoin (RETIN-A) 0.05 % cream, Compound tretinoin 0.05% / niacinamide 2% cream. Apply a pea-sized amount to entire face qhs then moisturize, Disp: 30 g, Rfl: 5    triamcinolone acetonide 0.1% (KENALOG) 0.1 % cream, Aaa qd- bid prn back itching . Not more than 2 weeks straight in the same location. Avoid use on face and groin, Disp: 45 g, Rfl: 3    triamcinolone acetonide 0.1% (KENALOG) 0.1 % ointment, Apply topically 2 (two) times daily., Disp: 30 g, Rfl: 0  No current facility-administered medications for this visit.    Facility-Administered Medications Ordered in Other Visits:     ropivacaine 0.2% [facility] PainPRO Pump infusion 500 ML, , Perineural, Continuous, Mariana Vergara MD, New Bag at 03/09/21 1323      Review of Systems:  ROS:  The updated medical history is in the chart and has been reviewed. A review of systems is updated and there is no reported vision changes, ear/nose/mouth/throat complaints, chest pain, shortness of breath, abdominal pain, urological complaints, fevers or chills, psychiatric complaints. Musculoskeletal and neurologcial symptoms are as documented. All other systems are negative.      OBJECTIVE:     PHYSICAL EXAM:  LMP  (LMP Unknown)   General: Pleasant, cooperative, NAD.  HEENT: NCAT, sclera nonicteric.  Lungs:  Respirations are equal and unlabored.   Abdomen: Soft and non-tender.  CV: 2+ bilateral upper and lower extremity pulses.  Neuro: Sensation intact to light touch.  Skin: Intact throughout LE with no rashes, erythema, or lesions.  Extremities: No LE edema, NVI lower extremities. normal gait.    right Knee Exam:  Knee Range of Motion:  0-120   Effusion: none  Condition of skin: intact  Location of tenderness: None   Strength: 5/5 quadriceps strength, 5/5 gastroc-soleus strength, 5/5 hamstring strength, and 5/5 tibialis anterior strength  Stability: stable to testing  Knee Alignment: normal    left Knee Exam:  Knee Range of Motion:  0-120   Effusion: none  Condition of skin: intact  Location of tenderness: None   Strength: 5/5 quadriceps strength, 5/5 gastroc-soleus strength, 5/5 hamstring strength, and 5/5 tibialis anterior strength  Stability: stable to testing  Knee Alignment: normal    RADIOGRAPHS:  X-rays of the bilateral knees taken today personally reviewed. Imaging reveals well-aligned prosthesis with no acute subsidence, fractures, or dislocations.      ASSESSMENT:       ICD-10-CM ICD-9-CM   1. S/P total knee replacement, right  Z96.651 V43.65   2. Acute pain of right knee  M25.561 719.46       PLAN:     We discussed with the patient at length all the different treatment options available including anti-inflammatories, acetaminophen, rest, ice, knee strengthening exercise, occasional cortisone injections for temporary relief, Viscosupplimentation injections, arthroscopic debridement, osteotomy, and finally knee arthroplasty.     Due to the pain fully resolving following the injury, patient instructed to continue conservative treatment via rest, ice, compression, elevation, and NSAIDs/Tylenol as needed for pain if the pain comes back.

## 2024-07-03 ENCOUNTER — PATIENT MESSAGE (OUTPATIENT)
Dept: PRIMARY CARE CLINIC | Facility: CLINIC | Age: 69
End: 2024-07-03
Payer: MEDICARE

## 2024-07-05 ENCOUNTER — CLINICAL SUPPORT (OUTPATIENT)
Dept: ENDOSCOPY | Facility: HOSPITAL | Age: 69
End: 2024-07-05
Attending: INTERNAL MEDICINE
Payer: MEDICARE

## 2024-07-05 ENCOUNTER — TELEPHONE (OUTPATIENT)
Dept: ENDOSCOPY | Facility: HOSPITAL | Age: 69
End: 2024-07-05

## 2024-07-05 ENCOUNTER — PATIENT MESSAGE (OUTPATIENT)
Dept: ENDOSCOPY | Facility: HOSPITAL | Age: 69
End: 2024-07-05

## 2024-07-05 DIAGNOSIS — K21.9 GASTROESOPHAGEAL REFLUX DISEASE WITHOUT ESOPHAGITIS: ICD-10-CM

## 2024-07-05 DIAGNOSIS — K21.9 GASTROESOPHAGEAL REFLUX DISEASE, UNSPECIFIED WHETHER ESOPHAGITIS PRESENT: Primary | ICD-10-CM

## 2024-07-05 DIAGNOSIS — Z12.11 SCREEN FOR COLON CANCER: ICD-10-CM

## 2024-07-05 DIAGNOSIS — Z12.11 COLON CANCER SCREENING: ICD-10-CM

## 2024-07-05 NOTE — TELEPHONE ENCOUNTER
Telephoned pt for PAT appt to schedule EGD/Colonoscopy.  Awaiting September and October schedules to open.  New Refendo placed and direct contact number provided to pt.

## 2024-07-05 NOTE — PLAN OF CARE
PAT appt to schedule EGD/Colonoscopy.  Awaiting September and October schedules to open.  New Refendo placed and direct contact number provided to pt.

## 2024-07-15 ENCOUNTER — OFFICE VISIT (OUTPATIENT)
Dept: PRIMARY CARE CLINIC | Facility: CLINIC | Age: 69
End: 2024-07-15
Payer: MEDICARE

## 2024-07-15 VITALS
OXYGEN SATURATION: 98 % | WEIGHT: 262.81 LBS | HEART RATE: 74 BPM | BODY MASS INDEX: 42.24 KG/M2 | SYSTOLIC BLOOD PRESSURE: 128 MMHG | DIASTOLIC BLOOD PRESSURE: 72 MMHG | HEIGHT: 66 IN | RESPIRATION RATE: 16 BRPM

## 2024-07-15 DIAGNOSIS — I10 ESSENTIAL HYPERTENSION: ICD-10-CM

## 2024-07-15 DIAGNOSIS — Z74.09 OTHER REDUCED MOBILITY: ICD-10-CM

## 2024-07-15 DIAGNOSIS — Z86.018: ICD-10-CM

## 2024-07-15 DIAGNOSIS — M35.00 SJOGREN'S SYNDROME, WITH UNSPECIFIED ORGAN INVOLVEMENT: ICD-10-CM

## 2024-07-15 DIAGNOSIS — Z00.00 ENCOUNTER FOR INITIAL ANNUAL WELLNESS VISIT (AWV) IN MEDICARE PATIENT: Primary | ICD-10-CM

## 2024-07-15 DIAGNOSIS — I87.2 VENOUS INSUFFICIENCY OF BOTH LOWER EXTREMITIES: ICD-10-CM

## 2024-07-15 DIAGNOSIS — K76.0 FATTY LIVER: ICD-10-CM

## 2024-07-15 DIAGNOSIS — G47.33 OSA (OBSTRUCTIVE SLEEP APNEA): ICD-10-CM

## 2024-07-15 DIAGNOSIS — Z78.9 STATIN INTOLERANCE: ICD-10-CM

## 2024-07-15 DIAGNOSIS — E66.01 MORBID OBESITY WITH BODY MASS INDEX (BMI) OF 40.0 TO 49.9: ICD-10-CM

## 2024-07-15 DIAGNOSIS — M17.12 PRIMARY OSTEOARTHRITIS OF LEFT KNEE: ICD-10-CM

## 2024-07-15 DIAGNOSIS — J45.20 MILD INTERMITTENT ASTHMA WITHOUT COMPLICATION: ICD-10-CM

## 2024-07-15 DIAGNOSIS — E78.5 HYPERLIPIDEMIA, UNSPECIFIED HYPERLIPIDEMIA TYPE: ICD-10-CM

## 2024-07-15 DIAGNOSIS — M17.11 PRIMARY OSTEOARTHRITIS OF RIGHT KNEE: ICD-10-CM

## 2024-07-15 DIAGNOSIS — K21.9 GASTROESOPHAGEAL REFLUX DISEASE WITHOUT ESOPHAGITIS: ICD-10-CM

## 2024-07-15 DIAGNOSIS — I77.1 TORTUOUS AORTA: ICD-10-CM

## 2024-07-15 DIAGNOSIS — E03.9 HYPOTHYROIDISM, UNSPECIFIED TYPE: ICD-10-CM

## 2024-07-15 PROBLEM — H91.90 HEARING LOSS: Status: RESOLVED | Noted: 2024-03-12 | Resolved: 2024-07-15

## 2024-07-15 PROBLEM — I83.93 VARICOSE VEINS OF BOTH LOWER EXTREMITIES: Status: RESOLVED | Noted: 2020-07-29 | Resolved: 2024-07-15

## 2024-07-15 PROBLEM — M53.86 DECREASED ROM OF LUMBAR SPINE: Status: RESOLVED | Noted: 2017-11-16 | Resolved: 2024-07-15

## 2024-07-15 PROCEDURE — G0439 PPPS, SUBSEQ VISIT: HCPCS | Mod: ,,, | Performed by: NURSE PRACTITIONER

## 2024-07-15 PROCEDURE — 99999 PR PBB SHADOW E&M-EST. PATIENT-LVL III: CPT | Mod: PBBFAC,,, | Performed by: NURSE PRACTITIONER

## 2024-07-15 PROCEDURE — 99213 OFFICE O/P EST LOW 20 MIN: CPT | Mod: PBBFAC | Performed by: NURSE PRACTITIONER

## 2024-07-15 NOTE — PATIENT INSTRUCTIONS
Counseling and Referral of Other Preventative  (Italic type indicates deductible and co-insurance are waived)    Patient Name: Carolina Knight  Today's Date: 7/15/2024    Health Maintenance       Date Due Completion Date    RSV Vaccine (Age 60+ and Pregnant patients) (1 - 1-dose 60+ series) Never done ---    Colorectal Cancer Screening 06/18/2023 6/18/2022    COVID-19 Vaccine (5 - 2023-24 season) 09/01/2023 8/31/2022    Influenza Vaccine (1) 09/01/2024 10/12/2023    Mammogram 10/27/2024 10/27/2023    Hemoglobin A1c (Prediabetes) 05/13/2025 5/13/2024    TETANUS VACCINE 12/21/2026 12/21/2016    DEXA Scan 05/24/2027 5/24/2024    Lipid Panel 05/13/2029 5/13/2024        No orders of the defined types were placed in this encounter.    The following information is provided to all patients.  This information is to help you find resources for any of the problems found today that may be affecting your health:                  Living healthy guide: www.[facility].[place].[facility]      Understanding Diabetes: www.diabetes.org      Eating healthy: www.cdc.gov/healthyweight      CDC home safety checklist: www.cdc.gov/steadi/patient.html      Agency on Aging: www.goea.[place]      Alcoholics anonymous (AA): www.aa.org      Physical Activity: www.fabian.nih.gov/xd6cmhz      Tobacco use: www.quitwithusla.org         Counseling and Referral of Other Preventative  (Italic type indicates deductible and co-insurance are waived)    Patient Name: Carolina Knight  Today's Date: 7/15/2024    Health Maintenance       Date Due Completion Date    RSV Vaccine (Age 60+ and Pregnant patients) (1 - 1-dose 60+ series) Never done ---    Colorectal Cancer Screening 06/18/2023 6/18/2022    COVID-19 Vaccine (5 - 2023-24 season) 09/01/2023 8/31/2022    Influenza Vaccine (1) 09/01/2024 10/12/2023    Mammogram 10/27/2024 10/27/2023    Hemoglobin A1c (Prediabetes) 05/13/2025 5/13/2024    TETANUS VACCINE 12/21/2026 12/21/2016    DEXA Scan 05/24/2027  5/24/2024    Lipid Panel 05/13/2029 5/13/2024        No orders of the defined types were placed in this encounter.    The following information is provided to all patients.  This information is to help you find resources for any of the problems found today that may be affecting your health:                  Living healthy guide: www.[facility]      Understanding Diabetes: www.diabetes.org      Eating healthy: www.cdc.gov/healthyweight      CDC home safety checklist: www.cdc.gov/steadi/patient.html      Agency on Aging: www.goea.[place].[facility]      Alcoholics anonymous (AA): www.aa.org      Physical Activity: www.fabian.nih.gov/ny6uevq      Tobacco use: www.quitwithusla.org

## 2024-07-15 NOTE — PROGRESS NOTES
"  Carolina Knight presented for an initial Medicare AWV today. The following components were reviewed and updated:    Medical history  Family History  Social history  Allergies and Current Medications  Health Risk Assessment  Health Maintenance  Care Team    **See Completed Assessments for Annual Wellness visit with in the encounter summary    The following assessments were completed:  Depression Screening  Cognitive function Screening  Timed Get Up Test  Whisper Test      Opioid documentation:      Patient does not have a current opioid prescription.          Vitals:    07/15/24 0843   BP: 128/72   BP Location: Right arm   Patient Position: Sitting   Pulse: 74   Resp: 16   SpO2: 98%   Weight: 119.2 kg (262 lb 12.6 oz)   Height: 5' 6" (1.676 m)     Body mass index is 42.42 kg/m².       Physical Exam  Vitals and nursing note reviewed.   Constitutional:       General: She is not in acute distress.     Appearance: Normal appearance. She is not ill-appearing.   HENT:      Head: Normocephalic and atraumatic.      Right Ear: Tympanic membrane normal.      Left Ear: Tympanic membrane normal.      Nose: Nose normal.      Mouth/Throat:      Mouth: Mucous membranes are moist.   Eyes:      Pupils: Pupils are equal, round, and reactive to light.   Cardiovascular:      Rate and Rhythm: Normal rate and regular rhythm.      Heart sounds: Normal heart sounds.   Pulmonary:      Effort: Pulmonary effort is normal. No respiratory distress.      Breath sounds: Normal breath sounds.   Abdominal:      Palpations: Abdomen is soft.   Musculoskeletal:         General: Normal range of motion.      Cervical back: Normal range of motion.   Lymphadenopathy:      Cervical: No cervical adenopathy.   Skin:     General: Skin is warm and dry.   Neurological:      General: No focal deficit present.      Mental Status: She is alert and oriented to person, place, and time.   Psychiatric:         Mood and Affect: Mood normal.         Behavior: " Behavior normal.         Thought Content: Thought content normal.         Judgment: Judgment normal.           Diagnoses and health risks identified today and associated recommendations/orders:  1. Encounter for initial annual wellness visit (AWV) in Medicare patient      2. Essential hypertension   Stable on current medications, /70   We will continue to monitor   Low-sodium diet    3. Hyperlipidemia, unspecified hyperlipidemia type   Has been stable on current medication   We will continue to    4. Mild intermittent asthma without complication   Stable on current medication   We will continue to monitor    5. Sjogren's syndrome, with unspecified organ involvement   Stable, we will continue to monitor    6. Hypothyroidism, unspecified type   Stable on current medication   We will continue to monitor    7. Venous insufficiency of both lower extremities   Has been stable, we will continue to monitor   Managed by Dr. Plummer    8. Tortuous aorta   Stable, we will continue to monitor    9. H/O benign gastric tumor   Stable, we will continue to my    10. Morbid obesity with body mass index (BMI) of 40.0 to 49.9   We will continue to monitor    11. Gastroesophageal reflux disease without esophagitis   Stable on current medication, we will continue to my    12. Fatty liver   Stable, we will continue to monitor    13. Primary osteoarthritis of right knee   Stable since knee replacement, we will continue to    14. Primary osteoarthritis of left knee   Stable since knee replacement, we will continue    15. Statin intolerance   Stable on current medication every other day   We will continue to monitor    16. QUINTIN (obstructive sleep apnea)   Stable, we will continue to monitor    17. Other reduced mobility   Has been resolved since bilateral knee replacement      Provided Carolina with a 5-10 year written screening schedule and personal prevention plan. Recommendations were developed using the USPSTF age appropriate  recommendations. Education, counseling, and referrals were provided as needed.  After Visit Summary printed and given to patient which includes a list of additional screenings\tests needed.    No follow-ups on file.      Ines Zuniga NP    I offered to discuss advanced care planning, including how to pick a person who would make decisions for you if you were unable to make them for yourself, called a health care power of , and what kind of decisions you might make such as use of life sustaining treatments such as ventilators and tube feeding when faced with a life limiting illness recorded on a living will that they will need to know. (How you want to be cared for as you near the end of your natural life)     X Patient is interested in learning more about how to make advanced directives.  I provided them paperwork and offered to discuss this with them.  I offered to discuss advanced care planning, including how to pick a person who would make decisions for you if you were unable to make them for yourself, called a health care power of , and what kind of decisions you might make such as use of life sustaining treatments such as ventilators and tube feeding when faced with a life limiting illness recorded on a living will that they will need to know. (How you want to be cared for as you near the end of your natural life)     X  Patient has advanced directives on file, which we reviewed, and they do not wish to make changes.

## 2024-07-19 ENCOUNTER — OFFICE VISIT (OUTPATIENT)
Dept: OBSTETRICS AND GYNECOLOGY | Facility: CLINIC | Age: 69
End: 2024-07-19
Payer: MEDICARE

## 2024-07-19 VITALS — SYSTOLIC BLOOD PRESSURE: 127 MMHG | BODY MASS INDEX: 42.7 KG/M2 | WEIGHT: 264.56 LBS | DIASTOLIC BLOOD PRESSURE: 83 MMHG

## 2024-07-19 DIAGNOSIS — L02.92 FURUNCLE: Primary | ICD-10-CM

## 2024-07-19 PROCEDURE — 99212 OFFICE O/P EST SF 10 MIN: CPT | Mod: S$PBB,,, | Performed by: OBSTETRICS & GYNECOLOGY

## 2024-07-19 PROCEDURE — 99212 OFFICE O/P EST SF 10 MIN: CPT | Mod: PBBFAC,PO | Performed by: OBSTETRICS & GYNECOLOGY

## 2024-07-19 PROCEDURE — 99999 PR PBB SHADOW E&M-EST. PATIENT-LVL II: CPT | Mod: PBBFAC,,, | Performed by: OBSTETRICS & GYNECOLOGY

## 2024-07-19 RX ORDER — FLUCONAZOLE 150 MG/1
150 TABLET ORAL
Qty: 2 TABLET | Refills: 0 | Status: SHIPPED | OUTPATIENT
Start: 2024-07-19 | End: 2024-07-23

## 2024-07-19 RX ORDER — SULFAMETHOXAZOLE AND TRIMETHOPRIM 800; 160 MG/1; MG/1
1 TABLET ORAL 2 TIMES DAILY
Qty: 14 TABLET | Refills: 0 | Status: SHIPPED | OUTPATIENT
Start: 2024-07-19 | End: 2024-07-26

## 2024-07-19 RX ORDER — MUPIROCIN 20 MG/G
OINTMENT TOPICAL 3 TIMES DAILY
Qty: 30 G | Refills: 0 | Status: SHIPPED | OUTPATIENT
Start: 2024-07-19

## 2024-07-19 NOTE — PROGRESS NOTES
Chief Complaint   Patient presents with    bump on vagina       HPI:   Carolina Knight 69 y.o.  is here for a bump on the left labia that she noticed when she wiped there was blood so she squeezed it and more blood came out. Can't really see it. Doesn't really hurt.  Has odor under panus and thinks there is yeast. Puts powder on it.      No LMP recorded (lmp unknown). Patient has had a hysterectomy.     Past Medical History:   Diagnosis Date    Decreased mobility and endurance 2017    Decreased strength 2017    Decreased strength of lower extremity 2021    Difficult intubation     Early dry stage nonexudative age-related macular degeneration     Edema 2018    Edema of both legs 2020    Fatty liver 12/15/2014    Hypokalemia 2018    Lymphedema of both lower extremities 2020    Multiple gastric polyps 2012    Polyarthralgia 2015    Sjogren's disease     Sleep apnea        Past Surgical History:   Procedure Laterality Date    BREAST BIOPSY Left 2017    core,Benign breast with fibrocystic changes, including benign usual ductal hyperplasia and adenosis    BREAST BIOPSY Right     core    CERVICAL CONIZATION   W/ LASER       SECTION      x1    CHOLECYSTECTOMY      ESOPHAGOGASTRODUODENOSCOPY N/A 2018    Procedure: ESOPHAGOGASTRODUODENOSCOPY (EGD);  Surgeon: George Henriquez MD;  Location: [facility] ([address] [place] [address]);  Service: Endoscopy;  Laterality: N/A;  possible difficult intubation - see anesthesia note dated 1/15/13 - 2nd floor/ generated from last EGD, 3 year f/u, Pt due/ Pt requesting Dr. Henriquez -     EYE SURGERY  Est 2014-15    Laser on each eye - tear in white part eye by Dr Nitish Ayala    HYSTERECTOMY  age 42    [place], LSO (uterine fibroids, adhesions, endometriosis)    OOPHORECTOMY  age 42    LSO (endometriosis, cyst)    PERCUTANEOUS CRYOTHERAPY OF PERIPHERAL NERVE USING LIQUID NITROUS OXIDE IN CLOSED NEEDLE DEVICE Right 2021    Procedure:  CRYOTHERAPY, NERVE, PERIPHERAL, PERCUTANEOUS, USING LIQUID NITROUS OXIDE IN CLOSED NEEDLE DEVICE;  Surgeon: CHACE Riley;  Location: [facility];  Service: Pain Management;  Laterality: Right;  RIGHT KNEE IOVERA    THYROIDECTOMY      TOE SURGERY      left    TOTAL REPLACEMENT OF BOTH KNEES USING COMPUTER-ASSISTED NAVIGATION  2023    TOTAL THYROIDECTOMY         Family History   Problem Relation Name Age of Onset    Arthritis Mother Siomara Skaggs     Asthma Father Mariana Dudley  Sr     COPD Father Mariana Dudley  Sr     COPD Sister Jolynn dudley     Melanoma Sister Cheryle     Melanoma Brother Mariana     Diabetes Brother Chris dudley         Adult onset got in his 60s    Asthma Son Sergio Garcia     Breast cancer Maternal Aunt One Aunt had breast cancer     Cancer Maternal Aunt One Aunt had breast cancer     Cancer Maternal Uncle Uncle Al had rare liver cancer         esophageal cancer    Ovarian cancer Neg Hx      Colon cancer Neg Hx         Social History     Socioeconomic History    Marital status:      Spouse name: Nitish    Number of children: 1   Occupational History    Occupation:      Comment: retired   Tobacco Use    Smoking status: Never    Smokeless tobacco: Never   Substance and Sexual Activity    Alcohol use: No     Comment: none lately.  rarely has a drink for special occassion    Drug use: No    Sexual activity: Not Currently     Partners: Male     Birth control/protection: Post-menopausal, None   Social History Narrative    House- has stairs     Social Determinants of Health     Financial Resource Strain: Low Risk  (7/15/2024)    Overall Financial Resource Strain (CARDIA)     Difficulty of Paying Living Expenses: Not very hard   Food Insecurity: No Food Insecurity (7/15/2024)    Hunger Vital Sign     Worried About Running Out of Food in the Last Year: Never true     Ran Out of Food in the Last Year: Never true   Transportation Needs: No Transportation Needs  (7/15/2024)    PRAPARE - Transportation     Lack of Transportation (Medical): No     Lack of Transportation (Non-Medical): No   Physical Activity: Insufficiently Active (7/15/2024)    Exercise Vital Sign     Days of Exercise per Week: 2 days     Minutes of Exercise per Session: 20 min   Stress: No Stress Concern Present (7/15/2024)    [employer] - Occupational Stress Questionnaire     Feeling of Stress : Not at all   Housing Stability: Low Risk  (7/15/2024)    Housing Stability Vital Sign     Unable to Pay for Housing in the Last Year: No     Homeless in the Last Year: No       OB History          1    Para   1    Term   1            AB        Living   1         SAB        IAB        Ectopic        Multiple        Live Births   1                        ROS:    All other ROS negative     PE:   /83   Wt 120 kg (264 lb 8.8 oz)   LMP  (LMP Unknown)   BMI 42.70 kg/m²     APPEARANCE: Well nourished, well developed, in no acute distress.  Panus: normal appearing skin with no redness  PELVIC:   EXTERNAL GENITALIA/VULVA: 1 cm area of erythema with small opening on it, no fluctuation under it. On exam not able to express purulent material. Normal female genitalia.  URETHRAL MEATUS: Normal size and location, no lesions, no prolapse.  URETHRA: No masses, tenderness, prolapse or scarring.  BLADDER: non-tender, no masses  VAGINA: atrophic no discharge, no significant cystocele or rectocele.  CERVIX: No lesions and discharge.          1. Furuncle        Plan:  Appears like healing furuncle vs small abscess. Will do topical bactroban and bactrim and RTC 1 week; doesn't appear like needs to be drained but will let me know if changes. Wants diflucan since gets yeast easily. Discussed under panus doesn't appear like yeast. Recommend keeping as dry as possible helen in heat.          Face to Face time with patient: 20 minutes of total time spent on the encounter, which includes face to  face time and non-face to face time preparing to see the patient (eg, review of tests), Obtaining and/or reviewing separately obtained history, Documenting clinical information in the electronic or other health record, Independently interpreting results (not separately reported) and communicating results to the patient/family/caregiver, or Care coordination (not separately reported).

## 2024-08-01 DIAGNOSIS — E03.4 HYPOTHYROIDISM DUE TO ACQUIRED ATROPHY OF THYROID: ICD-10-CM

## 2024-08-01 RX ORDER — LEVOTHYROXINE SODIUM 125 UG/1
125 TABLET ORAL
Qty: 90 TABLET | Refills: 3 | Status: SHIPPED | OUTPATIENT
Start: 2024-08-01

## 2024-08-05 RX ORDER — NYSTATIN TOPICAL POWDER 100000 U/G
POWDER TOPICAL 2 TIMES DAILY
Qty: 60 G | Refills: 1 | Status: SHIPPED | OUTPATIENT
Start: 2024-08-05

## 2024-08-15 ENCOUNTER — TELEPHONE (OUTPATIENT)
Dept: ENDOSCOPY | Facility: HOSPITAL | Age: 69
End: 2024-08-15
Payer: MEDICARE

## 2024-08-15 VITALS — BODY MASS INDEX: 41.78 KG/M2 | HEIGHT: 66 IN | WEIGHT: 260 LBS

## 2024-08-15 DIAGNOSIS — K21.9 GASTROESOPHAGEAL REFLUX DISEASE, UNSPECIFIED WHETHER ESOPHAGITIS PRESENT: Primary | ICD-10-CM

## 2024-08-15 DIAGNOSIS — Z12.11 COLON CANCER SCREENING: ICD-10-CM

## 2024-08-15 RX ORDER — SODIUM, POTASSIUM,MAG SULFATES 17.5-3.13G
1 SOLUTION, RECONSTITUTED, ORAL ORAL DAILY
Qty: 1 KIT | Refills: 0 | Status: SHIPPED | OUTPATIENT
Start: 2024-08-15 | End: 2024-08-17

## 2024-08-15 NOTE — TELEPHONE ENCOUNTER
Spoke to pt to schedule procedure(s) Colonoscopy/EGD       Physician to perform procedure(s) Dr. SUKI Henriquez  Date of Procedure (s) 10/14/24  Arrival Time 8:30 AM  Time of Procedure(s) 9:30 AM   Location of Procedure(s) [facility] 2nd Floor  Type of Rx Prep sent to patient: Suprep  Instructions provided to patient via [facility]    Patient was informed on the following information and verbalized understanding. Screening questionnaire reviewed with patient and complete. If procedure requires anesthesia, a responsible adult needs to be present to accompany the patient home, patient cannot drive after receiving anesthesia. Appointment details are tentative, especially check-in time. Patient will receive a prep-op call 7 days prior to confirm check-in time for procedure. If applicable the patient should contact their pharmacy to verify Rx for procedure prep is ready for pick-up. Patient was advised to call the scheduling department at [phone] if pharmacy states no Rx is available. Patient was advised to call the endoscopy scheduling department if any questions or concerns arise.       Endoscopy Scheduling Department    Patient denies taking any anticoagulants, antiplatelets,  or Adipex (Phentermine).

## 2024-08-16 ENCOUNTER — PATIENT MESSAGE (OUTPATIENT)
Dept: PRIMARY CARE CLINIC | Facility: CLINIC | Age: 69
End: 2024-08-16
Payer: MEDICARE

## 2024-08-27 ENCOUNTER — OFFICE VISIT (OUTPATIENT)
Dept: URGENT CARE | Facility: CLINIC | Age: 69
End: 2024-08-27
Payer: MEDICARE

## 2024-08-27 VITALS
WEIGHT: 260 LBS | DIASTOLIC BLOOD PRESSURE: 82 MMHG | OXYGEN SATURATION: 95 % | TEMPERATURE: 98 F | HEIGHT: 66 IN | RESPIRATION RATE: 20 BRPM | SYSTOLIC BLOOD PRESSURE: 120 MMHG | BODY MASS INDEX: 41.78 KG/M2 | HEART RATE: 73 BPM

## 2024-08-27 DIAGNOSIS — T63.441A BEE STING, ACCIDENTAL OR UNINTENTIONAL, INITIAL ENCOUNTER: Primary | ICD-10-CM

## 2024-08-27 PROCEDURE — 99213 OFFICE O/P EST LOW 20 MIN: CPT | Mod: S$GLB,,, | Performed by: NURSE PRACTITIONER

## 2024-08-27 RX ORDER — PREDNISONE 10 MG/1
TABLET ORAL
Qty: 4 TABLET | Refills: 0 | Status: SHIPPED | OUTPATIENT
Start: 2024-08-27 | End: 2024-08-30

## 2024-08-27 RX ORDER — TRIAMCINOLONE ACETONIDE 1 MG/G
CREAM TOPICAL 2 TIMES DAILY
Qty: 15 G | Refills: 0 | Status: SHIPPED | OUTPATIENT
Start: 2024-08-27 | End: 2024-09-06

## 2024-08-27 NOTE — PROGRESS NOTES
"Subjective:      Patient ID: Carolina Knight is a 69 y.o. female.    Vitals:  height is 5' 6" (1.676 m) and weight is 117.9 kg (260 lb). Her oral temperature is 98.2 °F (36.8 °C). Her blood pressure is 120/82 and her pulse is 73. Her respiration is 20 and oxygen saturation is 95%.     Chief Complaint: Insect Bite    Pt present with a bee sting to her right hand X 2 days and she has swelling and itching.   Provider note below:  This is a 69 y.o. female who presents today with a chief complaint of bee sting to her right hand that happened 2 days ago, patient reports she noticed yesterday that it is little spreading, patient reports she is taking Benadryl at night and takes Zyrtec every day, denies any drainage or discharge,  denies fever, body aches or chills, denies cough, wheezing or shortness of breath, denies nausea, vomiting, diarrhea or abdominal pain, denies chest pain or dizziness positional lightheadedness, denies sore throat or trouble swallowing, denies loss of taste or smell, or any other symptoms       Insect Bite  This is a new problem. The current episode started in the past 7 days. The problem occurs constantly. The problem has been gradually worsening. Nothing aggravates the symptoms. Treatments tried: Tobaco and Benadryl. The treatment provided no relief.       Skin:  Positive for erythema.      Past Medical History:   Diagnosis Date    Decreased mobility and endurance 11/16/2017    Decreased strength 11/16/2017    Decreased strength of lower extremity 2/22/2021    Difficult intubation     Early dry stage nonexudative age-related macular degeneration 2020    Edema 9/26/2018    Edema of both legs 7/29/2020    Fatty liver 12/15/2014    Hypokalemia 9/26/2018    Lymphedema of both lower extremities 7/29/2020    Multiple gastric polyps 7/21/2012    Polyarthralgia 4/6/2015    Sjogren's disease     Sleep apnea        Objective:     Physical Exam   Constitutional: She is oriented to person, place, and " time. She appears well-developed.   HENT:   Head: Normocephalic and atraumatic. Head is without abrasion, without contusion and without laceration.   Ears:   Right Ear: External ear normal.   Left Ear: External ear normal.   Nose: Nose normal.   Mouth/Throat: Oropharynx is clear and moist and mucous membranes are normal.   Eyes: Conjunctivae, EOM and lids are normal. Pupils are equal, round, and reactive to light.   Neck: Trachea normal and phonation normal. Neck supple.   Cardiovascular: Normal rate, regular rhythm and normal heart sounds.   Pulmonary/Chest: Effort normal and breath sounds normal. No stridor. No respiratory distress.   Musculoskeletal: Normal range of motion.         General: Normal range of motion.      Comments: Right hand with minimal erythema and swelling to dorsal aspect, no tenderness noted, no red stroke streaking noted, full rom intact of right hand/fingers  Cap refill 2 seconds, right radial pulse 2+, sensation intact       Neurological: She is alert and oriented to person, place, and time.   Skin: Skin is warm, dry, intact and no rash. Capillary refill takes less than 2 seconds. erythema No abrasion, No burn, No bruising and No ecchymosis   Psychiatric: Her speech is normal and behavior is normal. Judgment and thought content normal.   Nursing note and vitals reviewed.        Patient in no acute distress.  Vitals reassuring.  Discussed results/diagnosis/plan in depth with patient in clinic. Strict precautions given to patient to monitor for worsening signs and symptoms. Advised to follow up with primary.All questions answered. Strict ER precautions given. If your symptoms worsens or fail to improve you should go to the Emergency Room. Discharge and follow-up instructions given verbally/printed. Discharge and follow-up instructions discussed with the patient who expressed understanding and willingness to comply with my recommendations.Patient voiced understanding and in agreement with  current treatment plan.     Please be advised this text was dictated with My[employer] software and may contain errors due to translation.   Assessment:     1. Bee sting, accidental or unintentional, initial encounter        Plan:       Bee sting, accidental or unintentional, initial encounter    Other orders  -     triamcinolone acetonide 0.1% (KENALOG) 0.1 % cream; Apply topically 2 (two) times daily. for 10 days  Dispense: 15 g; Refill: 0  -     predniSONE (DELTASONE) 10 MG tablet; Take 2 tablets (20 mg total) by mouth once daily for 1 day, THEN 1 tablet (10 mg total) once daily for 2 days.  Dispense: 4 tablet; Refill: 0          Medical Decision Making:   Urgent Care Management:  Patient in no acute distress.  Vitals reassuring.  On exam, patient is nontoxic appearing and afebrile.  Lungs CTA.  Physical examination as above.  No concern for cellulitis is skin infection.  Discussed with patient in detail that her swelling to right hand with erythema secondary to the inflammation.  Will prescribe a low-dose short course of oral steroids with detailed education provided about side effects and recommendations.  Medication prescribed and over-the-counter medication discussed with patient at length.  Proper hydration advised.  I reiterated the importance of further evaluation if no improvement symptoms and follow-up with primary. Patient voiced understanding and in agreement with current treatment plan.               Patient Instructions   PLEASE READ YOUR DISCHARGE INSTRUCTIONS ENTIRELY AS IT CONTAINS IMPORTANT INFORMATION.      Please drink plenty of fluids.  Please get plenty of rest.  Please return here or go to the Emergency Department for any concerns or worsening of condition (worsening rash, difficulty swallowing, shortness of breath, passing out).    If you were prescribed a narcotic medication, do not drive or operate heavy equipment or machinery while taking these medications.    Please take over the counter  Zantac as directed for the next 24-72hours as needed.    If you were given a steroid shot in the clinic and have also been given a prescription for a steroid such as Prednisone or a Medrol Dose Pack, please begin taking them tomorrow.    If you have a localized reaction it is ok to apply OTC  topical creams  as directed to the affected area.    Please take an over the counter antihistamine medication (allegra/Claritin/Zyrtec) of your choice as directed.  Benadryl at night - may make you drowsy do not drive after    Please follow up with your primary care doctor or specialist as needed.    If you  smoke, please stop smoking.    Please arrange follow up with your primary medical clinic as soon as possible. You must understand that you've received an Urgent Care treatment only and that you may be released before all of your medical problems are known or treated. You, the patient, will arrange for follow up as instructed. If your symptoms worsen or fail to improve you should go to the Emergency Room.

## 2024-09-03 ENCOUNTER — OFFICE VISIT (OUTPATIENT)
Dept: URGENT CARE | Facility: CLINIC | Age: 69
End: 2024-09-03
Payer: MEDICARE

## 2024-09-03 ENCOUNTER — TELEPHONE (OUTPATIENT)
Dept: PRIMARY CARE CLINIC | Facility: CLINIC | Age: 69
End: 2024-09-03
Payer: MEDICARE

## 2024-09-03 VITALS
HEART RATE: 90 BPM | SYSTOLIC BLOOD PRESSURE: 121 MMHG | TEMPERATURE: 98 F | DIASTOLIC BLOOD PRESSURE: 88 MMHG | HEIGHT: 66 IN | WEIGHT: 260 LBS | BODY MASS INDEX: 41.78 KG/M2 | OXYGEN SATURATION: 95 % | RESPIRATION RATE: 17 BRPM

## 2024-09-03 DIAGNOSIS — J30.9 ALLERGIC RHINITIS WITH POSTNASAL DRIP: ICD-10-CM

## 2024-09-03 DIAGNOSIS — R09.81 COUGH WITH CONGESTION OF PARANASAL SINUS: ICD-10-CM

## 2024-09-03 DIAGNOSIS — U07.1 COVID-19 VIRUS INFECTION: Primary | ICD-10-CM

## 2024-09-03 DIAGNOSIS — R09.82 ALLERGIC RHINITIS WITH POSTNASAL DRIP: ICD-10-CM

## 2024-09-03 DIAGNOSIS — U07.1 COVID-19 VIRUS DETECTED: ICD-10-CM

## 2024-09-03 DIAGNOSIS — R05.8 COUGH WITH CONGESTION OF PARANASAL SINUS: ICD-10-CM

## 2024-09-03 LAB
CTP QC/QA: YES
SARS-COV-2 AG RESP QL IA.RAPID: POSITIVE

## 2024-09-03 PROCEDURE — 99214 OFFICE O/P EST MOD 30 MIN: CPT | Mod: S$GLB,,, | Performed by: PHYSICIAN ASSISTANT

## 2024-09-03 PROCEDURE — 87811 SARS-COV-2 COVID19 W/OPTIC: CPT | Mod: QW,S$GLB,, | Performed by: PHYSICIAN ASSISTANT

## 2024-09-03 RX ORDER — CETIRIZINE HYDROCHLORIDE 10 MG/1
10 TABLET ORAL DAILY PRN
Qty: 30 TABLET | Refills: 0 | Status: SHIPPED | OUTPATIENT
Start: 2024-09-03 | End: 2025-09-03

## 2024-09-03 RX ORDER — BENZONATATE 100 MG/1
200 CAPSULE ORAL 3 TIMES DAILY PRN
Qty: 30 CAPSULE | Refills: 0 | Status: SHIPPED | OUTPATIENT
Start: 2024-09-03

## 2024-09-03 RX ORDER — FLUTICASONE PROPIONATE 50 MCG
1 SPRAY, SUSPENSION (ML) NASAL 2 TIMES DAILY PRN
Qty: 16 G | Refills: 0 | Status: SHIPPED | OUTPATIENT
Start: 2024-09-03

## 2024-09-03 NOTE — PATIENT INSTRUCTIONS
PLEASE READ YOUR DISCHARGE INSTRUCTIONS ENTIRELY AS IT CONTAINS IMPORTANT INFORMATION.    Patient had covid testing done today.    Discussed corona virus precautions and reviewed CDC FAC; printed a copy for patient.  I discussed to continue to monitor their symptoms. Discussed that if their symptoms persist or worsen to seek re-evaluation. Clinic vs. ER precautions were given.  Patient verbalized understanding and agreed with the entire plan of care.      Patient will hold rosuvastatin for 8 days while on Paxlovid     If you tested positive and have symptoms, you must isolate until you are fever free for 24 hours without fever reducing medications.    NO TESTING REQUIRED to return to community! If no fever without fever reducing meds for 24 hours, before coming out of quarantine. After your 5 days of isolation are completed, the CDC recommends strict mask use for the first 5 days (day #2-6) that you come out of isolation.  You should continue to wear a well-fitting mask around others at home and in public for 5 additional days, wash your hands frequently, and keep distancing. If you are unable to wear a mask when around others, you should continue to isolate. Avoid people who have weakened immune systems or are more likely to get very sick from COVID-19, and nursing homes and other high-risk settings.    - Reviewed radiographs and all diagnostic testing with patient/family.    - Rest.  Drink plenty of fluids.    - Tylenol OR anti-inflammatory (NSAIDs, ibuprofen, aleve, motrin) as directed as needed for fever/pain.  For Tylenol, do not exceed 3000 mg/ day. If no contraindication or allergies.  -OK to supplement with OTC NyQuil night as needed for cough and congestion.  Use caution of total amount of Tylenol/acetaminophen per day.  - take Tessalon as needed for cough suppression.       -Below are suggestions for symptomatic relief:              -Salt water gargles to soothe throat pain.              -Chloroseptic  spray also helps to numb throat pain. Drink hot tea with honey or lemon to soothe your throat.              -Nasal saline spray reduces inflammation and dryness.              -Warm face compresses to help with facial sinus pain/pressure.              -Vicks vapor rub at night.              -Astepro/Azelastine NASAL SPRAY twice day for nasal/sinus congestion   **may also supplement with OTC nasal spray to help with inflammation and congestion.   Wean to off when you nose becomes to dry or bleed. Also use nasal saline twice a day to help with dryness.               -Flonase OTC or Nasacort OTC  once or twice a day for nasal/sinus congestion. DON'T USE IF YOU HAVE GLAUCOMA. CHECK WITH YOUR PHARMACIST/EYE PHYSICIAN.              -Simple foods like chicken noodle soup.              -Mucinex DM (ANY COUGH EXPECTORANT-- guaifenesin) for cough or chest congestion with mucus and (ANY COUGH SUPPRESSANT- dextromethorphan) helps with coughing every 12 hours. Mucinex-DM if you have chest congestion or sputum (caution if history of high blood pressure or palpitations).              -Zyrtec/Claritin/xyzal during the day time  & Benadryl at night (only if severe runny nose) may help with allergies and runny nose. Add decongestant if you have nasal/sinus congestion/sinus pressure/ear fullness sensation. (see below)              -may take OTC meclizine as needed for dizziness or nausea.     Caution with use of Decongestant meds:  -If you DO NOT have Hypertension or any history of palpitations, it is ok to take over the counter Sudafed or Mucinex D or Allegra-D or Claritin-D or Zyrtec-D.  -If you do take one of the above, it is ok to combine that with plain over the counter Mucinex or Allegra or Claritin or Zyrtec. If, for example, you are taking Zyrtec -D, you can combine that with Mucinex, but not Mucinex-D.  If you are taking Mucinex-D, you can combine that with plain Allegra or Claritin or Zyrtec.   -Do not combine pseudophed or  phenylephrine with any other brand allergy-D for DECONGESTANT.   -Or vice versa, you can you take plain allergy medications (allegra/claritin/zyrtec with NO Decongestant) and ADD OTC pseudophed or phenelyphrine 3 times a day (or every 4-6 hours needed). Avoid taking decongestant late at night or with caffeine as it can keep you up or cause jittery feeling.     -If you DO have Hypertension , anxiety, or palpitations, it is safe to take Coricidin HBP for relief of cough, congestion, or sinus symptoms every 4-6 hours.      For your GI symptoms:  -Use gatorade/pedialyte or rehydration packets to help stay hydrated. Vitamin water and plain water do not contain rehydrating electrolytes.  -Increase clear liquids (water, gatorade, pedialyte, broths, jello, etc). If nausea/vomiting/diarrhea, advance to BRAT diet (banana, rice, applesauce, tea, toast/crackers), then advance further to solid food as tolerated. Avoid spicy or fatty foods.   -Please go to the ER if you experience worsening abdominal pain, blood in your vomit or stool, high fever, dizziness, fainting, swelling of your abdomen, inability to pass gas or stool, or inability to urinate.     -Use Peptobismol or Immodium to help alleviate your diarrhea symptoms.   -Take mylanta or simethicone for bloating or gas pain.   -Take pepcid or omeprazole if you have heartburn or reflux sensation. Best taken first thing in AM on empty stomach.  -Avoid imodium unless you have more than 6 loose stools in 24 hours. Take 1 dose and monitor to see if you can repeat AS IT WILL CAUSE CONSTIPATION.      -You must understand that you've received an Urgent Care treatment only and that you may be released before all your medical problems are known or treated. You, the patient, will arrange for follow up care as instructed. Please arrange follow up with your primary medical clinic within 2-5 days if your signs and symptoms have not resolved or worsen.     - Follow up with your PCP or  specialty clinic as directed.  You can call [phone] or [phone] to schedule an appointment with the appropriate provider.  Schedule [facility] is open Mon-Friday 8-5pm (excluded holidays).    - If your condition worsens or fails to improve we recommend that you receive another evaluation at the emergency room immediately or contact your primary medical clinic to discuss your concerns.        Prevention steps for patients with confirmed or suspected COVID-19  Stay home and stay away from family members and friends. The CDC says, you can leave home after these three things have happened: 1) You have had no fever for at least 24 hours (that is one full day of no fever without the use of medicine that reduces fevers) BUT MUST WEAR A SURGICAL MASKS IN PUBLIC FOR 5 days passed from first positive test.  Separate yourself from other people and animals in your home.  Call ahead before visiting your doctor.  Wear a facemask.  Cover your coughs and sneezes.  Wash your hands often with soap and water; hand  can be used, too.  Avoid sharing personal household items.  Wipe down surfaces used daily.  Monitor your symptoms. Seek prompt medical attention if your illness is worsening (e.g., difficulty breathing).   Before seeking care, call your healthcare provider.  If you have a medical emergency and need to call 911, notify the dispatch personnel that you have, or are being evaluated for COVID-19. If possible, put on a facemask before emergency medical services arrive.      Recommended precautions for household members, intimate partners, and caregivers in a home setting of a patient with symptomatic laboratory-confirmed COVID-19 or a patient under investigation.  Household members, intimate partners, and caregivers in the home setting awaiting tests results have close contact with a person with symptomatic, laboratory-confirmed COVID-19 or a person under investigation. Close contacts should monitor their health;  they should call their provider right away if they develop symptoms suggestive of COVID-19 (e.g., fever, cough, shortness of breath).    Close contacts should also follow these recommendations:  Make sure that you understand and can help the patient follow their provider's instructions for medication(s) and care. You should help the patient with basic needs in the home and provide support for getting groceries, prescriptions, and other personal needs.  Monitor the patient's symptoms. If the patient is getting sicker, call his or her healthcare provider and tell them that the patient has laboratory-confirmed COVID-19. If the patient has a medical emergency and you need to call 911, notify the dispatch personnel that the patient has, or is being evaluated for COVID-19.  Household members should stay in another room or be  from the patient. Household members should use a separate bedroom and bathroom, if available.  Prohibit visitors.  Household members should care for any pets in the home.  Make sure that shared spaces in the home have good air flow, such as by an air conditioner or an opened window, weather permitting.  Perform hand hygiene frequently. Wash your hands often with soap and water for at least 20 seconds or use an alcohol-based hand  (that contains > 60% alcohol) covering all surfaces of your hands and rubbing them together until they feel dry. Soap and water should be used preferentially.  Avoid touching your eyes, nose, and mouth.  The patient should wear a facemask. If the patient is not able to wear a facemask (for example, because it causes trouble breathing), caregivers should wear a mask when they are in the same room as the patient.  Wear a disposable facemask and gloves when you touch or have contact with the patient's blood, stool, or body fluids, such as saliva, sputum, nasal mucus, vomit, urine.  Throw out disposable facemasks and gloves after using them. Do not reuse.  When  removing personal protective equipment, first remove and dispose of gloves. Then, immediately clean your hands with soap and water or alcohol-based hand . Next, remove and dispose of facemask, and immediately clean your hands again with soap and water or alcohol-based hand .  You should not share dishes, drinking glasses, cups, eating utensils, towels, bedding, or other items with the patient. After the patient uses these items, you should wash them thoroughly (see below Wash laundry thoroughly).  Clean all high-touch surfaces, such as counters, tabletops, doorknobs, bathroom fixtures, toilets, phones, keyboards, tablets, and bedside tables, every day. Also, clean any surfaces that may have blood, stool, or body fluids on them.  Use a household cleaning spray or wipe, according to the label instructions. Labels contain instructions for safe and effective use of the cleaning product including precautions you should take when applying the product, such as wearing gloves and making sure you have good ventilation during use of the product.  Wash laundry thoroughly.  Immediately remove and wash clothes or bedding that have blood, stool, or body fluids on them.  Wear disposable gloves while handling soiled items and keep soiled items away from your body. Clean your hands (with soap and water or an alcohol-based hand ) immediately after removing your gloves.  Read and follow directions on labels of laundry or clothing items and detergent. In general, using a normal laundry detergent according to washing machine instructions and dry thoroughly using the warmest temperatures recommended on the clothing label.  Place all used disposable gloves, facemasks, and other contaminated items in a lined container before disposing of them with other household waste. Clean your hands (with soap and water or an alcohol-based hand ) immediately after handling these items. Soap and water should be  used preferentially if hands are visibly dirty.  Discuss any additional questions with your state or local health department or healthcare provider. Check available hours when contacting your local health department.    For more information see CDC link below.      https://www.cdc.gov/coronavirus/2019-ncov/hcp/guidance-prevent-spread.html#precautions        Sources:  [facility] [place], [place] Department of Health and [facility]          Instructions for Home Care of Patients and Caretakers with Coronavirus Disease 2019  Limit visitors to the home.  Older persons and those that have chronic medical conditions such as diabetes, lung and heart disease are at increased risk for illness.   If possible, patients should use a separate bedroom while recovering. Caregivers and household members should avoid prolonged contact with the patient which means to stay 6 feet away and avoid contact with cough droplets.  When close contact is necessary, wash your hands before and immediately after contact.   Perform hand hygiene frequently. Wash your hands often with soap and water for at least 20 seconds or use an alcohol-based hand , covering all surfaces of your hands and rubbing them together until they feel dry.   Avoid touching your eyes, nose, and mouth with unwashed hands.  Avoid sharing household items with the patient. You should not share dishes, drinking glasses, cups, eating utensils, towels, bedding, or other items. After the patient uses these items, you should wash them thoroughly.  Wash laundry thoroughly.   Immediately remove and wash clothes or bedding that have blood, stool, or body fluids on them.  Clean all high-touch surfaces, such as counters, tabletops, doorknobs, bathroom fixtures, toilets, phones, keyboards, tablets, and bedside tables, every day.   Use a household cleaning spray or wipe, according to the label instructions. Labels contain instructions for safe and effective use of the cleaning product  including precautions you should take when applying the product, such as wearing gloves and making sure you have good ventilation during use of the product.    For more information see CDC link below.      https://www.cdc.gov/coronavirus/2019-ncov/hcp/guidance-prevent-spread.html#precautions               If your symptoms worsen or if you have any other concerns, please contact [facility] at [phone].

## 2024-09-03 NOTE — PROGRESS NOTES
"Subjective:      Patient ID: Carolina Knight is a 69 y.o. female.    Vitals:  height is 5' 6" (1.676 m) and weight is 117.9 kg (260 lb). Her oral temperature is 98.4 °F (36.9 °C). Her blood pressure is 121/88 and her pulse is 90. Her respiration is 17 and oxygen saturation is 95%.     Chief Complaint: Cough    69-year-old female who presents to urgent care clinic for evaluation.  Symptoms started yesterday.  She has not taking anything for symptoms.  She is complaining of headache, productive cough, nasal congestion, runny nose, postnasal drip, mild sore throat, and mild nausea due to postnasal drip.  Previous COVID-19 infection.  No sick contacts.  Takes Zyrtec daily for chronic allergies.    Cough  This is a new problem. The current episode started in the past 7 days. The problem has been unchanged. The problem occurs constantly. The cough is Productive of sputum. Associated symptoms include headaches, nasal congestion, postnasal drip and rhinorrhea. Pertinent negatives include no chest pain, chills, ear congestion, ear pain, fever, heartburn, hemoptysis, myalgias, rash, sore throat, shortness of breath or wheezing. She has tried nothing for the symptoms. Her past medical history is significant for environmental allergies.       Constitution: Negative for activity change, appetite change, chills, sweating, fatigue, fever and generalized weakness.   HENT:  Positive for congestion, postnasal drip and sinus pressure. Negative for ear pain, hearing loss, facial swelling, sinus pain, sore throat, trouble swallowing and voice change.    Neck: Negative for neck pain, neck stiffness and painful lymph nodes.   Cardiovascular:  Negative for chest pain, leg swelling, palpitations, sob on exertion and passing out.   Eyes:  Negative for eye discharge, eye pain, photophobia, vision loss, double vision and blurred vision.   Respiratory:  Positive for cough and sputum production. Negative for chest tightness, bloody sputum, " COPD, shortness of breath, stridor, wheezing and asthma.    Gastrointestinal:  Positive for nausea. Negative for abdominal pain, vomiting, constipation, diarrhea, bright red blood in stool, rectal bleeding, heartburn and bowel incontinence.   Genitourinary:  Negative for dysuria, frequency, urgency, urine decreased, flank pain, bladder incontinence and hematuria.   Musculoskeletal:  Negative for trauma, joint pain, joint swelling, abnormal ROM of joint, muscle cramps and muscle ache.   Skin:  Negative for color change, pale, rash and wound.   Allergic/Immunologic: Positive for environmental allergies. Negative for seasonal allergies, asthma and immunocompromised state.   Neurological:  Positive for headaches. Negative for dizziness, history of vertigo, light-headedness, passing out, facial drooping, speech difficulty, coordination disturbances, loss of balance, disorientation, altered mental status, loss of consciousness, numbness, tingling and seizures.   Hematologic/Lymphatic: Negative for swollen lymph nodes, easy bruising/bleeding and trouble clotting. Does not bruise/bleed easily.   Psychiatric/Behavioral:  Negative for altered mental status and disorientation.       Objective:     Physical Exam   Constitutional: She is oriented to person, place, and time. She appears well-developed. She does not appear ill. No distress.   HENT:   Head: Normocephalic.   Ears:   Right Ear: Hearing, external ear and ear canal normal. No no drainage, swelling or tenderness. No mastoid tenderness.   Left Ear: Hearing, external ear and ear canal normal. No no drainage, swelling or tenderness. No mastoid tenderness.   Nose: Congestion present. No rhinorrhea. Right sinus exhibits maxillary sinus tenderness. Right sinus exhibits no frontal sinus tenderness. Left sinus exhibits maxillary sinus tenderness. Left sinus exhibits no frontal sinus tenderness.   Mouth/Throat: Oropharynx is clear and moist and mucous membranes are normal.  Mucous membranes are moist. No oral lesions. No oropharyngeal exudate, posterior oropharyngeal edema, posterior oropharyngeal erythema or tonsillar abscesses. No tonsillar exudate. Oropharynx is clear.   Eyes: Conjunctivae, EOM and lids are normal. Right eye exhibits no discharge. Left eye exhibits no discharge. Right conjunctiva is not injected. Right conjunctiva has no hemorrhage. Left conjunctiva is not injected. Left conjunctiva has no hemorrhage. vision grossly intact gaze aligned appropriately   Neck: Phonation normal. Neck supple.   Cardiovascular: Normal rate, regular rhythm, normal heart sounds and normal pulses.   Pulmonary/Chest: Effort normal and breath sounds normal. No accessory muscle usage. No respiratory distress. She has no wheezes.   Abdominal: Normal appearance. She exhibits no distension. Soft. There is no abdominal tenderness. There is no rebound and no guarding.   Musculoskeletal: Normal range of motion.         General: Normal range of motion.      Comments: Moves all extremities with normal tone, strength, and ROM.  Gait normal.   Lymphadenopathy:     She has no cervical adenopathy.   Neurological: no focal deficit. She is alert, oriented to person, place, and time and at baseline. She has normal motor skills and normal sensation. She displays facial symmetry and no dysarthria. Gait and coordination normal. GCS eye subscore is 4. GCS verbal subscore is 5. GCS motor subscore is 6.   Skin: Skin is warm, dry and no rash. Capillary refill takes less than 2 seconds.   Psychiatric: She experiences Normal attention. Her speech is normal and behavior is normal. Thought content normal.   Nursing note and vitals reviewed.        Results for orders placed or performed in visit on 09/03/24   SARS Coronavirus 2 Antigen, POCT Manual Read   Result Value Ref Range    SARS Coronavirus 2 Antigen Positive (A) Negative     Acceptable Yes      *Note: Due to a large number of results and/or  encounters for the requested time period, some results have not been displayed. A complete set of results can be found in Results Review.         Assessment:     1. COVID-19 virus infection    2. Cough with congestion of paranasal sinus    3. Allergic rhinitis with postnasal drip      Note dictated with voice recognition software, please excuse any grammatical errors.    Nontoxic appearing. Vitals are stable. Patient has symptoms at this time which is consistent with above diagnosis.    Clinic orders:   Rapid covid positive.   All diagnostic testing personally reviewed and interpreted.   Previous progress notes and diagnostic testing reviewed and interpreted.     Patient has 4 complication risk (CALCULATED PER EPIC).      Patient will need to wear a full masks for an additional 5 days in public. May complete isolation if fever free for 24 hours without fever reducing medications.    We did discuss the risk of morbidity without treatment is significant. We had shared decision making for patient's treatment and care.        Plan:     We did discuss treatment options including Emergency FDA authorization use of Paxlovid.  We did discuss the warnings/precaustions/alternatives/side effects/benefits/risks/expected course/rebound of this treatment.  Patient would like to proceed.  This was sent to patient's pharmacy on file and was confirmed with antiviral . Patient is aware that all [facility] retail pharmacies have this medication available. Also reviewed his drug interactions with the [facility] drug .  For appropriate treatment of this antiviral treatment, patient understands that it is their responsibility that all of their current medication must be informed to our clinic staff and confirmed with our Epic system which includes prescribed medication as well as over-the-counter supplements.  * patient will hold Crestor for 8 days while on Paxlovid    Patient was also prescribed medications for their symptoms.    Patient was recommended OTC treatments for their symptoms.  Emphasized the importance of OTC symptomatic treatment for improvements in symptoms and prevent further worsening of conditioning.  Recommend oral antihistamine q.a.m., antihistamine nasal spray 1-2 times per day, Mucinex DM Q 12 hour, and Coricidin/Sudafed q.4-6 hours in addition to prescribed medication.     Patient was counseled, explained with the test results meaning, expected course, and answered all of questions. They can also receive results via my chart.  Printed and verbal COVID /treatment guidelines were given.   Recommend follow-up PCP in the next 5-7 days if new or worsening symptoms.        COVID-19 virus infection  -     cetirizine (ZYRTEC) 10 MG tablet; Take 1 tablet (10 mg total) by mouth daily as needed for Allergies or Rhinitis.  Dispense: 30 tablet; Refill: 0  -     fluticasone propionate (FLONASE) 50 mcg/actuation nasal spray; 1 spray (50 mcg total) by Each Nostril route 2 (two) times daily as needed for Rhinitis or Allergies.  Dispense: 16 g; Refill: 0  -     dextromethorphan-guaiFENesin  mg (MUCINEX DM)  mg per 12 hr tablet; Take 1 tablet by mouth every 12 (twelve) hours as needed (cough and chest congestion). Take with 1 full glass of water.  Dispense: 30 tablet; Refill: 0  -     benzonatate (TESSALON PERLES) 100 MG capsule; Take 2 capsules (200 mg total) by mouth 3 (three) times daily as needed for Cough.  Dispense: 30 capsule; Refill: 0  -     nirmatrelvir-ritonavir 300 mg (150 mg x 2)-100 mg copackaged tablets (EUA); Take 3 tablets by mouth 2 (two) times daily for 5 days. Each dose contains 2 nirmatrelvir (pink tablets) and 1 ritonavir (white tablet). Take all 3 tablets together  Dispense: 30 tablet; Refill: 0    Cough with congestion of paranasal sinus  -     SARS Coronavirus 2 Antigen, POCT Manual Read  -     cetirizine (ZYRTEC) 10 MG tablet; Take 1 tablet (10 mg total) by mouth daily as needed for Allergies or  Rhinitis.  Dispense: 30 tablet; Refill: 0  -     fluticasone propionate (FLONASE) 50 mcg/actuation nasal spray; 1 spray (50 mcg total) by Each Nostril route 2 (two) times daily as needed for Rhinitis or Allergies.  Dispense: 16 g; Refill: 0  -     dextromethorphan-guaiFENesin  mg (MUCINEX DM)  mg per 12 hr tablet; Take 1 tablet by mouth every 12 (twelve) hours as needed (cough and chest congestion). Take with 1 full glass of water.  Dispense: 30 tablet; Refill: 0  -     benzonatate (TESSALON PERLES) 100 MG capsule; Take 2 capsules (200 mg total) by mouth 3 (three) times daily as needed for Cough.  Dispense: 30 capsule; Refill: 0    Allergic rhinitis with postnasal drip  -     cetirizine (ZYRTEC) 10 MG tablet; Take 1 tablet (10 mg total) by mouth daily as needed for Allergies or Rhinitis.  Dispense: 30 tablet; Refill: 0  -     fluticasone propionate (FLONASE) 50 mcg/actuation nasal spray; 1 spray (50 mcg total) by Each Nostril route 2 (two) times daily as needed for Rhinitis or Allergies.  Dispense: 16 g; Refill: 0             Additional MDM:     Heart Failure Score:   COPD = No

## 2024-09-23 ENCOUNTER — PATIENT OUTREACH (OUTPATIENT)
Dept: ADMINISTRATIVE | Facility: HOSPITAL | Age: 69
End: 2024-09-23
Payer: MEDICARE

## 2024-09-23 NOTE — PROGRESS NOTES
[facility] in reference to Depression Remission at Twelve Months.        RE:  Follow up on a CMS Patient Health Questionnaire.    Message sent to patient's portal.

## 2024-09-30 ENCOUNTER — CLINICAL SUPPORT (OUTPATIENT)
Dept: ENDOSCOPY | Facility: HOSPITAL | Age: 69
End: 2024-09-30
Attending: INTERNAL MEDICINE
Payer: MEDICARE

## 2024-09-30 DIAGNOSIS — K21.9 GASTROESOPHAGEAL REFLUX DISEASE, UNSPECIFIED WHETHER ESOPHAGITIS PRESENT: ICD-10-CM

## 2024-09-30 DIAGNOSIS — Z12.11 SCREEN FOR COLON CANCER: ICD-10-CM

## 2024-10-04 ENCOUNTER — TELEPHONE (OUTPATIENT)
Dept: PRIMARY CARE CLINIC | Facility: CLINIC | Age: 69
End: 2024-10-04

## 2024-10-04 ENCOUNTER — OFFICE VISIT (OUTPATIENT)
Dept: PRIMARY CARE CLINIC | Facility: CLINIC | Age: 69
End: 2024-10-04
Payer: MEDICARE

## 2024-10-04 ENCOUNTER — PATIENT MESSAGE (OUTPATIENT)
Dept: PRIMARY CARE CLINIC | Facility: CLINIC | Age: 69
End: 2024-10-04

## 2024-10-04 VITALS
SYSTOLIC BLOOD PRESSURE: 116 MMHG | WEIGHT: 263.25 LBS | TEMPERATURE: 98 F | HEART RATE: 71 BPM | DIASTOLIC BLOOD PRESSURE: 82 MMHG | BODY MASS INDEX: 42.31 KG/M2 | OXYGEN SATURATION: 99 % | HEIGHT: 66 IN

## 2024-10-04 DIAGNOSIS — I10 BENIGN ESSENTIAL HYPERTENSION: ICD-10-CM

## 2024-10-04 DIAGNOSIS — M35.00 SJOGREN'S SYNDROME, WITH UNSPECIFIED ORGAN INVOLVEMENT: ICD-10-CM

## 2024-10-04 DIAGNOSIS — I25.10 ATHEROSCLEROSIS OF NATIVE CORONARY ARTERY OF NATIVE HEART WITHOUT ANGINA PECTORIS: ICD-10-CM

## 2024-10-04 DIAGNOSIS — I70.0 AORTIC ATHEROSCLEROSIS: ICD-10-CM

## 2024-10-04 DIAGNOSIS — E03.9 HYPOTHYROIDISM, UNSPECIFIED TYPE: ICD-10-CM

## 2024-10-04 DIAGNOSIS — D69.2 SENILE PURPURA: ICD-10-CM

## 2024-10-04 DIAGNOSIS — J31.0 CHRONIC RHINITIS: Primary | ICD-10-CM

## 2024-10-04 PROCEDURE — 99999 PR PBB SHADOW E&M-EST. PATIENT-LVL V: CPT | Mod: PBBFAC,,, | Performed by: INTERNAL MEDICINE

## 2024-10-04 PROCEDURE — 99215 OFFICE O/P EST HI 40 MIN: CPT | Mod: PBBFAC,PN | Performed by: INTERNAL MEDICINE

## 2024-10-04 RX ORDER — ALBUTEROL SULFATE 90 UG/1
1-2 INHALANT RESPIRATORY (INHALATION) EVERY 6 HOURS PRN
Qty: 18 G | Refills: 2 | Status: SHIPPED | OUTPATIENT
Start: 2024-10-04

## 2024-10-04 RX ORDER — SEMAGLUTIDE 0.25 MG/.5ML
0.25 INJECTION, SOLUTION SUBCUTANEOUS WEEKLY
Qty: 2 ML | Refills: 0 | Status: SHIPPED | OUTPATIENT
Start: 2024-10-04
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
## 2024-10-07 ENCOUNTER — TELEPHONE (OUTPATIENT)
Dept: ENDOSCOPY | Facility: HOSPITAL | Age: 69
End: 2024-10-07
Payer: MEDICARE

## 2024-10-07 ENCOUNTER — LAB VISIT (OUTPATIENT)
Dept: LAB | Facility: HOSPITAL | Age: 69
End: 2024-10-07
Attending: INTERNAL MEDICINE
Payer: MEDICARE

## 2024-10-07 ENCOUNTER — PATIENT MESSAGE (OUTPATIENT)
Dept: ENDOSCOPY | Facility: HOSPITAL | Age: 69
End: 2024-10-07
Payer: MEDICARE

## 2024-10-07 ENCOUNTER — E-CONSULT (OUTPATIENT)
Dept: PHARMACY | Facility: CLINIC | Age: 69
End: 2024-10-07
Payer: MEDICARE

## 2024-10-07 DIAGNOSIS — E03.4 HYPOTHYROIDISM DUE TO ACQUIRED ATROPHY OF THYROID: ICD-10-CM

## 2024-10-07 DIAGNOSIS — E66.01 MORBID OBESITY WITH BODY MASS INDEX (BMI) OF 40.0 TO 49.9: Primary | ICD-10-CM

## 2024-10-07 LAB — TSH SERPL DL<=0.005 MIU/L-ACNC: 1.95 UIU/ML (ref 0.4–4)

## 2024-10-07 PROCEDURE — 84443 ASSAY THYROID STIM HORMONE: CPT | Performed by: INTERNAL MEDICINE

## 2024-10-07 PROCEDURE — 36415 COLL VENOUS BLD VENIPUNCTURE: CPT | Mod: PO | Performed by: INTERNAL MEDICINE

## 2024-10-07 NOTE — CONSULTS
[facility] - Pharmacy/Wellness  Response for E-Consult     Patient Name: Carolina Knight  MRN: [id number]  Primary Care Provider: Kierra Cornejo MD   Requesting Provider: Kierra Cornejo MD  E-Consult to Pharmacy  Consult performed by: Jaxon Falcon PharmD  Consult ordered by: Kierra Cornejo MD  Reason for consult: Wegovy PA  Assessment/Recommendations: Denial has been appealed.  Will update once a decision has been made.          Recommendation: N/A    Contingency that warrants a repeat eConsult or referral: NO    Total time of Consultation: 20 minute    I did not speak to the requesting provider verbally about this.     *This eConsult is based on the clinical data available to me and is furnished without benefit of a physical examination. The eConsult will need to be interpreted in light of any clinical issues or changes in patient status not available to me at the time of filing this eConsults. Significant changes in patient condition or level of acuity should result in immediate formal consultation and reevaluation. Please alert me if you have further questions.    Thank you for this eConsult referral.     Jaxon Falcon PharmD  [facility] - Pharmacy/Wellness

## 2024-10-07 NOTE — TELEPHONE ENCOUNTER
Spoke to pt for pre-call to confirm scheduled Colonoscopy/EGD and patient verbalized understanding of the following:       Date of Procedure (s)  verified 10/14/24  Arrival Time 8:30 AM verified.  Location of Procedure(s) [address] verified.  NPO status reinforced. Ok to continue clear liquids up until 4 hours prior to the Endoscopy procedure.   Confirmed Pt is currently taking Ozempic (Semaglutide), Pt instructed to stop stop taking Ozempic (Semaglutide) on 10/6/24    Pt stated has not taken in 3 weeks.     Pt confirmed receipt of prep instructions and Rx prep (if applicable).  Instructions provided to patient via [facility]  Pt confirmed ride home after procedure if procedure requires anesthesia.   Pre-call screening questionnaire reviewed and completed with patient.   Appointment details are tentative, including check-in time.  If the patient begins taking any blood thinning medications, injectable weight loss/diabetes medications (other than insulin), or Adipex (phentermine) patient was instructed to contact the endoscopy scheduling department as soon as possible.  Patient was advised to call the endoscopy scheduling department if any questions or concerns arise.       [facility] Scheduling Department

## 2024-10-08 ENCOUNTER — TELEPHONE (OUTPATIENT)
Dept: PRIMARY CARE CLINIC | Facility: CLINIC | Age: 69
End: 2024-10-08
Payer: MEDICARE

## 2024-10-08 RX ORDER — LEVOTHYROXINE SODIUM 112 UG/1
112 TABLET ORAL
Qty: 90 TABLET | Refills: 3 | Status: SHIPPED | OUTPATIENT
Start: 2024-10-08 | End: 2025-10-08

## 2024-10-09 ENCOUNTER — TELEPHONE (OUTPATIENT)
Dept: PRIMARY CARE CLINIC | Facility: CLINIC | Age: 69
End: 2024-10-09
Payer: MEDICARE

## 2024-10-09 NOTE — TELEPHONE ENCOUNTER
RE: Rx Prior Auth Response  Received: Today  Kierra Cornejo MD Tran, Billy, Cliff; Randal Howell MA; P Nurse Chantal  Thanks for trying! B, will you let pt know?    Thanks!          Previous Messages       ----- Message -----  From: Jaxon Falcon PharmD  Sent: 10/9/2024   9:23 AM CDT  To: Kierra Cornejo MD; Randal Howell MA  Subject: RE: Rx Prior Auth Response                      Good morning,    I was not able to successfully appeal the PA denial on the Wegovy.  Part D will not cover anorexics.  ----- Message -----  From: Randal Howell MA  Sent: 10/4/2024   1:44 PM CDT  To: 65+ Centralized Pharmacy Consult Pool  Subject: FW: Rx Prior Auth Response                        ----- Message -----  From: Tori Johnson Covermymeds Incoming  Sent: 10/4/2024   1:26 PM CDT  To: Chantal [facility] Staff  Subject: Rx Prior Auth Response                          Electronic Prior Authorization message received

## 2024-10-13 ENCOUNTER — ANESTHESIA EVENT (OUTPATIENT)
Dept: ENDOSCOPY | Facility: HOSPITAL | Age: 69
End: 2024-10-13
Payer: MEDICARE

## 2024-10-14 ENCOUNTER — ANESTHESIA (OUTPATIENT)
Dept: ENDOSCOPY | Facility: HOSPITAL | Age: 69
End: 2024-10-14
Payer: MEDICARE

## 2024-10-14 ENCOUNTER — HOSPITAL ENCOUNTER (OUTPATIENT)
Facility: HOSPITAL | Age: 69
Discharge: HOME | End: 2024-10-14
Attending: INTERNAL MEDICINE | Admitting: INTERNAL MEDICINE
Payer: MEDICARE

## 2024-10-14 VITALS
RESPIRATION RATE: 18 BRPM | OXYGEN SATURATION: 97 % | DIASTOLIC BLOOD PRESSURE: 67 MMHG | SYSTOLIC BLOOD PRESSURE: 132 MMHG | TEMPERATURE: 98 F | HEART RATE: 60 BPM | WEIGHT: 263 LBS | BODY MASS INDEX: 46.6 KG/M2 | HEIGHT: 63 IN

## 2024-10-14 DIAGNOSIS — Z12.11 SCREEN FOR COLON CANCER: ICD-10-CM

## 2024-10-14 LAB
POCT GLUCOSE: 94 MG/DL (ref 70–110)
POCT GLUCOSE: 99 MG/DL (ref 70–110)

## 2024-10-14 PROCEDURE — 45385 COLONOSCOPY W/LESION REMOVAL: CPT | Mod: PT,,, | Performed by: INTERNAL MEDICINE

## 2024-10-14 PROCEDURE — 45380 COLONOSCOPY AND BIOPSY: CPT | Mod: PT,59 | Performed by: INTERNAL MEDICINE

## 2024-10-14 PROCEDURE — 82962 GLUCOSE BLOOD TEST: CPT | Performed by: INTERNAL MEDICINE

## 2024-10-14 PROCEDURE — A4216 STERILE WATER/SALINE, 10 ML: HCPCS | Performed by: NURSE ANESTHETIST, CERTIFIED REGISTERED

## 2024-10-14 PROCEDURE — 37000009 HC ANESTHESIA EA ADD 15 MINS: Performed by: INTERNAL MEDICINE

## 2024-10-14 PROCEDURE — D9220A PRA ANESTHESIA: Mod: ANES,,, | Performed by: STUDENT IN AN ORGANIZED HEALTH CARE EDUCATION/TRAINING PROGRAM

## 2024-10-14 PROCEDURE — 88305 TISSUE EXAM BY PATHOLOGIST: CPT | Mod: 59 | Performed by: STUDENT IN AN ORGANIZED HEALTH CARE EDUCATION/TRAINING PROGRAM

## 2024-10-14 PROCEDURE — 43239 EGD BIOPSY SINGLE/MULTIPLE: CPT | Performed by: INTERNAL MEDICINE

## 2024-10-14 PROCEDURE — 25000003 PHARM REV CODE 250: Performed by: NURSE ANESTHETIST, CERTIFIED REGISTERED

## 2024-10-14 PROCEDURE — 43239 EGD BIOPSY SINGLE/MULTIPLE: CPT | Mod: 51,,, | Performed by: INTERNAL MEDICINE

## 2024-10-14 PROCEDURE — 63600175 PHARM REV CODE 636 W HCPCS: Performed by: INTERNAL MEDICINE

## 2024-10-14 PROCEDURE — 45385 COLONOSCOPY W/LESION REMOVAL: CPT | Mod: PT | Performed by: INTERNAL MEDICINE

## 2024-10-14 PROCEDURE — 27201012 HC FORCEPS, HOT/COLD, DISP: Performed by: INTERNAL MEDICINE

## 2024-10-14 PROCEDURE — 63600175 PHARM REV CODE 636 W HCPCS: Performed by: NURSE ANESTHETIST, CERTIFIED REGISTERED

## 2024-10-14 PROCEDURE — D9220A PRA ANESTHESIA: Mod: CRNA,,, | Performed by: NURSE ANESTHETIST, CERTIFIED REGISTERED

## 2024-10-14 PROCEDURE — 27201089 HC SNARE, DISP (ANY): Performed by: INTERNAL MEDICINE

## 2024-10-14 PROCEDURE — 88305 TISSUE EXAM BY PATHOLOGIST: CPT | Mod: 26,,, | Performed by: STUDENT IN AN ORGANIZED HEALTH CARE EDUCATION/TRAINING PROGRAM

## 2024-10-14 PROCEDURE — 37000008 HC ANESTHESIA 1ST 15 MINUTES: Performed by: INTERNAL MEDICINE

## 2024-10-14 PROCEDURE — 45380 COLONOSCOPY AND BIOPSY: CPT | Mod: PT,59,, | Performed by: INTERNAL MEDICINE

## 2024-10-14 RX ORDER — PROPOFOL 10 MG/ML
VIAL (ML) INTRAVENOUS
Status: DISCONTINUED | OUTPATIENT
Start: 2024-10-14 | End: 2024-10-14

## 2024-10-14 RX ORDER — ONDANSETRON HYDROCHLORIDE 2 MG/ML
INJECTION, SOLUTION INTRAVENOUS
Status: DISCONTINUED | OUTPATIENT
Start: 2024-10-14 | End: 2024-10-14

## 2024-10-14 RX ORDER — SODIUM CHLORIDE 9 MG/ML
INJECTION, SOLUTION INTRAVENOUS CONTINUOUS
Status: DISCONTINUED | OUTPATIENT
Start: 2024-10-14 | End: 2024-10-14 | Stop reason: HOSPADM

## 2024-10-14 RX ORDER — LIDOCAINE HYDROCHLORIDE 20 MG/ML
INJECTION INTRAVENOUS
Status: DISCONTINUED | OUTPATIENT
Start: 2024-10-14 | End: 2024-10-14

## 2024-10-14 RX ORDER — PROPOFOL 10 MG/ML
INJECTION, EMULSION INTRAVENOUS CONTINUOUS PRN
Status: DISCONTINUED | OUTPATIENT
Start: 2024-10-14 | End: 2024-10-14

## 2024-10-14 RX ORDER — FENTANYL CITRATE 50 UG/ML
INJECTION, SOLUTION INTRAMUSCULAR; INTRAVENOUS
Status: DISCONTINUED | OUTPATIENT
Start: 2024-10-14 | End: 2024-10-14

## 2024-10-14 RX ORDER — SODIUM CHLORIDE 0.9 % (FLUSH) 0.9 %
SYRINGE (ML) INJECTION
Status: DISCONTINUED | OUTPATIENT
Start: 2024-10-14 | End: 2024-10-14

## 2024-10-14 RX ADMIN — LIDOCAINE HYDROCHLORIDE 80 MG: 20 INJECTION INTRAVENOUS at 09:10

## 2024-10-14 RX ADMIN — Medication 10 ML: at 10:10

## 2024-10-14 RX ADMIN — GLYCOPYRROLATE 0.2 MG: 0.2 INJECTION, SOLUTION INTRAMUSCULAR; INTRAVENOUS at 09:10

## 2024-10-14 RX ADMIN — ONDANSETRON 4 MG: 2 INJECTION INTRAMUSCULAR; INTRAVENOUS at 10:10

## 2024-10-14 RX ADMIN — FENTANYL CITRATE 25 MCG: 50 INJECTION, SOLUTION INTRAMUSCULAR; INTRAVENOUS at 10:10

## 2024-10-14 RX ADMIN — LIDOCAINE HYDROCHLORIDE 10 MG: 20 INJECTION INTRAVENOUS at 09:10

## 2024-10-14 RX ADMIN — ONDANSETRON 4 MG: 2 INJECTION INTRAMUSCULAR; INTRAVENOUS at 09:10

## 2024-10-14 RX ADMIN — PROPOFOL 100 MG: 10 INJECTION, EMULSION INTRAVENOUS at 09:10

## 2024-10-14 RX ADMIN — PROPOFOL 40 MG: 10 INJECTION, EMULSION INTRAVENOUS at 10:10

## 2024-10-14 RX ADMIN — PROPOFOL 30 MG: 10 INJECTION, EMULSION INTRAVENOUS at 09:10

## 2024-10-14 RX ADMIN — PROPOFOL 175 MCG/KG/MIN: 10 INJECTION, EMULSION INTRAVENOUS at 09:10

## 2024-10-14 NOTE — TRANSFER OF CARE
"Anesthesia Transfer of Care Note    Patient: Carolina Knight    Procedure(s) Performed: Procedure(s) (LRB):  EGD (ESOPHAGOGASTRODUODENOSCOPY) (N/A)  COLONOSCOPY (N/A)    Patient location: [facility]    Anesthesia Type: general    Transport from OR: Transported from OR on room air with adequate spontaneous ventilation    Post pain: adequate analgesia    Post assessment: no apparent anesthetic complications    Post vital signs: stable    Level of consciousness: responds to stimulation and awake    Nausea/Vomiting: no nausea/vomiting    Complications: none    Transfer of care protocol was followed      Last vitals: Visit Vitals  /81   Pulse 76   Temp 36.4 °C (97.5 °F) (Temporal)   Resp 18   Ht 5' 3" (1.6 m)   Wt 119.3 kg (263 lb)   LMP  (LMP Unknown)   SpO2 96%   Breastfeeding No   BMI 46.59 kg/m²     "

## 2024-10-14 NOTE — ANESTHESIA PREPROCEDURE EVALUATION
10/14/2024  Carolina Knight is a 69 y.o., female.      Pre-op Assessment    I have reviewed the Patient Summary Reports.     I have reviewed the Nursing Notes. I have reviewed the NPO Status.   I have reviewed the Medications.     Review of Systems  Anesthesia Hx:  No problems with previous Anesthesia   History of prior surgery of interest to airway management or planning:          Denies Family Hx of Anesthesia complications.   Personal Hx of Anesthesia complications   Difficult Intubation                  Cardiovascular:     Hypertension, poorly controlled                                        Pulmonary:     Denies Asthma.    Sleep Apnea (hasnt worn CPAP in >1month d/t post nasal drip), CPAP                Hepatic/GI:      Denies GERD. Liver Disease,            Musculoskeletal:  Arthritis               Endocrine:   Hypothyroidism              Physical Exam  General: Well nourished    Airway:  Mallampati: II / II  Mouth Opening: Normal  TM Distance: 4 - 6 cm  Tongue: Large  Neck ROM: Normal ROM    Chest/Lungs:  Non-productive cough lingering from COVID/bronchitis ddiagnosed in late August       Anesthesia Plan  Type of Anesthesia, risks & benefits discussed:    Anesthesia Type: MAC, Gen Natural Airway  Intra-op Monitoring Plan: Standard ASA Monitors  Post Op Pain Control Plan: multimodal analgesia and IV/PO Opioids PRN  Induction:  IV  Informed Consent: Informed consent signed with the Patient and all parties understand the risks and agree with anesthesia plan.  All questions answered.   ASA Score: 3  Day of Surgery Review of History & Physical: H&P Update referred to the surgeon/provider.I have interviewed and examined the patient. I have reviewed the patient's H&P dated: There are no significant changes.     Ready For Surgery From Anesthesia Perspective.     .

## 2024-10-14 NOTE — H&P
[facility] - Endoscopy  History & Physical    Subjective:      Chief Complaint/Reason for Admission:     Direct access Screening EGD and colonoscopy for GERD and average risk for CRC by personal and family history today.    Carolina Knight is a 69 y.o. female.    Past Medical History:   Diagnosis Date    Decreased mobility and endurance 2017    Decreased strength 2017    Decreased strength of lower extremity 2021    Difficult intubation     Early dry stage nonexudative age-related macular degeneration     Edema 2018    Edema of both legs 2020    Fatty liver 12/15/2014    Hypokalemia 2018    Lymphedema of both lower extremities 2020    Multiple gastric polyps 2012    Polyarthralgia 2015    Sjogren's disease     Sleep apnea      Past Surgical History:   Procedure Laterality Date    BREAST BIOPSY Left 2017    core,Benign breast with fibrocystic changes, including benign usual ductal hyperplasia and adenosis    BREAST BIOPSY Right     core    CERVICAL CONIZATION   W/ LASER       SECTION      x1    CHOLECYSTECTOMY      ESOPHAGOGASTRODUODENOSCOPY N/A 2018    Procedure: ESOPHAGOGASTRODUODENOSCOPY (EGD);  Surgeon: George Henriquez MD;  Location: [address]);  Service: Endoscopy;  Laterality: N/A;  possible difficult intubation - see anesthesia note dated 1/15/13 - 2nd floor/ generated from last EGD, 3 year f/u, Pt due/ Pt requesting Dr. Henriquez -     EYE SURGERY  Est 2014-15    Laser on each eye - tear in white part eye by Dr Nitish Ayala    HYSTERECTOMY  age 42    [place], LSO (uterine fibroids, adhesions, endometriosis)    OOPHORECTOMY  age 42    LSO (endometriosis, cyst)    PERCUTANEOUS CRYOTHERAPY OF PERIPHERAL NERVE USING LIQUID NITROUS OXIDE IN CLOSED NEEDLE DEVICE Right 2021    Procedure: CRYOTHERAPY, NERVE, PERIPHERAL, PERCUTANEOUS, USING LIQUID NITROUS OXIDE IN CLOSED NEEDLE DEVICE;  Surgeon: CHACE Riley;  Location:  [facility];  Service: Pain Management;  Laterality: Right;  RIGHT KNEE IOVERA    THYROIDECTOMY      TOE SURGERY      left    TOTAL REPLACEMENT OF BOTH KNEES USING COMPUTER-ASSISTED NAVIGATION  2023    TOTAL THYROIDECTOMY       Social History     Tobacco Use    Smoking status: Never    Smokeless tobacco: Never   Substance Use Topics    Alcohol use: No     Comment: none lately.  rarely has a drink for special occassion    Drug use: No       PTA Medications   Medication Sig    amLODIPine (NORVASC) 10 MG tablet Take 1 tablet (10 mg total) by mouth once daily.    cetirizine (ZYRTEC) 10 MG tablet Take 10 mg by mouth every evening.     hydroCHLOROthiazide (HYDRODIURIL) 12.5 MG Tab Take 1 tablet (12.5 mg total) by mouth 2 (two) times a day.    LUTEIN ORAL Take by mouth.    meclizine (ANTIVERT) 25 mg tablet Take 25 mg by mouth nightly as needed for Dizziness.    metFORMIN (GLUCOPHAGE-XR) 500 MG ER 24hr tablet Take 1 tablet (500 mg total) by mouth every evening.    montelukast (SINGULAIR) 10 mg tablet Take 1 tablet (10 mg total) by mouth every evening.    pantoprazole (PROTONIX) 40 MG tablet Take 1 tablet (40 mg total) by mouth once daily.    rosuvastatin (CRESTOR) 5 MG tablet Take 1 tablet (5 mg total) by mouth every Mon, Wed, Fri.    spironolactone (ALDACTONE) 50 MG tablet Take 1 tablet (50 mg total) by mouth once daily.    SYNTHROID 112 mcg tablet Take 1 tablet (112 mcg total) by mouth before breakfast.    albuterol (VENTOLIN HFA) 90 mcg/actuation inhaler Inhale 1-2 puffs into the lungs every 6 (six) hours as needed for Wheezing or Shortness of Breath. Rescue    azelastine (ASTELIN) 137 mcg (0.1 %) nasal spray 1 spray (137 mcg total) by Nasal route daily as needed for Rhinitis.    betamethasone dipropionate (DIPROLENE) 0.05 % ointment aaa qd- bid . Not more than 2 weeks straight in same location. Avoid use on face and groin    clobetasoL (TEMOVATE) 0.05 % cream Apply topically 2 (two) times daily.    econazole nitrate 1 %  cream Use bid    FINACEA 15 % gel AAA face qhs then moisturize    fluocinonide (LIDEX) 0.05 % external solution AAA scalp qday prn itching, scaling    fluorouraciL (EFUDEX) 5 % cream Apply thin film to right lower leg  2times per day for 2-3 weeks; d/c if area bleeding or ulcerated; avoid eyes or mouth    fluticasone propionate (FLONASE) 50 mcg/actuation nasal spray 1 spray (50 mcg total) by Each Nostril route 2 (two) times daily as needed for Rhinitis or Allergies.    ketoconazole (NIZORAL) 2 % cream aaa bid on  right posterior leg x 3 weeks    KLAYESTA powder APPLY EXTERNALLY TO THE AFFECTED AREA TWICE DAILY    mupirocin (BACTROBAN) 2 % ointment Apply topically 3 (three) times daily.    nystatin (MYCOSTATIN) cream Apply topically 2 (two) times daily.    RETIN-A 0.05 % cream Apply topically nightly as needed.    semaglutide, weight loss, (WEGOVY) 0.25 mg/0.5 mL PnIj Inject 0.25 mg into the skin once a week. X 4 weeks and then inc to 0.5mg weekly onwards.    tretinoin (RETIN-A) 0.05 % cream Compound tretinoin 0.05% / niacinamide 2% cream. Apply a pea-sized amount to entire face qhs then moisturize    triamcinolone acetonide 0.1% (KENALOG) 0.1 % cream Aaa qd- bid prn back itching . Not more than 2 weeks straight in the same location. Avoid use on face and groin    triamcinolone acetonide 0.1% (KENALOG) 0.1 % ointment Apply topically 2 (two) times daily.     Review of patient's allergies indicates:   Allergen Reactions    Lipitor [atorvastatin]      Severe myalgia    Losartan Swelling     Possible throat closing sensation.    Lovastatin Other (See Comments)     myalgia    Cefuroxime Itching and Rash    Latex, natural rubber Itching and Rash     Pt reported         Review of Systems   Constitutional:  Negative for fever.       Objective:      Vital Signs (Most Recent)  Temp: 98.1 °F (36.7 °C) (10/14/24 0844)  Pulse: 81 (10/14/24 0844)  Resp: 18 (10/14/24 0844)  BP: 137/65 (10/14/24 0844)  SpO2: 97 % (10/14/24  0844)    Vital Signs Range (Last 24H):  Temp:  [98.1 °F (36.7 °C)]   Pulse:  [81]   Resp:  [18]   BP: (137)/(65)   SpO2:  [97 %]     Physical Exam  Cardiovascular:      Rate and Rhythm: Normal rate.   Pulmonary:      Effort: Pulmonary effort is normal.   Neurological:      Mental Status: She is alert and oriented to person, place, and time.             Assessment:        Direct access Screening EGD and colonoscopy for GERD and average risk for CRC by personal and family history today.      Plan:      Direct access Screening EGD and colonoscopy for GERD and average risk for CRC by personal and family history today.

## 2024-10-14 NOTE — PROVATION PATIENT INSTRUCTIONS
Discharge Summary/Instructions after an Endoscopic Procedure  Patient Name: Carolina Malone  Patient MRN: [id number]  Patient YOB: 1955  Monday, October 14, 2024  George Henriquez MD  Dear patient,  As a result of recent federal legislation (The Federal Cures Act), you may   receive lab or pathology results from your procedure in your [facility]   account before your physician is able to contact you. Your physician or   their representative will relay the results to you with their   recommendations at their soonest availability.  Thank you,  RESTRICTIONS:  During your procedure today, you received medications for sedation.  These   medications may affect your judgment, balance and coordination.  Therefore,   for 24 hours, you have the following restrictions:   - DO NOT drive a car, operate machinery, make legal/financial decisions,   sign important papers or drink alcohol.    ACTIVITY:  Today: no heavy lifting, straining or running due to procedural   sedation/anesthesia.  The following day: return to full activity including work.  DIET:  Eat and drink normally unless instructed otherwise.     TREATMENT FOR COMMON SIDE EFFECTS:  - Mild abdominal pain, nausea, belching, bloating or excessive gas:  rest,   eat lightly and use a heating pad.  - Sore Throat: treat with throat lozenges and/or gargle with warm salt   water.  - Because air was used during the procedure, expelling large amounts of air   from your rectum or belching is normal.  - If a bowel prep was taken, you may not have a bowel movement for 1-3 days.    This is normal.  SYMPTOMS TO WATCH FOR AND REPORT TO YOUR PHYSICIAN:  1. Abdominal pain or bloating, other than gas cramps.  2. Chest pain.  3. Back pain.  4. Signs of infection such as: chills or fever occurring within 24 hours   after the procedure.  5. Rectal bleeding, which would show as bright red, maroon, or black stools.   (A tablespoon of blood from the rectum is not serious,  especially if   hemorrhoids are present.)  6. Vomiting.  7. Weakness or dizziness.  GO DIRECTLY TO THE NEAREST EMERGENCY ROOM IF YOU HAVE ANY OF THE FOLLOWING:      Difficulty breathing              Chills and/or fever over 101 F   Persistent vomiting and/or vomiting blood   Severe abdominal pain   Severe chest pain   Black, tarry stools   Bleeding- more than one tablespoon   Any other symptom or condition that you feel may need urgent attention  Your doctor recommends these additional instructions:  If any biopsies were taken, your doctors clinic will contact you in 1 to 2   weeks with any results.  - Discharge patient to home.   - Await pathology results.   - Follow an antireflux regimen.   - Return to referring physician.   - The findings and recommendations were discussed with the patient.  For questions, problems or results please call your physician - George Henriquez MD at Work:  [phone].  [facility], EMERGENCY ROOM PHONE NUMBER: [phone]  IF A COMPLICATION OR EMERGENCY SITUATION ARISES AND YOU ARE UNABLE TO REACH   YOUR PHYSICIAN - GO DIRECTLY TO THE EMERGENCY ROOM.  George Henriquez MD  10/14/2024 10:47:42 AM  This report has been verified and signed electronically.  Dear patient,  As a result of recent federal legislation (The Federal Cures Act), you may   receive lab or pathology results from your procedure in your [facility]   account before your physician is able to contact you. Your physician or   their representative will relay the results to you with their   recommendations at their soonest availability.  Thank you,  PROVATION

## 2024-10-14 NOTE — PROVATION PATIENT INSTRUCTIONS
Discharge Summary/Instructions after an Endoscopic Procedure  Patient Name: Carolina Malone  Patient MRN: [id number]  Patient YOB: 1955  Monday, October 14, 2024  George Henriquez MD  Dear patient,  As a result of recent federal legislation (The Federal Cures Act), you may   receive lab or pathology results from your procedure in your [facility]   account before your physician is able to contact you. Your physician or   their representative will relay the results to you with their   recommendations at their soonest availability.  Thank you,  RESTRICTIONS:  During your procedure today, you received medications for sedation.  These   medications may affect your judgment, balance and coordination.  Therefore,   for 24 hours, you have the following restrictions:   - DO NOT drive a car, operate machinery, make legal/financial decisions,   sign important papers or drink alcohol.    ACTIVITY:  Today: no heavy lifting, straining or running due to procedural   sedation/anesthesia.  The following day: return to full activity including work.  DIET:  Eat and drink normally unless instructed otherwise.     TREATMENT FOR COMMON SIDE EFFECTS:  - Mild abdominal pain, nausea, belching, bloating or excessive gas:  rest,   eat lightly and use a heating pad.  - Sore Throat: treat with throat lozenges and/or gargle with warm salt   water.  - Because air was used during the procedure, expelling large amounts of air   from your rectum or belching is normal.  - If a bowel prep was taken, you may not have a bowel movement for 1-3 days.    This is normal.  SYMPTOMS TO WATCH FOR AND REPORT TO YOUR PHYSICIAN:  1. Abdominal pain or bloating, other than gas cramps.  2. Chest pain.  3. Back pain.  4. Signs of infection such as: chills or fever occurring within 24 hours   after the procedure.  5. Rectal bleeding, which would show as bright red, maroon, or black stools.   (A tablespoon of blood from the rectum is not serious,  especially if   hemorrhoids are present.)  6. Vomiting.  7. Weakness or dizziness.  GO DIRECTLY TO THE NEAREST EMERGENCY ROOM IF YOU HAVE ANY OF THE FOLLOWING:      Difficulty breathing              Chills and/or fever over 101 F   Persistent vomiting and/or vomiting blood   Severe abdominal pain   Severe chest pain   Black, tarry stools   Bleeding- more than one tablespoon   Any other symptom or condition that you feel may need urgent attention  Your doctor recommends these additional instructions:  If any biopsies were taken, your doctors clinic will contact you in 1 to 2   weeks with any results.  - Discharge patient to home.   - Await pathology results.   - Telephone endoscopist for pathology results in 3 weeks.   - Repeat colonoscopy in 5 years for surveillance of multiple polyps.   - Return to primary care physician.   - The findings and recommendations were discussed with the patient.  For questions, problems or results please call your physician - George Henriquez MD at Work:  [phone].  [facility], EMERGENCY ROOM PHONE NUMBER: [phone]  IF A COMPLICATION OR EMERGENCY SITUATION ARISES AND YOU ARE UNABLE TO REACH   YOUR PHYSICIAN - GO DIRECTLY TO THE EMERGENCY ROOM.  George Henriquez MD  10/14/2024 10:44:56 AM  This report has been verified and signed electronically.  Dear patient,  As a result of recent federal legislation (The Federal Cures Act), you may   receive lab or pathology results from your procedure in your [facility]   account before your physician is able to contact you. Your physician or   their representative will relay the results to you with their   recommendations at their soonest availability.  Thank you,  PROVATION

## 2024-10-15 NOTE — ANESTHESIA POSTPROCEDURE EVALUATION
Anesthesia Post Evaluation    Patient: Carolina Garcia-Britmelina    Procedure(s) Performed: Procedure(s) (LRB):  EGD (ESOPHAGOGASTRODUODENOSCOPY) (N/A)  COLONOSCOPY (N/A)    Final Anesthesia Type: MAC      Patient location during evaluation: PACU  Patient participation: Yes- Able to Participate  Level of consciousness: awake and alert  Post-procedure vital signs: reviewed and stable  Pain management: adequate  Airway patency: patent    PONV status at discharge: No PONV  Anesthetic complications: no      Cardiovascular status: blood pressure returned to baseline  Respiratory status: unassisted, spontaneous ventilation and room air  Hydration status: euvolemic  Follow-up not needed.              Vitals Value Taken Time   /67 10/14/24 1130   Temp 36.6 °C (97.9 °F) 10/14/24 1130   Pulse 60 10/14/24 1130   Resp 18 10/14/24 1130   SpO2 97 % 10/14/24 1130         No case tracking events are documented in the log.      Pain/Vesna Score: Vesna Score: 10 (10/14/2024 11:00 AM)

## 2024-10-16 LAB
FINAL PATHOLOGIC DIAGNOSIS: NORMAL
GROSS: NORMAL
Lab: NORMAL

## 2024-10-26 DIAGNOSIS — E78.5 HYPERLIPIDEMIA, UNSPECIFIED HYPERLIPIDEMIA TYPE: ICD-10-CM

## 2024-10-28 ENCOUNTER — HOSPITAL ENCOUNTER (OUTPATIENT)
Dept: RADIOLOGY | Facility: HOSPITAL | Age: 69
Discharge: HOME | End: 2024-10-28
Attending: OBSTETRICS & GYNECOLOGY
Payer: MEDICARE

## 2024-10-28 DIAGNOSIS — Z12.31 SCREENING MAMMOGRAM FOR BREAST CANCER: ICD-10-CM

## 2024-10-28 PROCEDURE — 77067 SCR MAMMO BI INCL CAD: CPT | Mod: 26,,, | Performed by: RADIOLOGY

## 2024-10-28 PROCEDURE — 77063 BREAST TOMOSYNTHESIS BI: CPT | Mod: 26,,, | Performed by: RADIOLOGY

## 2024-10-28 PROCEDURE — 77063 BREAST TOMOSYNTHESIS BI: CPT | Mod: TC

## 2024-10-28 PROCEDURE — 77067 SCR MAMMO BI INCL CAD: CPT | Mod: TC

## 2024-10-28 RX ORDER — ROSUVASTATIN CALCIUM 5 MG/1
5 TABLET, COATED ORAL
Qty: 36 TABLET | Refills: 3 | Status: SHIPPED | OUTPATIENT
Start: 2024-10-28

## 2024-11-05 ENCOUNTER — TELEPHONE (OUTPATIENT)
Dept: OBSTETRICS AND GYNECOLOGY | Facility: CLINIC | Age: 69
End: 2024-11-05
Payer: MEDICARE

## 2024-11-05 ENCOUNTER — PATIENT MESSAGE (OUTPATIENT)
Dept: OBSTETRICS AND GYNECOLOGY | Facility: CLINIC | Age: 69
End: 2024-11-05
Payer: MEDICARE

## 2024-11-05 DIAGNOSIS — R92.8 ABNORMAL MAMMOGRAM: Primary | ICD-10-CM

## 2024-11-05 NOTE — TELEPHONE ENCOUNTER
Please advise patient would like to know if she needs additional testing after her mammogram.     ----- Message from Rubina sent at 11/5/2024 10:35 AM CST -----  Type:  Call    Who Called:pt  Does the patient know what this is regarding?:Additional Test  Would the patient rather a call back or a response via [facility]? call  Best Call Back Number: [phone]  Additional Information: Pt stated she needs additional test due to Mammo

## 2024-11-09 ENCOUNTER — PATIENT MESSAGE (OUTPATIENT)
Dept: GASTROENTEROLOGY | Facility: CLINIC | Age: 69
End: 2024-11-09
Payer: MEDICARE

## 2024-11-12 ENCOUNTER — HOSPITAL ENCOUNTER (OUTPATIENT)
Dept: RADIOLOGY | Facility: HOSPITAL | Age: 69
Discharge: HOME | End: 2024-11-12
Attending: OBSTETRICS & GYNECOLOGY
Payer: MEDICARE

## 2024-11-12 DIAGNOSIS — R92.8 ABNORMAL MAMMOGRAM: ICD-10-CM

## 2024-11-12 PROCEDURE — 77065 DX MAMMO INCL CAD UNI: CPT | Mod: TC,LT

## 2024-11-12 PROCEDURE — 76642 ULTRASOUND BREAST LIMITED: CPT | Mod: 26,LT,, | Performed by: RADIOLOGY

## 2024-11-12 PROCEDURE — 77061 BREAST TOMOSYNTHESIS UNI: CPT | Mod: 26,LT,, | Performed by: RADIOLOGY

## 2024-11-12 PROCEDURE — 77065 DX MAMMO INCL CAD UNI: CPT | Mod: 26,LT,, | Performed by: RADIOLOGY

## 2024-11-12 PROCEDURE — 76642 ULTRASOUND BREAST LIMITED: CPT | Mod: TC,LT

## 2024-11-13 ENCOUNTER — PATIENT MESSAGE (OUTPATIENT)
Dept: OBSTETRICS AND GYNECOLOGY | Facility: CLINIC | Age: 69
End: 2024-11-13
Payer: MEDICARE

## 2024-12-10 ENCOUNTER — PATIENT MESSAGE (OUTPATIENT)
Dept: ADMINISTRATIVE | Facility: OTHER | Age: 69
End: 2024-12-10
Payer: MEDICARE

## 2024-12-25 DIAGNOSIS — J31.0 CHRONIC RHINITIS: ICD-10-CM

## 2024-12-26 RX ORDER — ALBUTEROL SULFATE 90 UG/1
INHALANT RESPIRATORY (INHALATION)
Qty: 8.5 G | Refills: 1 | Status: SHIPPED | OUTPATIENT
Start: 2024-12-26

## 2025-01-13 ENCOUNTER — OFFICE VISIT (OUTPATIENT)
Dept: PRIMARY CARE CLINIC | Facility: CLINIC | Age: 70
End: 2025-01-13
Payer: MEDICARE

## 2025-01-13 VITALS
SYSTOLIC BLOOD PRESSURE: 130 MMHG | OXYGEN SATURATION: 97 % | BODY MASS INDEX: 46.8 KG/M2 | DIASTOLIC BLOOD PRESSURE: 84 MMHG | HEART RATE: 79 BPM | WEIGHT: 264.25 LBS

## 2025-01-13 DIAGNOSIS — M79.10 MYALGIA DUE TO STATIN: ICD-10-CM

## 2025-01-13 DIAGNOSIS — M35.00 SJOGREN'S SYNDROME, WITH UNSPECIFIED ORGAN INVOLVEMENT: ICD-10-CM

## 2025-01-13 DIAGNOSIS — I77.1 TORTUOUS AORTA: ICD-10-CM

## 2025-01-13 DIAGNOSIS — K21.9 GASTROESOPHAGEAL REFLUX DISEASE WITHOUT ESOPHAGITIS: ICD-10-CM

## 2025-01-13 DIAGNOSIS — E03.9 HYPOTHYROIDISM, UNSPECIFIED TYPE: ICD-10-CM

## 2025-01-13 DIAGNOSIS — I10 BENIGN ESSENTIAL HYPERTENSION: Primary | ICD-10-CM

## 2025-01-13 DIAGNOSIS — E66.01 OBESITY, CLASS III, BMI 40-49.9 (MORBID OBESITY): ICD-10-CM

## 2025-01-13 DIAGNOSIS — E78.5 HYPERLIPIDEMIA, UNSPECIFIED HYPERLIPIDEMIA TYPE: ICD-10-CM

## 2025-01-13 DIAGNOSIS — T46.6X5A MYALGIA DUE TO STATIN: ICD-10-CM

## 2025-01-13 DIAGNOSIS — R73.02 IGT (IMPAIRED GLUCOSE TOLERANCE): ICD-10-CM

## 2025-01-13 DIAGNOSIS — D69.2 SENILE PURPURA: ICD-10-CM

## 2025-01-13 DIAGNOSIS — I47.19 ATRIAL TACHYCARDIA: ICD-10-CM

## 2025-01-13 PROCEDURE — 99214 OFFICE O/P EST MOD 30 MIN: CPT | Mod: S$PBB,,, | Performed by: INTERNAL MEDICINE

## 2025-01-13 PROCEDURE — 99213 OFFICE O/P EST LOW 20 MIN: CPT | Mod: PBBFAC,PN | Performed by: INTERNAL MEDICINE

## 2025-01-13 PROCEDURE — 99999 PR PBB SHADOW E&M-EST. PATIENT-LVL III: CPT | Mod: PBBFAC,,, | Performed by: INTERNAL MEDICINE

## 2025-01-13 NOTE — PROGRESS NOTES
Subjective:       Patient ID: Carolina Knight is a 69 y.o. female.    Chief Complaint: Chronic rhinitis    HPI  B screening MMG 10/28/24 - possible left subcentimeter mass with calcification  Left diagnostic mammogram with breast ultrasound 11/12/2024-negative  DEXA 05/24/2024-osteopenia  EGD 10/14/2024-normal duodenum.  Erythematous mucosa in the antrum and pre-pyloric region of the stomach.  1 cm hiatal hernia.  Stomach biopsy - Minimal chronic inactive gastritis, negative for metaplasia, dysplasia or malignancy   No Helicobacter pylori organisms are identified on H&E   Colonoscopy 10/14/2024-  Impression:            - One 1 mm polyp in the ascending colon, removed                          with a jumbo cold forceps. Resected and retrieved.                          - One 4 mm polyp at the recto-sigmoid colon,                          removed with a cold snare. Resected and retrieved.                          - Diverticulosis in the recto-sigmoid colon, in                          the sigmoid colon, in the descending colon, in the                          transverse colon and in the ascending colon.                          - Non-bleeding internal hemorrhoids.   Biopsy - tubular adenoma and hyperplastic polyp  Repeat in 5 yrs.     HTN - amlo 10mg daily, hctz 12.5mg BID, aldactone 50mg daily  Part of digital HTN program. Well controlled.   48 hr holter 4/4/24 -     The predominant rhythm is sinus.    Very rare PVCs    Very rare PACs and a single 4-beat run of nonsustained atrial tachycardia  CVI BLE.      Hypothyroidism - synthroid 112mcg daily but pt reports paige was still giving her 125mcg and she's been taking this daily.   TSH 10/7/24 1.948     Sjogren's - more dry eyes. Seeing retina specialist (sees Dr. Ayala annually). Follows w/ dentist regularly and had appt a few weeks ago and was normal.     HLD - crestor 5mg 3 days a week.   CT Cardiac 2019 - Agatston calcium score equals 65, which translates to  the 75thth percentile for coronary calcium load based on age and sex. Additional findings and recommendations above.   H/o statin induced myalgia.      Occ bruising.    GERD - protonix 40mg nightly.    IGT  Fatty liver  Back on ozempic 0.5mg at [facility] for the last 3 weeks. Lost 2lbs last week. Does have some issues w/ reflux on the 0.5mg - when she overeats despite feeling full.  Also on metformin xr 500mg nightly.     Sometimes not able to hold urine in. Wears a liner and that's manageable.    Review of Systems  Comprehensive review of systems otherwise negative. See history/subjective section for more details.    Objective:      Physical Exam    /84 (BP Location: Right arm, Patient Position: Sitting)   Pulse 79   Wt 119.8 kg (264 lb 3.5 oz)   LMP  (LMP Unknown)   SpO2 97%   BMI 46.80 kg/m²     GEN - A+OX4, NAD, obese  HEENT - PERRL, EOMI, OP clear but small. MMM. TM normal.   Neck - No thyromegaly or cervical LAD. No thyroid masses felt.  CV - RRR, no m/r   Chest - CTAB, no wheezing or rhonchi  Abd - S/NT/ND/+BS.   Ext - 2+BDP and radial pulses. No LE edema.   Skin - LUE w/ small bruises.      Previous labs reviewed.        Assessment/Plan     Carolina was seen today for chronic rhinitis.    Diagnoses and all orders for this visit:    Benign essential hypertension - Stable and controlled. Continue current medications.  -     Comprehensive Metabolic Panel; Future    Atrial tachycardia - cont current meds. Avoid iatrogenic hyperthyroidism.   -     Comprehensive Metabolic Panel; Future  -     TSH; Future  -     T4, Free; Future    Hypothyroidism, unspecified type - pt has been taking 125mcg daily and not 112mcg daily as pharmacy dispensed the 125mcg tabs.  -     TSH; Future  -     T4, Free; Future    Sjogren's syndrome, with unspecified organ involvement - follows w/ eye and dentist appts at least annually.    Hyperlipidemia, unspecified hyperlipidemia type - can't tolerate crestor more than 3 days a week.  -      Comprehensive Metabolic Panel; Future  -     Lipid Panel; Future    Tortuous aorta  -     Comprehensive Metabolic Panel; Future  -     Lipid Panel; Future    Myalgia due to statin - as above.     Senile purpura  -     CBC Auto Differential; Future    Gastroesophageal reflux disease without esophagitis - cont PPI nightly. Slightly worse now that she's back on ozempic. Smaller meals and avoid overeating.    IGT (impaired glucose tolerance)  -     Comprehensive Metabolic Panel; Future  -     Hemoglobin A1C; Future    Obesity, Class III, BMI 46.8 (morbid obesity) - follows at [facility] and lost about 2lbs last week. Being monitored by outside MD. Recommend to start chair pilates/strength training.   -     CBC Auto Differential; Future    Advance Care Planning     Date: 01/13/2025    ACP Reviewed/No Changes  Voluntary advance care planning discussion had today with patient. Previously completed HCPOA and LW in electronic medical record is current, no changes made.      A total of 3 min was spent on advance care planning, goals of care discussion, emotional support, formulating and communicating prognosis and exploring burden/benefit of various approaches of treatment. This discussion occurred on a fully voluntary basis with the verbal consent of the patient and/or family.           Follow up in about 4 months (around 5/13/2025).      Kierra Cornejo MD  Department of Internal Medicine - [facility]  8:27 AM

## 2025-01-17 ENCOUNTER — LAB VISIT (OUTPATIENT)
Dept: LAB | Facility: HOSPITAL | Age: 70
End: 2025-01-17
Attending: INTERNAL MEDICINE
Payer: MEDICARE

## 2025-01-17 ENCOUNTER — OFFICE VISIT (OUTPATIENT)
Dept: URGENT CARE | Facility: CLINIC | Age: 70
End: 2025-01-17
Payer: MEDICARE

## 2025-01-17 VITALS
HEIGHT: 63 IN | OXYGEN SATURATION: 98 % | WEIGHT: 262.5 LBS | SYSTOLIC BLOOD PRESSURE: 150 MMHG | HEART RATE: 89 BPM | BODY MASS INDEX: 46.51 KG/M2 | TEMPERATURE: 98 F | RESPIRATION RATE: 20 BRPM | DIASTOLIC BLOOD PRESSURE: 89 MMHG

## 2025-01-17 DIAGNOSIS — I10 BENIGN ESSENTIAL HYPERTENSION: ICD-10-CM

## 2025-01-17 DIAGNOSIS — E03.9 HYPOTHYROIDISM, UNSPECIFIED TYPE: ICD-10-CM

## 2025-01-17 DIAGNOSIS — M94.0 COSTOCHONDRITIS, ACUTE: Primary | ICD-10-CM

## 2025-01-17 DIAGNOSIS — E66.01 OBESITY, CLASS III, BMI 40-49.9 (MORBID OBESITY): ICD-10-CM

## 2025-01-17 DIAGNOSIS — I77.1 TORTUOUS AORTA: ICD-10-CM

## 2025-01-17 DIAGNOSIS — D69.2 SENILE PURPURA: ICD-10-CM

## 2025-01-17 DIAGNOSIS — R73.02 IGT (IMPAIRED GLUCOSE TOLERANCE): ICD-10-CM

## 2025-01-17 DIAGNOSIS — I47.19 ATRIAL TACHYCARDIA: ICD-10-CM

## 2025-01-17 DIAGNOSIS — E78.5 HYPERLIPIDEMIA, UNSPECIFIED HYPERLIPIDEMIA TYPE: ICD-10-CM

## 2025-01-17 DIAGNOSIS — R07.81 RIB PAIN ON RIGHT SIDE: ICD-10-CM

## 2025-01-17 LAB
ALBUMIN SERPL BCP-MCNC: 3.8 G/DL (ref 3.5–5.2)
ALP SERPL-CCNC: 49 U/L (ref 40–150)
ALT SERPL W/O P-5'-P-CCNC: 20 U/L (ref 10–44)
ANION GAP SERPL CALC-SCNC: 9 MMOL/L (ref 8–16)
AST SERPL-CCNC: 10 U/L (ref 10–40)
BASOPHILS # BLD AUTO: 0.06 K/UL (ref 0–0.2)
BASOPHILS NFR BLD: 0.9 % (ref 0–1.9)
BILIRUB SERPL-MCNC: 0.4 MG/DL (ref 0.1–1)
BUN SERPL-MCNC: 16 MG/DL (ref 8–23)
CALCIUM SERPL-MCNC: 9.3 MG/DL (ref 8.7–10.5)
CHLORIDE SERPL-SCNC: 103 MMOL/L (ref 95–110)
CHOLEST SERPL-MCNC: 135 MG/DL (ref 120–199)
CHOLEST/HDLC SERPL: 2.8 {RATIO} (ref 2–5)
CO2 SERPL-SCNC: 27 MMOL/L (ref 23–29)
CREAT SERPL-MCNC: 0.8 MG/DL (ref 0.5–1.4)
DIFFERENTIAL METHOD BLD: NORMAL
EOSINOPHIL # BLD AUTO: 0.2 K/UL (ref 0–0.5)
EOSINOPHIL NFR BLD: 3.2 % (ref 0–8)
ERYTHROCYTE [DISTWIDTH] IN BLOOD BY AUTOMATED COUNT: 14 % (ref 11.5–14.5)
EST. GFR  (NO RACE VARIABLE): >60 ML/MIN/1.73 M^2
ESTIMATED AVG GLUCOSE: 111 MG/DL (ref 68–131)
GLUCOSE SERPL-MCNC: 90 MG/DL (ref 70–110)
HBA1C MFR BLD: 5.5 % (ref 4–5.6)
HCT VFR BLD AUTO: 43.3 % (ref 37–48.5)
HDLC SERPL-MCNC: 48 MG/DL (ref 40–75)
HDLC SERPL: 35.6 % (ref 20–50)
HGB BLD-MCNC: 14 G/DL (ref 12–16)
IMM GRANULOCYTES # BLD AUTO: 0.03 K/UL (ref 0–0.04)
IMM GRANULOCYTES NFR BLD AUTO: 0.4 % (ref 0–0.5)
LDLC SERPL CALC-MCNC: 66.8 MG/DL (ref 63–159)
LYMPHOCYTES # BLD AUTO: 2.7 K/UL (ref 1–4.8)
LYMPHOCYTES NFR BLD: 38.7 % (ref 18–48)
MCH RBC QN AUTO: 29 PG (ref 27–31)
MCHC RBC AUTO-ENTMCNC: 32.3 G/DL (ref 32–36)
MCV RBC AUTO: 90 FL (ref 82–98)
MONOCYTES # BLD AUTO: 0.7 K/UL (ref 0.3–1)
MONOCYTES NFR BLD: 9.9 % (ref 4–15)
NEUTROPHILS # BLD AUTO: 3.2 K/UL (ref 1.8–7.7)
NEUTROPHILS NFR BLD: 46.9 % (ref 38–73)
NONHDLC SERPL-MCNC: 87 MG/DL
NRBC BLD-RTO: 0 /100 WBC
PLATELET # BLD AUTO: 304 K/UL (ref 150–450)
PMV BLD AUTO: 10.2 FL (ref 9.2–12.9)
POTASSIUM SERPL-SCNC: 3.7 MMOL/L (ref 3.5–5.1)
PROT SERPL-MCNC: 7.5 G/DL (ref 6–8.4)
RBC # BLD AUTO: 4.82 M/UL (ref 4–5.4)
SODIUM SERPL-SCNC: 139 MMOL/L (ref 136–145)
T4 FREE SERPL-MCNC: 1.25 NG/DL (ref 0.71–1.51)
TRIGL SERPL-MCNC: 101 MG/DL (ref 30–150)
TSH SERPL DL<=0.005 MIU/L-ACNC: 1.38 UIU/ML (ref 0.4–4)
WBC # BLD AUTO: 6.9 K/UL (ref 3.9–12.7)

## 2025-01-17 PROCEDURE — 80061 LIPID PANEL: CPT | Performed by: INTERNAL MEDICINE

## 2025-01-17 PROCEDURE — 36415 COLL VENOUS BLD VENIPUNCTURE: CPT | Mod: PO | Performed by: INTERNAL MEDICINE

## 2025-01-17 PROCEDURE — 99213 OFFICE O/P EST LOW 20 MIN: CPT | Mod: 25,S$GLB,, | Performed by: PHYSICIAN ASSISTANT

## 2025-01-17 PROCEDURE — 96372 THER/PROPH/DIAG INJ SC/IM: CPT | Mod: S$GLB,,, | Performed by: PHYSICIAN ASSISTANT

## 2025-01-17 PROCEDURE — 83036 HEMOGLOBIN GLYCOSYLATED A1C: CPT | Performed by: INTERNAL MEDICINE

## 2025-01-17 PROCEDURE — 84439 ASSAY OF FREE THYROXINE: CPT | Performed by: INTERNAL MEDICINE

## 2025-01-17 PROCEDURE — 80053 COMPREHEN METABOLIC PANEL: CPT | Performed by: INTERNAL MEDICINE

## 2025-01-17 PROCEDURE — 84443 ASSAY THYROID STIM HORMONE: CPT | Performed by: INTERNAL MEDICINE

## 2025-01-17 PROCEDURE — 85025 COMPLETE CBC W/AUTO DIFF WBC: CPT | Performed by: INTERNAL MEDICINE

## 2025-01-17 RX ORDER — KETOROLAC TROMETHAMINE 30 MG/ML
30 INJECTION, SOLUTION INTRAMUSCULAR; INTRAVENOUS
Status: COMPLETED | OUTPATIENT
Start: 2025-01-17 | End: 2025-01-17

## 2025-01-17 RX ORDER — LEVOTHYROXINE SODIUM 125 UG/1
125 TABLET ORAL
COMMUNITY
Start: 2024-11-11

## 2025-01-17 RX ORDER — PREDNISONE 20 MG/1
20 TABLET ORAL 2 TIMES DAILY
Qty: 10 TABLET | Refills: 0 | Status: SHIPPED | OUTPATIENT
Start: 2025-01-17 | End: 2025-01-22

## 2025-01-17 RX ADMIN — KETOROLAC TROMETHAMINE 30 MG: 30 INJECTION, SOLUTION INTRAMUSCULAR; INTRAVENOUS at 04:01

## 2025-01-17 NOTE — PATIENT INSTRUCTIONS
Discussed with patient that if he/she is in pain today, only take tylenol due to toradol injection received in clinic. You can continue NSAIDS tomorrow such as ibuprofen (Motrin/Advil), naproxen (Aleve), Mobic (Meloxicam).     If you were prescribed a narcotic or muscle relaxer (Flexeril or Robaxin), do not drive or operate heavy equipment or machinery while taking these medications. These medications can make you drowsy. If you are driving, it is recommended to take ibuprofen TID prn pain and take flexeril/robaxin at night.  If not allergic, take Tylenol (Acetaminophen) 650 mg to  1 g every 6 hours as needed as needed for fever/pain and/or Motrin (Ibuprofen) 600 to 800 mg every 6 hours as needed for pain and/or fever    If you were not prescribed an anti-inflammatory medication, and if you do not have any history of stomach/intestinal ulcers, or kidney disease, or are not taking a blood thinner such as Coumadin, Plavix, Pradaxa, Eloquis, or Xaralta for example, it is OK to take over the counter Ibuprofen or Advil or Motrin or Aleve as directed.  Do not take these medications on an empty stomach.    Please drink plenty of fluids.  Please get plenty of rest.    Discussed adverse side effects of recurrent/long term steroid use: elevated blood pressure elevated blood sugar, pancreatitis,glaucoma/cataracts, weight gain in face/abdomen/neck, round face (moon face), fluid retention in legs/lungs, mood swings, upset stomach, increased risk of infections, osteoporosis and increased risk of fractures, fatigue, loss of appetite, nausea and muscle weakness, thin skin, bruising and slower wound healing.      Please remember that you have received care at an urgent care today. Urgent cares are not emergency rooms and are not equipped to handle life threatening emergencies and cannot rule in or out certain medical conditions and you may be released before all of your medical problems are known or treated. Please arrange follow up  with your primary care physician or speciality clinic (orthopedics) within 2-5 days if your signs and symptoms have not resolved or worsen.     Patient can call our Referral Hotline at [phone] to make an appointment.    Please return here or go to the Emergency Department for any concerns or worsening of condition.Patient was educated on signs/symptoms that would warrant emergent medical attention.

## 2025-01-17 NOTE — PROGRESS NOTES
"Subjective:      Patient ID: Carolina Knight is a 69 y.o. female.    Vitals:  height is 5' 3" (1.6 m) and weight is 119.1 kg (262 lb 8.4 oz). Her oral temperature is 98.1 °F (36.7 °C). Her blood pressure is 150/89 (abnormal) and her pulse is 89. Her respiration is 20 and oxygen saturation is 98%.     Chief Complaint: Rib pain / hx of pleurisy    Carolina Knight is a 69 y.o. female who complains of right side abd pain right by ribs, sx started 2 days ago, states still feels soreness in that area as if she was punched. She denies recent hx of trauma, excessive exercise, or persistent coughing. She states she she has a hx of pleuritis. Requesting for antibiotics.    Abdominal Pain  This is a new problem. The current episode started in the past 7 days. The onset quality is sudden. The problem occurs constantly. The problem has been gradually worsening. The pain is located in the RUQ. The pain is at a severity of 5/10. The pain is moderate. The quality of the pain is dull. The abdominal pain does not radiate. Associated symptoms include myalgias. Pertinent negatives include no anorexia, arthralgias, belching, constipation, diarrhea, dysuria, fever, flatus, frequency, headaches, hematochezia, hematuria, melena, nausea, vomiting or weight loss. Nothing aggravates the pain. The pain is relieved by Nothing. She has tried nothing for the symptoms. The treatment provided no relief. Her past medical history is significant for abdominal surgery. There is no history of colon cancer, Crohn's disease, gallstones, GERD, irritable bowel syndrome, pancreatitis, PUD or ulcerative colitis. Patient's medical history does not include kidney stones and UTI.       Constitution: Negative for activity change, appetite change, fatigue, fever and generalized weakness.   HENT:  Negative for congestion, postnasal drip, sinus pain, sinus pressure, sore throat, trouble swallowing and voice change.    Neck: Negative for neck pain and neck " stiffness.   Cardiovascular:  Negative for chest pain, leg swelling, palpitations and sob on exertion.   Respiratory:  Positive for sleep apnea. Negative for cough, sputum production, shortness of breath, wheezing and asthma.    Gastrointestinal:  Positive for abdominal pain and history of abdominal surgery. Negative for abdominal trauma, nausea, vomiting, constipation, diarrhea and bright red blood in stool.   Genitourinary:  Negative for dysuria, frequency, urgency and hematuria.   Musculoskeletal:  Positive for pain and muscle ache. Negative for trauma, joint pain, joint swelling, abnormal ROM of joint, arthritis, gout, back pain and muscle cramps.   Allergic/Immunologic: Negative for asthma and chronic cough.   Neurological:  Negative for headaches.      Objective:     Physical Exam   Constitutional: She is oriented to person, place, and time. No distress.      Comments:Patient is awake and alert, sitting up in exam chair, speaking and answering in complete sentences     normal  HENT:   Head: Normocephalic and atraumatic.   Ears:   Right Ear: External ear normal.   Left Ear: External ear normal.   Nose: Nose normal.   Eyes: Conjunctivae are normal. Extraocular movement intact   Neck: Neck supple.   Cardiovascular: Normal rate, regular rhythm, normal heart sounds and normal pulses.   Pulmonary/Chest: Effort normal and breath sounds normal. She exhibits bony tenderness. She exhibits no tenderness and no crepitus.       Abdominal: Normal appearance. She exhibits no distension and no mass. There is no abdominal tenderness. There is no rebound, no guarding, no left CVA tenderness and no right CVA tenderness. No hernia.   Musculoskeletal: Normal range of motion.         General: Normal range of motion.   Neurological: She is alert and oriented to person, place, and time.   Psychiatric: Her behavior is normal. Mood and judgment normal.   Nursing note and vitals reviewed.      Assessment:     1. Costochondritis, acute   "  2. Rib pain on right side      Patient presents with clinical exam findings and history consistent with above.      On exam, patient is nontoxic appearing and vitals are stable.      Diagnostic testing results were reviewed and discussed with patient/guardian.   Tests ordered in clinic: None  Previous progress notes/admissions/labs and medications were reviewed.  Reviewed GFR > 60 from CMP on 1/17/25.     Plan:   Pt declined x-ray of ribs.    Costochondritis, acute  -     ketorolac injection 30 mg  -     predniSONE (DELTASONE) 20 MG tablet; Take 1 tablet (20 mg total) by mouth 2 (two) times daily. for 5 days  Dispense: 10 tablet; Refill: 0  -     Ambulatory referral/consult to Internal Medicine    Rib pain on right side  -     ketorolac injection 30 mg  -     predniSONE (DELTASONE) 20 MG tablet; Take 1 tablet (20 mg total) by mouth 2 (two) times daily. for 5 days  Dispense: 10 tablet; Refill: 0                    1) See orders for this visit as documented in the electronic medical record.  2) Symptomatic therapy suggested: use acetaminophen/ibuprofen every 6-8 hours prn pain or fever, push fluids.   3) Call or return to clinic prn if these symptoms worsen or fail to improve as anticipated.    Discussed results/diagnosis/plan with patient in clinic.  We had shared decision making for patient's treatment. Patient verbalized understanding and in agreement with current treatment plan.     Patient was instructed to return for re-evaluation with urgent care or PCP for continued outpatient workup and management if symptoms do not improve/worsening symptoms. Strict ED versus clinic precautions given in depth.    Discharge and follow-up instructions given verbally/printed with the patient who expressed understanding. The instructions and results are also available on [facility].              Lynn "Swati" CARLOS Levy          Patient Instructions   Discussed with patient that if he/she is in pain today, only take tylenol due " to toradol injection received in clinic. You can continue NSAIDS tomorrow such as ibuprofen (Motrin/Advil), naproxen (Aleve), Mobic (Meloxicam).     If you were prescribed a narcotic or muscle relaxer (Flexeril or Robaxin), do not drive or operate heavy equipment or machinery while taking these medications. These medications can make you drowsy. If you are driving, it is recommended to take ibuprofen TID prn pain and take flexeril/robaxin at night.  If not allergic, take Tylenol (Acetaminophen) 650 mg to  1 g every 6 hours as needed as needed for fever/pain and/or Motrin (Ibuprofen) 600 to 800 mg every 6 hours as needed for pain and/or fever    If you were not prescribed an anti-inflammatory medication, and if you do not have any history of stomach/intestinal ulcers, or kidney disease, or are not taking a blood thinner such as Coumadin, Plavix, Pradaxa, Eloquis, or Xaralta for example, it is OK to take over the counter Ibuprofen or Advil or Motrin or Aleve as directed.  Do not take these medications on an empty stomach.    Please drink plenty of fluids.  Please get plenty of rest.    Discussed adverse side effects of recurrent/long term steroid use: elevated blood pressure elevated blood sugar, pancreatitis,glaucoma/cataracts, weight gain in face/abdomen/neck, round face (moon face), fluid retention in legs/lungs, mood swings, upset stomach, increased risk of infections, osteoporosis and increased risk of fractures, fatigue, loss of appetite, nausea and muscle weakness, thin skin, bruising and slower wound healing.      Please remember that you have received care at an urgent care today. Urgent cares are not emergency rooms and are not equipped to handle life threatening emergencies and cannot rule in or out certain medical conditions and you may be released before all of your medical problems are known or treated. Please arrange follow up with your primary care physician or speciality clinic (orthopedics) within  2-5 days if your signs and symptoms have not resolved or worsen.     Patient can call our Referral Hotline at [phone] to make an appointment.    Please return here or go to the Emergency Department for any concerns or worsening of condition.Patient was educated on signs/symptoms that would warrant emergent medical attention.

## 2025-01-24 ENCOUNTER — OFFICE VISIT (OUTPATIENT)
Dept: DERMATOLOGY | Facility: CLINIC | Age: 70
End: 2025-01-24
Payer: MEDICARE

## 2025-01-24 ENCOUNTER — TELEPHONE (OUTPATIENT)
Dept: DERMATOLOGY | Facility: CLINIC | Age: 70
End: 2025-01-24
Payer: MEDICARE

## 2025-01-24 DIAGNOSIS — L82.0 INFLAMED SEBORRHEIC KERATOSIS: ICD-10-CM

## 2025-01-24 DIAGNOSIS — D22.9 NEVUS OF MULTIPLE SITES: ICD-10-CM

## 2025-01-24 DIAGNOSIS — L81.4 LENTIGINES: ICD-10-CM

## 2025-01-24 DIAGNOSIS — D18.01 CHERRY ANGIOMA: ICD-10-CM

## 2025-01-24 DIAGNOSIS — L57.0 AK (ACTINIC KERATOSIS): Primary | ICD-10-CM

## 2025-01-24 DIAGNOSIS — L82.1 SK (SEBORRHEIC KERATOSIS): ICD-10-CM

## 2025-01-24 DIAGNOSIS — L30.4 INTERTRIGO: ICD-10-CM

## 2025-01-24 PROCEDURE — 99213 OFFICE O/P EST LOW 20 MIN: CPT | Mod: PBBFAC,PO | Performed by: DERMATOLOGY

## 2025-01-24 PROCEDURE — 17000 DESTRUCT PREMALG LESION: CPT | Mod: S$PBB,XS,, | Performed by: DERMATOLOGY

## 2025-01-24 PROCEDURE — 99999 PR PBB SHADOW E&M-EST. PATIENT-LVL III: CPT | Mod: PBBFAC,,, | Performed by: DERMATOLOGY

## 2025-01-24 PROCEDURE — 17110 DESTRUCTION B9 LES UP TO 14: CPT | Mod: S$PBB,,, | Performed by: DERMATOLOGY

## 2025-01-24 PROCEDURE — 17000 DESTRUCT PREMALG LESION: CPT | Mod: 59,PBBFAC,PO | Performed by: DERMATOLOGY

## 2025-01-24 PROCEDURE — 99214 OFFICE O/P EST MOD 30 MIN: CPT | Mod: 25,S$PBB,, | Performed by: DERMATOLOGY

## 2025-01-24 PROCEDURE — 17110 DESTRUCTION B9 LES UP TO 14: CPT | Mod: PBBFAC,PO | Performed by: DERMATOLOGY

## 2025-01-24 RX ORDER — TRETINOIN 0.25 MG/G
CREAM TOPICAL
Qty: 30 G | Refills: 5 | Status: SHIPPED | OUTPATIENT
Start: 2025-01-24

## 2025-01-24 RX ORDER — KETOCONAZOLE 20 MG/G
CREAM TOPICAL
Qty: 60 G | Refills: 3 | Status: SHIPPED | OUTPATIENT
Start: 2025-01-24 | End: 2025-01-27 | Stop reason: SDUPTHER

## 2025-01-24 NOTE — PROGRESS NOTES
"  Subjective:      Patient ID:  Carolina Knight is a 69 y.o. female who presents for   Chief Complaint   Patient presents with    Skin Check     TBSE    Lesion     Posterior scalp     Patient is here today for a "mole" check.   Pt has a history of  moderate sun exposure in the past.   Pt recalls several blistering sunburns in the past- once  Pt has history of tanning bed use- no  Pt has  had moles removed in the past- yes  Pt has history of melanoma in first degree relatives-  Sister and brother hx of skin cancer, pt unsure of which    Patient with new complaint of lesion(s)  Location: posterior scalp  Duration: few weeks  Symptoms: itching  Relieving factors/Previous treatments: none    C/o irritated lesion on R back near bra line. Itches.  No tx.   C/o scaly new lesion on right nose.  Red. No tx.   C/o rash under abdomen comes and goes. Worse with heat and sweat. No current tx and is flaring        Review of Systems   Skin:  Positive for daily sunscreen use and activity-related sunscreen use. Negative for recent sunburn.   Hematologic/Lymphatic: Does not bruise/bleed easily.       Objective:   Physical Exam   Constitutional: She appears well-developed and well-nourished. No distress.   Neurological: She is alert and oriented to person, place, and time. She is not disoriented.   Psychiatric: She has a normal mood and affect.   Skin:   Areas Examined (abnormalities noted in diagram):   Scalp / Hair Palpated and Inspected  Head / Face Inspection Performed  Neck Inspection Performed  Chest / Axilla Inspection Performed  Abdomen Inspection Performed  Genitals / Buttocks / Groin Inspection Performed  Back Inspection Performed  RUE Inspected  LUE Inspection Performed  RLE Inspected  LLE Inspection Performed  Nails and Digits Inspection Performed                 Diagram Legend     Erythematous scaling macule/papule c/w actinic keratosis       Vascular papule c/w angioma      Pigmented verrucoid papule/plaque c/w " seborrheic keratosis      Yellow umbilicated papule c/w sebaceous hyperplasia      Irregularly shaped tan macule c/w lentigo     1-2 mm smooth white papules consistent with Milia      Movable subcutaneous cyst with punctum c/w epidermal inclusion cyst      Subcutaneous movable cyst c/w pilar cyst      Firm pink to brown papule c/w dermatofibroma      Pedunculated fleshy papule(s) c/w skin tag(s)      Evenly pigmented macule c/w junctional nevus     Mildly variegated pigmented, slightly irregular-bordered macule c/w mildly atypical nevus      Flesh colored to evenly pigmented papule c/w intradermal nevus       Pink pearly papule/plaque c/w basal cell carcinoma      Erythematous hyperkeratotic cursted plaque c/w SCC      Surgical scar with no sign of skin cancer recurrence      Open and closed comedones      Inflammatory papules and pustules      Verrucoid papule consistent consistent with wart     Erythematous eczematous patches and plaques     Dystrophic onycholytic nail with subungual debris c/w onychomycosis     Umbilicated papule    Erythematous-base heme-crusted tan verrucoid plaque consistent with inflamed seborrheic keratosis     Erythematous Silvery Scaling Plaque c/w Psoriasis     See annotation      Assessment / Plan:        AK (actinic keratosis)  Cryosurgery Procedure Note    Verbal consent from the patient is obtained including, but not limited to, risk of hypopigmentation/hyperpigmentation, scar, recurrence of lesion. The patient is aware of the precancerous quality and need for treatment of these lesions. Liquid nitrogen cryosurgery is applied to the 1 actinic keratoses, as detailed in the physical exam, to produce a freeze injury. The patient is aware that blisters may form and is instructed on wound care with gentle cleansing and use of vaseline ointment to keep moist until healed. The patient is supplied a handout on cryosurgery and is instructed to call if lesions do not completely  resolve.    Inflamed seborrheic keratosis  Cryosurgery procedure note:    Verbal consent from the patient is obtained including, but not limited to, risk of hypopigmentation/hyperpigmentation, scar, recurrence of lesion. Liquid nitrogen cryosurgery is applied to 1 lesions to produce a freeze injury. The patient is aware that blisters may form and is instructed on wound care with gentle cleansing and use of vaseline ointment to keep moist until healed. The patient is supplied a handout on cryosurgery and is instructed to call if lesions do not completely resolve.    SK (seborrheic keratosis)  These are benign inherited growths without a malignant potential. Reassurance given to patient. No treatment is necessary.     Nevus of multiple sites  Discussed ABCDE's of nevi.  Monitor for new mole or moles that are becoming bigger, darker, irritated, or developing irregular borders. Brochure provided. Instructed patient to observe lesion(s) for changes and follow up in clinic if changes are noted. Patient to monitor skin at home for new or changing lesions.       Lentigines  -     tretinoin (RETIN-A) 0.025 % cream; Compound tretinoin 0.025% / niacinamide 2% cream. Apply a pea-sized amount to entire face qhs then moisturize.  Dispense: 30 g; Refill: 5    Cherry angioma  These are benign vascular lesions that are inherited.  Treatment is not necessary.    Intertrigo  -     ketoconazole (NIZORAL) 2 % cream; AAA qhs under abdomen after cool blow dry  Dispense: 60 g; Refill: 3  Under breasts or abdomen :  Recommend white vinegar: water 1:1 compresses  nightly after bath/shower followed by cool blow dry and then application of prescription medication.     Use powder for maintenance in the morning.     Wash affected areas 2x per week with Hibiclens wash which can be purchased over the counter      Total body skin examination performed today including at least 12 points as noted in physical examination. No lesions suspicious for  malignancy noted.    Recommend daily sun protection/avoidance, use of at least SPF 30, broad spectrum sunscreen (OTC drug), skin self examinations, and routine physician surveillance to optimize early detection           Follow up in about 1 year (around 1/24/2026) for TBSE. Recheck R calf lesion, unchanged from last OV

## 2025-01-24 NOTE — PATIENT INSTRUCTIONS
Under breasts or abdomen :  Recommend white vinegar: water 1:1 compresses  nightly after bath/shower followed by cool blow dry and then application of prescription medication.     Use powder for maintenance in the morning.     Wash affected areas 2x per week with Hibiclens wash which can be purchased over the counter    We would like to see you back in the clinic in 12 months.  You will be able to schedule this appointment by calling or by using your [facility] portal 3 months before this time. Because our schedule fills so quickly, please set a reminder in your phone or on your calendar to schedule 3 months before you are due to come in so that we can see you in a timely fashion.  You should also receive a reminder from us in the mail. This will help us ensure we can continue to provide excellent healthcare for you. Thank you.

## 2025-01-24 NOTE — TELEPHONE ENCOUNTER
Spoke with pt - scheduled appt this afternoon for 1pm. Pt accepted appt and thanked me for calling.     ----- Message -----  From: Yanni Chavez  Sent: 1/22/2025  12:42 PM CST  To: Margarita [facility]BIO Staff  Subject: Reschedule appt                                  Type:Reschedule appt    Who Called: the pt is calling to reschedule her appt due to the weather  Would the patient rather a call back or a response via [facility]? yes  Best Call Back Number: [phone]

## 2025-01-27 ENCOUNTER — OFFICE VISIT (OUTPATIENT)
Dept: CARDIOLOGY | Facility: CLINIC | Age: 70
End: 2025-01-27
Payer: MEDICARE

## 2025-01-27 VITALS
DIASTOLIC BLOOD PRESSURE: 89 MMHG | BODY MASS INDEX: 42.69 KG/M2 | SYSTOLIC BLOOD PRESSURE: 145 MMHG | WEIGHT: 265.63 LBS | HEIGHT: 66 IN | HEART RATE: 86 BPM

## 2025-01-27 DIAGNOSIS — E66.01 MORBID OBESITY WITH BODY MASS INDEX (BMI) OF 40.0 TO 49.9: ICD-10-CM

## 2025-01-27 DIAGNOSIS — I89.0 LYMPHEDEMA OF BOTH LOWER EXTREMITIES: Primary | ICD-10-CM

## 2025-01-27 DIAGNOSIS — I87.2 VENOUS INSUFFICIENCY OF BOTH LOWER EXTREMITIES: ICD-10-CM

## 2025-01-27 DIAGNOSIS — I10 ESSENTIAL HYPERTENSION: ICD-10-CM

## 2025-01-27 PROCEDURE — 99213 OFFICE O/P EST LOW 20 MIN: CPT | Mod: S$PBB,,, | Performed by: INTERNAL MEDICINE

## 2025-01-27 PROCEDURE — 99999 PR PBB SHADOW E&M-EST. PATIENT-LVL III: CPT | Mod: PBBFAC,,, | Performed by: INTERNAL MEDICINE

## 2025-01-27 PROCEDURE — 99213 OFFICE O/P EST LOW 20 MIN: CPT | Mod: PBBFAC,PO | Performed by: INTERNAL MEDICINE

## 2025-01-27 RX ORDER — SEMAGLUTIDE 1.34 MG/ML
0.5 INJECTION, SOLUTION SUBCUTANEOUS
COMMUNITY

## 2025-01-27 NOTE — PROGRESS NOTES
[facility]    Chief Complaint   Patient presents with    Varicose veins of both lower extremities with pain     Patient ID: Carolina Kngiht is a 69 y.o. female with bilateral lower extremity lymphedema, venous insufficiency, HLD, HTN, hypothyroidism, morbid obesity, who presents for a follow up appointment. Pertinent history/events are as follows:     -Pt presents for evaluation of LLE pain.      -At our initial clinic visit on 7/29/2020, Mrs. Knight reports pain and edema of both legs.  She has no claudication or tissue loss.  Exam shows BLE's with prominent varicose veins with changes consistent with lymphedema.  Plan:  BLE Edema and Pain- Likely due to lymphedema and venous insufficiency.  Check BLE venous reflux study and segmental pressure study.  If no evidence of moderate to severe PAD, will refer to lymphedema clinic.   Morbid Obesity- Pt would like to try to lose weight on her own at this time.  Continue to encourage diet, exercise and weight loss.    -At follow up clinic visit on 08/12/20, Mrs. Knight reports continued BLE edema since clinic visit on 7/29/2020.  She has no claudication or tissue loss.  BLE Venous Reflux Study on 8/7/2020 revealed significant BLE venous reflux and no DVT.  Segmental Pressure Study on 8/10/2020 revealed normal rest and exercise YAJAIRA's.  Plan  BLE Edema and Pain- Likely due to lymphedema and venous insufficiency.  BLE Venous Reflux Study on 8/7/2020 revealed significant BLE venous reflux and no DVT.  Segmental Pressure Study on 8/10/2020 revealed normal rest and exercise YAJAIRA's.  Refer to lymphedema clinic.  Pt will benefit from significant weight loss.  Morbid Obesity- Pt would like to try to lose weight on her own at this time.  Continue to encourage diet, exercise and weight loss.     -11/30/2020 clinic visit: Mrs. Knight reports improvement in her lower extremity edema as she has completed all the sessions with lymphedema clinic and  discharged from service on 11/20/20. She reports no CLI, tissue loss, rest pain or claudication.  Plan:  BLE Edema and Pain- Likely due to lymphedema and venous insufficiency.  BLE Venous Reflux Study on 8/7/2020 revealed significant BLE venous reflux and no DVT.  Segmental Pressure Study on 8/10/2020 revealed normal rest and exercise YAJAIRA's.  Significant improvement noted with lymphedema clinic therapy discharged 11/20/20.  Pt will benefit from significant weight loss.  Morbid Obesity- Pt would like to try to lose weight on her own at this time.  Continue to encourage diet, exercise and weight loss.     12/1/2021 clinic visit: Mrs. Knight reports improvement in her swelling. She plans to lose more weight. She is planned for knee replacement since her knee has limited her lifestyle. She has no other complaints.  Plan:  BLE Edema and Pain- Likely due to lymphedema and venous insufficiency.  BLE Venous Reflux Study on 8/7/2020 revealed significant BLE venous reflux and no DVT.  Significant improvement noted with lymphedema clinic therapy discharged 11/20/20.  Pt will benefit from significant weight loss. Prescribed compression stockings 20-30 mm Hg waist high.  Morbid Obesity- Pt would like to try to lose weight on her own at this time.  Continue to encourage diet, exercise and weight loss.     HPI  Mrs. Knight an episode of severe leg swelling in 11/2024 while on a girl's trip. States she consumed large amounts of sodium during the trip. Leg swelling has since improved. States she has not been wearing compression hose everyday. She has no claudication or LE wound/ulcer.     Past Medical History:   Diagnosis Date    Decreased mobility and endurance 11/16/2017    Decreased strength 11/16/2017    Decreased strength of lower extremity 2/22/2021    Difficult intubation     Early dry stage nonexudative age-related macular degeneration 2020    Edema 9/26/2018    Edema of both legs 7/29/2020    Fatty liver  12/15/2014    Hypokalemia 2018    Lymphedema of both lower extremities 2020    Multiple gastric polyps 2012    Polyarthralgia 2015    Sjogren's disease     Sleep apnea        Past Surgical History:   Procedure Laterality Date    BREAST BIOPSY Left 2017    core,Benign breast with fibrocystic changes, including benign usual ductal hyperplasia and adenosis    BREAST BIOPSY Right     core    CERVICAL CONIZATION   W/ LASER       SECTION      x1    CHOLECYSTECTOMY      COLONOSCOPY N/A 10/14/2024    Procedure: COLONOSCOPY;  Surgeon: George Henriquez MD;  Location: [facility] (2ND FLR);  Service: Endoscopy;  Laterality: N/A;  hx of difficult intubation  WLMED: Ozempic  Referral: Kierra Cornejo MD  10/7 instr portal-ml  suprep  inst portal  LW    ESOPHAGOGASTRODUODENOSCOPY N/A 2018    Procedure: ESOPHAGOGASTRODUODENOSCOPY (EGD);  Surgeon: George Henriquez MD;  Location: [facility] (2ND FLR);  Service: Endoscopy;  Laterality: N/A;  possible difficult intubation - see anesthesia note dated 1/15/13 - 2nd floor/ generated from last EGD, 3 year f/u, Pt due/ Pt requesting Dr. Henriquez - ER    ESOPHAGOGASTRODUODENOSCOPY N/A 10/14/2024    Procedure: EGD (ESOPHAGOGASTRODUODENOSCOPY);  Surgeon: George Henriquez MD;  [facility]  Service: Endoscopy;  Laterality: N/A;  Pt request Dr. Henriquez Only  10/7-pre call complete-last ozempic-3 weeks ago-tb    EYE SURGERY  Est 2014-15    Laser on each eye - tear in white part eye by Dr Nitish Ayala    HYSTERECTOMY  age 42    [place], LSO (uterine fibroids, adhesions, endometriosis)    OOPHORECTOMY  age 42    LSO (endometriosis, cyst)    PERCUTANEOUS CRYOTHERAPY OF PERIPHERAL NERVE USING LIQUID NITROUS OXIDE IN CLOSED NEEDLE DEVICE Right 2021    Procedure: CRYOTHERAPY, NERVE, PERIPHERAL, PERCUTANEOUS, USING LIQUID NITROUS OXIDE IN CLOSED NEEDLE DEVICE;  Surgeon: CHACE Riley;  Location: [facility];  Service: Pain Management;   Laterality: Right;  RIGHT KNEE IOVERA    THYROIDECTOMY      TOE SURGERY      left    TOTAL REPLACEMENT OF BOTH KNEES USING COMPUTER-ASSISTED NAVIGATION  2023    TOTAL THYROIDECTOMY         Social History     Socioeconomic History    Marital status:      Spouse name: Nitish    Number of children: 1   Occupational History    Occupation:      Comment: retired   Tobacco Use    Smoking status: Never    Smokeless tobacco: Never   Substance and Sexual Activity    Alcohol use: No     Comment: none lately.  rarely has a drink for special occassion    Drug use: No    Sexual activity: Not Currently     Partners: Male     Birth control/protection: Post-menopausal, None   Social History Narrative    House- has stairs     Social Drivers of Health     Financial Resource Strain: Low Risk  (7/15/2024)    Overall Financial Resource Strain (CARDIA)     Difficulty of Paying Living Expenses: Not very hard   Food Insecurity: No Food Insecurity (7/15/2024)    Hunger Vital Sign     Worried About Running Out of Food in the Last Year: Never true     Ran Out of Food in the Last Year: Never true   Transportation Needs: No Transportation Needs (7/15/2024)    PRAPARE - Transportation     Lack of Transportation (Medical): No     Lack of Transportation (Non-Medical): No   Physical Activity: Insufficiently Active (7/15/2024)    Exercise Vital Sign     Days of Exercise per Week: 2 days     Minutes of Exercise per Session: 20 min   Stress: No Stress Concern Present (7/15/2024)    [employer] [employer] - Occupational Stress Questionnaire     Feeling of Stress : Not at all   Housing Stability: Low Risk  (7/15/2024)    Housing Stability Vital Sign     Unable to Pay for Housing in the Last Year: No     Homeless in the Last Year: No       Family History   Problem Relation Name Age of Onset    Arthritis Mother Siomara Skaggs     Asthma Father Mariana Dudley  Sr     COPD Father Mariana Dudley  Sr     COPD  Sister Jolynn zarco     Melanoma Sister Cheryle     Melanoma Brother Mariana     Diabetes Brother Chris zarco         Adult onset got in his 60s    Asthma Son Sergio Garcia     Breast cancer Maternal Aunt One Aunt had breast cancer     Cancer Maternal Aunt One Aunt had breast cancer     Cancer Maternal Uncle Uncle Al had rare liver cancer         esophageal cancer    Ovarian cancer Neg Hx      Colon cancer Neg Hx         Review of patient's allergies indicates:   Allergen Reactions    Lipitor [atorvastatin]      Severe myalgia    Losartan Swelling     Possible throat closing sensation.    Lovastatin Other (See Comments)     myalgia    Cefuroxime Itching and Rash    Latex, natural rubber Itching and Rash     Pt reported        Medication List with Changes/Refills   Current Medications    ALBUTEROL (PROVENTIL/VENTOLIN HFA) 90 MCG/ACTUATION INHALER    1-2 PUFFS EVERY 6 HOURS AS NEEDED    AMLODIPINE (NORVASC) 10 MG TABLET    Take 1 tablet (10 mg total) by mouth once daily.    AZELASTINE (ASTELIN) 137 MCG (0.1 %) NASAL SPRAY    1 spray (137 mcg total) by Nasal route daily as needed for Rhinitis.    BETAMETHASONE DIPROPIONATE (DIPROLENE) 0.05 % OINTMENT    aaa qd- bid . Not more than 2 weeks straight in same location. Avoid use on face and groin    CETIRIZINE (ZYRTEC) 10 MG TABLET    Take 10 mg by mouth every evening.     CLOBETASOL (TEMOVATE) 0.05 % CREAM    Apply topically 2 (two) times daily.    ECONAZOLE NITRATE 1 % CREAM    Use bid    FINACEA 15 % GEL    AAA face qhs then moisturize    FLUOCINONIDE (LIDEX) 0.05 % EXTERNAL SOLUTION    AAA scalp qday prn itching, scaling    FLUOROURACIL (EFUDEX) 5 % CREAM    Apply thin film to right lower leg  2times per day for 2-3 weeks; d/c if area bleeding or ulcerated; avoid eyes or mouth    FLUTICASONE PROPIONATE (FLONASE) 50 MCG/ACTUATION NASAL SPRAY    1 spray (50 mcg total) by Each Nostril route 2 (two) times daily as needed for Rhinitis or Allergies.    HYDROCHLOROTHIAZIDE  (HYDRODIURIL) 12.5 MG TAB    Take 1 tablet (12.5 mg total) by mouth 2 (two) times a day.    KETOCONAZOLE (NIZORAL) 2 % CREAM    aaa bid on  right posterior leg x 3 weeks    KLAYESTA POWDER    APPLY EXTERNALLY TO THE AFFECTED AREA TWICE DAILY    LEVOTHYROXINE (SYNTHROID) 125 MCG TABLET    Take 125 mcg by mouth before breakfast.    LUTEIN ORAL    Take by mouth.    MECLIZINE (ANTIVERT) 25 MG TABLET    Take 25 mg by mouth nightly as needed for Dizziness.    METFORMIN (GLUCOPHAGE-XR) 500 MG ER 24HR TABLET    Take 1 tablet (500 mg total) by mouth every evening.    MONTELUKAST (SINGULAIR) 10 MG TABLET    Take 1 tablet (10 mg total) by mouth every evening.    MUPIROCIN (BACTROBAN) 2 % OINTMENT    Apply topically 3 (three) times daily.    NYSTATIN (MYCOSTATIN) CREAM    Apply topically 2 (two) times daily.    PANTOPRAZOLE (PROTONIX) 40 MG TABLET    Take 1 tablet (40 mg total) by mouth once daily.    RETIN-A 0.05 % CREAM    Apply topically nightly as needed.    ROSUVASTATIN (CRESTOR) 5 MG TABLET    TAKE 1 TABLET BY MOUTH EVERY MONDAY WEDNESDAY AND FRIDAY    SEMAGLUTIDE (OZEMPIC) 0.25 MG OR 0.5 MG(2 MG/1.5 ML) PEN INJECTOR    Inject 0.5 mg into the skin every 7 days.    SPIRONOLACTONE (ALDACTONE) 50 MG TABLET    Take 1 tablet (50 mg total) by mouth once daily.    TRETINOIN (RETIN-A) 0.025 % CREAM    Compound tretinoin 0.025% / niacinamide 2% cream. Apply a pea-sized amount to entire face qhs then moisturize.    TRETINOIN (RETIN-A) 0.05 % CREAM    Compound tretinoin 0.05% / niacinamide 2% cream. Apply a pea-sized amount to entire face qhs then moisturize    TRIAMCINOLONE ACETONIDE 0.1% (KENALOG) 0.1 % CREAM    Aaa qd- bid prn back itching . Not more than 2 weeks straight in the same location. Avoid use on face and groin   Discontinued Medications    KETOCONAZOLE (NIZORAL) 2 % CREAM    AAA qhs under abdomen after cool blow dry    SEMAGLUTIDE, WEIGHT LOSS, (WEGOVY) 0.25 MG/0.5 ML PNIJ    Inject 0.25 mg into the skin once a week. X  "4 weeks and then inc to 0.5mg weekly onwards.    TRIAMCINOLONE ACETONIDE 0.1% (KENALOG) 0.1 % OINTMENT    Apply topically 2 (two) times daily.       Review of Systems  Constitution: Denies chills, fever, and sweats.  HENT: Denies headaches or blurry vision.  Cardiovascular: Denies chest pain or irregular heart beat.  Respiratory: Denies cough or shortness of breath.  Gastrointestinal: Denies abdominal pain, nausea, or vomiting.  Musculoskeletal: Positive for leg swelling and pain.    Neurological: Denies dizziness or focal weakness.  Psychiatric/Behavioral: Normal mental status.  Hematologic/Lymphatic: Denies bleeding problem or easy bruising/bleeding.  Skin: Denies rash or suspicious lesions    Physical Examination  BP (!) 145/89 (BP Location: Left arm, Patient Position: Sitting)   Pulse 86   Ht 5' 6" (1.676 m)   Wt 120.5 kg (265 lb 10.5 oz)   LMP  (LMP Unknown)   BMI 42.88 kg/m²     Constitutional: No acute distress, conversant  HEENT: Sclera anicteric, Pupils equal, round and reactive to light, extraocular motions intact, Oropharynx clear  Neck: No JVD, no carotid bruits  Cardiovascular: regular rate and rhythm, no murmur, rubs or gallops, normal S1/S2  Pulmonary: Clear to auscultation bilaterally  Abdominal: Abdomen soft, nontender, nondistended, positive bowel sounds  Extremities: BLE's with prominent varicose veins with changes consistent with lymphedema   Pulses:  Carotid pulses are 2+ on the right side, and 2+ on the left side.  Radial pulses are 2+ on the right side, and 2+ on the left side.   Femoral pulses are 2+ on the right side, and 2+ on the left side.  Popliteal pulses are 2+ on the right side, and 2+ on the left side.   Dorsalis pedis pulses are 2+ on the right side, and 2+ on the left side.   Posterior tibial pulses are 2+ on the right side, and 2+ on the left side.    Skin: No ecchymosis, erythema, or ulcers  Psych: Alert and oriented x 3, appropriate affect  Neuro: CNII-XII intact, no focal " deficits    Labs:  Most Recent Data  CBC:   Lab Results   Component Value Date    WBC 6.90 01/17/2025    HGB 14.0 01/17/2025    HCT 43.3 01/17/2025     01/17/2025    MCV 90 01/17/2025    RDW 14.0 01/17/2025     BMP:   Lab Results   Component Value Date     01/17/2025    K 3.7 01/17/2025     01/17/2025    CO2 27 01/17/2025    BUN 16 01/17/2025    CREATININE 0.8 01/17/2025    GLU 90 01/17/2025    CALCIUM 9.3 01/17/2025    MG 2.0 07/15/2022    PHOS 3.7 05/25/2011     LFTS;   Lab Results   Component Value Date    PROT 7.5 01/17/2025    ALBUMIN 3.8 01/17/2025    BILITOT 0.4 01/17/2025    AST 10 01/17/2025    ALKPHOS 49 01/17/2025    ALT 20 01/17/2025     COAGS:   Lab Results   Component Value Date    INR 0.9 01/25/2022     FLP:   Lab Results   Component Value Date    CHOL 135 01/17/2025    HDL 48 01/17/2025    LDLCALC 66.8 01/17/2025    TRIG 101 01/17/2025    CHOLHDL 35.6 01/17/2025     CARDIAC:   Lab Results   Component Value Date    TROPONINI <0.006 07/15/2022    BNP 21 07/09/2020     BLE Venous Reflux Study 8/7/2020:  No evidence of right lower extremity DVT.  No evidence of left lower extremity DVT.  Right GSV reflux (below knee segments)  Left GSV reflux (below knee segment)  Left SSV reflux.    Segmental Pressure Study 8/10/2020:  Normal rest and exercise YAJAIRA's.    Assessment/Plan:  Carolina Knight is a 69 y.o. female with bilateral lower extremity lymphedema, venous insufficiency, HLD, HTN, hypothyroidism, morbid obesity, who presents for a follow up appointment.     1. BLE lymphedema and venous insufficiency- Refer back to lymphedema clinic. Pt presents with findings consistent with moderate lymphedema.  Recommend wearing compression stockings 20-30 mm Hg waist high daily.  Limit sodium intake to 2000 mg daily.  Limit volume intake to 1.5 L daily.  Elevate legs when resting.    2. Morbid Obesity-  Continue to encourage diet, exercise and weight loss.     3. HTN- Continue current  medications.    Follow up in 4 months     Total duration of face to face visit time 22 minutes.  Total time spent counseling greater than fifty percent of total visit time.  Counseling included discussion regarding imaging findings, diagnosis, possibilities, treatment options, risks and benefits.  The patient had many questions regarding the options and long-term effects.    Mina Plummer MD, PhD  Interventional Cardiology

## 2025-01-27 NOTE — PATIENT INSTRUCTIONS
Assessment/Plan:  Carolina Knight is a 69 y.o. female with bilateral lower extremity lymphedema, venous insufficiency, HLD, HTN, hypothyroidism, morbid obesity, who presents for a follow up appointment.     1. BLE lymphedema and venous insufficiency- Refer back to lymphedema clinic. Pt presents with findings consistent with moderate lymphedema.  Recommend wearing compression stockings 20-30 mm Hg waist high daily.  Limit sodium intake to 2000 mg daily.  Limit volume intake to 1.5 L daily.  Elevate legs when resting.    2. Morbid Obesity-  Continue to encourage diet, exercise and weight loss.     3. HTN- Continue current medications.    Follow up in 4 months

## 2025-01-27 NOTE — PROGRESS NOTES
[facility]    Chief Complaint   Patient presents with    Varicose veins of both lower extremities with pain     Patient ID: Carolina Knight is a 65 y.o. female with a past medical history of HLD, HTN, hypothyroidism, morbid obesity, who presents for a follow up appointment.  Pertinent history/events are as follows:     -Pt presents for evaluation of LLE pain.      -At our initial clinic visit on 7/29/2020, Mrs. Knight reports pain and edema of both legs.  She has no claudication or tissue loss.  Exam shows BLE's with prominent varicose veins with changes consistent with lymphedema.  Plan:  BLE Edema and Pain- Likely due to lymphedema and venous insufficiency.  Check BLE venous reflux study and segmental pressure study.  If no evidence of moderate to severe PAD, will refer to lymphedema clinic.   Morbid Obesity- Pt would like to try to lose weight on her own at this time.  Continue to encourage diet, exercise and weight loss.    - At clinic visit on 08/12/20, Mrs. Knight reports continued BLE edema since clinic visit on 7/29/2020.  She has no claudication or tissue loss.  BLE Venous Reflux Study on 8/7/2020 revealed significant BLE venous reflux and no DVT.  Segmental Pressure Study on 8/10/2020 revealed normal rest and exercise YAJAIRA's.  Plan  BLE Edema and Pain- Likely due to lymphedema and venous insufficiency.  BLE Venous Reflux Study on 8/7/2020 revealed significant BLE venous reflux and no DVT.  Segmental Pressure Study on 8/10/2020 revealed normal rest and exercise YAJAIRA's.  Refer to lymphedema clinic.  Pt will benefit from significant weight loss.  Morbid Obesity- Pt would like to try to lose weight on her own at this time.  Continue to encourage diet, exercise and weight loss.     HPI  Mrs. Knight reports improvement in her lower extremity edema as she had completed all the sessions with lymphedema clinic and discharged from service on 11/20/20. She reports no CLI, tissue  loss, rest pain or claudication.      Past Medical History:   Diagnosis Date    Decreased mobility and endurance 2017    Decreased strength 2017    Decreased strength of lower extremity 2021    Difficult intubation     Early dry stage nonexudative age-related macular degeneration     Edema 2018    Edema of both legs 2020    Fatty liver 12/15/2014    Hypokalemia 2018    Lymphedema of both lower extremities 2020    Multiple gastric polyps 2012    Polyarthralgia 2015    Sjogren's disease     Sleep apnea        Past Surgical History:   Procedure Laterality Date    BREAST BIOPSY Left 2017    core,Benign breast with fibrocystic changes, including benign usual ductal hyperplasia and adenosis    BREAST BIOPSY Right     core    CERVICAL CONIZATION   W/ LASER       SECTION      x1    CHOLECYSTECTOMY      COLONOSCOPY N/A 10/14/2024    Procedure: COLONOSCOPY;  Surgeon: George Henriquez MD;  Location: [facility] (2ND FLR);  Service: Endoscopy;  Laterality: N/A;  hx of difficult intubation  WLMED: Ozempic  Referral: Kierra Cornejo MD  10/7 [facility] portal-  [facility]  [facility]    ESOPHAGOGASTRODUODENOSCOPY N/A 2018    Procedure: ESOPHAGOGASTRODUODENOSCOPY (EGD);  Surgeon: George Henriquez MD;  Location: [facility] (2ND FLR);  Service: Endoscopy;  Laterality: N/A;  possible difficult intubation - see anesthesia note dated 1/15/13 - 2nd floor/ generated from last EGD, 3 year f/u, Pt due/ Pt requesting Dr. Henriquez - ER    ESOPHAGOGASTRODUODENOSCOPY N/A 10/14/2024    Procedure: EGD (ESOPHAGOGASTRODUODENOSCOPY);  Surgeon: George Henriquez MD;  Location: [facility] (2ND FLR);  Service: Endoscopy;  Laterality: N/A;  Pt request Dr. Henriquez Only  10/7-pre call complete-last ozempic-3 weeks ago-tb    EYE SURGERY  Est 2014-15    Laser on each eye - tear in white part eye by Dr Nitish Ayala    HYSTERECTOMY  age 42    [place], LSO (uterine fibroids, adhesions, endometriosis)     OOPHORECTOMY  age 42    LSO (endometriosis, cyst)    PERCUTANEOUS CRYOTHERAPY OF PERIPHERAL NERVE USING LIQUID NITROUS OXIDE IN CLOSED NEEDLE DEVICE Right 03/01/2021    Procedure: CRYOTHERAPY, NERVE, PERIPHERAL, PERCUTANEOUS, USING LIQUID NITROUS OXIDE IN CLOSED NEEDLE DEVICE;  Surgeon: CHACE Riley;  Location: [facility];  Service: Pain Management;  Laterality: Right;  RIGHT KNEE IOVERA    THYROIDECTOMY      TOE SURGERY      left    TOTAL REPLACEMENT OF BOTH KNEES USING COMPUTER-ASSISTED NAVIGATION  2023    TOTAL THYROIDECTOMY         Social History     Socioeconomic History    Marital status:      Spouse name: Nitish    Number of children: 1   Occupational History    Occupation:      Comment: retired   Tobacco Use    Smoking status: Never    Smokeless tobacco: Never   Substance and Sexual Activity    Alcohol use: No     Comment: none lately.  rarely has a drink for special occassion    Drug use: No    Sexual activity: Not Currently     Partners: Male     Birth control/protection: Post-menopausal, None   Social History Narrative    House- has stairs     Social Drivers of Health     Financial Resource Strain: Low Risk  (7/15/2024)    Overall Financial Resource Strain (CARDIA)     Difficulty of Paying Living Expenses: Not very hard   Food Insecurity: No Food Insecurity (7/15/2024)    Hunger Vital Sign     Worried About Running Out of Food in the Last Year: Never true     Ran Out of Food in the Last Year: Never true   Transportation Needs: No Transportation Needs (7/15/2024)    PRAPARE - Transportation     Lack of Transportation (Medical): No     Lack of Transportation (Non-Medical): No   Physical Activity: Insufficiently Active (7/15/2024)    Exercise Vital Sign     Days of Exercise per Week: 2 days     Minutes of Exercise per Session: 20 min   Stress: No Stress Concern Present (7/15/2024)    [employer] [place] [employer] - Occupational Stress Questionnaire     Feeling  of Stress : Not at all   Housing Stability: Low Risk  (7/15/2024)    Housing Stability Vital Sign     Unable to Pay for Housing in the Last Year: No     Homeless in the Last Year: No       Family History   Problem Relation Name Age of Onset    Arthritis Mother Siomara Skaggs     Asthma Father Mariana Dudley  Sr     COPD Father Mariana Dudley  Sr     COPD Sister Jolynn dudley     Melanoma Sister Cheryle     Melanoma Brother Mariana     Diabetes Brother Chris dudley         Adult onset got in his 60s    Asthma Son Sergio Garcia     Breast cancer Maternal Aunt One Aunt had breast cancer     Cancer Maternal Aunt One Aunt had breast cancer     Cancer Maternal Uncle Uncle Al had rare liver cancer         esophageal cancer    Ovarian cancer Neg Hx      Colon cancer Neg Hx         Review of patient's allergies indicates:   Allergen Reactions    Lipitor [atorvastatin]      Severe myalgia    Losartan Swelling     Possible throat closing sensation.    Lovastatin Other (See Comments)     myalgia    Cefuroxime Itching and Rash    Latex, natural rubber Itching and Rash     Pt reported        Medication List with Changes/Refills   Current Medications    ALBUTEROL (PROVENTIL/VENTOLIN HFA) 90 MCG/ACTUATION INHALER    1-2 PUFFS EVERY 6 HOURS AS NEEDED    AMLODIPINE (NORVASC) 10 MG TABLET    Take 1 tablet (10 mg total) by mouth once daily.    AZELASTINE (ASTELIN) 137 MCG (0.1 %) NASAL SPRAY    1 spray (137 mcg total) by Nasal route daily as needed for Rhinitis.    BETAMETHASONE DIPROPIONATE (DIPROLENE) 0.05 % OINTMENT    aaa qd- bid . Not more than 2 weeks straight in same location. Avoid use on face and groin    CETIRIZINE (ZYRTEC) 10 MG TABLET    Take 10 mg by mouth every evening.     CLOBETASOL (TEMOVATE) 0.05 % CREAM    Apply topically 2 (two) times daily.    ECONAZOLE NITRATE 1 % CREAM    Use bid    FINACEA 15 % GEL    AAA face qhs then moisturize    FLUOCINONIDE (LIDEX) 0.05 % EXTERNAL SOLUTION    AAA scalp qday prn itching,  scaling    FLUOROURACIL (EFUDEX) 5 % CREAM    Apply thin film to right lower leg  2times per day for 2-3 weeks; d/c if area bleeding or ulcerated; avoid eyes or mouth    FLUTICASONE PROPIONATE (FLONASE) 50 MCG/ACTUATION NASAL SPRAY    1 spray (50 mcg total) by Each Nostril route 2 (two) times daily as needed for Rhinitis or Allergies.    HYDROCHLOROTHIAZIDE (HYDRODIURIL) 12.5 MG TAB    Take 1 tablet (12.5 mg total) by mouth 2 (two) times a day.    KETOCONAZOLE (NIZORAL) 2 % CREAM    aaa bid on  right posterior leg x 3 weeks    KLAYESTA POWDER    APPLY EXTERNALLY TO THE AFFECTED AREA TWICE DAILY    LEVOTHYROXINE (SYNTHROID) 125 MCG TABLET    Take 125 mcg by mouth before breakfast.    LUTEIN ORAL    Take by mouth.    MECLIZINE (ANTIVERT) 25 MG TABLET    Take 25 mg by mouth nightly as needed for Dizziness.    METFORMIN (GLUCOPHAGE-XR) 500 MG ER 24HR TABLET    Take 1 tablet (500 mg total) by mouth every evening.    MONTELUKAST (SINGULAIR) 10 MG TABLET    Take 1 tablet (10 mg total) by mouth every evening.    MUPIROCIN (BACTROBAN) 2 % OINTMENT    Apply topically 3 (three) times daily.    NYSTATIN (MYCOSTATIN) CREAM    Apply topically 2 (two) times daily.    PANTOPRAZOLE (PROTONIX) 40 MG TABLET    Take 1 tablet (40 mg total) by mouth once daily.    RETIN-A 0.05 % CREAM    Apply topically nightly as needed.    ROSUVASTATIN (CRESTOR) 5 MG TABLET    TAKE 1 TABLET BY MOUTH EVERY MONDAY WEDNESDAY AND FRIDAY    SEMAGLUTIDE (OZEMPIC) 0.25 MG OR 0.5 MG(2 MG/1.5 ML) PEN INJECTOR    Inject 0.5 mg into the skin every 7 days.    SPIRONOLACTONE (ALDACTONE) 50 MG TABLET    Take 1 tablet (50 mg total) by mouth once daily.    TRETINOIN (RETIN-A) 0.025 % CREAM    Compound tretinoin 0.025% / niacinamide 2% cream. Apply a pea-sized amount to entire face qhs then moisturize.    TRETINOIN (RETIN-A) 0.05 % CREAM    Compound tretinoin 0.05% / niacinamide 2% cream. Apply a pea-sized amount to entire face qhs then moisturize    TRIAMCINOLONE  "ACETONIDE 0.1% (KENALOG) 0.1 % CREAM    Aaa qd- bid prn back itching . Not more than 2 weeks straight in the same location. Avoid use on face and groin   Discontinued Medications    KETOCONAZOLE (NIZORAL) 2 % CREAM    AAA qhs under abdomen after cool blow dry    SEMAGLUTIDE, WEIGHT LOSS, (WEGOVY) 0.25 MG/0.5 ML PNIJ    Inject 0.25 mg into the skin once a week. X 4 weeks and then inc to 0.5mg weekly onwards.    TRIAMCINOLONE ACETONIDE 0.1% (KENALOG) 0.1 % OINTMENT    Apply topically 2 (two) times daily.       Review of Systems  Constitution: Denies chills, fever, and sweats.  HENT: Denies headaches or blurry vision.  Cardiovascular: Denies chest pain or irregular heart beat.  Respiratory: Denies cough or shortness of breath.  Gastrointestinal: Denies abdominal pain, nausea, or vomiting.  Musculoskeletal: Positive for leg swelling and pain.    Neurological: Denies dizziness or focal weakness.  Psychiatric/Behavioral: Normal mental status.  Hematologic/Lymphatic: Denies bleeding problem or easy bruising/bleeding.  Skin: Denies rash or suspicious lesions    Physical Examination  BP (!) 145/89 (BP Location: Left arm, Patient Position: Sitting)   Pulse 86   Ht 5' 6" (1.676 m)   Wt 120.5 kg (265 lb 10.5 oz)   LMP  (LMP Unknown)   BMI 42.88 kg/m²     Constitutional: No acute distress, conversant  HEENT: Sclera anicteric, Pupils equal, round and reactive to light, extraocular motions intact, Oropharynx clear  Neck: No JVD, no carotid bruits  Cardiovascular: regular rate and rhythm, no murmur, rubs or gallops, normal S1/S2  Pulmonary: Clear to auscultation bilaterally  Abdominal: Abdomen soft, nontender, nondistended, positive bowel sounds  Extremities: BLE's with prominent varicose veins with changes consistent with lymphedema   Pulses:  Carotid pulses are 2+ on the right side, and 2+ on the left side.  Radial pulses are 2+ on the right side, and 2+ on the left side.   Femoral pulses are 2+ on the right side, and 2+ on " the left side.  Popliteal pulses are 2+ on the right side, and 2+ on the left side.   Dorsalis pedis pulses are 2+ on the right side, and 2+ on the left side.   Posterior tibial pulses are 2+ on the right side, and 2+ on the left side.    Skin: No ecchymosis, erythema, or ulcers  Psych: Alert and oriented x 3, appropriate affect  Neuro: CNII-XII intact, no focal deficits    Labs:  Most Recent Data  CBC:   Lab Results   Component Value Date    WBC 6.90 01/17/2025    HGB 14.0 01/17/2025    HCT 43.3 01/17/2025     01/17/2025    MCV 90 01/17/2025    RDW 14.0 01/17/2025     BMP:   Lab Results   Component Value Date     01/17/2025    K 3.7 01/17/2025     01/17/2025    CO2 27 01/17/2025    BUN 16 01/17/2025    CREATININE 0.8 01/17/2025    GLU 90 01/17/2025    CALCIUM 9.3 01/17/2025    MG 2.0 07/15/2022    PHOS 3.7 05/25/2011     LFTS;   Lab Results   Component Value Date    PROT 7.5 01/17/2025    ALBUMIN 3.8 01/17/2025    BILITOT 0.4 01/17/2025    AST 10 01/17/2025    ALKPHOS 49 01/17/2025    ALT 20 01/17/2025     COAGS:   Lab Results   Component Value Date    INR 0.9 01/25/2022     FLP:   Lab Results   Component Value Date    CHOL 135 01/17/2025    HDL 48 01/17/2025    LDLCALC 66.8 01/17/2025    TRIG 101 01/17/2025    CHOLHDL 35.6 01/17/2025     CARDIAC:   Lab Results   Component Value Date    TROPONINI <0.006 07/15/2022    BNP 21 07/09/2020     BLE Venous Reflux Study 8/7/2020:  No evidence of right lower extremity DVT.  No evidence of left lower extremity DVT.  Right GSV reflux (below knee segments)  Left GSV reflux (below knee segment)  Left SSV reflux.    Segmental Pressure Study 8/10/2020:  Normal rest and exercise YAJAIRA's.    Assessment/Plan:  Carolina Knight is a 65 y.o. female with a past medical history of HLD, HTN, hypothyroidism, morbid obesity, who presents for a follow up appointment.     1. BLE Edema and Pain- Likely due to lymphedema and venous insufficiency.  BLE Venous Reflux Study on  8/7/2020 revealed significant BLE venous reflux and no DVT.  Segmental Pressure Study on 8/10/2020 revealed normal rest and exercise YAJAIRA's.  Significant improvement noted with lymphedema clinic therapy discharged 11/20/20.  Pt will benefit from significant weight loss.    2. Morbid Obesity- Pt would like to try to lose weight on her own at this time.  Continue to encourage diet, exercise and weight loss.     Follow up in 3 months     Total duration of face to face visit time 30 minutes.  Total time spent counseling greater than fifty percent of total visit time.  Counseling included discussion regarding imaging findings, diagnosis, possibilities, treatment options, risks and benefits.  The patient had many questions regarding the options and long-term effects.    Mina Plummer MD, PhD  Interventional Cardiology

## 2025-01-28 NOTE — PROGRESS NOTES
Lymphedema Pump Progress:  [x] Consult, clinical notes, and face sheet faxed to [employer] for home pneumatic compression.  [] Reached out to patient for follow up. Wish to inquire about symptoms of lymphedema and if conservative therapy has been effective.  [] Updated progress note sent to [employer].  [] Patient pneumatic compression pump approved and shipped by [employer].

## 2025-02-24 ENCOUNTER — DOCUMENTATION ONLY (OUTPATIENT)
Dept: CARDIOLOGY | Facility: CLINIC | Age: 70
End: 2025-02-24
Payer: MEDICARE

## 2025-02-24 ENCOUNTER — CLINICAL SUPPORT (OUTPATIENT)
Dept: REHABILITATION | Facility: HOSPITAL | Age: 70
End: 2025-02-24
Payer: MEDICARE

## 2025-02-24 DIAGNOSIS — I89.0 LYMPHEDEMA OF BOTH LOWER EXTREMITIES: ICD-10-CM

## 2025-02-24 DIAGNOSIS — I89.0 LYMPHEDEMA OF BOTH LOWER EXTREMITIES: Primary | ICD-10-CM

## 2025-02-24 PROCEDURE — 97165 OT EVAL LOW COMPLEX 30 MIN: CPT

## 2025-02-24 PROCEDURE — 97535 SELF CARE MNGMENT TRAINING: CPT

## 2025-02-24 NOTE — PROGRESS NOTES
Patient has been compliant with compression, elevation and exercise for at least 4 weeks yet swelling persists. Hyperplasia is present with this edema.

## 2025-03-06 NOTE — PROGRESS NOTES
"  Physical Therapy Daily Treatment Note     Name: Carolina [facility]  Clinic Number: [id number]    Therapy Diagnosis:   Encounter Diagnoses   Name Primary?    Chronic pain of both knees     Muscle weakness     Decreased functional mobility      Physician: Deedee Mcgraw NP    Visit Date: 10/25/2018    Physician Orders: PT Eval and Treat   Medical Diagnosis from Referral: M17.0 (ICD-10-CM) - Primary osteoarthritis of both knees  Evaluation Date: 10/10/2018  Authorization Period Expiration: 12/31/2018  Plan of Care Expiration: 12/10/18  Visit # / Visits authorized: 5/ 50     Time In: 1700  Time Out: 1750  Total Billable Time: 50 (MT-1, TE-3)     Precautions: Standard    Subjective     Pt reports: she is feeling "pretty good." Has "clicking" in knee when on bike  She was compliant with home exercise program.  Response to previous treatment: no adverse reaction  Functional change: no change    Pain: 3/10  Location: right knee      Objective     Carolina received therapeutic exercises to develop strength, endurance, flexibility and core stabilization for 40 minutes including:  below      FOTO: 5/10 NEXT     Bike: 5'    HSS: 3x30' B  SAQ: 2x10 B   QS: 20x5" towel under knees  SLR: 3x10 B  Bridges: 2x15 B  Clams: 2x10 B  SL Hip ABd: 2x10 B    Calf stretch- on fitter 3 x30"  [place]amboats YTB 2x10 held    Leg Press: 4.5 3x10      MT x 10' consisting of STM/MFR to L lateral quad and HS, MFR L ITB    Home Exercises Provided and Patient Education Provided       Written Home Exercises Provided: Patient instructed to cont prior HEP.  Exercises were reviewed and Carolina was able to demonstrate them prior to the end of the session.  Carolina demonstrated good  understanding of the education provided.     See EMR under Patient Instructions for exercises provided prior visit.    Assessment     Pt tolerated treatment well with no c/o pain or discomfort.    Carolina is progressing well towards her goals.   Pt prognosis is Good.     Pt will " continue to benefit from skilled outpatient physical therapy to address the deficits listed in the problem list box on initial evaluation, provide pt/family education and to maximize pt's level of independence in the home and community environment.     Pt's spiritual, cultural and educational needs considered and pt agreeable to plan of care and goals.    Anticipated barriers to physical therapy: none    Goals:     Short Term Goals:  1. Pt will be independent with HEP  2. Pt will improve B LE strength to at least 4+/5 in order to improve functional mobility  3. Pt will improve L knee AROM to at least 108 degrees flexion in order to improve gait     Long Term Goals:  1. Pt will be independent with updated HEP  2. Pt will improve B LE strength to grossly 5/5 in order to improve functional mobility  3. Pt will improve FOTO knee survey score to </= 52% limited in order to demo improved functional mobility  4. Pt will perform at least 10 sit to stands without UE support on 30 second sit to stand test in order to demo improved ability to perform transfers    Plan     Cont POC.    Tanika Ellis, PT      no

## 2025-03-09 DIAGNOSIS — I10 ESSENTIAL HYPERTENSION: ICD-10-CM

## 2025-03-10 RX ORDER — AMLODIPINE BESYLATE 10 MG/1
10 TABLET ORAL
Qty: 90 TABLET | Refills: 3 | Status: SHIPPED | OUTPATIENT
Start: 2025-03-10

## 2025-03-10 NOTE — PROGRESS NOTES
Outpatient Rehab    Occupational Therapy Evaluation    Patient Name: Carolina Knight  MRN: [id number]  YOB: 1955  Encounter Date: 2/24/2025    Therapy Diagnosis:   Encounter Diagnosis   Name Primary?    Lymphedema of both lower extremities      Physician: Mina Plummer MD*    Physician Orders: Eval and Treat  Medical Diagnosis: Lymphedema of both lower extremities [I89.0]     Visit # / Visits Authorized:  1 / 1   Date of Evaluation:  2/24/2025   Insurance Authorization Period: 1/27/2025 - 1/27/2026   Plan of Care Certification:  2/24/2025 to 4/29/2025      Time In: 1230   Time Out: 1330  Total Time: 60   Total Billable Time: 60    Intake Outcome Measure for FOTO Survey    Therapist reviewed FOTO scores for Carolina Knight on 2/24/2025.   FOTO report - see Media section or FOTO account episode details.     Intake Score:  %         Subjective   History of Present Illness  Carolina is a 69 y.o. female who reports to occupational therapy with a chief concern of Swelling of BLE.     The patient reports a medical diagnosis of Lymphedema of both lower extremities (I89.0).            History of Present Condition/Illness: She participated in lymphedema therapy in 2020 and was discharged with knee high 20-30 mm hg stockings. She had b/l TKAs since and was not experiencing swelling after so she self discontinued stockings. Swelling was well controlled until she went on a trip in November 2024 where she had increased sodium intake and prolonged periods in sitting and has been unable to control swelling since. She has not resumed compression stockings since. Alleviating factors include elevation, worsening factors include dependency and sodium. She does not have a home pump.     Additional Lymphedema History  The patient's medical history relevant to lymphedema includes Surgery in region, Traumatic injury to region, and History of infection in at-risk limb.         Activities of Daily Living  Previously  independent with activities of daily living? Yes     Currently independent with activities of daily living? Yes          Previously independent with instrumental activities of daily living? Yes     Currently independent with instrumental activities of daily living? Yes              Pain     Patient reports a current pain level of 2/10. Pain at best is reported as 0/10. Pain at worst is reported as 10/10.   Location: Knees  Pain Qualities: Other (Comment)  Other Pain Qualities: Sore  Pain-Relieving Factors: Elevation  Pain-Aggravating Factors: Sitting         Living Arrangements  Living Situation  Housing: Home independently  Living Arrangements: Spouse/significant other        Employment  Employment Status: Employed part-time   Self employed, sells insurance      Past Medical History/Physical Systems Review:   Carolina Knight  has a past medical history of Decreased mobility and endurance, Decreased strength, Decreased strength of lower extremity, Difficult intubation, Early dry stage nonexudative age-related macular degeneration, Edema, Edema of both legs, Fatty liver, Hypokalemia, Lymphedema of both lower extremities, Multiple gastric polyps, Polyarthralgia, Sjogren's disease, and Sleep apnea.    Carolina Knight  has a past surgical history that includes Total thyroidectomy; Cholecystectomy; Thyroidectomy; Toe Surgery; Oophorectomy (age 42); Cervical conization w/ laser ();  section; Hysterectomy (age 42); Esophagogastroduodenoscopy (N/A, 2018); Eye surgery (Est 2014-15); Percutaneous cryotherapy of peripheral nerve using liquid nitrous oxide in closed needle device (Right, 2021); Breast biopsy (Left, ); Breast biopsy (Right); Total replacement of both knees using computer-assisted navigation (); Esophagogastroduodenoscopy (N/A, 10/14/2024); and Colonoscopy (N/A, 10/14/2024).    Carolina has a current medication list which includes the following prescription(s): albuterol,  amlodipine, azelastine, betamethasone dipropionate, zyrtec, clobetasol, econazole nitrate, finacea, fluocinonide, fluorouracil, fluticasone propionate, hydrochlorothiazide, ketoconazole, klayesta, levothyroxine, lutein, meclizine, metformin, montelukast, mupirocin, nystatin, pantoprazole, retin-a, rosuvastatin, ozempic, spironolactone, tretinoin, tretinoin, and triamcinolone acetonide 0.1%, and the following Facility-Administered Medications: ropivacaine hcl/pf.    Review of patient's allergies indicates:   Allergen Reactions    Lipitor [atorvastatin]      Severe myalgia    Losartan Swelling     Possible throat closing sensation.    Lovastatin Other (See Comments)     myalgia    Cefuroxime Itching and Rash    Latex, natural rubber Itching and Rash     Pt reported         Objective   Lower Extremity Skin Observations  Right Lower Extremity Skin Observations  Right Lower Extremity Edema Non-pitting or Pitting: Non-pitting  Right Lower Extremity Non-pitting Edema Severity: Mild  Right Lower Extremity Stemmer's Sign: No    Left Lower Extremity Skin Observations  Left Lower Extremity Edema Non-pitting or Pitting: Non-pitting  Left Lower Extremity Non-pitting Edema Severity: Mild  Left Lower Extremity Stemmer's Sign: No           Lower extremity Girth Measurements (in centimeters): taken in supine  [place] Left lower extremity  Date: 2/24/25 Right lower extremity   Date: 2/24/25   Metatarsal Head 22.0 cm 22.0 cm   Medial Malleolus 29.0 cm 28.5 cm   (+) 5cm 29.0 cm 29.0 cm   (+) 10cm 33.5 cm 34.0 cm   (+) 20cm 47.0 cm 46.5 cm   (+) 30cm 52.0 cm 52.0 cm                Treatment:  Self Cares and ADLs  Self Care/ADL Activity 1: See education section    Assessment & Plan   Assessment  Carolina presents with a condition of Low complexity.              Functional Limitations: Sitting tolerance, Standing tolerance                 Evaluation/Treatment Response: Patient responded to treatment well  Patient Goal for Therapy (OT):  Reduce swelling  Prognosis: Good  Assessment Details: Pt presents with stage 1 lymphedema of BLE. Anticipate resolution of symptoms after resuming 20-30 mm hg compression stockings. Pt to RTC for reassessment in 3-4 weeks. If swelling has not resolved, will initiate short complete decongestive therapy course at that time.    Plan  From an occupational therapy perspective, the patient would benefit from: Skilled Rehab Services    Planned therapy interventions include: Therapeutic exercise, Therapeutic activities, Manual therapy, ADLs/IADLs, and Lymphatic compression wrapping.            Visit Frequency: 1 times In Total          This plan was discussed with Patient.   Discussion participants: Agreed Upon Plan of Care             Patient's spiritual, cultural, and educational needs considered and patient agreeable to plan of care and goals.     Education  Education was done with Patient. The patient's learning style includes Demonstration and Listening. The patient Demonstrates understanding and Verbalizes understanding.          Patient was educated in  Compression needs: 20-30 mm hg knee highs Home management plan: Wear schedule: Enrico first thing in the morning, remove at the end of the day as close to bedtime as possible. Do not sleep in garments. If using a home pneumatic pump, remove garments when using pump. If it is not yet bedtime, resume wearing garments until bed. Donning/doffing techniques Wash and management of products Cost/coverage of compression garments: Medicare now pays for compression. Private insurance coverage is on a case-by-case basis. In order to determine coverage an Rx with a diagnosis of lymphedema is sent to a participating DME for a benefits check.  Medicare Coverage: Daytime garments - 3 sets (one garment for each affected body part) every six months,  standard or custom fit, or a combination of both. Nighttime garments - 2 sets (one garment for each affected body part) every two years,  standard or custom fit, or a combination of both. Bandaging supplies - no set limit in the rule. Accessories - no set limit, will be determined on a case-by-case basis depending on the needs of the patient. Coverage is under Medicare part B. After deductible is met, a 20% copayment is required for all items. Secondary insurance often covers copayment's.  Commitment to attendance and securing compression needs are critical to lymphedema management Pneumatic pump benefits, set-up, use, referral process, coverage through insurance. Coverage is based on insurance plan as well as if deductible has been met. Pneumatic pump is an added tool for managing lymphedema at home and is meant to be used in addition to wearing compression garments daily but does not substitute compression garments.  Monitor for signs/symptoms of infection and seek medical attention immediately if symptoms occur.         Goals:   Active       Long Term Goals       Patient and/or caregiver to rogerio/doff compression garment independently and with daily compliance to assist in lymphedema management, skin elasticity, and tissue density        Start:  03/10/25    Expected End:  04/21/25            Patient and/or caregiver will be independent in use of pneumatic compression pump or self manual lymphatic drainage techniques to areas within reach to enhance lymphatic drainage and skin condition.         Start:  03/10/25    Expected End:  04/21/25            Patient to be independent and compliant with home exercise program to allow for increased function in affected limb.        Start:  03/10/25    Expected End:  04/21/25               Short Term Goals       Patient will tolerate daily activities wearing multilayered bandaging to allow for lymphatic drainage support, skin elasticity, and reduction in shape and size of limb.         Start:  03/10/25    Expected End:  03/31/25            Patient and/or caregiver will order/obtain appropriate compression  garments to maintain lymphatic and venous support.        Start:  03/10/25    Expected End:  03/31/25            Patient will tolerate daily activities wearing compression garments to allow for lymphatic drainage support, skin elasticity, and reduction in shape and size of limb.         Start:  03/10/25    Expected End:  03/31/25                Leigha Quintana OT

## 2025-03-11 ENCOUNTER — HOSPITAL ENCOUNTER (OUTPATIENT)
Dept: RADIOLOGY | Facility: HOSPITAL | Age: 70
Discharge: HOME | End: 2025-03-11
Attending: NURSE PRACTITIONER
Payer: MEDICARE

## 2025-03-11 ENCOUNTER — OFFICE VISIT (OUTPATIENT)
Dept: ORTHOPEDICS | Facility: CLINIC | Age: 70
End: 2025-03-11
Payer: MEDICARE

## 2025-03-11 DIAGNOSIS — Z96.652 S/P TOTAL KNEE REPLACEMENT, LEFT: ICD-10-CM

## 2025-03-11 DIAGNOSIS — Z96.651 S/P TOTAL KNEE REPLACEMENT, RIGHT: ICD-10-CM

## 2025-03-11 DIAGNOSIS — Z96.653 HISTORY OF BILATERAL KNEE REPLACEMENT: ICD-10-CM

## 2025-03-11 DIAGNOSIS — Z96.652 S/P TOTAL KNEE REPLACEMENT, LEFT: Primary | ICD-10-CM

## 2025-03-11 PROCEDURE — 73562 X-RAY EXAM OF KNEE 3: CPT | Mod: TC,50

## 2025-03-11 PROCEDURE — 99215 OFFICE O/P EST HI 40 MIN: CPT | Mod: PBBFAC,25 | Performed by: NURSE PRACTITIONER

## 2025-03-11 PROCEDURE — 99214 OFFICE O/P EST MOD 30 MIN: CPT | Mod: S$PBB,,, | Performed by: NURSE PRACTITIONER

## 2025-03-11 PROCEDURE — 73562 X-RAY EXAM OF KNEE 3: CPT | Mod: 26,50,, | Performed by: RADIOLOGY

## 2025-03-11 PROCEDURE — 99999 PR PBB SHADOW E&M-EST. PATIENT-LVL V: CPT | Mod: PBBFAC,,, | Performed by: NURSE PRACTITIONER

## 2025-03-11 RX ORDER — NAPROXEN 500 MG/1
500 TABLET ORAL 2 TIMES DAILY WITH MEALS
Qty: 60 TABLET | Refills: 1 | Status: SHIPPED | OUTPATIENT
Start: 2025-03-11

## 2025-03-11 NOTE — PROGRESS NOTES
CC: h/o bilateral knee replacement      HPI:   Pt with history of bilateral knee replacements by Dr. Garcia in 2021 and 2022. She has occasional medial joint line tenderness, but overall feels that her knees have done great. She has noticed that she's weaker in her quads and calves. She has not been exercising and she would like to work on that to lose some weight. She takes naproxen prn. She is ambulating without assistive device. There is not a limp.      ROS  General: denies fever and chills  Resp: no c/o sob  CVS: no c/o cp  MSK: c/o occasional medial knee pain and weakness overall    PE  General: AAOx3, pleasant and cooperative  Resp: respirations even and unlabored  MSK: bilateral knee exam  0 degrees extension  125 degrees flexion  No warmth or erythema   - effusion  4/5 quad and hamstring strength      Xray:  Personally interpreted by me and reviewed with the patient: no fracture or dislocation noted. Prosthesis are in proper position and alignment    Assessment:  H/o bilateral knee replacement  Annual visit    Plan:  Naproxen rx sent to pharmacy  Physical therapy orders to [facility] for strengthening  RICE prn  F/u annually or sooner as needed

## 2025-03-19 ENCOUNTER — CLINICAL SUPPORT (OUTPATIENT)
Dept: REHABILITATION | Facility: HOSPITAL | Age: 70
End: 2025-03-19
Payer: MEDICARE

## 2025-03-19 DIAGNOSIS — I89.0 LYMPHEDEMA OF BOTH LOWER EXTREMITIES: Primary | ICD-10-CM

## 2025-03-19 PROCEDURE — 97016 VASOPNEUMATIC DEVICE THERAPY: CPT

## 2025-03-19 PROCEDURE — 97535 SELF CARE MNGMENT TRAINING: CPT

## 2025-03-19 NOTE — PROGRESS NOTES
Outpatient Rehab    Occupational Therapy Discharge    Patient Name: Carolina Knight  MRN: [id number]  YOB: 1955  Encounter Date: 3/19/2025    Therapy Diagnosis:   Encounter Diagnosis   Name Primary?    Lymphedema of both lower extremities Yes     Physician: Mina Plummer MD*    Physician Orders: Eval and Treat  Medical Diagnosis: Lymphedema, not elsewhere classified    Visit # / Visits Authorized: 1 / 10  Date of Evaluation: 2/24/2025  Insurance Authorization Period: 2/20/2025 to 12/31/2025  Plan of Care Certification:   2/24/2025 to 4/29/2025      Time In: 1100   Time Out: 1130  Total Time: 30   Total Billable Time: 30         Subjective   [employer] rep came and measured her but she has not received the pump yet. She has not heard from S regarding mediven plus stockings. She has not started wearing compression stockings at all because she was waiting for the ones for S and she does not think her old stockings will fit her because she has gained weight. She cannot stay for the full session but would like to use the pump for 20 minutes..         Objective         Lower extremity Girth Measurements (in centimeters): taken in supine  [place] Left lower extremity  Date: 2/24/25 Right lower extremity   Date: 2/24/25 LLE 3/19/25 RLE 3/19/25   Metatarsal Head 22.0 cm 22.0 cm 21.5 cm 22.0 cm   Medial Malleolus 29.0 cm 28.5 cm 29.0 cm 29.0 cm   (+) 5cm 29.0 cm 29.0 cm 29.0 cm 29.0 cm   (+) 10cm 33.5 cm 34.0 cm 33.0 cm 34.0 cm   (+) 20cm 47.0 cm 46.5 cm 42.5 cm 45.0 cm   (+) 30cm 52.0 cm 52.0 cm 52.5 cm 53.0 cm           Treatment:  Self Care and ADLs  ADL 1: See education section  ADL 2: edema wear size medium donned at end of session  Other Activities  Activity 1: SEQUENTIAL COMPRESSION PUMP: full leg sleeve applied to RLE and LLE  Airos 6 with default setting with distal pressures starting at 45mmHg entire extremity simultaneous to education.    Time Entry(in minutes):  Vasopneumatic Devices Time  Entry: 25  Self Care/Home Management (ADLs) Time Entry: 30    Assessment & Plan   Assessment: Pt has not resumed wearing compression stockings as new wide calf stockings ordered last month have not arrived yet. Anticipate adequate containment with daily stockings and home pump. Pt declines follow up session after new stockings arrive and prefers to contact therapist if there are any issues or concerns. No further therapeutic needs identified at this time. She is ready for discharge to home management.  Evaluation/Treatment Tolerance: Patient tolerated treatment well    Patient's spiritual, cultural, and educational needs considered and patient agreeable to plan of care and goals.     Education  Education was done with Patient. The patient's learning style includes Demonstration and Listening. The patient Demonstrates understanding and Verbalizes understanding.          Patient was educated in  Compression needs: 20-30 mm hg knee highs Home management plan: Wear schedule: [place] first thing in the morning, remove at the end of the day as close to bedtime as possible. Do not sleep in garments. If using a home pneumatic pump, remove garments when using pump. If it is not yet bedtime, resume wearing garments until bed. Donning/doffing techniques Wash and management of products Cost/coverage of compression garments: Medicare now pays for compression. Private insurance coverage is on a case-by-case basis. In order to determine coverage an Rx with a diagnosis of lymphedema is sent to a participating DME for a benefits check.  Medicare Coverage: Daytime garments - 3 sets (one garment for each affected body part) every six months,  standard or custom fit, or a combination of both. Nighttime garments - 2 sets (one garment for each affected body part) every two years, standard or custom fit, or a combination of both. Bandaging supplies - no set limit in the rule. Accessories - no set limit, will be determined on a case-by-case  basis depending on the needs of the patient. Coverage is under Medicare part B. After deductible is met, a 20% copayment is required for all items. Secondary insurance often covers copayment's.  Edema wear as temporary compression garment until able to secure long term compression needs Pneumatic pump benefits, set-up, use, referral process, coverage through insurance. Coverage is based on insurance plan as well as if deductible has been met. Pneumatic pump is an added tool for managing lymphedema at home and is meant to be used in addition to wearing compression garments daily but does not substitute compression garments.  Seek new referral if edema status worsens or changes in the future despite compliance with home management techniques.  Monitor for signs/symptoms of infection and seek medical attention immediately if symptoms occur. Do not need to return to therapy for new compression garments. PCP/referring provider can re-order compression garment Rx. Rx must include i89.0 lymphedema dx code. DME requires a face-to-face provider appointment within 6 months with i89.0 lymphedema dx code and medical justification statement for compression garments.         Plan:      Goals:   Resolved       Long Term Goals       Patient and/or caregiver to rogerio/doff compression garment independently and with daily compliance to assist in lymphedema management, skin elasticity, and tissue density  (Met)       Start:  03/10/25    Expected End:  04/21/25    Resolved:  03/19/25         Patient and/or caregiver will be independent in use of pneumatic compression pump or self manual lymphatic drainage techniques to areas within reach to enhance lymphatic drainage and skin condition.   (Met)       Start:  03/10/25    Expected End:  04/21/25    Resolved:  03/19/25         Patient to be independent and compliant with home exercise program to allow for increased function in affected limb.  (Met)       Start:  03/10/25    Expected End:   04/21/25    Resolved:  03/19/25            Short Term Goals       Patient will tolerate daily activities wearing multilayered bandaging to allow for lymphatic drainage support, skin elasticity, and reduction in shape and size of limb.   (Met)       Start:  03/10/25    Expected End:  03/31/25    Resolved:  03/19/25         Patient and/or caregiver will order/obtain appropriate compression garments to maintain lymphatic and venous support.  (Met)       Start:  03/10/25    Expected End:  03/31/25    Resolved:  03/19/25         Patient will tolerate daily activities wearing compression garments to allow for lymphatic drainage support, skin elasticity, and reduction in shape and size of limb.   (Met)       Start:  03/10/25    Expected End:  03/31/25    Resolved:  03/19/25             Leigha Quintana OT

## 2025-03-21 ENCOUNTER — CLINICAL SUPPORT (OUTPATIENT)
Dept: REHABILITATION | Facility: HOSPITAL | Age: 70
End: 2025-03-21
Payer: MEDICARE

## 2025-03-21 DIAGNOSIS — R68.89 DECREASED STRENGTH, ENDURANCE, AND MOBILITY: Primary | ICD-10-CM

## 2025-03-21 DIAGNOSIS — Z96.653 HISTORY OF BILATERAL KNEE REPLACEMENT: ICD-10-CM

## 2025-03-21 DIAGNOSIS — Z74.09 DECREASED STRENGTH, ENDURANCE, AND MOBILITY: Primary | ICD-10-CM

## 2025-03-21 DIAGNOSIS — R53.1 DECREASED STRENGTH, ENDURANCE, AND MOBILITY: Primary | ICD-10-CM

## 2025-03-21 PROCEDURE — 97530 THERAPEUTIC ACTIVITIES: CPT | Mod: PN

## 2025-03-21 PROCEDURE — 97161 PT EVAL LOW COMPLEX 20 MIN: CPT | Mod: PN

## 2025-03-21 NOTE — PROGRESS NOTES
Outpatient Rehab    Physical Therapy Evaluation    Patient Name: Carolina Knight  MRN: [id number]  YOB: 1955  Encounter Date: 3/21/2025    Therapy Diagnosis:   Encounter Diagnoses   Name Primary?    History of bilateral knee replacement     Decreased strength, endurance, and mobility Yes     Physician: Deedee Mcgraw NP    Physician Orders: Eval and Treat  Medical Diagnosis: History of bilateral knee replacement    Visit # / Visits Authorized:  1 / 1  Date of Evaluation: 3/21/2025  Insurance Authorization Period: 3/21/2025 to 12/31/2025  Plan of Care Certification:  3/21/2025 to 5/18/25      PT/PTA:     Number of PTA visits since last PT visit:   Time In: 1100   Time Out: 1157  Total Time: 57   Total Billable Time: 57 minutes    Intake Outcome Measure for FOTO Survey    Therapist reviewed FOTO scores for Carolina Knight on 3/21/2025.   FOTO report - see Media section or FOTO account episode details.     Intake Score: 59%         Subjective   History of Present Illness  Carolina is a 69 y.o. female who reports to physical therapy with a chief concern of bilateral knee pain.     The patient reports a medical diagnosis of Z96.653 (ICD-10-CM) - History of bilateral knee replacement.    Diagnostic tests related to this condition: X-ray.   X-Ray Details: FINDINGS:  Reconfirmed findings of bilateral total knee arthroplasty procedures with stable and satisfactory alignment and position of bilateral tibial and femoral hardware.  Reconfirmed medial tilting of the left patella.  Satisfactory position of right patella.  No acute fractures, suprapatellar bursal effusions or prepatellar soft tissue swelling seen involving either knee.    History of Present Condition/Illness: She came to therapy after her knee replacements. Then she is now starting to feel weakness and soreness in her legs. She has not been exercises. She has been doing more clubs and she is just eating a lot more. She wants to be able to  lose weight and build her quad strength back up. The Right knee bothers her more than the other. She will get some swelling in her legs from time to time. She started to see a lymphedema and they are getting her some stockings to help with the swelling. She denies any instance of her legs giving out on her when she walks. She was very active when she was young but she has not been keeping up with it lately. She has stairs at home but she does not go on them a lot. She can tell that they are getting harder for her and if her legs were stronger. Her Right leg is stronger than her Left.     Pain     Patient reports a current pain level of 3/10. Pain at best is reported as 0/10. Pain at worst is reported as 5/10.   Location: R lateral knee pain  Clinical Progression (since onset): Stable  Pain Qualities: Aching, Tenderness, Throbbing  Pain-Relieving Factors: Other (Comment)  Other Pain-Relieving Factors: Naproxen as needed  Pain-Aggravating Factors: Lying down, Sitting  Sitting for prolonged periods of time. She will mainly feel the pain when she lays down at night. She is always tender when she touches her legs.       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       Past Medical History/Physical Systems Review:   Carolina Knight  has a past medical history of Decreased mobility and endurance, Decreased strength, Decreased strength of lower extremity, Difficult intubation, Early dry stage nonexudative age-related macular degeneration, Edema, Edema of both legs, Fatty liver, Hypokalemia, Lymphedema of both lower extremities, Multiple gastric polyps, Polyarthralgia, Sjogren's disease, and Sleep apnea.    Carolina Knight  has a past surgical history that includes  Total thyroidectomy; Cholecystectomy; Thyroidectomy; Toe Surgery; Oophorectomy (age 42); Cervical conization w/ laser ();  section; Hysterectomy (age 42); Esophagogastroduodenoscopy (N/A, 2018); Eye surgery (Est -15); Percutaneous cryotherapy of peripheral nerve using liquid nitrous oxide in closed needle device (Right, 2021); Breast biopsy (Left, ); Breast biopsy (Right); Total replacement of both knees using computer-assisted navigation (); Esophagogastroduodenoscopy (N/A, 10/14/2024); and Colonoscopy (N/A, 10/14/2024).    Rives has a current medication list which includes the following prescription(s): albuterol, amlodipine, azelastine, betamethasone dipropionate, zyrtec, clobetasol, econazole nitrate, finacea, fluocinonide, fluorouracil, fluticasone propionate, hydrochlorothiazide, ketoconazole, klayesta, levothyroxine, lutein, meclizine, metformin, montelukast, mupirocin, naproxen, nystatin, pantoprazole, retin-a, rosuvastatin, ozempic, spironolactone, tretinoin, tretinoin, and triamcinolone acetonide 0.1%, and the following Facility-Administered Medications: ropivacaine hcl/pf.    Review of patient's allergies indicates:   Allergen Reactions    Lipitor [atorvastatin]      Severe myalgia    Losartan Swelling     Possible throat closing sensation.    Lovastatin Other (See Comments)     myalgia    Cefuroxime Itching and Rash    Latex, natural rubber Itching and Rash     Pt reported         Objective       Knee Range of Motion   Right Knee   Active (deg) Passive (deg) Pain   Flexion 111       Extension -2           Left Knee   Active (deg) Passive (deg) Pain   Flexion 116       Extension -4                       Sit to Stand Testing      The patient completed 12 repetitions of a sit to stand transfer in 30 seconds.                  All below testing performed in seated position. Gait belt used for quadriceps testing.    Lower extremity strength with [employer] Mobile handheld dynamometer  "Right  (Kgf) Left  (Kgf) Pain/dysfunction with movement   (approx 4 sec hold w/ max contraction)   Hip flexion 11.6  12.3     Hip abduction 6.8  8.2     Quadriceps 11.8 14.7    Hamstrings 7.8 9.5         Treatment:  Therapeutic Activity  TA 1: QS 5"x10 ea  TA 2: SLR 2x10 ea  TA 3: bridges 2x10  TA 4: LAQ GTB 2x10 ea    Time Entry(in minutes):  Therapeutic Activity Time Entry: 15    Assessment & Plan   Assessment  Carolina presents with a condition of Low complexity.   Presentation of Symptoms: Stable       Functional Limitations: Activity tolerance, Ambulating on uneven surfaces, Decreased ambulation distance/endurance, Functional mobility, Gait limitations  Impairments: Impaired physical strength, Lack of appropriate home exercise program  Personal Factors Affecting Prognosis: Schedule    Patient Goal for Therapy (PT): Improve strength in BLE  Prognosis: Good  Assessment Details: Patient is a 69 y.o. female with medical diagnosis of history of bilateral knee replacement. She arrived to today's initial evaluation with complaints of weakness and overall decrease in conditioning. She displayed decrease in strength as compared to discharge from therapy a few years ago. She also displayed decrease in functional strength as seen with decrease in reps performed with 30 second sit to stand. At this time she would benefit from skilled PT to address strength and overall functional deficits. She would also benefit from establishing a good Home exercise program to progress independently after discharge.     Plan  From a physical therapy perspective, the patient would benefit from: Skilled Rehab Services    Planned therapy interventions include: Therapeutic exercise, Therapeutic activities, Neuromuscular re-education, Manual therapy, and Gait training.            Visit Frequency: 2 times Per Week for 8 Weeks.       This plan was discussed with Patient.   Discussion participants: Agreed Upon Plan of Care             Patient's " spiritual, cultural, and educational needs considered and patient agreeable to plan of care and goals.           Goals:   Active       long term goal       patient will improve right lower extremity strength to 90% of Left to improve ability to perform functional tasks        Start:  03/23/25    Expected End:  05/18/25            patient will be able to navigate stairs with little to no difficulty to improve ability to navigate home environment        Start:  03/23/25    Expected End:  05/18/25            Patient will be able to perform 15 sit to  30 seconds to improve functional strength and endurance        Start:  03/23/25    Expected End:  05/18/25            patient will improve FOTO score to 66% to improve self perceived ability to perform functional tasks        Start:  03/23/25    Expected End:  05/18/25               short term goal        patient will be independent with Home exercise program to supplement therapy       Start:  03/23/25    Expected End:  04/20/25            patient will be able to rise from sitting without difficulty to improve overall function        Start:  03/23/25    Expected End:  04/20/25                Debra Flor, PT ,DPT,OCS  03/23/2025

## 2025-03-23 PROBLEM — R68.89 DECREASED STRENGTH, ENDURANCE, AND MOBILITY: Status: ACTIVE | Noted: 2025-03-23

## 2025-03-23 PROBLEM — R53.1 DECREASED STRENGTH, ENDURANCE, AND MOBILITY: Status: ACTIVE | Noted: 2025-03-23

## 2025-03-23 PROBLEM — Z74.09 DECREASED STRENGTH, ENDURANCE, AND MOBILITY: Status: ACTIVE | Noted: 2025-03-23

## 2025-03-31 ENCOUNTER — CLINICAL SUPPORT (OUTPATIENT)
Dept: REHABILITATION | Facility: HOSPITAL | Age: 70
End: 2025-03-31
Payer: MEDICARE

## 2025-03-31 DIAGNOSIS — R68.89 DECREASED STRENGTH, ENDURANCE, AND MOBILITY: Primary | ICD-10-CM

## 2025-03-31 DIAGNOSIS — R53.1 DECREASED STRENGTH, ENDURANCE, AND MOBILITY: Primary | ICD-10-CM

## 2025-03-31 DIAGNOSIS — Z74.09 DECREASED STRENGTH, ENDURANCE, AND MOBILITY: Primary | ICD-10-CM

## 2025-03-31 PROCEDURE — 97530 THERAPEUTIC ACTIVITIES: CPT | Mod: PN,CQ

## 2025-04-01 NOTE — PROGRESS NOTES
"  Outpatient Rehab    Physical Therapy Visit    Patient Name: Carolina Knight  MRN: [id number]  YOB: 1955  Encounter Date: 3/31/2025    Therapy Diagnosis:   Encounter Diagnosis   Name Primary?    Decreased strength, endurance, and mobility Yes     Physician: Deedee Mcgraw NP    Physician Orders: Eval and Treat  Medical Diagnosis: History of bilateral knee replacement    Visit # / Visits Authorized:  1 / 12  Insurance Authorization Period: 3/21/2025 to 12/31/2025  Date of Evaluation: 3/21/25  Plan of Care Certification: 3/21/2025 to 5/18/25      PT/PTA: PTA   Number of PTA visits since last PT visit:1  Time In: 1050   Time Out: 1150  Total Time: 60   Total Billable Time: 40    FOTO:  Intake Score:  %  Survey Score 1:  %  Survey Score 2:  %         Subjective   agreeable to PT session..  Pain reported as 2/10. R knee soreness    Objective            Treatment:  Therapeutic Activity  TA 1: QS 5"x10 ea  TA 2: SLR 2x10 ea  TA 3: bridges 2x10  TA 4: LAQ GTB 2x10 ea  TA 5: hip adduction isometric 5"x12  TA 6: Hip abduction standing 2x10 B  TA 7: Heel/ toe raises B 2x10 standing  TA 8: Nustep x 7 min level 2    Time Entry(in minutes):  Therapeutic Activity Time Entry: 40    Assessment & Plan   Assessment: Carolina completed her first full PT session following initial evaluation today. Session focusing on B knee strengthening and ROM within tolerance. She tolerated supine LE activities well, however standing required rest breaks due to general LE fatigue but no pain reported. Encouraged pt to continue HEP within tolerance.       Patient will continue to benefit from skilled outpatient physical therapy to address the deficits listed in the problem list box on initial evaluation, provide pt/family education and to maximize pt's level of independence in the home and community environment.     Patient's spiritual, cultural, and educational needs considered and patient agreeable to plan of care and goals.       "     Plan: cont to advance PT as per POC    Goals:   Active       long term goal       patient will improve right lower extremity strength to 90% of Left to improve ability to perform functional tasks        Start:  03/23/25    Expected End:  05/18/25            patient will be able to navigate stairs with little to no difficulty to improve ability to navigate home environment        Start:  03/23/25    Expected End:  05/18/25            Patient will be able to perform 15 sit to  30 seconds to improve functional strength and endurance        Start:  03/23/25    Expected End:  05/18/25            patient will improve FOTO score to 66% to improve self perceived ability to perform functional tasks        Start:  03/23/25    Expected End:  05/18/25               short term goal        patient will be independent with Home exercise program to supplement therapy       Start:  03/23/25    Expected End:  04/20/25            patient will be able to rise from sitting without difficulty to improve overall function        Start:  03/23/25    Expected End:  04/20/25                Jose Alvares PTA

## 2025-04-04 ENCOUNTER — CLINICAL SUPPORT (OUTPATIENT)
Dept: REHABILITATION | Facility: HOSPITAL | Age: 70
End: 2025-04-04
Payer: MEDICARE

## 2025-04-04 DIAGNOSIS — R53.1 DECREASED STRENGTH, ENDURANCE, AND MOBILITY: Primary | ICD-10-CM

## 2025-04-04 DIAGNOSIS — R68.89 DECREASED STRENGTH, ENDURANCE, AND MOBILITY: Primary | ICD-10-CM

## 2025-04-04 DIAGNOSIS — Z74.09 DECREASED STRENGTH, ENDURANCE, AND MOBILITY: Primary | ICD-10-CM

## 2025-04-04 PROCEDURE — 97112 NEUROMUSCULAR REEDUCATION: CPT | Mod: PN

## 2025-04-04 PROCEDURE — 97530 THERAPEUTIC ACTIVITIES: CPT | Mod: PN

## 2025-04-04 NOTE — PROGRESS NOTES
"  Outpatient Rehab    Physical Therapy Visit    Patient Name: Carolina Knight  MRN: [id number]  YOB: 1955  Encounter Date: 4/4/2025    Therapy Diagnosis:   Encounter Diagnosis   Name Primary?    Decreased strength, endurance, and mobility Yes     Physician: Deedee Mcgraw NP    Physician Orders: Eval and Treat  Medical Diagnosis: History of bilateral knee replacement    Visit # / Visits Authorized:  2 / 12  Insurance Authorization Period: 3/21/2025 to 12/31/2025  Date of Evaluation: 3/21/25  Plan of Care Certification: 3/21/2025 to 5/18/25      PT/PTA:     Number of PTA visits since last PT visit:   Time In: 1002   Time Out: 1058  Total Time: 56   Total Billable Time: 30    FOTO:  Intake Score:  %  Survey Score 1:  %  Survey Score 2:  %         Subjective   Her knees are feling good. She did try to lift her 9lb dog and she pulled something in her R arm. She has been massaging it and that has been helping some but at first it was hard to lift her arm..  Pain reported as 2/10.      Objective            Treatment:  Balance/Neuromuscular Re-Education  NMR 1: QS 5" x10  NMR 2: SLR 2x10  NMR 3: hip adduction isometric with ball 5"x12  NMR 4: bridges 2x10  NMR 5: LAQ GTB 2x10 ea  Therapeutic Activity  TA 1: standing heel raises 2x10  TA 2: standing hip abdcution 2x10 ea  TA 3: Cybex leg press 5 plates DL 3x10  TA 4: Cybex leg press 3 plates SL 3x10  TA 5: step up 6" 3x10 ea    Time Entry(in minutes):  Neuromuscular Re-Education Time Entry: 25  Therapeutic Activity Time Entry: 31    Assessment & Plan   Assessment: Mrs. Figueroa arrived to today's session without reports of pain. PT progressed strengthening with leg press and step ups. She was appropriately challenged with all strengthening exercises without reports of pain. She would continue to benefit from skilled PT to address functional strength deficits.       Patient will continue to benefit from skilled outpatient physical therapy to address the " deficits listed in the problem list box on initial evaluation, provide pt/family education and to maximize pt's level of independence in the home and community environment.     Patient's spiritual, cultural, and educational needs considered and patient agreeable to plan of care and goals.           Plan: cont to advance PT as per POC    Goals:   Active       long term goal       patient will improve right lower extremity strength to 90% of Left to improve ability to perform functional tasks        Start:  03/23/25    Expected End:  05/18/25            patient will be able to navigate stairs with little to no difficulty to improve ability to navigate home environment        Start:  03/23/25    Expected End:  05/18/25            Patient will be able to perform 15 sit to  30 seconds to improve functional strength and endurance        Start:  03/23/25    Expected End:  05/18/25            patient will improve FOTO score to 66% to improve self perceived ability to perform functional tasks        Start:  03/23/25    Expected End:  05/18/25               short term goal        patient will be independent with Home exercise program to supplement therapy       Start:  03/23/25    Expected End:  04/20/25            patient will be able to rise from sitting without difficulty to improve overall function        Start:  03/23/25    Expected End:  04/20/25                Debra Flor, PT ,DPT,OCS  04/04/2025

## 2025-04-07 ENCOUNTER — CLINICAL SUPPORT (OUTPATIENT)
Dept: REHABILITATION | Facility: HOSPITAL | Age: 70
End: 2025-04-07
Payer: MEDICARE

## 2025-04-07 DIAGNOSIS — R68.89 DECREASED STRENGTH, ENDURANCE, AND MOBILITY: Primary | ICD-10-CM

## 2025-04-07 DIAGNOSIS — R53.1 DECREASED STRENGTH, ENDURANCE, AND MOBILITY: Primary | ICD-10-CM

## 2025-04-07 DIAGNOSIS — Z74.09 DECREASED STRENGTH, ENDURANCE, AND MOBILITY: Primary | ICD-10-CM

## 2025-04-07 PROCEDURE — 97112 NEUROMUSCULAR REEDUCATION: CPT | Mod: PN,CQ

## 2025-04-07 PROCEDURE — 97530 THERAPEUTIC ACTIVITIES: CPT | Mod: PN,CQ

## 2025-04-09 NOTE — PROGRESS NOTES
"  Outpatient Rehab    Physical Therapy Visit    Patient Name: Carolina Knigth  MRN: [id number]  YOB: 1955  Encounter Date: 4/7/2025    Therapy Diagnosis:   Encounter Diagnosis   Name Primary?    Decreased strength, endurance, and mobility Yes     Physician: Deedee Mcgraw NP    Physician Orders: Eval and Treat  Medical Diagnosis: History of bilateral knee replacement    Visit # / Visits Authorized:  3 / 12  Insurance Authorization Period: 3/21/2025 to 12/31/2025  Date of Evaluation: 3/21/25  Plan of Care Certification: 3/21/2025 to 5/18/25      PT/PTA: PTA   Number of PTA visits since last PT visit:2  Time In: 1100   Time Out: 1200  Total Time: 60   Total Billable Time: 60    FOTO:  Intake Score:  %  Survey Score 1:  %  Survey Score 2:  %         Subjective   "I haven't been able to do the exercises.  Pain reported as 2/10. R knee soreness    Objective            Treatment:  Balance/Neuromuscular Re-Education  NMR 1: QS 5" x10  NMR 2: SLR 2x10  NMR 3: hip adduction isometric with ball 5"x12  NMR 4: bridges 2x10  NMR 5: LAQ GTB 2x10 ea  Therapeutic Activity  TA 1: standing heel raises 2x10  TA 2: standing hip abduction 2x10 ea  TA 3: Cybex leg press 5 plates DL 3x10  TA 4: Cybex leg press 3 plates SL 3x10  TA 5: step up 6" 3x10 ea  TA 6: Hip abduction standing 2x10 B  TA 7: Heel/ toe raises B 2x10 standing  TA 8: Nustep x 7 min level 2    Time Entry(in minutes):  Neuromuscular Re-Education Time Entry: 25  Therapeutic Activity Time Entry: 35    Assessment & Plan   Assessment: Pt completed today's session focusing on LE strengthening, with no reports of increased knee pain. Responded well to use of resisted weighted machinery, appropriate level of fatigue at end of session.       Patient will continue to benefit from skilled outpatient physical therapy to address the deficits listed in the problem list box on initial evaluation, provide pt/family education and to maximize pt's level of independence " in the home and community environment.     Patient's spiritual, cultural, and educational needs considered and patient agreeable to plan of care and goals.           Plan: cont to advance PT as per POC    Goals:   Active       long term goal       patient will improve right lower extremity strength to 90% of Left to improve ability to perform functional tasks        Start:  03/23/25    Expected End:  05/18/25            patient will be able to navigate stairs with little to no difficulty to improve ability to navigate home environment        Start:  03/23/25    Expected End:  05/18/25            Patient will be able to perform 15 sit to  30 seconds to improve functional strength and endurance        Start:  03/23/25    Expected End:  05/18/25            patient will improve FOTO score to 66% to improve self perceived ability to perform functional tasks        Start:  03/23/25    Expected End:  05/18/25               short term goal        patient will be independent with Home exercise program to supplement therapy       Start:  03/23/25    Expected End:  04/20/25            patient will be able to rise from sitting without difficulty to improve overall function        Start:  03/23/25    Expected End:  04/20/25                Jose Alvares PTA

## 2025-04-14 ENCOUNTER — CLINICAL SUPPORT (OUTPATIENT)
Dept: REHABILITATION | Facility: HOSPITAL | Age: 70
End: 2025-04-14
Payer: MEDICARE

## 2025-04-14 DIAGNOSIS — R68.89 DECREASED STRENGTH, ENDURANCE, AND MOBILITY: Primary | ICD-10-CM

## 2025-04-14 DIAGNOSIS — Z74.09 DECREASED STRENGTH, ENDURANCE, AND MOBILITY: Primary | ICD-10-CM

## 2025-04-14 DIAGNOSIS — R53.1 DECREASED STRENGTH, ENDURANCE, AND MOBILITY: Primary | ICD-10-CM

## 2025-04-14 PROCEDURE — 97112 NEUROMUSCULAR REEDUCATION: CPT | Mod: PN,CQ

## 2025-04-14 PROCEDURE — 97530 THERAPEUTIC ACTIVITIES: CPT | Mod: PN,CQ

## 2025-04-14 NOTE — PROGRESS NOTES
"  Outpatient Rehab    Physical Therapy Visit    Patient Name: Carolina Knight  MRN: [id number]  YOB: 1955  Encounter Date: 4/14/2025    Therapy Diagnosis:   Encounter Diagnosis   Name Primary?    Decreased strength, endurance, and mobility Yes     Physician: Deedee Mcgraw NP    Physician Orders: Eval and Treat  Medical Diagnosis: History of bilateral knee replacement    Visit # / Visits Authorized:  5 / 12  Insurance Authorization Period: 3/21/2025 to 12/31/2025  Date of Evaluation: 3/21/25  Plan of Care Certification: 3/21/2025 to 5/18/25      PT/PTA: PTA   Number of PTA visits since last PT visit:2  Time In: 1100   Time Out: 1200  Total Time: 60   Total Billable Time: 55    FOTO:  Intake Score:  %  Survey Score 1:  %  Survey Score 2:  %         Subjective   Agreeable to PT session.  Pain reported as 0/10. R knee soreness only    Objective            Treatment:  Balance/Neuromuscular Re-Education  NMR 1: QS 5" x20 ea  NMR 2: SLR 2x10 ea  NMR 3: hip adduction isometric with ball 5"x12  NMR 4: bridges 3x10  NMR 5: LAQ GTB 2x10 ea  NMR 6: SAQ 4lb 3" hold 2x10 ea  Therapeutic Activity  TA 1: standing heel raises 2x10  TA 2: standing hip abduction 2x10 ea  TA 3: Cybex leg press 5 plates DL 3x10  TA 4: Cybex leg press 3 plates SL 3x10  TA 5: step up 6" 3x10 ea  TA 6: Hip abduction standing 2x10 B  TA 7: Heel/ toe raises B 2x10 standing  TA 8: Nustep x 7 min level 2    Time Entry(in minutes):  Neuromuscular Re-Education Time Entry: 30  Therapeutic Activity Time Entry: 25    Assessment & Plan   Assessment: Pt completed today's session with no adverse reactions. she tolerated LE strengthening and ROM activities within good tolerance. Noted general fatigue but no reports of increased pain .       Patient will continue to benefit from skilled outpatient physical therapy to address the deficits listed in the problem list box on initial evaluation, provide pt/family education and to maximize pt's level of " independence in the home and community environment.     Patient's spiritual, cultural, and educational needs considered and patient agreeable to plan of care and goals.           Plan: cont to advance PT as per POC    Goals:   Active       long term goal       patient will improve right lower extremity strength to 90% of Left to improve ability to perform functional tasks  (Progressing)       Start:  03/23/25    Expected End:  05/18/25            patient will be able to navigate stairs with little to no difficulty to improve ability to navigate home environment  (Progressing)       Start:  03/23/25    Expected End:  05/18/25            Patient will be able to perform 15 sit to  30 seconds to improve functional strength and endurance  (Progressing)       Start:  03/23/25    Expected End:  05/18/25            patient will improve FOTO score to 66% to improve self perceived ability to perform functional tasks  (Progressing)       Start:  03/23/25    Expected End:  05/18/25               short term goal        patient will be independent with Home exercise program to supplement therapy (Progressing)       Start:  03/23/25    Expected End:  04/20/25            patient will be able to rise from sitting without difficulty to improve overall function  (Progressing)       Start:  03/23/25    Expected End:  04/20/25                Jose Alvares PTA

## 2025-04-16 ENCOUNTER — CLINICAL SUPPORT (OUTPATIENT)
Dept: REHABILITATION | Facility: HOSPITAL | Age: 70
End: 2025-04-16
Payer: MEDICARE

## 2025-04-16 DIAGNOSIS — Z74.09 DECREASED STRENGTH, ENDURANCE, AND MOBILITY: Primary | ICD-10-CM

## 2025-04-16 DIAGNOSIS — R68.89 DECREASED STRENGTH, ENDURANCE, AND MOBILITY: Primary | ICD-10-CM

## 2025-04-16 DIAGNOSIS — R53.1 DECREASED STRENGTH, ENDURANCE, AND MOBILITY: Primary | ICD-10-CM

## 2025-04-16 PROCEDURE — 97530 THERAPEUTIC ACTIVITIES: CPT | Mod: PN

## 2025-04-16 PROCEDURE — 97112 NEUROMUSCULAR REEDUCATION: CPT | Mod: PN

## 2025-04-16 NOTE — PROGRESS NOTES
"  Outpatient Rehab    Physical Therapy Visit    Patient Name: Carolina Knight  MRN: [id number]  YOB: 1955  Encounter Date: 4/16/2025    Therapy Diagnosis:   Encounter Diagnosis   Name Primary?    Decreased strength, endurance, and mobility Yes     Physician: Deedee Mcgraw NP    Physician Orders: Eval and Treat  Medical Diagnosis: History of bilateral knee replacement    Visit # / Visits Authorized:  5 / 12  Insurance Authorization Period: 3/21/2025 to 12/31/2025  Date of Evaluation: 3/21/25  Plan of Care Certification: 3/21/2025 to 5/18/25      PT/PTA:     Number of PTA visits since last PT visit:   Time In: 1100   Time Out: 1153  Total Time: 53   Total Billable Time: 53    FOTO:  Intake Score:  %  Survey Score 1:  %  Survey Score 2:  %         Subjective   The R knee will feel sore in the morning but it gets better as the time goes on. She is unsure if the shoes bother her but she is going to try and wear some thicker sneakers..  Pain reported as 0/10.      Objective            Treatment:  Balance/Neuromuscular Re-Education  NMR 1: QS 5" x20 ea  NMR 2: SLR 2x10 ea  NMR 3: hip adduction isometric with ball 5"x12  NMR 4: bridges 3x10  NMR 5: LAQ GTB 2x10 ea  NMR 6: SAQ 4lb 3" hold 2x10 ea  Therapeutic Activity  TA 1: standing heel raises 2x10  TA 2: standing hip abduction 2x10 ea  TA 3: Cybex leg press 5 plates DL 3x10  TA 4: Cybex leg press 3 plates SL 3x10  TA 5: step up 6" 3x10 ea    Time Entry(in minutes):  Neuromuscular Re-Education Time Entry: 28  Therapeutic Activity Time Entry: 25    Assessment & Plan   Assessment: Patient arrived to today's session without reports of pain. PT continued with strengthening of BLE for quad and hip strengthening. No reports of increase in pain during exercises. However, she did report approriate level of fatigue.       Patient will continue to benefit from skilled outpatient physical therapy to address the deficits listed in the problem list box on initial " evaluation, provide pt/family education and to maximize pt's level of independence in the home and community environment.     Patient's spiritual, cultural, and educational needs considered and patient agreeable to plan of care and goals.           Plan: cont to advance PT as per POC    Goals:   Active       long term goal       patient will improve right lower extremity strength to 90% of Left to improve ability to perform functional tasks  (Progressing)       Start:  03/23/25    Expected End:  05/18/25            patient will be able to navigate stairs with little to no difficulty to improve ability to navigate home environment  (Progressing)       Start:  03/23/25    Expected End:  05/18/25            Patient will be able to perform 15 sit to  30 seconds to improve functional strength and endurance  (Progressing)       Start:  03/23/25    Expected End:  05/18/25            patient will improve FOTO score to 66% to improve self perceived ability to perform functional tasks  (Progressing)       Start:  03/23/25    Expected End:  05/18/25               short term goal        patient will be independent with Home exercise program to supplement therapy (Progressing)       Start:  03/23/25    Expected End:  04/20/25            patient will be able to rise from sitting without difficulty to improve overall function  (Progressing)       Start:  03/23/25    Expected End:  04/20/25                Debar Flor, PT ,DPT,OCS  04/16/2025

## 2025-04-23 ENCOUNTER — CLINICAL SUPPORT (OUTPATIENT)
Dept: REHABILITATION | Facility: HOSPITAL | Age: 70
End: 2025-04-23
Payer: MEDICARE

## 2025-04-23 DIAGNOSIS — R68.89 DECREASED STRENGTH, ENDURANCE, AND MOBILITY: Primary | ICD-10-CM

## 2025-04-23 DIAGNOSIS — Z74.09 DECREASED STRENGTH, ENDURANCE, AND MOBILITY: Primary | ICD-10-CM

## 2025-04-23 DIAGNOSIS — R53.1 DECREASED STRENGTH, ENDURANCE, AND MOBILITY: Primary | ICD-10-CM

## 2025-04-23 PROCEDURE — 97530 THERAPEUTIC ACTIVITIES: CPT | Mod: PN,CQ

## 2025-04-23 PROCEDURE — 97112 NEUROMUSCULAR REEDUCATION: CPT | Mod: PN,CQ

## 2025-04-25 ENCOUNTER — CLINICAL SUPPORT (OUTPATIENT)
Dept: REHABILITATION | Facility: HOSPITAL | Age: 70
End: 2025-04-25
Payer: MEDICARE

## 2025-04-25 DIAGNOSIS — R68.89 DECREASED STRENGTH, ENDURANCE, AND MOBILITY: Primary | ICD-10-CM

## 2025-04-25 DIAGNOSIS — R53.1 DECREASED STRENGTH, ENDURANCE, AND MOBILITY: Primary | ICD-10-CM

## 2025-04-25 DIAGNOSIS — Z74.09 DECREASED STRENGTH, ENDURANCE, AND MOBILITY: Primary | ICD-10-CM

## 2025-04-25 PROCEDURE — 97530 THERAPEUTIC ACTIVITIES: CPT | Mod: PN,CQ

## 2025-04-25 PROCEDURE — 97112 NEUROMUSCULAR REEDUCATION: CPT | Mod: PN,CQ

## 2025-04-28 ENCOUNTER — CLINICAL SUPPORT (OUTPATIENT)
Dept: REHABILITATION | Facility: HOSPITAL | Age: 70
End: 2025-04-28
Payer: MEDICARE

## 2025-04-28 DIAGNOSIS — R68.89 DECREASED STRENGTH, ENDURANCE, AND MOBILITY: Primary | ICD-10-CM

## 2025-04-28 DIAGNOSIS — Z74.09 DECREASED STRENGTH, ENDURANCE, AND MOBILITY: Primary | ICD-10-CM

## 2025-04-28 DIAGNOSIS — R53.1 DECREASED STRENGTH, ENDURANCE, AND MOBILITY: Primary | ICD-10-CM

## 2025-04-28 PROCEDURE — 97112 NEUROMUSCULAR REEDUCATION: CPT | Mod: PN

## 2025-04-28 PROCEDURE — 97530 THERAPEUTIC ACTIVITIES: CPT | Mod: PN

## 2025-04-28 NOTE — PROGRESS NOTES
"  Outpatient Rehab    Physical Therapy Progress Note    Patient Name: Carolina Knight  MRN: [id number]  YOB: 1955  Encounter Date: 4/28/2025    Therapy Diagnosis:   Encounter Diagnosis   Name Primary?    Decreased strength, endurance, and mobility Yes     Physician: Deedee Mcgraw NP    Physician Orders: Eval and Treat  Medical Diagnosis: History of bilateral knee replacement    Visit # / Visits Authorized:  8 / 12  Insurance Authorization Period: 3/21/2025 to 12/31/2025  Date of Evaluation: 3/21/25  Plan of Care Certification: 3/21/2025 to 5/18/25      PT/PTA:     Number of PTA visits since last PT visit:   Time In: 1100   Time Out: 1200  Total Time: 60   Total Billable Time: 60    FOTO:  Intake Score: 59%  Survey Score 1: 66%  Survey Score 2:  %         Subjective   She did yard work and she was sore but she is feeling better now. She is getting better at stepping up her step to get into her house. She is not really doing her exercises at home..  Pain reported as 0/10.      Objective         Sit to Stand Testing      The patient completed 13 repetitions of a sit to stand transfer in 30 seconds.               All below testing performed in seated position. Gait belt used for quadriceps testing.    Lower extremity strength with [employer] handheld dynamometer Right  (Kgf) Left  (Kgf) Pain/dysfunction with movement   (approx 4 sec hold w/ max contraction)   Quadriceps 17.0    LSI: 96% 17.7    Hamstrings 8.8 8.6         Treatment:  Balance/Neuromuscular Re-Education  NMR 2: SLR 3x10 ea  NMR 4: bridges 3x10  NMR 5: LAQ GTB 3x10 ea  NMR 6: SAQ 4lb 3" hold 3x10 ea  Therapeutic Activity  TA 3: Cybex leg press 5 plates DL 3x10  TA 4: Cybex leg press 3 plates SL 3x10  TA 5: step up 6" 3x10 ea  TA 8: Nustep x 8 min level 2 [place] Sprints  TA 9: reassessment    Time Entry(in minutes):  Neuromuscular Re-Education Time Entry: 20  Therapeutic Activity Time Entry: 40    Assessment & Plan   Assessment: Patient was " reassessed today and displayed improvements in strength with hand held dynamometer as well overall self perceived function with FOTO. She displayed slight improvement in functional strength with 30 second STS. She continues to have most difficulty with functional activities such as stairs, standing for prolonged periods of time, and squatting down to pick something up from the floor. She would continue to benefit from skilled PT to address remaining functional deficits and improve QOL.       Patient will continue to benefit from skilled outpatient physical therapy to address the deficits listed in the problem list box on initial evaluation, provide pt/family education and to maximize pt's level of independence in the home and community environment.     Patient's spiritual, cultural, and educational needs considered and patient agreeable to plan of care and goals.           Plan: Continue to improve functional strength    Goals:   Active       long term goal       patient will improve right lower extremity strength to 90% of Left to improve ability to perform functional tasks  (Met)       Start:  03/23/25    Expected End:  05/18/25    Resolved:  04/28/25         patient will be able to navigate stairs with little to no difficulty to improve ability to navigate home environment  (Progressing)       Start:  03/23/25    Expected End:  05/18/25            Patient will be able to perform 15 sit to  30 seconds to improve functional strength and endurance  (Progressing)       Start:  03/23/25    Expected End:  05/18/25            patient will improve FOTO score to 66% to improve self perceived ability to perform functional tasks  (Met)       Start:  03/23/25    Expected End:  05/18/25    Resolved:  04/28/25            short term goal        patient will be independent with Home exercise program to supplement therapy (Progressing)       Start:  03/23/25    Expected End:  05/18/25            patient will be able to  rise from sitting without difficulty to improve overall function  (Met)       Start:  03/23/25    Expected End:  04/20/25    Resolved:  04/28/25             Debra Flor, PT ,DPT,OCS  04/28/2025

## 2025-04-30 NOTE — PROGRESS NOTES
"  Outpatient Rehab    Physical Therapy Visit    Patient Name: Carolina Knight  MRN: [id number]  YOB: 1955  Encounter Date: 4/23/2025    Therapy Diagnosis:   Encounter Diagnosis   Name Primary?    Decreased strength, endurance, and mobility Yes     Physician: Deedee Mcgraw NP    Physician Orders: Eval and Treat  Medical Diagnosis: History of bilateral knee replacement    Visit # / Visits Authorized:  8 / 12  Insurance Authorization Period: 3/21/2025 to 12/31/2025  Date of Evaluation: 3/21/25  Plan of Care Certification: 3/21/2025 to 5/18/25   PT/PTA: PTA   Number of PTA visits since last PT visit:1  Time In: 1100   Time Out: 1200  Total Time: 60   Total Billable Time: 23    FOTO:  Intake Score:  %  Survey Score 1:  %  Survey Score 2:  %         Subjective   Agreeable to PT session. she states had a busy week. no new compliants of pain besides soreness in B knees..    R knee soreness only    Objective            Treatment:  Balance/Neuromuscular Re-Education  NMR 1: QS 5" x20 ea  NMR 2: SLR 3x10 ea  NMR 3: hip adduction isometric with ball 5"x12  NMR 4: bridges 3x10  NMR 5: LAQ GTB 3x10 ea  NMR 6: SAQ 4lb 3" hold 3x10 ea  Therapeutic Activity  TA 1: standing heel raises 2x10  TA 2: standing hip abduction 2x10 ea  TA 3: Cybex leg press 5 plates DL 3x10  TA 4: Cybex leg press 3 plates SL 3x10  TA 5: step up 6" 3x10 ea  TA 6: Hip abduction standing 2x10 B  TA 7: Heel/ toe raises B 2x10 standing  TA 8: Nustep x 8 min level 2 [place] Sprints    Time Entry(in minutes):       Assessment & Plan   Assessment: Pt completed today's session focusing on B LE strengthening and ROM. Pt able to perform cybex leg press for continued quadriceps strengthening today with no adverse response (vc's for technique prevent knee hyperextension).  Evaluation/Treatment Tolerance: Patient tolerated treatment well    Patient will continue to benefit from skilled outpatient physical therapy to address the deficits listed in the " Daily Note    Today's date: 2023  Patient name: Blair Grimm  : 2017  MRN: [id number]  Referring provider: Lena Abbasi MD  Dx:   Encounter Diagnosis     ICD-10-CM    1. Lack of expected normal physiological development  R62.50       2. Development delay  R62.50               Visit Tracking:  Visit # 2  Insurance: [employer]   Initial Evaluation Date: 9/10/2019  POC End Date: 10/2023  Testing Due:10/13/2023      Subjective: Jerry Campuzano arrived to the session accompanied by his mom, mom present during the session. Session held land, new hire speech therapist observed in the session and will start seeing him for speech ongoing on a weekly basis. Mom reports Jerry Campuzano will be getting OT and speech at school however it will only be for a 15-minute session. Jerry Campuzano was shy and timid when starting the session needed verbal prompt when transitioning to the downstairs gym. Objective:  Patient started on the rock wall reaching for visual targets however was shy and timid to start with new therapist observing. However he warmed up when therapist participated in the activity with him. He was able to climb up the rock wall requiring max assist for safety having difficulties for motor planning for foot placement when ascending and descending [place]. We then utilized other sensory equipment working on transitions and following directions, proprioceptive and vestibular with the trapeze bar, the scooter board and a 3 step obstacle with improvement when a number of repetition was given in order to stay on task. Able to transition over to the new sensory light box table with good engagement for manipulating and  stacking shape blocks. He also was able to work on some visual motor activities utilizing dry erase marker for attempting to trace shapes, attempting to write letters of first name and working on staying within straight line and curvy pathways with max verbal prompts and assist needed.   He continues to have difficulty with grasp prehension and sticking with a preferred hand right. Discussed with mom scheduled for the school year and will continue to try cotreatment with speech in future sessions to help with following directions multistep sequencing tasks working attending better with functional tasks. STGs:  1. Cayetano Jauregui will trace a variety of simple shapes within 1/2" of lines with minimal (1-2) prompts across 3 consecutive sessions. -PROGRESS, with min A   2. Cayetano Jauregui will attend to an adult-directed table task for 4-5 minutes with 2 or fewer redirections in 75% of opportunities. -INCONSISTENT, attention/engagement deficits  3. Cayetano Juaregui will transition between therapist directed activities with no more than Min A and without the presence of a behavioral over reaction in 75% of given opportunities. -INCONSISTENT  4. Cayetano Jauregui will safely complete a 3 minute motor activity in a large environment (e.g. open gym) without eloping with moderate (3-4) prompts/redirections in order to promote safety and body awareness necessary for participating in school and community environments. INCONSISTENT  5. Cayetano Jauregui will engage with self-regulation programs (e.g. astronaut training, interactive metronome) on a trial basis for at least 1-2 minutes each session with moderate (3-4) prompts/redirections, in order to promote self-regulation and attention. PROGRESS, up to 1 minute  6. Cayetano Jauregui will improve hand strength and grasp as demonstrated by holding writing implement consistently with dominant hand in quadrupod grasp with min assist for s/u in 75% of opportunities. PROGRESS  7. Cayetano Jauregui will participate in a variety of bilateral coordination tasks (e.g. zipper, catching a ball, stabilizing paper while coloring) with moderate assistance in 75% of opportunities to promote increased independence with self-care and play activities.  PROGRESS up to 50% of the time    Assessment: Wes tolerated the session well. Skilled occupational problem list box on initial evaluation, provide pt/family education and to maximize pt's level of independence in the home and community environment.     Patient's spiritual, cultural, and educational needs considered and patient agreeable to plan of care and goals.           Plan: Continue to improve functional strength    Goals:   Active       long term goal       patient will improve right lower extremity strength to 90% of Left to improve ability to perform functional tasks  (Met)       Start:  03/23/25    Expected End:  05/18/25    Resolved:  04/28/25         patient will be able to navigate stairs with little to no difficulty to improve ability to navigate home environment  (Progressing)       Start:  03/23/25    Expected End:  05/18/25            Patient will be able to perform 15 sit to  30 seconds to improve functional strength and endurance  (Progressing)       Start:  03/23/25    Expected End:  05/18/25            patient will improve FOTO score to 66% to improve self perceived ability to perform functional tasks  (Met)       Start:  03/23/25    Expected End:  05/18/25    Resolved:  04/28/25            short term goal        patient will be independent with Home exercise program to supplement therapy (Progressing)       Start:  03/23/25    Expected End:  05/18/25            patient will be able to rise from sitting without difficulty to improve overall function  (Met)       Start:  03/23/25    Expected End:  04/20/25    Resolved:  04/28/25             Jose Alvares PTA     therapy intervention continues to be required with the recommended frequency due to deficits in ADL performance, fine motor skills, sensory processing, visual motor skills, attention, play skills, feeding skills. bilateral skills. During today's treatment session, Montey Eisenmenger is making progress in the areas of improved attention to tasks, following directions and transitions when given a choice of 2-3 activities, working on breathing techniques to assist with regulation and for focus and attention. Plan: Continue with the Plan of Care     HEP: Encouraged mom to continue working on letter/number recognition in addition to giving Montey Eisenmenger choices in order to decrease frustrations and continue to redirect on breathing techniques when busy and impulsive. Continue to work on counting on fingers     Long term goals:   1. Montey Eisenmenger will improve social emotional skills and sensory processing abilities to interact effectively with people and objects in his environment, 75% of given opportunities. PROGRESS      2. Montey Eisenmenger will improve FM skills, visual-perceptual-skills, and bilateral integration for increased participation in developmentally appropriate play skills, 75% of given opportunities.  PROGRESS      POC Certification Date:  From: 10/2022  To: 10/2023

## 2025-05-01 NOTE — PROGRESS NOTES
"  Outpatient Rehab    Physical Therapy Visit    Patient Name: Carolina Knight  MRN: [id number]  YOB: 1955  Encounter Date: 4/25/2025    Therapy Diagnosis:   Encounter Diagnosis   Name Primary?    Decreased strength, endurance, and mobility Yes     Physician: Deedee Mcgraw NP    Physician Orders: Eval and Treat  Medical Diagnosis: History of bilateral knee replacement    Visit # / Visits Authorized:  8 / 12  Insurance Authorization Period: 3/21/2025 to 12/31/2025  Date of Evaluation: 3/21/25  Plan of Care Certification:  3/21/2025 to 5/18/25      PT/PTA: PTA   Number of PTA visits since last PT visit:1  Time In: 1100   Time Out: 1200  Total Time: 60   Total Billable Time: 23    FOTO:  Intake Score:  %  Survey Score 1:  %  Survey Score 2:  %         Subjective   Agreeable to PT session..  Pain reported as 0/10. R knee soreness only    Objective            Treatment:  Balance/Neuromuscular Re-Education  NMR 1: QS 5" x20 ea  NMR 2: SLR 2x10 ea  NMR 3: hip adduction isometric with ball 5"x12  NMR 4: bridges 3x10  NMR 5: LAQ GTB 2x10 ea  NMR 6: SAQ 4lb 3" hold 2x10 ea  Therapeutic Activity  TA 1: standing heel raises 2x10  TA 2: standing hip abduction 2x10 ea  TA 3: Cybex leg press 5 plates DL 3x10  TA 4: Cybex leg press 3 plates SL 3x10  TA 5: step up 6" 3x10 ea  TA 6: Hip abduction standing 2x10 B  TA 7: Heel/ toe raises B 2x10 standing  TA 8: Nustep x 8 min level 2 [place] Sprints    Time Entry(in minutes):  Neuromuscular Re-Education Time Entry: 13  Therapeutic Activity Time Entry: 10    Assessment & Plan   Assessment: Pt completed today's session focusing on B LE strengthening and ROM. Pt able to perform cybex leg press for continued quadriceps strengthening today with no adverse response (vc's for technique prevent knee hyperextension).  Evaluation/Treatment Tolerance: Patient tolerated treatment well    Patient will continue to benefit from skilled outpatient physical therapy to address the " deficits listed in the problem list box on initial evaluation, provide pt/family education and to maximize pt's level of independence in the home and community environment.     Patient's spiritual, cultural, and educational needs considered and patient agreeable to plan of care and goals.           Plan: Continue to improve functional strength    Goals:   Active       long term goal       patient will improve right lower extremity strength to 90% of Left to improve ability to perform functional tasks  (Met)       Start:  03/23/25    Expected End:  05/18/25    Resolved:  04/28/25         patient will be able to navigate stairs with little to no difficulty to improve ability to navigate home environment  (Progressing)       Start:  03/23/25    Expected End:  05/18/25            Patient will be able to perform 15 sit to  30 seconds to improve functional strength and endurance  (Progressing)       Start:  03/23/25    Expected End:  05/18/25            patient will improve FOTO score to 66% to improve self perceived ability to perform functional tasks  (Met)       Start:  03/23/25    Expected End:  05/18/25    Resolved:  04/28/25            short term goal        patient will be independent with Home exercise program to supplement therapy (Progressing)       Start:  03/23/25    Expected End:  05/18/25            patient will be able to rise from sitting without difficulty to improve overall function  (Met)       Start:  03/23/25    Expected End:  04/20/25    Resolved:  04/28/25             Jose Alvares PTA

## 2025-05-03 DIAGNOSIS — I10 ESSENTIAL HYPERTENSION: ICD-10-CM

## 2025-05-04 DIAGNOSIS — J31.0 CHRONIC RHINITIS: ICD-10-CM

## 2025-05-05 ENCOUNTER — CLINICAL SUPPORT (OUTPATIENT)
Dept: REHABILITATION | Facility: HOSPITAL | Age: 70
End: 2025-05-05
Payer: MEDICARE

## 2025-05-05 DIAGNOSIS — Z74.09 DECREASED STRENGTH, ENDURANCE, AND MOBILITY: Primary | ICD-10-CM

## 2025-05-05 DIAGNOSIS — R53.1 DECREASED STRENGTH, ENDURANCE, AND MOBILITY: Primary | ICD-10-CM

## 2025-05-05 DIAGNOSIS — R68.89 DECREASED STRENGTH, ENDURANCE, AND MOBILITY: Primary | ICD-10-CM

## 2025-05-05 PROCEDURE — 97112 NEUROMUSCULAR REEDUCATION: CPT | Mod: PN,CQ

## 2025-05-05 PROCEDURE — 97530 THERAPEUTIC ACTIVITIES: CPT | Mod: PN,CQ

## 2025-05-05 RX ORDER — SPIRONOLACTONE 50 MG/1
TABLET, FILM COATED ORAL
Qty: 90 TABLET | Refills: 3 | Status: SHIPPED | OUTPATIENT
Start: 2025-05-05

## 2025-05-05 RX ORDER — AZELASTINE 1 MG/ML
SPRAY, METERED NASAL
Qty: 30 ML | Refills: 11 | Status: SHIPPED | OUTPATIENT
Start: 2025-05-05

## 2025-05-05 NOTE — PROGRESS NOTES
"  Outpatient Rehab    Physical Therapy Visit    Patient Name: Carolina Knight  MRN: [id number]  YOB: 1955  Encounter Date: 5/5/2025    Therapy Diagnosis:   Encounter Diagnosis   Name Primary?    Decreased strength, endurance, and mobility Yes     Physician: Deedee Mcgraw NP    Physician Orders: Eval and Treat  Medical Diagnosis: History of bilateral knee replacement    Visit # / Visits Authorized:  9 / 12  Insurance Authorization Period: 3/21/2025 to 12/31/2025  Date of Evaluation: 3/21/25  Plan of Care Certification: 3/21/2025 to 5/18/25      PT/PTA: PTA   Number of PTA visits since last PT visit:1  Time In: 1600   Time Out: 1700  Total Time (in minutes): 60   Total Billable Time (in minutes): 38    FOTO:  Intake Score:  %  Survey Score 2:  %  Survey Score 3:  %         Subjective   agreeable to PT session. pt arrived to session with no new reports of pain..    R knee soreness only    Objective            Treatment:  Balance/Neuromuscular Re-Education  NMR 1: QS 5" x20 ea  NMR 2: SLR 3x10 ea  NMR 3: hip adduction isometric with ball 5"x12  NMR 4: bridges 3x10  NMR 5: LAQ GTB 3x10 ea  NMR 6: SAQ 4lb 3" hold 3x10 ea  Therapeutic Activity  TA 1: standing heel raises 2x10  TA 2: standing hip abduction 2x10 ea  TA 3: Cybex leg press 5 plates DL 3x10  TA 4: Cybex leg press 3 plates SL 3x10  TA 5: step up 6" 3x10 ea  TA 6: Hip abduction standing 2x10 B  TA 7: Heel/ toe raises B 2x10 standing  TA 8: Nustep x 8 min level 2 [place] Sprints    Time Entry(in minutes):  Neuromuscular Re-Education Time Entry: 13  Therapeutic Activity Time Entry: 25    Assessment & Plan   Assessment: Pt completed today's session focusing on B LE strengthening and ROM. Pt demonstrated improved tolerance to standing activities today coupled with resistive machinery.  Evaluation/Treatment Tolerance: Patient tolerated treatment well    Patient will continue to benefit from skilled outpatient physical therapy to address the deficits " listed in the problem list box on initial evaluation, provide pt/family education and to maximize pt's level of independence in the home and community environment.     Patient's spiritual, cultural, and educational needs considered and patient agreeable to plan of care and goals.           Plan: Continue to improve functional strength    Goals:   Active       long term goal       patient will improve right lower extremity strength to 90% of Left to improve ability to perform functional tasks  (Met)       Start:  03/23/25    Expected End:  05/18/25    Resolved:  04/28/25         patient will be able to navigate stairs with little to no difficulty to improve ability to navigate home environment  (Progressing)       Start:  03/23/25    Expected End:  05/18/25            Patient will be able to perform 15 sit to  30 seconds to improve functional strength and endurance  (Progressing)       Start:  03/23/25    Expected End:  05/18/25            patient will improve FOTO score to 66% to improve self perceived ability to perform functional tasks  (Met)       Start:  03/23/25    Expected End:  05/18/25    Resolved:  04/28/25            short term goal        patient will be independent with Home exercise program to supplement therapy (Progressing)       Start:  03/23/25    Expected End:  05/18/25            patient will be able to rise from sitting without difficulty to improve overall function  (Met)       Start:  03/23/25    Expected End:  04/20/25    Resolved:  04/28/25             Jose Alvares PTA

## 2025-05-06 ENCOUNTER — PATIENT MESSAGE (OUTPATIENT)
Dept: REHABILITATION | Facility: HOSPITAL | Age: 70
End: 2025-05-06
Payer: MEDICARE

## 2025-05-09 ENCOUNTER — CLINICAL SUPPORT (OUTPATIENT)
Dept: REHABILITATION | Facility: HOSPITAL | Age: 70
End: 2025-05-09
Payer: MEDICARE

## 2025-05-09 DIAGNOSIS — R68.89 DECREASED STRENGTH, ENDURANCE, AND MOBILITY: Primary | ICD-10-CM

## 2025-05-09 DIAGNOSIS — Z74.09 DECREASED STRENGTH, ENDURANCE, AND MOBILITY: Primary | ICD-10-CM

## 2025-05-09 DIAGNOSIS — R53.1 DECREASED STRENGTH, ENDURANCE, AND MOBILITY: Primary | ICD-10-CM

## 2025-05-09 PROCEDURE — 97112 NEUROMUSCULAR REEDUCATION: CPT | Mod: PN

## 2025-05-09 PROCEDURE — 97530 THERAPEUTIC ACTIVITIES: CPT | Mod: PN

## 2025-05-09 NOTE — PROGRESS NOTES
"  Outpatient Rehab    Physical Therapy Visit    Patient Name: Carolina Knight  MRN: [id number]  YOB: 1955  Encounter Date: 5/9/2025    Therapy Diagnosis:   Encounter Diagnosis   Name Primary?    Decreased strength, endurance, and mobility Yes     Physician: Deedee Mcgraw NP    Physician Orders: Eval and Treat  Medical Diagnosis: History of bilateral knee replacement    Visit # / Visits Authorized:  10 / 22  Insurance Authorization Period: 3/21/2025 to 12/31/2025  Date of Evaluation: 3/21/25  Plan of Care Certification: 3/21/2025 to 5/18/25      PT/PTA:     Number of PTA visits since last PT visit:   Time In: 1100   Time Out: 1200  Total Time (in minutes): 60   Total Billable Time (in minutes): 30    FOTO:  Intake Score:  %  Survey Score 2:  %  Survey Score 3:  %         Subjective   She has some soreness in her knees today but she is doing okay overall..         Objective            Treatment:  Balance/Neuromuscular Re-Education  NMR 1: QS 5" x20 ea  NMR 2: SLR 3x10 ea  NMR 3: hip adduction isometric with ball 5"x15  NMR 4: bridges 3x10  NMR 5: LAQ GTB 3x10 ea  NMR 6: SAQ 4lb 3" hold 3x10 ea  Therapeutic Activity  TA 1: standing heel raises 3x10  TA 2: standing hip abduction 3x10 ea  TA 3: Cybex leg press 5 plates DL 3x10  TA 4: Cybex leg press 3 plates SL 3x10  TA 5: step up 6" 3x10 ea  TA 6: recumbent bike x 8 minutes level 2 [place] sprints    Time Entry(in minutes):  Neuromuscular Re-Education Time Entry: 35  Therapeutic Activity Time Entry: 25    Assessment & Plan   Assessment: Patient arrived to today's session with little reports of pain. PT continued with strengthening of BLE. She was able to perform all exercises without reports of pain. She would continue to benefit from skilled PT to address functional strength and mobility.       Patient will continue to benefit from skilled outpatient physical therapy to address the deficits listed in the problem list box on initial evaluation, provide " pt/family education and to maximize pt's level of independence in the home and community environment.     Patient's spiritual, cultural, and educational needs considered and patient agreeable to plan of care and goals.           Plan: Continue to improve functional strength    Goals:   Active       long term goal       patient will improve right lower extremity strength to 90% of Left to improve ability to perform functional tasks  (Met)       Start:  03/23/25    Expected End:  05/18/25    Resolved:  04/28/25         patient will be able to navigate stairs with little to no difficulty to improve ability to navigate home environment  (Progressing)       Start:  03/23/25    Expected End:  05/18/25            Patient will be able to perform 15 sit to  30 seconds to improve functional strength and endurance  (Progressing)       Start:  03/23/25    Expected End:  05/18/25            patient will improve FOTO score to 66% to improve self perceived ability to perform functional tasks  (Met)       Start:  03/23/25    Expected End:  05/18/25    Resolved:  04/28/25            short term goal        patient will be independent with Home exercise program to supplement therapy (Progressing)       Start:  03/23/25    Expected End:  05/18/25            patient will be able to rise from sitting without difficulty to improve overall function  (Met)       Start:  03/23/25    Expected End:  04/20/25    Resolved:  04/28/25             Debra Flor, PT ,DPT,OCS  05/11/2025

## 2025-05-12 ENCOUNTER — OFFICE VISIT (OUTPATIENT)
Dept: PRIMARY CARE CLINIC | Facility: CLINIC | Age: 70
End: 2025-05-12
Payer: MEDICARE

## 2025-05-12 VITALS
HEART RATE: 72 BPM | SYSTOLIC BLOOD PRESSURE: 118 MMHG | WEIGHT: 266.56 LBS | OXYGEN SATURATION: 96 % | HEIGHT: 66 IN | BODY MASS INDEX: 42.84 KG/M2 | DIASTOLIC BLOOD PRESSURE: 82 MMHG | TEMPERATURE: 98 F

## 2025-05-12 DIAGNOSIS — G47.33 OSA ON CPAP: ICD-10-CM

## 2025-05-12 DIAGNOSIS — Z96.653 HISTORY OF BILATERAL KNEE REPLACEMENT: ICD-10-CM

## 2025-05-12 DIAGNOSIS — K76.0 FATTY LIVER: ICD-10-CM

## 2025-05-12 DIAGNOSIS — E66.01 MORBID OBESITY WITH BODY MASS INDEX (BMI) OF 40.0 TO 49.9: ICD-10-CM

## 2025-05-12 DIAGNOSIS — I10 BENIGN ESSENTIAL HYPERTENSION: Primary | ICD-10-CM

## 2025-05-12 DIAGNOSIS — L30.9 DERMATITIS: ICD-10-CM

## 2025-05-12 DIAGNOSIS — E78.5 HYPERLIPIDEMIA, UNSPECIFIED HYPERLIPIDEMIA TYPE: ICD-10-CM

## 2025-05-12 DIAGNOSIS — E03.9 HYPOTHYROIDISM, UNSPECIFIED TYPE: ICD-10-CM

## 2025-05-12 PROCEDURE — 99999 PR PBB SHADOW E&M-EST. PATIENT-LVL IV: CPT | Mod: PBBFAC,,, | Performed by: INTERNAL MEDICINE

## 2025-05-12 PROCEDURE — 99214 OFFICE O/P EST MOD 30 MIN: CPT | Mod: S$PBB,,, | Performed by: INTERNAL MEDICINE

## 2025-05-12 PROCEDURE — 99214 OFFICE O/P EST MOD 30 MIN: CPT | Mod: PBBFAC,PN | Performed by: INTERNAL MEDICINE

## 2025-05-12 PROCEDURE — G2211 COMPLEX E/M VISIT ADD ON: HCPCS | Mod: S$PBB,,, | Performed by: INTERNAL MEDICINE

## 2025-05-12 RX ORDER — TRIAMCINOLONE ACETONIDE 1 MG/G
CREAM TOPICAL
Qty: 45 G | Refills: 3 | Status: SHIPPED | OUTPATIENT
Start: 2025-05-12

## 2025-05-12 RX ORDER — NAPROXEN 500 MG/1
500 TABLET ORAL 2 TIMES DAILY PRN
Qty: 60 TABLET | Refills: 0 | Status: SHIPPED | OUTPATIENT
Start: 2025-05-12

## 2025-05-12 RX ORDER — TIRZEPATIDE 2.5 MG/.5ML
2.5 INJECTION, SOLUTION SUBCUTANEOUS
COMMUNITY

## 2025-05-12 RX ORDER — FLUCONAZOLE 150 MG/1
TABLET ORAL
Qty: 2 TABLET | Refills: 3 | Status: SHIPPED | OUTPATIENT
Start: 2025-05-12

## 2025-05-12 RX ORDER — METRONIDAZOLE 500 MG/1
500 TABLET ORAL 3 TIMES DAILY
COMMUNITY

## 2025-05-12 NOTE — PROGRESS NOTES
Subjective:       Patient ID: Carolina Knight is a 70 y.o. female.    Chief Complaint: Follow-up    HPI  Will be due for screening MMG 10/2025.   DEXA 5/24/24 - osteopenia.    Is seeing Gabriel for weight loss. Wasn't losing weight on ozempic so started on tirzepatide. Hasn't lost any weight--had strawberry shortcake all weekend and ate the whole cake w/ . Discussed potentially stopping the medicine due to cost and just going back to diet. Recommended pt to do the med in conjunction w/ diet and exercise. It's helping her sleep at night and worried that once she's off of the tirzepatide, then she may have insomnia. Also taking zyrtec nightly which worked in the past for insomnia but doesn't work anymore. Declines health  at this time. Doing PT at [facility] currently.   No nausea/vomiting/stomach pains.   Does not have a lot of energy.   QUINTIN - using CPAP nightly but throws it off sometimes helen when she has postnasal drip.     HTN - amlo 10mg daily, hctz 12.5mg BID, aldactone 50mg daily  Part of digital HTN program. Reviewed flow sheet.  CMP/CBC WNL 1/17/25    Hypothyroidism - synthroid 125mcg daily.   TSH 1/17/25 WNL.    HLD - crestor 3 days a week - cannot tolerate more often than that. LDL 66.8 1/17/25    Had skin exam w/ Dr. Avila 1/24/25. AK s/p cryo  Saw Dr. Plummer for BLE lymphedema and CVI - referred back to lymphedema clinic. Rec compression stockings. Low Na diet. Limit fluid intake to 1.5L daily. F/u in 4 mo.  B knee replacement. Saw Deedee Mcgraw NP 3/11/25 - Rx'd naproxen and ordered PT. Sometimes would use it infrequently.     Fatty liver.   FIB-4 Calculation: 0.51 at 1/17/2025  8:26 AM  Calculated from:  SGOT/AST: 10 U/L at 1/17/2025  8:26 AM  SGPT/ALT: 20 U/L at 1/17/2025  8:26 AM  Platelets: 304 K/uL at 1/17/2025  8:26 AM  Age: 69 years     FIB-4 below 1.30 is considered as low-risk for advanced fibrosis  FIB-4 over 2.67 is considered as high-risk for advanced fibrosis  FIB-4 values  between 1.30 and 2.67 are considered as intermediate-risk of advanced fibrosis for ages 36-64.     For ages > 64 the cut-off for low-risk goes to < 2.  This is a screening tool and clinical judgement should be used in the interpretation of these results.    4Ms for Medical Decision-Making in Older Adults    Last Completed EAWV: 7/15/2024    MEDICATIONS:  High Risk Medications:  Total Active Medications: 0  This patient does not have an active medication from one of the medication groupers.    MOBILITY:  Activities of Daily Livin/15/2024     8:37 AM   Activities of Daily Living   Ambulation Independent   Dressing Independent   Transfers Independent   Toileting Continent of bladder   Feeding Independent   Cleaning home/Chores Independent   Telephone use Independent   Shopping Independent   Paying bills Independent   Taking meds Independent     Fall Risk:      2025    10:40 AM 2025     1:00 PM 2025     9:40 AM   Fall Risk Assessment - Outpatient   Mobility Status Ambulatory Ambulatory Ambulatory   Number of falls 0 0 0   Identified as fall risk False False False     Disability Status:      7/15/2024     8:38 AM   Disability Status   Are you deaf or do you have serious difficulty hearing? N   Are you blind or do you have serious difficulty seeing, even when wearing glasses? Y   Because of a physical, mental, or emotional condition, do you have serious difficulty concentrating, remembering, or making decisions? N   Do you have serious difficulty walking or climbing stairs? N   Do you have difficulty dressing or bathing? N   Because of a physical, mental, or emotional condition, do you have difficulty doing errands alone such as visiting a doctor's office or shopping? N     Nutrition Screenin/15/2024     8:37 AM   Nutrition Screening   Has food intake declined over the past three months due to loss of appetite, digestive problems, chewing or swallowing difficulties? No decrease in food  intake   Involuntary weight loss during the last 3 months? No weight loss   Mobility? Goes out   Has the patient suffered psychological stress or acute disease in the past three months? No   Neuropsychological problems? No psychological problems    Screening Score: 0-7 Malnourished, 8-11 At Risk, 12-14 Normal  Get Up and Go:      7/15/2024     8:39 AM 2022     1:36 PM   Get Up and Go   Trial 1 14 seconds 16 seconds     Whisper Test:      7/15/2024     8:39 AM   Whisper Test   Whisper Test Normal           MENTATION:   Has Dementia Dx: No  Has Anxiety Dx: No    Depression Patient Health Questionnaire:      2025     9:35 AM   Depression Patient Health Questionnaire   Over the last two weeks how often have you been bothered by little interest or pleasure in doing things Not at all   Over the last two weeks how often have you been bothered by feeling down, depressed or hopeless Not at all   PHQ-2 Total Score 0     Cognitive Function Screenin/15/2024     8:39 AM   Cognitive Function Screening   Clock Drawing Test 2    Mini-Cog 3 Minute Recall 3   Cognitive Function Screening 5       Data saved with a previous flowsheet row definition     Cognitive Function Screening Total - Less than 4 = Abnormal,  Greater than or equal to 4 = Normal        WHAT MATTERS MOST:  Advance Care Planning   ACP Status:   Patient has had an ACP conversation  Living Will: Yes  Power of : Yes  LaPOST: No         Review of Systems   Constitutional:  Negative for activity change and unexpected weight change.   HENT:  Negative for hearing loss, rhinorrhea and trouble swallowing.    Eyes:  Negative for discharge and visual disturbance.   Respiratory:  Negative for chest tightness and wheezing.    Cardiovascular:  Negative for chest pain and palpitations.   Gastrointestinal:  Negative for blood in stool, constipation, diarrhea and vomiting.   Endocrine: Negative for polydipsia and polyuria.   Genitourinary:  Negative for  "difficulty urinating, dysuria, hematuria and menstrual problem.   Musculoskeletal:  Negative for arthralgias, joint swelling and neck pain.   Neurological:  Negative for weakness and headaches.   Psychiatric/Behavioral:  Negative for confusion and dysphoric mood.          Objective:      Physical Exam    /82   Pulse 72   Temp 97.7 °F (36.5 °C) (Oral)   Ht 5' 6" (1.676 m)   Wt 120.9 kg (266 lb 8.6 oz)   LMP  (LMP Unknown)   SpO2 96%   BMI 43.02 kg/m²     GEN - A+OX4, NAD   HEENT - PERRL, EOMI, OP clear but small. TM normal.   Neck - No thyromegaly or cervical LAD. No thyroid masses felt.  CV - RRR, no m/r   Chest - CTAB, no wheezing or rhonchi  Abd - S/NT/ND/+BS.   Ext - 2+BDP and radial pulses. No LE edema  Neuro - 5/5 BUE and BLE strength. Normal gait.   Skin - No rash.    Labs reviewed.     Assessment/Plan     Carolina was seen today for follow-up.    Diagnoses and all orders for this visit:    Benign essential hypertension - Stable and controlled. Continue current medications.    History of bilateral knee replacement - cont PT.   -     naproxen (NAPROSYN) 500 MG tablet; Take 1 tablet (500 mg total) by mouth 2 (two) times daily as needed (knee pains. take with food.).    Dermatitis  -     triamcinolone acetonide 0.1% (KENALOG) 0.1 % cream; Aaa qd- bid prn back itching . Not more than 2 weeks straight in the same location. Avoid use on face and groin    Morbid obesity with body mass index (BMI) of 43.02 - discussed starting diet and exercise now as meds should always be used in conjunction w/ behavioral modification. Discussed health . Pt reports burdensome already doing PT twice weekly at [facility]. Would consider it at next visit.     Hypothyroidism, unspecified type - cont synthroid. TFT WNL.    Hyperlipidemia, unspecified hyperlipidemia type - cont cholesterol medicine.     Fatty liver - low risk for fibrosis. LFTs ok. Weight loss as above.     QUINTIN on CPAP - Stop zyrtec for a few weeks. Do " flonase nasal spray over the counter 1 spray each nostril twice daily so that you have have decreased postnasal drip and thus able to use CPAP nightly.     Other orders  -     fluconazole (DIFLUCAN) 150 MG Tab; Take 1 tab and then another one in 72 hours for yeast infections    Pt can call me in 2 weeks and discuss insomnia should it return after stopping tirzepatide.    Follow up in about 3 months (around 8/12/2025).      Kierra Cornejo MD  Department of Internal Medicine - [facility]  8:18 AM

## 2025-05-12 NOTE — PATIENT INSTRUCTIONS
Stop zyrtec for a few weeks. Do flonase nasal spray over the counter 1 spray each nostril twice daily so that you have have decreased postnasal drip and thus able to use CPAP nightly.

## 2025-05-16 ENCOUNTER — OFFICE VISIT (OUTPATIENT)
Dept: SLEEP MEDICINE | Facility: CLINIC | Age: 70
End: 2025-05-16
Payer: MEDICARE

## 2025-05-16 VITALS
HEART RATE: 76 BPM | BODY MASS INDEX: 42.8 KG/M2 | WEIGHT: 266.31 LBS | HEIGHT: 66 IN | SYSTOLIC BLOOD PRESSURE: 126 MMHG | DIASTOLIC BLOOD PRESSURE: 80 MMHG

## 2025-05-16 DIAGNOSIS — G47.33 OSA (OBSTRUCTIVE SLEEP APNEA): Primary | ICD-10-CM

## 2025-05-16 PROCEDURE — 99999 PR PBB SHADOW E&M-EST. PATIENT-LVL III: CPT | Mod: PBBFAC,,, | Performed by: NURSE PRACTITIONER

## 2025-05-16 PROCEDURE — 99213 OFFICE O/P EST LOW 20 MIN: CPT | Mod: PBBFAC | Performed by: NURSE PRACTITIONER

## 2025-05-16 RX ORDER — TIRZEPATIDE 5 MG/.5ML
5 INJECTION, SOLUTION SUBCUTANEOUS
Qty: 2 ML | Refills: 3 | Status: SHIPPED | OUTPATIENT
Start: 2025-05-16

## 2025-05-16 NOTE — PROGRESS NOTES
"Cc: QUINTIN    Since seen she has been using apap 8-12cm qhs. DS2.  Recently been removing mask and hard to return to sleep(light sleep/hears husbands beep, tv, cleans) but since beginning [facility] she's been sleeping better again/more consolidated. Not had supplies in a while and needs filters and climate hose. Wants chin strap also/has oral drying.   Lost 20# on ozempic before but then stopped med/wgt gain  Interrogation 30davg 4.4h/n AHI 5.1, 90% tile 11.8cm    [employer] FFM    HST 6-5-18 AHI 23/low SpO2: 82.7%      SH: . Former ,  volunteers at [facility]  /80 (BP Location: Left arm, Patient Position: Sitting)   Pulse 76   Ht 5' 6" (1.676 m)   Wt 120.8 kg (266 lb 5.1 oz)   LMP  (LMP Unknown)   BMI 42.98 kg/m²       ASSESSMENT:   QUINTIN, moderate. She remains adherent with PAP, AHI mild residual. benefiting  She has  medical comorbidities of asthma, hypertension, hypothyroidism,morbid obesity    PLAN:   1. Adjust to apap 8-14cm. Notify me 1-2 wks to remotely view data. Get climat ehose/chin strap possibly/has FFM already.  [facility] DME prn supplies   2. Discussed goal of therapy AHI<5  Zepboung 5mg SC q7d          "

## 2025-05-17 ENCOUNTER — OFFICE VISIT (OUTPATIENT)
Dept: URGENT CARE | Facility: CLINIC | Age: 70
End: 2025-05-17
Payer: MEDICARE

## 2025-05-17 VITALS
TEMPERATURE: 99 F | BODY MASS INDEX: 42.8 KG/M2 | HEART RATE: 87 BPM | RESPIRATION RATE: 17 BRPM | HEIGHT: 66 IN | OXYGEN SATURATION: 96 % | SYSTOLIC BLOOD PRESSURE: 156 MMHG | WEIGHT: 266.31 LBS | DIASTOLIC BLOOD PRESSURE: 80 MMHG

## 2025-05-17 DIAGNOSIS — S90.811A ABRASION, RIGHT FOOT, INITIAL ENCOUNTER: ICD-10-CM

## 2025-05-17 DIAGNOSIS — T14.8XXA PUNCTURE WOUND: Primary | ICD-10-CM

## 2025-05-17 PROCEDURE — 90715 TDAP VACCINE 7 YRS/> IM: CPT | Mod: S$GLB,,,

## 2025-05-17 PROCEDURE — 90471 IMMUNIZATION ADMIN: CPT | Mod: S$GLB,,,

## 2025-05-17 PROCEDURE — 99214 OFFICE O/P EST MOD 30 MIN: CPT | Mod: 25,S$GLB,,

## 2025-05-17 RX ORDER — MUPIROCIN 20 MG/G
OINTMENT TOPICAL 3 TIMES DAILY
Qty: 15 G | Refills: 0 | Status: SHIPPED | OUTPATIENT
Start: 2025-05-17 | End: 2025-05-24

## 2025-05-17 RX ORDER — DOXYCYCLINE HYCLATE 100 MG
100 TABLET ORAL EVERY 12 HOURS
Qty: 10 TABLET | Refills: 0 | Status: SHIPPED | OUTPATIENT
Start: 2025-05-17 | End: 2025-05-22

## 2025-05-17 NOTE — PROGRESS NOTES
"Subjective:      Patient ID: Carolina Knight is a 70 y.o. female.    Vitals:  height is 5' 6" (1.676 m) and weight is 120.8 kg (266 lb 5.1 oz). Her oral temperature is 98.7 °F (37.1 °C). Her blood pressure is 156/80 (abnormal) and her pulse is 87. Her respiration is 17 and oxygen saturation is 96%.     Chief Complaint: Foot Injury    Pt is a 70 y.o. female with PMHx of HTN, HLD, sjogren's, hypothyroidism, GERD, QUINTIN. She is presenting with L foot injury.  Onset of symptoms was 4 days ago after stepping on something outside while at home without shoes. Unsure of object. Now having pain with walking. Pt reports using no OTC tx.    Foot Injury   The incident occurred at home (outside). Injury mechanism: stepped on object. The pain is present in the left foot. The quality of the pain is described as aching. The pain is at a severity of 4/10. The pain is mild. The pain has been Constant since onset. Associated symptoms include an inability to bear weight. Pertinent negatives include no loss of motion, loss of sensation, muscle weakness, numbness or tingling. The symptoms are aggravated by movement and weight bearing. She has tried nothing for the symptoms. The treatment provided no relief.     Constitution: Negative for activity change, appetite change, chills and fever.   HENT:  Negative for ear pain, congestion, postnasal drip, sinus pain, sinus pressure and sore throat.    Neck: Negative for neck pain.   Cardiovascular:  Negative for chest pain and sob on exertion.   Eyes:  Negative for eye trauma, eye discharge, eye itching, eye redness, photophobia and blurred vision.   Respiratory:  Negative for cough, shortness of breath, wheezing and asthma.    Gastrointestinal:  Negative for abdominal pain, nausea, vomiting, constipation and diarrhea.   Genitourinary:  Negative for dysuria, frequency, urgency, urine decreased and hematuria.   Musculoskeletal:  Positive for pain. Negative for muscle ache.   Skin:  Negative for " color change, rash and hives.   Allergic/Immunologic: Negative for seasonal allergies, asthma, hives and sneezing.   Neurological:  Negative for dizziness, light-headedness, headaches, altered mental status and numbness.   Psychiatric/Behavioral:  Negative for altered mental status and confusion.       Objective:     Physical Exam   Constitutional: She is oriented to person, place, and time. She appears well-developed. She is cooperative.      Comments:Pt sitting erect on examination table. No acute respiratory distress, no use of accessory muscles, no notice of nasal flaring.        HENT:   Head: Normocephalic and atraumatic.   Ears:   Right Ear: Hearing and external ear normal.   Left Ear: Hearing and external ear normal.   Nose: Nose normal. No mucosal edema or nasal deformity. No epistaxis. Right sinus exhibits no maxillary sinus tenderness and no frontal sinus tenderness. Left sinus exhibits no maxillary sinus tenderness and no frontal sinus tenderness.   Mouth/Throat: Uvula is midline, oropharynx is clear and moist and mucous membranes are normal. No trismus in the jaw. Normal dentition. No uvula swelling.   Eyes: Conjunctivae and lids are normal.   Neck: Trachea normal and phonation normal. Neck supple.   Cardiovascular: Normal rate, regular rhythm, normal heart sounds and normal pulses.   Pulmonary/Chest: Effort normal.   Abdominal: Normal appearance.   Musculoskeletal: Normal range of motion.         General: Normal range of motion.        Feet:       Comments: Puncture wound of R plantar surface of foot  Nontender to palpation  No discharge, redness, or warmth surrounding area   AMBROSE    Neurological: She is alert and oriented to person, place, and time. She exhibits normal muscle tone.   Skin: Skin is warm, dry and intact.   Psychiatric: Her speech is normal and behavior is normal. Judgment and thought content normal.   Nursing note and vitals reviewed.      Assessment:     1. Puncture wound    2.  Abrasion, right foot, initial encounter        Plan:   I have reviewed the patient chart and pertinent past imaging/labs.      Puncture wound  -     DIPH,PERTUSS(ACEL),TET VAC(PF)(ADULT)(ADACEL)(TDaP)  -     doxycycline (VIBRA-TABS) 100 MG tablet; Take 1 tablet (100 mg total) by mouth every 12 (twelve) hours. for 5 days  Dispense: 10 tablet; Refill: 0  -     mupirocin (BACTROBAN) 2 % ointment; Apply topically 3 (three) times daily. for 7 days  Dispense: 15 g; Refill: 0    Abrasion, right foot, initial encounter  -     DIPH,PERTUSS(ACEL),TET VAC(PF)(ADULT)(ADACEL)(TDaP)

## 2025-05-17 NOTE — PATIENT INSTRUCTIONS
Cellulitis: Doxycycline twice daily for 5 days. Mupirocin twice daily for 7 days   Please return here or go to the Emergency Department for any concerns or worsening of condition.  If you were prescribed antibiotics, please take them to completion.  If you were prescribed a narcotic medication, do not drive or operate heavy equipment or machinery while taking these medications.  Please follow up with your primary care doctor or specialist as needed.    If you  smoke, please stop smoking.

## 2025-05-19 ENCOUNTER — CLINICAL SUPPORT (OUTPATIENT)
Dept: REHABILITATION | Facility: HOSPITAL | Age: 70
End: 2025-05-19
Payer: MEDICARE

## 2025-05-19 DIAGNOSIS — Z74.09 DECREASED STRENGTH, ENDURANCE, AND MOBILITY: Primary | ICD-10-CM

## 2025-05-19 DIAGNOSIS — R53.1 DECREASED STRENGTH, ENDURANCE, AND MOBILITY: Primary | ICD-10-CM

## 2025-05-19 DIAGNOSIS — R68.89 DECREASED STRENGTH, ENDURANCE, AND MOBILITY: Primary | ICD-10-CM

## 2025-05-19 PROCEDURE — 97112 NEUROMUSCULAR REEDUCATION: CPT | Mod: PN

## 2025-05-19 PROCEDURE — 97530 THERAPEUTIC ACTIVITIES: CPT | Mod: PN

## 2025-05-19 NOTE — PROGRESS NOTES
"  Outpatient Rehab    Physical Therapy Visit    Patient Name: Carolina Knight  MRN: [id number]  YOB: 1955  Encounter Date: 5/19/2025    Therapy Diagnosis:   Encounter Diagnosis   Name Primary?    Decreased strength, endurance, and mobility Yes     Physician: Deedee Mcgraw NP    Physician Orders: Eval and Treat  Medical Diagnosis: History of bilateral knee replacement    Visit # / Visits Authorized:  12 / 22  Insurance Authorization Period: 3/21/2025 to 12/31/2025  Date of Evaluation: 3/21/2025  Plan of Care Certification: 3/23/2025 to 5/18/2025      PT/PTA: PT   Number of PTA visits since last PT visit:0  Time In: 1105   Time Out: 1200  Total Time (in minutes): 55   Total Billable Time (in minutes): 55    FOTO:  Intake Score:  %  Survey Score 2:  %  Survey Score 3:  %    Precautions:       Subjective   She feels like when she does the SAQ with the weights then it hurts her R knee. She has an internal soreness that she only feels when she walks.  Pain reported as 1/10. R knee soreness only    Objective            Treatment:  Balance/Neuromuscular Re-Education  NMR 2: SLR 3x10 ea  NMR 3: hip adduction isometric with ball 5"x15  NMR 4: bridges 3x10  NMR 5: LAQ GTB 3x10 ea  NMR 6: SAQ 4lb 3" hold 3x10 ea (2lb weight on R)  Therapeutic Activity  TA 1: standing heel raises 3x10  TA 2: standing hip abduction 3x10 ea  TA 3: Cybex leg press 5 plates DL 3x10  TA 4: Cybex leg press 3 plates SL 3x10  TA 5: step up 6" 3x10 ea  TA 6: recumbent bike x 8 minutes level 2 [place] sprints  TA 7: standing hip extension 3x10 ea    Time Entry(in minutes):  Neuromuscular Re-Education Time Entry: 25  Therapeutic Activity Time Entry: 30    Assessment & Plan   Assessment: Patient arrived to today's session with minor reports of pain in R knee. She reported increase in R knee pain with resistance with SAQ and therefore weight was reduced today. She denied increase in pain with all other exercises. PT to reassess at [place].   "     Patient will continue to benefit from skilled outpatient physical therapy to address the deficits listed in the problem list box on initial evaluation, provide pt/family education and to maximize pt's level of independence in the home and community environment.     Patient's spiritual, cultural, and educational needs considered and patient agreeable to plan of care and goals.           Plan: Continue to improve functional strength    Goals:   Active       long term goal       patient will improve right lower extremity strength to 90% of Left to improve ability to perform functional tasks  (Met)       Start:  03/23/25    Expected End:  05/18/25    Resolved:  04/28/25         patient will be able to navigate stairs with little to no difficulty to improve ability to navigate home environment  (Progressing)       Start:  03/23/25    Expected End:  05/18/25            Patient will be able to perform 15 sit to  30 seconds to improve functional strength and endurance  (Progressing)       Start:  03/23/25    Expected End:  05/18/25            patient will improve FOTO score to 66% to improve self perceived ability to perform functional tasks  (Met)       Start:  03/23/25    Expected End:  05/18/25    Resolved:  04/28/25            short term goal        patient will be independent with Home exercise program to supplement therapy (Progressing)       Start:  03/23/25    Expected End:  05/18/25            patient will be able to rise from sitting without difficulty to improve overall function  (Met)       Start:  03/23/25    Expected End:  04/20/25    Resolved:  04/28/25             Debra Flor, PT ,DPT,OCS  05/19/2025

## 2025-05-22 ENCOUNTER — PATIENT MESSAGE (OUTPATIENT)
Dept: REHABILITATION | Facility: HOSPITAL | Age: 70
End: 2025-05-22
Payer: MEDICARE

## 2025-05-22 ENCOUNTER — DOCUMENTATION ONLY (OUTPATIENT)
Dept: CARDIOLOGY | Facility: CLINIC | Age: 70
End: 2025-05-22
Payer: MEDICARE

## 2025-05-22 NOTE — PROGRESS NOTES
I89.0 lymphedema not elsewhere classified secondary to CVI. Patient has tried conservative treatment for more than four weeks using compression, elevation, and exercise from 3/19/25 to 05/22/2025 and yet the swelling has failed to reduce and still persists. Hyperplasia is present with this edema. Please give due consideration to this medically necessary device as lymphedema is a chronic and progressive condition.

## 2025-05-23 ENCOUNTER — CLINICAL SUPPORT (OUTPATIENT)
Dept: REHABILITATION | Facility: HOSPITAL | Age: 70
End: 2025-05-23
Payer: MEDICARE

## 2025-05-23 DIAGNOSIS — R53.1 DECREASED STRENGTH, ENDURANCE, AND MOBILITY: Primary | ICD-10-CM

## 2025-05-23 DIAGNOSIS — Z74.09 DECREASED STRENGTH, ENDURANCE, AND MOBILITY: Primary | ICD-10-CM

## 2025-05-23 DIAGNOSIS — R68.89 DECREASED STRENGTH, ENDURANCE, AND MOBILITY: Primary | ICD-10-CM

## 2025-05-23 PROCEDURE — 97530 THERAPEUTIC ACTIVITIES: CPT | Mod: PN

## 2025-05-23 PROCEDURE — 97112 NEUROMUSCULAR REEDUCATION: CPT | Mod: PN

## 2025-05-23 NOTE — PROGRESS NOTES
"  Outpatient Rehab    Physical Therapy Progress Note : Updated Plan of Care    Patient Name: Carolina Knight  MRN: [id number]  YOB: 1955  Encounter Date: 5/23/2025    Therapy Diagnosis:   Encounter Diagnosis   Name Primary?    Decreased strength, endurance, and mobility Yes     Physician: Deedee Mcgraw NP    Physician Orders: Eval and Treat  Medical Diagnosis: History of bilateral knee replacement    Visit # / Visits Authorized:  13 / 22  Insurance Authorization Period: 3/21/2025 to 12/31/2025  Date of Evaluation: 3/21/2025  Plan of Care Certification: 3/23/2025 to 7/4/1015     PT/PTA: PT   Number of PTA visits since last PT visit:0  Time In: 1100   Time Out: 1200  Total Time (in minutes): 60   Total Billable Time (in minutes): 60    FOTO:  Intake Score:  %  Survey Score 2:  %  Survey Score 3:  %    Precautions:       Subjective   She is feeling good without any pain today. She took her dog for a walk for 4 blocks and her knee was hurting. She realized that it is the type of shoes that she is wearing. She took a Naproxen once this week. She was not really hurting but she has some inflammation..  Pain reported as 0/10. R knee soreness only    Objective         Sit to Stand Testing      The patient completed 12 repetitions of a sit to stand transfer in 30 seconds.                Treatment:  Balance/Neuromuscular Re-Education  NMR 2: SLR 3x10 ea 2lb  NMR 4: bridges 3x10  Therapeutic Activity  TA 1: standing heel raises 3x10  TA 2: standing hip abduction 3x10 ea  TA 3: Cybex leg press 6 plates DL 3x10  TA 4: Cybex leg press 3 plates SL 3x10  TA 5: step up 6" 3x10 ea  TA 6: recumbent bike x 8 minutes level 2 [place] sprints  TA 7: standing hip extension 3x10 ea  TA 8: cybex knee extension 15lb 2" hold 3x10  TA 9: reassessment    Time Entry(in minutes):  Neuromuscular Re-Education Time Entry: 15  Therapeutic Activity Time Entry: 45    Assessment & Plan   Assessment  Carolina                 Assessment " Details: Patient was reassessed today and displayed no overall change in functional strength with 30 second STS. Patient would benefit from focusing more on functional strengthening and weight bearing exercises to improve overall function and QOL.    Plan  From a physical therapy perspective, the patient would benefit from: Skilled Rehab Services                              Plan details: Progress with weight bearing and functional strengthening          The patient will continue to benefit from skilled outpatient physical therapy in order to address the deficits listed in the problem list on the initial evaluation, provide patient and family education, and maximize the patients level of independence in the home and community environments.     The patient's spiritual, cultural, and educational needs were considered, and the patient is agreeable to the plan of care and goals.           Goals:   Active       long term goal       patient will improve right lower extremity strength to 90% of Left to improve ability to perform functional tasks  (Met)       Start:  03/23/25    Expected End:  05/18/25    Resolved:  04/28/25         patient will be able to navigate stairs with little to no difficulty to improve ability to navigate home environment  (Progressing)       Start:  03/23/25    Expected End:  05/18/25            Patient will be able to perform 15 sit to  30 seconds to improve functional strength and endurance  (Progressing)       Start:  03/23/25    Expected End:  05/18/25            patient will improve FOTO score to 66% to improve self perceived ability to perform functional tasks  (Met)       Start:  03/23/25    Expected End:  05/18/25    Resolved:  04/28/25            short term goal        patient will be independent with Home exercise program to supplement therapy (Progressing)       Start:  03/23/25    Expected End:  05/18/25            patient will be able to rise from sitting without difficulty  to improve overall function  (Met)       Start:  03/23/25    Expected End:  04/20/25    Resolved:  04/28/25             Debra Flor, PT ,DPT,OCS  05/23/2025

## 2025-05-23 NOTE — PROGRESS NOTES
Lymphedema Pump Progress:  [x] Consult, clinical notes, and face sheet faxed to [employer] for home pneumatic compression.  [x] Updated progress note sent to [employer].  [] Patient pneumatic compression pump approved and shipped by [employer].

## 2025-05-26 ENCOUNTER — CLINICAL SUPPORT (OUTPATIENT)
Dept: REHABILITATION | Facility: HOSPITAL | Age: 70
End: 2025-05-26
Payer: MEDICARE

## 2025-05-26 DIAGNOSIS — Z74.09 DECREASED STRENGTH, ENDURANCE, AND MOBILITY: Primary | ICD-10-CM

## 2025-05-26 DIAGNOSIS — R53.1 DECREASED STRENGTH, ENDURANCE, AND MOBILITY: Primary | ICD-10-CM

## 2025-05-26 DIAGNOSIS — R68.89 DECREASED STRENGTH, ENDURANCE, AND MOBILITY: Primary | ICD-10-CM

## 2025-05-26 PROCEDURE — 97530 THERAPEUTIC ACTIVITIES: CPT | Mod: PN

## 2025-05-26 NOTE — PROGRESS NOTES
"  Outpatient Rehab    Physical Therapy Visit    Patient Name: Carolina Knight  MRN: [id number]  YOB: 1955  Encounter Date: 5/26/2025    Therapy Diagnosis:   Encounter Diagnosis   Name Primary?    Decreased strength, endurance, and mobility Yes     Physician: Deedee Mcgraw NP    Physician Orders: Eval and Treat  Medical Diagnosis: History of bilateral knee replacement    Visit # / Visits Authorized:  14 / 22  Insurance Authorization Period: 3/21/2025 to 12/31/2025  Date of Evaluation: 3/21/2025  Plan of Care Certification: 5/23/2025 to 7/3/2025      PT/PTA: PT   Number of PTA visits since last PT visit:0  Time In: 1100   Time Out: 1145  Total Time (in minutes): 45   Total Billable Time (in minutes): 20    FOTO:  Intake Score:  %  Survey Score 2:  %  Survey Score 3:  %    Precautions:       Subjective   She is just feeling tired and lazy today..  Pain reported as 0/10.      Objective            Treatment:  Balance/Neuromuscular Re-Education  NMR 2: SLR 3x10 ea 2lb  NMR 4: bridges 3x10  Therapeutic Activity  TA 1: standing heel raises 3x10  TA 2: standing hip abduction 3x10 ea  TA 3: Cybex leg press 6 plates DL 3x10  TA 4: Cybex leg press 3 plates SL 3x10  TA 5: step up 6" 3x10 ea  TA 6: recumbent bike x 8 minutes level 2 [place] sprints  TA 7: standing hip extension 3x10 ea  TA 8: cybex knee extension 15lb 2" hold 3x10  TA 9: STS from chair 3x10    Time Entry(in minutes):  Neuromuscular Re-Education Time Entry: 10  Therapeutic Activity Time Entry: 35    Assessment & Plan   Assessment: PT progressed today's session with addition of STS to improve functional strength. She required use of hands to complete task. PT to attempt to progress without hands at NV.       The patient will continue to benefit from skilled outpatient physical therapy in order to address the deficits listed in the problem list on the initial evaluation, provide patient and family education, and maximize the patients level of " independence in the home and community environments.     The patient's spiritual, cultural, and educational needs were considered, and the patient is agreeable to the plan of care and goals.           Plan: Continue to improve functional strength    Goals:   Active       long term goal       patient will improve right lower extremity strength to 90% of Left to improve ability to perform functional tasks  (Met)       Start:  03/23/25    Expected End:  05/18/25    Resolved:  04/28/25         patient will be able to navigate stairs with little to no difficulty to improve ability to navigate home environment  (Progressing)       Start:  03/23/25    Expected End:  05/18/25            Patient will be able to perform 15 sit to  30 seconds to improve functional strength and endurance  (Progressing)       Start:  03/23/25    Expected End:  05/18/25            patient will improve FOTO score to 66% to improve self perceived ability to perform functional tasks  (Met)       Start:  03/23/25    Expected End:  05/18/25    Resolved:  04/28/25            short term goal        patient will be independent with Home exercise program to supplement therapy (Progressing)       Start:  03/23/25    Expected End:  05/18/25            patient will be able to rise from sitting without difficulty to improve overall function  (Met)       Start:  03/23/25    Expected End:  04/20/25    Resolved:  04/28/25             Debra Flor, PT ,DPT,OCS  05/26/2025

## 2025-05-30 ENCOUNTER — CLINICAL SUPPORT (OUTPATIENT)
Dept: REHABILITATION | Facility: HOSPITAL | Age: 70
End: 2025-05-30
Payer: MEDICARE

## 2025-05-30 DIAGNOSIS — R68.89 DECREASED STRENGTH, ENDURANCE, AND MOBILITY: Primary | ICD-10-CM

## 2025-05-30 DIAGNOSIS — Z74.09 DECREASED STRENGTH, ENDURANCE, AND MOBILITY: Primary | ICD-10-CM

## 2025-05-30 DIAGNOSIS — R53.1 DECREASED STRENGTH, ENDURANCE, AND MOBILITY: Primary | ICD-10-CM

## 2025-05-30 PROCEDURE — 97530 THERAPEUTIC ACTIVITIES: CPT | Mod: PN

## 2025-05-30 PROCEDURE — 97112 NEUROMUSCULAR REEDUCATION: CPT | Mod: PN

## 2025-05-30 NOTE — PROGRESS NOTES
"  Outpatient Rehab    Physical Therapy Visit    Patient Name: Carolina Knight  MRN: [id number]  YOB: 1955  Encounter Date: 5/30/2025    Therapy Diagnosis:   Encounter Diagnosis   Name Primary?    Decreased strength, endurance, and mobility Yes     Physician: Deedee Mcgrwa NP    Physician Orders: Eval and Treat  Medical Diagnosis: History of bilateral knee replacement    Visit # / Visits Authorized:  15 / 22  Insurance Authorization Period: 3/21/2025 to 12/31/2025  Date of Evaluation: 3/21/2025  Plan of Care Certification: 5/23/2025 to 7/3/2025      PT/PTA: PT   Number of PTA visits since last PT visit:0  Time In: 1100   Time Out: 1159  Total Time (in minutes): 59   Total Billable Time (in minutes): 59    FOTO:  Intake Score:  %  Survey Score 2:  %  Survey Score 3:  %    Precautions:       Subjective   She is doing well. She thinks that she wants to drop down the one time a week now..  Pain reported as 0/10.      Objective            Treatment:  Balance/Neuromuscular Re-Education  NMR 2: SLR 3x10 ea 2lb  NMR 4: bridges 3x10  Therapeutic Activity  TA 1: standing heel raises 3x10 +2lb  TA 2: standing hip abduction 3x10 ea +2lb  TA 3: Cybex leg press 6 plates DL 3x10  TA 4: Cybex leg press 3 plates SL 3x10  TA 5: step up 6" 3x10 ea  TA 6: recumbent bike x 8 minutes level 2 [place] sprints  TA 7: standing hip extension 3x10 ea +2lb  TA 8: cybex knee extension 15lb 2" hold 3x10  TA 9: STS from chair 3x10    Time Entry(in minutes):  Neuromuscular Re-Education Time Entry: 10  Therapeutic Activity Time Entry: 49    Assessment & Plan   Assessment: Patient arrived to today's session without reports of pain. PT progressed strengthening by adding resistance to standing exercises. Patient will be dropping down in frequenct to 1 time a week for 1 month to prepare for discharge.       The patient will continue to benefit from skilled outpatient physical therapy in order to address the deficits listed in the " problem list on the initial evaluation, provide patient and family education, and maximize the patients level of independence in the home and community environments.     The patient's spiritual, cultural, and educational needs were considered, and the patient is agreeable to the plan of care and goals.           Plan: drop down frequency to 1 time a week    Goals:   Active       long term goal       patient will improve right lower extremity strength to 90% of Left to improve ability to perform functional tasks  (Met)       Start:  03/23/25    Expected End:  05/18/25    Resolved:  04/28/25         patient will be able to navigate stairs with little to no difficulty to improve ability to navigate home environment  (Progressing)       Start:  03/23/25    Expected End:  05/18/25            Patient will be able to perform 15 sit to  30 seconds to improve functional strength and endurance  (Progressing)       Start:  03/23/25    Expected End:  05/18/25            patient will improve FOTO score to 66% to improve self perceived ability to perform functional tasks  (Met)       Start:  03/23/25    Expected End:  05/18/25    Resolved:  04/28/25            short term goal        patient will be independent with Home exercise program to supplement therapy (Progressing)       Start:  03/23/25    Expected End:  05/18/25            patient will be able to rise from sitting without difficulty to improve overall function  (Met)       Start:  03/23/25    Expected End:  04/20/25    Resolved:  04/28/25             Debra Flor, PT ,DPT,OCS  05/30/2025

## 2025-06-03 ENCOUNTER — CLINICAL SUPPORT (OUTPATIENT)
Dept: REHABILITATION | Facility: HOSPITAL | Age: 70
End: 2025-06-03
Payer: MEDICARE

## 2025-06-03 DIAGNOSIS — R53.1 DECREASED STRENGTH, ENDURANCE, AND MOBILITY: Primary | ICD-10-CM

## 2025-06-03 DIAGNOSIS — R68.89 DECREASED STRENGTH, ENDURANCE, AND MOBILITY: Primary | ICD-10-CM

## 2025-06-03 DIAGNOSIS — Z74.09 DECREASED STRENGTH, ENDURANCE, AND MOBILITY: Primary | ICD-10-CM

## 2025-06-03 PROCEDURE — 97530 THERAPEUTIC ACTIVITIES: CPT | Mod: PN,CQ

## 2025-06-03 NOTE — PROGRESS NOTES
"  Outpatient Rehab    Physical Therapy Visit    Patient Name: Carolina Knight  MRN: [id number]  YOB: 1955  Encounter Date: 6/3/2025    Therapy Diagnosis:   Encounter Diagnosis   Name Primary?    Decreased strength, endurance, and mobility Yes     Physician: Deedee Mcgraw NP    Physician Orders: Eval and Treat  Medical Diagnosis: History of bilateral knee replacement    Visit # / Visits Authorized:  17 / 22  Insurance Authorization Period: 3/21/2025 to 12/31/2025  Date of Evaluation: 3/21/2025  Plan of Care Certification: 5/23/2025 to 7/3/2025      PT/PTA: PTA   Number of PTA visits since last PT visit:1  Time In: 1300   Time Out: 1400  Total Time (in minutes): 60   Total Billable Time (in minutes): 23    FOTO:  Intake Score:  %  Survey Score 2:  %  Survey Score 3:  %    Precautions:       Subjective   Feeling ok today, she states she finished working on her garden..    R knee soreness only (general)    Objective            Treatment:  Balance/Neuromuscular Re-Education  NMR 1: QS 5" x20 ea  NMR 2: SLR 3x10 ea 2lb  NMR 3: hip adduction isometric with ball 5"x15  NMR 4: bridges 3x10  NMR 5: LAQ GTB 3x10 ea  NMR 6: SAQ 4lb 3" hold 3x10 ea (2lb weight on R)  Therapeutic Activity  TA 1: standing heel raises 3x10 +2lb  TA 2: standing hip abduction 3x10 ea +2lb  TA 3: Cybex leg press 6 plates DL 3x10  TA 4: Cybex leg press 3 plates SL 3x10  TA 5: step up 6" 3x10 ea  TA 6: recumbent bike x 8 minutes level 2 [place] sprints  TA 7: standing hip extension 3x10 ea +2lb  TA 8: cybex knee extension 15lb 2" hold 3x10  TA 9: STS from chair 3x10    Time Entry(in minutes):  Therapeutic Activity Time Entry: 23    Assessment & Plan   Assessment: Pt arrived to session with no new reports of pain. Pt able to complete session focusing on B LE strengthening within tolerance and as logged in treatment without an adverse event. Improved tolerance with cybex leg press today, verbal instructions provided on pace and " technique.  Evaluation/Treatment Tolerance: Patient tolerated treatment well    The patient will continue to benefit from skilled outpatient physical therapy in order to address the deficits listed in the problem list on the initial evaluation, provide patient and family education, and maximize the patients level of independence in the home and community environments.     The patient's spiritual, cultural, and educational needs were considered, and the patient is agreeable to the plan of care and goals.           Plan: Continue with established frequency, strengthening B knees within tolerance.    Goals:   Active       long term goal       patient will improve right lower extremity strength to 90% of Left to improve ability to perform functional tasks  (Met)       Start:  03/23/25    Expected End:  05/18/25    Resolved:  04/28/25         patient will be able to navigate stairs with little to no difficulty to improve ability to navigate home environment  (Progressing)       Start:  03/23/25    Expected End:  05/18/25            Patient will be able to perform 15 sit to  30 seconds to improve functional strength and endurance  (Progressing)       Start:  03/23/25    Expected End:  05/18/25            patient will improve FOTO score to 66% to improve self perceived ability to perform functional tasks  (Met)       Start:  03/23/25    Expected End:  05/18/25    Resolved:  04/28/25            short term goal        patient will be independent with Home exercise program to supplement therapy (Progressing)       Start:  03/23/25    Expected End:  05/18/25            patient will be able to rise from sitting without difficulty to improve overall function  (Met)       Start:  03/23/25    Expected End:  04/20/25    Resolved:  04/28/25             Jose Alvares PTA

## 2025-06-10 ENCOUNTER — CLINICAL SUPPORT (OUTPATIENT)
Dept: REHABILITATION | Facility: HOSPITAL | Age: 70
End: 2025-06-10
Payer: MEDICARE

## 2025-06-10 DIAGNOSIS — R53.1 DECREASED STRENGTH, ENDURANCE, AND MOBILITY: Primary | ICD-10-CM

## 2025-06-10 DIAGNOSIS — R68.89 DECREASED STRENGTH, ENDURANCE, AND MOBILITY: Primary | ICD-10-CM

## 2025-06-10 DIAGNOSIS — Z74.09 DECREASED STRENGTH, ENDURANCE, AND MOBILITY: Primary | ICD-10-CM

## 2025-06-10 PROCEDURE — 97530 THERAPEUTIC ACTIVITIES: CPT | Mod: PN,CQ

## 2025-06-10 PROCEDURE — 97112 NEUROMUSCULAR REEDUCATION: CPT | Mod: PN,CQ

## 2025-06-11 ENCOUNTER — PATIENT MESSAGE (OUTPATIENT)
Dept: SLEEP MEDICINE | Facility: CLINIC | Age: 70
End: 2025-06-11
Payer: MEDICARE

## 2025-06-11 DIAGNOSIS — G47.33 OSA (OBSTRUCTIVE SLEEP APNEA): Primary | ICD-10-CM

## 2025-06-17 ENCOUNTER — TELEPHONE (OUTPATIENT)
Dept: CARDIOLOGY | Facility: CLINIC | Age: 70
End: 2025-06-17
Payer: MEDICARE

## 2025-06-17 NOTE — TELEPHONE ENCOUNTER
Lymphedema Pump Progress:  [x] Consult, clinical notes, and face sheet faxed to [employer] for home pneumatic compression.  [x] Patient pneumatic compression pump approved and shipped by [employer].

## 2025-06-23 DIAGNOSIS — Z00.00 ENCOUNTER FOR MEDICARE ANNUAL WELLNESS EXAM: ICD-10-CM

## 2025-07-02 ENCOUNTER — TELEPHONE (OUTPATIENT)
Dept: CARDIOLOGY | Facility: CLINIC | Age: 70
End: 2025-07-02
Payer: MEDICARE

## 2025-07-02 NOTE — TELEPHONE ENCOUNTER
Lymphedema Pump Progress:  [x] Consult, clinical notes, and face sheet faxed to [employer] for home pneumatic compression.  [x] Patient pneumatic compression pump approved and shipped by [employer].

## 2025-07-25 DIAGNOSIS — I10 ESSENTIAL HYPERTENSION: ICD-10-CM

## 2025-07-25 DIAGNOSIS — M35.00 SJOGREN'S SYNDROME, WITH UNSPECIFIED ORGAN INVOLVEMENT: Primary | ICD-10-CM

## 2025-07-25 DIAGNOSIS — R73.03 PREDIABETES: ICD-10-CM

## 2025-07-25 DIAGNOSIS — E03.9 HYPOTHYROIDISM, UNSPECIFIED TYPE: ICD-10-CM

## 2025-07-25 DIAGNOSIS — K76.0 FATTY LIVER: ICD-10-CM

## 2025-07-25 DIAGNOSIS — E78.5 HYPERLIPIDEMIA, UNSPECIFIED HYPERLIPIDEMIA TYPE: ICD-10-CM

## 2025-07-25 RX ORDER — METFORMIN HYDROCHLORIDE 500 MG/1
500 TABLET, EXTENDED RELEASE ORAL NIGHTLY
Qty: 90 TABLET | Refills: 3 | Status: SHIPPED | OUTPATIENT
Start: 2025-07-25 | End: 2026-07-25

## 2025-07-25 RX ORDER — HYDROCHLOROTHIAZIDE 12.5 MG/1
12.5 TABLET ORAL 2 TIMES DAILY
Qty: 180 TABLET | Refills: 3 | Status: SHIPPED | OUTPATIENT
Start: 2025-07-25

## 2025-07-29 ENCOUNTER — LAB VISIT (OUTPATIENT)
Dept: LAB | Facility: HOSPITAL | Age: 70
End: 2025-07-29
Attending: INTERNAL MEDICINE
Payer: MEDICARE

## 2025-07-29 DIAGNOSIS — E03.9 HYPOTHYROIDISM, UNSPECIFIED TYPE: ICD-10-CM

## 2025-07-29 DIAGNOSIS — K76.0 FATTY LIVER: ICD-10-CM

## 2025-07-29 DIAGNOSIS — E78.5 HYPERLIPIDEMIA, UNSPECIFIED HYPERLIPIDEMIA TYPE: ICD-10-CM

## 2025-07-29 DIAGNOSIS — R73.03 PREDIABETES: ICD-10-CM

## 2025-07-29 LAB
ALBUMIN SERPL BCP-MCNC: 3.8 G/DL (ref 3.5–5.2)
ALP SERPL-CCNC: 52 UNIT/L (ref 40–150)
ALT SERPL W/O P-5'-P-CCNC: 26 UNIT/L (ref 0–55)
ANION GAP (OHS): 11 MMOL/L (ref 8–16)
AST SERPL-CCNC: 13 UNIT/L (ref 0–50)
BILIRUB SERPL-MCNC: 0.4 MG/DL (ref 0.1–1)
BUN SERPL-MCNC: 15 MG/DL (ref 8–23)
CALCIUM SERPL-MCNC: 9.7 MG/DL (ref 8.7–10.5)
CHLORIDE SERPL-SCNC: 104 MMOL/L (ref 95–110)
CHOLEST SERPL-MCNC: 133 MG/DL (ref 120–199)
CHOLEST/HDLC SERPL: 2.9 {RATIO} (ref 2–5)
CO2 SERPL-SCNC: 26 MMOL/L (ref 23–29)
CREAT SERPL-MCNC: 0.8 MG/DL (ref 0.5–1.4)
EAG (OHS): 111 MG/DL (ref 68–131)
GFR SERPLBLD CREATININE-BSD FMLA CKD-EPI: >60 ML/MIN/1.73/M2
GLUCOSE SERPL-MCNC: 95 MG/DL (ref 70–110)
HBA1C MFR BLD: 5.5 % (ref 4–5.6)
HDLC SERPL-MCNC: 46 MG/DL (ref 40–75)
HDLC SERPL: 34.6 % (ref 20–50)
LDLC SERPL CALC-MCNC: 64 MG/DL (ref 63–159)
NONHDLC SERPL-MCNC: 87 MG/DL
POTASSIUM SERPL-SCNC: 3.5 MMOL/L (ref 3.5–5.1)
PROT SERPL-MCNC: 7.2 GM/DL (ref 6–8.4)
SODIUM SERPL-SCNC: 141 MMOL/L (ref 136–145)
TRIGL SERPL-MCNC: 115 MG/DL (ref 30–150)
TSH SERPL-ACNC: 3.69 UIU/ML (ref 0.4–4)

## 2025-07-29 PROCEDURE — 84443 ASSAY THYROID STIM HORMONE: CPT

## 2025-07-29 PROCEDURE — 82465 ASSAY BLD/SERUM CHOLESTEROL: CPT

## 2025-07-29 PROCEDURE — 82040 ASSAY OF SERUM ALBUMIN: CPT

## 2025-07-29 PROCEDURE — 36415 COLL VENOUS BLD VENIPUNCTURE: CPT | Mod: PO

## 2025-07-29 PROCEDURE — 83036 HEMOGLOBIN GLYCOSYLATED A1C: CPT

## 2025-07-30 ENCOUNTER — RESULTS FOLLOW-UP (OUTPATIENT)
Dept: PRIMARY CARE CLINIC | Facility: CLINIC | Age: 70
End: 2025-07-30
Payer: MEDICARE

## 2025-07-30 DIAGNOSIS — R73.03 PREDIABETES: ICD-10-CM

## 2025-08-06 ENCOUNTER — OFFICE VISIT (OUTPATIENT)
Dept: DERMATOLOGY | Facility: CLINIC | Age: 70
End: 2025-08-06
Payer: MEDICARE

## 2025-08-06 DIAGNOSIS — L30.0 NUMMULAR ECZEMATOUS DERMATITIS: ICD-10-CM

## 2025-08-06 DIAGNOSIS — L81.4 LENTIGINES: ICD-10-CM

## 2025-08-06 DIAGNOSIS — B07.8 COMMON WART: ICD-10-CM

## 2025-08-06 DIAGNOSIS — L73.9 FOLLICULITIS: Primary | ICD-10-CM

## 2025-08-06 PROCEDURE — 99214 OFFICE O/P EST MOD 30 MIN: CPT | Mod: PBBFAC,PO,25 | Performed by: DERMATOLOGY

## 2025-08-06 PROCEDURE — 17110 DESTRUCTION B9 LES UP TO 14: CPT | Mod: S$PBB,,, | Performed by: DERMATOLOGY

## 2025-08-06 PROCEDURE — 17110 DESTRUCTION B9 LES UP TO 14: CPT | Mod: PBBFAC,PO | Performed by: DERMATOLOGY

## 2025-08-06 PROCEDURE — 99999 PR PBB SHADOW E&M-EST. PATIENT-LVL IV: CPT | Mod: PBBFAC,,, | Performed by: DERMATOLOGY

## 2025-08-06 PROCEDURE — 99214 OFFICE O/P EST MOD 30 MIN: CPT | Mod: 25,S$PBB,, | Performed by: DERMATOLOGY

## 2025-08-06 RX ORDER — BETAMETHASONE DIPROPIONATE 0.5 MG/G
OINTMENT TOPICAL
Qty: 45 G | Refills: 1 | Status: SHIPPED | OUTPATIENT
Start: 2025-08-06

## 2025-08-06 RX ORDER — DOXYCYCLINE HYCLATE 100 MG
100 TABLET ORAL DAILY
Qty: 30 TABLET | Refills: 0 | Status: SHIPPED | OUTPATIENT
Start: 2025-08-06

## 2025-08-06 RX ORDER — MOMETASONE FUROATE 1 MG/ML
LOTION TOPICAL
Qty: 60 ML | Refills: 6 | Status: SHIPPED | OUTPATIENT
Start: 2025-08-06

## 2025-08-06 RX ORDER — TRETINOIN 0.25 MG/G
CREAM TOPICAL
Qty: 60 G | Refills: 5 | Status: SHIPPED | OUTPATIENT
Start: 2025-08-06

## 2025-08-06 NOTE — PATIENT INSTRUCTIONS
Discussed with patient good skin care regimen including avoiding fragranced products and very hot showers.  Recommended dove sensitive skin bar soap or cerave hydrating cleanser or bar.  Recommend Cerave cream  For moisturization daily -2x daily.     CRYOSURGERY      Your doctor has used a method called cryosurgery to treat your skin condition. Cryosurgery refers to the use of very cold substances to treat a variety of skin conditions such as warts, pre-skin cancers, molluscum contagiosum, sun spots, and several benign growths. The substance we use in cryosurgery is liquid nitrogen and is so cold (-195 degrees Celsius) that is burns when administered.     Following treatment in the office, the skin may immediately burn and become red. You may find the area around the lesion is affected as well. It is sometimes necessary to treat not only the lesion, but a small area of the surrounding normal skin to achieve a good response.     A blister, and even a blood filled blister, may form after treatment.   This is a normal response. If the blister is painful, it is acceptable to sterilize a needle and with rubbing alcohol and gently pop the blister. It is important that you gently wash the area with soap and warm water as the blister fluid may contain wart virus if a wart was treated. Do no remove the roof of the blister.     The area treated can take anywhere from 1-3 weeks to heal. Healing time depends on the kind skin lesion treated, the location, and how aggressively the lesion was treated. It is recommended that the areas treated are covered with Vaseline or bacitracin ointment and a band-aid. If a band-aid is not practical, just ointment applied several times per day will do. Keeping these areas moist will speed the healing time.    Treatment with liquid nitrogen can leave a scar. In dark skin, it may be a light or dark scar, in light skin it may be a white or pink scar. These will generally fade with time, but may  never go away completely.     If you have any concerns after your treatment, please feel free to call the office.       [facility] [place] [address]/ [phone] FAX/ [email or web address]

## 2025-08-06 NOTE — PROGRESS NOTES
Subjective:      Patient ID:  Carolina Knight is a 70 y.o. female who presents for   Chief Complaint   Patient presents with    Lesion     L posterior forearm    Rash     Neck, scalp, and both legs      Patient with new area of concern:   Location: scalp- itches  Previous treatments: otc shampoo       Lesion - Initial  Affected locations: left arm  Signs / symptoms: growing  Aggravated by: nothing  Relieving factors/Treatments tried: nothing    Rash - Initial  Affected locations: left lower leg, right lower leg, left upper leg, right upper leg and neck  Signs / symptoms: itching and redness  Relieving factors/Treatments tried: nothing      Review of Systems   Skin:  Positive for rash.       Objective:   Physical Exam   Skin:   Areas Examined (abnormalities noted in diagram):   Scalp / Hair Palpated and Inspected  Neck Inspection Performed  LUE Inspection Performed  RLE Inspected  LLE Inspection Performed            Diagram Legend     Erythematous scaling macule/papule c/w actinic keratosis       Vascular papule c/w angioma      Pigmented verrucoid papule/plaque c/w seborrheic keratosis      Yellow umbilicated papule c/w sebaceous hyperplasia      Irregularly shaped tan macule c/w lentigo     1-2 mm smooth white papules consistent with Milia      Movable subcutaneous cyst with punctum c/w epidermal inclusion cyst      Subcutaneous movable cyst c/w pilar cyst      Firm pink to brown papule c/w dermatofibroma      Pedunculated fleshy papule(s) c/w skin tag(s)      Evenly pigmented macule c/w junctional nevus     Mildly variegated pigmented, slightly irregular-bordered macule c/w mildly atypical nevus      Flesh colored to evenly pigmented papule c/w intradermal nevus       Pink pearly papule/plaque c/w basal cell carcinoma      Erythematous hyperkeratotic cursted plaque c/w SCC      Surgical scar with no sign of skin cancer recurrence      Open and closed comedones      Inflammatory papules and pustules       Verrucoid papule consistent consistent with wart     Erythematous eczematous patches and plaques     Dystrophic onycholytic nail with subungual debris c/w onychomycosis     Umbilicated papule    Erythematous-base heme-crusted tan verrucoid plaque consistent with inflamed seborrheic keratosis     Erythematous Silvery Scaling Plaque c/w Psoriasis     See annotation      Assessment / Plan:        Folliculitis  -     doxycycline (VIBRA-TABS) 100 MG tablet; Take 1 tablet (100 mg total) by mouth once daily. Sig 1 po qd with food and not within 1 hour of bedtime  Dispense: 30 tablet; Refill: 0  -     mometasone (ELOCON) 0.1 % solution; Aaa on scalp qd prn itching /scaling  Dispense: 60 mL; Refill: 6  Take doxycyline 100mg  - 1 whole tablet every day with food and not within 1 hour prior to lying down until face clear then decrease to 1/2 tablet every day    Common wart  Cryosurgery procedure note:    Verbal consent from the patient is obtained including, but not limited to, risk of hypopigmentation/hyperpigmentation, scar, recurrence of lesion. Liquid nitrogen cryosurgery is applied to 1 lesions to produce a freeze injury. The patient is aware that blisters may form and is instructed on wound care with gentle cleansing and use of vaseline ointment to keep moist until healed. The patient is supplied a handout on cryosurgery and is instructed to call if lesions do not completely resolve.    Nummular eczematous dermatitis  Discussed with patient good skin care regimen including avoiding fragranced products and very hot showers.  Recommended dove sensitive skin bar soap or cerave hydrating cleanser or bar.  Recommend Cerave cream  For moisturization daily -2x daily.     -     betamethasone dipropionate (BETANATE) 0.05 % ointment; aaa qd- bid to legs. Not more than 2 weeks straight in same location. Avoid use on face and groin  Dispense: 45 g; Refill: 1    Lentigines  This is a benign hyperpigmented sun induced lesion.  Recommend daily sun protection/avoidance and use of at least SPF 30, broad spectrum sunscreen (OTC drug) will reduce the number of new lesions. Treatment of these benign lesions are considered cosmetic.  The nature of sun-induced photo-aging and skin cancers is discussed.  Sun avoidance, protective clothing, and the use of 30-SPF sunscreens is advised. Observe closely for skin damage/changes, and call if such occurs.  Elta daily tinted  -     tretinoin (RETIN-A) 0.025 % cream; Compound tretinoin 0.025% / niacinamide 2% cream. Apply a pea-sized amount to entire face qhs then moisturize.  Dispense: 60 g; Refill: 5             Follow up for PRN.

## 2025-08-13 RX ORDER — METFORMIN HYDROCHLORIDE 500 MG/1
500 TABLET, EXTENDED RELEASE ORAL NIGHTLY
Qty: 90 TABLET | Refills: 3 | Status: SHIPPED | OUTPATIENT
Start: 2025-08-13 | End: 2026-08-13

## 2025-08-19 ENCOUNTER — OFFICE VISIT (OUTPATIENT)
Dept: PRIMARY CARE CLINIC | Facility: CLINIC | Age: 70
End: 2025-08-19
Payer: MEDICARE

## 2025-08-19 VITALS
OXYGEN SATURATION: 97 % | SYSTOLIC BLOOD PRESSURE: 102 MMHG | TEMPERATURE: 98 F | HEIGHT: 66 IN | BODY MASS INDEX: 42.71 KG/M2 | WEIGHT: 265.75 LBS | DIASTOLIC BLOOD PRESSURE: 76 MMHG | HEART RATE: 73 BPM

## 2025-08-19 DIAGNOSIS — M85.80 OSTEOPENIA, UNSPECIFIED LOCATION: ICD-10-CM

## 2025-08-19 DIAGNOSIS — E03.9 HYPOTHYROIDISM, UNSPECIFIED TYPE: ICD-10-CM

## 2025-08-19 DIAGNOSIS — I10 ESSENTIAL HYPERTENSION: ICD-10-CM

## 2025-08-19 DIAGNOSIS — Z12.31 ENCOUNTER FOR SCREENING MAMMOGRAM FOR BREAST CANCER: ICD-10-CM

## 2025-08-19 DIAGNOSIS — I10 BENIGN ESSENTIAL HYPERTENSION: Primary | ICD-10-CM

## 2025-08-19 DIAGNOSIS — K21.9 GASTROESOPHAGEAL REFLUX DISEASE WITHOUT ESOPHAGITIS: ICD-10-CM

## 2025-08-19 DIAGNOSIS — E78.5 HYPERLIPIDEMIA, UNSPECIFIED HYPERLIPIDEMIA TYPE: ICD-10-CM

## 2025-08-19 DIAGNOSIS — G47.33 OSA (OBSTRUCTIVE SLEEP APNEA): ICD-10-CM

## 2025-08-19 PROCEDURE — 99999 PR PBB SHADOW E&M-EST. PATIENT-LVL III: CPT | Mod: PBBFAC,,, | Performed by: INTERNAL MEDICINE

## 2025-08-19 PROCEDURE — 99213 OFFICE O/P EST LOW 20 MIN: CPT | Mod: PBBFAC,PN | Performed by: INTERNAL MEDICINE

## 2025-08-19 PROCEDURE — 99214 OFFICE O/P EST MOD 30 MIN: CPT | Mod: S$PBB,,, | Performed by: INTERNAL MEDICINE

## 2025-08-19 PROCEDURE — G2211 COMPLEX E/M VISIT ADD ON: HCPCS | Mod: ,,, | Performed by: INTERNAL MEDICINE

## 2025-08-19 RX ORDER — AMLODIPINE BESYLATE 10 MG/1
5 TABLET ORAL DAILY
Start: 2025-08-19

## 2025-08-19 RX ORDER — LEVOTHYROXINE SODIUM 125 UG/1
125 TABLET ORAL
Qty: 90 TABLET | Refills: 3 | Status: SHIPPED | OUTPATIENT
Start: 2025-08-19

## 2025-08-19 RX ORDER — TIRZEPATIDE 7.5 MG/.5ML
7.5 INJECTION, SOLUTION SUBCUTANEOUS
Qty: 2 ML | Refills: 1 | Status: SHIPPED | OUTPATIENT
Start: 2025-08-19

## 2025-08-19 RX ORDER — PANTOPRAZOLE SODIUM 40 MG/1
40 TABLET, DELAYED RELEASE ORAL DAILY
Qty: 90 TABLET | Refills: 3 | Status: SHIPPED | OUTPATIENT
Start: 2025-08-19

## 2025-08-19 RX ORDER — PANTOPRAZOLE SODIUM 40 MG/1
40 TABLET, DELAYED RELEASE ORAL DAILY
Qty: 90 TABLET | Refills: 3 | Status: SHIPPED | OUTPATIENT
Start: 2025-08-19 | End: 2025-08-19 | Stop reason: SDUPTHER

## 2025-08-19 RX ORDER — FLUCONAZOLE 150 MG/1
TABLET ORAL
Qty: 2 TABLET | Refills: 3 | Status: SHIPPED | OUTPATIENT
Start: 2025-08-19

## 2025-08-19 RX ORDER — ROSUVASTATIN CALCIUM 5 MG/1
5 TABLET, COATED ORAL
Qty: 48 TABLET | Refills: 3 | Status: SHIPPED | OUTPATIENT
Start: 2025-08-20

## (undated) DEVICE — KIT VIZADISC KNEE TRACKING

## (undated) DEVICE — ADHESIVE DERMABOND ADVANCED

## (undated) DEVICE — SEE MEDLINE ITEM 157131

## (undated) DEVICE — SET CEMENT (SCULP)

## (undated) DEVICE — SEE MEDLINE ITEM 146298

## (undated) DEVICE — SUT VICRYL PLUS 0 CT1 18IN

## (undated) DEVICE — PIN BONE 4X110MM
Type: IMPLANTABLE DEVICE | Site: KNEE | Status: NON-FUNCTIONAL
Removed: 2022-02-01

## (undated) DEVICE — PAD COLD THERAPY KNEE WRAP ON

## (undated) DEVICE — KIT TOTAL KNEE TKOFG

## (undated) DEVICE — TOURNIQUET SB QC DP 34X4IN

## (undated) DEVICE — DRESSING AQUACEL AG ADV 3.5X12

## (undated) DEVICE — ELECTRODE REM PLYHSV RETURN 9

## (undated) DEVICE — SUT VICRYL+ 1 CT1 18IN

## (undated) DEVICE — KIT DRAPE RIO ONE PIECE W/POCK

## (undated) DEVICE — DRAPE INCISE IOBAN 2 23X33IN

## (undated) DEVICE — SUT VICRYL PLUS 2-0 CT1 18

## (undated) DEVICE — KIT IRR SUCTION HND PIECE

## (undated) DEVICE — SEE MEDLINE ITEM 152530

## (undated) DEVICE — PIN BONE 4 X 140MM STERILE
Type: IMPLANTABLE DEVICE | Site: KNEE | Status: NON-FUNCTIONAL
Removed: 2022-02-01

## (undated) DEVICE — DRAPE STERI INSTRUMENT 1018

## (undated) DEVICE — BLADE MAKO STANDARD

## (undated) DEVICE — PIN BONE 4 X 140MM STERILE
Type: IMPLANTABLE DEVICE | Site: KNEE | Status: NON-FUNCTIONAL
Removed: 2021-03-09

## (undated) DEVICE — WRAP PROTECTIVE LEG POS STRL

## (undated) DEVICE — SUT MONOCRYL 3-0 PS-1

## (undated) DEVICE — UNDERGLOVES BIOGEL PI SIZE 8.5

## (undated) DEVICE — Device

## (undated) DEVICE — MASK FLYTE HOOD PEEL AWAY

## (undated) DEVICE — GLOVE BIOGEL SKINSENSE PI 8.5

## (undated) DEVICE — KIT CHECKPOINT MAKO

## (undated) DEVICE — BOWL CEMENT

## (undated) DEVICE — PUMP COLD THERAPY

## (undated) DEVICE — TAPE SURG DURAPORE 2 X10YD

## (undated) DEVICE — SEE MEDLINE ITEM 152548

## (undated) DEVICE — SUT STRATAFIX 3-0 30CM

## (undated) DEVICE — PAD KNEE POLAR XL

## (undated) DEVICE — PIN BONE 4X110MM
Type: IMPLANTABLE DEVICE | Site: KNEE | Status: NON-FUNCTIONAL
Removed: 2021-03-09